# Patient Record
Sex: MALE | Race: WHITE | NOT HISPANIC OR LATINO | URBAN - METROPOLITAN AREA
[De-identification: names, ages, dates, MRNs, and addresses within clinical notes are randomized per-mention and may not be internally consistent; named-entity substitution may affect disease eponyms.]

---

## 2017-10-20 ENCOUNTER — OUTPATIENT (OUTPATIENT)
Dept: OUTPATIENT SERVICES | Facility: HOSPITAL | Age: 82
LOS: 1 days | Discharge: HOME | End: 2017-10-20

## 2017-10-20 DIAGNOSIS — B02.9 ZOSTER WITHOUT COMPLICATIONS: ICD-10-CM

## 2017-10-20 DIAGNOSIS — E11.9 TYPE 2 DIABETES MELLITUS WITHOUT COMPLICATIONS: ICD-10-CM

## 2017-10-20 DIAGNOSIS — I48.91 UNSPECIFIED ATRIAL FIBRILLATION: ICD-10-CM

## 2017-10-20 DIAGNOSIS — I10 ESSENTIAL (PRIMARY) HYPERTENSION: ICD-10-CM

## 2017-10-31 DIAGNOSIS — E55.9 VITAMIN D DEFICIENCY, UNSPECIFIED: ICD-10-CM

## 2017-11-22 ENCOUNTER — OUTPATIENT (OUTPATIENT)
Dept: OUTPATIENT SERVICES | Facility: HOSPITAL | Age: 82
LOS: 1 days | Discharge: HOME | End: 2017-11-22

## 2017-11-22 DIAGNOSIS — E11.9 TYPE 2 DIABETES MELLITUS WITHOUT COMPLICATIONS: ICD-10-CM

## 2017-11-22 DIAGNOSIS — R06.02 SHORTNESS OF BREATH: ICD-10-CM

## 2017-11-22 DIAGNOSIS — I10 ESSENTIAL (PRIMARY) HYPERTENSION: ICD-10-CM

## 2018-07-31 ENCOUNTER — TRANSCRIPTION ENCOUNTER (OUTPATIENT)
Age: 83
End: 2018-07-31

## 2018-10-16 ENCOUNTER — OUTPATIENT (OUTPATIENT)
Dept: OUTPATIENT SERVICES | Facility: HOSPITAL | Age: 83
LOS: 1 days | Discharge: HOME | End: 2018-10-16

## 2018-10-16 DIAGNOSIS — N40.1 BENIGN PROSTATIC HYPERPLASIA WITH LOWER URINARY TRACT SYMPTOMS: ICD-10-CM

## 2018-10-16 DIAGNOSIS — N99.114 POSTPROCEDURAL URETHRAL STRICTURE, MALE, UNSPECIFIED: ICD-10-CM

## 2018-10-16 DIAGNOSIS — R33.9 RETENTION OF URINE, UNSPECIFIED: ICD-10-CM

## 2018-12-03 ENCOUNTER — INPATIENT (INPATIENT)
Facility: HOSPITAL | Age: 83
LOS: 2 days | Discharge: HOME | End: 2018-12-06
Attending: INTERNAL MEDICINE | Admitting: INTERNAL MEDICINE

## 2018-12-03 VITALS — WEIGHT: 279.99 LBS | HEIGHT: 72 IN

## 2018-12-03 DIAGNOSIS — Z98.890 OTHER SPECIFIED POSTPROCEDURAL STATES: Chronic | ICD-10-CM

## 2018-12-03 LAB
ALBUMIN SERPL ELPH-MCNC: 4 G/DL — SIGNIFICANT CHANGE UP (ref 3.5–5.2)
ALP SERPL-CCNC: 104 U/L — SIGNIFICANT CHANGE UP (ref 30–115)
ALT FLD-CCNC: 8 U/L — SIGNIFICANT CHANGE UP (ref 0–41)
ANION GAP SERPL CALC-SCNC: 17 MMOL/L — HIGH (ref 7–14)
APPEARANCE UR: CLEAR — SIGNIFICANT CHANGE UP
AST SERPL-CCNC: 23 U/L — SIGNIFICANT CHANGE UP (ref 0–41)
BASOPHILS # BLD AUTO: 0.01 K/UL — SIGNIFICANT CHANGE UP (ref 0–0.2)
BASOPHILS NFR BLD AUTO: 0.1 % — SIGNIFICANT CHANGE UP (ref 0–1)
BILIRUB SERPL-MCNC: 0.7 MG/DL — SIGNIFICANT CHANGE UP (ref 0.2–1.2)
BILIRUB UR-MCNC: NEGATIVE — SIGNIFICANT CHANGE UP
BUN SERPL-MCNC: 58 MG/DL — HIGH (ref 10–20)
CALCIUM SERPL-MCNC: 7.6 MG/DL — LOW (ref 8.5–10.1)
CHLORIDE SERPL-SCNC: 96 MMOL/L — LOW (ref 98–110)
CO2 SERPL-SCNC: 25 MMOL/L — SIGNIFICANT CHANGE UP (ref 17–32)
COLOR SPEC: YELLOW — SIGNIFICANT CHANGE UP
CREAT SERPL-MCNC: 2.3 MG/DL — HIGH (ref 0.7–1.5)
DIFF PNL FLD: NEGATIVE — SIGNIFICANT CHANGE UP
EOSINOPHIL # BLD AUTO: 0.21 K/UL — SIGNIFICANT CHANGE UP (ref 0–0.7)
EOSINOPHIL NFR BLD AUTO: 2.4 % — SIGNIFICANT CHANGE UP (ref 0–8)
GAS PNL BLDV: SIGNIFICANT CHANGE UP
GLUCOSE BLDC GLUCOMTR-MCNC: 305 MG/DL — HIGH (ref 70–99)
GLUCOSE SERPL-MCNC: 370 MG/DL — HIGH (ref 70–99)
GLUCOSE UR QL: 250 MG/DL
HCT VFR BLD CALC: 37.2 % — LOW (ref 42–52)
HGB BLD-MCNC: 11.6 G/DL — LOW (ref 14–18)
IMM GRANULOCYTES NFR BLD AUTO: 0.6 % — HIGH (ref 0.1–0.3)
KETONES UR-MCNC: NEGATIVE — SIGNIFICANT CHANGE UP
LEUKOCYTE ESTERASE UR-ACNC: ABNORMAL
LYMPHOCYTES # BLD AUTO: 2.1 K/UL — SIGNIFICANT CHANGE UP (ref 1.2–3.4)
LYMPHOCYTES # BLD AUTO: 24.2 % — SIGNIFICANT CHANGE UP (ref 20.5–51.1)
MCHC RBC-ENTMCNC: 27.4 PG — SIGNIFICANT CHANGE UP (ref 27–31)
MCHC RBC-ENTMCNC: 31.2 G/DL — LOW (ref 32–37)
MCV RBC AUTO: 87.7 FL — SIGNIFICANT CHANGE UP (ref 80–94)
MONOCYTES # BLD AUTO: 0.65 K/UL — HIGH (ref 0.1–0.6)
MONOCYTES NFR BLD AUTO: 7.5 % — SIGNIFICANT CHANGE UP (ref 1.7–9.3)
NEUTROPHILS # BLD AUTO: 5.67 K/UL — SIGNIFICANT CHANGE UP (ref 1.4–6.5)
NEUTROPHILS NFR BLD AUTO: 65.2 % — SIGNIFICANT CHANGE UP (ref 42.2–75.2)
NITRITE UR-MCNC: NEGATIVE — SIGNIFICANT CHANGE UP
NT-PROBNP SERPL-SCNC: 2577 PG/ML — HIGH (ref 0–300)
PH UR: 6 — SIGNIFICANT CHANGE UP (ref 5–8)
PLATELET # BLD AUTO: 187 K/UL — SIGNIFICANT CHANGE UP (ref 130–400)
POTASSIUM SERPL-MCNC: 4.9 MMOL/L — SIGNIFICANT CHANGE UP (ref 3.5–5)
POTASSIUM SERPL-SCNC: 4.9 MMOL/L — SIGNIFICANT CHANGE UP (ref 3.5–5)
PROT SERPL-MCNC: 6.2 G/DL — SIGNIFICANT CHANGE UP (ref 6–8)
PROT UR-MCNC: NEGATIVE MG/DL — SIGNIFICANT CHANGE UP
RBC # BLD: 4.24 M/UL — LOW (ref 4.7–6.1)
RBC # FLD: 15.3 % — HIGH (ref 11.5–14.5)
SODIUM SERPL-SCNC: 138 MMOL/L — SIGNIFICANT CHANGE UP (ref 135–146)
SP GR SPEC: 1.02 — SIGNIFICANT CHANGE UP (ref 1.01–1.03)
TROPONIN T SERPL-MCNC: 0.11 NG/ML — CRITICAL HIGH
UROBILINOGEN FLD QL: 0.2 MG/DL — SIGNIFICANT CHANGE UP (ref 0.2–0.2)
WBC # BLD: 8.69 K/UL — SIGNIFICANT CHANGE UP (ref 4.8–10.8)
WBC # FLD AUTO: 8.69 K/UL — SIGNIFICANT CHANGE UP (ref 4.8–10.8)

## 2018-12-03 RX ORDER — SODIUM CHLORIDE 9 MG/ML
1000 INJECTION, SOLUTION INTRAVENOUS
Qty: 0 | Refills: 0 | Status: DISCONTINUED | OUTPATIENT
Start: 2018-12-03 | End: 2018-12-06

## 2018-12-03 RX ORDER — ARIPIPRAZOLE 15 MG/1
1 TABLET ORAL
Qty: 0 | Refills: 0 | COMMUNITY

## 2018-12-03 RX ORDER — INSULIN GLARGINE 100 [IU]/ML
20 INJECTION, SOLUTION SUBCUTANEOUS AT BEDTIME
Qty: 0 | Refills: 0 | Status: DISCONTINUED | OUTPATIENT
Start: 2018-12-03 | End: 2018-12-04

## 2018-12-03 RX ORDER — DEXTROSE 50 % IN WATER 50 %
15 SYRINGE (ML) INTRAVENOUS ONCE
Qty: 0 | Refills: 0 | Status: DISCONTINUED | OUTPATIENT
Start: 2018-12-03 | End: 2018-12-06

## 2018-12-03 RX ORDER — TAMSULOSIN HYDROCHLORIDE 0.4 MG/1
0.4 CAPSULE ORAL AT BEDTIME
Qty: 0 | Refills: 0 | Status: DISCONTINUED | OUTPATIENT
Start: 2018-12-03 | End: 2018-12-06

## 2018-12-03 RX ORDER — AMLODIPINE BESYLATE 2.5 MG/1
5 TABLET ORAL DAILY
Qty: 0 | Refills: 0 | Status: DISCONTINUED | OUTPATIENT
Start: 2018-12-03 | End: 2018-12-06

## 2018-12-03 RX ORDER — INSULIN LISPRO 100/ML
7 VIAL (ML) SUBCUTANEOUS
Qty: 0 | Refills: 0 | Status: DISCONTINUED | OUTPATIENT
Start: 2018-12-03 | End: 2018-12-04

## 2018-12-03 RX ORDER — FUROSEMIDE 40 MG
40 TABLET ORAL DAILY
Qty: 0 | Refills: 0 | Status: DISCONTINUED | OUTPATIENT
Start: 2018-12-03 | End: 2018-12-06

## 2018-12-03 RX ORDER — GLUCAGON INJECTION, SOLUTION 0.5 MG/.1ML
1 INJECTION, SOLUTION SUBCUTANEOUS ONCE
Qty: 0 | Refills: 0 | Status: DISCONTINUED | OUTPATIENT
Start: 2018-12-03 | End: 2018-12-06

## 2018-12-03 RX ORDER — INSULIN LISPRO 100/ML
5 VIAL (ML) SUBCUTANEOUS ONCE
Qty: 0 | Refills: 0 | Status: COMPLETED | OUTPATIENT
Start: 2018-12-03 | End: 2018-12-03

## 2018-12-03 RX ORDER — ASPIRIN/CALCIUM CARB/MAGNESIUM 324 MG
325 TABLET ORAL ONCE
Qty: 0 | Refills: 0 | Status: COMPLETED | OUTPATIENT
Start: 2018-12-03 | End: 2018-12-03

## 2018-12-03 RX ORDER — DEXTROSE 50 % IN WATER 50 %
12.5 SYRINGE (ML) INTRAVENOUS ONCE
Qty: 0 | Refills: 0 | Status: DISCONTINUED | OUTPATIENT
Start: 2018-12-03 | End: 2018-12-06

## 2018-12-03 RX ORDER — INSULIN LISPRO 100/ML
VIAL (ML) SUBCUTANEOUS
Qty: 0 | Refills: 0 | Status: DISCONTINUED | OUTPATIENT
Start: 2018-12-03 | End: 2018-12-06

## 2018-12-03 RX ORDER — DEXTROSE 50 % IN WATER 50 %
25 SYRINGE (ML) INTRAVENOUS ONCE
Qty: 0 | Refills: 0 | Status: DISCONTINUED | OUTPATIENT
Start: 2018-12-03 | End: 2018-12-06

## 2018-12-03 RX ORDER — ATORVASTATIN CALCIUM 80 MG/1
20 TABLET, FILM COATED ORAL AT BEDTIME
Qty: 0 | Refills: 0 | Status: DISCONTINUED | OUTPATIENT
Start: 2018-12-03 | End: 2018-12-06

## 2018-12-03 RX ORDER — HEPARIN SODIUM 5000 [USP'U]/ML
5000 INJECTION INTRAVENOUS; SUBCUTANEOUS EVERY 8 HOURS
Qty: 0 | Refills: 0 | Status: DISCONTINUED | OUTPATIENT
Start: 2018-12-03 | End: 2018-12-06

## 2018-12-03 RX ORDER — LISINOPRIL 2.5 MG/1
40 TABLET ORAL DAILY
Qty: 0 | Refills: 0 | Status: DISCONTINUED | OUTPATIENT
Start: 2018-12-03 | End: 2018-12-06

## 2018-12-03 RX ADMIN — Medication 325 MILLIGRAM(S): at 17:38

## 2018-12-03 NOTE — ED PROVIDER NOTE - PHYSICAL EXAMINATION
GEN: Well appearing, in no apparent distress.    HEAD:  Normocephalic, atraumatic.    EYES:  Clear conjunctivae without injection, drainage or discharge.    ENMT:  Moist MM.    NECK:  Supple, no masses. Normal ROM.    CARDIAC:  RRR, normal S1 and S2, no murmurs, rubs or gallops.    RESP:  Respiratory rate and effort appear normal; lungs are clear to auscultation bilaterally; no rhonchi, rales or wheezes.    ABDOMEN:  Soft, non-tender, non-distended, no masses. Normal BS throughout.    MUSCULOSKELETAL: No leg swelling, no calf tenderness.   NEURO:  AAO x 3. Motor 5/5. Sensory intact. CN II – XII intact.     SKIN:  Normal skin color for age and race, well-perfused; warm and dry.

## 2018-12-03 NOTE — H&P ADULT - ASSESSMENT
83M with PMHx of DMII, HTN, Hydrocephalus s/p  shunt, Afib not on AC presents to [facility] for dizziness x 1 week.    #Dizziness  - Ddx (neuro mediated vs orthostatic vs cardiovascular)  - check monitor for arrhythmic events  - Check orthostatic vitals   - Check TTE, possible hx of CHF. Was prescribed lasix as per pharmacy. Euvolemic on exam   - Neuro eval, PT eval   - consider meclizine for vertigo   - consider cardio evaluation     #Hydrocephalus s/p  shunt   - CTH shows Hypo- to isodense subdural fluid collections over the cerebral   convexities measuring 4-5 mm on each side.   - Evaluated by CT surgery PA in the ED. Images review and this is unlikely SDH    #DMII, uncontrolled   - non compliant with medications at home  - A1c on 10/2018 - 13.1   - Will start insulin regimen     CKD stage III-IV  - stable since october  - likely 2/2 uncontrolled dmii    #HTN  - c/w ace-i and amlodipine    #Atrial fibrillation not on AC  - rate controlled     Ambulate with assistance  DASH diet  DVT PPx: heparin sc   PLEASE CONFIRM HOME MEDS WITH FAMILY IN AM 83M with PMHx of DMII, HTN, Hydrocephalus s/p  shunt, Afib not on AC presents to [facility] for dizziness x 1 week.    #Dizziness  - Ddx (neuro mediated vs orthostatic vs cardiovascular)  - check monitor for arrhythmic events  - Check orthostatic vitals   - Check TTE, possible hx of CHF. Was prescribed lasix as per pharmacy. Euvolemic on exam   - Neuro eval, PT eval   - consider meclizine for vertigo   - consider cardio evaluation     #Hydrocephalus s/p  shunt   - CTH shows Hypo- to isodense subdural fluid collections over the cerebral   convexities measuring 4-5 mm on each side.   - Evaluated by CT surgery PA in the ED. Images review and this is unlikely SDH    #DMII, uncontrolled   - non compliant with medications at home  - A1c on 10/2018 - 13.1   - Will start insulin regimen     CKD stage III-IV  - stable since october  - likely 2/2 uncontrolled dmii    #HTN  - c/w ace-i and amlodipine    #Atrial fibrillation not on AC  - rate controlled     Ambulate with assistance  DASH diet  DVT PPx: heparin sc   PLEASE CONFIRM HOME MEDS WITH FAMILY IN AM. CURRENT MED REC FROM [facility]

## 2018-12-03 NOTE — H&P ADULT - NSHPPHYSICALEXAM_GEN_ALL_CORE
PHYSICAL EXAM:  GEN: No acute distress, obese male   LUNGS: Clear to auscultation bilaterally   HEART: S1/S2 present. RRR.   ABD: Soft, non-tender, non-distended. Bowel sounds present  EXT: NC/NC/NE/2+PP/HUSTON  NEURO: AAOX3 PHYSICAL EXAM:  GEN: No acute distress, obese male   LUNGS/RESP: Clear to auscultation bilaterally   HEART/CVS: S1/S2 present. RRR.   ABD/GI: Soft, non-tender, non-distended. Bowel sounds present  EXT: NC/NC/NE/2+PP/HUSTON  NEURO: AAOX3

## 2018-12-03 NOTE — ED PROVIDER NOTE - PROGRESS NOTE DETAILS
I spoke to memo RESENDEZ at the [facility] and discussed results of CT.  will admit to [facility] for further dizziness work up, inpatient neurosurgery consult, and fuerther management.  pt with mild elevated troponin.  will give aspirin.  CT dicsused with neurosurgery pt likely not with ich given ct read. pt has no dysuria.  no urinary complaints.  no incontinence.

## 2018-12-03 NOTE — ED PROVIDER NOTE - OBJECTIVE STATEMENT
84 yo male with PMHX Afib, NPH s/p  shunt presents for dizziness x 2 weeks. Patient states he was seen at PMD today and was sent in for high sugar. Patient has been having B/l; leg weakness and dizziness. Patient/family denies fevers, chest pain, SOB, abdominal pain, nausea, vomiting, diarrhea, weakness, changes in PO intake, changes in urine output, changes in mental status.

## 2018-12-03 NOTE — H&P ADULT - ATTENDING COMMENTS
Patient seen and examined independently. Resident's H & P reviewed. Agree with the findings and plan of care except,    An 83 years old male presented to the ER with dizziness for the last one week. As per wife while trying to get in van he fell backwards three times over the last few weeks.    EKG: A. Fib @ 68/min. Multiple PVC'S. Non specific ST, T changes.    ASSESSMENT:    1. Dizziness likely due to elevated sugars  2. Uncontrolled DM-2  3. JOHANNY/Obesity  4. DM-2/HTN  5. Hydrocephalus S/P  shunt  6. A. Fib on no A/C  7. TOBIAS on CKD-3    PLAN:    . Increase Lantus to 30 units and Humalog to 10 units before meals  . Monitor FS  . Neurosurgery eval for  shunt.  . Care D/W the Neuro. Recommended neurosurgery eval.  . Not on A/C due to  shunt as per wife  . Kidney function back to baseline  . Plan of care D/W the wife and daughter on bedside.

## 2018-12-03 NOTE — ED PROVIDER NOTE - ATTENDING CONTRIBUTION TO CARE
84 yo m with pmh of afib not on anticoagulation, NPH s/p  shunt sent in for 2 weeks of intermittent dizziness and hyperglyucemia.  no cp, no sob, no abd pain, no back pain, no headache, no neck pain, no fevers, no chills.  no back pain.    No dysuria.  awake, alert.  Lungs clear.  EOMI.  Neck supple full rom.  motor/sensation intact in all 4 ext.  MMM.   FS over 300.  p:  labs, ekg, cxr, ct, ivf, supportive care, reassess.

## 2018-12-03 NOTE — ED PROVIDER NOTE - NS ED ROS FT
Constitutional: No fevers/chills.    Head: No injury, headache.    Eyes:  No visual changes, eye pain or discharge. No injury.    ENMT:  No hearing changes, pain, discharge or infections. No neck pain or stiffness. No loss ROM.    Cardiac:  No chest pain, SOB or edema. No chest pain with exertion.    Respiratory:  No cough or respiratory distress.     GI:  No nausea, vomiting, diarrhea or abdominal pain. No anorexia. No change in PO intake.    :  No frequency, urgency or burning. No change in urine output.    MS:  No leg pain, leg swelling, myalgia, muscle weakness, joint pain or back pain. No loss ROM.    Neuro:  (+) dizziness and generalized weakness.  No LOC.    Skin:  No skin rash.

## 2018-12-03 NOTE — H&P ADULT - HISTORY OF PRESENT ILLNESS
83M with PMHx of DMII, HTN, Hydrocephalus s/p  shunt, Afib not on AC presents to [facility] for dizziness x 1 week. Patient has had intermittent feelings of dizziness described as room spinning sensation. Occurs mostly with ambulations. He admits to blurry vision not associated with the episode but has seen ophthalmologist and diagnosed with macular degeneration of right eye. Denies any headaches, LOC. He otherwise denies any fevers, chills, N/V, diarrhea, chest pain, palpitations. Of note patient has history of uncontrolled DMII, recent A1c 13.1, has not been taking his medications.     In ED vitals; /74, HR 78, 98% on RA, RR 18. Finger stick in 300s.

## 2018-12-03 NOTE — H&P ADULT - NSHPLABSRESULTS_GEN_ALL_CORE
Vitals:    Vital Signs Last 24 Hrs  T(C): 35.8 (03 Dec 2018 22:09), Max: 36.6 (03 Dec 2018 17:47)  T(F): 96.4 (03 Dec 2018 22:09), Max: 97.8 (03 Dec 2018 17:47)  HR: 50 (03 Dec 2018 22:09) (50 - 84)  BP: 125/65 (03 Dec 2018 22:09) (109/59 - 148/74)  BP(mean): --  RR: 18 (03 Dec 2018 22:09) (18 - 19)  SpO2: 98% (03 Dec 2018 19:42) (97% - 98%)    Labs:         138  |  96<L>  |  58<H>  ----------------------------<  370<H>  4.9   |  25  |  2.3<H>    Ca    7.6<L>      03 Dec 2018 15:45    TPro  6.2  /  Alb  4.0  /  TBili  0.7  /  DBili  x   /  AST  23  /  ALT  8   /  AlkPhos  104                            11.6   8.69  )-----------( 187      ( 03 Dec 2018 15:45 )             37.2     CARDIAC MARKERS ( 03 Dec 2018 15:45 )  x     / 0.11 ng/mL / x     / x     / x          LIVER FUNCTIONS - ( 03 Dec 2018 15:45 )  Alb: 4.0 g/dL / Pro: 6.2 g/dL / ALK PHOS: 104 U/L / ALT: 8 U/L / AST: 23 U/L / GGT: x             Urinalysis Basic - ( 03 Dec 2018 19:00 )    Color: Yellow / Appearance: Clear / S.020 / pH: x  Gluc: x / Ketone: Negative  / Bili: Negative / Urobili: 0.2 mg/dL   Blood: x / Protein: Negative mg/dL / Nitrite: Negative   Leuk Esterase: Small / RBC: 3-5 /HPF / WBC 10-25 /HPF   Sq Epi: x / Non Sq Epi: Few /HPF / Bacteria: Few        RADIOLOGY    < from: CT Head No Cont (18 @ 16:39) >    IMPRESSION:    1.  Right transfrontal ventricular shunt catheter present. The ventricles   are not significantly enlarged.    2.  Hypo- to isodense subdural fluid collections over the cerebral   convexities measuring 4-5 mm on each side. Given the presence of a shunt   catheter and the appearance and symmetry of the collections, favor   sequela of over-shunting rather than subacute subdural hematomas.    3.  Parenchymal edema in the right frontal lobe surrounding the shunt   catheter, likely reflecting catheter placement.    4.  Direct comparison with the patient's previous outside imaging studies   is recommended. If these can be made available an addendum will be issued   as appropriate.      < end of copied text >

## 2018-12-03 NOTE — ED PROVIDER NOTE - MEDICAL DECISION MAKING DETAILS
Pt sent in by PMD for intermittent dizziness.  Pt also with hyperglycemia.  Pt not acidotic, not in dka.  Pt with slightly elevated troponin.  No chest pain.  Pt also with history of  shunt.  I spoke to neurosurgery PA at [facility] and discussed CT reading.  Pt admitted to [facility] tele for further management, inpatient neuro/neurosurgery and cardiology evaluation.

## 2018-12-04 LAB
ANION GAP SERPL CALC-SCNC: 16 MMOL/L — HIGH (ref 7–14)
BUN SERPL-MCNC: 47 MG/DL — HIGH (ref 10–20)
CALCIUM SERPL-MCNC: 8.3 MG/DL — LOW (ref 8.5–10.1)
CHLORIDE SERPL-SCNC: 98 MMOL/L — SIGNIFICANT CHANGE UP (ref 98–110)
CK MB CFR SERPL CALC: 4.5 NG/ML — SIGNIFICANT CHANGE UP (ref 0.6–6.3)
CK MB CFR SERPL CALC: 4.7 NG/ML — SIGNIFICANT CHANGE UP (ref 0.6–6.3)
CK SERPL-CCNC: 56 U/L — SIGNIFICANT CHANGE UP (ref 0–225)
CK SERPL-CCNC: 57 U/L — SIGNIFICANT CHANGE UP (ref 0–225)
CO2 SERPL-SCNC: 31 MMOL/L — SIGNIFICANT CHANGE UP (ref 17–32)
CREAT SERPL-MCNC: 1.9 MG/DL — HIGH (ref 0.7–1.5)
CULTURE RESULTS: NO GROWTH — SIGNIFICANT CHANGE UP
ESTIMATED AVERAGE GLUCOSE: 283 MG/DL — HIGH (ref 68–114)
GLUCOSE BLDC GLUCOMTR-MCNC: 203 MG/DL — HIGH (ref 70–99)
GLUCOSE BLDC GLUCOMTR-MCNC: 269 MG/DL — HIGH (ref 70–99)
GLUCOSE BLDC GLUCOMTR-MCNC: 304 MG/DL — HIGH (ref 70–99)
GLUCOSE BLDC GLUCOMTR-MCNC: 320 MG/DL — HIGH (ref 70–99)
GLUCOSE BLDC GLUCOMTR-MCNC: 365 MG/DL — HIGH (ref 70–99)
GLUCOSE BLDC GLUCOMTR-MCNC: 419 MG/DL — HIGH (ref 70–99)
GLUCOSE SERPL-MCNC: 276 MG/DL — HIGH (ref 70–99)
HBA1C BLD-MCNC: 11.5 % — HIGH (ref 4–5.6)
MAGNESIUM SERPL-MCNC: 2.3 MG/DL — SIGNIFICANT CHANGE UP (ref 1.8–2.4)
POTASSIUM SERPL-MCNC: 3.9 MMOL/L — SIGNIFICANT CHANGE UP (ref 3.5–5)
POTASSIUM SERPL-SCNC: 3.9 MMOL/L — SIGNIFICANT CHANGE UP (ref 3.5–5)
SODIUM SERPL-SCNC: 145 MMOL/L — SIGNIFICANT CHANGE UP (ref 135–146)
SPECIMEN SOURCE: SIGNIFICANT CHANGE UP
TROPONIN T SERPL-MCNC: 0.06 NG/ML — CRITICAL HIGH
TROPONIN T SERPL-MCNC: 0.06 NG/ML — CRITICAL HIGH

## 2018-12-04 RX ORDER — INSULIN GLARGINE 100 [IU]/ML
30 INJECTION, SOLUTION SUBCUTANEOUS AT BEDTIME
Qty: 0 | Refills: 0 | Status: DISCONTINUED | OUTPATIENT
Start: 2018-12-04 | End: 2018-12-05

## 2018-12-04 RX ORDER — INSULIN LISPRO 100/ML
10 VIAL (ML) SUBCUTANEOUS
Qty: 0 | Refills: 0 | Status: DISCONTINUED | OUTPATIENT
Start: 2018-12-04 | End: 2018-12-05

## 2018-12-04 RX ADMIN — Medication 10 UNIT(S): at 12:38

## 2018-12-04 RX ADMIN — Medication 6: at 12:38

## 2018-12-04 RX ADMIN — Medication 5 UNIT(S): at 04:07

## 2018-12-04 RX ADMIN — INSULIN GLARGINE 30 UNIT(S): 100 INJECTION, SOLUTION SUBCUTANEOUS at 22:00

## 2018-12-04 RX ADMIN — Medication 10 UNIT(S): at 17:20

## 2018-12-04 RX ADMIN — Medication 40 MILLIGRAM(S): at 05:58

## 2018-12-04 RX ADMIN — Medication 4: at 07:55

## 2018-12-04 RX ADMIN — Medication 75 MILLIGRAM(S): at 06:01

## 2018-12-04 RX ADMIN — ATORVASTATIN CALCIUM 20 MILLIGRAM(S): 80 TABLET, FILM COATED ORAL at 21:55

## 2018-12-04 RX ADMIN — Medication 2: at 17:21

## 2018-12-04 RX ADMIN — HEPARIN SODIUM 5000 UNIT(S): 5000 INJECTION INTRAVENOUS; SUBCUTANEOUS at 14:01

## 2018-12-04 RX ADMIN — TAMSULOSIN HYDROCHLORIDE 0.4 MILLIGRAM(S): 0.4 CAPSULE ORAL at 21:55

## 2018-12-04 RX ADMIN — HEPARIN SODIUM 5000 UNIT(S): 5000 INJECTION INTRAVENOUS; SUBCUTANEOUS at 05:59

## 2018-12-04 RX ADMIN — Medication 75 MILLIGRAM(S): at 14:01

## 2018-12-04 RX ADMIN — AMLODIPINE BESYLATE 5 MILLIGRAM(S): 2.5 TABLET ORAL at 05:58

## 2018-12-04 RX ADMIN — LISINOPRIL 40 MILLIGRAM(S): 2.5 TABLET ORAL at 05:58

## 2018-12-04 RX ADMIN — Medication 75 MILLIGRAM(S): at 21:59

## 2018-12-04 RX ADMIN — HEPARIN SODIUM 5000 UNIT(S): 5000 INJECTION INTRAVENOUS; SUBCUTANEOUS at 21:59

## 2018-12-04 NOTE — MEDICAL STUDENT PROGRESS NOTE(EDUCATION) - SUBJECTIVE AND OBJECTIVE BOX
History of Present Illness:  Reason for Admission: Dizziness	  History of Present Illness: 	  83M with PMHx of DMII, HTN, Hydrocephalus s/p  shunt, Afib not on AC presents to [facility] for dizziness x 1 week. Patient has had intermittent feelings of dizziness described as room spinning sensation. Occurs mostly with ambulations. He admits to blurry vision not associated with the episode but has seen ophthalmologist and diagnosed with macular degeneration of right eye. Denies any headaches, LOC. He otherwise denies any fevers, chills, N/V, diarrhea, chest pain, palpitations. Of note patient has history of uncontrolled DMII, recent A1c 13.1, has not been taking his medications.     In ED vitals; /74, HR 78, 98% on RA, RR 18. Finger stick in 300s.     Interval History:  Patient was seen at bedside and appeared to be in no acute distress. He describes feeling dizzy mostly after ambulating and describes these episodes as like a spinning sensation starting within the last week, occurring intermittently occasionally to the point when he needs to lay down. He hasn't been on any diabetes medication medications for over a month and his sugars were in the 300s. No shaking or eye movements were noted by the family during these episodes. No n/v/d/c.    PHYSICAL EXAM:  	GEN: No acute distress, obese male   	LUNGS: Clear to auscultation bilaterally   	HEART: S1/S2 present. RRR.   	ABD: Soft, non-tender, non-distended. Bowel sounds present  	EXT: NC/NC/NE/2+PP/HUSTON  NEURO:   Gen- Patient is alert and oriented to person, place, and location. Recent and remote memory intact. Fund of knowledge is intact and normal. Language with normal repetition, comprehension, and naming. Nondysarthric  Cranial nerves- EOMI w/o nystagmus, skew, or reported double vision. PERRL. No ptosis/weakness or eyelid closure. No facial droop was noted. Facial sensation was intact. Mild hearing loss noted bilaterally. Tongue midline  Motor examination- Normal bulk, Normal tone in the UE and LE b/l; strength 5/5 in the UE and LE b/l  Sensation-   Reflexes- 2+ b/l biceps, triceps, brachioradialis, patella, and achilles. No Babinski.  Cerebellum- FTN and heel to shin testing was normal  Gait- Pt was able to ambulate independently. 4 step turn        Allergies and Intolerances:        Allergies:  	No Known Allergies:       MEDICATIONS  (STANDING):  amLODIPine   Tablet 5 milliGRAM(s) Oral daily  atorvastatin 20 milliGRAM(s) Oral at bedtime  dextrose 5%. 1000 milliLiter(s) (50 mL/Hr) IV Continuous <Continuous>  dextrose 50% Injectable 12.5 Gram(s) IV Push once  dextrose 50% Injectable 25 Gram(s) IV Push once  dextrose 50% Injectable 25 Gram(s) IV Push once  furosemide    Tablet 40 milliGRAM(s) Oral daily  heparin  Injectable 5000 Unit(s) SubCutaneous every 8 hours  insulin glargine Injectable (LANTUS) 30 Unit(s) SubCutaneous at bedtime  insulin lispro (HumaLOG) corrective regimen sliding scale   SubCutaneous three times a day before meals  insulin lispro Injectable (HumaLOG) 10 Unit(s) SubCutaneous three times a day before meals  lisinopril 40 milliGRAM(s) Oral daily  pregabalin 75 milliGRAM(s) Oral three times a day  tamsulosin 0.4 milliGRAM(s) Oral at bedtime    MEDICATIONS  (PRN):  dextrose 40% Gel 15 Gram(s) Oral once PRN Blood Glucose LESS THAN 70 milliGRAM(s)/deciliter  glucagon  Injectable 1 milliGRAM(s) IntraMuscular once PRN Glucose LESS THAN 70 milligrams/deciliter History of Present Illness:  Reason for Admission: Dizziness	  History of Present Illness: 	  83M with PMHx of DMII, HTN, Hydrocephalus s/p  shunt, Afib not on AC presents to [facility] for dizziness x 1 week. Patient has had intermittent feelings of dizziness described as room spinning sensation. Occurs mostly with ambulations. He admits to blurry vision not associated with the episode but has seen ophthalmologist and diagnosed with macular degeneration of right eye. Denies any headaches, LOC. He otherwise denies any fevers, chills, N/V, diarrhea, chest pain, palpitations. Of note patient has history of uncontrolled DMII, recent A1c 13.1, has not been taking his medications.     In ED vitals; /74, HR 78, 98% on RA, RR 18. Finger stick in 300s.     Interval History:  Patient was seen at bedside and appeared to be in no acute distress. He describes feeling dizzy mostly after ambulating and describes these episodes as like a spinning sensation starting within the last week, occurring intermittently occasionally to the point when he needs to lay down. He hasn't been on any diabetes medication medications for over a month and his sugars were in the 300s. No shaking or eye movements were noted by the family during these episodes. No n/v/d/c.    PHYSICAL EXAM:  	GEN: No acute distress, obese male   	LUNGS: Clear to auscultation bilaterally   	HEART: S1/S2 present. RRR.   	ABD: Soft, non-tender, non-distended. Bowel sounds present  	EXT: NC/NC/NE/2+PP/HUSTON  NEURO:   Gen- Patient is alert and oriented to person, place, and location. Recent and remote memory intact. Fund of knowledge is intact and normal. Language with normal repetition, comprehension, and naming. Nondysarthric  Cranial nerves- EOMI w/o nystagmus, skew, or reported double vision. PERRL. No ptosis/weakness or eyelid closure. No facial droop was noted. Facial sensation was intact. Mild hearing loss noted bilaterally. Tongue midline  Motor examination- Normal bulk, Normal tone in the UE and LE b/l; strength 5/5 in the UE and LE b/l  Sensation-   Reflexes- 2+ b/l biceps, triceps, brachioradialis, patella, and achilles. No Babinski.  Cerebellum- FTN and heel to shin testing was normal  Gait- Pt was able to ambulate independently. 4 step turn        Allergies and Intolerances:        Allergies:  	No Known Allergies:       MEDICATIONS  (STANDING):  amLODIPine   Tablet 5 milliGRAM(s) Oral daily  atorvastatin 20 milliGRAM(s) Oral at bedtime  dextrose 5%. 1000 milliLiter(s) (50 mL/Hr) IV Continuous <Continuous>  dextrose 50% Injectable 12.5 Gram(s) IV Push once  dextrose 50% Injectable 25 Gram(s) IV Push once  dextrose 50% Injectable 25 Gram(s) IV Push once  furosemide    Tablet 40 milliGRAM(s) Oral daily  heparin  Injectable 5000 Unit(s) SubCutaneous every 8 hours  insulin glargine Injectable (LANTUS) 30 Unit(s) SubCutaneous at bedtime  insulin lispro (HumaLOG) corrective regimen sliding scale   SubCutaneous three times a day before meals  insulin lispro Injectable (HumaLOG) 10 Unit(s) SubCutaneous three times a day before meals  lisinopril 40 milliGRAM(s) Oral daily  pregabalin 75 milliGRAM(s) Oral three times a day  tamsulosin 0.4 milliGRAM(s) Oral at bedtime    MEDICATIONS  (PRN):  dextrose 40% Gel 15 Gram(s) Oral once PRN Blood Glucose LESS THAN 70 milliGRAM(s)/deciliter  glucagon  Injectable 1 milliGRAM(s) IntraMuscular once PRN Glucose LESS THAN 70 milligrams/deciliter    Labs:  Basic Metabolic Panel in AM (12.04.18 @ 07:33)    Sodium, Serum: 145 mmol/L    Potassium, Serum: 3.9 mmol/L    Chloride, Serum: 98 mmol/L    Carbon Dioxide, Serum: 31 mmol/L    Anion Gap, Serum: 16 mmol/L    Blood Urea Nitrogen, Serum: 47 mg/dL    Creatinine, Serum: 1.9 mg/dL    Glucose, Serum: 276 mg/dL    Calcium, Total Serum: 8.3 mg/dL    eGFR if Non : 32: Interpretative comment    POCT  Blood Glucose (12.04.18 @ 07:48)    POCT Blood Glucose.: 304: RNNotify RB Clnd mtr mg/dL      Imaging:    < from: CT Head No Cont (12.03.18 @ 16:39) >  EXAM:  CT BRAIN            PROCEDURE DATE:  12/03/2018            INTERPRETATION:  Clinical History / Reason for exam: Dizziness. History   of normal pressure hydrocephalus. Status post shunt.    Technique: Noncontrast head CT. Contiguous CT axial images of the head   from the base to the vertex.    Comparison: None available    Findings:    There is a right transfrontal ventricular shunt catheter entering via   right frontal bo hole with distal tip terminating in the atrium of the   right lateral ventricle. Shunt reservoir is in the right parietal scalp   soft tissues with superficial catheter traversing the soft tissues of the   right scalp and neck. There is parenchymal edema in the right frontal   lobe surrounding the catheter tract.    The ventricles are not significantly enlarged.     There are hypo- to isodense subdural fluid collections over the cerebral   convexities measuring up to 4 to 5 mm on each side.    There are patchy hypodensities in the cerebral hemispheric white matter   likely on the basis of chronic microvascular changes.    There is calcific atherosclerotic disease at the skull base.    The calvarium is otherwise intact. The paranasal sinuses and mastoids are   well-aerated.    IMPRESSION:    1.  Right transfrontal ventricular shunt catheter present. The ventricles   are not significantly enlarged.    2.  Hypo- to isodense subdural fluid collections over the cerebral   convexities measuring 4-5 mm on each side. Given the presence of a shunt   catheter and the appearance and symmetry of the collections, favor   sequela of over-shunting rather than subacute subdural hematomas.    3.  Parenchymal edema in the right frontal lobe surrounding the shunt   catheter, likely reflecting catheter placement.    4.  Direct comparison with the patient's previous outside imaging studies   is recommended. If these can be made available an addendum will be issued   as appropriate.                  AMY YOUNG M.D., ATTENDING RADIOLOGIST  This document has been electronically signed. Dec  3 2018  4:49PM    < end of copied text > History of Present Illness:  Reason for Admission: Dizziness	  History of Present Illness: 	  83M with PMHx of DMII, HTN, Hydrocephalus s/p  shunt, Afib not on AC presents to [facility] for dizziness x 1 week. Patient has had intermittent feelings of dizziness described as room spinning sensation. Occurs mostly with ambulations. He admits to blurry vision not associated with the episode but has seen ophthalmologist and diagnosed with macular degeneration of right eye. Denies any headaches, LOC. He otherwise denies any fevers, chills, N/V, diarrhea, chest pain, palpitations. Of note patient has history of uncontrolled DMII, recent A1c 13.1, has not been taking his medications.     In ED vitals; /74, HR 78, 98% on RA, RR 18. Finger stick in 300s.     Interval History:  Patient was seen at bedside and appeared to be in no acute distress. He describes feeling dizzy mostly after ambulating and describes these episodes as like a spinning sensation starting within the last week, occurring intermittently occasionally to the point when he needs to lay down. He hasn't been on any diabetes medication medications for over a month and his sugars were in the 300s. No shaking or eye movements were noted by the family during these episodes. No n/v/d/c.    PHYSICAL EXAM:  	GEN: No acute distress, obese male   	LUNGS: Clear to auscultation bilaterally   	HEART: S1/S2 present. RRR.   	ABD: Soft, non-tender, non-distended. Bowel sounds present  	EXT: NC/NC/NE/2+PP/HUSTON  NEURO:   Gen- Patient is alert and oriented to person, place, and location. Recent and remote memory intact. Fund of knowledge is intact and normal. Language with normal repetition, comprehension, and naming. Nondysarthric  Cranial nerves- EOMI w/o nystagmus, skew, or reported double vision. PERRL. No ptosis/weakness or eyelid closure. No facial droop was noted. Facial sensation was intact. Mild hearing loss noted bilaterally. Tongue midline  Motor examination- Normal bulk, Normal tone in the UE and LE b/l; strength 5/5 in the UE and LE b/l  Sensation- Sensation to light touch and pinprick were present in the UE; sensation to light touch was mildly decreased in the distal LE.   Reflexes- 2+ b/l biceps, triceps, brachioradialis, patella, and achilles. No Babinski.  Cerebellum- FTN and heel to shin testing was normal  Gait- Pt was able to ambulate independently. 4 step turn        Allergies and Intolerances:        Allergies:  	No Known Allergies:       MEDICATIONS  (STANDING):  amLODIPine   Tablet 5 milliGRAM(s) Oral daily  atorvastatin 20 milliGRAM(s) Oral at bedtime  dextrose 5%. 1000 milliLiter(s) (50 mL/Hr) IV Continuous <Continuous>  dextrose 50% Injectable 12.5 Gram(s) IV Push once  dextrose 50% Injectable 25 Gram(s) IV Push once  dextrose 50% Injectable 25 Gram(s) IV Push once  furosemide    Tablet 40 milliGRAM(s) Oral daily  heparin  Injectable 5000 Unit(s) SubCutaneous every 8 hours  insulin glargine Injectable (LANTUS) 30 Unit(s) SubCutaneous at bedtime  insulin lispro (HumaLOG) corrective regimen sliding scale   SubCutaneous three times a day before meals  insulin lispro Injectable (HumaLOG) 10 Unit(s) SubCutaneous three times a day before meals  lisinopril 40 milliGRAM(s) Oral daily  pregabalin 75 milliGRAM(s) Oral three times a day  tamsulosin 0.4 milliGRAM(s) Oral at bedtime    MEDICATIONS  (PRN):  dextrose 40% Gel 15 Gram(s) Oral once PRN Blood Glucose LESS THAN 70 milliGRAM(s)/deciliter  glucagon  Injectable 1 milliGRAM(s) IntraMuscular once PRN Glucose LESS THAN 70 milligrams/deciliter    Labs:  Basic Metabolic Panel in AM (12.04.18 @ 07:33)    Sodium, Serum: 145 mmol/L    Potassium, Serum: 3.9 mmol/L    Chloride, Serum: 98 mmol/L    Carbon Dioxide, Serum: 31 mmol/L    Anion Gap, Serum: 16 mmol/L    Blood Urea Nitrogen, Serum: 47 mg/dL    Creatinine, Serum: 1.9 mg/dL    Glucose, Serum: 276 mg/dL    Calcium, Total Serum: 8.3 mg/dL    eGFR if Non : 32: Interpretative comment    POCT  Blood Glucose (12.04.18 @ 07:48)    POCT Blood Glucose.: 304: RNNotify RB Clnd mtr mg/dL      Imaging:    < from: CT Head No Cont (12.03.18 @ 16:39) >  EXAM:  CT BRAIN            PROCEDURE DATE:  12/03/2018            INTERPRETATION:  Clinical History / Reason for exam: Dizziness. History   of normal pressure hydrocephalus. Status post shunt.    Technique: Noncontrast head CT. Contiguous CT axial images of the head   from the base to the vertex.    Comparison: None available    Findings:    There is a right transfrontal ventricular shunt catheter entering via   right frontal bo hole with distal tip terminating in the atrium of the   right lateral ventricle. Shunt reservoir is in the right parietal scalp   soft tissues with superficial catheter traversing the soft tissues of the   right scalp and neck. There is parenchymal edema in the right frontal   lobe surrounding the catheter tract.    The ventricles are not significantly enlarged.     There are hypo- to isodense subdural fluid collections over the cerebral   convexities measuring up to 4 to 5 mm on each side.    There are patchy hypodensities in the cerebral hemispheric white matter   likely on the basis of chronic microvascular changes.    There is calcific atherosclerotic disease at the skull base.    The calvarium is otherwise intact. The paranasal sinuses and mastoids are   well-aerated.    IMPRESSION:    1.  Right transfrontal ventricular shunt catheter present. The ventricles   are not significantly enlarged.    2.  Hypo- to isodense subdural fluid collections over the cerebral   convexities measuring 4-5 mm on each side. Given the presence of a shunt   catheter and the appearance and symmetry of the collections, favor   sequela of over-shunting rather than subacute subdural hematomas.    3.  Parenchymal edema in the right frontal lobe surrounding the shunt   catheter, likely reflecting catheter placement.    4.  Direct comparison with the patient's previous outside imaging studies   is recommended. If these can be made available an addendum will be issued   as appropriate.                  AMY YOUNG M.D., ATTENDING RADIOLOGIST  This document has been electronically signed. Dec  3 2018  4:49PM    < end of copied text > History of Present Illness:  Reason for Admission: Dizziness	  History of Present Illness: 	  83M with PMHx of DMII, HTN, Hydrocephalus s/p  shunt, Afib not on AC presents to [facility] for dizziness x 1 week. Patient has had intermittent feelings of dizziness described as room spinning sensation. Occurs mostly with ambulations. He admits to blurry vision not associated with the episode but has seen ophthalmologist and diagnosed with macular degeneration of right eye. Denies any headaches, LOC. He otherwise denies any fevers, chills, N/V, diarrhea, chest pain, palpitations. Of note patient has history of uncontrolled DMII, recent A1c 13.1, has not been taking his medications.     In ED vitals; /74, HR 78, 98% on RA, RR 18. Finger stick in 300s.     Interval History:  Patient was seen at bedside and appeared to be in no acute distress. He describes feeling dizzy mostly after ambulating and describes these episodes as like a spinning sensation starting within the last week, occurring intermittently occasionally to the point when he needs to lay down. He hasn't been on any diabetes medication medications for over a month and his sugars were in the 300s. No shaking or eye movements were noted by the family during these episodes. No n/v/d/c.  Has known prior SDH seen in last f/u with neurosurgery Dr. Parker at [facility] in July 2018 was told that SDH is ok.      PHYSICAL EXAM:  	GEN: No acute distress, obese male   	LUNGS: Clear to auscultation bilaterally   	HEART: S1/S2 present. RRR.   	ABD: Soft, non-tender, non-distended. Bowel sounds present  	EXT: NC/NC/NE/2+PP/HUSTON  NEURO:   Gen- Patient is alert and oriented to person, place, and location. Recent and remote memory intact. Fund of knowledge is intact and normal. Language with normal repetition, comprehension, and naming. Nondysarthric  Cranial nerves- EOMI w/o nystagmus, skew, or reported double vision. PERRL. No ptosis/weakness or eyelid closure. No facial droop was noted. Facial sensation was intact. Mild hearing loss noted bilaterally. Tongue midline  Motor examination- Normal bulk, Normal tone in the UE and LE b/l; strength 5/5 in the UE and LE b/l  Sensation- Sensation to light touch and pinprick were present in the UE; sensation to light touch was mildly decreased in the distal LE.   Reflexes- 2+ b/l biceps, triceps, brachioradialis, patella, and achilles. No Babinski.  Cerebellum- FTN and heel to shin testing was normal  Gait- Pt was able to ambulate independently. 4 step turn    Allergies and Intolerances:        Allergies:  	No Known Allergies:     MEDICATIONS  (STANDING):  amLODIPine   Tablet 5 milliGRAM(s) Oral daily  atorvastatin 20 milliGRAM(s) Oral at bedtime  dextrose 5%. 1000 milliLiter(s) (50 mL/Hr) IV Continuous <Continuous>  dextrose 50% Injectable 12.5 Gram(s) IV Push once  dextrose 50% Injectable 25 Gram(s) IV Push once  dextrose 50% Injectable 25 Gram(s) IV Push once  furosemide    Tablet 40 milliGRAM(s) Oral daily  heparin  Injectable 5000 Unit(s) SubCutaneous every 8 hours  insulin glargine Injectable (LANTUS) 30 Unit(s) SubCutaneous at bedtime  insulin lispro (HumaLOG) corrective regimen sliding scale   SubCutaneous three times a day before meals  insulin lispro Injectable (HumaLOG) 10 Unit(s) SubCutaneous three times a day before meals  lisinopril 40 milliGRAM(s) Oral daily  pregabalin 75 milliGRAM(s) Oral three times a day  tamsulosin 0.4 milliGRAM(s) Oral at bedtime    MEDICATIONS  (PRN):  dextrose 40% Gel 15 Gram(s) Oral once PRN Blood Glucose LESS THAN 70 milliGRAM(s)/deciliter  glucagon  Injectable 1 milliGRAM(s) IntraMuscular once PRN Glucose LESS THAN 70 milligrams/deciliter    Labs:  Basic Metabolic Panel in AM (12.04.18 @ 07:33)    Sodium, Serum: 145 mmol/L    Potassium, Serum: 3.9 mmol/L    Chloride, Serum: 98 mmol/L    Carbon Dioxide, Serum: 31 mmol/L    Anion Gap, Serum: 16 mmol/L    Blood Urea Nitrogen, Serum: 47 mg/dL    Creatinine, Serum: 1.9 mg/dL    Glucose, Serum: 276 mg/dL    Calcium, Total Serum: 8.3 mg/dL    eGFR if Non : 32: Interpretative comment    POCT  Blood Glucose (12.04.18 @ 07:48)    POCT Blood Glucose.: 304: RNNotify RB Clnd mtr mg/dL      Imaging:    < from: CT Head No Cont (12.03.18 @ 16:39) >  EXAM:  CT BRAIN            PROCEDURE DATE:  12/03/2018            INTERPRETATION:  Clinical History / Reason for exam: Dizziness. History   of normal pressure hydrocephalus. Status post shunt.    Technique: Noncontrast head CT. Contiguous CT axial images of the head   from the base to the vertex.    Comparison: None available    Findings:    There is a right transfrontal ventricular shunt catheter entering via   right frontal bo hole with distal tip terminating in the atrium of the   right lateral ventricle. Shunt reservoir is in the right parietal scalp   soft tissues with superficial catheter traversing the soft tissues of the   right scalp and neck. There is parenchymal edema in the right frontal   lobe surrounding the catheter tract.    The ventricles are not significantly enlarged.     There are hypo- to isodense subdural fluid collections over the cerebral   convexities measuring up to 4 to 5 mm on each side.    There are patchy hypodensities in the cerebral hemispheric white matter   likely on the basis of chronic microvascular changes.    There is calcific atherosclerotic disease at the skull base.    The calvarium is otherwise intact. The paranasal sinuses and mastoids are   well-aerated.    IMPRESSION:    1.  Right transfrontal ventricular shunt catheter present. The ventricles   are not significantly enlarged.    2.  Hypo- to isodense subdural fluid collections over the cerebral   convexities measuring 4-5 mm on each side. Given the presence of a shunt   catheter and the appearance and symmetry of the collections, favor   sequela of over-shunting rather than subacute subdural hematomas.    3.  Parenchymal edema in the right frontal lobe surrounding the shunt   catheter, likely reflecting catheter placement.    4.  Direct comparison with the patient's previous outside imaging studies   is recommended. If these can be made available an addendum will be issued   as appropriate.                  AMY YOUNG M.D., ATTENDING RADIOLOGIST  This document has been electronically signed. Dec  3 2018  4:49PM    < end of copied text >

## 2018-12-04 NOTE — CONSULT NOTE ADULT - ASSESSMENT
IMPRESSION: Rehab of gait ab dizziness falls- multifactorial- neuropathy/RO CAD/RO Shunt     PRECAUTIONS: [   x ] Cardiac  [    ] Respiratory  [    ] Seizures [    ] Contact Isolation  [    ] Droplet Isolation  [    ] Other    Weight Bearing Status:     RECOMMENDATION:    Out of Bed to Chair     DVT/Decubiti Prophylaxis    REHAB PLAN:     [  x   ] Bedside P/T 3-5 times a week   [     ] Bedside O/T  2-3 times a week   [     ] No Rehab Therapy Indicated   [     ]  Speech Therapy   Conditioning/ROM                                 ADL  Bed Mobility                                            Conditioning/ROM  Transfers                                                  Bed Mobility  Sitting /Standing Balance                      Transfers                                        Gait Training                                            Sitting/Standing Balance  Stair Training [   ]Applicable                 Home equipment Eval                                                                     Splinting  [   ] Only      GOALS:   ADL   [    ]   Independent         Transfers  [    ] Independent            Ambulation  [     ] Independent     [     ] With device                            [    ]  CG                                               [    ]  CG                                                    [     ] CG                            [    ] Min A                                          [    ] Min A                                                [     ] Min  A          DISCHARGE PLAN:   [     ]  Good candidate for Intensive Rehabilitation/Hospital based                                             Will tolerate 3hrs Intensive Rehab Daily                                       [      ]  Short Term Rehab in Skilled Nursing Facility                                       [  x    ]  Home with Outpatient or VN services USE DEVICE FOR SAFETY                                         [      ]  Possible Candidate for Intensive Hospital based Rehab

## 2018-12-04 NOTE — MEDICAL STUDENT PROGRESS NOTE(EDUCATION) - NS MD HP STUD SUPERVISOR COMMENTS FT
Pt seen and examined.  Agree with above.  82 y/o M w/ PMHx of DMII, HTN, Hydrocephalus s/p  shunt, Afib not on AC p/w 1 week slowly progressive sensation of imbalance with no obvious focal neurologic deficits except mild asterixis likely toxic/metabolic off medications last month.  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      Plan:  - f/u neurosurgery recommendations regarding chronic SDH and VPS management  - check TSH, NH3, B12, RPR,   - PT eval and treat  - if no neurosurgical intervention, recommend f/u with Dr. Parker at [facility] for further VPS management

## 2018-12-04 NOTE — MEDICAL STUDENT PROGRESS NOTE(EDUCATION) - SUBJECTIVE AND OBJECTIVE BOX
Provider Specialty    Internal Medicine    HPI:   83M with PMHx of DMII, HTN, macular degeneration, CKD III, NPH s/p  shunt, Afib not on AC presents to the [facility] for dizziness of 2-3 week duration. Patient has had intermittent feelings of dizziness with ambulation in which he feels that he is spinning. He also reports blurry vision but notes it is no different from the blurry vision he has been experiencing from macular degeneration. The patient also reports recent congestion and increased frequency of urination. He otherwise denies any HA, LOC, fevers, chills, N/V, diarrhea, chest pain, palpitations, neck pain. The patient is non compliant with his DMII medications, recent A1c 13.1.   In ED vitals; /74, HR 78, 98% on RA, RR 18. Finger stick in 300s.     Interval History:  Patient was sitting comfortably in bed NAD. Patients reports no feelings of dizziness O/N.    MEDICATIONS  (STANDING):  amLODIPine   Tablet 5 milliGRAM(s) Oral daily  atorvastatin 20 milliGRAM(s) Oral at bedtime  dextrose 5%. 1000 milliLiter(s) (50 mL/Hr) IV Continuous <Continuous>  dextrose 50% Injectable 12.5 Gram(s) IV Push once  dextrose 50% Injectable 25 Gram(s) IV Push once  dextrose 50% Injectable 25 Gram(s) IV Push once  furosemide    Tablet 40 milliGRAM(s) Oral daily  heparin  Injectable 5000 Unit(s) SubCutaneous every 8 hours  insulin glargine Injectable (LANTUS) 30 Unit(s) SubCutaneous at bedtime  insulin lispro (HumaLOG) corrective regimen sliding scale   SubCutaneous three times a day before meals  insulin lispro Injectable (HumaLOG) 10 Unit(s) SubCutaneous three times a day before meals  lisinopril 40 milliGRAM(s) Oral daily  pregabalin 75 milliGRAM(s) Oral three times a day  tamsulosin 0.4 milliGRAM(s) Oral at bedtime    MEDICATIONS  (PRN):  dextrose 40% Gel 15 Gram(s) Oral once PRN Blood Glucose LESS THAN 70 milliGRAM(s)/deciliter  glucagon  Injectable 1 milliGRAM(s) IntraMuscular once PRN Glucose LESS THAN 70 milligrams/deciliter    Vital Signs Last 24 Hrs  T(C): 35.6 (04 Dec 2018 06:20), Max: 36.6 (03 Dec 2018 17:47)  T(F): 96.0 (04 Dec 2018 06:20), Max: 97.8 (03 Dec 2018 17:47)  HR: 78 (04 Dec 2018 06:20) (50 - 84)  BP: 123/69 (04 Dec 2018 06:20) (109/59 - 148/74)  BP(mean): --  RR: 18 (04 Dec 2018 06:20) (18 - 19)  SpO2: 98% (03 Dec 2018 19:42) (97% - 98%)    PHYSICAL EXAM:  GEN: No acute distress, obese male   LUNGS: Clear to auscultation bilaterally   HEART: S1/S2 present   ABD: Soft, non-tender, non-distended. Bowel sounds present  NEURO: AOx3, cerebellar function normal, no focal deficits     LABS:               11.6   8.69  )-----------( 187      ( 03 Dec 2018 15:45 )               37.2     145  |  98  |  47<H>  ----------------------------<  276<H>  3.9   |  31  |  1.9<H>    Ca    8.3<L>      04 Dec 2018 07:33  POCT 304 <H>    TPro  6.2  /  Alb  4.0  /  TBili  0.7  /  DBili  x   /  AST  23  /  ALT  8   /  AlkPhos  104  12-03    CARDIAC MARKERS ( 03 Dec 2018 15:45 )  x     / 0.11 ng/mL / x     / x     / x        LIVER FUNCTIONS - ( 03 Dec 2018 15:45 )  Alb: 4.0 g/dL / Pro: 6.2 g/dL / ALK PHOS: 104 U/L / ALT: 8 U/L / AST: 23 U/L / GGT: x           Urinalysis Basic - ( 03 Dec 2018 19:00 )  Color: Yellow / Appearance: Clear / S.020 / pH: x  Gluc: x / Ketone: Negative  / Bili: Negative / Urobili: 0.2 mg/dL   Blood: x / Protein: Negative mg/dL / Nitrite: Negative   Leuk Esterase: Small / RBC: 3-5 /HPF / WBC 10-25 /HPF   Sq Epi: x / Non Sq Epi: Few /HPF / Bacteria: Few    RADIOLOGY  < from: CT Head No Cont (12.03.18 @ 16:39) >  IMPRESSION:  1.  Right transfrontal ventricular shunt catheter present. The ventricles are not significantly enlarged.  2.  Hypo- to isodense subdural fluid collections over the cerebral convexities measuring 4-5 mm on each side. Given the presence of a shunt catheter and the appearance and symmetry of the collections, favor sequela of over-shunting rather than subacute subdural hematomas.  3.  Parenchymal edema in the right frontal lobe surrounding the shunt catheter, likely reflecting catheter placement.  4.  Direct comparison with the patient's previous outside imaging studies is recommended. If these can be made available an addendum will be issued as appropriate.

## 2018-12-04 NOTE — MEDICAL STUDENT PROGRESS NOTE(EDUCATION) - NS MD HP STUD ASPLAN PLAN FT
#Dizziness  - Ddx (neuro mediated vs orthostatic)  - orthostatic vitals negative   - Neuro eval, PT eval   - consider meclizine for vertigo     #Hydrocephalus s/p  shunt   - CTH shows Hypo- to isodense subdural fluid collections over the cerebral convexities measuring 4-5 mm on each side.   - Evaluated by CT surgery PA in the ED. Images review and this is unlikely SDH    #DMII, uncontrolled   - non compliant with medications at home  - A1c on 10/2018 - 13.1   - insulin regimen started    CKD stage III  - stable since october  - likely 2/2 uncontrolled DM II    #HTN  - c/w ace-i and amlodipine    #Atrial fibrillation not on AC  - rate controlled     Ambulate with assistance  DASH diet  DVT PPx: heparin sc #Dizziness  - orthostatic vitals negative   - Neuro eval, PT eval  - cardio consult for multifocal pvc and dizziness  - f/u echo  - pending tsh , NH3,  b12     #Hydrocephalus s/p  shunt   - CTH shows Hypo- to isodense subdural fluid collections over the cerebral convexities measuring 4-5 mm on each side.   - neurosurgery consult per Medicine attending    #DMII, uncontrolled   - non compliant with medications at home  - A1c on 10/2018 - 13.1   - insulin regimen started 30 Lantus , 10/10/10 lispro    CKD stage III  - stable since october  - likely 2/2 uncontrolled DM II    #HTN  - c/w ace-i and amlodipine    #Atrial fibrillation not on AC  - rate controlled     Ambulate with assistance  DASH diet  DVT PPx: heparin sc

## 2018-12-04 NOTE — CONSULT NOTE ADULT - SUBJECTIVE AND OBJECTIVE BOX
83M with PMHx of DMII, HTN, Hydrocephalus s/p  shunt, Afib not on AC presents to [facility] for dizziness x 1 week. Patient has had intermittent feelings of dizziness described as room spinning sensation. Occurs mostly with ambulations. He admits to blurry vision not associated with the episode but has seen ophthalmologist and diagnosed with macular degeneration of right eye. Denies any headaches, LOC. He otherwise denies any fevers, chills, N/V, diarrhea, chest pain, palpitations. Of note patient has history of uncontrolled DMII, recent A1c 13.1, has not been taking his medications.         MEDICATIONS  (STANDING):  amLODIPine   Tablet 5 milliGRAM(s) Oral daily  atorvastatin 20 milliGRAM(s) Oral at bedtime  dextrose 5%. 1000 milliLiter(s) (50 mL/Hr) IV Continuous <Continuous>  dextrose 50% Injectable 12.5 Gram(s) IV Push once  dextrose 50% Injectable 25 Gram(s) IV Push once  dextrose 50% Injectable 25 Gram(s) IV Push once  furosemide    Tablet 40 milliGRAM(s) Oral daily  heparin  Injectable 5000 Unit(s) SubCutaneous every 8 hours  insulin glargine Injectable (LANTUS) 30 Unit(s) SubCutaneous at bedtime  insulin lispro (HumaLOG) corrective regimen sliding scale   SubCutaneous three times a day before meals  insulin lispro Injectable (HumaLOG) 10 Unit(s) SubCutaneous three times a day before meals  lisinopril 40 milliGRAM(s) Oral daily  pregabalin 75 milliGRAM(s) Oral three times a day  tamsulosin 0.4 milliGRAM(s) Oral at bedtime    MEDICATIONS  (PRN):  dextrose 40% Gel 15 Gram(s) Oral once PRN Blood Glucose LESS THAN 70 milliGRAM(s)/deciliter  glucagon  Injectable 1 milliGRAM(s) IntraMuscular once PRN Glucose LESS THAN 70 milligrams/deciliter      Allergies    No Known Allergies    Intolerances      83y    Vital Signs Last 24 Hrs  T(C): 35.7 (04 Dec 2018 20:00), Max: 35.8 (03 Dec 2018 22:09)  T(F): 96.2 (04 Dec 2018 20:00), Max: 96.4 (03 Dec 2018 22:09)  HR: 78 (04 Dec 2018 06:20) (50 - 78)  BP: 123/69 (04 Dec 2018 06:20) (123/69 - 125/65)  BP(mean): --  RR: 18 (04 Dec 2018 20:00) (18 - 18)  SpO2: 98% (04 Dec 2018 18:18) (98% - 98%)    PHYSICAL EXAM:    GENERAL: NAD, well-groomed, well-developed  HEAD:  Atraumatic, Normocephalic  EYES: EOMI, PERRLA, conjunctiva and sclera clear  NERVOUS SYSTEM:  Awake  alert oriented to self, place situation   Fund of knowledge, recent and remote memory are intact   Mood; normal affect   CN II-XII intact PERRRL, EOMI, no ptosis, no Nystagmus, normal shoulder shrug   Face symmetrical tongue is midline speech is clear and fluent  Motor: 5/5 UE/LE all muscle groups   Sensory: No deficit to light touch   Gait is not tested      EXTREMITIES:  2+ Peripheral Pulses, No clubbing, cyanosis, or edema      LABS:                        11.6   8.69  )-----------( 187      ( 03 Dec 2018 15:45 )             37.2     12-    145  |  98  |  47<H>  ----------------------------<  276<H>  3.9   |  31  |  1.9<H>    Ca    8.3<L>      04 Dec 2018 07:33  Mg     2.3     12-    TPro  6.2  /  Alb  4.0  /  TBili  0.7  /  DBili  x   /  AST  23  /  ALT  8   /  AlkPhos  104  12-03      Urinalysis Basic - ( 03 Dec 2018 19:00 )    Color: Yellow / Appearance: Clear / S.020 / pH: x  Gluc: x / Ketone: Negative  / Bili: Negative / Urobili: 0.2 mg/dL   Blood: x / Protein: Negative mg/dL / Nitrite: Negative   Leuk Esterase: Small / RBC: 3-5 /HPF / WBC 10-25 /HPF   Sq Epi: x / Non Sq Epi: Few /HPF / Bacteria: Few        RADIOLOGY & ADDITIONAL TESTS:  < from: CT Head No Cont (18 @ 16:39) >  IMPRESSION:    1.  Right transfrontal ventricular shunt catheter present. The ventricles   are not significantly enlarged.    2.  Hypo- to isodense subdural fluid collections over the cerebral   convexities measuring 4-5 mm on each side. Given the presence of a shunt   catheter and the appearance and symmetry of the collections, favor   sequela of over-shunting rather than subacute subdural hematomas.    3.  Parenchymal edema in the right frontal lobe surrounding the shunt   catheter, likely reflecting catheter placement.    4.  Direct comparison with the patient's previous outside imaging studies   is recommended. If these can be made available an addendum will be issued   as appropriate.      < end of copied text >

## 2018-12-04 NOTE — CONSULT NOTE ADULT - SUBJECTIVE AND OBJECTIVE BOX
Patient is a 83y old  Male who presents with a chief complaint of Dizziness (03 Dec 2018 22:32)    HPI:  83M with PMHx of DMII, HTN, Hydrocephalus s/p  shunt, Afib not on AC presents to [facility] for dizziness x 1 week. Patient has had intermittent feelings of dizziness described as room spinning sensation. Occurs mostly with ambulations. He admits to blurry vision not associated with the episode but has seen ophthalmologist and diagnosed with macular degeneration of right eye. Denies any headaches, LOC. He otherwise denies any fevers, chills, N/V, diarrhea, chest pain, palpitations. Of note patient has history of uncontrolled DMII, recent A1c 13.1, has not been taking his medications.     In ED vitals; /74, HR 78, 98% on RA, RR 18. Finger stick in 300s. (03 Dec 2018 22:32)      PAST MEDICAL & SURGICAL HISTORY:  Hypertension  Hydrocephalus  CKD (chronic kidney disease)  DM (diabetes mellitus)  Afib  History of prostate surgery      Hospital Course: Dizziness  - Ddx (neuro mediated vs orthostatic vs cardiovascular)  - check monitor for arrhythmic events  - Check orthostatic vitals   - Check TTE, possible hx of CHF. Was prescribed lasix as per pharmacy. Euvolemic on exam   - Neuro eval, PT eval   - consider meclizine for vertigo   - consider cardio evaluation     #Hydrocephalus s/p  shunt   - CTH shows Hypo- to isodense subdural fluid collections over the cerebral   convexities measuring 4-5 mm on each side.   - Evaluated by CT surgery PA in the ED. Images review and this is unlikely SDH    #DMII, uncontrolled   - non compliant with medications at home  - A1c on 10/2018 - 13.1   - Will start insulin regimen     CKD stage III-IV  - stable since october  - likely 2/2 uncontrolled dmii    #HTN  - c/w ace-i and amlodipine    #Atrial fibrillation not on AC- rate controlled    TODAY'S SUBJECTIVE & REVIEW OF SYMPTOMS:     Constitutional dizziness   Cardio WNL   Resp WNL   GI WNL  Heme WNL  Endo WNL  Skin WNL  MSK WNL  Neuro WNL  Cognitive WNL  Psych WNL  +falls X3 attempting to get into car- no falls at home    MEDICATIONS  (STANDING):  amLODIPine   Tablet 5 milliGRAM(s) Oral daily  atorvastatin 20 milliGRAM(s) Oral at bedtime  dextrose 5%. 1000 milliLiter(s) (50 mL/Hr) IV Continuous <Continuous>  dextrose 50% Injectable 12.5 Gram(s) IV Push once  dextrose 50% Injectable 25 Gram(s) IV Push once  dextrose 50% Injectable 25 Gram(s) IV Push once  furosemide    Tablet 40 milliGRAM(s) Oral daily  heparin  Injectable 5000 Unit(s) SubCutaneous every 8 hours  insulin glargine Injectable (LANTUS) 30 Unit(s) SubCutaneous at bedtime  insulin lispro (HumaLOG) corrective regimen sliding scale   SubCutaneous three times a day before meals  insulin lispro Injectable (HumaLOG) 10 Unit(s) SubCutaneous three times a day before meals  lisinopril 40 milliGRAM(s) Oral daily  pregabalin 75 milliGRAM(s) Oral three times a day  tamsulosin 0.4 milliGRAM(s) Oral at bedtime    MEDICATIONS  (PRN):  dextrose 40% Gel 15 Gram(s) Oral once PRN Blood Glucose LESS THAN 70 milliGRAM(s)/deciliter  glucagon  Injectable 1 milliGRAM(s) IntraMuscular once PRN Glucose LESS THAN 70 milligrams/deciliter      FAMILY HISTORY:  No pertinent family history in first degree relatives      Allergies    No Known Allergies    Intolerances        SOCIAL HISTORY:    [    ] Etoh  [    ] Smoking  [    ] Substance abuse     Home Environment:  [    ] Home Alone  [ x   ] Lives with Family  [    ] Home Health Aid    Dwelling:  [    ] Apartment  [  x  ] Private House  [    ] Adult Home  [    ] Skilled Nursing Facility      [    ] Short Term  [    ] Long Term  [  x  ] Stairs   outside                        [    ] Elevator     FUNCTIONAL STATUS PTA: (Check all that apply)  Ambulation: [   x  ]Independent    [    ] Dependent     [    ] Non-Ambulatory  Assistive Device: [    ] SA Cane  [    ]  Q Cane  [    ] Walker  [    ]  Wheelchair  ADL : [  x  ] Independent  [    ]  Dependent   REFUSES TO USE DEVICE    Vital Signs Last 24 Hrs  T(C): 35.6 (04 Dec 2018 06:20), Max: 36.6 (03 Dec 2018 17:47)  T(F): 96 (04 Dec 2018 06:20), Max: 97.8 (03 Dec 2018 17:47)  HR: 78 (04 Dec 2018 06:20) (50 - 84)  BP: 123/69 (04 Dec 2018 06:20) (109/59 - 148/74)  BP(mean): --  RR: 18 (04 Dec 2018 06:20) (18 - 19)  SpO2: 98% (03 Dec 2018 19:42) (97% - 98%)      PHYSICAL EXAM: Alert & Oriented X3  GENERAL: NAD, well-groomed, well-developed  HEAD:  Atraumatic, Normocephalic  EYES: EOMI, PERRLA, conjunctiva and sclera clear  NECK: Supple, No JVD, Normal thyroid  CHEST/LUNG: Clear bilaterally; No rales, rhonchi, wheezing, or rubs  HEART: S1s2RR  ABDOMEN: Soft, Nontender, Nondistended; Bowel sounds present  EXTREMITIES:  2+ Peripheral Pulses, No clubbing, cyanosis, or edema    NERVOUS SYSTEM:  Cranial Nerves 2-12 intact [    ] Abnormal  [   x ]diminished vision  ROM: WFL all extremities [  x  ]  Abnormal [     ]  Motor Strength: WFL all extremities  [ x   ]  Abnormal [    ]  Sensation: intact to light touch [    ] Abnormal [ x   ]diminished distally LES      FUNCTIONAL STATUS:  Bed Mobility: [   ]  Independent [ x  ]  Supervision [    ]  Needs Assistance [  ]  N/A  Transfers: [    ]  Independent [  x ]  Supervision [    ]  Needs Assistance [    ]  N/A    Ambulation:  [    ]  Independent [  x  ]  Supervision [    ]  Needs Assistance [    ]  N/A   ADL:  [    ]   Independent [    ] Requires Assistance [  x  ] N/A   OCC LOB  FTN INTACT  LABS:                        11.6   8.69  )-----------( 187      ( 03 Dec 2018 15:45 )             37.2     12-04    145  |  98  |  47<H>  ----------------------------<  276<H>  3.9   |  31  |  1.9<H>    Ca    8.3<L>      04 Dec 2018 07:33  Mg     2.3     12-    TPro  6.2  /  Alb  4.0  /  TBili  0.7  /  DBili  x   /  AST  23  /  ALT  8   /  AlkPhos  104  12-03      Urinalysis Basic - ( 03 Dec 2018 19:00 )    Color: Yellow / Appearance: Clear / S.020 / pH: x  Gluc: x / Ketone: Negative  / Bili: Negative / Urobili: 0.2 mg/dL   Blood: x / Protein: Negative mg/dL / Nitrite: Negative   Leuk Esterase: Small / RBC: 3-5 /HPF / WBC 10-25 /HPF   Sq Epi: x / Non Sq Epi: Few /HPF / Bacteria: Few        RADIOLOGY & ADDITIONAL STUDIES:

## 2018-12-05 DIAGNOSIS — G91.9 HYDROCEPHALUS, UNSPECIFIED: ICD-10-CM

## 2018-12-05 LAB
AMMONIA BLD-MCNC: 21 UMOL/L — SIGNIFICANT CHANGE UP (ref 11–55)
ANION GAP SERPL CALC-SCNC: 16 MMOL/L — HIGH (ref 7–14)
BUN SERPL-MCNC: 41 MG/DL — HIGH (ref 10–20)
CALCIUM SERPL-MCNC: 7.9 MG/DL — LOW (ref 8.5–10.1)
CHLORIDE SERPL-SCNC: 98 MMOL/L — SIGNIFICANT CHANGE UP (ref 98–110)
CO2 SERPL-SCNC: 30 MMOL/L — SIGNIFICANT CHANGE UP (ref 17–32)
CREAT SERPL-MCNC: 1.9 MG/DL — HIGH (ref 0.7–1.5)
GLUCOSE BLDC GLUCOMTR-MCNC: 156 MG/DL — HIGH (ref 70–99)
GLUCOSE BLDC GLUCOMTR-MCNC: 189 MG/DL — HIGH (ref 70–99)
GLUCOSE BLDC GLUCOMTR-MCNC: 248 MG/DL — HIGH (ref 70–99)
GLUCOSE BLDC GLUCOMTR-MCNC: 339 MG/DL — HIGH (ref 70–99)
GLUCOSE SERPL-MCNC: 346 MG/DL — HIGH (ref 70–99)
HCT VFR BLD CALC: 38.6 % — LOW (ref 42–52)
HGB BLD-MCNC: 11.8 G/DL — LOW (ref 14–18)
MCHC RBC-ENTMCNC: 26.9 PG — LOW (ref 27–31)
MCHC RBC-ENTMCNC: 30.6 G/DL — LOW (ref 32–37)
MCV RBC AUTO: 87.9 FL — SIGNIFICANT CHANGE UP (ref 80–94)
NRBC # BLD: 0 /100 WBCS — SIGNIFICANT CHANGE UP (ref 0–0)
PLATELET # BLD AUTO: 174 K/UL — SIGNIFICANT CHANGE UP (ref 130–400)
POTASSIUM SERPL-MCNC: 3.7 MMOL/L — SIGNIFICANT CHANGE UP (ref 3.5–5)
POTASSIUM SERPL-SCNC: 3.7 MMOL/L — SIGNIFICANT CHANGE UP (ref 3.5–5)
RBC # BLD: 4.39 M/UL — LOW (ref 4.7–6.1)
RBC # FLD: 15 % — HIGH (ref 11.5–14.5)
SODIUM SERPL-SCNC: 144 MMOL/L — SIGNIFICANT CHANGE UP (ref 135–146)
WBC # BLD: 6.7 K/UL — SIGNIFICANT CHANGE UP (ref 4.8–10.8)
WBC # FLD AUTO: 6.7 K/UL — SIGNIFICANT CHANGE UP (ref 4.8–10.8)

## 2018-12-05 RX ORDER — INSULIN LISPRO 100/ML
12 VIAL (ML) SUBCUTANEOUS
Qty: 0 | Refills: 0 | Status: DISCONTINUED | OUTPATIENT
Start: 2018-12-05 | End: 2018-12-06

## 2018-12-05 RX ORDER — INSULIN GLARGINE 100 [IU]/ML
36 INJECTION, SOLUTION SUBCUTANEOUS AT BEDTIME
Qty: 0 | Refills: 0 | Status: DISCONTINUED | OUTPATIENT
Start: 2018-12-05 | End: 2018-12-06

## 2018-12-05 RX ORDER — SODIUM CHLORIDE 0.65 %
1 AEROSOL, SPRAY (ML) NASAL
Qty: 0 | Refills: 0 | Status: DISCONTINUED | OUTPATIENT
Start: 2018-12-05 | End: 2018-12-06

## 2018-12-05 RX ORDER — POTASSIUM CHLORIDE 20 MEQ
1 PACKET (EA) ORAL
Qty: 0 | Refills: 0 | COMMUNITY

## 2018-12-05 RX ADMIN — Medication 4: at 11:55

## 2018-12-05 RX ADMIN — Medication 1: at 17:45

## 2018-12-05 RX ADMIN — Medication 12 UNIT(S): at 17:45

## 2018-12-05 RX ADMIN — INSULIN GLARGINE 36 UNIT(S): 100 INJECTION, SOLUTION SUBCUTANEOUS at 21:35

## 2018-12-05 RX ADMIN — AMLODIPINE BESYLATE 5 MILLIGRAM(S): 2.5 TABLET ORAL at 06:23

## 2018-12-05 RX ADMIN — HEPARIN SODIUM 5000 UNIT(S): 5000 INJECTION INTRAVENOUS; SUBCUTANEOUS at 21:34

## 2018-12-05 RX ADMIN — HEPARIN SODIUM 5000 UNIT(S): 5000 INJECTION INTRAVENOUS; SUBCUTANEOUS at 13:38

## 2018-12-05 RX ADMIN — Medication 75 MILLIGRAM(S): at 21:34

## 2018-12-05 RX ADMIN — ATORVASTATIN CALCIUM 20 MILLIGRAM(S): 80 TABLET, FILM COATED ORAL at 21:45

## 2018-12-05 RX ADMIN — LISINOPRIL 40 MILLIGRAM(S): 2.5 TABLET ORAL at 06:23

## 2018-12-05 RX ADMIN — TAMSULOSIN HYDROCHLORIDE 0.4 MILLIGRAM(S): 0.4 CAPSULE ORAL at 21:36

## 2018-12-05 RX ADMIN — Medication 40 MILLIGRAM(S): at 06:23

## 2018-12-05 RX ADMIN — Medication 75 MILLIGRAM(S): at 13:39

## 2018-12-05 RX ADMIN — HEPARIN SODIUM 5000 UNIT(S): 5000 INJECTION INTRAVENOUS; SUBCUTANEOUS at 06:23

## 2018-12-05 RX ADMIN — Medication 10 UNIT(S): at 08:08

## 2018-12-05 RX ADMIN — Medication 1 SPRAY(S): at 17:47

## 2018-12-05 RX ADMIN — Medication 75 MILLIGRAM(S): at 06:25

## 2018-12-05 RX ADMIN — Medication 1 SPRAY(S): at 16:22

## 2018-12-05 RX ADMIN — Medication 12 UNIT(S): at 11:55

## 2018-12-05 RX ADMIN — Medication 2: at 08:06

## 2018-12-05 NOTE — CONSULT NOTE ADULT - SUBJECTIVE AND OBJECTIVE BOX
CHIEF COMPLAINT:Patient is a 83y old  Male who presents with a chief complaint of Dizziness (04 Dec 2018 21:46)      HISTORY OF PRESENT ILLNESS:     83M with PMHx of DMII, HTN, Hydrocephalus s/p  shunt, Afib not on AC presents to [facility] for dizziness x 1 week. Patient has had intermittent feelings of dizziness described as room spinning sensation. Occurs mostly with ambulations. He admits to blurry vision not associated with the episode but has seen ophthalmologist and diagnosed with macular degeneration of right eye. Denies any headaches, LOC. He otherwise denies any fevers, chills, N/V, diarrhea, chest pain, palpitations. Of note patient has history of uncontrolled DMII, recent A1c 13.1, has not been taking his medications.     In ED vitals; /74, HR 78, 98% on RA, RR 18. Finger stick in 300s. (03 Dec 2018 22:32)    PAST MEDICAL & SURGICAL HISTORY:  Hypertension  Hydrocephalus  CKD (chronic kidney disease)  DM (diabetes mellitus)  Afib  History of prostate surgery    FAMILY HISTORY:  Family history of diabetes mellitus (Mother, Sibling)    Allergies    No Known Allergies    Intolerances    	  Home Medications:  amLODIPine 5 mg oral tablet: 1 tab(s) orally once a day (03 Dec 2018 23:01)  Crestor 5 mg oral tablet: 1 tab(s) orally once a day (at bedtime) (03 Dec 2018 22:56)  Flomax 0.4 mg oral capsule: 1 cap(s) orally once a day (03 Dec 2018 22:58)  Lasix 40 mg oral tablet: 1 tab(s) orally once a day (03 Dec 2018 22:56)  Lyrica 75 mg oral capsule: 1 cap(s) orally 3 times a day (03 Dec 2018 22:56)  potassium chloride 8 mEq (600 mg) oral capsule, extended release: 1 cap(s) orally once a day (03 Dec 2018 22:57)  ramipril 10 mg oral capsule: 1 cap(s) orally 2 times a day (03 Dec 2018 22:57)  Tradjenta 5 mg oral tablet: 1 tab(s) orally once a day (03 Dec 2018 22:58)    MEDICATIONS  (STANDING):  amLODIPine   Tablet 5 milliGRAM(s) Oral daily  atorvastatin 20 milliGRAM(s) Oral at bedtime  dextrose 5%. 1000 milliLiter(s) (50 mL/Hr) IV Continuous <Continuous>  dextrose 50% Injectable 12.5 Gram(s) IV Push once  dextrose 50% Injectable 25 Gram(s) IV Push once  dextrose 50% Injectable 25 Gram(s) IV Push once  furosemide    Tablet 40 milliGRAM(s) Oral daily  heparin  Injectable 5000 Unit(s) SubCutaneous every 8 hours  insulin glargine Injectable (LANTUS) 36 Unit(s) SubCutaneous at bedtime  insulin lispro (HumaLOG) corrective regimen sliding scale   SubCutaneous three times a day before meals  insulin lispro Injectable (HumaLOG) 12 Unit(s) SubCutaneous three times a day before meals  lisinopril 40 milliGRAM(s) Oral daily  pregabalin 75 milliGRAM(s) Oral three times a day  tamsulosin 0.4 milliGRAM(s) Oral at bedtime    MEDICATIONS  (PRN):  dextrose 40% Gel 15 Gram(s) Oral once PRN Blood Glucose LESS THAN 70 milliGRAM(s)/deciliter  glucagon  Injectable 1 milliGRAM(s) IntraMuscular once PRN Glucose LESS THAN 70 milligrams/deciliter    REVIEW OF SYSTEMS:  14 point ROS negative except as mentioned above in HPI    PHYSICAL EXAM:  T(C): 36 (12-05-18 @ 06:15), Max: 36 (12-05-18 @ 06:15)  HR: 71 (12-05-18 @ 06:15) (71 - 71)  BP: --  RR: 18 (12-05-18 @ 06:15) (18 - 18)  SpO2: 98% (12-04-18 @ 18:18) (98% - 98%)  Wt(kg): --  I&O's Summary      General Appearance: in NAD	  HEENT: NC, No JVD appreciated  Cardiovascular: Normal S1 S2, No murmurs  Respiratory: cta b/l  Gastrointestinal:  Soft, Non-tender, BS +	  Neurologic: No focal deficits, AAOx3  Extremities: ROM wnl, No clubbing/cyanosis, b/l LE edema  Skin: No rashes, No ecchymoses, No cyanosis  Vascular: Peripheral pulses palpable 2+ bilaterally    LABS:	 	                        11.6   8.69  )-----------( 187      ( 03 Dec 2018 15:45 )             37.2     12-04    145  |  98  |  47<H>  ----------------------------<  276<H>  3.9   |  31  |  1.9<H>    Ca    8.3<L>      04 Dec 2018 07:33  Mg     2.3     12-04    TPro  6.2  /  Alb  4.0  /  TBili  0.7  /  DBili  x   /  AST  23  /  ALT  8   /  AlkPhos  104  12-03        Hemoglobin A1C, Whole Blood: 11.5 % (12-04-18 @ 07:33)    Serum Pro-Brain Natriuretic Peptide: 2577 pg/mL (12-03-18 @ 15:45)      CARDIAC MARKERS:  12-04-18 @ 07:33  TROPONIN-T  0.06 ng/mL  CKMB  4.7 ng/mL  CREATINE KINASE  57 U/L  12-04-18 @ 01:29  TROPONIN-T  0.06 ng/mL  CKMB  4.5 ng/mL  CREATINE KINASE  56 U/L  12-03-18 @ 15:45  TROPONIN-T  0.11 ng/mL  CKMB  --    TELEMETRY EVENTS:  PVC's noted	    ECG:  < from: 12 Lead ECG (12.03.18 @ 20:54) >  Diagnosis Line Sinus rhythm  Otherwise normal ECG    Confirmed by FAHAD FUENTES, MARYCHUY ([id number]) on 12/4/2018 2:47:20 PM    < end of copied text >    RADIOLOGY:  < from: Xray Chest 1 View-PORTABLE IMMEDIATE (12.03.18 @ 17:03) >  Impression:      No radiographic evidence of acute pulmonary disease.    Enlarged cardiac silhouette.         JONNIE BERRY M.D., ATTENDING RADIOLOGIST  This document has been electronically signed. Dec  4 2018  8:32AM    < end of copied text >  	    PREVIOUS DIAGNOSTIC TESTING:    [x] Echocardiogram:  < from: Transthoracic Echocardiogram (12.04.18 @ 13:10) >  Summary:   1. Normal global left ventricular systolic function.   2. LV Ejection Fraction by Lemon's Method with a biplane EF of 54 %.   3. Mildly increased LV wall thickness.   4. There is mild concentric left ventricular hypertrophy.   5. Normal left atrial size.   6. Mild tricuspidregurgitation.   7. Fibrocalcific AV with normal opening.   8. Pulmonic valve regurgitation.   9. Pulmonary hypertension is present.  10. LA volume Index is 25.4 ml/m² ml/m2.    < end of copied text >

## 2018-12-05 NOTE — CONSULT NOTE ADULT - SUBJECTIVE AND OBJECTIVE BOX
HPI:   83M with PMHx of DMII, HTN, macular degeneration, CKD III, NPH s/p  shunt, Afib not on AC presents to the [facility] for dizziness of 2-3 week duration HD:2. Patient has had intermittent feelings of dizziness with ambulation in which he feels that he is spinning. He also reports blurry vision but notes it is no different from the blurry vision he has been experiencing from macular degeneration. The patient also reports recent congestion and increased frequency of urination. He otherwise denies any HA, LOC, fevers, chills, N/V, diarrhea, chest pain, palpitations, neck pain. The patient is non compliant with his DMII medications, recent A1c 13.1.   In ED vitals; /74, HR 78, 98% on RA, RR 18. Finger stick in 300s.     Interval History:  Patient was laying comfortably in bed NAD. Patients reports no feelings of dizziness O/N. Patient reports a decrease in congestion.        PAST MEDICAL & SURGICAL HISTORY:  Hypertension  Hydrocephalus  CKD (chronic kidney disease)  DM (diabetes mellitus)  Afib  History of prostate surgery      FAMILY HISTORY:  Family history of diabetes mellitus (Mother, Sibling)      Home Medications:  amLODIPine 5 mg oral tablet: 1 tab(s) orally once a day (03 Dec 2018 23:01)  Crestor 5 mg oral tablet: 1 tab(s) orally once a day (at bedtime) (03 Dec 2018 22:56)  Flomax 0.4 mg oral capsule: 1 cap(s) orally once a day (03 Dec 2018 22:58)  Lasix 40 mg oral tablet: 1 tab(s) orally once a day (03 Dec 2018 22:56)  Lyrica 75 mg oral capsule: 1 cap(s) orally 3 times a day (03 Dec 2018 22:56)  potassium chloride 8 mEq (600 mg) oral capsule, extended release: 1 cap(s) orally once a day (03 Dec 2018 22:57)  ramipril 10 mg oral capsule: 1 cap(s) orally 2 times a day (03 Dec 2018 22:57)  Tradjenta 5 mg oral tablet: 1 tab(s) orally once a day (03 Dec 2018 22:58)      MEDICATIONS  (STANDING):  amLODIPine   Tablet 5 milliGRAM(s) Oral daily  atorvastatin 20 milliGRAM(s) Oral at bedtime  dextrose 5%. 1000 milliLiter(s) (50 mL/Hr) IV Continuous <Continuous>  dextrose 50% Injectable 12.5 Gram(s) IV Push once  dextrose 50% Injectable 25 Gram(s) IV Push once  dextrose 50% Injectable 25 Gram(s) IV Push once  furosemide    Tablet 40 milliGRAM(s) Oral daily  heparin  Injectable 5000 Unit(s) SubCutaneous every 8 hours  insulin glargine Injectable (LANTUS) 36 Unit(s) SubCutaneous at bedtime  insulin lispro (HumaLOG) corrective regimen sliding scale   SubCutaneous three times a day before meals  insulin lispro Injectable (HumaLOG) 12 Unit(s) SubCutaneous three times a day before meals  lisinopril 40 milliGRAM(s) Oral daily  pregabalin 75 milliGRAM(s) Oral three times a day  tamsulosin 0.4 milliGRAM(s) Oral at bedtime    MEDICATIONS  (PRN):  dextrose 40% Gel 15 Gram(s) Oral once PRN Blood Glucose LESS THAN 70 milliGRAM(s)/deciliter  glucagon  Injectable 1 milliGRAM(s) IntraMuscular once PRN Glucose LESS THAN 70 milligrams/deciliter      Allergies    No Known Allergies    Intolerances        REVIEW OF SYSTEMS    ROS negative except as mentioned above  CAPILLARY BLOOD GLUCOSE      POCT Blood Glucose.: 248 mg/dL (05 Dec 2018 07:53)      CAPILLARY GLUCOSE:  Date:                        AM:  Pre-lunch:  Pre-dinner:  Bedtime:    Vital Signs Last 24 Hrs  T(C): 36 (05 Dec 2018 06:15), Max: 36 (05 Dec 2018 06:15)  T(F): 96.8 (05 Dec 2018 06:15), Max: 96.8 (05 Dec 2018 06:15)  HR: 71 (05 Dec 2018 06:15) (71 - 71)  BP: --  BP(mean): --  RR: 18 (05 Dec 2018 06:15) (18 - 18)  SpO2: 98% (04 Dec 2018 18:18) (98% - 98%)    PHYSICAL EXAM:    General: NAD, AAO x3  HEENT: No icterus,. Moist mucous membranes  Neck: No JVD noted. Supple  Cardio: S1, S2 noted, RRR. No murmurs  Resp: Clear to auscultation b/l.  Abdo: Soft, NT, bowel sounds present.  Extremities: No edema noted. Pulses present b/l  Neuro: AAO x3, grossly normal motor strength.      LABS:                        11.8   6.70  )-----------( 174      ( 05 Dec 2018 09:27 )             38.6     12-    145  |  98  |  47<H>  ----------------------------<  276<H>  3.9   |  31  |  1.9<H>    Ca    8.3<L>      04 Dec 2018 07:33  Mg     2.3         TPro  6.2  /  Alb  4.0  /  TBili  0.7  /  DBili  x   /  AST  23  /  ALT  8   /  AlkPhos  104        Urinalysis Basic - ( 03 Dec 2018 19:00 )    Color: Yellow / Appearance: Clear / S.020 / pH: x  Gluc: x / Ketone: Negative  / Bili: Negative / Urobili: 0.2 mg/dL   Blood: x / Protein: Negative mg/dL / Nitrite: Negative   Leuk Esterase: Small / RBC: 3-5 /HPF / WBC 10-25 /HPF   Sq Epi: x / Non Sq Epi: Few /HPF / Bacteria: Few    hba1c Hemoglobin A1C, Whole Blood: 11.5: Method: Immunoassay       Reference Range                4.0-5.6%       High risk (prediabetic)        5.7-6.4%       Diabetic, diagnostic             >=6.5%       ADA diabetic treatment goal       <7.0%  The Hemoglobin A1c testing is NGSP-certified.Reference ranges are based  upon the 2010 recommendations of  the American Diabetes Association.  Interpretation may vary for children  and adolescents. % (..18 @ 07:33)

## 2018-12-05 NOTE — CONSULT NOTE ADULT - ASSESSMENT
# Afib  #  shunt  # DM 2  # HTN  # HLD  # morbid obesity  # CKD 3  # troponin elevation    - c/w telemetry monitoring  - in light of elevated troponins in setting of CKD, possibly non-ischemic   - start patient on low dose BB  - obtain OP records regarding  shunt and OP cardiac workup  - consider nuclear stress test if not done as part of OP cardiac workup  - CHADSVaSC approximately 4, will benefit from therapeutic anticoagulation.   - in light of dizziness assess if patient is fall risk. If is due to mechanical etiology which can be improved by assistance devices. If no fall risk or  contraindication with respect to shunt, may start on NOAC   - refer for OP sleep study to r/o JOHANNY if not already done  - c/w other home medications # Afib  #  shunt  # DM 2  # HTN  # HLD  # morbid obesity  # CKD 3  # troponin elevation    - c/w telemetry monitoring  - in light of elevated troponins in setting of CKD, possibly non-ischemic   - start patient on low dose BB metorpolol 25mg BID  - obtain OP records regarding  shunt and OP cardiac workup  - consider nuclear stress test if not done as part of OP cardiac workup  - CHADSVaSC approximately 4, will benefit from therapeutic anticoagulation will clear with neurology for antocoagulation.   - in light of dizziness assess if patient is fall risk. If is due to mechanical etiology which can be improved by assistance devices. If no fall risk or  contraindication with respect to shunt, may start on NOAC   - refer for OP sleep study to r/o JOHANNY if not already done  - c/w other home medications # Afib  #  shunt  # DM 2  # HTN  # HLD  # morbid obesity  # CKD 3  # troponin elevation    - c/w telemetry monitoring  - in light of elevated troponins in setting of CKD, possibly non-ischemic   - start patient on low dose BB metorpolol 25mg BID  - obtain OP records regarding  shunt and OP cardiac workup  - nuclear stress test in am tomm  - CHADSVaSC approximately 4, will benefit from therapeutic anticoagulation. However due to frequent falls not a good candidate for a/c currently.    - PT/rehab eval for falls  - refer for OP sleep study to r/o JOHANNY if not already done  - c/w other home medications

## 2018-12-05 NOTE — CONSULT NOTE ADULT - ATTENDING COMMENTS
H+P as above  Recommend home on Lantus 30 units qHs (solostar pen), tradjenta 5 mg daily, and repaglinide 1 mg tid ac  F/U in office next week.

## 2018-12-05 NOTE — CHART NOTE - NSCHARTNOTEFT_GEN_A_CORE
Registered Dietitian Limited Note    RD was consulted for diabetes diet education today--provided pt's wife with materials pertaining to diabetes education, as pt was sleeping at time of RD visit. Discussed carbohydrate counting, sources of carbohydrates, importance of carbohydrate consistency at meals, as well as portion control with pt's wife at b/s. Since pt's wife prepares most meals, she had a lot of questions pertaining to sample menus/ways to control carbohydrate intake. Thoroughly discussed and answered all questions; pt's wife verbalized understanding/agreement following diet education.

## 2018-12-05 NOTE — PROGRESS NOTE ADULT - ASSESSMENT
An 83 years old male presented to the ER with dizziness for the last one week. As per wife while trying to get in van he fell backwards three times over the last few weeks.    EKG: A. Fib @ 68/min. Multiple PVC'S. Non specific ST, T changes.    1. Dizziness likely due to Hyperglycemia  2. Uncontrolled DM-2  3. JOHANNY/Obesity  4. DM-2/HTN  5. Hydrocephalus S/P  shunt  6. A. Fib on no A/C  7. TOBIAS on CKD-3          PLAN:    . Care D/W the endocrionology  . Cont Lantus to 30 units QHS  . Cont Tradjenta 5 mg po daily and add Repaglinide 1 mg po three times a day   . Care D/W the Neurosurgery. Shunt is stable. Out pt F/U with his surgeon  . Care D/W the Neuro. No further W/U  . Not on A/C due to  shunt as per wife  . Kidney function back to baseline  . Plan of care D/W the wife and daughter on bedside.    . Awaiting cardiology eval  . D/C planning    * Med rec reviewed with the intern. Plan of care D/W the family on bedside. Time spent 41 minutes. An 83 years old male presented to the ER with dizziness for the last one week. As per wife while trying to get in van he fell backwards three times over the last few weeks.    EKG: A. Fib @ 68/min. Multiple PVC'S. Non specific ST, T changes.    1. Dizziness likely due to Hyperglycemia  2. Uncontrolled DM-2  3. JOHANNY/Obesity  4. DM-2/HTN  5. Hydrocephalus S/P  shunt  6. A. Fib on no A/C  7. TOBIAS on CKD-3          PLAN:    . Care D/W the endocrionology  . Cont Lantus to 30 units QHS  . Cont Tradjenta 5 mg po daily and add Repaglinide 1 mg po three times a day   . Care D/W the Neurosurgery. Shunt is stable. Out pt F/U with his surgeon  . Care D/W the Neuro. No further W/U  . Not on A/C due to  shunt as per wife  . Kidney function back to baseline  . Plan of care D/W the wife and daughter on bedside.    . Awaiting cardiology eval  . D/C planning    * Med rec reviewed with the intern. Plan of care D/W the family on bedside. Time spent 41 minutes.    ADDENDUM: Care D/W the cardiology. Recommended Nuclear stress test. Pt had multiple falls recently. He is not a candidate for A/C as per cardiology.

## 2018-12-05 NOTE — CONSULT NOTE ADULT - ASSESSMENT
83M with PMHx of DMII, HTN, macular degeneration, CKD III, NPH s/p  shunt, Afib not on AC presents to the [facility] site for dizziness of 2-3 week duration .Endocrinology being consulted for management of diabetes    #DM-2   hba1c 11.5  patient not compliant with outpatient -tradjenta 5 mg PO   inpatient patient on insulin regimen 36 lantus and lispro 12/12/12  carb consistent diet    case to be d/w attending

## 2018-12-05 NOTE — MEDICAL STUDENT PROGRESS NOTE(EDUCATION) - SUBJECTIVE AND OBJECTIVE BOX
Provider Specialty    Internal Medicine    HPI:   83M with PMHx of DMII, HTN, macular degeneration, CKD III, NPH s/p  shunt, Afib not on AC presents to the [facility] site for dizziness of 2-3 week duration HD:2. Patient has had intermittent feelings of dizziness with ambulation in which he feels that he is spinning. He also reports blurry vision but notes it is no different from the blurry vision he has been experiencing from macular degeneration. The patient also reports recent congestion and increased frequency of urination. He otherwise denies any HA, LOC, fevers, chills, N/V, diarrhea, chest pain, palpitations, neck pain. The patient is non compliant with his DMII medications, recent A1c 13.1.   In ED vitals; /74, HR 78, 98% on RA, RR 18. Finger stick in 300s.     Interval History:  Patient was laying comfortably in bed NAD. Patients reports no feelings of dizziness O/N. Patient reports a decrease in congestion.    MEDICATIONS  (STANDING):  amLODIPine   Tablet 5 milliGRAM(s) Oral daily  atorvastatin 20 milliGRAM(s) Oral at bedtime  dextrose 5%. 1000 milliLiter(s) (50 mL/Hr) IV Continuous <Continuous>  dextrose 50% Injectable 12.5 Gram(s) IV Push once  dextrose 50% Injectable 25 Gram(s) IV Push once  dextrose 50% Injectable 25 Gram(s) IV Push once  furosemide    Tablet 40 milliGRAM(s) Oral daily  heparin  Injectable 5000 Unit(s) SubCutaneous every 8 hours  insulin glargine Injectable (LANTUS) 30 Unit(s) SubCutaneous at bedtime  insulin lispro (HumaLOG) corrective regimen sliding scale   SubCutaneous three times a day before meals  insulin lispro Injectable (HumaLOG) 10 Unit(s) SubCutaneous three times a day before meals  lisinopril 40 milliGRAM(s) Oral daily  pregabalin 75 milliGRAM(s) Oral three times a day  tamsulosin 0.4 milliGRAM(s) Oral at bedtime    MEDICATIONS  (PRN):  dextrose 40% Gel 15 Gram(s) Oral once PRN Blood Glucose LESS THAN 70 milliGRAM(s)/deciliter  glucagon  Injectable 1 milliGRAM(s) IntraMuscular once PRN Glucose LESS THAN 70 milligrams/deciliter    Vital Signs Last 24 Hrs  T(C): 36.0 (05 Dec 2018 06:15), Max: 36.6 (03 Dec 2018 17:47)  T(F): 96.8 (05 Dec 2018 06:15), Max: 97.8 (03 Dec 2018 17:47)  HR: 71 (05 Dec 2018 06:15) (50 - 84)  BP: 123/69 (04 Dec 2018 06:20) (109/59 - 148/74)  BP(mean): --  RR: 18 (05 Dec 2018 06:15) (18 - 19)  SpO2: 98% (03 Dec 2018 19:42) (97% - 98%)    PHYSICAL EXAM:  GEN: No acute distress, obese male   LUNGS: Clear to auscultation bilaterally   HEART: S1/S2 present   ABD: Soft, non-tender, non-distended. Bowel sounds present  NEURO: AOx3, cerebellar function normal, no focal deficits     LABS:               11.8  6.70  )-----------( 174      ( 05 Dec 2018 09:27 )               38.6     145  |  98  |  47<H>  ----------------------------<  276<H>  3.9   |  31  |  1.9<H>    Ca    8.3<L>      04 Dec 2018 07:33  POCT 304 <H>    TPro  6.2  /  Alb  4.0  /  TBili  0.7  /  DBili  x   /  AST  23  /  ALT  8   /  AlkPhos  104  12-03    CARDIAC MARKERS ( 03 Dec 2018 15:45 )  x     / 0.11 ng/mL / x     / x     / x        LIVER FUNCTIONS - ( 03 Dec 2018 15:45 )  Alb: 4.0 g/dL / Pro: 6.2 g/dL / ALK PHOS: 104 U/L / ALT: 8 U/L / AST: 23 U/L / GGT: x           Urinalysis Basic - ( 03 Dec 2018 19:00 )  Color: Yellow / Appearance: Clear / S.020 / pH: x  Gluc: x / Ketone: Negative  / Bili: Negative / Urobili: 0.2 mg/dL   Blood: x / Protein: Negative mg/dL / Nitrite: Negative   Leuk Esterase: Small / RBC: 3-5 /HPF / WBC 10-25 /HPF   Sq Epi: x / Non Sq Epi: Few /HPF / Bacteria: Few    Hemoglobin A1C, Whole Blood: 11.5 % (18 @ 07:33)  Serum Pro-Brain Natriuretic Peptide: 2577 pg/mL (18 @ 15:45)    CARDIAC MARKERS:  18 @ 07:33  TROPONIN-T 0.06 ng/mL  CKMB 4.7 ng/mL  CREATINE KINASE 57 U/L    18 @ 01:29  TROPONIN-T 0.06 ng/mL  CKMB 4.5 ng/mL  CREATINE KINASE 56 U/L    18 @ 15:45  TROPONIN-T 0.11 ng/mL  CKMB --    TELEMETRY EVENTS:  PVC's noted	    ECG:  < from: 12 Lead ECG (18 @ 20:54) >  Diagnosis Line Sinus rhythm  Otherwise normal ECG    RADIOLOGY:  < from: Xray Chest 1 View-PORTABLE IMMEDIATE (18 @ 17:03) >  Impression:    No radiographic evidence of acute pulmonary disease.  Enlarged cardiac silhouette.     < from: Transthoracic Echocardiogram (18 @ 13:10) >  Summary:   1. Normal global left ventricular systolic function.   2. LV Ejection Fraction by Lemon's Method with a biplane EF of 54 %.   3. Mildly increased LV wall thickness.   4. There is mild concentric left ventricular hypertrophy.   5. Normal left atrial size.   6. Mild tricuspid regurgitation.   7. Fibrocalcific AV with normal opening.   8. Pulmonic valve regurgitation.   9. Pulmonary hypertension is present.  10. LA volume Index is 25.4 ml/m² ml/m2.    < from: CT Head No Cont (18 @ 16:39) >  IMPRESSION:  1.  Right transfrontal ventricular shunt catheter present. The ventricles are not significantly enlarged.  2.  Hypo- to isodense subdural fluid collections over the cerebral convexities measuring 4-5 mm on each side. Given the presence of a shunt catheter and the appearance and symmetry of the collections, favor sequela of over-shunting rather than subacute subdural hematomas.  3.  Parenchymal edema in the right frontal lobe surrounding the shunt catheter, likely reflecting catheter placement.  4.  Direct comparison with the patient's previous outside imaging studies is recommended. If these can be made available an addendum will be issued as appropriate.

## 2018-12-05 NOTE — MEDICAL STUDENT PROGRESS NOTE(EDUCATION) - NS MD HP STUD ASPLAN ASSES FT
83M with PMHx of DMII, HTN, macular degeneration, CKD III, NPH s/p  shunt, Afib not on AC presents to the [facility] site for dizziness of 2-3 week duration.
83M with PMHx of DMII, HTN, macular degeneration, CKD III, NPH s/p  shunt, Afib not on AC presents to the [facility] site for dizziness of 2-3 week duration.
82 y/o M w/ PMHx of DMII, HTN, Hydrocephalus s/p  shunt, Afib not on AC presents to [facility] site for dizziness x 1 week

## 2018-12-05 NOTE — MEDICAL STUDENT PROGRESS NOTE(EDUCATION) - NS MD HP STUD ASPLAN PLAN FT
#Dizziness  - orthostatic vitals negative   - Neuro consult appreciated- hydrocephalus likely due to overshunting  - PT consult appreciated  - cardio consult for multifocal pvc and dizziness appreciated  - cardio recommends starting low dose BB and therapeutic anticoagulation (CHADSVaSC score 4)  - pending tsh , NH3,  b12     #Hydrocephalus s/p  shunt   - CTH shows Hypo- to isodense subdural fluid collections over the cerebral convexities measuring 4-5 mm on each side.   - neurosurgery consult appreciated    #DMII, uncontrolled   - non compliant with medications at home  - A1c on 10/2018 - 13.1   - insulin regimen started 30 Lantus , 10/10/10 lispro  - endocrine consult    CKD stage III  - stable since october  - likely 2/2 uncontrolled DM II    #HTN  - c/w ace-i and amlodipine    #Atrial fibrillation not on AC  - rate controlled     Ambulate with assistance  DASH diet  DVT PPx: heparin sc # Dizziness:   - orthostatic vitals negative   - Neuro consult appreciated- hydrocephalus likely due to over shunting  - Neurosurgery: no need for AC ,  shunt is intact  - PT consult appreciated  - cardio consult : will get nuclear stress test  - pending TSH, b12 , syphilis  - normal NH3 level    # DMII, uncontrolled   - non compliant with medications at home  - A1c on 10/2018 - 13.1   - insulin regimen adjusted 32 Lantus , 12/12/12 lispro  - endocrine consult appreciated , will continue insulin regimen in house.    # CKD stage III  - stable since october  - likely 2/2 uncontrolled DM II  - AM cr 1.9 from 2.3 on admission    # HTN:  - controlled  - c/w ace-i and amlodipine    # Atrial fibrillation not on AC  - rate controlled     Ambulate with assistance  DASH diet  DVT PPx: heparin sc   Dispo: home when medically cleared

## 2018-12-06 ENCOUNTER — TRANSCRIPTION ENCOUNTER (OUTPATIENT)
Age: 83
End: 2018-12-06

## 2018-12-06 VITALS
RESPIRATION RATE: 18 BRPM | DIASTOLIC BLOOD PRESSURE: 56 MMHG | SYSTOLIC BLOOD PRESSURE: 94 MMHG | TEMPERATURE: 97 F | HEART RATE: 60 BPM

## 2018-12-06 LAB
ANION GAP SERPL CALC-SCNC: 15 MMOL/L — HIGH (ref 7–14)
BUN SERPL-MCNC: 40 MG/DL — HIGH (ref 10–20)
CALCIUM SERPL-MCNC: 8.1 MG/DL — LOW (ref 8.5–10.1)
CHLORIDE SERPL-SCNC: 98 MMOL/L — SIGNIFICANT CHANGE UP (ref 98–110)
CO2 SERPL-SCNC: 32 MMOL/L — SIGNIFICANT CHANGE UP (ref 17–32)
CREAT SERPL-MCNC: 1.9 MG/DL — HIGH (ref 0.7–1.5)
GLUCOSE BLDC GLUCOMTR-MCNC: 179 MG/DL — HIGH (ref 70–99)
GLUCOSE SERPL-MCNC: 195 MG/DL — HIGH (ref 70–99)
HCT VFR BLD CALC: 38.4 % — LOW (ref 42–52)
HGB BLD-MCNC: 12 G/DL — LOW (ref 14–18)
MAGNESIUM SERPL-MCNC: 2.2 MG/DL — SIGNIFICANT CHANGE UP (ref 1.8–2.4)
MCHC RBC-ENTMCNC: 27.6 PG — SIGNIFICANT CHANGE UP (ref 27–31)
MCHC RBC-ENTMCNC: 31.3 G/DL — LOW (ref 32–37)
MCV RBC AUTO: 88.5 FL — SIGNIFICANT CHANGE UP (ref 80–94)
NRBC # BLD: 0 /100 WBCS — SIGNIFICANT CHANGE UP (ref 0–0)
PLATELET # BLD AUTO: 188 K/UL — SIGNIFICANT CHANGE UP (ref 130–400)
POTASSIUM SERPL-MCNC: 4 MMOL/L — SIGNIFICANT CHANGE UP (ref 3.5–5)
POTASSIUM SERPL-SCNC: 4 MMOL/L — SIGNIFICANT CHANGE UP (ref 3.5–5)
RBC # BLD: 4.34 M/UL — LOW (ref 4.7–6.1)
RBC # FLD: 15.1 % — HIGH (ref 11.5–14.5)
SODIUM SERPL-SCNC: 145 MMOL/L — SIGNIFICANT CHANGE UP (ref 135–146)
T PALLIDUM AB TITR SER: NEGATIVE — SIGNIFICANT CHANGE UP
TSH SERPL-MCNC: 0.34 UIU/ML — SIGNIFICANT CHANGE UP (ref 0.27–4.2)
VIT B12 SERPL-MCNC: 578 PG/ML — SIGNIFICANT CHANGE UP (ref 232–1245)
WBC # BLD: 8.73 K/UL — SIGNIFICANT CHANGE UP (ref 4.8–10.8)
WBC # FLD AUTO: 8.73 K/UL — SIGNIFICANT CHANGE UP (ref 4.8–10.8)

## 2018-12-06 RX ORDER — RAMIPRIL 5 MG
1 CAPSULE ORAL
Qty: 0 | Refills: 0 | COMMUNITY

## 2018-12-06 RX ORDER — TAMSULOSIN HYDROCHLORIDE 0.4 MG/1
1 CAPSULE ORAL
Qty: 30 | Refills: 0
Start: 2018-12-06 | End: 2019-01-04

## 2018-12-06 RX ORDER — ENOXAPARIN SODIUM 100 MG/ML
20 INJECTION SUBCUTANEOUS
Qty: 0 | Refills: 0 | DISCHARGE
Start: 2018-12-06 | End: 2019-01-04

## 2018-12-06 RX ORDER — AMLODIPINE BESYLATE 2.5 MG/1
1 TABLET ORAL
Qty: 30 | Refills: 0 | OUTPATIENT
Start: 2018-12-06 | End: 2019-01-04

## 2018-12-06 RX ORDER — LINAGLIPTIN 5 MG/1
1 TABLET, FILM COATED ORAL
Qty: 30 | Refills: 0 | OUTPATIENT
Start: 2018-12-06 | End: 2019-01-04

## 2018-12-06 RX ORDER — TAMSULOSIN HYDROCHLORIDE 0.4 MG/1
1 CAPSULE ORAL
Qty: 0 | Refills: 0 | COMMUNITY

## 2018-12-06 RX ORDER — LINAGLIPTIN 5 MG/1
1 TABLET, FILM COATED ORAL
Qty: 0 | Refills: 0 | COMMUNITY

## 2018-12-06 RX ORDER — AMLODIPINE BESYLATE 2.5 MG/1
1 TABLET ORAL
Qty: 0 | Refills: 0 | COMMUNITY

## 2018-12-06 RX ORDER — ROSUVASTATIN CALCIUM 5 MG/1
1 TABLET ORAL
Qty: 30 | Refills: 0
Start: 2018-12-06 | End: 2019-01-04

## 2018-12-06 RX ORDER — RAMIPRIL 5 MG
1 CAPSULE ORAL
Qty: 60 | Refills: 0 | OUTPATIENT
Start: 2018-12-06 | End: 2019-01-04

## 2018-12-06 RX ORDER — ENOXAPARIN SODIUM 100 MG/ML
30 INJECTION SUBCUTANEOUS
Qty: 10 | Refills: 0 | OUTPATIENT
Start: 2018-12-06 | End: 2019-01-04

## 2018-12-06 RX ORDER — FUROSEMIDE 40 MG
1 TABLET ORAL
Qty: 0 | Refills: 0 | DISCHARGE
Start: 2018-12-06 | End: 2019-01-04

## 2018-12-06 RX ORDER — INSULIN GLARGINE 100 [IU]/ML
30 INJECTION, SOLUTION SUBCUTANEOUS ONCE
Qty: 0 | Refills: 0 | Status: COMPLETED | OUTPATIENT
Start: 2018-12-06 | End: 2018-12-06

## 2018-12-06 RX ORDER — ROSUVASTATIN CALCIUM 5 MG/1
1 TABLET ORAL
Qty: 0 | Refills: 0 | COMMUNITY

## 2018-12-06 RX ORDER — FUROSEMIDE 40 MG
1 TABLET ORAL
Qty: 30 | Refills: 0 | OUTPATIENT
Start: 2018-12-06 | End: 2019-01-04

## 2018-12-06 RX ORDER — AMLODIPINE BESYLATE 2.5 MG/1
1 TABLET ORAL
Qty: 0 | Refills: 0 | DISCHARGE
Start: 2018-12-06 | End: 2019-01-04

## 2018-12-06 RX ORDER — REPAGLINIDE 1 MG/1
1 TABLET ORAL
Qty: 90 | Refills: 0 | OUTPATIENT
Start: 2018-12-06 | End: 2019-01-04

## 2018-12-06 RX ORDER — FUROSEMIDE 40 MG
1 TABLET ORAL
Qty: 0 | Refills: 0 | COMMUNITY

## 2018-12-06 RX ADMIN — Medication 1 SPRAY(S): at 05:34

## 2018-12-06 RX ADMIN — HEPARIN SODIUM 5000 UNIT(S): 5000 INJECTION INTRAVENOUS; SUBCUTANEOUS at 05:34

## 2018-12-06 RX ADMIN — Medication 75 MILLIGRAM(S): at 14:47

## 2018-12-06 RX ADMIN — Medication 75 MILLIGRAM(S): at 05:33

## 2018-12-06 RX ADMIN — AMLODIPINE BESYLATE 5 MILLIGRAM(S): 2.5 TABLET ORAL at 05:34

## 2018-12-06 RX ADMIN — INSULIN GLARGINE 30 UNIT(S): 100 INJECTION, SOLUTION SUBCUTANEOUS at 16:45

## 2018-12-06 RX ADMIN — HEPARIN SODIUM 5000 UNIT(S): 5000 INJECTION INTRAVENOUS; SUBCUTANEOUS at 14:48

## 2018-12-06 RX ADMIN — LISINOPRIL 40 MILLIGRAM(S): 2.5 TABLET ORAL at 05:34

## 2018-12-06 RX ADMIN — Medication 40 MILLIGRAM(S): at 05:34

## 2018-12-06 NOTE — DISCHARGE NOTE ADULT - PATIENT PORTAL LINK FT
You can access the [facility] Patient Portal, offered by [facility], by registering with the following website: [email or web address]

## 2018-12-06 NOTE — DISCHARGE NOTE ADULT - HOSPITAL COURSE
83M with PMHx of DMII, HTN, Hydrocephalus s/p  shunt, Afib not on AC presents to [facility] for dizziness x 1 week. Patient has had intermittent feelings of dizziness described as room spinning sensation. Occurs mostly with ambulations. He admits to blurry vision not associated with the episode but has seen ophthalmologist and diagnosed with macular degeneration of right eye. Denies any headaches, LOC. He otherwise denies any fevers, chills, N/V, diarrhea, chest pain, palpitations. Of note patient has history of uncontrolled DMII, recent A1c 13.1, has not been taking his medications. In ED vitals; /74, HR 78, 98% on RA, RR 18. Finger stick in 300s.   pt was admitted to the hospital for further work up.  Neurology / Neurosurgery / cardiology / endocrinology were consulted.  DM has been controlled. education provided . pt has negative nuclear stress test.  pt is medically stable and clinically improved.  pt to be discharged per Medicine attending to follow up with endo as out patient and PMD in a week.

## 2018-12-06 NOTE — DISCHARGE NOTE ADULT - CARE PLAN
Principal Discharge DX:	DM (diabetes mellitus)  Goal:	medically optimize condition  Assessment and plan of treatment:	1- take medications as prescribed  2- f/u with Dr. Adkins in 1 week  Secondary Diagnosis:	Hydrocephalus  Goal:	medically optimize condition  Assessment and plan of treatment:	f/u with Dr. Parker at [facility] for further VPS management.  Secondary Diagnosis:	Hypertension  Goal:	medically optimize condition  Assessment and plan of treatment:	1- take medications as prescribed  2- f/u with PMD in a week Principal Discharge DX:	DM (diabetes mellitus)  Goal:	medically optimize condition  Assessment and plan of treatment:	1- take medications as prescribed  2- f/u with Dr. Adkins in 1 week  Secondary Diagnosis:	Hydrocephalus  Goal:	medically optimize condition  Assessment and plan of treatment:	f/u with Dr. Parker at [facility] for further VPS management.  Secondary Diagnosis:	Hypertension  Goal:	medically optimize condition  Assessment and plan of treatment:	1- take medications as prescribed  2- f/u with PMD in a week  3- follow up with Dr. Dias in 10 days

## 2018-12-06 NOTE — PROGRESS NOTE ADULT - SUBJECTIVE AND OBJECTIVE BOX
VINCENZO EDMONDSON  83y Male    CHIEF COMPLAINT:    Patient is a 83y old  Male who presents with a chief complaint of Dizziness (06 Dec 2018 15:06)      INTERVAL HPI/OVERNIGHT EVENTS:    Patient seen and examined. Feels goo. No dizziness. No other complaint    ROS: All other systems are negative.    Vital Signs:    T(F): 97.2 (12-06-18 @ 14:16), Max: 97.2 (12-06-18 @ 05:30)  HR: 60 (12-06-18 @ 14:16) (60 - 80)  BP: 94/56 (12-06-18 @ 14:16) (94/56 - 109/61)  RR: 18 (12-06-18 @ 14:16) (18 - 18)  SpO2: --  I&O's Summary    Daily     Daily   CAPILLARY BLOOD GLUCOSE      POCT Blood Glucose.: 179 mg/dL (06 Dec 2018 07:50)  POCT Blood Glucose.: 189 mg/dL (05 Dec 2018 21:00)  POCT Blood Glucose.: 156 mg/dL (05 Dec 2018 16:38)      PHYSICAL EXAM:    GENERAL:  NAD  SKIN: No rashes or lesions  HENT: Atrumatic. Normocephalic. PERRL. Moist membranes.  NECK: Supple, No JVD. No lymphadenopathy.  PULMONARY: CTA B/L. No wheezing. No rales  CVS: Normal S1, S2. Rate and Rythm are regular. No murmurs.  ABDOMEN/GI: Soft, Nontender, Nondistended; BS present  EXTREMITIES: Peripheral pulses intact. No edema B/L LE.  NEUROLOGIC:  No motor or sensory deficit.  PSYCH: Alert & oriented x 3    Consultant(s) Notes Reviewed:  [x ] YES  [ ] NO  Care Discussed with Consultants/Other Providers [ x] YES  [ ] NO    EKG reviewed  Telemetry reviewed    LABS:                        12.0   8.73  )-----------( 188      ( 06 Dec 2018 06:52 )             38.4     12-06    145  |  98  |  40<H>  ----------------------------<  195<H>   Creatinine Trend: 1.9<--, 1.9<--, 1.9<--, 2.3<--  4.0   |  32  |  1.9<H>    Ca    8.1<L>      06 Dec 2018 06:52  Mg     2.2     12-06        Serum Pro-Brain Natriuretic Peptide: 2577 pg/mL (12-03-18 @ 15:45)    Trop 0.06, CKMB 4.7, CK 57, 12-04-18 @ 07:33  Trop 0.06, CKMB 4.5, CK 56, 12-04-18 @ 01:29  Trop 0.11, CKMB --, CK --, 12-03-18 @ 15:45      Culture - Urine (collected 03 Dec 2018 17:27)  Source: .Urine Clean Catch (Midstream)  Final Report (04 Dec 2018 20:42):    No growth        RADIOLOGY & ADDITIONAL TESTS:    < from: Transthoracic Echocardiogram (12.04.18 @ 13:10) >    Summary:   1. Normal global left ventricular systolic function.   2. LV Ejection Fraction by Lemon's Method with a biplane EF of 54 %.   3. Mildly increased LV wall thickness.   4. There is mild concentric left ventricular hypertrophy.   5. Normal left atrial size.   6. Mild tricuspidregurgitation.   7. Fibrocalcific AV with normal opening.   8. Pulmonic valve regurgitation.   9. Pulmonary hypertension is present.  10. LA volume Index is 25.4 ml/m² ml/m2.      < end of copied text >  < from: NM Nuclear Stress Pharmacologic Multiple (12.06.18 @ 14:16) >  Impression:   1. NORMAL ADENOSINE / REST MYOCARDIAL PERFUSION TOMOGRAPHY, WITH NO   EVIDENCE FOR ISCHEMIA DURING ADENOSINE INFUSION.   2. BORDERLINE NORMAL RESTING LEFT VENTRICULAR WALL MOTION AND WALL   THICKENING.  3. LEFT VENTRICULAR EJECTION FRACTION OF   49 % WHICH IS JUST BELOW THE   LOWER LIMIT OF NORMAL OF 50%. WALL MOTION ANALYSIS SUGGESTS EJECTION   FRACTION OF 55%. ECHOCARDIOGRAPHIC ESTIMATED EJECTION FRACTION WAS 54% ON   12/4/2018.       < end of copied text >    Imaging or report Personally Reviewed:  [ ] YES  [ ] NO    Medications:  Standing  amLODIPine   Tablet 5 milliGRAM(s) Oral daily  atorvastatin 20 milliGRAM(s) Oral at bedtime  dextrose 5%. 1000 milliLiter(s) IV Continuous <Continuous>  dextrose 50% Injectable 12.5 Gram(s) IV Push once  dextrose 50% Injectable 25 Gram(s) IV Push once  dextrose 50% Injectable 25 Gram(s) IV Push once  furosemide    Tablet 40 milliGRAM(s) Oral daily  heparin  Injectable 5000 Unit(s) SubCutaneous every 8 hours  insulin glargine Injectable (LANTUS) 36 Unit(s) SubCutaneous at bedtime  insulin lispro (HumaLOG) corrective regimen sliding scale   SubCutaneous three times a day before meals  insulin lispro Injectable (HumaLOG) 12 Unit(s) SubCutaneous three times a day before meals  lisinopril 40 milliGRAM(s) Oral daily  pregabalin 75 milliGRAM(s) Oral three times a day  sodium chloride 0.65% Nasal 1 Spray(s) Both Nostrils four times a day  tamsulosin 0.4 milliGRAM(s) Oral at bedtime    PRN Meds  dextrose 40% Gel 15 Gram(s) Oral once PRN  glucagon  Injectable 1 milliGRAM(s) IntraMuscular once PRN      Case discussed with resident    Care discussed with pt/family
DELVISVINCENZO  83y Male    CHIEF COMPLAINT:    Patient is a 83y old  Male who presents with a chief complaint of Dizziness (05 Dec 2018 10:13)      INTERVAL HPI/OVERNIGHT EVENTS:    Patient seen and examined. Feels better today. No dizziness. No cp. No palpitations. No sob    ROS: All other systems are negative.    Vital Signs:    T(F): 96.8 (12-05-18 @ 06:15), Max: 96.8 (12-05-18 @ 06:15)  HR: 71 (12-05-18 @ 06:15) (71 - 71)  BP: --  RR: 18 (12-05-18 @ 06:15) (18 - 18)  SpO2: 98% (12-04-18 @ 18:18) (98% - 98%)  I&O's Summary    Daily     Daily   CAPILLARY BLOOD GLUCOSE      POCT Blood Glucose.: 339 mg/dL (05 Dec 2018 11:39)  POCT Blood Glucose.: 248 mg/dL (05 Dec 2018 07:53)  POCT Blood Glucose.: 269 mg/dL (04 Dec 2018 21:18)  POCT Blood Glucose.: 203 mg/dL (04 Dec 2018 16:42)  POCT Blood Glucose.: 365 mg/dL (04 Dec 2018 14:04)      PHYSICAL EXAM:    GENERAL:  NAD  SKIN: No rashes or lesions  HENT: Atrumatic. Normocephalic. PERRL. Moist membranes.  NECK: Supple, No JVD. No lymphadenopathy.  PULMONARY: CTA B/L. No wheezing. No rales  CVS: Normal S1, S2. Rate and Rythm are regular. No murmurs.  ABDOMEN/GI: Soft, Nontender, Nondistended; BS present  EXTREMITIES: Peripheral pulses intact. No edema B/L LE.  NEUROLOGIC:  No motor or sensory deficit.  PSYCH: Alert & oriented x 3    Consultant(s) Notes Reviewed:  [x ] YES  [ ] NO  Care Discussed with Consultants/Other Providers [ x] YES  [ ] NO    EKG reviewed  Telemetry reviewed    LABS:                        11.8   6.70  )-----------( 174      ( 05 Dec 2018 09:27 )             38.6     12-05    144  |  98  |  41<H>  ----------------------------<  346<H>   Creatinine Trend: 1.9<--, 1.9<--, 2.3<--  3.7   |  30  |  1.9<H>    Ca    7.9<L>      05 Dec 2018 09:27  Mg     2.3     12-04    TPro  6.2  /  Alb  4.0  /  TBili  0.7  /  DBili  x   /  AST  23  /  ALT  8   /  AlkPhos  104  12-03      Serum Pro-Brain Natriuretic Peptide: 2577 pg/mL (12-03-18 @ 15:45)    Trop 0.06, CKMB 4.7, CK 57, 12-04-18 @ 07:33  Trop 0.06, CKMB 4.5, CK 56, 12-04-18 @ 01:29  Trop 0.11, CKMB --, CK --, 12-03-18 @ 15:45      Culture - Urine (collected 03 Dec 2018 17:27)  Source: .Urine Clean Catch (Midstream)  Final Report (04 Dec 2018 20:42):    No growth        RADIOLOGY & ADDITIONAL TESTS:      Imaging or report Personally Reviewed:  [ ] YES  [ ] NO    Medications:  Standing  amLODIPine   Tablet 5 milliGRAM(s) Oral daily  atorvastatin 20 milliGRAM(s) Oral at bedtime  dextrose 5%. 1000 milliLiter(s) IV Continuous <Continuous>  dextrose 50% Injectable 12.5 Gram(s) IV Push once  dextrose 50% Injectable 25 Gram(s) IV Push once  dextrose 50% Injectable 25 Gram(s) IV Push once  furosemide    Tablet 40 milliGRAM(s) Oral daily  heparin  Injectable 5000 Unit(s) SubCutaneous every 8 hours  insulin glargine Injectable (LANTUS) 36 Unit(s) SubCutaneous at bedtime  insulin lispro (HumaLOG) corrective regimen sliding scale   SubCutaneous three times a day before meals  insulin lispro Injectable (HumaLOG) 12 Unit(s) SubCutaneous three times a day before meals  lisinopril 40 milliGRAM(s) Oral daily  pregabalin 75 milliGRAM(s) Oral three times a day  tamsulosin 0.4 milliGRAM(s) Oral at bedtime    PRN Meds  dextrose 40% Gel 15 Gram(s) Oral once PRN  glucagon  Injectable 1 milliGRAM(s) IntraMuscular once PRN      Case discussed with resident    Care discussed with pt/family

## 2018-12-06 NOTE — DISCHARGE NOTE ADULT - CARE PROVIDERS DIRECT ADDRESSES
,[email or web address],DirectAddress_Unknown

## 2018-12-06 NOTE — PROGRESS NOTE ADULT - ASSESSMENT
An 83 years old male presented to the ER with dizziness for the last one week. As per wife while trying to get in van he fell backwards three times over the last few weeks.    EKG: A. Fib @ 68/min. Multiple PVC'S. Non specific ST, T changes.    1. Dizziness likely due to Hyperglycemia  2. Uncontrolled DM-2  3. JOHANNY/Obesity  4. DM-2/HTN  5. Hydrocephalus S/P  shunt  6. A. Fib on no A/C  7. TOBIAS on CKD-3          PLAN:    . Care D/W the cardiology yesterday. Pt has normal Stress test. Will D/C home. F/U with cardiology as an out pt.  . Care D/W the endocrinology yesterday. F/U with endocrinology in two weeks  . Cont Lantus 30 units QHS  . Cont Tradjenta 5 mg po daily and add Repaglinide 1 mg po three times a day   . Care D/W the Neurosurgery. Shunt is stable. Out pt F/U with his surgeon at [facility]  . Care D/W the Neuro. No further W/U  . Not on A/C due to  shunt as per wife. Pt is also at risk of falls  . Kidney function back to baseline  . Plan of care D/W the wife and daughter on bedside.      * Med rec reviewed with the intern. Plan of care D/W the family on bedside. Time spent 41 minutes.

## 2018-12-06 NOTE — DISCHARGE NOTE ADULT - PLAN OF CARE
medically optimize condition 1- take medications as prescribed  2- f/u with Dr. Adkins in 1 week f/u with Dr. Parker at [facility] for further VPS management. 1- take medications as prescribed  2- f/u with PMD in a week 1- take medications as prescribed  2- f/u with PMD in a week  3- follow up with Dr. Dias in 10 days

## 2018-12-06 NOTE — DISCHARGE NOTE ADULT - CARE PROVIDER_API CALL
Irvin Adkins), EndocrinologyMetabDiabetes; Internal Medicine  [address] [place] [address]  Phone: [phone]  Fax: [phone]    Doug Foy), Geriatric Medicine; Internal Medicine  [address] [place] [address]  Phone: [phone]  Fax: [phone] Irvin Adkins), EndocrinologyMetabDiabetes; Internal Medicine  [address] [place] [address]  Phone: [phone]  Fax: [phone]    Doug Foy), Geriatric Medicine; Internal Medicine  [address] [place] [address]  Phone: [phone]  Fax: [phone]    Ok Harris), Cardiovascular Disease; Interventional Cardiology  [address] [place] [address]  Phone: [phone]  Fax: [phone]

## 2018-12-06 NOTE — CHART NOTE - NSCHARTNOTEFT_GEN_A_CORE
<<<RESIDENT DISCHARGE NOTE>>>     VINCENZO EDMONDSON  MRN-[id number]    VITAL SIGNS:  T(F): 97.2 (12-06-18 @ 14:16), Max: 97.2 (12-06-18 @ 05:30)  HR: 60 (12-06-18 @ 14:16)  BP: 94/56 (12-06-18 @ 14:16)  SpO2: --      PHYSICAL EXAMINATION:  GEN: No acute distress, obese male   LUNGS: Clear to auscultation bilaterally   HEART: S1/S2 present   ABD: Soft, non-tender, non-distended. Bowel sounds present  NEURO: AOx3, no focal deficits                           12.0   8.73  )-----------( 188      ( 06 Dec 2018 06:52 )             38.4       12-06    145  |  98  |  40<H>  ----------------------------<  195<H>  4.0   |  32  |  1.9<H>    Ca    8.1<L>      06 Dec 2018 06:52  Mg     2.2     12-06        FINAL DISCHARGE INTERVIEW:  Resident(s) Present: (Name: Dr. Jag Wright), RN Present: (Name:Duane)    DISCHARGE MEDICATION RECONCILIATION  reviewed with Attending (Name: Dr. Murphy)    DISPOSITION:  Home

## 2018-12-06 NOTE — DISCHARGE NOTE ADULT - PROVIDER TOKENS
TOKEN:'[id number]',TOKEN:'[id number]' TOKEN:'[id number]:'[id number]',TOKEN:'[id number]'

## 2018-12-06 NOTE — DISCHARGE NOTE ADULT - REASON FOR ADMISSION
PROCEDURE:  FLEXIBLE SIGMOIDOSCOPY      PHYSICIAN:  Dr. Johnson Piedra      Please re-read these instructions at least one (1) week prior to your procedure.  If you have any questions, please call our [facility] office ([phone].)    CANCELLATION AND RESCHEDULE POLICY:  Due to the busy schedules of our patients, surgery centers, and physicians it is important that appointments for procedures are kept.  In the event that you need to reschedule or cancel your procedure, we require 48-hours notice.  Please call the clinic where you scheduled your procedure appointment:     [phone]    Thank you for your cooperation in this matter.       Please read all instructions carefully, at least one week before your procedure.      FOOD CHANGES BEFORE THE PROCEDURE:  Avoid all alcoholic beverages including beer for at least 24 hours before your procedure.  No corn based foods for 48 hours before your procedure.  At least 5 days prior, avoid all food containing OLEAN ingredients.  Please refer to package labels.       MEDICATION CHANGES BEFORE THE PROCEDURE:  If you are taking a blood thinner such as Coumadin, please check with the doctor for instructions.  If you are diabetic and require Insulin please check with the doctor for instructions before you begin this preparation to change the dosage of your insulin for those days.  You may take any of your regular medications the day of your procedure.  It is fine to take Tylenol if needed.                      DAY BEFORE PROCEDURE:    1. You can eat a normal breakfast and lunch.    Avoid vegetables, fruits, or popcorn.      2. Starting at 5:00 p.m. you can have only clear liquids for the rest of the evening.  Solid foods are not allowed.  No flavors or products that are colored red or blue or purple.      CLEAR LIQUIDS INCLUDE:  Fruit juices, without pulp (Do NOT drink pineapple, tomato, grapefruit, or nectars)  Water  Clear broth or bouillon  Coffee or tea      Gatorade - NO FLAVORS COLORED RED  Popsicles - NO FLAVORS COLORED RED  Carbonated or non-carbonated soft drinks  Ministerio aid (or other fruit flavored drinks) - NO FLAVORS COLORED RED  Plain jello (without added fruits or toppings) - NO FLAVORS COLORED RED  Milk or Shakes (no added fruits or toppings)      DAY OF PROCEDURE:    1. On the day of the exam, the only thing you may have is clear liquids.    2.   You may take your medications the morning of your procedure with sips of water.      3.   After your procedure, you may resume eating and drinking with no limitations.       Dizziness

## 2018-12-06 NOTE — PROGRESS NOTE ADULT - ATTENDING COMMENTS
Patient seen and examined, neurologically stable.    Shunt pumps and refills without resistance.     CTH shows bilateral extraaxial fluid collections, which are chronic.    Pt is followed by neurosurgery at [facility], shunt has not been manipulated in >10 yrs, and is NONPROGRAMMABLE.    Unlikely pt's symptoms due to shunt. Follow up with primary neurosurgeon as scheduled.

## 2018-12-06 NOTE — DISCHARGE NOTE ADULT - MEDICATION SUMMARY - MEDICATIONS TO TAKE
I will START or STAY ON the medications listed below when I get home from the hospital:    ramipril 10 mg oral capsule  -- 1 cap(s) by mouth 2 times a day  -- Indication: For Hypertension    Flomax 0.4 mg oral capsule  -- 1 cap(s) by mouth once a day  -- Indication: For BPH    Lyrica 75 mg oral capsule  -- 1 cap(s) by mouth 3 times a day MDD:3 tabs / day  -- Indication: For Neuropathy    Tradjenta 5 mg oral tablet  -- 1 tab(s) by mouth once a day  -- Indication: For DM (diabetes mellitus)    Lantus Solostar Pen 100 units/mL subcutaneous solution  -- 30 unit(s) subcutaneous once a day (at bedtime)   -- Do not drink alcoholic beverages when taking this medication.  It is very important that you take or use this exactly as directed.  Do not skip doses or discontinue unless directed by your doctor.  Keep in refrigerator.  Do not freeze.    -- Indication: For DM (diabetes mellitus)    repaglinide 1 mg oral tablet  -- 1 tab(s) by mouth 3 times a day   -- Do not drink alcoholic beverages when taking this medication.  It is very important that you take or use this exactly as directed.  Do not skip doses or discontinue unless directed by your doctor.  Obtain medical advice before taking any non-prescription drugs as some may affect the action of this medication.    -- Indication: For DM (diabetes mellitus)    Crestor 5 mg oral tablet  -- 1 tab(s) by mouth once a day (at bedtime)  -- Indication: For DLD    amLODIPine 5 mg oral tablet  -- 1 tab(s) by mouth once a day  -- Indication: For HTN    Lasix 40 mg oral tablet  -- 1 tab(s) by mouth once a day   -- Indication: For HTN

## 2018-12-12 DIAGNOSIS — N18.4 CHRONIC KIDNEY DISEASE, STAGE 4 (SEVERE): ICD-10-CM

## 2018-12-12 DIAGNOSIS — N17.9 ACUTE KIDNEY FAILURE, UNSPECIFIED: ICD-10-CM

## 2018-12-12 DIAGNOSIS — E11.65 TYPE 2 DIABETES MELLITUS WITH HYPERGLYCEMIA: ICD-10-CM

## 2018-12-12 DIAGNOSIS — I12.9 HYPERTENSIVE CHRONIC KIDNEY DISEASE WITH STAGE 1 THROUGH STAGE 4 CHRONIC KIDNEY DISEASE, OR UNSPECIFIED CHRONIC KIDNEY DISEASE: ICD-10-CM

## 2018-12-12 DIAGNOSIS — R42 DIZZINESS AND GIDDINESS: ICD-10-CM

## 2018-12-12 DIAGNOSIS — Z91.14 PATIENT'S OTHER NONCOMPLIANCE WITH MEDICATION REGIMEN: ICD-10-CM

## 2018-12-12 DIAGNOSIS — I48.91 UNSPECIFIED ATRIAL FIBRILLATION: ICD-10-CM

## 2018-12-12 DIAGNOSIS — E66.9 OBESITY, UNSPECIFIED: ICD-10-CM

## 2018-12-12 DIAGNOSIS — G47.33 OBSTRUCTIVE SLEEP APNEA (ADULT) (PEDIATRIC): ICD-10-CM

## 2018-12-12 DIAGNOSIS — Z79.84 LONG TERM (CURRENT) USE OF ORAL HYPOGLYCEMIC DRUGS: ICD-10-CM

## 2018-12-12 DIAGNOSIS — H35.30 UNSPECIFIED MACULAR DEGENERATION: ICD-10-CM

## 2018-12-12 DIAGNOSIS — Z83.3 FAMILY HISTORY OF DIABETES MELLITUS: ICD-10-CM

## 2019-02-15 ENCOUNTER — INPATIENT (INPATIENT)
Facility: HOSPITAL | Age: 84
LOS: 11 days | Discharge: NURSING, REHAB OR LONG-TERM CARE | End: 2019-02-27
Attending: INTERNAL MEDICINE | Admitting: INTERNAL MEDICINE
Payer: COMMERCIAL

## 2019-02-15 VITALS
SYSTOLIC BLOOD PRESSURE: 83 MMHG | OXYGEN SATURATION: 100 % | RESPIRATION RATE: 20 BRPM | TEMPERATURE: 96 F | DIASTOLIC BLOOD PRESSURE: 45 MMHG | HEART RATE: 79 BPM

## 2019-02-15 DIAGNOSIS — Z98.890 OTHER SPECIFIED POSTPROCEDURAL STATES: Chronic | ICD-10-CM

## 2019-02-15 PROBLEM — N18.9 CHRONIC KIDNEY DISEASE, UNSPECIFIED: Chronic | Status: ACTIVE | Noted: 2018-12-03

## 2019-02-15 PROBLEM — G91.9 HYDROCEPHALUS, UNSPECIFIED: Chronic | Status: ACTIVE | Noted: 2018-12-03

## 2019-02-15 PROBLEM — E11.9 TYPE 2 DIABETES MELLITUS WITHOUT COMPLICATIONS: Chronic | Status: ACTIVE | Noted: 2018-12-03

## 2019-02-15 PROBLEM — I10 ESSENTIAL (PRIMARY) HYPERTENSION: Chronic | Status: ACTIVE | Noted: 2018-12-03

## 2019-02-15 PROBLEM — I48.91 UNSPECIFIED ATRIAL FIBRILLATION: Chronic | Status: ACTIVE | Noted: 2018-12-03

## 2019-02-15 LAB
ALBUMIN SERPL ELPH-MCNC: 4 G/DL — SIGNIFICANT CHANGE UP (ref 3.5–5.2)
ALP SERPL-CCNC: 79 U/L — SIGNIFICANT CHANGE UP (ref 30–115)
ALT FLD-CCNC: 8 U/L — SIGNIFICANT CHANGE UP (ref 0–41)
ANION GAP SERPL CALC-SCNC: 18 MMOL/L — HIGH (ref 7–14)
APTT BLD: 31.8 SEC — SIGNIFICANT CHANGE UP (ref 27–39.2)
AST SERPL-CCNC: 13 U/L — SIGNIFICANT CHANGE UP (ref 0–41)
BASE EXCESS BLDA CALC-SCNC: 2.7 MMOL/L — HIGH (ref -2–2)
BASE EXCESS BLDV CALC-SCNC: 4.7 MMOL/L — HIGH (ref -2–2)
BASOPHILS # BLD AUTO: 0.02 K/UL — SIGNIFICANT CHANGE UP (ref 0–0.2)
BASOPHILS NFR BLD AUTO: 0.2 % — SIGNIFICANT CHANGE UP (ref 0–1)
BILIRUB SERPL-MCNC: 0.8 MG/DL — SIGNIFICANT CHANGE UP (ref 0.2–1.2)
BLD GP AB SCN SERPL QL: SIGNIFICANT CHANGE UP
BUN SERPL-MCNC: 94 MG/DL — CRITICAL HIGH (ref 10–20)
CA-I SERPL-SCNC: 0.94 MMOL/L — LOW (ref 1.12–1.3)
CALCIUM SERPL-MCNC: 7.1 MG/DL — LOW (ref 8.5–10.1)
CHLORIDE SERPL-SCNC: 97 MMOL/L — LOW (ref 98–110)
CK MB CFR SERPL CALC: 5.4 NG/ML — SIGNIFICANT CHANGE UP (ref 0.6–6.3)
CK SERPL-CCNC: 144 U/L — SIGNIFICANT CHANGE UP (ref 0–225)
CO2 SERPL-SCNC: 29 MMOL/L — SIGNIFICANT CHANGE UP (ref 17–32)
CREAT SERPL-MCNC: 3.2 MG/DL — HIGH (ref 0.7–1.5)
EOSINOPHIL # BLD AUTO: 0.15 K/UL — SIGNIFICANT CHANGE UP (ref 0–0.7)
EOSINOPHIL NFR BLD AUTO: 1.8 % — SIGNIFICANT CHANGE UP (ref 0–8)
GAS PNL BLDV: 144 MMOL/L — SIGNIFICANT CHANGE UP (ref 136–145)
GAS PNL BLDV: SIGNIFICANT CHANGE UP
GLUCOSE BLDC GLUCOMTR-MCNC: 120 MG/DL — HIGH (ref 70–99)
GLUCOSE BLDC GLUCOMTR-MCNC: 49 MG/DL — LOW (ref 70–99)
GLUCOSE BLDC GLUCOMTR-MCNC: 55 MG/DL — LOW (ref 70–99)
GLUCOSE BLDC GLUCOMTR-MCNC: 62 MG/DL — LOW (ref 70–99)
GLUCOSE SERPL-MCNC: 70 MG/DL — SIGNIFICANT CHANGE UP (ref 70–99)
HCO3 BLDA-SCNC: 29 MMOL/L — HIGH (ref 23–27)
HCO3 BLDV-SCNC: 32 MMOL/L — HIGH (ref 22–29)
HCT VFR BLD CALC: 32.5 % — LOW (ref 42–52)
HCT VFR BLDA CALC: 31.6 % — LOW (ref 34–44)
HGB BLD CALC-MCNC: 10.3 G/DL — LOW (ref 14–18)
HGB BLD-MCNC: 10 G/DL — LOW (ref 14–18)
IMM GRANULOCYTES NFR BLD AUTO: 0.4 % — HIGH (ref 0.1–0.3)
INR BLD: 1.14 RATIO — SIGNIFICANT CHANGE UP (ref 0.65–1.3)
LACTATE BLDV-MCNC: 1.1 MMOL/L — SIGNIFICANT CHANGE UP (ref 0.5–1.6)
LIDOCAIN IGE QN: 16 U/L — SIGNIFICANT CHANGE UP (ref 7–60)
LYMPHOCYTES # BLD AUTO: 0.98 K/UL — LOW (ref 1.2–3.4)
LYMPHOCYTES # BLD AUTO: 11.9 % — LOW (ref 20.5–51.1)
MCHC RBC-ENTMCNC: 25.4 PG — LOW (ref 27–31)
MCHC RBC-ENTMCNC: 30.8 G/DL — LOW (ref 32–37)
MCV RBC AUTO: 82.7 FL — SIGNIFICANT CHANGE UP (ref 80–94)
MONOCYTES # BLD AUTO: 0.8 K/UL — HIGH (ref 0.1–0.6)
MONOCYTES NFR BLD AUTO: 9.7 % — HIGH (ref 1.7–9.3)
NEUTROPHILS # BLD AUTO: 6.23 K/UL — SIGNIFICANT CHANGE UP (ref 1.4–6.5)
NEUTROPHILS NFR BLD AUTO: 76 % — HIGH (ref 42.2–75.2)
NRBC # BLD: 0 /100 WBCS — SIGNIFICANT CHANGE UP (ref 0–0)
PCO2 BLDA: 53 MMHG — HIGH (ref 38–42)
PCO2 BLDV: 58 MMHG — HIGH (ref 41–51)
PH BLDA: 7.35 — LOW (ref 7.38–7.42)
PH BLDV: 7.34 — SIGNIFICANT CHANGE UP (ref 7.26–7.43)
PLATELET # BLD AUTO: 169 K/UL — SIGNIFICANT CHANGE UP (ref 130–400)
PO2 BLDA: 54 MMHG — LOW (ref 78–95)
PO2 BLDV: 41 MMHG — HIGH (ref 20–40)
POTASSIUM BLDV-SCNC: 4.5 MMOL/L — SIGNIFICANT CHANGE UP (ref 3.3–5.6)
POTASSIUM SERPL-MCNC: 4.6 MMOL/L — SIGNIFICANT CHANGE UP (ref 3.5–5)
POTASSIUM SERPL-SCNC: 4.6 MMOL/L — SIGNIFICANT CHANGE UP (ref 3.5–5)
PROT SERPL-MCNC: 6.2 G/DL — SIGNIFICANT CHANGE UP (ref 6–8)
PROTHROM AB SERPL-ACNC: 13.1 SEC — HIGH (ref 9.95–12.87)
RBC # BLD: 3.93 M/UL — LOW (ref 4.7–6.1)
RBC # FLD: 16 % — HIGH (ref 11.5–14.5)
SAO2 % BLDA: 87 % — LOW (ref 94–98)
SAO2 % BLDV: 74 % — SIGNIFICANT CHANGE UP
SODIUM SERPL-SCNC: 144 MMOL/L — SIGNIFICANT CHANGE UP (ref 135–146)
TROPONIN T SERPL-MCNC: 0.19 NG/ML — CRITICAL HIGH
TROPONIN T SERPL-MCNC: 0.19 NG/ML — CRITICAL HIGH
TYPE + AB SCN PNL BLD: SIGNIFICANT CHANGE UP
WBC # BLD: 8.21 K/UL — SIGNIFICANT CHANGE UP (ref 4.8–10.8)
WBC # FLD AUTO: 8.21 K/UL — SIGNIFICANT CHANGE UP (ref 4.8–10.8)

## 2019-02-15 PROCEDURE — 99223 1ST HOSP IP/OBS HIGH 75: CPT

## 2019-02-15 RX ORDER — VANCOMYCIN HCL 1 G
1000 VIAL (EA) INTRAVENOUS EVERY 24 HOURS
Qty: 0 | Refills: 0 | Status: DISCONTINUED | OUTPATIENT
Start: 2019-02-16 | End: 2019-02-16

## 2019-02-15 RX ORDER — CALCIUM GLUCONATE 100 MG/ML
1 VIAL (ML) INTRAVENOUS ONCE
Qty: 0 | Refills: 0 | Status: COMPLETED | OUTPATIENT
Start: 2019-02-15 | End: 2019-02-15

## 2019-02-15 RX ORDER — VANCOMYCIN HCL 1 G
VIAL (EA) INTRAVENOUS
Qty: 0 | Refills: 0 | Status: DISCONTINUED | OUTPATIENT
Start: 2019-02-15 | End: 2019-02-16

## 2019-02-15 RX ORDER — SODIUM CHLORIDE 9 MG/ML
1000 INJECTION, SOLUTION INTRAVENOUS
Qty: 0 | Refills: 0 | Status: DISCONTINUED | OUTPATIENT
Start: 2019-02-15 | End: 2019-02-27

## 2019-02-15 RX ORDER — VANCOMYCIN HCL 1 G
1000 VIAL (EA) INTRAVENOUS ONCE
Qty: 0 | Refills: 0 | Status: COMPLETED | OUTPATIENT
Start: 2019-02-15 | End: 2019-02-15

## 2019-02-15 RX ORDER — DEXTROSE 50 % IN WATER 50 %
25 SYRINGE (ML) INTRAVENOUS ONCE
Qty: 0 | Refills: 0 | Status: DISCONTINUED | OUTPATIENT
Start: 2019-02-15 | End: 2019-02-27

## 2019-02-15 RX ORDER — MEROPENEM 1 G/30ML
INJECTION INTRAVENOUS
Qty: 0 | Refills: 0 | Status: DISCONTINUED | OUTPATIENT
Start: 2019-02-15 | End: 2019-02-16

## 2019-02-15 RX ORDER — FUROSEMIDE 40 MG
40 TABLET ORAL DAILY
Qty: 0 | Refills: 0 | Status: DISCONTINUED | OUTPATIENT
Start: 2019-02-15 | End: 2019-02-16

## 2019-02-15 RX ORDER — ATORVASTATIN CALCIUM 80 MG/1
20 TABLET, FILM COATED ORAL AT BEDTIME
Qty: 0 | Refills: 0 | Status: DISCONTINUED | OUTPATIENT
Start: 2019-02-15 | End: 2019-02-27

## 2019-02-15 RX ORDER — INSULIN LISPRO 100/ML
3 VIAL (ML) SUBCUTANEOUS
Qty: 0 | Refills: 0 | Status: DISCONTINUED | OUTPATIENT
Start: 2019-02-15 | End: 2019-02-27

## 2019-02-15 RX ORDER — MEROPENEM 1 G/30ML
500 INJECTION INTRAVENOUS EVERY 12 HOURS
Qty: 0 | Refills: 0 | Status: DISCONTINUED | OUTPATIENT
Start: 2019-02-16 | End: 2019-02-16

## 2019-02-15 RX ORDER — HEPARIN SODIUM 5000 [USP'U]/ML
5000 INJECTION INTRAVENOUS; SUBCUTANEOUS EVERY 8 HOURS
Qty: 0 | Refills: 0 | Status: DISCONTINUED | OUTPATIENT
Start: 2019-02-15 | End: 2019-02-27

## 2019-02-15 RX ORDER — NYSTATIN CREAM 100000 [USP'U]/G
1 CREAM TOPICAL THREE TIMES A DAY
Qty: 0 | Refills: 0 | Status: DISCONTINUED | OUTPATIENT
Start: 2019-02-15 | End: 2019-02-27

## 2019-02-15 RX ORDER — TAMSULOSIN HYDROCHLORIDE 0.4 MG/1
0.4 CAPSULE ORAL AT BEDTIME
Qty: 0 | Refills: 0 | Status: DISCONTINUED | OUTPATIENT
Start: 2019-02-15 | End: 2019-02-27

## 2019-02-15 RX ORDER — FUROSEMIDE 40 MG
40 TABLET ORAL ONCE
Qty: 0 | Refills: 0 | Status: COMPLETED | OUTPATIENT
Start: 2019-02-15 | End: 2019-02-15

## 2019-02-15 RX ORDER — MEROPENEM 1 G/30ML
1000 INJECTION INTRAVENOUS ONCE
Qty: 0 | Refills: 0 | Status: COMPLETED | OUTPATIENT
Start: 2019-02-15 | End: 2019-02-15

## 2019-02-15 RX ORDER — SODIUM CHLORIDE 9 MG/ML
2000 INJECTION, SOLUTION INTRAVENOUS ONCE
Qty: 0 | Refills: 0 | Status: COMPLETED | OUTPATIENT
Start: 2019-02-15 | End: 2019-02-15

## 2019-02-15 RX ORDER — DEXTROSE 50 % IN WATER 50 %
12.5 SYRINGE (ML) INTRAVENOUS ONCE
Qty: 0 | Refills: 0 | Status: DISCONTINUED | OUTPATIENT
Start: 2019-02-15 | End: 2019-02-27

## 2019-02-15 RX ORDER — DEXTROSE 50 % IN WATER 50 %
15 SYRINGE (ML) INTRAVENOUS ONCE
Qty: 0 | Refills: 0 | Status: DISCONTINUED | OUTPATIENT
Start: 2019-02-15 | End: 2019-02-27

## 2019-02-15 RX ORDER — GLUCAGON INJECTION, SOLUTION 0.5 MG/.1ML
1 INJECTION, SOLUTION SUBCUTANEOUS ONCE
Qty: 0 | Refills: 0 | Status: DISCONTINUED | OUTPATIENT
Start: 2019-02-15 | End: 2019-02-27

## 2019-02-15 RX ORDER — INSULIN GLARGINE 100 [IU]/ML
10 INJECTION, SOLUTION SUBCUTANEOUS AT BEDTIME
Qty: 0 | Refills: 0 | Status: DISCONTINUED | OUTPATIENT
Start: 2019-02-15 | End: 2019-02-27

## 2019-02-15 RX ORDER — FUROSEMIDE 40 MG
40 TABLET ORAL DAILY
Qty: 0 | Refills: 0 | Status: DISCONTINUED | OUTPATIENT
Start: 2019-02-15 | End: 2019-02-15

## 2019-02-15 RX ADMIN — Medication 40 MILLIGRAM(S): at 13:19

## 2019-02-15 RX ADMIN — ATORVASTATIN CALCIUM 20 MILLIGRAM(S): 80 TABLET, FILM COATED ORAL at 22:45

## 2019-02-15 RX ADMIN — TAMSULOSIN HYDROCHLORIDE 0.4 MILLIGRAM(S): 0.4 CAPSULE ORAL at 22:45

## 2019-02-15 RX ADMIN — Medication 40 MILLIGRAM(S): at 16:14

## 2019-02-15 RX ADMIN — MEROPENEM 100 MILLIGRAM(S): 1 INJECTION INTRAVENOUS at 20:15

## 2019-02-15 RX ADMIN — HEPARIN SODIUM 5000 UNIT(S): 5000 INJECTION INTRAVENOUS; SUBCUTANEOUS at 22:45

## 2019-02-15 RX ADMIN — INSULIN GLARGINE 10 UNIT(S): 100 INJECTION, SOLUTION SUBCUTANEOUS at 22:45

## 2019-02-15 RX ADMIN — HEPARIN SODIUM 5000 UNIT(S): 5000 INJECTION INTRAVENOUS; SUBCUTANEOUS at 13:19

## 2019-02-15 RX ADMIN — Medication 250 MILLIGRAM(S): at 20:15

## 2019-02-15 RX ADMIN — SODIUM CHLORIDE 2000 MILLILITER(S): 9 INJECTION, SOLUTION INTRAVENOUS at 02:30

## 2019-02-15 RX ADMIN — Medication 200 GRAM(S): at 16:13

## 2019-02-15 RX ADMIN — NYSTATIN CREAM 1 APPLICATION(S): 100000 CREAM TOPICAL at 13:19

## 2019-02-15 NOTE — H&P ADULT - NSHPPHYSICALEXAM_GEN_ALL_CORE
PHYSICAL EXAM:      Constitutional: obese male with short neck, snoring while sleeping.    Respiratory: lungs B/L clear on auscultation    Cardiovascular: S1S2 normal, no murmur heard    Gastrointestinal: Abd soft, non distended, non tender, BS+    Extremities: B/L pedal edema, erythema B/L leg, venous stasis changes.    Neurological: AAOX3, No FND, slurred speech (at baseline).

## 2019-02-15 NOTE — H&P ADULT - ATTENDING COMMENTS
HPI as above.  Interval Hx- pt has had multiple falls this week and this past year. Denies cp, palpitations, sob or LOC.   Vital Signs (24 Hrs):  T(C): 36.7 (02-15-19 @ 15:40), Max: 37.1 (02-15-19 @ 02:35)  HR: 97 (02-15-19 @ 15:40) (72 - 98)  BP: 116/61 (02-15-19 @ 15:40) (79/51 - 132/98)  RR: 19 (02-15-19 @ 15:40) (16 - 26)  SpO2: 95% (02-15-19 @ 15:40) (95% - 100%)  Wt(kg): --  Daily     Daily     I&O's Summary    Exam: distant air sounds   Labs reviewed  Imaging reviewed  EKG reviewed    Plan      #Progress Note Handoff  Pending (specify):  Consults_________, Tests________, Test Results_______, Other_________  Family discussion:  Disposition: Home___/SNF___/Other________/Unknown at this time________ HPI as above.  Interval Hx- pt has had multiple falls this week and this past year. Denies cp, palpitations, sob or LOC.   Vital Signs (24 Hrs):  T(C): 36.7 (02-15-19 @ 15:40), Max: 37.1 (02-15-19 @ 02:35)  HR: 97 (02-15-19 @ 15:40) (72 - 98)  BP: 116/61 (02-15-19 @ 15:40) (79/51 - 132/98)  RR: 19 (02-15-19 @ 15:40) (16 - 26)  SpO2: 95% (02-15-19 @ 15:40) (95% - 100%)  Wt(kg): --  Daily     Daily     I&O's Summary    Exam: distant air sounds, irregular rhytum, normal s1 s2, +BS, NT/ND  Labs reviewed  Imaging reviewed  < from: 12 Lead ECG (02.15.19 @ 04:37) >    Atrial fibrillation  Low voltage QRS  Nonspecific ST abnormality  Prolonged QT  Abnormal ECG    < end of copied text >    Plan  #Recurrent falls- PT, trauma w/u neg   #TOBIAS on CKD- likely post obstructive- barragan inserted with release 1L- fu bmp   #Sleep apnea- continue night cpap  #Troponemia- likely 2/2 ckd- ckmb flat-fu trops  #Lower ext edema- check echo, [facility]- last echo in dec 2018 normal EF  #DM- controlled- SS        #Progress Note Handoff  Pending (specify):  Consults___PT______, Tests_bmps and trops_______, Test Results_______, Other_________  Family discussion: dw family at bedside   Disposition: Home___/SNF_x__/Other________/Unknown at this time________

## 2019-02-15 NOTE — H&P ADULT - NSHPSOCIALHISTORY_GEN_ALL_CORE
Ex smoker with 30 pack yr history, quit 30 yrs bcak  Non alcoholic  No IV drug abuse    Lives in [place] with family and has health aid

## 2019-02-15 NOTE — H&P ADULT - HISTORY OF PRESENT ILLNESS
82 yo male coming from home with PMHx of DMII on insulin, HTN on meds, Hydrocephalus s/p  shunt, Afib not on AC (multiple falls) presents to our ED with c/o mechanical fall yesterday. According to patient he was trying to walk with a walker and slipped on the floor, no nausea, vomiting, dizziness, chest pain, palpitations, seizure like episode, no LOC. So according to the history patient is having mechanical fall. He had 4 falls since last 10 days. He was recently discharged from the hospital for dizziness and stress test and  shunt was normal. At baseline he has health aid, and he takes his medications and walks very minimal with the help of cane or walker. For the last few months he is not walking at all, increase B/L leg swelling.  In the ED BP was 83/45, HR 78/min, Sao2 100% on NC, afebrile. He was given 2l RL and his repeat BP was 110/80. Trauma work up was negative.

## 2019-02-15 NOTE — ED ADULT NURSE NOTE - OBJECTIVE STATEMENT
patient s/p fall at home on anticoag. hx of multiple falls this week with worsening mental status. patient BIBA alert and oriented x4, [place], on 3LNC O2 sat 100%. patient arrived hypotensive. B/L LE ary/reddness and edema +4. B/L UE ecchymosis and scraped. denies LOC. complains of pain to touch on R hip and r arm

## 2019-02-15 NOTE — CONSULT NOTE ADULT - ASSESSMENT
---------------------------------------------------------------------------------------    ASSESSMENT:  83y old m s/p mechanical fall at nursing home (+HT, -LOC, -AC)    PLAN:    - Pan scan  - Labs  - IVF

## 2019-02-15 NOTE — ED PROVIDER NOTE - ATTENDING CONTRIBUTION TO CARE
84yo m with PMHx encephalopathy s/p  shunt, DM, HTN, Afib not on AC, hard of hearing, p/w 3 days of progressively worsening mental status, slurring speech, generalized weakness, frequent falls. Was BIBEMS today for another fall tonight, unknown head trauma or LOC, not on AC or antiplatelets. Pt fell backwards, c/o right hip pain and low back pain. GCS 15 and AAOx2 but is relatively poor historian. Patient's family at bedside supplementing history.  trauma code called for low bp, ams, fall. surgery at bedside, FAST neg.   agree with exam. will get imaging, labs, reassess.

## 2019-02-15 NOTE — ED PROVIDER NOTE - CARE PLAN
Principal Discharge DX:	TOBIAS (acute kidney injury)  Secondary Diagnosis:	NSTEMI (non-ST elevated myocardial infarction)  Secondary Diagnosis:	Metabolic encephalopathy

## 2019-02-15 NOTE — CHART NOTE - NSCHARTNOTEFT_GEN_A_CORE
Tertiary Survey    Patient seen and examined. No complaints at this time.     PHYSICAL EXAM:  Craniofacial: Atraumatic, No deformity  Eyes: Pupil Size equal B/L and RTL. EOMI  Oropharynx: Atraumatic  Neck: Non-tender, No deformity, Trachea midline.  Chest: Equal breath sounds, non-tender, No deformity or crepitus  Heart: RSR, No murmurs, no rubs  Abdomen: Atraumatic, Non-tender, non-distended. No hepatosplenomegaly  Pelvis: Stable, non tender, no deformity  Back: Spine nontender. atraumatic  Extremities: Atraumatic, no deformities, b/l pedal edema   Neurologic: CN intact, Motor intact throughout. Sensation intact/normal throughout. Slurred speech at baseline, no focal neural deficits  Psych: Mood and affect normal.    All images/reports reviewed. No further traumatic work-up warranted.

## 2019-02-15 NOTE — ED PROVIDER NOTE - PROGRESS NOTE DETAILS
Trauma code called on arrival for fall and hypotension, patient slurring speech. Trauma team at bedside. Initial BP 80s/50s, improved with IV fluids. GCS 15, patient awake and oriented x2. FAST negative. currently at CT scan. Multiple calls/paged for EKG. Obtaining now

## 2019-02-15 NOTE — H&P ADULT - ASSESSMENT
# Recurrent falls: Likely multifactorial, physical deconditioning medication induced hypotension and peripheral neuropathy.  admit to medicine  Trauma work up was negative, FS was 45 s/p glucose  BP was 78/50 s/p fluids and responsive  He needs PT/Rehab, family wants short term rehab.  Monitor electrolytes.  ambulate with assistance    Tropnemia: likely 2/2 hypotension  recent stress test was negative  trend CE and get a repeat ECHO.    # TOBIAS on CKD: Baseline Cr is around 2, today is 3.2  Likely pre renal + lasix.  R/O urinary retention as he is not going to bathroom often according to family  Will do a bladder scan and if needed put a barragan in.  Recheck BMP in am  Avoid nephrotoxic agents like Ramipril  Get Renal US, urine electrolytes with UA, monitor I/O    HTN: SBP goal 120-150  Hold indapamide, amlopdipine, Ramipril, monitor vitals    DM II: Get Hga1C  FS monitor every hr until normal  Hold oral agents  Will start insulin 10 units at night  Lispro 3 units with meals     shunt: Recently evaluated by Dr. Wayne and was normal    Afib not AC : 2/2 falls.    Full code  DVt PPX: heparin  OOB with assistance  Diet: Renal diet

## 2019-02-15 NOTE — ED PROVIDER NOTE - OBJECTIVE STATEMENT
82yo m with PMHx encephalopathy s/p  shunt, DM, HTN, Afib not on AC, p/w 3 days of progressively worsening 82yo m with PMHx encephalopathy s/p  shunt, DM, HTN, Afib not on AC, hard of hearing, p/w 3 days of progressively worsening mental status, slurring speech, generalized weakness, frequent falls. Was BIBEMS today for another fall tonight, unknown head trauma or LOC, not on AC or antiplatelets. Pt fell backwards, c/o right hip pain and low back pain. GCS 15 and AAOx2 but is relatively poor historian. Patient's family at bedside supplementing history. They state pt has also been congested, was being treated for respiratory infection for past 2 weeks initially with azithromycin and then with levaquin without significant improvement. B/L leg redness per family is chronic, pt has leg swelling and venous stasis.   Neurosurgeon is at [facility], Dr. Parker

## 2019-02-15 NOTE — CONSULT NOTE ADULT - SUBJECTIVE AND OBJECTIVE BOX
83y m  83y m    TRAUMA ACTIVATION LEVEL:  CODE TRAUMA    MECHANISM OF INJURY: s/p mechanical fall at nursing home     [] Blunt  	[] MVC	[] Fall	[] Pedestrian Struck	[] Motorcycle   [] Assault   [] Bicycle collision  [] Sports injury     [] Penetrating  	[] Gun Shot Wound 		[] Stab Wound    GCS:14 	E: 4	V: 5	M: 6    83y old male, obese, PMH siginificant for HTN, CKD, DM,  s/p mechanical fall off bed +HT, -LOC, -AC from NH, presents to the ED as CODE TRAUMA complaining of right hip pain, right shoulder pain, some right upper extremity ecchymosis. No other active complaints. Foul smelling. Slightly confused but AA&Ox3, responds to questions.     PAST MEDICAL & SURGICAL HISTORY:  Hypertension  Hydrocephalus  CKD (chronic kidney disease)  DM (diabetes mellitus)  Afib  History of prostate surgery    ROS: 10-system review is otherwise negative except HPI above.      Primary Survey:    A - airway intact  B - bilateral breath sounds and good chest rise  C - palpable pulses in all extremities  D - GCS 15 on arrival, HUSTON  Exposure obtained    Vital Signs Last 24 Hrs  T(C): 37.1 (15 Feb 2019 02:35), Max: 37.1 (15 Feb 2019 02:35)  T(F): 98.8 (15 Feb 2019 02:35), Max: 98.8 (15 Feb 2019 02:35)  HR: 98 (15 Feb 2019 02:47) (79 - 98)  BP: 102/59 (15 Feb 2019 02:47) (79/51 - 102/59)  RR: 19 (15 Feb 2019 02:47) (16 - 26)  SpO2: 100% (15 Feb 2019 02:47) (100% - 100%)    Secondary Survey:   General: NAD  HEENT: Normocephalic, atraumatic, EOMI, PEERLA. no scalp lacerations   Neck: Soft, midline trachea. no cspine tenderness  Chest: No chest wall tenderness. or subq  emphysema   Cardiac: S1, S2, RRR  Respiratory: Bilateral breath sounds, clear and equal bilaterally  Abdomen: Soft, non-distended, non-tender, no rebound,   Groin: Normal appearing, pelvis stable   Ext: palp radial b/l UE, b/l DP palp in Lower Extrem.   Back: no TTP, no palpable runoff/stepoff/deformity  Rectal: No deb blood, IVY with good tone    FAST negative    Labs:  CAPILLARY BLOOD GLUCOSE      POCT Blood Glucose.: 84 mg/dL (15 Feb 2019 02:27)      LFTs:     Blood Gas Venous - Lactate: 1.1 mmoL/L (02-15-19 @ 03:03)      RADIOLOGY & ADDITIONAL STUDIES:    < from: Xray Pelvis AP only (02.15.19 @ 02:57) >  Findings:    Left greater trochanter is excluded from view. The pannus obscures fine   bony detail. No gross fracture is seen.    < end of copied text >

## 2019-02-15 NOTE — ED PROVIDER NOTE - NS ED ROS FT
Constitutional: No fever  Eyes:  No visual changes  ENMT:  No neck pain  Cardiac:  No chest pain  Respiratory:  No cough, SOB  GI:  No nausea, vomiting, diarrhea, abdominal pain.  :  No dysuria  MS:  +hip pain, back pain.  Neuro:  No headache or lightheadedness  Skin:  +chronic skin rash  Endocrine: h/o DM  Except as documented in the HPI,  all other systems are negative.

## 2019-02-15 NOTE — ED PROVIDER NOTE - CLINICAL SUMMARY MEDICAL DECISION MAKING FREE TEXT BOX
trauma/metabolic encephalopathy/abnl troponins/acute on chronic renal insuff. trauma eval/trauma code. labs, ct imaging, fluid resus. ekg. admission for further mgmt.

## 2019-02-15 NOTE — H&P ADULT - NSHPLABSRESULTS_GEN_ALL_CORE
10.0   8.21  )-----------( 169      ( 15 Feb 2019 02:48 )             32.5       02-15    144  |  97<L>  |  94<HH>  ----------------------------<  70  4.6   |  29  |  3.2<H>    Ca    7.1<L>      15 Feb 2019 02:48    TPro  6.2  /  Alb  4.0  /  TBili  0.8  /  DBili  x   /  AST  13  /  ALT  8   /  AlkPhos  79  02-15                  PT/INR - ( 15 Feb 2019 02:48 )   PT: 13.10 sec;   INR: 1.14 ratio         PTT - ( 15 Feb 2019 02:48 )  PTT:31.8 sec    Lactate Trend      CARDIAC MARKERS ( 15 Feb 2019 02:48 )  x     / 0.19 ng/mL / x     / x     / x            CAPILLARY BLOOD GLUCOSE      POCT Blood Glucose.: 62 mg/dL (15 Feb 2019 08:13)

## 2019-02-15 NOTE — CHART NOTE - NSCHARTNOTEFT_GEN_A_CORE
I was called by the RN to assess the patient for hypoxia. He had O2 SAt 85% on 3 L NC, so I did an ABG which showed hypoxia with hypercapnea. On review of history patient has HTN , JOHANNY , CKD, obese, DM2, from NH came in for falls and SOB. He has a distant history of smoking 2-3 PPD for 30+ years and quit 30 years ago. On physical examination he looked in respiratory distress with bilateral crackles to upper fields, so BIPAP was started with a stat dose of lasix 40 mg. Patients was AAO3. His breathing improved over next 30 minutes. Family at bedside, discussed code status. He is full code. I updated the RN with the plan and will follow closely.

## 2019-02-15 NOTE — ED PROVIDER NOTE - PHYSICAL EXAMINATION
Vital Signs: I have reviewed the initial vital signs.  Constitutional: WDWN in nad.  HEAD: No signs of basilar skull fracture.  Integumentary:  B/L leg erythema, slightly warm to touch, no drainage/discharge.   EYES: No periorbial swelling/ecchymoses. Pupils 2mm, reactive. EOM intact. No nystagmus.  ENT: MMM. No rhinorrhea/otorrhea. No septal hematoma. No mastoid ecchymoses.  NECK: Supple, non-tender, no spinous tenderness to neck. No palpable shelves or step-offs.  BACK: No spinous tenderness. No palpable shelves or step-offs.  Cardiovascular: RRR, radial pulses 2/4 b/l. No pain to palpation to chest wall.  Respiratory: BS present b/l, ctabl, no wheezing or crackles, good air exchange, good resp effort and excursion, no accessory muscle use, no stridor. No pain to palpation to ribs b/l. No crepitus.  Gastrointestinal: BS present throughout all 4 quadrants, obese, soft, nd, nt no rebound tenderness or guarding, no cvat.  Musculoskeletal: FROM, no edema, no hip pain to palpation. No short leg. No internal or external rotation of LE.  Neurologic: GCS 15. AAOx2. No facial droop. Slurring speech.

## 2019-02-16 LAB
ANION GAP SERPL CALC-SCNC: 21 MMOL/L — HIGH (ref 7–14)
ANION GAP SERPL CALC-SCNC: 22 MMOL/L — HIGH (ref 7–14)
APPEARANCE UR: ABNORMAL
BASOPHILS # BLD AUTO: 0.01 K/UL — SIGNIFICANT CHANGE UP (ref 0–0.2)
BASOPHILS NFR BLD AUTO: 0.1 % — SIGNIFICANT CHANGE UP (ref 0–1)
BILIRUB UR-MCNC: NEGATIVE — SIGNIFICANT CHANGE UP
BUN SERPL-MCNC: 89 MG/DL — CRITICAL HIGH (ref 10–20)
BUN SERPL-MCNC: 95 MG/DL — CRITICAL HIGH (ref 10–20)
CALCIUM SERPL-MCNC: 6.8 MG/DL — LOW (ref 8.5–10.1)
CALCIUM SERPL-MCNC: 6.9 MG/DL — LOW (ref 8.5–10.1)
CHLORIDE SERPL-SCNC: 100 MMOL/L — SIGNIFICANT CHANGE UP (ref 98–110)
CHLORIDE SERPL-SCNC: 102 MMOL/L — SIGNIFICANT CHANGE UP (ref 98–110)
CK MB CFR SERPL CALC: 3.6 NG/ML — SIGNIFICANT CHANGE UP (ref 0.6–6.3)
CK SERPL-CCNC: 109 U/L — SIGNIFICANT CHANGE UP (ref 0–225)
CO2 SERPL-SCNC: 25 MMOL/L — SIGNIFICANT CHANGE UP (ref 17–32)
CO2 SERPL-SCNC: 25 MMOL/L — SIGNIFICANT CHANGE UP (ref 17–32)
COLOR SPEC: YELLOW — SIGNIFICANT CHANGE UP
CREAT ?TM UR-MCNC: 28 MG/DL — SIGNIFICANT CHANGE UP
CREAT SERPL-MCNC: 2.9 MG/DL — HIGH (ref 0.7–1.5)
CREAT SERPL-MCNC: 3 MG/DL — HIGH (ref 0.7–1.5)
DIFF PNL FLD: ABNORMAL
EOSINOPHIL # BLD AUTO: 0.06 K/UL — SIGNIFICANT CHANGE UP (ref 0–0.7)
EOSINOPHIL NFR BLD AUTO: 0.8 % — SIGNIFICANT CHANGE UP (ref 0–8)
GLUCOSE BLDC GLUCOMTR-MCNC: 121 MG/DL — HIGH (ref 70–99)
GLUCOSE BLDC GLUCOMTR-MCNC: 149 MG/DL — HIGH (ref 70–99)
GLUCOSE BLDC GLUCOMTR-MCNC: 168 MG/DL — HIGH (ref 70–99)
GLUCOSE BLDC GLUCOMTR-MCNC: 99 MG/DL — SIGNIFICANT CHANGE UP (ref 70–99)
GLUCOSE SERPL-MCNC: 151 MG/DL — HIGH (ref 70–99)
GLUCOSE SERPL-MCNC: 93 MG/DL — SIGNIFICANT CHANGE UP (ref 70–99)
GLUCOSE UR QL: NEGATIVE MG/DL — SIGNIFICANT CHANGE UP
HCT VFR BLD CALC: 32.2 % — LOW (ref 42–52)
HCT VFR BLD CALC: 33.2 % — LOW (ref 42–52)
HGB BLD-MCNC: 9.7 G/DL — LOW (ref 14–18)
HGB BLD-MCNC: 9.8 G/DL — LOW (ref 14–18)
IMM GRANULOCYTES NFR BLD AUTO: 0.4 % — HIGH (ref 0.1–0.3)
KETONES UR-MCNC: NEGATIVE — SIGNIFICANT CHANGE UP
LEUKOCYTE ESTERASE UR-ACNC: ABNORMAL
LYMPHOCYTES # BLD AUTO: 0.97 K/UL — LOW (ref 1.2–3.4)
LYMPHOCYTES # BLD AUTO: 12.1 % — LOW (ref 20.5–51.1)
MCHC RBC-ENTMCNC: 24.5 PG — LOW (ref 27–31)
MCHC RBC-ENTMCNC: 25 PG — LOW (ref 27–31)
MCHC RBC-ENTMCNC: 29.5 G/DL — LOW (ref 32–37)
MCHC RBC-ENTMCNC: 30.1 G/DL — LOW (ref 32–37)
MCV RBC AUTO: 83 FL — SIGNIFICANT CHANGE UP (ref 80–94)
MCV RBC AUTO: 83 FL — SIGNIFICANT CHANGE UP (ref 80–94)
MONOCYTES # BLD AUTO: 0.8 K/UL — HIGH (ref 0.1–0.6)
MONOCYTES NFR BLD AUTO: 10 % — HIGH (ref 1.7–9.3)
NEUTROPHILS # BLD AUTO: 6.12 K/UL — SIGNIFICANT CHANGE UP (ref 1.4–6.5)
NEUTROPHILS NFR BLD AUTO: 76.6 % — HIGH (ref 42.2–75.2)
NITRITE UR-MCNC: NEGATIVE — SIGNIFICANT CHANGE UP
NRBC # BLD: 0 /100 WBCS — SIGNIFICANT CHANGE UP (ref 0–0)
NRBC # BLD: 0 /100 WBCS — SIGNIFICANT CHANGE UP (ref 0–0)
PH UR: 6.5 — SIGNIFICANT CHANGE UP (ref 5–8)
PLATELET # BLD AUTO: 141 K/UL — SIGNIFICANT CHANGE UP (ref 130–400)
PLATELET # BLD AUTO: 153 K/UL — SIGNIFICANT CHANGE UP (ref 130–400)
POTASSIUM SERPL-MCNC: 4.4 MMOL/L — SIGNIFICANT CHANGE UP (ref 3.5–5)
POTASSIUM SERPL-MCNC: 4.5 MMOL/L — SIGNIFICANT CHANGE UP (ref 3.5–5)
POTASSIUM SERPL-SCNC: 4.4 MMOL/L — SIGNIFICANT CHANGE UP (ref 3.5–5)
POTASSIUM SERPL-SCNC: 4.5 MMOL/L — SIGNIFICANT CHANGE UP (ref 3.5–5)
PROT UR-MCNC: 100 MG/DL
RBC # BLD: 3.88 M/UL — LOW (ref 4.7–6.1)
RBC # BLD: 4 M/UL — LOW (ref 4.7–6.1)
RBC # FLD: 16 % — HIGH (ref 11.5–14.5)
RBC # FLD: 16.1 % — HIGH (ref 11.5–14.5)
SODIUM SERPL-SCNC: 147 MMOL/L — HIGH (ref 135–146)
SODIUM SERPL-SCNC: 148 MMOL/L — HIGH (ref 135–146)
SODIUM UR-SCNC: 20 MMOL/L — SIGNIFICANT CHANGE UP
SP GR SPEC: 1.01 — SIGNIFICANT CHANGE UP (ref 1.01–1.03)
TROPONIN T SERPL-MCNC: 0.2 NG/ML — CRITICAL HIGH
UROBILINOGEN FLD QL: 0.2 MG/DL — SIGNIFICANT CHANGE UP (ref 0.2–0.2)
WBC # BLD: 7.99 K/UL — SIGNIFICANT CHANGE UP (ref 4.8–10.8)
WBC # BLD: 8.16 K/UL — SIGNIFICANT CHANGE UP (ref 4.8–10.8)
WBC # FLD AUTO: 7.99 K/UL — SIGNIFICANT CHANGE UP (ref 4.8–10.8)
WBC # FLD AUTO: 8.16 K/UL — SIGNIFICANT CHANGE UP (ref 4.8–10.8)

## 2019-02-16 RX ORDER — CEFTRIAXONE 500 MG/1
1 INJECTION, POWDER, FOR SOLUTION INTRAMUSCULAR; INTRAVENOUS EVERY 24 HOURS
Qty: 0 | Refills: 0 | Status: DISCONTINUED | OUTPATIENT
Start: 2019-02-17 | End: 2019-02-18

## 2019-02-16 RX ORDER — CALCIUM CARBONATE 500(1250)
1 TABLET ORAL THREE TIMES A DAY
Qty: 0 | Refills: 0 | Status: DISCONTINUED | OUTPATIENT
Start: 2019-02-16 | End: 2019-02-18

## 2019-02-16 RX ORDER — ACETAMINOPHEN 500 MG
650 TABLET ORAL EVERY 6 HOURS
Qty: 0 | Refills: 0 | Status: DISCONTINUED | OUTPATIENT
Start: 2019-02-16 | End: 2019-02-27

## 2019-02-16 RX ORDER — AZITHROMYCIN 500 MG/1
500 TABLET, FILM COATED ORAL EVERY 24 HOURS
Qty: 0 | Refills: 0 | Status: DISCONTINUED | OUTPATIENT
Start: 2019-02-17 | End: 2019-02-18

## 2019-02-16 RX ADMIN — ATORVASTATIN CALCIUM 20 MILLIGRAM(S): 80 TABLET, FILM COATED ORAL at 21:07

## 2019-02-16 RX ADMIN — NYSTATIN CREAM 1 APPLICATION(S): 100000 CREAM TOPICAL at 21:10

## 2019-02-16 RX ADMIN — HEPARIN SODIUM 5000 UNIT(S): 5000 INJECTION INTRAVENOUS; SUBCUTANEOUS at 21:08

## 2019-02-16 RX ADMIN — NYSTATIN CREAM 1 APPLICATION(S): 100000 CREAM TOPICAL at 13:00

## 2019-02-16 RX ADMIN — NYSTATIN CREAM 1 APPLICATION(S): 100000 CREAM TOPICAL at 06:20

## 2019-02-16 RX ADMIN — HEPARIN SODIUM 5000 UNIT(S): 5000 INJECTION INTRAVENOUS; SUBCUTANEOUS at 06:21

## 2019-02-16 RX ADMIN — Medication 40 MILLIGRAM(S): at 06:21

## 2019-02-16 RX ADMIN — TAMSULOSIN HYDROCHLORIDE 0.4 MILLIGRAM(S): 0.4 CAPSULE ORAL at 21:07

## 2019-02-16 RX ADMIN — Medication 3 UNIT(S): at 12:49

## 2019-02-16 RX ADMIN — HEPARIN SODIUM 5000 UNIT(S): 5000 INJECTION INTRAVENOUS; SUBCUTANEOUS at 13:00

## 2019-02-16 RX ADMIN — Medication 1 TABLET(S): at 21:07

## 2019-02-16 RX ADMIN — INSULIN GLARGINE 10 UNIT(S): 100 INJECTION, SOLUTION SUBCUTANEOUS at 21:53

## 2019-02-16 RX ADMIN — MEROPENEM 100 MILLIGRAM(S): 1 INJECTION INTRAVENOUS at 06:21

## 2019-02-16 NOTE — CONSULT NOTE ADULT - ASSESSMENT
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.9 on 12/2018)  urine retention s/p prostatectomy / no hydro on CT abd and renal sono   s/p barragan - 1L UOP initially, now with mild traumatic gross hematuria  hypernatremia  hypocalcemia  fluid responsive hypotension - better after 2L IVF  b/l renal cysts  somnolence due to uremia, lyrica and hypercapnia  falls  JOHANNY on CPAP with cor pulmonale and right heart failure  EF 54% / mild LVH and pulm HTN on echo 12/2018  peripheral edema with nl albumin   proteinuria  afib  HTN   s/p  shunt  DM 2 uncontrolled  dm neuropathy    plan:    hold diuretics today  hold high dose ramipril  hold norvasc  encourage PO H2O hydration  if Na+ worsens, then can give gentle D5W gtt  allow bp's to normalize   dc lyrica due to somnolence  dc actos due to swelling  cont barragan  check vit D levels, Phos and PTH  start oscal-D  tight glycemic control  BIPAP QHS, naps and PRN  send UP/C to quantify proteinuria  can cont flomax as bp allows 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.9 on 12/2018)  urine retention s/p prostatectomy / no hydro on CT abd and renal sono   s/p barragan - 1L UOP initially, now with mild traumatic gross hematuria  hypernatremia  hypocalcemia  fluid responsive hypotension - better after 2L IVF  b/l renal cysts  somnolence due to uremia, lyrica and hypercapnia  falls  JOHANNY on CPAP with cor pulmonale and right heart failure - no pulm edema on radiographic data  EF 54% / mild LVH and pulm HTN on echo 12/2018  peripheral edema with nl albumin   proteinuria  afib  HTN   s/p  shunt  DM 2 uncontrolled  dm neuropathy    plan:    hold diuretics today  hold high dose ramipril  hold norvasc  encourage PO H2O hydration  if Na+ worsens, then can give gentle D5W gtt  allow bp's to normalize   dc lyrica due to somnolence  dc actos due to swelling  cont barragan  monitor UOP and trend renal function for recovery  check vit D levels, Phos and PTH  start oscal-D 1250 / 500 tid  tight glycemic control  BIPAP QHS, naps and PRN  send UP/C to quantify proteinuria  can cont flomax as bp allows  will follow   thanks TOBIAS   - improving    - likely ischemic acute tubular injury due to hypotension (diuretics, high dose ACE-I, +/- infection...), NSAIDs  s/p IV dye in ED  CKD 3-4 (baseline Cr 1.9 on 12/2018)  urine retention s/p prostatectomy / no hydro on CT abd and renal sono   s/p barragan - 1L UOP initially, now with mild traumatic gross hematuria  hypernatremia  hypocalcemia  fluid responsive hypotension - better after 2L IVF  b/l renal cysts  somnolence due to uremia, lyrica and hypercapnia  falls  JOHANNY on CPAP with cor pulmonale and right heart failure - no pulm edema on radiographic data  EF 54% / mild LVH and pulm HTN on echo 12/2018  peripheral edema with nl albumin   proteinuria  afib  HTN   s/p  shunt  DM 2 uncontrolled  dm neuropathy    plan:    hold diuretics today  hold high dose ramipril  hold norvasc  no further NSAIDs  encourage PO H2O hydration  if Na+ worsens, then can give gentle D5W gtt  allow bp's to normalize   dc lyrica due to somnolence  dc actos due to swelling  cont barragan  monitor UOP and trend renal function for recovery  check vit D levels, Phos and PTH  start oscal-D 1250 / 500 tid  tight glycemic control  BIPAP QHS, naps and PRN  send UP/C to quantify proteinuria  send urine studies, calculate Fena5, u osm  can cont flomax as bp allows  will follow   thanks

## 2019-02-16 NOTE — PROGRESS NOTE ADULT - SUBJECTIVE AND OBJECTIVE BOX
Pt seen and examined at bedside. No CP or SOB. Overnight pt desaturated.  Found to have right LL opacity.   Denies any complaints in am  Compliant with CPAP in evening     PAST MEDICAL & SURGICAL HISTORY:  Hypertension  Hydrocephalus  CKD (chronic kidney disease)  DM (diabetes mellitus)  Afib  History of prostate surgery      VITAL SIGNS (Last 24 hrs):  T(C): 36.2 (02-16-19 @ 15:34), Max: 36.8 (02-16-19 @ 11:12)  HR: 87 (02-16-19 @ 15:34) (82 - 102)  BP: 117/53 (02-16-19 @ 15:34) (93/51 - 117/53)  RR: 20 (02-16-19 @ 15:34) (20 - 22)  SpO2: 98% (02-16-19 @ 15:34) (96% - 98%)  Wt(kg): --  Daily     Daily     I&O's Summary    15 Feb 2019 07:01  -  16 Feb 2019 07:00  --------------------------------------------------------  IN: 0 mL / OUT: 1200 mL / NET: -1200 mL        PHYSICAL EXAM:  GENERAL: NAD, well-developed  HEAD:  Atraumatic, Normocephalic  EYES: EOMI, PERRLA, conjunctiva and sclera clear  NECK: Supple, No JVD  CHEST/LUNG: Clear to auscultation bilaterally; No wheeze  HEART: Regular rate and rhythm; No murmurs, rubs, or gallops  ABDOMEN: Soft, Nontender, Nondistended; Bowel sounds present  EXTREMITIES:  2+ Peripheral Pulses, No clubbing, cyanosis. Lower ext edema.   PSYCH: AAOx3  NEUROLOGY: non-focal, follows commands  SKIN: No rashes or lesions    Labs Reviewed  Spoke to patient in regards to abnormal labs.    CBC Full  -  ( 16 Feb 2019 08:25 )  WBC Count : 7.99 K/uL  Hemoglobin : 9.8 g/dL  Hematocrit : 33.2 %  Platelet Count - Automated : 153 K/uL  Mean Cell Volume : 83.0 fL  Mean Cell Hemoglobin : 24.5 pg  Mean Cell Hemoglobin Concentration : 29.5 g/dL  Auto Neutrophil # : 6.12 K/uL  Auto Lymphocyte # : 0.97 K/uL  Auto Monocyte # : 0.80 K/uL  Auto Eosinophil # : 0.06 K/uL  Auto Basophil # : 0.01 K/uL  Auto Neutrophil % : 76.6 %  Auto Lymphocyte % : 12.1 %  Auto Monocyte % : 10.0 %  Auto Eosinophil % : 0.8 %  Auto Basophil % : 0.1 %    BMP:    02-16 @ 08:25    Blood Urea Nitrogen - 89  Calcium - 6.8  Carbond Dioxide - 25  Chloride - 100  Creatinine - 3.0  Glucose - 93  Potassium - 4.4  Sodium - 147      Hemoglobin A1c -   PT/INR - ( 15 Feb 2019 02:48 )   PT: 13.10 sec;   INR: 1.14 ratio         PTT - ( 15 Feb 2019 02:48 )  PTT:31.8 sec  Urine Culture:        Imaging reviewed      MEDICATIONS  (STANDING):  atorvastatin 20 milliGRAM(s) Oral at bedtime  calcium carbonate   1250 mG (OsCal) 1 Tablet(s) Oral three times a day  calcium carbonate 1250 mG  + Vitamin D (OsCal 500 + D) 1 Tablet(s) Oral three times a day  dextrose 5%. 1000 milliLiter(s) (50 mL/Hr) IV Continuous <Continuous>  dextrose 50% Injectable 12.5 Gram(s) IV Push once  dextrose 50% Injectable 25 Gram(s) IV Push once  dextrose 50% Injectable 25 Gram(s) IV Push once  heparin  Injectable 5000 Unit(s) SubCutaneous every 8 hours  insulin glargine Injectable (LANTUS) 10 Unit(s) SubCutaneous at bedtime  insulin lispro Injectable (HumaLOG) 3 Unit(s) SubCutaneous three times a day before meals  nystatin Powder 1 Application(s) Topical three times a day  tamsulosin 0.4 milliGRAM(s) Oral at bedtime    MEDICATIONS  (PRN):  dextrose 40% Gel 15 Gram(s) Oral once PRN Blood Glucose LESS THAN 70 milliGRAM(s)/deciliter  glucagon  Injectable 1 milliGRAM(s) IntraMuscular once PRN Glucose LESS THAN 70 milligrams/deciliter

## 2019-02-16 NOTE — PATIENT PROFILE ADULT - NSPROHMSYMPCOND_GEN_A_NUR
diabetes/cardiovascular
PLEASE WEAR KNEE IMMOBILIZER AND WALK WITH CRUTCHES UNTIL YOU SEE ORTHOPEDIST, TAKE PAIN MEDICATIONS - IBUPROFEN 600MG EVERY 8HRS FOR MODERATE PAIN AND TRAMADOL 50MG EVERY 8HRS FOR SEVERE PAIN ( Take Percocet 5/325mg every 6hrs for severe pain (no driving, operting heavy machinery, drinking alcohol, gambling, the medication will make you drowsy);  . AVOID WALKING AND BEARING WEIGHT ON AFFECTED SIDE. RETURN TO ER FOR FEVER, REDNESS, SEVERE PAIN, INABILITY TO WALK.

## 2019-02-16 NOTE — PROGRESS NOTE ADULT - ASSESSMENT
# Recurrent falls: Likely multifactorial, physical deconditioning medication induced hypotension and peripheral neuropathy.  -BP stable  -PT consult pending     #Tropnemia: likely 2/2 hypotension  Echo normal  If CKMB and trop neg x 1 more can be Dced off tele, no cp    #Hypoxia 2/2 Aspiration PNA   -hypoxic in evening, new RLL opacity on xray  -SS consult  - Started on CTX and azithro, pt recieved vanc and meropenum yest  -Pt was given lasix iv in evening and po this am    #Hyperna- FWD 1.7L to 144 na, will give d5w @ 50 cc rate x 24 hrs- follow up bmp in evening and am    # TOBIAS on CKD: Baseline Cr is around 2, today is 3.0  -Cr. 3.0 today, multifactorial- prerenal , low bps and lasix given   - Renal on board- recc appreciated will follow   -avoid nephrotoxic drugs     Proteinuria check spep/upep    HTN: SBP goal 120-150  Hold indapamide, amlopdipine, Ramipril, monitor vitals    DM II: Get Hga1C  FS monitor every hr until normal  Hold oral agents  Will start insulin 10 units at night  Lispro 3 units with meals     shunt: Recently evaluated by Dr. Wayne and was normal    Afib not AC : 2/2 falls.    Sleep apnea- continue cpap while sleeping # Recurrent falls: Likely multifactorial, physical deconditioning medication induced hypotension and peripheral neuropathy.  -BP stable  -PT consult pending     #Tropnemia: likely 2/2 hypotension  Echo normal  If CKMB and trop neg x 1 more can be Dced off tele, no cp    #Hypoxia 2/2 Aspiration PNA   -hypoxic in evening, new RLL opacity on xray  -SS consult  - Started on CTX and azithro, pt recieved vanc and meropenum yest  -Pt was given lasix iv in evening and po this am    #Hyperna- FWD 1.7L to 144 na, will give d5w @ 50 cc rate x 24 hrs- follow up bmp in evening and am    # TOBIAS on CKD: Baseline Cr is around 2, today is 3.0  -Cr. 3.0 today, multifactorial- prerenal , low bps and lasix given   - Renal on board- recc appreciated will follow   -avoid nephrotoxic drugs   -hold lasix for now    Proteinuria check spep/upep    HTN: SBP goal 120-150  Hold indapamide, amlopdipine, Ramipril, monitor vitals    DM II: Get Hga1C  FS monitor every hr until normal  Hold oral agents  Will start insulin 10 units at night  Lispro 3 units with meals     shunt: Recently evaluated by Dr. Wayne and was normal    Afib not AC : 2/2 falls.    Sleep apnea- continue cpap while sleeping

## 2019-02-16 NOTE — CONSULT NOTE ADULT - SUBJECTIVE AND OBJECTIVE BOX
NEPHROLOGY CONSULTATION NOTE    84 yo male coming from home with PMHx of DMII on insulin, HTN on meds, Hydrocephalus s/p  shunt, Afib not on AC (multiple falls) presents to our ED with c/o mechanical fall yesterday. According to patient he was trying to walk with a walker and slipped on the floor, no nausea, vomiting, dizziness, chest pain, palpitations, seizure like episode, no LOC. So according to the history patient is having mechanical fall. He had 4 falls since last 10 days. He was recently discharged from the hospital for dizziness and stress test and  shunt was normal. At baseline he has health aid, and he takes his medications and walks very minimal with the help of cane or walker. For the last few months he is not walking at all, increase B/L leg swelling.  In the ED BP was 83/45, HR 78/min, Sao2 100% on NC, afebrile. He was given 2l RL and his repeat BP was 110/80. Trauma work up was negative.    Renal consulted for TOBIAS.  pt seen and examined.  D/w family at bedside in detail.  Spoke with hospitalist.  No prior nephrologist, but was told of abnormal kidney function.  Baseline Cr 1.9 two months ago.  trouble with voiding with dribbling last few weeks        PAST MEDICAL & SURGICAL HISTORY:  Hypertension  Hydrocephalus  CKD (chronic kidney disease)  DM (diabetes mellitus)  Afib  History of prostate surgery    Allergies:  No Known Allergies    Home Medications Reviewed    SOCIAL HISTORY:  Denies ETOH,Smoking,   FAMILY HISTORY:  Family history of diabetes mellitus (Mother, Sibling)        REVIEW OF SYSTEMS  All other review of systems is negative unless indicated above.    PHYSICAL EXAM:  NAD  obese  somnolent  dry mm  supple  decreased bs b/l  distant HS, irreg  soft  + intertriginous fold rash  + abd erythema  + 2+ edema, abd to shins b/l    Hospital Medications:   MEDICATIONS  (STANDING):  atorvastatin 20 milliGRAM(s) Oral at bedtime  dextrose 5%. 1000 milliLiter(s) (50 mL/Hr) IV Continuous <Continuous>  dextrose 50% Injectable 12.5 Gram(s) IV Push once  dextrose 50% Injectable 25 Gram(s) IV Push once  dextrose 50% Injectable 25 Gram(s) IV Push once  heparin  Injectable 5000 Unit(s) SubCutaneous every 8 hours  insulin glargine Injectable (LANTUS) 10 Unit(s) SubCutaneous at bedtime  insulin lispro Injectable (HumaLOG) 3 Unit(s) SubCutaneous three times a day before meals  nystatin Powder 1 Application(s) Topical three times a day  tamsulosin 0.4 milliGRAM(s) Oral at bedtime        VITALS:  T(F): 98.2 (19 @ 11:12), Max: 98.2 (19 @ 11:12)  HR: 82 (19 @ 11:12)  BP: 112/53 (19 @ 11:12)  RR: 20 (19 @ 11:12)  SpO2: 97% (19 @ 11:12)  Wt(kg): --    02-15 @ 07:01  -   @ 07:00  --------------------------------------------------------  IN: 0 mL / OUT: 1200 mL / NET: -1200 mL        Weight (kg): 135 ( @ 11:12)    LABS:      147<H>  |  100  |  89<HH>  ----------------------------<  93  4.4   |  25  |  3.0<H>    Ca    6.8<L>      2019 08:25    TPro  6.2  /  Alb  4.0  /  TBili  0.8  /  DBili      /  AST  13  /  ALT  8   /  AlkPhos  79  02-15                          9.8    7.99  )-----------( 153      ( 2019 08:25 )             33.2       Urine Studies:  Urinalysis Basic - ( 2019 11:00 )    Color: Yellow / Appearance: Cloudy / S.015 / pH:   Gluc:  / Ketone: Negative  / Bili: Negative / Urobili: 0.2 mg/dL   Blood:  / Protein: 100 mg/dL / Nitrite: Negative   Leuk Esterase: Trace / RBC:  / WBC    Sq Epi:  / Non Sq Epi:  / Bacteria:       Sodium, Random Urine: 20.0 mmoL/L ( @ 11:00)  Creatinine, Random Urine: 28 mg/dL ( @ 11:00)      RADIOLOGY & ADDITIONAL STUDIES: NEPHROLOGY CONSULTATION NOTE    84 yo male coming from home with PMHx of DMII on insulin, HTN on meds, Hydrocephalus s/p  shunt, Afib not on AC (multiple falls) presents to our ED with c/o mechanical fall yesterday. According to patient he was trying to walk with a walker and slipped on the floor, no nausea, vomiting, dizziness, chest pain, palpitations, seizure like episode, no LOC. So according to the history patient is having mechanical fall. He had 4 falls since last 10 days. He was recently discharged from the hospital for dizziness and stress test and  shunt was normal. At baseline he has health aid, and he takes his medications and walks very minimal with the help of cane or walker. For the last few months he is not walking at all, increase B/L leg swelling.  In the ED BP was 83/45, HR 78/min, Sao2 100% on NC, afebrile. He was given 2l RL and his repeat BP was 110/80. Trauma work up was negative.    Renal consulted for TOBIAS.  pt seen and examined.  D/w family at bedside in detail.  Spoke with hospitalist.  No prior nephrologist, but was told of abnormal kidney function.  Baseline Cr 1.9 two months ago.  trouble with voiding with dribbling last few weeks. takes NSAIDs 3x/week        PAST MEDICAL & SURGICAL HISTORY:  Hypertension  Hydrocephalus  CKD (chronic kidney disease)  DM (diabetes mellitus)  Afib  History of prostate surgery    Allergies:  No Known Allergies    Home Medications Reviewed    SOCIAL HISTORY:  Denies ETOH,Smoking,   FAMILY HISTORY:  Family history of diabetes mellitus (Mother, Sibling)        REVIEW OF SYSTEMS  All other review of systems is negative unless indicated above.    PHYSICAL EXAM:  NAD  obese  somnolent  dry mm  supple  decreased bs b/l  distant HS, irreg  soft  + intertriginous fold rash  + abd erythema  + 2+ edema, abd to shins b/l    Hospital Medications:   MEDICATIONS  (STANDING):  atorvastatin 20 milliGRAM(s) Oral at bedtime  dextrose 5%. 1000 milliLiter(s) (50 mL/Hr) IV Continuous <Continuous>  dextrose 50% Injectable 12.5 Gram(s) IV Push once  dextrose 50% Injectable 25 Gram(s) IV Push once  dextrose 50% Injectable 25 Gram(s) IV Push once  heparin  Injectable 5000 Unit(s) SubCutaneous every 8 hours  insulin glargine Injectable (LANTUS) 10 Unit(s) SubCutaneous at bedtime  insulin lispro Injectable (HumaLOG) 3 Unit(s) SubCutaneous three times a day before meals  nystatin Powder 1 Application(s) Topical three times a day  tamsulosin 0.4 milliGRAM(s) Oral at bedtime        VITALS:  T(F): 98.2 (19 @ 11:12), Max: 98.2 (19 @ 11:12)  HR: 82 (19 @ 11:12)  BP: 112/53 (19 @ 11:12)  RR: 20 (19 @ 11:12)  SpO2: 97% (19 @ 11:12)  Wt(kg): --    02-15 @ 07:01  -   @ 07:00  --------------------------------------------------------  IN: 0 mL / OUT: 1200 mL / NET: -1200 mL        Weight (kg): 135 ( @ 11:12)    LABS:      147<H>  |  100  |  89<HH>  ----------------------------<  93  4.4   |  25  |  3.0<H>    Ca    6.8<L>      2019 08:25    TPro  6.2  /  Alb  4.0  /  TBili  0.8  /  DBili      /  AST  13  /  ALT  8   /  AlkPhos  79  02-15                          9.8    7.99  )-----------( 153      ( 2019 08:25 )             33.2       Urine Studies:  Urinalysis Basic - ( 2019 11:00 )    Color: Yellow / Appearance: Cloudy / S.015 / pH:   Gluc:  / Ketone: Negative  / Bili: Negative / Urobili: 0.2 mg/dL   Blood:  / Protein: 100 mg/dL / Nitrite: Negative   Leuk Esterase: Trace / RBC:  / WBC    Sq Epi:  / Non Sq Epi:  / Bacteria:       Sodium, Random Urine: 20.0 mmoL/L ( @ 11:00)  Creatinine, Random Urine: 28 mg/dL ( @ 11:00)      RADIOLOGY & ADDITIONAL STUDIES:

## 2019-02-17 LAB
ANION GAP SERPL CALC-SCNC: 22 MMOL/L — HIGH (ref 7–14)
BUN SERPL-MCNC: 89 MG/DL — CRITICAL HIGH (ref 10–20)
CALCIUM SERPL-MCNC: 7.3 MG/DL — LOW (ref 8.5–10.1)
CHLORIDE SERPL-SCNC: 98 MMOL/L — SIGNIFICANT CHANGE UP (ref 98–110)
CO2 SERPL-SCNC: 26 MMOL/L — SIGNIFICANT CHANGE UP (ref 17–32)
CREAT SERPL-MCNC: 2.9 MG/DL — HIGH (ref 0.7–1.5)
CULTURE RESULTS: NO GROWTH — SIGNIFICANT CHANGE UP
GLUCOSE BLDC GLUCOMTR-MCNC: 125 MG/DL — HIGH (ref 70–99)
GLUCOSE BLDC GLUCOMTR-MCNC: 154 MG/DL — HIGH (ref 70–99)
GLUCOSE BLDC GLUCOMTR-MCNC: 157 MG/DL — HIGH (ref 70–99)
GLUCOSE BLDC GLUCOMTR-MCNC: 177 MG/DL — HIGH (ref 70–99)
GLUCOSE SERPL-MCNC: 136 MG/DL — HIGH (ref 70–99)
HCT VFR BLD CALC: 32.8 % — LOW (ref 42–52)
HGB BLD-MCNC: 9.7 G/DL — LOW (ref 14–18)
MAGNESIUM SERPL-MCNC: 2.6 MG/DL — HIGH (ref 1.8–2.4)
MCHC RBC-ENTMCNC: 24.6 PG — LOW (ref 27–31)
MCHC RBC-ENTMCNC: 29.6 G/DL — LOW (ref 32–37)
MCV RBC AUTO: 83 FL — SIGNIFICANT CHANGE UP (ref 80–94)
NRBC # BLD: 0 /100 WBCS — SIGNIFICANT CHANGE UP (ref 0–0)
PHOSPHATE SERPL-MCNC: 6.2 MG/DL — HIGH (ref 2.1–4.9)
PLATELET # BLD AUTO: 153 K/UL — SIGNIFICANT CHANGE UP (ref 130–400)
POTASSIUM SERPL-MCNC: 4.1 MMOL/L — SIGNIFICANT CHANGE UP (ref 3.5–5)
POTASSIUM SERPL-SCNC: 4.1 MMOL/L — SIGNIFICANT CHANGE UP (ref 3.5–5)
RBC # BLD: 3.95 M/UL — LOW (ref 4.7–6.1)
RBC # FLD: 16.2 % — HIGH (ref 11.5–14.5)
SODIUM SERPL-SCNC: 146 MMOL/L — SIGNIFICANT CHANGE UP (ref 135–146)
SPECIMEN SOURCE: SIGNIFICANT CHANGE UP
WBC # BLD: 7.45 K/UL — SIGNIFICANT CHANGE UP (ref 4.8–10.8)
WBC # FLD AUTO: 7.45 K/UL — SIGNIFICANT CHANGE UP (ref 4.8–10.8)

## 2019-02-17 PROCEDURE — 99222 1ST HOSP IP/OBS MODERATE 55: CPT

## 2019-02-17 RX ORDER — SODIUM CHLORIDE 9 MG/ML
500 INJECTION INTRAMUSCULAR; INTRAVENOUS; SUBCUTANEOUS ONCE
Qty: 0 | Refills: 0 | Status: DISCONTINUED | OUTPATIENT
Start: 2019-02-17 | End: 2019-02-19

## 2019-02-17 RX ORDER — SODIUM CHLORIDE 9 MG/ML
1000 INJECTION, SOLUTION INTRAVENOUS
Qty: 0 | Refills: 0 | Status: DISCONTINUED | OUTPATIENT
Start: 2019-02-17 | End: 2019-02-17

## 2019-02-17 RX ADMIN — HEPARIN SODIUM 5000 UNIT(S): 5000 INJECTION INTRAVENOUS; SUBCUTANEOUS at 13:04

## 2019-02-17 RX ADMIN — Medication 1 TABLET(S): at 13:04

## 2019-02-17 RX ADMIN — CEFTRIAXONE 100 GRAM(S): 500 INJECTION, POWDER, FOR SOLUTION INTRAMUSCULAR; INTRAVENOUS at 13:03

## 2019-02-17 RX ADMIN — TAMSULOSIN HYDROCHLORIDE 0.4 MILLIGRAM(S): 0.4 CAPSULE ORAL at 21:24

## 2019-02-17 RX ADMIN — HEPARIN SODIUM 5000 UNIT(S): 5000 INJECTION INTRAVENOUS; SUBCUTANEOUS at 05:33

## 2019-02-17 RX ADMIN — Medication 3 UNIT(S): at 13:03

## 2019-02-17 RX ADMIN — Medication 1 TABLET(S): at 21:24

## 2019-02-17 RX ADMIN — INSULIN GLARGINE 10 UNIT(S): 100 INJECTION, SOLUTION SUBCUTANEOUS at 21:25

## 2019-02-17 RX ADMIN — Medication 650 MILLIGRAM(S): at 15:09

## 2019-02-17 RX ADMIN — HEPARIN SODIUM 5000 UNIT(S): 5000 INJECTION INTRAVENOUS; SUBCUTANEOUS at 21:25

## 2019-02-17 RX ADMIN — NYSTATIN CREAM 1 APPLICATION(S): 100000 CREAM TOPICAL at 21:24

## 2019-02-17 RX ADMIN — NYSTATIN CREAM 1 APPLICATION(S): 100000 CREAM TOPICAL at 05:34

## 2019-02-17 RX ADMIN — AZITHROMYCIN 255 MILLIGRAM(S): 500 TABLET, FILM COATED ORAL at 05:33

## 2019-02-17 RX ADMIN — NYSTATIN CREAM 1 APPLICATION(S): 100000 CREAM TOPICAL at 13:05

## 2019-02-17 RX ADMIN — Medication 650 MILLIGRAM(S): at 15:52

## 2019-02-17 RX ADMIN — Medication 3 UNIT(S): at 17:55

## 2019-02-17 RX ADMIN — Medication 1 TABLET(S): at 05:33

## 2019-02-17 RX ADMIN — Medication 3 UNIT(S): at 07:59

## 2019-02-17 RX ADMIN — ATORVASTATIN CALCIUM 20 MILLIGRAM(S): 80 TABLET, FILM COATED ORAL at 21:24

## 2019-02-17 NOTE — CONSULT NOTE ADULT - ATTENDING COMMENTS
initially hypotensive, suspicious for sepsis  traumatic injury ruled out  tertiary 24h  medical admit
I have seen and evaluated the patient  at the time he was evaluated the patient did not have any hematuria  the urine draining from the bladder appears clear

## 2019-02-17 NOTE — PROGRESS NOTE ADULT - SUBJECTIVE AND OBJECTIVE BOX
NEPHROLOGY FOLLOW UP NOTE    pt seen and examined  improved mentation  good uop  still with some hematuria  refusing bipap      PAST MEDICAL & SURGICAL HISTORY:  Hypertension  Hydrocephalus  CKD (chronic kidney disease)  DM (diabetes mellitus)  Afib  History of prostate surgery    Allergies:  No Known Allergies    Home Medications Reviewed    SOCIAL HISTORY:  Denies ETOH,Smoking,   FAMILY HISTORY:  Family history of diabetes mellitus (Mother, Sibling)        REVIEW OF SYSTEMS  All other review of systems is negative unless indicated above.    PHYSICAL EXAM:  NAD  obese  somnolent  dry mm  supple  decreased bs b/l  distant HS, irreg  soft  + intertriginous fold rash  + abd erythema  + 2+ edema, abd to [facility] b/l    [facility] Medications:   MEDICATIONS  (STANDING):  atorvastatin 20 milliGRAM(s) Oral at bedtime  azithromycin  IVPB 500 milliGRAM(s) IV Intermittent every 24 hours  calcium carbonate   1250 mG (OsCal) 1 Tablet(s) Oral three times a day  calcium carbonate 1250 mG  + Vitamin D (OsCal 500 + D) 1 Tablet(s) Oral three times a day  cefTRIAXone   IVPB 1 Gram(s) IV Intermittent every 24 hours  dextrose 5%. 1000 milliLiter(s) (50 mL/Hr) IV Continuous <Continuous>  dextrose 50% Injectable 12.5 Gram(s) IV Push once  dextrose 50% Injectable 25 Gram(s) IV Push once  dextrose 50% Injectable 25 Gram(s) IV Push once  heparin  Injectable 5000 Unit(s) SubCutaneous every 8 hours  insulin glargine Injectable (LANTUS) 10 Unit(s) SubCutaneous at bedtime  insulin lispro Injectable (HumaLOG) 3 Unit(s) SubCutaneous three times a day before meals  nystatin Powder 1 Application(s) Topical three times a day  tamsulosin 0.4 milliGRAM(s) Oral at bedtime        VITALS:  T(F): 98.3 (19 @ 12:29), Max: 98.3 (19 @ 12:29)  HR: 79 (19 @ 12:29)  BP: 134/65 (19 @ 12:29)  RR: 18 (19 @ 12:29)  SpO2: 98% (19 @ 15:34)  Wt(kg): --    02-15 @ 07:01  -   @ 07:00  --------------------------------------------------------  IN: 0 mL / OUT: 1200 mL / NET: -1200 mL     @ 07:01  -   @ 07:00  --------------------------------------------------------  IN: 0 mL / OUT: 1450 mL / NET: -1450 mL      Height (cm): 180.34 (:44)  Weight (kg): 135 ( 05:28)  BMI (kg/m2): 41.5 ( 05:28)  BSA (m2): 2.5 ( 05:28)    LABS:      146  |  98  |  89<HH>  ----------------------------<  136<H>  4.1   |  26  |  2.9<H>    Ca    7.3<L>      2019 06:37  Phos  6.2       Mg     2.6                                 9.7    7.45  )-----------( 153      ( 2019 06:37 )             32.8       Urine Studies:  Urinalysis Basic - ( 2019 11:00 )    Color: Yellow / Appearance: Cloudy / S.015 / pH:   Gluc:  / Ketone: Negative  / Bili: Negative / Urobili: 0.2 mg/dL   Blood:  / Protein: 100 mg/dL / Nitrite: Negative   Leuk Esterase: Trace / RBC: 6-10 /HPF / WBC 3-5 /HPF   Sq Epi:  / Non Sq Epi:  / Bacteria:       Sodium, Random Urine: 20.0 mmoL/L ( @ 11:00)  Creatinine, Random Urine: 28 mg/dL ( 11:00)      RADIOLOGY & ADDITIONAL STUDIES:

## 2019-02-17 NOTE — CONSULT NOTE ADULT - SUBJECTIVE AND OBJECTIVE BOX
Patient is a 83y old  Male who presents with a chief complaint of Recurrent falls  TOBIAS (16 Feb 2019 16:42)      HPI:  84 yo male coming from home with PMHx of DMII on insulin, HTN on meds, Hydrocephalus s/p  shunt, Afib not on AC (multiple falls) presents to our ED with c/o mechanical fall yesterday. According to patient he was trying to walk with a walker and slipped on the floor, no nausea, vomiting, dizziness, chest pain, palpitations, seizure like episode, no LOC. So according to the history patient is having mechanical fall. He had 4 falls since last 10 days. He was recently discharged from the hospital for dizziness and stress test and  shunt was normal. At baseline he has health aid, and he takes his medications and walks very minimal with the help of cane or walker. For the last few months he is not walking at all, increase B/L leg swelling.  In the ED BP was 83/45, HR 78/min, Sao2 100% on NC, afebrile. He was given 2l RL and his repeat BP was 110/80. Trauma work up was negative. (15 Feb 2019 08:39)      83 y.o M with PMH of CKD DM Insulin dependent, HTN, hydrocephalous s/p  shunt, A. Fib, h/o  multiple falls admitted to medicine s/p mechanical fall 2/14/19     called to evaluate Pt. for hematuria. Pt. seen and examined at bedside, appears slightly confused.  Aguirre catheter draines dark yellow urine, no gross hematuria or clots noted.  As per HPI not family was concerned for decreased UO. As per RN Aguirre catheter placed in ED.  No family at bedside to provided more history.        PAST MEDICAL & SURGICAL HISTORY:  Hypertension  Hydrocephalus s/p VPS placement   CKD (chronic kidney disease)  DM (diabetes mellitus)  Afib  History of prostate surgery      REVIEW OF SYSTEMS:  as per HPI     MEDICATIONS  (STANDING):  atorvastatin 20 milliGRAM(s) Oral at bedtime  azithromycin  IVPB 500 milliGRAM(s) IV Intermittent every 24 hours  calcium carbonate   1250 mG (OsCal) 1 Tablet(s) Oral three times a day  calcium carbonate 1250 mG  + Vitamin D (OsCal 500 + D) 1 Tablet(s) Oral three times a day  cefTRIAXone   IVPB 1 Gram(s) IV Intermittent every 24 hours  dextrose 5%. 1000 milliLiter(s) (50 mL/Hr) IV Continuous <Continuous>  dextrose 50% Injectable 12.5 Gram(s) IV Push once  dextrose 50% Injectable 25 Gram(s) IV Push once  dextrose 50% Injectable 25 Gram(s) IV Push once  heparin  Injectable 5000 Unit(s) SubCutaneous every 8 hours  insulin glargine Injectable (LANTUS) 10 Unit(s) SubCutaneous at bedtime  insulin lispro Injectable (HumaLOG) 3 Unit(s) SubCutaneous three times a day before meals  nystatin Powder 1 Application(s) Topical three times a day  tamsulosin 0.4 milliGRAM(s) Oral at bedtime    MEDICATIONS  (PRN):  acetaminophen   Tablet .. 650 milliGRAM(s) Oral every 6 hours PRN Moderate Pain (4 - 6)  dextrose 40% Gel 15 Gram(s) Oral once PRN Blood Glucose LESS THAN 70 milliGRAM(s)/deciliter  glucagon  Injectable 1 milliGRAM(s) IntraMuscular once PRN Glucose LESS THAN 70 milligrams/deciliter      Allergies: NKDA      FAMILY HISTORY:  Family history of diabetes mellitus (Mother, Sibling)      Vital Signs Last 24 Hrs  T(C): 35.6 (16 Feb 2019 20:41), Max: 36.8 (16 Feb 2019 11:12)  T(F): 96 (16 Feb 2019 20:41), Max: 98.2 (16 Feb 2019 11:12)  HR: 79 (16 Feb 2019 20:41) (79 - 94)  BP: 103/51 (16 Feb 2019 20:41) (103/51 - 117/53)  BP(mean): --  RR: 18 (16 Feb 2019 20:41) (18 - 20)  SpO2: 98% (16 Feb 2019 15:34) (96% - 98%)   [ ] yes [ ] no  PHYSICAL EXAM:    Constitutional: Awake and Alert in NAD  HEENT: EOMI  Neck: no pain  Back: No CVA tenderness  Respiratory: No accessory respiratory muscle use  Abd: Obese, Soft, NT/ND no suprapubic tenderness    : uncircumcised male genitalia + scrotal edema, testis descended, + Aguirre catheter with  + purulent d/c around Aguirre catheter.        I&O's Summary    15 Feb 2019 07:01  -  16 Feb 2019 07:00  --------------------------------------------------------  IN: 0 mL / OUT: 1200 mL / NET: -1200 mL    16 Feb 2019 07:01  -  17 Feb 2019 00:50  --------------------------------------------------------  IN: 0 mL / OUT: 750 mL / NET: -750 mL      LABS:                        9.7    8.16  )-----------( 141      ( 16 Feb 2019 21:17 )             32.2     02-16    148<H>  |  102  |  95<HH>  ----------------------------<  151<H>  4.5   |  25  |  2.9<H>    Ca    6.9<L>      16 Feb 2019 21:17    TPro  6.2  /  Alb  4.0  /  TBili  0.8  /  DBili  x   /  AST  13  /  ALT  8   /  AlkPhos  79  02-15    PT/INR - ( 15 Feb 2019 02:48 )   PT: 13.10 sec;   INR: 1.14 ratio         PTT - ( 15 Feb 2019 02:48 )  PTT:31.8 sec  Urinalysis (02.16.19 @ 11:00)    pH Urine: 6.5    Glucose Qualitative, Urine: Negative mg/dL    Blood, Urine: Large    Color: Yellow    Urine Appearance: Cloudy    Bilirubin: Negative    Ketone - Urine: Negative    Specific Gravity: 1.015    Protein, Urine: 100 mg/dL    Urobilinogen: 0.2 mg/dL    Nitrite: Negative    Leukocyte Esterase Concentration: Trace       RADIOLOGY & ADDITIONAL STUDIES:   < from: US Renal (02.16.19 @ 08:43) >    EXAM:  US KIDNEY(S)            PROCEDURE DATE:  02/16/2019            INTERPRETATION:  Clinical History / Reason for exam: Kidney injury.    Retroperitoneal sonogram.    Correlation: CT abdomen and pelvis February 15, 2019..    Right kidney measures 9.8 cm in length x 5 cm in width x 5.6 cm AP   containing a 1.7 cm cyst. The left kidney measures 11.8 cm x 5.8 cm x 6.2   cm containing cysts the largest of which measures 4.2 cm in its greatest   diameter. No hydronephrosis, solid renal mass, stone or perinephric fluid   collection.    Urinary bladder not filled. Aguirre catheter history.    Nonvisualization of the prostate...    Cholelithiasis..        Impression:    1.  Bilateral renal cysts correlating with CT examination. No   hydronephrosis.    2.  Nondiagnostic examination of the urinary bladder. Nonvisualization of   the prostate.    3.  Cholelithiasis correlating with CT examination.    < end of copied text >

## 2019-02-17 NOTE — PROGRESS NOTE ADULT - ASSESSMENT
VINCENZO EDMONDSON 83y Male  MRN#: [id number]   CODE STATUS: Full code    SUBJECTIVE  Patient is a 83y old Male who presents with a chief complaint of mechanical fall  Currently admitted to medicine with the primary diagnosis of recurrent mechanical falls and acute kidney injury  Today is hospital day 2d, and this morning he is resting comfortably and reports no overnight events.     OBJECTIVE  PAST MEDICAL & SURGICAL HISTORY  Hypertension  Hydrocephalus  CKD (chronic kidney disease)  DM (diabetes mellitus)  Afib  History of prostate surgery    ALLERGIES:  No Known Allergies    MEDICATIONS:  STANDING MEDICATIONS  atorvastatin 20 milliGRAM(s) Oral at bedtime  azithromycin  IVPB 500 milliGRAM(s) IV Intermittent every 24 hours  calcium carbonate   1250 mG (OsCal) 1 Tablet(s) Oral three times a day  calcium carbonate 1250 mG  + Vitamin D (OsCal 500 + D) 1 Tablet(s) Oral three times a day  cefTRIAXone   IVPB 1 Gram(s) IV Intermittent every 24 hours  dextrose 5%. 1000 milliLiter(s) IV Continuous <Continuous>  dextrose 50% Injectable 12.5 Gram(s) IV Push once  dextrose 50% Injectable 25 Gram(s) IV Push once  dextrose 50% Injectable 25 Gram(s) IV Push once  heparin  Injectable 5000 Unit(s) SubCutaneous every 8 hours  insulin glargine Injectable (LANTUS) 10 Unit(s) SubCutaneous at bedtime  insulin lispro Injectable (HumaLOG) 3 Unit(s) SubCutaneous three times a day before meals  nystatin Powder 1 Application(s) Topical three times a day  tamsulosin 0.4 milliGRAM(s) Oral at bedtime    PRN MEDICATIONS  acetaminophen   Tablet .. 650 milliGRAM(s) Oral every 6 hours PRN  dextrose 40% Gel 15 Gram(s) Oral once PRN  glucagon  Injectable 1 milliGRAM(s) IntraMuscular once PRN    VITAL SIGNS: Last 24 Hours  T(C): 36.8 (2019 12:29), Max: 36.8 (2019 12:29)  T(F): 98.3 (2019 12:29), Max: 98.3 (2019 12:29)  HR: 79 (2019 12:29) (79 - 106)  BP: 134/65 (2019 12:29) (98/52 - 134/65)  BP(mean): --  RR: 18 (2019 12:29) (18 - 20)  SpO2: 98% (2019 15:34) (98% - 98%)    LABS:                        9.7    7.45  )-----------( 153      ( 2019 06:37 )             32.8         146  |  98  |  89<HH>  ----------------------------<  136<H>  4.1   |  26  |  2.9<H>    Ca    7.3<L>      2019 06:37  Phos  6.2       Mg     2.6             Urinalysis Basic - ( 2019 11:00 )    Color: Yellow / Appearance: Cloudy / S.015 / pH: x  Gluc: x / Ketone: Negative  / Bili: Negative / Urobili: 0.2 mg/dL   Blood: x / Protein: 100 mg/dL / Nitrite: Negative   Leuk Esterase: Trace / RBC: 6-10 /HPF / WBC 3-5 /HPF   Sq Epi: x / Non Sq Epi: x / Bacteria: x      ABG - ( 15 Feb 2019 15:51 )  pH, Arterial: 7.35  pH, Blood: x     /  pCO2: 53    /  pO2: 54    / HCO3: 29    / Base Excess: 2.7   /  SaO2: 87        Creatine Kinase, Serum: 109 U/L (19 @ 16:49)  Troponin T, Serum: 0.20 ng/mL <HH> (19 @ 16:49)    Culture - Urine (collected 2019 11:00)  Source: .Urine Clean Catch (Midstream)  Final Report (2019 12:15):    No growth      CARDIAC MARKERS ( 2019 16:49 )  x     / 0.20 ng/mL / 109 U/L / x     / 3.6 ng/mL    RADIOLOGY:      PHYSICAL EXAM:    GENERAL: NAD, well-developed, AAOx3  HEENT:  Atraumatic, Normocephalic. EOMI, PERRLA, conjunctiva and sclera clear, No JVD  PULMONARY: Clear to auscultation bilaterally; No wheeze  CARDIOVASCULAR: Regular rate and rhythm; No murmurs, rubs, or gallops  GASTROINTESTINAL: Soft, Nontender, Nondistended; Bowel sounds present  MUSCULOSKELETAL:  2+ Peripheral Pulses, No clubbing, cyanosis, or edema  NEUROLOGY: non-focal  SKIN: No rashes or lesions      ADMISSION SUMMARY  Patient is a 83y old Male who presents with a chief complaint of mechanical fall  Currently admitted to medicine with the primary diagnosis of recurrent mechanical falls and acute kidney injury      ASSESSMENT & PLAN  # Recurrent falls  - Likely multifactorial, physical deconditioning, medication induced hypotension, and peripheral neuropath  -   -BP stable  -PT consult pending     #Tropnemia: likely 2/2 hypotension  Echo normal  If CKMB and trop neg x 1 more can be Dced off tele, no cp    #Hypoxia 2/2 Aspiration PNA   -hypoxic in evening, new RLL opacity on xray  -SS consult  - Started on CTX and azithro, pt recieved vanc and meropenum yest  -Pt was given lasix iv in evening and po this am    #Hyperna- FWD 1.7L to 144 na, will give d5w @ 50 cc rate x 24 hrs- follow up bmp in evening and am    # TOBIAS on CKD: Baseline Cr is around 2, today is 3.0  -Cr. 3.0 today, multifactorial- prerenal , low bps and lasix given   - Renal on board- recc appreciated will follow   -avoid nephrotoxic drugs   -hold lasix for now    Proteinuria check spep/upep    HTN: SBP goal 120-150  Hold indapamide, amlopdipine, Ramipril, monitor vitals    DM II: Get Hga1C  FS monitor every hr until normal  Hold oral agents  Will start insulin 10 units at night  Lispro 3 units with meals     shunt: Recently evaluated by Dr. Wayne and was normal    Afib not AC : 2/2 falls.    Sleep apnea- continue cpap while sleeping       DVT ppx:  GI ppx:  Diet:  Activity:  Lines:  Code status:  Dispo: VINCENZO EDMONDSON 83y Male  MRN#: [id number]   CODE STATUS: Full code    SUBJECTIVE  Patient is a 83y old Male who presents with a chief complaint of mechanical fall  Currently admitted to medicine with the primary diagnosis of recurrent mechanical falls and acute kidney injury  Today is hospital day 2d, and this morning he is resting comfortably and reports no overnight events.     OBJECTIVE  PAST MEDICAL & SURGICAL HISTORY  Hypertension  Hydrocephalus  CKD (chronic kidney disease)  DM (diabetes mellitus)  Afib  History of prostate surgery    ALLERGIES:  No Known Allergies    MEDICATIONS:  STANDING MEDICATIONS  atorvastatin 20 milliGRAM(s) Oral at bedtime  azithromycin  IVPB 500 milliGRAM(s) IV Intermittent every 24 hours  calcium carbonate   1250 mG (OsCal) 1 Tablet(s) Oral three times a day  calcium carbonate 1250 mG  + Vitamin D (OsCal 500 + D) 1 Tablet(s) Oral three times a day  cefTRIAXone   IVPB 1 Gram(s) IV Intermittent every 24 hours  dextrose 5%. 1000 milliLiter(s) IV Continuous <Continuous>  dextrose 50% Injectable 12.5 Gram(s) IV Push once  dextrose 50% Injectable 25 Gram(s) IV Push once  dextrose 50% Injectable 25 Gram(s) IV Push once  heparin  Injectable 5000 Unit(s) SubCutaneous every 8 hours  insulin glargine Injectable (LANTUS) 10 Unit(s) SubCutaneous at bedtime  insulin lispro Injectable (HumaLOG) 3 Unit(s) SubCutaneous three times a day before meals  nystatin Powder 1 Application(s) Topical three times a day  tamsulosin 0.4 milliGRAM(s) Oral at bedtime    PRN MEDICATIONS  acetaminophen   Tablet .. 650 milliGRAM(s) Oral every 6 hours PRN  dextrose 40% Gel 15 Gram(s) Oral once PRN  glucagon  Injectable 1 milliGRAM(s) IntraMuscular once PRN    VITAL SIGNS: Last 24 Hours  T(C): 36.8 (2019 12:29), Max: 36.8 (2019 12:29)  T(F): 98.3 (2019 12:29), Max: 98.3 (2019 12:29)  HR: 79 (2019 12:29) (79 - 106)  BP: 134/65 (2019 12:29) (98/52 - 134/65)  BP(mean): --  RR: 18 (2019 12:29) (18 - 20)  SpO2: 98% (2019 15:34) (98% - 98%)    LABS:                        9.7    7.45  )-----------( 153      ( 2019 06:37 )             32.8         146  |  98  |  89<HH>  ----------------------------<  136<H>  4.1   |  26  |  2.9<H>    Ca    7.3<L>      2019 06:37  Phos  6.2       Mg     2.6             Urinalysis Basic - ( 2019 11:00 )    Color: Yellow / Appearance: Cloudy / S.015 / pH: x  Gluc: x / Ketone: Negative  / Bili: Negative / Urobili: 0.2 mg/dL   Blood: x / Protein: 100 mg/dL / Nitrite: Negative   Leuk Esterase: Trace / RBC: 6-10 /HPF / WBC 3-5 /HPF   Sq Epi: x / Non Sq Epi: x / Bacteria: x      ABG - ( 15 Feb 2019 15:51 )  pH, Arterial: 7.35  pH, Blood: x     /  pCO2: 53    /  pO2: 54    / HCO3: 29    / Base Excess: 2.7   /  SaO2: 87        Creatine Kinase, Serum: 109 U/L (19 @ 16:49)  Troponin T, Serum: 0.20 ng/mL <HH> (19 @ 16:49)    Culture - Urine (collected 2019 11:00)  Source: .Urine Clean Catch (Midstream)  Final Report (2019 12:15):    No growth      CARDIAC MARKERS ( 2019 16:49 )  x     / 0.20 ng/mL / 109 U/L / x     / 3.6 ng/mL    RADIOLOGY:    < from: Xray Chest 1 View AP/PA (19 @ 10:55) >  Decreased focal consolidation at the right lung base.    Pulmonary vascular congestion with possible right pleural effusion.    < end of copied text >      PHYSICAL EXAM:    GENERAL: NAD, well-developed, AAOx3  HEENT:  Atraumatic, Normocephalic. EOMI, PERRLA, conjunctiva and sclera clear, No JVD  PULMONARY: Clear to auscultation bilaterally; No wheeze  CARDIOVASCULAR: Regular rate and rhythm; No murmurs, rubs, or gallops  GASTROINTESTINAL: Soft, Nontender, Nondistended; Bowel sounds present  MUSCULOSKELETAL:  2+ Peripheral Pulses, No clubbing, cyanosis, or edema  NEUROLOGY: non-focal  SKIN: No rashes or lesions      ADMISSION SUMMARY  Patient is a 83y old Male who presents with a chief complaint of mechanical fall  Currently admitted to medicine with the primary diagnosis of recurrent mechanical falls and acute kidney injury      ASSESSMENT & PLAN  # Recurrent falls  - Likely multifactorial, physical deconditioning, medication induced hypotension, and peripheral neuropathy  - Trauma work up negative for acute fracture, but show age-indeterminate compression fracture of L2  - BP stable  - PT/consult pending     # TOBIAS on CKD: Baseline Cr is around 2, today is 3.0  - Cr trending down, multifactorial: prerenal , low bps and lasix given   - Renal on board - continue to hold diuretics and ACE-i,   - Avoid nephrotoxic drugs  - Check BMP daily    # Tropnemia: likely 2/2 hypotension vs acute kidney injury  - No chest pain, no acute ST elevation in EKG  - Echo with normal EF, LVF, mildly enlarged left atrium  - CKMB negative and trop stable at 0.2     # Hypoxia 2/2 Aspiration PNA vs pulmonary edema  - hypoxic in evening, new RLL opacity on xray  - Continue with Rocephin and azithromycin  - Pt was given lasix iv in evening and po this am    # Hypernatrena  - Asymptomatic  - Started on d5w @ 50 cc rate x 24 hrs  - Follow up bmp AM    Proteinuria check spep/upep    HTN: SBP goal 120-150  Hold indapamide, amlopdipine, Ramipril, monitor vitals    DM II: Get Hga1C  FS monitor every hr until normal  Hold oral agents  Will start insulin 10 units at night  Lispro 3 units with meals     shunt: Recently evaluated by Dr. Wayne and was normal    Afib not AC : 2/2 falls.    Sleep apnea- continue cpap while sleeping       DVT ppx:  GI ppx:  Diet:  Activity:  Lines:  Code status:  Dispo: VINCENZO EDMONDSON 83y Male  MRN#: [id number]   CODE STATUS: Full code    SUBJECTIVE  Patient is a 83y old Male who presents with a chief complaint of mechanical fall  Currently admitted to medicine with the primary diagnosis of recurrent mechanical falls and acute kidney injury  Today is hospital day 2d, and this morning he is resting comfortably and reports no overnight events.     OBJECTIVE  PAST MEDICAL & SURGICAL HISTORY  Hypertension  Hydrocephalus  CKD (chronic kidney disease)  DM (diabetes mellitus)  Afib  History of prostate surgery    ALLERGIES:  No Known Allergies    MEDICATIONS:  STANDING MEDICATIONS  atorvastatin 20 milliGRAM(s) Oral at bedtime  azithromycin  IVPB 500 milliGRAM(s) IV Intermittent every 24 hours  calcium carbonate   1250 mG (OsCal) 1 Tablet(s) Oral three times a day  calcium carbonate 1250 mG  + Vitamin D (OsCal 500 + D) 1 Tablet(s) Oral three times a day  cefTRIAXone   IVPB 1 Gram(s) IV Intermittent every 24 hours  dextrose 5%. 1000 milliLiter(s) IV Continuous <Continuous>  dextrose 50% Injectable 12.5 Gram(s) IV Push once  dextrose 50% Injectable 25 Gram(s) IV Push once  dextrose 50% Injectable 25 Gram(s) IV Push once  heparin  Injectable 5000 Unit(s) SubCutaneous every 8 hours  insulin glargine Injectable (LANTUS) 10 Unit(s) SubCutaneous at bedtime  insulin lispro Injectable (HumaLOG) 3 Unit(s) SubCutaneous three times a day before meals  nystatin Powder 1 Application(s) Topical three times a day  tamsulosin 0.4 milliGRAM(s) Oral at bedtime    PRN MEDICATIONS  acetaminophen   Tablet .. 650 milliGRAM(s) Oral every 6 hours PRN  dextrose 40% Gel 15 Gram(s) Oral once PRN  glucagon  Injectable 1 milliGRAM(s) IntraMuscular once PRN    VITAL SIGNS: Last 24 Hours  T(C): 36.8 (2019 12:29), Max: 36.8 (2019 12:29)  T(F): 98.3 (2019 12:29), Max: 98.3 (2019 12:29)  HR: 79 (2019 12:29) (79 - 106)  BP: 134/65 (2019 12:29) (98/52 - 134/65)  BP(mean): --  RR: 18 (2019 12:29) (18 - 20)  SpO2: 98% (2019 15:34) (98% - 98%)    LABS:                        9.7    7.45  )-----------( 153      ( 2019 06:37 )             32.8         146  |  98  |  89<HH>  ----------------------------<  136<H>  4.1   |  26  |  2.9<H>    Ca    7.3<L>      2019 06:37  Phos  6.2       Mg     2.6             Urinalysis Basic - ( 2019 11:00 )    Color: Yellow / Appearance: Cloudy / S.015 / pH: x  Gluc: x / Ketone: Negative  / Bili: Negative / Urobili: 0.2 mg/dL   Blood: x / Protein: 100 mg/dL / Nitrite: Negative   Leuk Esterase: Trace / RBC: 6-10 /HPF / WBC 3-5 /HPF   Sq Epi: x / Non Sq Epi: x / Bacteria: x      ABG - ( 15 Feb 2019 15:51 )  pH, Arterial: 7.35  pH, Blood: x     /  pCO2: 53    /  pO2: 54    / HCO3: 29    / Base Excess: 2.7   /  SaO2: 87        Creatine Kinase, Serum: 109 U/L (19 @ 16:49)  Troponin T, Serum: 0.20 ng/mL <HH> (19 @ 16:49)    Culture - Urine (collected 2019 11:00)  Source: .Urine Clean Catch (Midstream)  Final Report (2019 12:15):    No growth      CARDIAC MARKERS ( 2019 16:49 )  x     / 0.20 ng/mL / 109 U/L / x     / 3.6 ng/mL    RADIOLOGY:    < from: Xray Chest 1 View AP/PA (19 @ 10:55) >  Decreased focal consolidation at the right lung base.    Pulmonary vascular congestion with possible right pleural effusion.    < end of copied text >      PHYSICAL EXAM:    GENERAL: NAD, well-developed, AAOx3  HEENT:  Atraumatic, Normocephalic. EOMI, PERRLA, conjunctiva and sclera clear, No JVD  PULMONARY: Clear to auscultation bilaterally; No wheeze  CARDIOVASCULAR: Regular rate and rhythm; No murmurs, rubs, or gallops  GASTROINTESTINAL: Soft, Nontender, Nondistended; Bowel sounds present  MUSCULOSKELETAL:  2+ Peripheral Pulses, No clubbing, cyanosis, or edema  NEUROLOGY: non-focal  SKIN: No rashes or lesions      ADMISSION SUMMARY  Patient is a 83y old Male who presents with a chief complaint of mechanical fall  Currently admitted to medicine with the primary diagnosis of recurrent mechanical falls and acute kidney injury      ASSESSMENT & PLAN  # Recurrent falls  - Likely multifactorial, physical deconditioning, medication induced hypotension, and peripheral neuropathy  - Trauma work up negative for acute fracture, but show age-indeterminate compression fracture of L2  - BP stable  - PT/consult pending     # TOBIAS on CKD: Baseline Cr is around 2, today is 3.0  - Cr trending down, multifactorial: prerenal, low bps, diabetic nephropathy?  - Renal on board - continue to hold diuretics and ACE-i  - Proteinuria check spep/upep  - Avoid nephrotoxic drugs  - Check BMP daily    # Tropnemia: likely 2/2 hypotension vs acute kidney injury  - No chest pain, no acute ST elevation in EKG  - Echo with normal EF, LVF, mildly enlarged left atrium  - CKMB negative and trop stable at 0.2     # Hypoxia 2/2 Aspiration PNA vs pulmonary edema  - hypoxic in evening, new RLL opacity on xray  - Continue with Rocephin and azithromycin  - Pt was given lasix iv in evening and po this am    # Chronic afib  - CHADsVASC 4 (Age, HTN, DM)  - Currently rate controlled, not on BB  - Not on AC due to recurrent falls    # Hypernatrena  - Asymptomatic  - Started on d5w @ 50 cc rate x 24 hrs  - Follow up bmp AM    # HTN: SBP goal 120-150  - Hold indapamide, amlodipine Ramipril  - Monitor vitals, avoid hypotension    # DM II:   - Hga1C pending  - Start basal/bolus 10 and 3/3/3  - Monitor FS    #  shunt:   - Recently evaluated by Dr. Wayne and was normal  - No signs of hydrocephalus in CT    # Sleep apnea  - continue cpap while sleeping       DVT ppx: SubQ heparin  GI ppx: Not indicated  Diet: Regular with thin  Activity: increase as tolerated  Lines: Peripheral IVs  Code status: Full code  Dispo: acute VINCENZO EDMONDSON 83y Male  MRN#: [id number]   CODE STATUS: Full code    SUBJECTIVE  Patient is a 83y old Male who presents with a chief complaint of mechanical fall  Currently admitted to medicine with the primary diagnosis of recurrent mechanical falls and acute kidney injury  Today is hospital day 2d, and this morning he is resting comfortably and reports no overnight events.     OBJECTIVE  PAST MEDICAL & SURGICAL HISTORY  Hypertension  Hydrocephalus  CKD (chronic kidney disease)  DM (diabetes mellitus)  Afib  History of prostate surgery    ALLERGIES:  No Known Allergies    MEDICATIONS:  STANDING MEDICATIONS  atorvastatin 20 milliGRAM(s) Oral at bedtime  azithromycin  IVPB 500 milliGRAM(s) IV Intermittent every 24 hours  calcium carbonate   1250 mG (OsCal) 1 Tablet(s) Oral three times a day  calcium carbonate 1250 mG  + Vitamin D (OsCal 500 + D) 1 Tablet(s) Oral three times a day  cefTRIAXone   IVPB 1 Gram(s) IV Intermittent every 24 hours  dextrose 5%. 1000 milliLiter(s) IV Continuous <Continuous>  dextrose 50% Injectable 12.5 Gram(s) IV Push once  dextrose 50% Injectable 25 Gram(s) IV Push once  dextrose 50% Injectable 25 Gram(s) IV Push once  heparin  Injectable 5000 Unit(s) SubCutaneous every 8 hours  insulin glargine Injectable (LANTUS) 10 Unit(s) SubCutaneous at bedtime  insulin lispro Injectable (HumaLOG) 3 Unit(s) SubCutaneous three times a day before meals  nystatin Powder 1 Application(s) Topical three times a day  tamsulosin 0.4 milliGRAM(s) Oral at bedtime    PRN MEDICATIONS  acetaminophen   Tablet .. 650 milliGRAM(s) Oral every 6 hours PRN  dextrose 40% Gel 15 Gram(s) Oral once PRN  glucagon  Injectable 1 milliGRAM(s) IntraMuscular once PRN    VITAL SIGNS: Last 24 Hours  T(C): 36.8 (2019 12:29), Max: 36.8 (2019 12:29)  T(F): 98.3 (2019 12:29), Max: 98.3 (2019 12:29)  HR: 79 (2019 12:29) (79 - 106)  BP: 134/65 (2019 12:29) (98/52 - 134/65)  BP(mean): --  RR: 18 (2019 12:29) (18 - 20)  SpO2: 98% (2019 15:34) (98% - 98%)    LABS:                        9.7    7.45  )-----------( 153      ( 2019 06:37 )             32.8         146  |  98  |  89<HH>  ----------------------------<  136<H>  4.1   |  26  |  2.9<H>    Ca    7.3<L>      2019 06:37  Phos  6.2       Mg     2.6             Urinalysis Basic - ( 2019 11:00 )    Color: Yellow / Appearance: Cloudy / S.015 / pH: x  Gluc: x / Ketone: Negative  / Bili: Negative / Urobili: 0.2 mg/dL   Blood: x / Protein: 100 mg/dL / Nitrite: Negative   Leuk Esterase: Trace / RBC: 6-10 /HPF / WBC 3-5 /HPF   Sq Epi: x / Non Sq Epi: x / Bacteria: x      ABG - ( 15 Feb 2019 15:51 )  pH, Arterial: 7.35  pH, Blood: x     /  pCO2: 53    /  pO2: 54    / HCO3: 29    / Base Excess: 2.7   /  SaO2: 87        Creatine Kinase, Serum: 109 U/L (19 @ 16:49)  Troponin T, Serum: 0.20 ng/mL <HH> (19 @ 16:49)    Culture - Urine (collected 2019 11:00)  Source: .Urine Clean Catch (Midstream)  Final Report (2019 12:15):    No growth      CARDIAC MARKERS ( 2019 16:49 )  x     / 0.20 ng/mL / 109 U/L / x     / 3.6 ng/mL    RADIOLOGY:    < from: Xray Chest 1 View AP/PA (19 @ 10:55) >  Decreased focal consolidation at the right lung base.    Pulmonary vascular congestion with possible right pleural effusion.    < end of copied text >      PHYSICAL EXAM:    GENERAL: NAD, well-developed, AAOx3  HEENT:  Atraumatic, Normocephalic. EOMI, PERRLA, conjunctiva and sclera clear, No JVD  PULMONARY: Clear to auscultation bilaterally; No wheeze  CARDIOVASCULAR: Regular rate and rhythm; No murmurs, rubs, or gallops  GASTROINTESTINAL: Soft, Nontender, Nondistended; Bowel sounds present  MUSCULOSKELETAL:  2+ Peripheral Pulses, No clubbing, cyanosis, or edema  NEUROLOGY: non-focal  SKIN: No rashes or lesions      ADMISSION SUMMARY  Patient is a 83y old Male who presents with a chief complaint of mechanical fall  Currently admitted to medicine with the primary diagnosis of recurrent mechanical falls and acute kidney injury      ASSESSMENT & PLAN  # Recurrent falls  - Likely multifactorial, physical deconditioning, medication induced hypotension, and peripheral neuropathy  - Trauma work up negative for acute fracture, but show age-indeterminate compression fracture of L2  - BP stable  - PT/consult pending     # TOBIAS on CKD: Baseline Cr is around 2, today is 3.0  - Cr trending down, multifactorial: prerenal, low bps, diabetic nephropathy?  - Renal on board - continue to hold diuretics and ACE-i  - Proteinuria check spep/upep  - Avoid nephrotoxic drugs  - Check BMP daily    # Tropnemia: likely 2/2 hypotension vs acute kidney injury  - No chest pain, no acute ST elevation in EKG  - Echo with normal EF, LVF, mildly enlarged left atrium  - CKMB negative and trop stable at 0.2     # Hypoxia 2/2 Aspiration PNA vs sleep apnea   - hypoxic in evening, new RLL opacity on xray  - Continue with Rocephin and azithromycin  - Pt was given lasix iv in evening and po yesterday, continue to hold   -Pulm consult placed, follow up pulm   -CXR PA and lateral ordered   - FU SS  -ECHO normal     # Chronic afib  - CHADsVASC 4 (Age, HTN, DM)  - Currently rate controlled, not on BB  - Not on AC due to recurrent falls    # Hypernatrena  - Asymptomatic  - Started on d5w @ 50 cc rate x 24 hrs  - Follow up bmp AM    # HTN: SBP goal 120-150  - Hold indapamide, amlodipine Ramipril  - Monitor vitals, avoid hypotension    # DM II:   - Hga1C pending  - Start basal/bolus 10 and 3/3/3  - Monitor FS    #  shunt:   - Recently evaluated by Dr. Wayne and was normal  - No signs of hydrocephalus in CT    # Sleep apnea  - continue cpap while sleeping       DVT ppx: SubQ heparin  GI ppx: Not indicated  Diet: Regular with thin  Activity: increase as tolerated  Lines: Peripheral IVs  Code status: Full code  Dispo: acute

## 2019-02-17 NOTE — SWALLOW BEDSIDE ASSESSMENT ADULT - SWALLOW EVAL: PATIENT/FAMILY GOALS STATEMENT
Pt and son educated on diet recs, potential for instrumental assessment, O/P services for skilled ST and use of thickeners

## 2019-02-17 NOTE — PROGRESS NOTE ADULT - ASSESSMENT
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  EF 54% /  pulm HTN on echo 12/2018, but fairly nl echo yesterday  peripheral edema with nl albumin, likely multifactorial due to TOBIAS, CKD, obesity, right heart failure  proteinuria  afib  HTN   s/p  shunt  DM 2 uncontrolled  dm neuropathy    plan:    cont to hold diuretics for today  off high dose ramipril  off norvasc  no further NSAIDs  encourage PO H2O hydration  no IV free water needed  off lyrica due to somnolence  off actos due to swelling  cont barragan  monitor UOP and trend renal function for recovery  f/u vit D levels, Phos and PTH  cont oscal-D 1250 / 500 tid  tight glycemic control  BIPAP QHS, naps and PRN / consider pulm eval  send UP/C to quantify proteinuria  send urine studies, calculate Fena%, u osm  can cont flomax as bp allows  cont abx - no gfr adjustment  will follow   d/w son   d/w hospitalist

## 2019-02-17 NOTE — CONSULT NOTE ADULT - ASSESSMENT
83 y.o M with urethritis and microscopic hematuria      Plan:   Will need good catheter care, keep glans c/d/i   Sent urethral d/c cx   Urine cx   Urine cytology  Will need cystoscopy as an outpatient   Will d/w attending on call 83 y.o M with urethritis and microscopic hematuria      Plan:   Will need good catheter care, keep glans c/d/i   Sent urethral d/c cx   Urine cx   Urine cytology  Cont. Abx as per ID  Will need cystoscopy as an outpatient   Will d/w attending on call 83 y.o M with urethritis and microscopic hematuria      Plan:   Will need good catheter care, keep glans c/d/i   Sent urethral d/c cx   Urine cx   Urine cytology  Cont. Abx as per ID  Will need cystoscopy as an outpatient   Will d/w attending Pt follows with Dr. Matthews.

## 2019-02-18 LAB
ANION GAP SERPL CALC-SCNC: 19 MMOL/L — HIGH (ref 7–14)
ANION GAP SERPL CALC-SCNC: 21 MMOL/L — HIGH (ref 7–14)
APPEARANCE UR: ABNORMAL
APTT BLD: 30.5 SEC — SIGNIFICANT CHANGE UP (ref 27–39.2)
BASE EXCESS BLDA CALC-SCNC: 5.7 MMOL/L — HIGH (ref -2–2)
BASOPHILS # BLD AUTO: 0.02 K/UL — SIGNIFICANT CHANGE UP (ref 0–0.2)
BASOPHILS NFR BLD AUTO: 0.1 % — SIGNIFICANT CHANGE UP (ref 0–1)
BILIRUB UR-MCNC: ABNORMAL
BLD GP AB SCN SERPL QL: SIGNIFICANT CHANGE UP
BUN SERPL-MCNC: 93 MG/DL — CRITICAL HIGH (ref 10–20)
BUN SERPL-MCNC: 94 MG/DL — CRITICAL HIGH (ref 10–20)
C3 SERPL-MCNC: 96 MG/DL — SIGNIFICANT CHANGE UP (ref 81–157)
C4 SERPL-MCNC: 40 MG/DL — HIGH (ref 13–39)
CALCIUM SERPL-MCNC: 7.5 MG/DL — LOW (ref 8.4–10.5)
CALCIUM SERPL-MCNC: 8 MG/DL — LOW (ref 8.5–10.1)
CALCIUM SERPL-MCNC: 8 MG/DL — LOW (ref 8.5–10.1)
CHLORIDE SERPL-SCNC: 101 MMOL/L — SIGNIFICANT CHANGE UP (ref 98–110)
CHLORIDE SERPL-SCNC: 103 MMOL/L — SIGNIFICANT CHANGE UP (ref 98–110)
CK MB CFR SERPL CALC: 3.8 NG/ML — SIGNIFICANT CHANGE UP (ref 0.6–6.3)
CK MB CFR SERPL CALC: 4.6 NG/ML — SIGNIFICANT CHANGE UP (ref 0.6–6.3)
CK SERPL-CCNC: 103 U/L — SIGNIFICANT CHANGE UP (ref 0–225)
CK SERPL-CCNC: 89 U/L — SIGNIFICANT CHANGE UP (ref 0–225)
CO2 SERPL-SCNC: 25 MMOL/L — SIGNIFICANT CHANGE UP (ref 17–32)
CO2 SERPL-SCNC: 25 MMOL/L — SIGNIFICANT CHANGE UP (ref 17–32)
COLOR SPEC: ABNORMAL
CREAT ?TM UR-MCNC: 72 MG/DL — SIGNIFICANT CHANGE UP
CREAT ?TM UR-MCNC: 74 MG/DL — SIGNIFICANT CHANGE UP
CREAT SERPL-MCNC: 3.1 MG/DL — HIGH (ref 0.7–1.5)
CREAT SERPL-MCNC: 3.2 MG/DL — HIGH (ref 0.7–1.5)
DIFF PNL FLD: ABNORMAL
EOSINOPHIL # BLD AUTO: 0 K/UL — SIGNIFICANT CHANGE UP (ref 0–0.7)
EOSINOPHIL NFR BLD AUTO: 0 % — SIGNIFICANT CHANGE UP (ref 0–8)
GAS PNL BLDA: SIGNIFICANT CHANGE UP
GLUCOSE BLDC GLUCOMTR-MCNC: 135 MG/DL — HIGH (ref 70–99)
GLUCOSE BLDC GLUCOMTR-MCNC: 177 MG/DL — HIGH (ref 70–99)
GLUCOSE BLDC GLUCOMTR-MCNC: 197 MG/DL — HIGH (ref 70–99)
GLUCOSE SERPL-MCNC: 175 MG/DL — HIGH (ref 70–99)
GLUCOSE SERPL-MCNC: 189 MG/DL — HIGH (ref 70–99)
GLUCOSE UR QL: NEGATIVE MG/DL — SIGNIFICANT CHANGE UP
HCO3 BLDA-SCNC: 32 MMOL/L — HIGH (ref 23–27)
HCT VFR BLD CALC: 34.2 % — LOW (ref 42–52)
HCT VFR BLD CALC: 34.6 % — LOW (ref 42–52)
HGB BLD-MCNC: 10.1 G/DL — LOW (ref 14–18)
HGB BLD-MCNC: 10.2 G/DL — LOW (ref 14–18)
HOROWITZ INDEX BLDA+IHG-RTO: 100 — SIGNIFICANT CHANGE UP
IMM GRANULOCYTES NFR BLD AUTO: 0.8 % — HIGH (ref 0.1–0.3)
INR BLD: 1.11 RATIO — SIGNIFICANT CHANGE UP (ref 0.65–1.3)
KAPPA LC SER QL IFE: 3.33 MG/DL — HIGH (ref 0.33–1.94)
KAPPA/LAMBDA FREE LIGHT CHAIN RATIO, SERUM: 0.27 RATIO — SIGNIFICANT CHANGE UP (ref 0.26–1.65)
KETONES UR-MCNC: 15
LAMBDA LC SER QL IFE: 12.51 MG/DL — HIGH (ref 0.57–2.63)
LEUKOCYTE ESTERASE UR-ACNC: ABNORMAL
LYMPHOCYTES # BLD AUTO: 0.58 K/UL — LOW (ref 1.2–3.4)
LYMPHOCYTES # BLD AUTO: 4.2 % — LOW (ref 20.5–51.1)
MAGNESIUM SERPL-MCNC: 2.7 MG/DL — HIGH (ref 1.8–2.4)
MCHC RBC-ENTMCNC: 24.6 PG — LOW (ref 27–31)
MCHC RBC-ENTMCNC: 24.8 PG — LOW (ref 27–31)
MCHC RBC-ENTMCNC: 29.5 G/DL — LOW (ref 32–37)
MCHC RBC-ENTMCNC: 29.5 G/DL — LOW (ref 32–37)
MCV RBC AUTO: 83.4 FL — SIGNIFICANT CHANGE UP (ref 80–94)
MCV RBC AUTO: 84 FL — SIGNIFICANT CHANGE UP (ref 80–94)
MONOCYTES # BLD AUTO: 1.06 K/UL — HIGH (ref 0.1–0.6)
MONOCYTES NFR BLD AUTO: 7.7 % — SIGNIFICANT CHANGE UP (ref 1.7–9.3)
NEUTROPHILS # BLD AUTO: 11.95 K/UL — HIGH (ref 1.4–6.5)
NEUTROPHILS NFR BLD AUTO: 87.2 % — HIGH (ref 42.2–75.2)
NITRITE UR-MCNC: POSITIVE
NRBC # BLD: 0 /100 WBCS — SIGNIFICANT CHANGE UP (ref 0–0)
NRBC # BLD: 0 /100 WBCS — SIGNIFICANT CHANGE UP (ref 0–0)
OSMOLALITY UR: 362 MOS/KG — SIGNIFICANT CHANGE UP (ref 50–1400)
PCO2 BLDA: 51 MMHG — HIGH (ref 38–42)
PH BLDA: 7.4 — SIGNIFICANT CHANGE UP (ref 7.38–7.42)
PH UR: 5.5 — SIGNIFICANT CHANGE UP (ref 5–8)
PLATELET # BLD AUTO: 149 K/UL — SIGNIFICANT CHANGE UP (ref 130–400)
PLATELET # BLD AUTO: 155 K/UL — SIGNIFICANT CHANGE UP (ref 130–400)
PO2 BLDA: 197 MMHG — HIGH (ref 78–95)
POTASSIUM SERPL-MCNC: 4.3 MMOL/L — SIGNIFICANT CHANGE UP (ref 3.5–5)
POTASSIUM SERPL-MCNC: 4.6 MMOL/L — SIGNIFICANT CHANGE UP (ref 3.5–5)
POTASSIUM SERPL-SCNC: 4.3 MMOL/L — SIGNIFICANT CHANGE UP (ref 3.5–5)
POTASSIUM SERPL-SCNC: 4.6 MMOL/L — SIGNIFICANT CHANGE UP (ref 3.5–5)
PROT ?TM UR-MCNC: 302 MG/DLG/24H — SIGNIFICANT CHANGE UP
PROT UR-MCNC: 100 MG/DL
PROT/CREAT UR-RTO: 4.1 RATIO — HIGH (ref 0–0.2)
PROTHROM AB SERPL-ACNC: 12.8 SEC — SIGNIFICANT CHANGE UP (ref 9.95–12.87)
PTH-INTACT FLD-MCNC: 37 PG/ML — SIGNIFICANT CHANGE UP (ref 15–65)
RBC # BLD: 4.1 M/UL — LOW (ref 4.7–6.1)
RBC # BLD: 4.12 M/UL — LOW (ref 4.7–6.1)
RBC # FLD: 16.1 % — HIGH (ref 11.5–14.5)
RBC # FLD: 16.2 % — HIGH (ref 11.5–14.5)
RBC CASTS # UR COMP ASSIST: >50 /HPF
SAO2 % BLDA: 100 % — HIGH (ref 94–98)
SODIUM SERPL-SCNC: 147 MMOL/L — HIGH (ref 135–146)
SODIUM SERPL-SCNC: 147 MMOL/L — HIGH (ref 135–146)
SODIUM UR-SCNC: 39 MMOL/L — SIGNIFICANT CHANGE UP
SODIUM UR-SCNC: 44 MMOL/L — SIGNIFICANT CHANGE UP
SP GR SPEC: 1.02 — SIGNIFICANT CHANGE UP (ref 1.01–1.03)
TROPONIN T SERPL-MCNC: 0.13 NG/ML — CRITICAL HIGH
TROPONIN T SERPL-MCNC: 0.14 NG/ML — CRITICAL HIGH
TYPE + AB SCN PNL BLD: SIGNIFICANT CHANGE UP
UROBILINOGEN FLD QL: 1 MG/DL (ref 0.2–0.2)
UUN UR-MCNC: 526 MG/DL — SIGNIFICANT CHANGE UP
WBC # BLD: 13.72 K/UL — HIGH (ref 4.8–10.8)
WBC # BLD: 14.79 K/UL — HIGH (ref 4.8–10.8)
WBC # FLD AUTO: 13.72 K/UL — HIGH (ref 4.8–10.8)
WBC # FLD AUTO: 14.79 K/UL — HIGH (ref 4.8–10.8)

## 2019-02-18 RX ORDER — IPRATROPIUM/ALBUTEROL SULFATE 18-103MCG
3 AEROSOL WITH ADAPTER (GRAM) INHALATION EVERY 6 HOURS
Qty: 0 | Refills: 0 | Status: DISCONTINUED | OUTPATIENT
Start: 2019-02-18 | End: 2019-02-27

## 2019-02-18 RX ORDER — FENTANYL CITRATE 50 UG/ML
400 INJECTION INTRAVENOUS ONCE
Qty: 0 | Refills: 0 | Status: DISCONTINUED | OUTPATIENT
Start: 2019-02-18 | End: 2019-02-18

## 2019-02-18 RX ORDER — FUROSEMIDE 40 MG
40 TABLET ORAL ONCE
Qty: 0 | Refills: 0 | Status: COMPLETED | OUTPATIENT
Start: 2019-02-18 | End: 2019-02-18

## 2019-02-18 RX ORDER — PROPOFOL 10 MG/ML
5 INJECTION, EMULSION INTRAVENOUS
Qty: 500 | Refills: 0 | Status: DISCONTINUED | OUTPATIENT
Start: 2019-02-18 | End: 2019-02-19

## 2019-02-18 RX ORDER — AMPICILLIN SODIUM AND SULBACTAM SODIUM 250; 125 MG/ML; MG/ML
INJECTION, POWDER, FOR SUSPENSION INTRAMUSCULAR; INTRAVENOUS
Qty: 0 | Refills: 0 | Status: DISCONTINUED | OUTPATIENT
Start: 2019-02-18 | End: 2019-02-18

## 2019-02-18 RX ORDER — CHLORHEXIDINE GLUCONATE 213 G/1000ML
1 SOLUTION TOPICAL
Qty: 0 | Refills: 0 | Status: DISCONTINUED | OUTPATIENT
Start: 2019-02-18 | End: 2019-02-23

## 2019-02-18 RX ORDER — FUROSEMIDE 40 MG
80 TABLET ORAL EVERY 12 HOURS
Qty: 0 | Refills: 0 | Status: DISCONTINUED | OUTPATIENT
Start: 2019-02-18 | End: 2019-02-19

## 2019-02-18 RX ORDER — MIDAZOLAM HYDROCHLORIDE 1 MG/ML
3 INJECTION, SOLUTION INTRAMUSCULAR; INTRAVENOUS ONCE
Qty: 0 | Refills: 0 | Status: DISCONTINUED | OUTPATIENT
Start: 2019-02-18 | End: 2019-02-18

## 2019-02-18 RX ORDER — MIDAZOLAM HYDROCHLORIDE 1 MG/ML
0.02 INJECTION, SOLUTION INTRAMUSCULAR; INTRAVENOUS
Qty: 100 | Refills: 0 | Status: DISCONTINUED | OUTPATIENT
Start: 2019-02-18 | End: 2019-02-19

## 2019-02-18 RX ORDER — CHLORHEXIDINE GLUCONATE 213 G/1000ML
15 SOLUTION TOPICAL EVERY 12 HOURS
Qty: 0 | Refills: 0 | Status: DISCONTINUED | OUTPATIENT
Start: 2019-02-18 | End: 2019-02-23

## 2019-02-18 RX ORDER — MIDAZOLAM HYDROCHLORIDE 1 MG/ML
2 INJECTION, SOLUTION INTRAMUSCULAR; INTRAVENOUS ONCE
Qty: 0 | Refills: 0 | Status: DISCONTINUED | OUTPATIENT
Start: 2019-02-18 | End: 2019-02-18

## 2019-02-18 RX ORDER — FENTANYL CITRATE 50 UG/ML
0.5 INJECTION INTRAVENOUS
Qty: 2500 | Refills: 0 | Status: DISCONTINUED | OUTPATIENT
Start: 2019-02-18 | End: 2019-02-21

## 2019-02-18 RX ORDER — FUROSEMIDE 40 MG
60 TABLET ORAL
Qty: 0 | Refills: 0 | Status: DISCONTINUED | OUTPATIENT
Start: 2019-02-18 | End: 2019-02-18

## 2019-02-18 RX ORDER — PIPERACILLIN AND TAZOBACTAM 4; .5 G/20ML; G/20ML
2.25 INJECTION, POWDER, LYOPHILIZED, FOR SOLUTION INTRAVENOUS EVERY 8 HOURS
Qty: 0 | Refills: 0 | Status: DISCONTINUED | OUTPATIENT
Start: 2019-02-18 | End: 2019-02-21

## 2019-02-18 RX ADMIN — NYSTATIN CREAM 1 APPLICATION(S): 100000 CREAM TOPICAL at 05:39

## 2019-02-18 RX ADMIN — FENTANYL CITRATE 1.35 MICROGRAM(S)/KG/HR: 50 INJECTION INTRAVENOUS at 15:09

## 2019-02-18 RX ADMIN — HEPARIN SODIUM 5000 UNIT(S): 5000 INJECTION INTRAVENOUS; SUBCUTANEOUS at 13:42

## 2019-02-18 RX ADMIN — INSULIN GLARGINE 10 UNIT(S): 100 INJECTION, SOLUTION SUBCUTANEOUS at 21:58

## 2019-02-18 RX ADMIN — FENTANYL CITRATE 400 MICROGRAM(S): 50 INJECTION INTRAVENOUS at 15:30

## 2019-02-18 RX ADMIN — Medication 40 MILLIGRAM(S): at 10:05

## 2019-02-18 RX ADMIN — HEPARIN SODIUM 5000 UNIT(S): 5000 INJECTION INTRAVENOUS; SUBCUTANEOUS at 05:39

## 2019-02-18 RX ADMIN — ATORVASTATIN CALCIUM 20 MILLIGRAM(S): 80 TABLET, FILM COATED ORAL at 21:57

## 2019-02-18 RX ADMIN — MIDAZOLAM HYDROCHLORIDE 2.7 MG/KG/HR: 1 INJECTION, SOLUTION INTRAMUSCULAR; INTRAVENOUS at 14:32

## 2019-02-18 RX ADMIN — Medication 1 TABLET(S): at 05:39

## 2019-02-18 RX ADMIN — MIDAZOLAM HYDROCHLORIDE 3 MILLIGRAM(S): 1 INJECTION, SOLUTION INTRAMUSCULAR; INTRAVENOUS at 15:06

## 2019-02-18 RX ADMIN — Medication 3 MILLILITER(S): at 13:41

## 2019-02-18 RX ADMIN — PIPERACILLIN AND TAZOBACTAM 25 GRAM(S): 4; .5 INJECTION, POWDER, LYOPHILIZED, FOR SOLUTION INTRAVENOUS at 21:57

## 2019-02-18 RX ADMIN — FENTANYL CITRATE 400 MICROGRAM(S): 50 INJECTION INTRAVENOUS at 19:42

## 2019-02-18 RX ADMIN — Medication 1 TABLET(S): at 21:58

## 2019-02-18 RX ADMIN — HEPARIN SODIUM 5000 UNIT(S): 5000 INJECTION INTRAVENOUS; SUBCUTANEOUS at 21:57

## 2019-02-18 RX ADMIN — Medication 80 MILLIGRAM(S): at 18:24

## 2019-02-18 RX ADMIN — AZITHROMYCIN 255 MILLIGRAM(S): 500 TABLET, FILM COATED ORAL at 05:38

## 2019-02-18 RX ADMIN — PIPERACILLIN AND TAZOBACTAM 25 GRAM(S): 4; .5 INJECTION, POWDER, LYOPHILIZED, FOR SOLUTION INTRAVENOUS at 13:39

## 2019-02-18 RX ADMIN — NYSTATIN CREAM 1 APPLICATION(S): 100000 CREAM TOPICAL at 21:58

## 2019-02-18 RX ADMIN — Medication 40 MILLIGRAM(S): at 13:52

## 2019-02-18 NOTE — CONSULT NOTE ADULT - ASSESSMENT
IMPRESSION:    Acute hypoxic respiratory failure  Chronic hypercapnic respiratory failure  CHF exacerbation  RLL PNA ?Asp  JOHANNY/OHS not compliant with CPAP  TOBAIS on CKD  Hypernatremia  HO of Afib not on AC  HO  shunt      PLAN:    CNS: Avoid CNS depressants    HEENT: Oral care    PULMONARY:  HOB @ 45 degrees. Continue BiPAP 12/6 appears comfortable    CARDIOVASCULAR: Keep I<O continue lasix. Obtain full echo report from cardiology    GI: GI prophylaxis.  NPO while on BiPAP. Speech eval when off BiPAP    RENAL:  Follow up lytes.  Correct as needed. Nephrology follow up, urology evaluation for new hematuria and decreased urine output r/o obstructive cause    INFECTIOUS DISEASE: Follow up cultures. Continue Unasyn for now.    HEMATOLOGICAL:  DVT prophylaxis.    ENDOCRINE:  Follow up FS. Basal bolus insulin when eating    MUSCULOSKELETAL: Bedrest for now    Will benefit form continuous monitoring in the MICU    Overall prognosis guarded.    Advanced Directives IMPRESSION:    Acute hypoxic respiratory failure s/p intubation  CHF exacerbation  RLL PNA ?Asp  JOHANNY/OHS not compliant with CPAP  TOBIAS on CKd  HO of Afib not on AC  HO  shunt      PLAN:    CNS: Avoid CNS depressants, if needed fentanyl for sedation    HEENT: Oral care    PULMONARY:  HOB @ 45 degrees. repeat ABG adjust vent setting accordingly    CARDIOVASCULAR: Keep I<O continue lasix. Obtain full echo report from cardiology, CE    GI: GI prophylaxis. NGT feeding    RENAL:  Follow up lytes.  Correct as needed. Nephrology follow up, ? need for HD, give lasix    INFECTIOUS DISEASE: Follow up cultures. meropenem till cx    HEMATOLOGICAL:  DVT prophylaxis. le doppler    ENDOCRINE:  Follow up FS. Will benefit form continuous monitoring in the MICU    Overall prognosis guarded.    Advanced Directives

## 2019-02-18 NOTE — CONSULT NOTE ADULT - SUBJECTIVE AND OBJECTIVE BOX
Patient is a 83y old  Male who presents with a chief complaint of Recurrent falls  TOBIAS (2019 15:22)      HPI:  82 yo male coming from home with PMHx of DMII on insulin, HTN on meds, Hydrocephalus s/p  shunt, Afib not on AC (multiple falls) presents to our ED with c/o mechanical fall yesterday. According to patient he was trying to walk with a walker and slipped on the floor, no nausea, vomiting, dizziness, chest pain, palpitations, seizure like episode, no LOC. So according to the history patient is having mechanical fall. He had 4 falls since last 10 days. He was recently discharged from the hospital for dizziness and stress test and  shunt was normal. At baseline he has health aid, and he takes his medications and walks very minimal with the help of cane or walker. For the last few months he is not walking at all, increase B/L leg swelling.  In the ED BP was 83/45, HR 78/min, Sao2 100% on NC, afebrile. He was given 2l RL and his repeat BP was 110/80. Trauma work up was negative. (15 Feb 2019 08:39)      PAST MEDICAL & SURGICAL HISTORY:  Hypertension  Hydrocephalus  CKD (chronic kidney disease)  DM (diabetes mellitus)  Afib  History of prostate surgery      SOCIAL HX:   Smoking                         ETOH                            Other    FAMILY HISTORY:  Family history of diabetes mellitus (Mother, Sibling)  .  No cardiovascular or pulmonary family history     Review of System:  See HPI    Allergies    No Known Allergies    Intolerances          PHYSICAL EXAM  Vital Signs Last 24 Hrs  T(C): 36.2 (2019 04:39), Max: 36.8 (2019 12:29)  T(F): 97.1 (2019 04:39), Max: 98.3 (2019 12:29)  HR: 111 (2019 09:34) (79 - 120)  BP: 123/78 (2019 09:34) (123/78 - 150/60)  BP(mean): --  RR: 19 (2019 04:39) (18 - 19)  SpO2: 89% (2019 08:59) (87% - 90%)    General: In NAD  HEENT: ALEXYS             Lymphatic system: No cervical LN     Lungs: Hima BS  Cardiovascular: Regular  Gastrointestinal: Soft.  + BS   Musculoskeletal: No Clubbing.  Full range of motion.. Moves all extremities  Skin: Warm.  Intact  Neurological: No motor or sensory deficit       LABS:                          10.1   14.79 )-----------( 155      ( 2019 06:48 )             34.2                                               02-18    147<H>  |  101  |  93<HH>  ----------------------------<  189<H>  4.3   |  25  |  3.2<H>    Ca    8.0<L>      2019 06:48  Phos  6.2     -  Mg     2.7     02-18                                               Urinalysis Basic - ( 2019 06:28 )    Color: Red / Appearance: Turbid / S.020 / pH: x  Gluc: x / Ketone: 15  / Bili: Large / Urobili: 1.0 mg/dL   Blood: x / Protein: 100 mg/dL / Nitrite: Positive   Leuk Esterase: Large / RBC: >50 /HPF / WBC x   Sq Epi: x / Non Sq Epi: x / Bacteria: x        CARDIAC MARKERS ( 2019 16:49 )  x     / 0.20 ng/mL / 109 U/L / x     / 3.6 ng/mL                                                                                     Culture - Urine (collected 2019 11:00)  Source: .Urine Clean Catch (Midstream)  Final Report (2019 12:15):    No growth                                                ABG - ( 2019 09:04 )  pH, Arterial: 7.35  pH, Blood: x     /  pCO2: 53    /  pO2: 66    / HCO3: 29    / Base Excess: 2.7   /  SaO2: 92          Serum Pro-Brain Natriuretic Peptide: 2577 pg/mL (18 @ 15:45)      < from: Xray Chest 1 View AP/PA (19 @ 10:55) >  Impression:      Decreased focal consolidation at the right lung base.    Pulmonary vascular congestion with possible right pleural effusion.    < end of copied text >        < from: CT Chest w/ IV Cont (02.15.19 @ 03:17) >    IMPRESSION:        No CT evidence of acuteintrathoracic or intra-abdominal pelvic traumatic   injury.    Age-indeterminate mild superior endplate compression fracture of L2.    Abdominal aortic aneurysm measuring 3 cm.    Findings suggestive of right heart dysfunction.    Small right pleuraleffusion.     shunt tip in the abdomen with small amount of free fluid.    < end of copied text >      < from: Transthoracic Echocardiogram (19 @ 13:35) >    Summary:   1. LV Ejection Fraction by Lemon's Method.   2. Normal left ventricular internal cavity size.   3. Mildly enlarged left atrium.   4. Normal right atrial size.   5. There is no evidence of pericardial effusion.   6. Thickening and calcification of the anterior and posterior mitral   valve leaflets.   7. Moderate tricuspid regurgitation.   8. Pulmonic valve regurgitation.    < end of copied text >    MEDICATIONS  (STANDING):  ampicillin/sulbactam  IVPB      atorvastatin 20 milliGRAM(s) Oral at bedtime  calcium carbonate 1250 mG  + Vitamin D (OsCal 500 + D) 1 Tablet(s) Oral three times a day  chlorhexidine 4% Liquid 1 Application(s) Topical <User Schedule>  dextrose 5%. 1000 milliLiter(s) (50 mL/Hr) IV Continuous <Continuous>  dextrose 50% Injectable 12.5 Gram(s) IV Push once  dextrose 50% Injectable 25 Gram(s) IV Push once  dextrose 50% Injectable 25 Gram(s) IV Push once  furosemide   Injectable 40 milliGRAM(s) IV Push once  guaiFENesin  milliGRAM(s) Oral every 12 hours  heparin  Injectable 5000 Unit(s) SubCutaneous every 8 hours  insulin glargine Injectable (LANTUS) 10 Unit(s) SubCutaneous at bedtime  insulin lispro Injectable (HumaLOG) 3 Unit(s) SubCutaneous three times a day before meals  nystatin Powder 1 Application(s) Topical three times a day  sodium chloride 0.9% Bolus 500 milliLiter(s) IV Bolus once  tamsulosin 0.4 milliGRAM(s) Oral at bedtime    MEDICATIONS  (PRN):  acetaminophen   Tablet .. 650 milliGRAM(s) Oral every 6 hours PRN Moderate Pain (4 - 6)  dextrose 40% Gel 15 Gram(s) Oral once PRN Blood Glucose LESS THAN 70 milliGRAM(s)/deciliter  glucagon  Injectable 1 milliGRAM(s) IntraMuscular once PRN Glucose LESS THAN 70 milligrams/deciliter

## 2019-02-18 NOTE — PROGRESS NOTE ADULT - ASSESSMENT
VINCENZO EDMONDSON 83y Male  MRN#: [id number]   CODE STATUS: Full code      SUBJECTIVE  Patient is a 83y old Male who presents with a chief complaint of mechanical fall  Currently admitted to medicine with the primary diagnosis of recurrent mechanical falls and acute kidney injury  Hospital course is complicated by acute hypoxic respiratory failure 2/2 aspiration PNA or pulmonary edema.   Today is hospital day 3d, and this morning he is resting comfortably and reports no overnight events.       OBJECTIVE  PAST MEDICAL & SURGICAL HISTORY  Hypertension  Hydrocephalus  CKD (chronic kidney disease)  DM (diabetes mellitus)  Afib  History of prostate surgery    ALLERGIES:  No Known Allergies    MEDICATIONS:  STANDING MEDICATIONS  atorvastatin 20 milliGRAM(s) Oral at bedtime  calcium carbonate 1250 mG  + Vitamin D (OsCal 500 + D) 1 Tablet(s) Oral three times a day  chlorhexidine 4% Liquid 1 Application(s) Topical <User Schedule>  dextrose 5%. 1000 milliLiter(s) IV Continuous <Continuous>  dextrose 50% Injectable 12.5 Gram(s) IV Push once  dextrose 50% Injectable 25 Gram(s) IV Push once  dextrose 50% Injectable 25 Gram(s) IV Push once  furosemide   Injectable 60 milliGRAM(s) IV Push two times a day  guaiFENesin  milliGRAM(s) Oral every 12 hours  heparin  Injectable 5000 Unit(s) SubCutaneous every 8 hours  insulin glargine Injectable (LANTUS) 10 Unit(s) SubCutaneous at bedtime  insulin lispro Injectable (HumaLOG) 3 Unit(s) SubCutaneous three times a day before meals  nystatin Powder 1 Application(s) Topical three times a day  piperacillin/tazobactam IVPB. 2.25 Gram(s) IV Intermittent every 8 hours  sodium chloride 0.9% Bolus 500 milliLiter(s) IV Bolus once  tamsulosin 0.4 milliGRAM(s) Oral at bedtime    PRN MEDICATIONS  acetaminophen   Tablet .. 650 milliGRAM(s) Oral every 6 hours PRN  dextrose 40% Gel 15 Gram(s) Oral once PRN  glucagon  Injectable 1 milliGRAM(s) IntraMuscular once PRN      VITAL SIGNS: Last 24 Hours  T(C): 36.2 (2019 04:39), Max: 36.2 (2019 04:39)  T(F): 97.1 (2019 04:39), Max: 97.1 (2019 04:39)  HR: 111 (2019 09:34) (96 - 120)  BP: 123/78 (2019 09:34) (123/78 - 150/60)  BP(mean): --  RR: 19 (2019 04:39) (18 - 19)  SpO2: 90% (2019 12:16) (87% - 92%)    LABS:                        10.1   14.79 )-----------( 155      ( 2019 06:48 )             34.2     02-18    147<H>  |  101  |  93<HH>  ----------------------------<  189<H>  4.3   |  25  |  3.2<H>    Ca    8.0<L>      2019 06:48  Phos  6.2     02-17  Mg     2.7     18        Urinalysis Basic - ( 2019 06:28 )    Color: Red / Appearance: Turbid / S.020 / pH: x  Gluc: x / Ketone: 15  / Bili: Large / Urobili: 1.0 mg/dL   Blood: x / Protein: 100 mg/dL / Nitrite: Positive   Leuk Esterase: Large / RBC: >50 /HPF / WBC x   Sq Epi: x / Non Sq Epi: x / Bacteria: x      ABG - ( 2019 09:04 )  pH, Arterial: 7.35  pH, Blood: x     /  pCO2: 53    /  pO2: 66    / HCO3: 29    / Base Excess: 2.7   /  SaO2: 92                Creatine Kinase, Serum: 103 U/L (19 @ 09:40)  Troponin T, Serum: 0.14 ng/mL <HH> (19 @ 09:40)      Culture - Urine (collected 2019 11:00)  Source: .Urine Clean Catch (Midstream)  Final Report (2019 12:15):    No growth      CARDIAC MARKERS ( 2019 09:40 )  x     / 0.14 ng/mL / 103 U/L / x     / 4.6 ng/mL  CARDIAC MARKERS ( 2019 16:49 )  x     / 0.20 ng/mL / 109 U/L / x     / 3.6 ng/mL      RADIOLOGY:    < from: Xray Chest 1 View- PORTABLE-Urgent (19 @ 09:31) >  Unchanged bibasilar opacities/effusions and interstitial edema.    < end of copied text >      PHYSICAL EXAM:    GENERAL: NAD, well-developed, slurred speech but comprehensable, intermittently confused but redirectable  HEENT:  Atraumatic, Normocephalic. Conjunctiva pink and cornea white, No JVD  PULMONARY: Diffused wheezing and bibasilar rhonchi  CARDIOVASCULAR: Regular rate and tachycardic; No murmurs  GASTROINTESTINAL: Soft, Nontender, Nondistended; Bowel sounds present  MUSCULOSKELETAL:  2+ Peripheral Pulses,1+ pitting edema  NEUROLOGY: non-focal  SKIN: No rashes or lesions    ADMISSION SUMMARY  Patient is a 83y old Male who presents with a chief complaint of mechanical fall  Currently admitted to medicine with the primary diagnosis of recurrent mechanical falls and acute kidney injury  Hospital course is complicated by acute hypoxic respiratory failure 2/2 aspiration PNA or pulmonary edema, critical consult was called and patient will be transferred to MICU for monitoring.      ASSESSMENT & PLAN    # Acute hypoxic respiratory failure 2/2 Aspiration PNA vs pulmonary edema  - CXR  shows unchanged bibasilar opacities/effusions and interstitial edema  - Blood Gas Profile - Arterial (19 @ 09:04)    pH 7.35: pCO2 53: pO2 66: HCO3 29 on Venti mask 35%  - ECHO : normal EF  - Switch Rocephin and azithromycin to Zosyn (renally dosed)  - Start IV lasix 60mg BID   - NPO, continue BIPAP  - Follow up ID consult    # TOBIAS likely acute tubular injury - worsening  - Multifactorial: ACE-I use and NSAIDs  - Nephro on board: restart Lasix IV 60mg BID, continue to hold ACEI and NSAIDs  - Avoid nephrotoxic drugs  - Check BMP daily    # Acute cystitis with hematuria  - Continue Zosyn    # Tropnemia: likely 2/2 hypotension vs acute kidney injury  - No chest pain, no acute ST elevation in EKG  - Echo with normal EF, LVF, mildly enlarged left atrium  - CKMB negative and trop trending down from 0.2 -> 0.14    # Recurrent falls  - Likely multifactorial, physical deconditioning, medication induced hypotension, and peripheral neuropathy  - Trauma work up negative for acute fracture, but show age-indeterminate compression fracture of L2  - BP stable  - PT/consult pending    # Chronic afib  - CHADsVASC 4 (Age, HTN, DM)  - Currently rate controlled, not on BB  - Not on AC due to recurrent falls    # Hypernatremia  - Asymptomatic  - BMP daily, if >150 consider gentle free water    # HTN: SBP goal 120-150  - Hold indapamide, amlodipine, Ramipril  - Monitor vitals, avoid hypotension    # DM II:   - Hga1C pending (10.5 in 18)  - Start basal/bolus 10 and 3/3/3  - Monitor FS    #  shunt:   - Recently evaluated by Dr. Wayne and was normal  - No signs of hydrocephalus in CT      DVT ppx: SubQ heparin  GI ppx: Not indicated  Diet: NPO for now  Activity: increase as tolerated  Lines: Peripheral IVs  Code status: Full code  Dispo: MICU transfer. VINCENZO EDMONDSON 83y Male  MRN#: [id number]   CODE STATUS: Full code      SUBJECTIVE  Patient is a 83y old Male who presents with a chief complaint of mechanical fall  Currently admitted to medicine with the primary diagnosis of recurrent mechanical falls and acute kidney injury  Hospital course is complicated by acute hypoxic respiratory failure 2/2 aspiration PNA or pulmonary edema.   Today is hospital day 3d, and this morning he is resting comfortably and reports no overnight events.       OBJECTIVE  PAST MEDICAL & SURGICAL HISTORY  Hypertension  Hydrocephalus  CKD (chronic kidney disease)  DM (diabetes mellitus)  Afib  History of prostate surgery    ALLERGIES:  No Known Allergies    MEDICATIONS:  STANDING MEDICATIONS  atorvastatin 20 milliGRAM(s) Oral at bedtime  calcium carbonate 1250 mG  + Vitamin D (OsCal 500 + D) 1 Tablet(s) Oral three times a day  chlorhexidine 4% Liquid 1 Application(s) Topical <User Schedule>  dextrose 5%. 1000 milliLiter(s) IV Continuous <Continuous>  dextrose 50% Injectable 12.5 Gram(s) IV Push once  dextrose 50% Injectable 25 Gram(s) IV Push once  dextrose 50% Injectable 25 Gram(s) IV Push once  furosemide   Injectable 60 milliGRAM(s) IV Push two times a day  guaiFENesin  milliGRAM(s) Oral every 12 hours  heparin  Injectable 5000 Unit(s) SubCutaneous every 8 hours  insulin glargine Injectable (LANTUS) 10 Unit(s) SubCutaneous at bedtime  insulin lispro Injectable (HumaLOG) 3 Unit(s) SubCutaneous three times a day before meals  nystatin Powder 1 Application(s) Topical three times a day  piperacillin/tazobactam IVPB. 2.25 Gram(s) IV Intermittent every 8 hours  sodium chloride 0.9% Bolus 500 milliLiter(s) IV Bolus once  tamsulosin 0.4 milliGRAM(s) Oral at bedtime    PRN MEDICATIONS  acetaminophen   Tablet .. 650 milliGRAM(s) Oral every 6 hours PRN  dextrose 40% Gel 15 Gram(s) Oral once PRN  glucagon  Injectable 1 milliGRAM(s) IntraMuscular once PRN      VITAL SIGNS: Last 24 Hours  T(C): 36.2 (2019 04:39), Max: 36.2 (2019 04:39)  T(F): 97.1 (2019 04:39), Max: 97.1 (2019 04:39)  HR: 111 (2019 09:34) (96 - 120)  BP: 123/78 (2019 09:34) (123/78 - 150/60)  BP(mean): --  RR: 19 (2019 04:39) (18 - 19)  SpO2: 90% (2019 12:16) (87% - 92%)    LABS:                        10.1   14.79 )-----------( 155      ( 2019 06:48 )             34.2     02-18    147<H>  |  101  |  93<HH>  ----------------------------<  189<H>  4.3   |  25  |  3.2<H>    Ca    8.0<L>      2019 06:48  Phos  6.2     02-17  Mg     2.7     18        Urinalysis Basic - ( 2019 06:28 )    Color: Red / Appearance: Turbid / S.020 / pH: x  Gluc: x / Ketone: 15  / Bili: Large / Urobili: 1.0 mg/dL   Blood: x / Protein: 100 mg/dL / Nitrite: Positive   Leuk Esterase: Large / RBC: >50 /HPF / WBC x   Sq Epi: x / Non Sq Epi: x / Bacteria: x      ABG - ( 2019 09:04 )  pH, Arterial: 7.35  pH, Blood: x     /  pCO2: 53    /  pO2: 66    / HCO3: 29    / Base Excess: 2.7   /  SaO2: 92                Creatine Kinase, Serum: 103 U/L (19 @ 09:40)  Troponin T, Serum: 0.14 ng/mL <HH> (19 @ 09:40)      Culture - Urine (collected 2019 11:00)  Source: .Urine Clean Catch (Midstream)  Final Report (2019 12:15):    No growth      CARDIAC MARKERS ( 2019 09:40 )  x     / 0.14 ng/mL / 103 U/L / x     / 4.6 ng/mL  CARDIAC MARKERS ( 2019 16:49 )  x     / 0.20 ng/mL / 109 U/L / x     / 3.6 ng/mL      RADIOLOGY:    < from: Xray Chest 1 View- PORTABLE-Urgent (19 @ 09:31) >  Unchanged bibasilar opacities/effusions and interstitial edema.    < end of copied text >      PHYSICAL EXAM:    GENERAL: NAD, well-developed, slurred speech but comprehensable, intermittently confused but redirectable  HEENT:  Atraumatic, Normocephalic. Conjunctiva pink and cornea white, No JVD  PULMONARY: Diffused wheezing and bibasilar rhonchi  CARDIOVASCULAR: Regular rate and tachycardic; No murmurs  GASTROINTESTINAL: Soft, Nontender, Nondistended; Bowel sounds present  MUSCULOSKELETAL:  2+ Peripheral Pulses,1+ pitting edema  NEUROLOGY: non-focal  SKIN: No rashes or lesions    ADMISSION SUMMARY  Patient is a 83y old Male who presents with a chief complaint of mechanical fall  Currently admitted to medicine with the primary diagnosis of recurrent mechanical falls and acute kidney injury  Hospital course is complicated by acute hypoxic respiratory failure 2/2 aspiration PNA or pulmonary edema, critical consult was called and patient will be transferred to MICU for monitoring.      ASSESSMENT & PLAN    # Acute hypoxic respiratory failure 2/2 Aspiration PNA vs pulmonary edema  - CXR  shows unchanged bibasilar opacities/effusions and interstitial edema  - Blood Gas Profile - Arterial (19 @ 09:04)    pH 7.35: pCO2 53: pO2 66: HCO3 29 on Venti mask 35%  - ECHO : normal EF  - Switch Rocephin and azithromycin to Zosyn (renally dosed)  - Start IV lasix 60mg BID   - Pt now intubated. Pt will tx to MICU once bed is available.   - Follow up ID consult    # TOBIAS likely acute tubular injury - worsening  - Multifactorial: ACE-I use and NSAIDs  - Nephro on board: restart Lasix IV 60mg BID, continue to hold ACEI and NSAIDs  - Avoid nephrotoxic drugs  - Check BMP daily    # Acute cystitis with hematuria  - Continue Zosyn    # Tropnemia: likely 2/2 hypotension vs acute kidney injury  - No chest pain, no acute ST elevation in EKG  - Echo with normal EF, LVF, mildly enlarged left atrium  - CKMB negative and trop trending down from 0.2 -> 0.14    # Recurrent falls  - Likely multifactorial, physical deconditioning, medication induced hypotension, and peripheral neuropathy  - Trauma work up negative for acute fracture, but show age-indeterminate compression fracture of L2  - BP stable  - PT/consult pending    # Chronic afib  - CHADsVASC 4 (Age, HTN, DM)  - Currently rate controlled, not on BB  - Not on AC due to recurrent falls    # Hypernatremia  - Asymptomatic  - BMP daily, if >150 consider gentle free water    # HTN: SBP goal 120-150  - Hold indapamide, amlodipine, Ramipril  - Monitor vitals, avoid hypotension    # DM II:   - Hga1C pending (10.5 in 18)  - Start basal/bolus 10 and 3/3/3  - Monitor FS    #  shunt:   - Recently evaluated by Dr. Wayne and was normal  - No signs of hydrocephalus in CT      DVT ppx: SubQ heparin  GI ppx: Not indicated  Diet: NPO for now  Activity: increase as tolerated  Lines: Peripheral IVs  Code status: Full code  Dispo: MICU transfer.

## 2019-02-18 NOTE — PROGRESS NOTE ADULT - ATTENDING COMMENTS
Pt seen and examined at bedside today. Pt became hypoxic in am started on BIPAP. CXR appeared worsening PNA vs Pulm edema. labs showed worsening TOBIAS and hyperna. Pt was given lasix with minimal out put and started on zosyn. while on BIPAP pt became lethargic and tiring out. Descion was made to intubate the patient. Plan was dw with family and family was in agreement with intubation. Discussed case with Dr. Merchant who recommended intubation and he wants to continue lasix for now and watch the pt for 24 hrs prior to considering emergent HD. Pt intubated and will be transferred to ICU. Pulm team is aware and agreed with management.

## 2019-02-18 NOTE — PROGRESS NOTE ADULT - SUBJECTIVE AND OBJECTIVE BOX
NEPHROLOGY FOLLOW UP NOTE    pt seen and examined  doing poorly  worsening resp status  worsening azotemia  bp's better though  poor uop, but some response to lasix      PAST MEDICAL & SURGICAL HISTORY:  Hypertension  Hydrocephalus  CKD (chronic kidney disease)  DM (diabetes mellitus)  Afib  History of prostate surgery    Allergies:  No Known Allergies    Home Medications Reviewed    SOCIAL HISTORY:  Denies ETOH,Smoking,   FAMILY HISTORY:  Family history of diabetes mellitus (Mother, Sibling)        REVIEW OF SYSTEMS  All other review of systems is negative unless indicated above.    PHYSICAL EXAM:  NAD  obese  somnolent  dry mm  supple  decreased bs b/l  distant HS, irreg  soft  + intertriginous fold rash  + abd erythema  + 2+ edema, abd to shins b/l    Hospital Medications:   MEDICATIONS  (STANDING):  atorvastatin 20 milliGRAM(s) Oral at bedtime  calcium carbonate 1250 mG  + Vitamin D (OsCal 500 + D) 1 Tablet(s) Oral three times a day  chlorhexidine 4% Liquid 1 Application(s) Topical <User Schedule>  dextrose 5%. 1000 milliLiter(s) (50 mL/Hr) IV Continuous <Continuous>  dextrose 50% Injectable 12.5 Gram(s) IV Push once  dextrose 50% Injectable 25 Gram(s) IV Push once  dextrose 50% Injectable 25 Gram(s) IV Push once  furosemide   Injectable 60 milliGRAM(s) IV Push two times a day  guaiFENesin  milliGRAM(s) Oral every 12 hours  heparin  Injectable 5000 Unit(s) SubCutaneous every 8 hours  insulin glargine Injectable (LANTUS) 10 Unit(s) SubCutaneous at bedtime  insulin lispro Injectable (HumaLOG) 3 Unit(s) SubCutaneous three times a day before meals  nystatin Powder 1 Application(s) Topical three times a day  piperacillin/tazobactam IVPB. 2.25 Gram(s) IV Intermittent every 8 hours  sodium chloride 0.9% Bolus 500 milliLiter(s) IV Bolus once  tamsulosin 0.4 milliGRAM(s) Oral at bedtime        VITALS:  T(F): 97.1 (19 @ 04:39), Max: 97.1 (19 @ 04:39)  HR: 111 (19 @ 09:34)  BP: 123/78 (19 @ 09:34)  RR: 19 (19 @ 04:39)  SpO2: 90% (19 @ 12:16)  Wt(kg): --     @ 07:01  -   @ 07:00  --------------------------------------------------------  IN: 0 mL / OUT: 1450 mL / NET: -1450 mL     @ 07:01  -   @ 07:00  --------------------------------------------------------  IN: 0 mL / OUT: 175 mL / NET: -175 mL     @ 07:01  -   @ 13:09  --------------------------------------------------------  IN: 0 mL / OUT: 3 mL / NET: -3 mL          LABS:      147<H>  |  101  |  93<HH>  ----------------------------<  189<H>  4.3   |  25  |  3.2<H>    Ca    8.0<L>      2019 06:48  Phos  6.2       Mg     2.7                                 10.1   14.79 )-----------( 155      ( 2019 06:48 )             34.2       Urine Studies:  Urinalysis Basic - ( 2019 06:28 )    Color: Red / Appearance: Turbid / S.020 / pH:   Gluc:  / Ketone: 15  / Bili: Large / Urobili: 1.0 mg/dL   Blood:  / Protein: 100 mg/dL / Nitrite: Positive   Leuk Esterase: Large / RBC: >50 /HPF / WBC    Sq Epi:  / Non Sq Epi:  / Bacteria:       Sodium, Random Urine: 44.0 mmoL/L ( @ 06:28)  Osmolality, Random Urine: 362 mos/kg ( @ 06:28)  Sodium, Random Urine: 20.0 mmoL/L ( @ 11:00)  Creatinine, Random Urine: 28 mg/dL ( @ 11:00)      RADIOLOGY & ADDITIONAL STUDIES:

## 2019-02-18 NOTE — CONSULT NOTE ADULT - SUBJECTIVE AND OBJECTIVE BOX
Patient is a 83y old  Male who presents with a chief complaint of Recurrent falls  TOBIAS (2019 09:54)      HPI:  84 yo male coming from home with PMHx of DMII on insulin, HTN on meds, Hydrocephalus s/p  shunt, Afib not on AC (multiple falls) presents to our ED with c/o mechanical fall yesterday. According to patient he was trying to walk with a walker and slipped on the floor, no nausea, vomiting, dizziness, chest pain, palpitations, seizure like episode, no LOC. So according to the history patient is having mechanical fall. He had 4 falls since last 10 days. He was recently discharged from the hospital for dizziness and stress test and  shunt was normal. At baseline he has health aid, and he takes his medications and walks very minimal with the help of cane or walker. For the last few months he is not walking at all, increase B/L leg swelling.  In the ED BP was 83/45, HR 78/min, Sao2 100% on NC, afebrile. He was given 2l RL and his repeat BP was 110/80. Trauma work up was negative. (15 Feb 2019 08:39)      PAST MEDICAL & SURGICAL HISTORY:  Hypertension  Hydrocephalus  CKD (chronic kidney disease)  DM (diabetes mellitus)  Afib  History of prostate surgery      SOCIAL HX:   Smoking   ex smoker                      ETOH   denies                         Other    FAMILY HISTORY:  Family history of diabetes mellitus (Mother, Sibling)  :  No known cardiovacular family hisotry     ROS:  See HPI     Allergies    No Known Allergies    Intolerances          PHYSICAL EXAM    ICU Vital Signs Last 24 Hrs  T(C): 36.2 (2019 04:39), Max: 36.8 (2019 12:29)  T(F): 97.1 (2019 04:39), Max: 98.3 (2019 12:29)  HR: 111 (2019 09:34) (79 - 120)  BP: 123/78 (2019 09:34) (123/78 - 150/60)  RR: 19 (2019 04:39) (18 - 19)  SpO2: 92% (2019 10:07) (87% - 92%)      General: In NAD   HEENT:  ALEXYS, + BiPAP  Lymphatic system: No cervical LN   Lungs: Bilateral BS anteriorly, basilar crackles  Cardiovascular: Irregular  Gastrointestinal: Soft, Positive BS  Musculoskeletal: No clubbing.  Moves all extremities.  B/L LE edema 2+ pitting  Skin: Warm.  Intact  Neurological: No motor or sensory deficit      19 @ 07:01  -  19 @ 07:00  --------------------------------------------------------  IN:  Total IN: 0 mL    OUT:    Indwelling Catheter - Urethral: 25 mL    Voided: 150 mL  Total OUT: 175 mL    Total NET: -175 mL      19 @ 07:01  -  19 @ 10:50  --------------------------------------------------------  IN:  Total IN: 0 mL    OUT:    Stool: 2 mL  Total OUT: 2 mL    Total NET: -2 mL          LABS:                          10.1   14.79 )-----------( 155      ( 2019 06:48 )             34.2                                               02-18    147<H>  |  101  |  93<HH>  ----------------------------<  189<H>  4.3   |  25  |  3.2<H>    Ca    8.0<L>      2019 06:48  Phos  6.2     -17  Mg     2.7     -18      Urinalysis Basic - ( 2019 06:28 )    Color: Red / Appearance: Turbid / S.020 / pH: x  Gluc: x / Ketone: 15  / Bili: Large / Urobili: 1.0 mg/dL   Blood: x / Protein: 100 mg/dL / Nitrite: Positive   Leuk Esterase: Large / RBC: >50 /HPF / WBC x   Sq Epi: x / Non Sq Epi: x / Bacteria: x        CARDIAC MARKERS ( 2019 16:49 )  x     / 0.20 ng/mL / 109 U/L / x     / 3.6 ng/mL                                                                                     Culture - Urine (collected 2019 11:00)  Source: .Urine Clean Catch (Midstream)  Final Report (2019 12:15):    No growth                                                  ABG - ( 2019 09:04 )  pH, Arterial: 7.35  pH, Blood: x     /  pCO2: 53    /  pO2: 66    / HCO3: 29    / Base Excess: 2.7   /  SaO2: 92                  X-Rays     Bilateral interstitial infiltrates, with underlying RLL opacity                                                                                ECHO incomplete results    MEDICATIONS  (STANDING):  atorvastatin 20 milliGRAM(s) Oral at bedtime  calcium carbonate 1250 mG  + Vitamin D (OsCal 500 + D) 1 Tablet(s) Oral three times a day  chlorhexidine 4% Liquid 1 Application(s) Topical <User Schedule>  dextrose 5%. 1000 milliLiter(s) (50 mL/Hr) IV Continuous <Continuous>  dextrose 50% Injectable 12.5 Gram(s) IV Push once  dextrose 50% Injectable 25 Gram(s) IV Push once  dextrose 50% Injectable 25 Gram(s) IV Push once  guaiFENesin  milliGRAM(s) Oral every 12 hours  heparin  Injectable 5000 Unit(s) SubCutaneous every 8 hours  insulin glargine Injectable (LANTUS) 10 Unit(s) SubCutaneous at bedtime  insulin lispro Injectable (HumaLOG) 3 Unit(s) SubCutaneous three times a day before meals  nystatin Powder 1 Application(s) Topical three times a day  piperacillin/tazobactam IVPB. 2.25 Gram(s) IV Intermittent every 8 hours  sodium chloride 0.9% Bolus 500 milliLiter(s) IV Bolus once  tamsulosin 0.4 milliGRAM(s) Oral at bedtime    MEDICATIONS  (PRN):  acetaminophen   Tablet .. 650 milliGRAM(s) Oral every 6 hours PRN Moderate Pain (4 - 6)  dextrose 40% Gel 15 Gram(s) Oral once PRN Blood Glucose LESS THAN 70 milliGRAM(s)/deciliter  glucagon  Injectable 1 milliGRAM(s) IntraMuscular once PRN Glucose LESS THAN 70 milligrams/deciliter Patient is a 83y old  Male who presents with a chief complaint of Recurrent falls  TOBIAS (2019 09:54)      HPI:  84 yo male coming from home with PMHx of DMII on insulin, HTN on meds, Hydrocephalus s/p  shunt, Afib not on AC (multiple falls) presents to our ED with c/o mechanical fall.  he was trying to walk with a walker and slipped on the floor, no nausea, vomiting, dizziness, chest pain, palpitations, seizure like episode, no LOC. So according to the history patient is having mechanical fall. He had 4 falls since last 10 days. He was recently discharged from the hospital for dizziness and stress test and  shunt was normal. At baseline he has health aid, and he takes his medications and walks very minimal with the help of cane or walker. For the last few months he is not walking at all, increase B/L leg swelling.  In the ED BP was 83/45, HR 78/min, Sao2 100% on NC, afebrile. He was given 2l RL and his repeat BP was 110/80. Trauma work up was negative. (15 Feb 2019 08:39), admitted to tele, was given IVF, today worsening SOB, desaturation s.p lasix than intubation, renal was called for worsening renal function      PAST MEDICAL & SURGICAL HISTORY:  Hypertension  Hydrocephalus  CKD (chronic kidney disease)  DM (diabetes mellitus)  Afib  History of prostate surgery      SOCIAL HX:   Smoking   ex smoker                      ETOH   denies                         Other    FAMILY HISTORY:  Family history of diabetes mellitus (Mother, Sibling)  :  No known cardiovacular family hisotry     ROS:  See HPI     Allergies    No Known Allergies    Intolerances          PHYSICAL EXAM    ICU Vital Signs Last 24 Hrs  T(C): 36.2 (2019 04:39), Max: 36.8 (2019 12:29)  T(F): 97.1 (2019 04:39), Max: 98.3 (2019 12:29)  HR: 111 (2019 09:34) (79 - 120)  BP: 123/78 (2019 09:34) (123/78 - 150/60)  RR: 19 (2019 04:39) (18 - 19)  SpO2: 92% (2019 10:07) (87% - 92%)      General: In NAD   HEENT:  ALEXYS, + BiPAP  Lymphatic system: No cervical LN   Lungs: Bilateral BS anteriorly, basilar crackles, dec bs both bases  Cardiovascular: Irregular  Gastrointestinal: Soft, Positive BS  Musculoskeletal: No clubbing.  Moves all extremities.  B/L LE edema 2+ pitting  Skin: Warm.  Intact  Neurological: No motor or sensory deficit      19 @ 07:01  -  19 @ 07:00  --------------------------------------------------------  IN:  Total IN: 0 mL    OUT:    Indwelling Catheter - Urethral: 25 mL    Voided: 150 mL  Total OUT: 175 mL    Total NET: -175 mL      19 @ 07:01  -  19 @ 10:50  --------------------------------------------------------  IN:  Total IN: 0 mL    OUT:    Stool: 2 mL  Total OUT: 2 mL    Total NET: -2 mL          LABS:                          10.1   14.79 )-----------( 155      ( 2019 06:48 )             34.2                                               02-18    147<H>  |  101  |  93<HH>  ----------------------------<  189<H>  4.3   |  25  |  3.2<H>    Ca    8.0<L>      2019 06:48  Phos  6.2     02-17  Mg     2.7     -18      Urinalysis Basic - ( 2019 06:28 )    Color: Red / Appearance: Turbid / S.020 / pH: x  Gluc: x / Ketone: 15  / Bili: Large / Urobili: 1.0 mg/dL   Blood: x / Protein: 100 mg/dL / Nitrite: Positive   Leuk Esterase: Large / RBC: >50 /HPF / WBC x   Sq Epi: x / Non Sq Epi: x / Bacteria: x        CARDIAC MARKERS ( 2019 16:49 )  x     / 0.20 ng/mL / 109 U/L / x     / 3.6 ng/mL                                                                                     Culture - Urine (collected 2019 11:00)  Source: .Urine Clean Catch (Midstream)  Final Report (2019 12:15):    No growth                                                  ABG - ( 2019 09:04 )  pH, Arterial: 7.35  pH, Blood: x     /  pCO2: 53    /  pO2: 66    / HCO3: 29    / Base Excess: 2.7   /  SaO2: 92                  X-Rays     Bilateral interstitial infiltrates, with underlying RLL opacity                                                                                ECHO incomplete results    MEDICATIONS  (STANDING):  atorvastatin 20 milliGRAM(s) Oral at bedtime  calcium carbonate 1250 mG  + Vitamin D (OsCal 500 + D) 1 Tablet(s) Oral three times a day  chlorhexidine 4% Liquid 1 Application(s) Topical <User Schedule>  dextrose 5%. 1000 milliLiter(s) (50 mL/Hr) IV Continuous <Continuous>  dextrose 50% Injectable 12.5 Gram(s) IV Push once  dextrose 50% Injectable 25 Gram(s) IV Push once  dextrose 50% Injectable 25 Gram(s) IV Push once  guaiFENesin  milliGRAM(s) Oral every 12 hours  heparin  Injectable 5000 Unit(s) SubCutaneous every 8 hours  insulin glargine Injectable (LANTUS) 10 Unit(s) SubCutaneous at bedtime  insulin lispro Injectable (HumaLOG) 3 Unit(s) SubCutaneous three times a day before meals  nystatin Powder 1 Application(s) Topical three times a day  piperacillin/tazobactam IVPB. 2.25 Gram(s) IV Intermittent every 8 hours  sodium chloride 0.9% Bolus 500 milliLiter(s) IV Bolus once  tamsulosin 0.4 milliGRAM(s) Oral at bedtime    MEDICATIONS  (PRN):  acetaminophen   Tablet .. 650 milliGRAM(s) Oral every 6 hours PRN Moderate Pain (4 - 6)  dextrose 40% Gel 15 Gram(s) Oral once PRN Blood Glucose LESS THAN 70 milliGRAM(s)/deciliter  glucagon  Injectable 1 milliGRAM(s) IntraMuscular once PRN Glucose LESS THAN 70 milligrams/deciliter

## 2019-02-18 NOTE — SWALLOW BEDSIDE ASSESSMENT ADULT - ASR SWALLOW ASPIRATION MONITOR
cough/gurgly voice/fever/pneumonia/throat clearing/upper respiratory infection/change of breathing pattern/position upright (90Y)/oral hygiene
gurgly voice/pneumonia/upper respiratory infection/oral hygiene/position upright (90Y)/fever/change of breathing pattern/cough/throat clearing

## 2019-02-18 NOTE — CHART NOTE - NSCHARTNOTEFT_GEN_A_CORE
82 yo male coming from home with PMHx of DMII on insulin, HTN on meds, Hydrocephalus s/p  shunt, Afib not on AC (multiple falls) presents to our ED with c/o mechanical fall yesterday. According to patient he was trying to walk with a walker and slipped on the floor, no nausea, vomiting, dizziness, chest pain, palpitations, seizure like episode, no LOC. So according to the history patient is having mechanical fall. He had 4 falls since last 10 days. He was recently discharged from the hospital for dizziness and stress test and  shunt was normal. At baseline he has health aid, and he takes his medications and walks very minimal with the help of cane or walker. For the last few months he is not walking at all, increase B/L leg swelling.    In the ED BP was 83/45, HR 78/min, Sao2 100% on NC, afebrile. He was given 2l RL and his repeat BP was 110/80. Initial CXR only shows cardiomegaly without lobar consolidation. However Patient later desatted to 85% on 3L NC so he was placed on BIPAP. Repeated CXR shows new consolidation at right lung base, suspecting PNA, so patient was started on Rocephin and Azithromycin.     Patient was also found to have TOBIAS with a creatinine of 3.2 (baseline 2) with decrease urine output. Nephrology was consulted and it is believed that patient has acute tubular injury due to NSAIDs and ACE-I use. Nephro recommends holding ACE-I, NSAIDs, and Actos. Patient had barragan placed for I & O monitoring. Trauma work up negative for acute fracture, except age-indeterminate compression fracture of L2.     2/18: Patient started 84 yo male coming from home with PMHx of DMII on insulin, HTN on meds, Hydrocephalus s/p  shunt, Afib not on AC (multiple falls) presents to our ED with c/o mechanical fall yesterday. According to patient he was trying to walk with a walker and slipped on the floor, no nausea, vomiting, dizziness, chest pain, palpitations, seizure like episode, no LOC. So according to the history patient is having mechanical fall. He had 4 falls since last 10 days. He was recently discharged from the hospital for dizziness and stress test and  shunt was normal. At baseline he has health aid, and he takes his medications and walks very minimal with the help of cane or walker. For the last few months he is not walking at all, increase B/L leg swelling.    In the ED BP was 83/45, HR 78/min, Sao2 100% on NC, afebrile. He was given 2L RL and his repeat BP was 110/80. Initial CXR only shows cardiomegaly without lobar consolidation. However Patient later desatted to 85% on 3L NC so he was placed on BIPAP. Repeated CXR shows new consolidation at right lung base, suspecting PNA, so patient was started on Rocephin and Azithromycin.     Patient was also found to have TOBIAS with a creatinine of 3.2 (baseline 2) with decrease urine output. Nephrology was consulted and it is believed that patient has acute tubular injury due to NSAIDs and ACE-I use. Nephro recommends holding ACE-I, NSAIDs, and Actos. Patient had barragan placed for I & O monitoring. Trauma work up negative for acute fracture, except age-indeterminate compression fracture of L2.     2/18: Patient started to have shortness of breath and wheezing. Overnight patient did not tolerate BIPAP as he kept trying to take it off. In the morning patient desatted to 89% on 3L and oxygenation did not improve with venturi mask. ABG was repeated and it shows acute hypoxic respiratory failure with chronic hypercapnia. Patient was placed on BIPAP with improved O2 sat. Repeated CXR shows worsening pulmonary vascular congestion compared to CXR on 2/17. Patient's urine output increased with IV lasix 40mg x1. Critical care consulted and patient is being upgrade to the MICU for monitoring.      ASSESSMENT/PLAN:    # Acute hypoxic respiratory failure 2/2 Aspiration PNA vs pulmonary edema  - CXR 2/18 shows unchanged bibasilar opacities/effusions and interstitial edema  - Blood Gas Profile - Arterial (02.18.19 @ 09:04)    pH 7.35: pCO2 53: pO2 66: HCO3 29 on Venti mask 35%  - ECHO 2/16: normal EF  - Switch Rocephin and azithromycin to Zosyn (renally dosed)  - Start IV lasix 60mg BID   - NPO, continue BIPAP  - Follow up ID consult    # TOBIAS likely acute tubular injury - worsening  - Multifactorial: ACE-I use and NSAIDs  - Nephro on board: restart Lasix IV 60mg BID, continue to hold ACEI and NSAIDs  - Avoid nephrotoxic drugs  - Check BMP daily    # Acute cystitis with hematuria  - Continue Zosyn    # Tropnemia: likely 2/2 hypotension vs acute kidney injury  - No chest pain, no acute ST elevation in EKG  - Echo with normal EF, LVF, mildly enlarged left atrium  - CKMB negative and trop trending down from 0.2 -> 0.14    # Recurrent falls  - Likely multifactorial, physical deconditioning, medication induced hypotension, and peripheral neuropathy  - Trauma work up negative for acute fracture, but show age-indeterminate compression fracture of L2  - BP stable  - PT/consult pending    # Chronic afib  - CHADsVASC 4 (Age, HTN, DM)  - Currently rate controlled, not on BB  - Not on AC due to recurrent falls    # Hypernatremia  - Asymptomatic  - BMP daily, if >150 consider gentle free water    # HTN: SBP goal 120-150  - Hold indapamide, amlodipine, Ramipril  - Monitor vitals, avoid hypotension    # DM II:   - Hga1C pending (10.5 in 12/5/18)  - Start basal/bolus 10 and 3/3/3  - Monitor FS    #  shunt:   - Recently evaluated by Dr. Wayne and was normal  - No signs of hydrocephalus in CT      DVT ppx: SubQ heparin  GI ppx: Not indicated  Diet: NPO for now  Activity: increase as tolerated  Lines: Peripheral IVs  Code status: Full code  Dispo: MICU transfer 82 yo male coming from home with PMHx of DMII on insulin, HTN on meds, Hydrocephalus s/p  shunt, Afib not on AC (multiple falls) presents to our ED with c/o mechanical fall yesterday. According to patient he was trying to walk with a walker and slipped on the floor, no nausea, vomiting, dizziness, chest pain, palpitations, seizure like episode, no LOC. So according to the history patient is having mechanical fall. He had 4 falls since last 10 days. He was recently discharged from the hospital for dizziness and stress test and  shunt was normal. At baseline he has health aid, and he takes his medications and walks very minimal with the help of cane or walker. For the last few months he is not walking at all, increase B/L leg swelling.    In the ED BP was 83/45, HR 78/min, Sao2 100% on NC, afebrile. He was given 2L RL and his repeat BP was 110/80. Initial CXR only shows cardiomegaly without lobar consolidation. However Patient later desatted to 85% on 3L NC so he was placed on BIPAP. Repeated CXR shows new consolidation at right lung base, suspecting PNA, so patient was started on Rocephin and Azithromycin.     Patient was also found to have TOBIAS with a creatinine of 3.2 (baseline 2) with decrease urine output. Nephrology was consulted and it is believed that patient has acute tubular injury due to NSAIDs and ACE-I use. Nephro recommends holding ACE-I, NSAIDs, and Actos. Patient had barragan placed for I & O monitoring. Trauma work up negative for acute fracture, except age-indeterminate compression fracture of L2.     2/18: Patient started to have shortness of breath and wheezing. Overnight patient did not tolerate BIPAP as he kept trying to take it off. In the morning patient desatted to 89% on 3L and oxygenation did not improve with venturi mask. ABG was repeated and it shows acute hypoxic respiratory failure with chronic hypercapnia. Patient was placed on BIPAP with improved O2 sat. Repeated CXR shows worsening pulmonary vascular congestion compared to CXR on 2/17. Patient's urine output increased with IV lasix 40mg x1. Critical care consulted and patient is being upgrade to the MICU for monitoring.      ASSESSMENT/PLAN:    # Acute hypoxic respiratory failure 2/2 Aspiration PNA vs pulmonary edema  - CXR 2/18 shows unchanged bibasilar opacities/effusions and interstitial edema  - Blood Gas Profile - Arterial (02.18.19 @ 09:04)    pH 7.35: pCO2 53: pO2 66: HCO3 29 on Venti mask 35%  - ECHO 2/16: normal EF  - Switch Rocephin and azithromycin to Zosyn (renally dosed)  - Start IV lasix 80mg BID   - NPO, continue BIPAP  - Follow up ID consult    # TOBIAS likely acute tubular injury - worsening  - Multifactorial: ACE-I use and NSAIDs  - Nephro on board: restart Lasix IV 80mg BID, continue to hold ACEI and NSAIDs  - Avoid nephrotoxic drugs  - Check BMP daily    # Acute cystitis with hematuria  - Continue Zosyn    # Tropnemia: likely 2/2 hypotension vs acute kidney injury  - No chest pain, no acute ST elevation in EKG  - Echo with normal EF, LVF, mildly enlarged left atrium  - CKMB negative and trop trending down from 0.2 -> 0.14    # Recurrent falls  - Likely multifactorial, physical deconditioning, medication induced hypotension, and peripheral neuropathy  - Trauma work up negative for acute fracture, but show age-indeterminate compression fracture of L2  - BP stable  - PT/consult pending    # Chronic afib  - CHADsVASC 4 (Age, HTN, DM)  - Currently rate controlled, not on BB  - Not on AC due to recurrent falls    # Hypernatremia  - Asymptomatic  - BMP daily, if >150 consider gentle free water    # HTN: SBP goal 120-150  - Hold indapamide, amlodipine, Ramipril  - Monitor vitals, avoid hypotension    # DM II:   - Hga1C pending (10.5 in 12/5/18)  - Start basal/bolus 10 and 3/3/3  - Monitor FS    #  shunt:   - Recently evaluated by Dr. Wayne and was normal  - No signs of hydrocephalus in CT      DVT ppx: SubQ heparin  GI ppx: Not indicated  Diet: NPO for now  Activity: increase as tolerated  Lines: Peripheral IVs  Code status: Full code  Dispo: MICU transfer 82 yo male coming from home with PMHx of DMII on insulin, HTN on meds, Hydrocephalus s/p  shunt, Afib not on AC (multiple falls) presents to our ED with c/o mechanical fall yesterday. According to patient he was trying to walk with a walker and slipped on the floor, no nausea, vomiting, dizziness, chest pain, palpitations, seizure like episode, no LOC. So according to the history patient is having mechanical fall. He had 4 falls since last 10 days. He was recently discharged from the hospital for dizziness and stress test and  shunt was normal. At baseline he has health aid, and he takes his medications and walks very minimal with the help of cane or walker. For the last few months he is not walking at all, increase B/L leg swelling.    In the ED BP was 83/45, HR 78/min, Sao2 100% on NC, afebrile. He was given 2L RL and his repeat BP was 110/80. Initial CXR only shows cardiomegaly without lobar consolidation. However Patient later desatted to 85% on 3L NC so he was placed on BIPAP. Repeated CXR shows new consolidation at right lung base, suspecting PNA, so patient was started on Rocephin and Azithromycin.     Patient was also found to have TOBIAS with a creatinine of 3.2 (baseline 2) with decrease urine output. Nephrology was consulted and it is believed that patient has acute tubular injury due to NSAIDs and ACE-I use. Nephro recommends holding ACE-I, NSAIDs, and Actos. Patient had barragan placed for I & O monitoring. Trauma work up negative for acute fracture, except age-indeterminate compression fracture of L2.     2/18: Patient started to have shortness of breath and wheezing. Overnight patient did not tolerate BIPAP as he kept trying to take it off. In the morning patient desatted to 89% on 3L and oxygenation did not improve with venturi mask. ABG was repeated and it shows acute hypoxic respiratory failure with chronic hypercapnia. Patient was placed on BIPAP, repeat CXR showed worsening pulmonary vascular congestion, there was minimal urine output with 2 pushes of 40 mg IV lasix. After 4 hours on Bipap he was becoming more lethargic, he was initially tachpneic, but looked like his respiratory muscles were fatiguing, and the decision was made to intubate him, he was intubated on 2/18/2019 on 2:30 PM.      ASSESSMENT/PLAN:    # Acute hypoxic respiratory failure 2/2 Aspiration PNA vs pulmonary edema  -intubated, sedated with Versed and Fentanyl  - CXR 2/18 shows unchanged bibasilar opacities/effusions and interstitial edema  - Blood Gas Profile - Arterial (02.18.19 @ 09:04)    pH 7.35: pCO2 53: pO2 66: HCO3 29 on Venti mask 35%  - ECHO 2/16: normal EF  - Switch Rocephin and azithromycin to Zosyn (renally dosed)  - Start IV lasix 80mg BID   - NPO, continue BIPAP  - Follow up ID consult    # TOBIAS likely acute tubular injury - worsening  - Multifactorial: ACE-I use and NSAIDs  - Nephro on board: restart Lasix IV 80mg BID, continue to hold ACEI and NSAIDs  - Avoid nephrotoxic drugs  - Check BMP daily    # Acute cystitis with hematuria  - Continue Zosyn    # Tropnemia: likely 2/2 hypotension vs acute kidney injury  - No chest pain, no acute ST elevation in EKG  - Echo with normal EF, LVF, mildly enlarged left atrium  - CKMB negative and trop trending down from 0.2 -> 0.14    # Recurrent falls  - Likely multifactorial, physical deconditioning, medication induced hypotension, and peripheral neuropathy  - Trauma work up negative for acute fracture, but show age-indeterminate compression fracture of L2  - BP stable  - PT/consult pending    # Chronic afib  - CHADsVASC 4 (Age, HTN, DM)  - Currently rate controlled, not on BB  - Not on AC due to recurrent falls    # Hypernatremia  - Asymptomatic  - BMP daily, if >150 consider gentle free water    # HTN: SBP goal 120-150  - Hold indapamide, amlodipine, Ramipril  - Monitor vitals, avoid hypotension    # DM II:   - Hga1C pending (10.5 in 12/5/18)  - Start basal/bolus 10 and 3/3/3  - Monitor FS    #  shunt:   - Recently evaluated by Dr. Wayne and was normal  - No signs of hydrocephalus in CT      DVT ppx: SubQ heparin  GI ppx: Not indicated  Diet: NPO for now  Activity: increase as tolerated  Lines: Peripheral IVs  Code status: Full code  Dispo: MICU transfer 82 yo male coming from home with PMHx of DMII on insulin, HTN on meds, Hydrocephalus s/p  shunt, Afib not on AC (multiple falls) presents to our ED with c/o mechanical fall yesterday. According to patient he was trying to walk with a walker and slipped on the floor, no nausea, vomiting, dizziness, chest pain, palpitations, seizure like episode, no LOC. So according to the history patient is having mechanical fall. He had 4 falls since last 10 days. He was recently discharged from the hospital for dizziness and stress test and  shunt was normal. At baseline he has health aid, and he takes his medications and walks very minimal with the help of cane or walker. For the last few months he is not walking at all, increase B/L leg swelling.    In the ED BP was 83/45, HR 78/min, Sao2 100% on NC, afebrile. He was given 2L RL and his repeat BP was 110/80. Initial CXR only shows cardiomegaly without lobar consolidation. However Patient later desatted to 85% on 3L NC so he was placed on BIPAP. Repeated CXR shows new consolidation at right lung base, suspecting PNA, so patient was started on Rocephin and Azithromycin.     Patient was also found to have TOBIAS with a creatinine of 3.2 (baseline 2) with decrease urine output. Nephrology was consulted and it is believed that patient has acute tubular injury due to NSAIDs and ACE-I use. Nephro recommends holding ACE-I, NSAIDs, and Actos. Patient had barragan placed for I & O monitoring. Trauma work up negative for acute fracture, except age-indeterminate compression fracture of L2.     2/18: Patient started to have shortness of breath and wheezing. Overnight patient did not tolerate BIPAP as he kept trying to take it off. In the morning patient desatted to 89% on 3L and oxygenation did not improve with venturi mask. ABG was repeated and it shows acute hypoxic respiratory failure with chronic hypercapnia. Patient was placed on BIPAP, repeat CXR showed worsening pulmonary vascular congestion, there was minimal urine output with 2 pushes of 40 mg IV lasix. After 4 hours on Bipap he was becoming more lethargic, he was initially tachpneic, but looked like his respiratory muscles were fatiguing, and the decision was made to intubate him, he was intubated on 2/18/2019 on 2:30 PM.      ASSESSMENT/PLAN:    # Acute hypoxic respiratory failure 2/2 Aspiration PNA vs pulmonary edema  -intubated, sedated with Versed and Fentanyl  - CXR 2/18 shows unchanged bibasilar opacities/effusions and interstitial edema  - Blood Gas Profile - Arterial (02.18.19 @ 09:04)    pH 7.35: pCO2 53: pO2 66: HCO3 29 on Venti mask 35%  - ECHO 2/16: normal EF  - Switch Rocephin and azithromycin to Zosyn (renally dosed)  - Start IV lasix 80mg BID   - NPO, continue BIPAP  - Follow up ID consult    # TOBIAS likely acute tubular injury - worsening  - Multifactorial: ACE-I use and NSAIDs  - Nephro on board: restart Lasix IV 80mg BID, continue to hold ACEI and NSAIDs  - Avoid nephrotoxic drugs  - Check BMP daily    # Acute cystitis with hematuria  - Continue Zosyn    # Tropnemia: likely 2/2 hypotension vs acute kidney injury  - No chest pain, no acute ST elevation in EKG  - Echo with normal EF, LVF, mildly enlarged left atrium  - CKMB negative and trop trending down from 0.2 -> 0.14    # Recurrent falls  - Likely multifactorial, physical deconditioning, medication induced hypotension, and peripheral neuropathy  - Trauma work up negative for acute fracture, but show age-indeterminate compression fracture of L2  - BP stable  - PT/consult pending    # Chronic afib  - CHADsVASC 4 (Age, HTN, DM)  - Currently rate controlled, not on BB  - Not on AC due to recurrent falls    # Hypernatremia  - Asymptomatic  - BMP daily, if >150 consider gentle free water    # HTN: SBP goal 120-150  - Hold indapamide, amlodipine, Ramipril  - Monitor vitals, avoid hypotension    # DM II:   - Hga1C pending (10.5 in 12/5/18)  - Start basal/bolus 10 and 3/3/3  - Monitor FS    #  shunt:   - Recently evaluated by Dr. Wayne and was normal  - No signs of hydrocephalus in CT      DVT ppx: SubQ heparin  GI ppx: Not indicated  Diet: NPO for now  Activity: increase as tolerated  Lines: Peripheral IVs  Code status: Full code, family aware of poor prognosis, they want to proceed with intubation, but will decided on other aggressive measures such as HD as the situation arises  Dispo: MICU transfer

## 2019-02-18 NOTE — PROGRESS NOTE ADULT - ATTENDING COMMENTS
resolving hematuria  TOBIAS  no acute urologic intervention  indwelling catheter indicated for renal insufficiency and close monitoring of ins and outs

## 2019-02-18 NOTE — PROGRESS NOTE ADULT - SUBJECTIVE AND OBJECTIVE BOX
Progress Note    Subjective  84 y/o Male being upgraded to the unit. Pt tachypneic.  Pt denies any pain, Aguirre in place draining dark blood tinged urine.  Pt follows with Dr. Matthews, Hx of incomplete bladder emptying.        Vital signs  T(C): , Max: 36.8 (02-17-19 @ 12:29)  HR: 111 (02-18-19 @ 09:34)  BP: 123/78 (02-18-19 @ 09:34)  SpO2: 92% (02-18-19 @ 10:07)    Gen in mild distress  Abd obese soft non tender   + Aguirre draining dark slightly tinged urine     Labs                        10.1   14.79 )-----------( 155      ( 18 Feb 2019 06:48 )             34.2     18 Feb 2019 06:48    147    |  101    |  93     ----------------------------<  189    4.3     |  25     |  3.2      Ca    8.0        18 Feb 2019 06:48  Phos  6.2       17 Feb 2019 06:37  Mg     2.7       18 Feb 2019 06:48

## 2019-02-18 NOTE — PROGRESS NOTE ADULT - ASSESSMENT
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)  urine retention s/p prostatectomy / no hydro on CT abd and renal sono   s/p barragan with mild traumatic gross hematuria  hypernatremia - mild  hypocalcemia  hyperphosphatemia  hypotension from bp meds +/- infection - better  b/l renal cysts  somnolence due to uremia, lyrica and hypercapnia  falls  ARF - JOHANNY on CPAP / PNA / CHF  EF 55%  peripheral edema with nl albumin, likely multifactorial due to TOBIAS, CKD, obesity, right heart failure  proteinuria  afib  HTN   s/p  shunt  DM 2 uncontrolled  dm neuropathy    plan:    worsening ARF / TOBIAS --> transfer to ICU  d/w multiple family members, hospitalist and resident  start lasix 80mg IV q12h - aim for 2-3L urine ouput next 24 hours  discussed HD option with family if renal function continues to deteriorate over next few days      off high dose ramipril and norvasc  off norvasc  no further NSAIDs  off lyrica due to somnolence  off actos due to swelling  cont oscal-D 1250 / 500 tid  send UP/C to quantify proteinuria      critical care 35min

## 2019-02-18 NOTE — PROGRESS NOTE ADULT - ASSESSMENT
84 y/o Male being upgraded to the unit. Pt tachypneic.  Pt denies any pain, Aguirre in place draining dark blood tinged urine.  Pt follows with Dr. Matthews, Hx of incomplete bladder emptying.    A) ATN/TOBIAS  baseline creat 2.3 in Oct.  LUTS with incomplete bladder emptying.  hx of urethral stricture    P) Continue Aguirre catheter  irrigate prn  no acute urologic intervention at this time

## 2019-02-19 LAB
ALBUMIN SERPL ELPH-MCNC: 3.4 G/DL — LOW (ref 3.5–5.2)
ALP SERPL-CCNC: 66 U/L — SIGNIFICANT CHANGE UP (ref 30–115)
ALT FLD-CCNC: 7 U/L — SIGNIFICANT CHANGE UP (ref 0–41)
ANION GAP SERPL CALC-SCNC: 19 MMOL/L — HIGH (ref 7–14)
AST SERPL-CCNC: 10 U/L — SIGNIFICANT CHANGE UP (ref 0–41)
BASE EXCESS BLDA CALC-SCNC: 6.8 MMOL/L — HIGH (ref -2–2)
BASE EXCESS BLDA CALC-SCNC: 7.1 MMOL/L — HIGH (ref -2–2)
BASOPHILS # BLD AUTO: 0 K/UL — SIGNIFICANT CHANGE UP (ref 0–0.2)
BASOPHILS NFR BLD AUTO: 0 % — SIGNIFICANT CHANGE UP (ref 0–1)
BILIRUB SERPL-MCNC: 0.6 MG/DL — SIGNIFICANT CHANGE UP (ref 0.2–1.2)
BUN SERPL-MCNC: 93 MG/DL — CRITICAL HIGH (ref 10–20)
CALCIUM SERPL-MCNC: 8 MG/DL — LOW (ref 8.5–10.1)
CHLORIDE SERPL-SCNC: 106 MMOL/L — SIGNIFICANT CHANGE UP (ref 98–110)
CO2 SERPL-SCNC: 29 MMOL/L — SIGNIFICANT CHANGE UP (ref 17–32)
CREAT SERPL-MCNC: 3 MG/DL — HIGH (ref 0.7–1.5)
CULTURE RESULTS: NO GROWTH — SIGNIFICANT CHANGE UP
EOSINOPHIL # BLD AUTO: 0.09 K/UL — SIGNIFICANT CHANGE UP (ref 0–0.7)
EOSINOPHIL NFR BLD AUTO: 1 % — SIGNIFICANT CHANGE UP (ref 0–8)
GLUCOSE BLDC GLUCOMTR-MCNC: 102 MG/DL — HIGH (ref 70–99)
GLUCOSE BLDC GLUCOMTR-MCNC: 109 MG/DL — HIGH (ref 70–99)
GLUCOSE BLDC GLUCOMTR-MCNC: 133 MG/DL — HIGH (ref 70–99)
GLUCOSE BLDC GLUCOMTR-MCNC: 142 MG/DL — HIGH (ref 70–99)
GLUCOSE BLDC GLUCOMTR-MCNC: 162 MG/DL — HIGH (ref 70–99)
GLUCOSE BLDC GLUCOMTR-MCNC: 99 MG/DL — SIGNIFICANT CHANGE UP (ref 70–99)
GLUCOSE SERPL-MCNC: 101 MG/DL — HIGH (ref 70–99)
GRAM STN FLD: SIGNIFICANT CHANGE UP
HCO3 BLDA-SCNC: 32 MMOL/L — HIGH (ref 23–27)
HCO3 BLDA-SCNC: 33 MMOL/L — HIGH (ref 23–27)
HCT VFR BLD CALC: 33.1 % — LOW (ref 42–52)
HGB BLD-MCNC: 9.9 G/DL — LOW (ref 14–18)
HOROWITZ INDEX BLDA+IHG-RTO: 60 — SIGNIFICANT CHANGE UP
HOROWITZ INDEX BLDA+IHG-RTO: 60 — SIGNIFICANT CHANGE UP
IMM GRANULOCYTES NFR BLD AUTO: 0.7 % — HIGH (ref 0.1–0.3)
LYMPHOCYTES # BLD AUTO: 0.81 K/UL — LOW (ref 1.2–3.4)
LYMPHOCYTES # BLD AUTO: 9.3 % — LOW (ref 20.5–51.1)
MAGNESIUM SERPL-MCNC: 2.6 MG/DL — HIGH (ref 1.8–2.4)
MCHC RBC-ENTMCNC: 24.6 PG — LOW (ref 27–31)
MCHC RBC-ENTMCNC: 29.9 G/DL — LOW (ref 32–37)
MCV RBC AUTO: 82.3 FL — SIGNIFICANT CHANGE UP (ref 80–94)
MONOCYTES # BLD AUTO: 0.75 K/UL — HIGH (ref 0.1–0.6)
MONOCYTES NFR BLD AUTO: 8.6 % — SIGNIFICANT CHANGE UP (ref 1.7–9.3)
NEUTROPHILS # BLD AUTO: 7.01 K/UL — HIGH (ref 1.4–6.5)
NEUTROPHILS NFR BLD AUTO: 80.4 % — HIGH (ref 42.2–75.2)
NRBC # BLD: 0 /100 WBCS — SIGNIFICANT CHANGE UP (ref 0–0)
OSMOLALITY UR: 369 MOS/KG — SIGNIFICANT CHANGE UP (ref 50–1400)
PCO2 BLDA: 47 MMHG — HIGH (ref 38–42)
PCO2 BLDA: 52 MMHG — HIGH (ref 38–42)
PH BLDA: 7.41 — SIGNIFICANT CHANGE UP (ref 7.38–7.42)
PH BLDA: 7.45 — HIGH (ref 7.38–7.42)
PLATELET # BLD AUTO: 145 K/UL — SIGNIFICANT CHANGE UP (ref 130–400)
PO2 BLDA: 118 MMHG — HIGH (ref 78–95)
PO2 BLDA: 77 MMHG — LOW (ref 78–95)
POTASSIUM SERPL-MCNC: 3.7 MMOL/L — SIGNIFICANT CHANGE UP (ref 3.5–5)
POTASSIUM SERPL-SCNC: 3.7 MMOL/L — SIGNIFICANT CHANGE UP (ref 3.5–5)
PROT SERPL-MCNC: 5.5 G/DL — LOW (ref 6–8)
PROT SERPL-MCNC: 6.2 G/DL — SIGNIFICANT CHANGE UP (ref 6–8.3)
RBC # BLD: 4.02 M/UL — LOW (ref 4.7–6.1)
RBC # FLD: 16.1 % — HIGH (ref 11.5–14.5)
SAO2 % BLDA: 96 % — SIGNIFICANT CHANGE UP (ref 94–98)
SAO2 % BLDA: 99 % — HIGH (ref 94–98)
SODIUM SERPL-SCNC: 154 MMOL/L — HIGH (ref 135–146)
SPECIMEN SOURCE: SIGNIFICANT CHANGE UP
SPECIMEN SOURCE: SIGNIFICANT CHANGE UP
WBC # BLD: 8.72 K/UL — SIGNIFICANT CHANGE UP (ref 4.8–10.8)
WBC # FLD AUTO: 8.72 K/UL — SIGNIFICANT CHANGE UP (ref 4.8–10.8)

## 2019-02-19 RX ORDER — METOPROLOL TARTRATE 50 MG
50 TABLET ORAL
Qty: 0 | Refills: 0 | Status: DISCONTINUED | OUTPATIENT
Start: 2019-02-19 | End: 2019-02-19

## 2019-02-19 RX ORDER — POTASSIUM CHLORIDE 20 MEQ
20 PACKET (EA) ORAL ONCE
Qty: 0 | Refills: 0 | Status: COMPLETED | OUTPATIENT
Start: 2019-02-19 | End: 2019-02-19

## 2019-02-19 RX ORDER — SENNA PLUS 8.6 MG/1
2 TABLET ORAL AT BEDTIME
Qty: 0 | Refills: 0 | Status: DISCONTINUED | OUTPATIENT
Start: 2019-02-19 | End: 2019-02-22

## 2019-02-19 RX ORDER — DEXMEDETOMIDINE HYDROCHLORIDE IN 0.9% SODIUM CHLORIDE 4 UG/ML
0.2 INJECTION INTRAVENOUS
Qty: 200 | Refills: 0 | Status: DISCONTINUED | OUTPATIENT
Start: 2019-02-19 | End: 2019-02-21

## 2019-02-19 RX ORDER — METOPROLOL TARTRATE 50 MG
25 TABLET ORAL
Qty: 0 | Refills: 0 | Status: DISCONTINUED | OUTPATIENT
Start: 2019-02-19 | End: 2019-02-21

## 2019-02-19 RX ORDER — PANTOPRAZOLE SODIUM 20 MG/1
40 TABLET, DELAYED RELEASE ORAL DAILY
Qty: 0 | Refills: 0 | Status: DISCONTINUED | OUTPATIENT
Start: 2019-02-19 | End: 2019-02-25

## 2019-02-19 RX ORDER — DOCUSATE SODIUM 100 MG
100 CAPSULE ORAL THREE TIMES A DAY
Qty: 0 | Refills: 0 | Status: DISCONTINUED | OUTPATIENT
Start: 2019-02-19 | End: 2019-02-19

## 2019-02-19 RX ORDER — DOCUSATE SODIUM 100 MG
100 CAPSULE ORAL THREE TIMES A DAY
Qty: 0 | Refills: 0 | Status: DISCONTINUED | OUTPATIENT
Start: 2019-02-19 | End: 2019-02-22

## 2019-02-19 RX ADMIN — Medication 100 MILLIGRAM(S): at 21:45

## 2019-02-19 RX ADMIN — NYSTATIN CREAM 1 APPLICATION(S): 100000 CREAM TOPICAL at 05:57

## 2019-02-19 RX ADMIN — Medication 600 MILLIGRAM(S): at 18:47

## 2019-02-19 RX ADMIN — ATORVASTATIN CALCIUM 20 MILLIGRAM(S): 80 TABLET, FILM COATED ORAL at 21:44

## 2019-02-19 RX ADMIN — NYSTATIN CREAM 1 APPLICATION(S): 100000 CREAM TOPICAL at 21:44

## 2019-02-19 RX ADMIN — CHLORHEXIDINE GLUCONATE 15 MILLILITER(S): 213 SOLUTION TOPICAL at 05:59

## 2019-02-19 RX ADMIN — Medication 100 MILLIGRAM(S): at 14:20

## 2019-02-19 RX ADMIN — HEPARIN SODIUM 5000 UNIT(S): 5000 INJECTION INTRAVENOUS; SUBCUTANEOUS at 05:58

## 2019-02-19 RX ADMIN — Medication 80 MILLIGRAM(S): at 05:57

## 2019-02-19 RX ADMIN — PIPERACILLIN AND TAZOBACTAM 25 GRAM(S): 4; .5 INJECTION, POWDER, LYOPHILIZED, FOR SOLUTION INTRAVENOUS at 06:00

## 2019-02-19 RX ADMIN — CHLORHEXIDINE GLUCONATE 1 APPLICATION(S): 213 SOLUTION TOPICAL at 05:58

## 2019-02-19 RX ADMIN — Medication 1 TABLET(S): at 05:57

## 2019-02-19 RX ADMIN — DEXMEDETOMIDINE HYDROCHLORIDE IN 0.9% SODIUM CHLORIDE 6.7 MICROGRAM(S)/KG/HR: 4 INJECTION INTRAVENOUS at 14:00

## 2019-02-19 RX ADMIN — INSULIN GLARGINE 10 UNIT(S): 100 INJECTION, SOLUTION SUBCUTANEOUS at 21:44

## 2019-02-19 RX ADMIN — Medication 20 MILLIEQUIVALENT(S): at 14:20

## 2019-02-19 RX ADMIN — PIPERACILLIN AND TAZOBACTAM 25 GRAM(S): 4; .5 INJECTION, POWDER, LYOPHILIZED, FOR SOLUTION INTRAVENOUS at 14:21

## 2019-02-19 RX ADMIN — CHLORHEXIDINE GLUCONATE 15 MILLILITER(S): 213 SOLUTION TOPICAL at 18:46

## 2019-02-19 RX ADMIN — NYSTATIN CREAM 1 APPLICATION(S): 100000 CREAM TOPICAL at 14:21

## 2019-02-19 RX ADMIN — Medication 25 MILLIGRAM(S): at 16:09

## 2019-02-19 RX ADMIN — MIDAZOLAM HYDROCHLORIDE 2.7 MG/KG/HR: 1 INJECTION, SOLUTION INTRAMUSCULAR; INTRAVENOUS at 00:04

## 2019-02-19 RX ADMIN — Medication 1 TABLET(S): at 14:20

## 2019-02-19 RX ADMIN — HEPARIN SODIUM 5000 UNIT(S): 5000 INJECTION INTRAVENOUS; SUBCUTANEOUS at 21:44

## 2019-02-19 RX ADMIN — HEPARIN SODIUM 5000 UNIT(S): 5000 INJECTION INTRAVENOUS; SUBCUTANEOUS at 14:19

## 2019-02-19 RX ADMIN — SENNA PLUS 2 TABLET(S): 8.6 TABLET ORAL at 21:45

## 2019-02-19 RX ADMIN — DEXMEDETOMIDINE HYDROCHLORIDE IN 0.9% SODIUM CHLORIDE 6.7 MICROGRAM(S)/KG/HR: 4 INJECTION INTRAVENOUS at 21:43

## 2019-02-19 RX ADMIN — Medication 1 TABLET(S): at 21:44

## 2019-02-19 RX ADMIN — Medication 3 UNIT(S): at 16:33

## 2019-02-19 RX ADMIN — PIPERACILLIN AND TAZOBACTAM 25 GRAM(S): 4; .5 INJECTION, POWDER, LYOPHILIZED, FOR SOLUTION INTRAVENOUS at 21:43

## 2019-02-19 RX ADMIN — PANTOPRAZOLE SODIUM 40 MILLIGRAM(S): 20 TABLET, DELAYED RELEASE ORAL at 12:24

## 2019-02-19 NOTE — PROGRESS NOTE ADULT - ASSESSMENT
Patient is a 83y old Male who presents with a chief complaint of mechanical fall  Currently admitted to medicine with the primary diagnosis of recurrent mechanical falls and acute kidney injury  Hospital course is complicated by acute hypoxic respiratory failure 2/2 aspiration PNA or pulmonary edema.    IMPRESSION:  Aspiration with possibly Bibasilar GNR PNA complicated by respiratory failure requiring ventilation  No pressors    RECOMMENDATIONS:  Procalcitonin level   Diagnostic bronchoscopy or deep ET cultures  Nares for ORSA  BCx  Zosyn 2.250 mg iv q8h   Consider a CT chest if stable enough

## 2019-02-19 NOTE — PROGRESS NOTE ADULT - SUBJECTIVE AND OBJECTIVE BOX
Patient is a 83y old Male who presents with a chief complaint of Recurrent falls  TOBIAS (2019 06:28)    Currently admitted to medicine with the primary diagnosis of TOBIAS (acute kidney injury)    Today is hospital day 4d.    BRIEF HOSPITAL COURSE:     EVENTS LAST 24HRS:     PAST MEDICAL & SURGICAL HISTORY  Hypertension  Hydrocephalus  CKD (chronic kidney disease)  DM (diabetes mellitus)  Afib  History of prostate surgery      SOCIAL HISTORY:      REVIEW OF SYSTEMS:  See HPI    ALLERGIES:    MEDICATIONS:  STANDING MEDICATIONS  atorvastatin 20 milliGRAM(s) Oral at bedtime  calcium carbonate 1250 mG  + Vitamin D (OsCal 500 + D) 1 Tablet(s) Oral three times a day  chlorhexidine 0.12% Liquid 15 milliLiter(s) Oral Mucosa every 12 hours  chlorhexidine 4% Liquid 1 Application(s) Topical <User Schedule>  dextrose 5%. 1000 milliLiter(s) IV Continuous <Continuous>  dextrose 50% Injectable 12.5 Gram(s) IV Push once  dextrose 50% Injectable 25 Gram(s) IV Push once  dextrose 50% Injectable 25 Gram(s) IV Push once  fentaNYL   Infusion. 0.5 MICROgram(s)/kG/Hr IV Continuous <Continuous>  furosemide   Injectable 80 milliGRAM(s) IV Push every 12 hours  guaiFENesin  milliGRAM(s) Oral every 12 hours  heparin  Injectable 5000 Unit(s) SubCutaneous every 8 hours  insulin glargine Injectable (LANTUS) 10 Unit(s) SubCutaneous at bedtime  insulin lispro Injectable (HumaLOG) 3 Unit(s) SubCutaneous three times a day before meals  midazolam Infusion 0.02 mG/kG/Hr IV Continuous <Continuous>  nystatin Powder 1 Application(s) Topical three times a day  piperacillin/tazobactam IVPB. 2.25 Gram(s) IV Intermittent every 8 hours  propofol Infusion 5 MICROgram(s)/kG/Min IV Continuous <Continuous>  sodium chloride 0.9% Bolus 500 milliLiter(s) IV Bolus once  tamsulosin 0.4 milliGRAM(s) Oral at bedtime    PRN MEDICATIONS  acetaminophen   Tablet .. 650 milliGRAM(s) Oral every 6 hours PRN  ALBUTerol/ipratropium for Nebulization 3 milliLiter(s) Nebulizer every 6 hours PRN  dextrose 40% Gel 15 Gram(s) Oral once PRN  glucagon  Injectable 1 milliGRAM(s) IntraMuscular once PRN    VITALS:     Mode: AC/ CMV (Assist Control/ Continuous Mandatory Ventilation)  RR (machine): 20  TV (machine): 450  FiO2: 60  PEEP: 10  ITime: 1  MAP: 15  PIP: 24      ICU Vital Signs Last 24 Hrs:    T(C): 37.1 (2019 04:00), Max: 37.6 (2019 17:00)  T(F): 98.7 (2019 04:00), Max: 99.7 (2019 17:00)  HR: 76 (2019 06:00) (60 - 111)  BP: 107/63 (2019 06:00) (95/52 - 150/60)  BP(mean): 86 (2019 06:00) (74 - 90)  ABP: --  ABP(mean): --  RR: 19 (2019 06:00) (19 - 45)  SpO2: 100% (2019 06:00) (87% - 100%)      ABG - ( 2019 19:12 )  pH, Arterial: 7.40  pH, Blood: x     /  pCO2: 51    /  pO2: 197   / HCO3: 32    / Base Excess: 5.7   /  SaO2: 100                 I&O's Detail:      2019 07:01  -  2019 07:00  --------------------------------------------------------  IN:  Total IN: 0 mL    OUT:    Indwelling Catheter - Urethral: 25 mL    Voided: 150 mL  Total OUT: 175 mL    Total NET: -175 mL      2019 07:01  -  2019 06:57  --------------------------------------------------------  IN:    fentaNYL Infusion.: 153.6 mL    IV PiggyBack: 200 mL    midazolam Infusion: 87 mL  Total IN: 440.6 mL    OUT:    Indwelling Catheter - Urethral: 2555 mL    Stool: 4 mL  Total OUT: 2559 mL    Total NET: -2118.4 mL          LABS:                        9.9    8.72  )-----------( 145      ( 2019 04:35 )             33.1         154<H>  |  106  |  93<HH>  ----------------------------<  101<H>  3.7   |  29  |  3.0<H>    Ca    8.0<L>      2019 04:35  Mg     2.6         TPro  5.5<L>  /  Alb  3.4<L>  /  TBili  0.6  /  DBili  x   /  AST  10  /  ALT  7   /  AlkPhos  66        CARDIAC MARKERS ( 2019 15:00 )  x     / 0.13 ng/mL / 89 U/L / x     / 3.8 ng/mL  CARDIAC MARKERS ( 2019 09:40 )  x     / 0.14 ng/mL / 103 U/L / x     / 4.6 ng/mL      CAPILLARY BLOOD GLUCOSE      POCT Blood Glucose.: 135 mg/dL (2019 21:36)    PT/INR - ( 2019 15:00 )   PT: 12.80 sec;   INR: 1.11 ratio         PTT - ( 2019 15:00 )  PTT:30.5 sec  Urinalysis Basic - ( 2019 06:28 )    Color: Red / Appearance: Turbid / S.020 / pH: x  Gluc: x / Ketone: 15  / Bili: Large / Urobili: 1.0 mg/dL   Blood: x / Protein: 100 mg/dL / Nitrite: Positive   Leuk Esterase: Large / RBC: >50 /HPF / WBC x   Sq Epi: x / Non Sq Epi: x / Bacteria: x      CULTURES:  Culture Results:   No growth ( @ 11:00)      PHYSICAL EXAM:    General: No acute distress.  Alert, oriented, interactive, nonfocal.    HEENT: Pupils equal, reactive to light symmetrically.    PULM: Clear to auscultation bilaterally, no significant sputum production.    CVS: Regular rate and rhythm, no murmurs, rubs, or gallops.    ABD: Soft, nondistended, nontender, no masses.    EXT: No edema, nontender.    SKIN: Warm and well perfused, no rashes noted.    RADIOLOGY:     < from: Transthoracic Echocardiogram (19 @ 13:35) >    Summary:   1. Normal left ventricular internal cavity size.   2. Mildly enlarged left atrium.   3. Normal right atrial size.   4. There is no evidence of pericardial effusion.   5. Thickening and calcification of the anterior and posterior mitral   valve leaflets.   6. Moderate tricuspid regurgitation.   7. Pulmonic valve regurgitation.      < end of copied text > Patient is a 83y old Male who presents with a chief complaint of Recurrent falls  TOBIAS (2019 06:28)    Currently admitted to medicine with the primary diagnosis of TOBIAS (acute kidney injury)    Today is hospital day 4d.    BRIEF HOSPITAL COURSE:   84 yo M PMHx of DMII, HTN,  Hydrocephalus s/p  shunt, Afib not on AC (multiple falls) presents to our ED with c/o mechanical fall. According to patient he was trying to walk with a walker and slipped on the floor, no nausea, vomiting, dizziness, chest pain, palpitations, seizure like episode, no LOC. He had 4 falls in last 10 days as per family. He was recently discharged from the hospital Dec for dizziness with stress test and  shunt assessment that was normal. At baseline he has home aid, takes his medications and walks very minimal with the help of cane or walker. However for last few months he has had dec ambulation and worsening LE edema. As per family, also worsening lethargy. Trauma work-up neg (age unknown L2 fx)    In the ED BP 83/45, HR 78/min, Sao2 100% on NC, afebrile. He was given 2L RL and his repeat BP was 110/80. Initial CXR only shows cardiomegaly without lobar consolidation. Patient later desatted to 85% on 3L NC so he was placed on BIPAP. Repeated CXR shows new consolidation at right lung base, suspecting PNA, so patient was started on Rocephin and Azithromycin. Labs showed TOBIAS with Cr 3.2 (baseline 2) and dec UOP. Pt also noted to be on NSAIDs and ACE, Actos so likely TOBIAS 2/2 dehydration and meds. On  pt was unable to tolerate NC and moved to BIPAP with ABG showing acute hypoxic resp failure and hypercapnia (likely chronic?). CXR at this time showed worsening pulm vascular congestion. Given x2 IV 40 lasix which did not improve UOP. Was on BIPAP for 4hr, became progressively lethargic to pt was intubated.    Admitted to CCU, barragan noted to be out-of-place (likely pt pulled out) so advanced with a sharp increase in out-put. CXR remained congested b/l bases and tolerated intubation well. Kidneys showed improvement after aggressive diuresis but remained on 60% O2.     EVENTS LAST 24HRS:   Remains intubated and sedated on fentanyl and precedex.   No events overnight, neg UOP 2L. CXR mildly improved.     PAST MEDICAL & SURGICAL HISTORY  Hypertension  Hydrocephalus  CKD (chronic kidney disease)  DM (diabetes mellitus)  Afib  History of prostate surgery      SOCIAL HISTORY:      REVIEW OF SYSTEMS:  See HPI    ALLERGIES:    MEDICATIONS:  STANDING MEDICATIONS  atorvastatin 20 milliGRAM(s) Oral at bedtime  calcium carbonate 1250 mG  + Vitamin D (OsCal 500 + D) 1 Tablet(s) Oral three times a day  chlorhexidine 0.12% Liquid 15 milliLiter(s) Oral Mucosa every 12 hours  chlorhexidine 4% Liquid 1 Application(s) Topical <User Schedule>  dextrose 5%. 1000 milliLiter(s) IV Continuous <Continuous>  dextrose 50% Injectable 12.5 Gram(s) IV Push once  dextrose 50% Injectable 25 Gram(s) IV Push once  dextrose 50% Injectable 25 Gram(s) IV Push once  fentaNYL   Infusion. 0.5 MICROgram(s)/kG/Hr IV Continuous <Continuous>  furosemide   Injectable 80 milliGRAM(s) IV Push every 12 hours  guaiFENesin  milliGRAM(s) Oral every 12 hours  heparin  Injectable 5000 Unit(s) SubCutaneous every 8 hours  insulin glargine Injectable (LANTUS) 10 Unit(s) SubCutaneous at bedtime  insulin lispro Injectable (HumaLOG) 3 Unit(s) SubCutaneous three times a day before meals  midazolam Infusion 0.02 mG/kG/Hr IV Continuous <Continuous>  nystatin Powder 1 Application(s) Topical three times a day  piperacillin/tazobactam IVPB. 2.25 Gram(s) IV Intermittent every 8 hours  propofol Infusion 5 MICROgram(s)/kG/Min IV Continuous <Continuous>  sodium chloride 0.9% Bolus 500 milliLiter(s) IV Bolus once  tamsulosin 0.4 milliGRAM(s) Oral at bedtime    PRN MEDICATIONS  acetaminophen   Tablet .. 650 milliGRAM(s) Oral every 6 hours PRN  ALBUTerol/ipratropium for Nebulization 3 milliLiter(s) Nebulizer every 6 hours PRN  dextrose 40% Gel 15 Gram(s) Oral once PRN  glucagon  Injectable 1 milliGRAM(s) IntraMuscular once PRN    VITALS:     Mode: AC/ CMV (Assist Control/ Continuous Mandatory Ventilation)  RR (machine): 20  TV (machine): 450  FiO2: 60  PEEP: 10  ITime: 1  MAP: 15  PIP: 24      ICU Vital Signs Last 24 Hrs:    T(C): 37.1 (2019 04:00), Max: 37.6 (2019 17:00)  T(F): 98.7 (2019 04:00), Max: 99.7 (2019 17:00)  HR: 76 (2019 06:00) (60 - 111)  BP: 107/63 (2019 06:00) (95/52 - 150/60)  BP(mean): 86 (2019 06:00) (74 - 90)  ABP: --  ABP(mean): --  RR: 19 (2019 06:00) (19 - 45)  SpO2: 100% (2019 06:00) (87% - 100%)    ABG - ( 2019 19:12 )  pH, Arterial: 7.40  pH, Blood: x     /  pCO2: 51    /  pO2: 197   / HCO3: 32    / Base Excess: 5.7   /  SaO2: 100       I&O's Detail:      2019 07:01  -  2019 07:00  --------------------------------------------------------  IN:  Total IN: 0 mL    OUT:    Indwelling Catheter - Urethral: 25 mL    Voided: 150 mL  Total OUT: 175 mL    Total NET: -175 mL      2019 07:01  -  2019 06:57  --------------------------------------------------------  IN:    fentaNYL Infusion.: 153.6 mL    IV PiggyBack: 200 mL    midazolam Infusion: 87 mL  Total IN: 440.6 mL    OUT:    Indwelling Catheter - Urethral: 2555 mL    Stool: 4 mL  Total OUT: 2559 mL    Total NET: -2118.4 mL    LABS:                        9.9    8.72  )-----------( 145      ( 2019 04:35 )             33.1     02-19    154<H>  |  106  |  93<HH>  ----------------------------<  101<H>  3.7   |  29  |  3.0<H>    Ca    8.0<L>      2019 04:35  Mg     2.6         TPro  5.5<L>  /  Alb  3.4<L>  /  TBili  0.6  /  DBili  x   /  AST  10  /  ALT  7   /  AlkPhos  66        CARDIAC MARKERS ( 2019 15:00 )  x     / 0.13 ng/mL / 89 U/L / x     / 3.8 ng/mL  CARDIAC MARKERS ( 2019 09:40 )  x     / 0.14 ng/mL / 103 U/L / x     / 4.6 ng/mL      CAPILLARY BLOOD GLUCOSE      POCT Blood Glucose.: 135 mg/dL (2019 21:36)    PT/INR - ( 2019 15:00 )   PT: 12.80 sec;   INR: 1.11 ratio         PTT - ( 2019 15:00 )  PTT:30.5 sec  Urinalysis Basic - ( 2019 06:28 )    Color: Red / Appearance: Turbid / S.020 / pH: x  Gluc: x / Ketone: 15  / Bili: Large / Urobili: 1.0 mg/dL   Blood: x / Protein: 100 mg/dL / Nitrite: Positive   Leuk Esterase: Large / RBC: >50 /HPF / WBC x   Sq Epi: x / Non Sq Epi: x / Bacteria: x      CULTURES:  Culture Results:   No growth ( @ 11:00)      PHYSICAL EXAM:    General: No acute distress.  intubated and sedated  HEENT: Pupils equal, reactive to light symmetrically. does not follow commands/track  PULM: Clear to auscultation bilaterally with decreased breath sounds, b/l bases, thick secretions noted; no crackles (But body habitus limiting)  CVS: Irregular rhythm, controlled rate, frequent PVCs noted on monitor, very soft systolic murmur at apex  ABD: Obese, Soft, nondistended, nontender, no masses.  EXT: 1+ pitting on top of diffuse non-putting edema to mid shin with small ulcerated sores noted (nothing open or draining)  SKIN: Warm and well perfused, bronze discoloration of b/l legs noted    RADIOLOGY:     < from: Transthoracic Echocardiogram (19 @ 13:35) >    Summary:   1. Normal left ventricular internal cavity size.   2. Mildly enlarged left atrium.   3. Normal right atrial size.   4. There is no evidence of pericardial effusion.   5. Thickening and calcification of the anterior and posterior mitral   valve leaflets.   6. Moderate tricuspid regurgitation.   7. Pulmonic valve regurgitation.      < end of copied text >

## 2019-02-19 NOTE — CONSULT NOTE ADULT - ASSESSMENT
82 y/o M PMHx DMII, HTN, Hydrocephalus s/p  shunt, Afib not on AC (multiple falls) presents to our ED with c/o mechanical fall, admitted for PNA. Hospital stay complicated by TOBIAS and worsening respiratory status requiring Intubation.    IMPRESSION:  # Frequent PVCs with NSVT, (NM stress in Dec negative)  # Afib- HR 90s  # Respiratory failure- intubated  # Hypernatremia  # TOBIAS  # Elevated troponin  # PNA/UTI      PLAN:  -monitor and correct electrolytes (sodium)  -troponin downtrending, likely related to TOBIAS/PNA (CKMB/CK negative)  -no AC given falls  -c/w IV Abx    The above is an interim resident provided plan to be d/w attending.  Final recs to follow. 84 y/o M PMHx DMII, HTN, Hydrocephalus s/p  shunt, Afib not on AC (multiple falls) presents to our ED with c/o mechanical fall, admitted for PNA. Hospital stay complicated by TOBIAS and worsening respiratory status requiring Intubation.    IMPRESSION:  # Frequent PVCs with NSVT, (NM stress in Dec negative)  # Afib- HR 90s  # Respiratory failure- intubated  # Hypernatremia  # TOBIAS  # Elevated troponin  # PNA/UTI      PLAN:  -monitor and correct electrolytes  -start metoprolol 25mg BID (can increase to q8 if needed)  -troponin downtrending, likely related to TOBIAS/PNA (CKMB/CK negative)  -no AC given falls  -c/w IV Abx    The above is an interim resident provided plan to be d/w attending.  Final recs to follow.

## 2019-02-19 NOTE — PROGRESS NOTE ADULT - ASSESSMENT
IMPRESSION:    Acute hypoxic respiratory failure s/p intubation  CHF exacerbation  RLL PNA ?Asp  JOHANNY/OHS not compliant with CPAP  TOBIAS on CKd  HO of Afib not on AC  HO  shunt      PLAN:    CNS: sedation vacation add precedex in need sedation     HEENT: Oral care    PULMONARY:  HOB @ 45 degrees. decrease rate to 18  send deep tracheal aspiration   CARDIOVASCULAR: Keep I<O continue lasix once daily keep negative max 1 liter     GI: GI prophylaxis. NGT feeding  colace senna   RENAL:  Follow up lytes.  Correct as needed. Nephrology follow up,     INFECTIOUS DISEASE: Follow up cultures.  zosyn  deep tracheal aspiration   pan cx   HEMATOLOGICAL:  DVT prophylaxis    ENDOCRINE:  Follow up FS. Will benefit form continuous monitoring in the MICU    Overall prognosis guarded.    Advanced Directives

## 2019-02-19 NOTE — PROGRESS NOTE ADULT - ASSESSMENT
# Acute hypoxic respiratory failure 2/2 Aspiration PNA vs pulmonary edema  - intubated, sedated with Versed and Fentanyl  - CXR shows unchanged bibasilar opacities/effusions and interstitial edema  - ABG 7.4/51/197/32 post unit transfer  - ECHO 2/16: normal EF  - initially on Rocephin and azithromycin; now on Zosyn  - c/w IV lasix 80mg BID   - Follow up ID consult - sent procal, bronch?    # TOBIAS likely acute tubular injury - worsening  - baseline Cr ~2, now 3.1  - likely ACE-I use and NSAIDs  - Nephro on board: c/w Lasix IV 80mg BID, continue to hold ACEI and NSAIDs  - negative 2L overnight  - Avoid nephrotoxic drugs  - Check BMP daily    # Acute cystitis with hematuria  - c/w Zosyn    # Tropnemia likely 2/2 hypotension vs acute kidney injury, stable   - No chest pain, no acute ST elevation in EKG  - CKMB negative and trop trending down from 0.2 -> 0.14 > 0.13    # Recurrent falls  - Likely multifactorial, physical deconditioning, medication induced hypotension, and peripheral neuropathy  - Trauma work up negative for acute fracture, but show age-indeterminate compression fracture of L2  - PT/consult pending    # Chronic afib  - CHADsVASC 4 (Age, HTN, DM)  - Currently rate controlled, not on BB  - Not on AC due to recurrent falls    # Hypernatremia  - Asymptomatic  - BMP daily, if >150 consider gentle free water    # HTN: SBP goal 120-150  - Hold indapamide, amlodipine, Ramipril  - Monitor vitals, avoid hypotension    # DM II:   - Hga1C pending (10.5 in 12/5/18)  - Start basal/bolus 10 and 3/3/3  - Monitor FS    #  shunt:   - Recently evaluated by Dr. Wayne 12/6/18, deemed to be chronically in place  - not manipulated in 10yrs, follows in [facility], non-programmable   - No signs of hydrocephalus in CT    #MISC:  - DVT ppx: SubQ heparin  - GI ppx: PPI  - Diet: NPO for now  - Activity: increase as tolerated  - Code status: Full code, family aware of poor prognosis, they want to proceed with intubation, but will decided on other aggressive measures such as HD as the situation arises # Acute hypoxic respiratory failure 2/2 Aspiration PNA vs pulmonary edema  - intubated, sedated with precedex and Fentanyl  - CXR shows persistent bibasilar opacities/effusions and interstitial edema  - ABG 7.4/51/197/32 post unit transfer  - ECHO 2/16: normal EF  - initially on Rocephin and azithromycin; now on Zosyn  - goal UOP neg 1L, to give 2nd dose of lasix depending on out-put in the evening  - Follow up ID consult - sent procal, bronch?    # TOBIAS likely acute tubular injury  - baseline Cr ~2, now 3.1 (stable)  - likely ACE-I use and NSAIDs  - Nephro on board: dose lasix to neg 1L, add free water, continue to hold ACEI and NSAIDs  - negative 2L overnight  - Avoid nephrotoxic drugs    # Hypernatremia  - Asymptomatic  - add 250 q6h free water to OG tube    # Acute cystitis with hematuria  - c/w Zosyn    # Tropnemia likely 2/2 hypotension vs acute kidney injury, stable   - No chest pain, no acute ST elevation in EKG  - CKMB negative and trop trending down from 0.2 -> 0.14 > 0.13    # Recurrent falls  - Likely multifactorial, physical deconditioning, medication induced hypotension, and peripheral neuropathy  - Trauma work up negative for acute fracture, but show age-indeterminate compression fracture of L2  - PT/consult pending    # Chronic afib  - CHADsVASC 4 (Age, HTN, DM)  - Currently rate controlled, not on BB  - Not on AC due to recurrent falls    # HTN: SBP goal 120-150, BP low at time  - Hold indapamide, amlodipine, Ramipril  - Monitor vitals, avoid hypotension    # DM II:   - Hga1C pending (10.5 in 12/5/18)  - Start basal/bolus 10 and 3/3/3  - Monitor FS    #  shunt:   - Recently evaluated by Dr. Wayne 12/6/18, deemed to be chronically in place  - not manipulated in 10yrs, follows in [facility], non-programmable   - No signs of hydrocephalus in CT    #MISC:  - DVT ppx: SubQ heparin  - GI ppx: PPI  - Diet: tube feeds  - Activity: increase as tolerated  - Code status: Full code, family aware of poor prognosis, they want to proceed with intubation, but will decided on other aggressive measures such as HD as the situation arises

## 2019-02-19 NOTE — CONSULT NOTE ADULT - SUBJECTIVE AND OBJECTIVE BOX
Date of Admission: 02-15-19    CHIEF COMPLAINT:     HISTORY OF PRESENT ILLNESS:   82 yo M PMHx of DMII, HTN,  Hydrocephalus s/p  shunt, Afib not on AC (multiple falls) presents to our ED with c/o mechanical fall. According to patient he was trying to walk with a walker and slipped on the floor, no nausea, vomiting, dizziness, chest pain, palpitations, seizure like episode, no LOC. He had 4 falls in last 10 days as per family. He was recently discharged from the hospital Dec for dizziness with stress test and  shunt assessment that was normal. At baseline he has home aid, takes his medications and walks very minimal with the help of cane or walker. However for last few months he has had dec ambulation and worsening LE edema. As per family, also worsening lethargy. Trauma work-up neg (age unknown L2 fx)    In the ED BP 83/45, HR 78/min, Sao2 100% on NC, afebrile. He was given 2L RL and his repeat BP was 110/80. Initial CXR only shows cardiomegaly without lobar consolidation. Patient later desatted to 85% on 3L NC so he was placed on BIPAP. Repeated CXR shows new consolidation at right lung base, suspecting PNA, so patient was started on Rocephin and Azithromycin. Labs showed TOBIAS with Cr 3.2 (baseline 2) and dec UOP. Pt also noted to be on NSAIDs and ACE, Actos so likely TOBIAS 2/2 dehydration and meds. On 2/18 pt was unable to tolerate NC and moved to BIPAP with ABG showing acute hypoxic resp failure and hypercapnia (likely chronic?). CXR at this time showed worsening pulm vascular congestion. Given x2 IV 40 lasix which did not improve UOP. Was on BIPAP for 4hr, became progressively lethargic to pt was intubated.    Admitted to CCU, barragan noted to be out-of-place (likely pt pulled out) so advanced with a sharp increase in out-put. CXR remained congested b/l bases and tolerated intubation well. Kidneys showed improvement after aggressive diuresis but remained on 60% O2.     Interval Hx  In CCU pt noticed to have mostly controlled heart rate with frequent PVCs. Labs significant for increasing Hypernatremia, stable TOBIAS, normalization of WBCs, downtrending Troponin with normal CKBM/CK,      PAST MEDICAL & SURGICAL HISTORY  Hypertension  Hydrocephalus  CKD (chronic kidney disease)  DM (diabetes mellitus)  Afib  History of prostate surgery      FAMILY HISTORY:  FAMILY HISTORY:  Family history of diabetes mellitus (Mother, Sibling)      SOCIAL HISTORY:  []smoker  []Alcohol  []Drug    ROS:  Negative except as mentioned in HPI    ALLERGIES:  No Known Allergies      MEDICATIONS:  MEDICATIONS  (STANDING):  atorvastatin 20 milliGRAM(s) Oral at bedtime  calcium carbonate 1250 mG  + Vitamin D (OsCal 500 + D) 1 Tablet(s) Oral three times a day  chlorhexidine 0.12% Liquid 15 milliLiter(s) Oral Mucosa every 12 hours  chlorhexidine 4% Liquid 1 Application(s) Topical <User Schedule>  dexmedetomidine Infusion 0.2 MICROgram(s)/kG/Hr (6.7 mL/Hr) IV Continuous <Continuous>  dextrose 5%. 1000 milliLiter(s) (50 mL/Hr) IV Continuous <Continuous>  dextrose 50% Injectable 12.5 Gram(s) IV Push once  dextrose 50% Injectable 25 Gram(s) IV Push once  dextrose 50% Injectable 25 Gram(s) IV Push once  docusate sodium Liquid 100 milliGRAM(s) Oral three times a day  fentaNYL   Infusion. 0.5 MICROgram(s)/kG/Hr (1.35 mL/Hr) IV Continuous <Continuous>  guaiFENesin  milliGRAM(s) Oral every 12 hours  heparin  Injectable 5000 Unit(s) SubCutaneous every 8 hours  insulin glargine Injectable (LANTUS) 10 Unit(s) SubCutaneous at bedtime  insulin lispro Injectable (HumaLOG) 3 Unit(s) SubCutaneous three times a day before meals  nystatin Powder 1 Application(s) Topical three times a day  pantoprazole  Injectable 40 milliGRAM(s) IV Push daily  piperacillin/tazobactam IVPB. 2.25 Gram(s) IV Intermittent every 8 hours  senna 2 Tablet(s) Oral at bedtime  tamsulosin 0.4 milliGRAM(s) Oral at bedtime    MEDICATIONS  (PRN):  acetaminophen   Tablet .. 650 milliGRAM(s) Oral every 6 hours PRN Moderate Pain (4 - 6)  ALBUTerol/ipratropium for Nebulization 3 milliLiter(s) Nebulizer every 6 hours PRN Shortness of Breath and/or Wheezing  dextrose 40% Gel 15 Gram(s) Oral once PRN Blood Glucose LESS THAN 70 milliGRAM(s)/deciliter  glucagon  Injectable 1 milliGRAM(s) IntraMuscular once PRN Glucose LESS THAN 70 milligrams/deciliter      HOME MEDICATIONS:  Home Medications:  amLODIPine 5 mg oral tablet: 1 tab(s) orally 2 times a day (15 Feb 2019 08:59)  indapamide 2.5 mg oral tablet: 1 tab(s) orally once a day (in the morning) (15 Feb 2019 08:59)  Lantus Solostar Pen 100 units/mL subcutaneous solution: 20 unit(s) subcutaneous once a day (at bedtime) (15 Feb 2019 08:59)  Lasix 40 mg oral tablet: 1 tab(s) orally 2 times a day (15 Feb 2019 08:59)  pioglitazone 30 mg oral tablet: 1 tab(s) orally once a day (15 Feb 2019 08:59)  semaglutide 2 mg/1.5 mL subcutaneous solution: 20  subcutaneous once a week (15 Feb 2019 08:59)      VITALS:   T(F): 98.8 (02-19 @ 14:00), Max: 99.7 (02-18 @ 17:00)  HR: 104 (02-19 @ 14:00) (60 - 120)  BP: 136/60 (02-19 @ 14:00) (95/52 - 150/60)  BP(mean): 80 (02-19 @ 14:00) (74 - 105)  RR: 18 (02-19 @ 14:00) (17 - 45)  SpO2: 99% (02-19 @ 14:00) (87% - 100%)    I&O's Summary    18 Feb 2019 07:01  -  19 Feb 2019 07:00  --------------------------------------------------------  IN: 440.6 mL / OUT: 2784 mL / NET: -2343.4 mL    19 Feb 2019 07:01  -  19 Feb 2019 14:30  --------------------------------------------------------  IN: 412.9 mL / OUT: 935 mL / NET: -522.1 mL        PHYSICAL EXAM:  GEN: Intubated, sedated   HEENT: NCAT, PERRL  LUNGS: Mechanical breath sounds clear to auscultation bilaterally (anterior auscultation)    CARDIOVASCULAR: Irregularly irregular, S1/S2 present, no murmurs, rubs or gallops, no JVD, + PP bilaterally  ABD: Obese, Soft, non-tender, non-distended, bowel sounds present  EXT: 1+ pitting edema bilaterally to level of the knees  SKIN: multiple skin tears, and healed/healing abrasions on UE   NEURO: sedated, withdraws to pain    LABS:                        9.9    8.72  )-----------( 145      ( 19 Feb 2019 04:35 )             33.1     02-19    154<H>  |  106  |  93<HH>  ----------------------------<  101<H>  3.7   |  29  |  3.0<H>    Ca    8.0<L>      19 Feb 2019 04:35  Mg     2.6     02-19    TPro  5.5<L>  /  Alb  3.4<L>  /  TBili  0.6  /  DBili  x   /  AST  10  /  ALT  7   /  AlkPhos  66  02-19    PT/INR - ( 18 Feb 2019 15:00 )   PT: 12.80 sec;   INR: 1.11 ratio         PTT - ( 18 Feb 2019 15:00 )  PTT:30.5 sec  Creatine Kinase, Serum: 89 U/L (02-18-19 @ 15:00)  Troponin T, Serum: 0.13 ng/mL <HH> (02-18-19 @ 15:00)    Troponin 0.13, CKMB 3.8, CK 89/ 02-18-19 @ 15:00  Troponin 0.14, CKMB 4.6, / 02-18-19 @ 09:40  Troponin 0.20, CKMB 3.6, / 02-16-19 @ 16:49  Troponin 0.19, CKMB 5.4, / 02-15-19 @ 12:08  Troponin 0.19, CKMB --, CK --/ 02-15-19 @ 02:48        Hemoglobin A1C Hemoglobin A1C with Mean Plasma Glucose (12.04.18 @ 07:33)    Hemoglobin A1C, Whole Blood: 11.5    Thyroid      RADIOLOGY:  -CXR:  < from: Xray Chest 1 View- PORTABLE-Routine (02.19.19 @ 05:34) >  INTERPRETATION:  Clinical History / Reason for exam: Intubated patient.  Comparison : Chest radiograph February 18, 2019.  Technique/Positioning: Satisfactory.    Findings:  Support devices: None.  Cardiac/mediastinum/hilum: Enlarged cardiac silhouette.  Lung parenchyma/Pleura: Worsening pulmonary vascular congestion,   bilateral pleural effusions and bibasilar airspace opacities. No   pneumothorax.  Skeleton/soft tissues: Unchanged.    Impression:    Enlarged cardiac silhouette and worsening pulmonary vascular congestion,   bilateral pleural effusions and bibasilar opacities.  < end of copied text >    -TTE:  < from: Transthoracic Echocardiogram (02.16.19 @ 13:35) >  PHYSICIAN INTERPRETATION:  Left Ventricle: The left ventricular internal cavity size is normal. Left   ventricular wall thickness is normal. LV Ejection Fraction by Lemon's   Method 55-60%.  Right Ventricle: The right ventricular size is normal. RV systolic   function isnormal.  Left Atrium: Mildly enlarged left atrium.  Right Atrium: Normal right atrial size.  Pericardium: There is no evidence of pericardial effusion.  Mitral Valve: The mitral valve is normal in structure. Thickening and   calcification of the anterior and posterior mitral valve leaflets.  Tricuspid Valve: Moderate tricuspid regurgitation is visualized. The   tricuspid valve is normal.  Aortic Valve: No evidence of aortic valve regurgitation is seen. The   aortic valve is trileaflet. Peak Av velocity is 2.16 m/s, mean   transaortic gradient equals 10.2 mmHg, the calculated aortic valve area   equals 2.10 cm² by the continuity equation consistent with mild aortic   stenosis.  Pulmonic Valve: Mild pulmonic valve regurgitation. The pulmonic valve is   thickened with good excursion.  Aorta: Aortic root measured at Sinus of Valsalva is normal.  < end of copied text >    -CCTA:  -STRESS TEST:  < from: NM Nuclear Stress Pharmacologic Multiple (12.06.18 @ 14:16) >  Impression:   1. NORMAL ADENOSINE / REST MYOCARDIAL PERFUSION TOMOGRAPHY, WITH NO   EVIDENCE FOR ISCHEMIA DURING ADENOSINE INFUSION.   2. BORDERLINE NORMAL RESTING LEFT VENTRICULAR WALL MOTION AND WALL   THICKENING.  3. LEFT VENTRICULAR EJECTION FRACTION OF   49 % WHICH IS JUST BELOW THE   LOWER LIMIT OF NORMAL OF 50%. WALL MOTION ANALYSIS SUGGESTS EJECTION   FRACTION OF 55%. ECHOCARDIOGRAPHIC ESTIMATED EJECTION FRACTION WAS 54% ON   12/4/2018.   < end of copied text >    -CATHETERIZATION:    ECG:  < from: 12 Lead ECG (02.19.19 @ 07:51) >  Ventricular Rate 84 BPM  Atrial Rate 45 BPM  QRS Duration 102 ms  Q-T Interval 352 ms  QTC Calculation(Bezet) 415 ms  R Axis 35 degrees  T Axis -76 degrees  Diagnosis Line Undetermined rhythm  Low voltage QRS  Nonspecific ST and T wave abnormality  Abnormal ECG  < end of copied text >    TELEMETRY EVENTS:  frequent PVCs, single run of 7 beats VT Date of Admission: 02-15-19    CHIEF COMPLAINT:     HISTORY OF PRESENT ILLNESS:   82 yo M PMHx of DMII, HTN,  Hydrocephalus s/p  shunt, Afib not on AC (multiple falls) presents to our ED with c/o mechanical fall. According to patient he was trying to walk with a walker and slipped on the floor, no nausea, vomiting, dizziness, chest pain, palpitations, seizure like episode, no LOC. He had 4 falls in last 10 days as per family. He was recently discharged from the hospital Dec for dizziness with stress test and  shunt assessment that was normal. At baseline he has home aid, takes his medications and walks very minimal with the help of cane or walker. However for last few months he has had dec ambulation and worsening LE edema. As per family, also worsening lethargy. Trauma work-up neg (age unknown L2 fx)    In the ED BP 83/45, HR 78/min, Sao2 100% on NC, afebrile. He was given 2L RL and his repeat BP was 110/80. Initial CXR only shows cardiomegaly without lobar consolidation. Patient later desatted to 85% on 3L NC so he was placed on BIPAP. Repeated CXR shows new consolidation at right lung base, suspecting PNA, so patient was started on Rocephin and Azithromycin. Labs showed TOBIAS with Cr 3.2 (baseline 2) and dec UOP. Pt also noted to be on NSAIDs and ACE, Actos so likely TOBIAS 2/2 dehydration and meds. On 2/18 pt was unable to tolerate NC and moved to BIPAP with ABG showing acute hypoxic resp failure and hypercapnia (likely chronic?). CXR at this time showed worsening pulm vascular congestion. Given x2 IV 40 lasix which did not improve UOP. Was on BIPAP for 4hr, became progressively lethargic to pt was intubated.    Admitted to CCU, barragan noted to be out-of-place (likely pt pulled out) so advanced with a sharp increase in out-put. CXR remained congested b/l bases and tolerated intubation well. Kidneys showed improvement after aggressive diuresis but remained on 60% O2.     Interval Hx  In CCU pt noticed to have mostly controlled heart rate with frequent PVCs. Labs significant for increasing Hypernatremia, stable TOBIAS, normalization of WBCs, downtrending Troponin with normal CKBM/CK,      PAST MEDICAL & SURGICAL HISTORY  Hypertension  Hydrocephalus  CKD (chronic kidney disease)  DM (diabetes mellitus)  Afib  History of prostate surgery      FAMILY HISTORY:  FAMILY HISTORY:  Family history of diabetes mellitus (Mother, Sibling)      SOCIAL HISTORY:  []smoker  []Alcohol  []Drug    ROS:  Negative except as mentioned in HPI    ALLERGIES:  No Known Allergies      MEDICATIONS:  MEDICATIONS  (STANDING):  atorvastatin 20 milliGRAM(s) Oral at bedtime  calcium carbonate 1250 mG  + Vitamin D (OsCal 500 + D) 1 Tablet(s) Oral three times a day  chlorhexidine 0.12% Liquid 15 milliLiter(s) Oral Mucosa every 12 hours  chlorhexidine 4% Liquid 1 Application(s) Topical <User Schedule>  dexmedetomidine Infusion 0.2 MICROgram(s)/kG/Hr (6.7 mL/Hr) IV Continuous <Continuous>  dextrose 5%. 1000 milliLiter(s) (50 mL/Hr) IV Continuous <Continuous>  dextrose 50% Injectable 12.5 Gram(s) IV Push once  dextrose 50% Injectable 25 Gram(s) IV Push once  dextrose 50% Injectable 25 Gram(s) IV Push once  docusate sodium Liquid 100 milliGRAM(s) Oral three times a day  fentaNYL   Infusion. 0.5 MICROgram(s)/kG/Hr (1.35 mL/Hr) IV Continuous <Continuous>  guaiFENesin  milliGRAM(s) Oral every 12 hours  heparin  Injectable 5000 Unit(s) SubCutaneous every 8 hours  insulin glargine Injectable (LANTUS) 10 Unit(s) SubCutaneous at bedtime  insulin lispro Injectable (HumaLOG) 3 Unit(s) SubCutaneous three times a day before meals  nystatin Powder 1 Application(s) Topical three times a day  pantoprazole  Injectable 40 milliGRAM(s) IV Push daily  piperacillin/tazobactam IVPB. 2.25 Gram(s) IV Intermittent every 8 hours  senna 2 Tablet(s) Oral at bedtime  tamsulosin 0.4 milliGRAM(s) Oral at bedtime    MEDICATIONS  (PRN):  acetaminophen   Tablet .. 650 milliGRAM(s) Oral every 6 hours PRN Moderate Pain (4 - 6)  ALBUTerol/ipratropium for Nebulization 3 milliLiter(s) Nebulizer every 6 hours PRN Shortness of Breath and/or Wheezing  dextrose 40% Gel 15 Gram(s) Oral once PRN Blood Glucose LESS THAN 70 milliGRAM(s)/deciliter  glucagon  Injectable 1 milliGRAM(s) IntraMuscular once PRN Glucose LESS THAN 70 milligrams/deciliter      HOME MEDICATIONS:  Home Medications:  amLODIPine 5 mg oral tablet: 1 tab(s) orally 2 times a day (15 Feb 2019 08:59)  indapamide 2.5 mg oral tablet: 1 tab(s) orally once a day (in the morning) (15 Feb 2019 08:59)  Lantus Solostar Pen 100 units/mL subcutaneous solution: 20 unit(s) subcutaneous once a day (at bedtime) (15 Feb 2019 08:59)  Lasix 40 mg oral tablet: 1 tab(s) orally 2 times a day (15 Feb 2019 08:59)  pioglitazone 30 mg oral tablet: 1 tab(s) orally once a day (15 Feb 2019 08:59)  semaglutide 2 mg/1.5 mL subcutaneous solution: 20  subcutaneous once a week (15 Feb 2019 08:59)      VITALS:   T(F): 98.8 (02-19 @ 14:00), Max: 99.7 (02-18 @ 17:00)  HR: 104 (02-19 @ 14:00) (60 - 120)  BP: 136/60 (02-19 @ 14:00) (95/52 - 150/60)  BP(mean): 80 (02-19 @ 14:00) (74 - 105)  RR: 18 (02-19 @ 14:00) (17 - 45)  SpO2: 99% (02-19 @ 14:00) (87% - 100%)    I&O's Summary    18 Feb 2019 07:01  -  19 Feb 2019 07:00  --------------------------------------------------------  IN: 440.6 mL / OUT: 2784 mL / NET: -2343.4 mL    19 Feb 2019 07:01  -  19 Feb 2019 14:30  --------------------------------------------------------  IN: 412.9 mL / OUT: 935 mL / NET: -522.1 mL        PHYSICAL EXAM:  GEN: Intubated, sedated   HEENT: NCAT, PERRL  LUNGS: Mechanical breath sounds bilaterally (anterior auscultation)    CARDIOVASCULAR: Irregularly irregular, S1/S2 present, no murmurs, rubs or gallops, no JVD, + PP bilaterally  ABD: Obese, Soft, non-tender, non-distended, bowel sounds present  EXT: 1+ pitting edema bilaterally to level of the knees  SKIN: multiple skin tears, and healed/healing abrasions on UE   NEURO: sedated, withdraws to pain    LABS:                        9.9    8.72  )-----------( 145      ( 19 Feb 2019 04:35 )             33.1     02-19    154<H>  |  106  |  93<HH>  ----------------------------<  101<H>  3.7   |  29  |  3.0<H>    Ca    8.0<L>      19 Feb 2019 04:35  Mg     2.6     02-19    TPro  5.5<L>  /  Alb  3.4<L>  /  TBili  0.6  /  DBili  x   /  AST  10  /  ALT  7   /  AlkPhos  66  02-19    PT/INR - ( 18 Feb 2019 15:00 )   PT: 12.80 sec;   INR: 1.11 ratio         PTT - ( 18 Feb 2019 15:00 )  PTT:30.5 sec  Creatine Kinase, Serum: 89 U/L (02-18-19 @ 15:00)  Troponin T, Serum: 0.13 ng/mL <HH> (02-18-19 @ 15:00)    Troponin 0.13, CKMB 3.8, CK 89/ 02-18-19 @ 15:00  Troponin 0.14, CKMB 4.6, / 02-18-19 @ 09:40  Troponin 0.20, CKMB 3.6, / 02-16-19 @ 16:49  Troponin 0.19, CKMB 5.4, / 02-15-19 @ 12:08  Troponin 0.19, CKMB --, CK --/ 02-15-19 @ 02:48        Hemoglobin A1C Hemoglobin A1C with Mean Plasma Glucose (12.04.18 @ 07:33)    Hemoglobin A1C, Whole Blood: 11.5    Thyroid      RADIOLOGY:  -CXR:  < from: Xray Chest 1 View- PORTABLE-Routine (02.19.19 @ 05:34) >  INTERPRETATION:  Clinical History / Reason for exam: Intubated patient.  Comparison : Chest radiograph February 18, 2019.  Technique/Positioning: Satisfactory.    Findings:  Support devices: None.  Cardiac/mediastinum/hilum: Enlarged cardiac silhouette.  Lung parenchyma/Pleura: Worsening pulmonary vascular congestion,   bilateral pleural effusions and bibasilar airspace opacities. No   pneumothorax.  Skeleton/soft tissues: Unchanged.    Impression:    Enlarged cardiac silhouette and worsening pulmonary vascular congestion,   bilateral pleural effusions and bibasilar opacities.  < end of copied text >    -TTE:  < from: Transthoracic Echocardiogram (02.16.19 @ 13:35) >  PHYSICIAN INTERPRETATION:  Left Ventricle: The left ventricular internal cavity size is normal. Left   ventricular wall thickness is normal. LV Ejection Fraction by Lemon's   Method 55-60%.  Right Ventricle: The right ventricular size is normal. RV systolic   function isnormal.  Left Atrium: Mildly enlarged left atrium.  Right Atrium: Normal right atrial size.  Pericardium: There is no evidence of pericardial effusion.  Mitral Valve: The mitral valve is normal in structure. Thickening and   calcification of the anterior and posterior mitral valve leaflets.  Tricuspid Valve: Moderate tricuspid regurgitation is visualized. The   tricuspid valve is normal.  Aortic Valve: No evidence of aortic valve regurgitation is seen. The   aortic valve is trileaflet. Peak Av velocity is 2.16 m/s, mean   transaortic gradient equals 10.2 mmHg, the calculated aortic valve area   equals 2.10 cm² by the continuity equation consistent with mild aortic   stenosis.  Pulmonic Valve: Mild pulmonic valve regurgitation. The pulmonic valve is   thickened with good excursion.  Aorta: Aortic root measured at Sinus of Valsalva is normal.  < end of copied text >    -CCTA:  -STRESS TEST:  < from: NM Nuclear Stress Pharmacologic Multiple (12.06.18 @ 14:16) >  Impression:   1. NORMAL ADENOSINE / REST MYOCARDIAL PERFUSION TOMOGRAPHY, WITH NO   EVIDENCE FOR ISCHEMIA DURING ADENOSINE INFUSION.   2. BORDERLINE NORMAL RESTING LEFT VENTRICULAR WALL MOTION AND WALL   THICKENING.  3. LEFT VENTRICULAR EJECTION FRACTION OF   49 % WHICH IS JUST BELOW THE   LOWER LIMIT OF NORMAL OF 50%. WALL MOTION ANALYSIS SUGGESTS EJECTION   FRACTION OF 55%. ECHOCARDIOGRAPHIC ESTIMATED EJECTION FRACTION WAS 54% ON   12/4/2018.   < end of copied text >    -CATHETERIZATION:    ECG:  < from: 12 Lead ECG (02.19.19 @ 07:51) >  Ventricular Rate 84 BPM  Atrial Rate 45 BPM  QRS Duration 102 ms  Q-T Interval 352 ms  QTC Calculation(Bezet) 415 ms  R Axis 35 degrees  T Axis -76 degrees  Diagnosis Line Undetermined rhythm  Low voltage QRS  Nonspecific ST and T wave abnormality  Abnormal ECG  < end of copied text >    TELEMETRY EVENTS:  frequent PVCs, single run of 7 beats VT

## 2019-02-19 NOTE — PROGRESS NOTE ADULT - ASSESSMENT
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/2018)  urine retention s/p prostatectomy   hypotension - better    falls  ARF - JOHANNY on CPAP / PNA / CHF  EF 55%  afib  HTN   s/p  shunt  DM 2       plan:    ICU care  cont current iv lasix  add D5W @ 50 cc/hr   keep negative balance 1-2L today  off bp meds and ACE-I  vent support  cmp, mg, phos Q12h  d/w team and family    critical care 35min 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

## 2019-02-19 NOTE — PROGRESS NOTE ADULT - SUBJECTIVE AND OBJECTIVE BOX
NEPHROLOGY FOLLOW UP NOTE    pt seen and examined in icu  critcally ill  vented  not hypotensive  barragan readjusted -> now with improved UOP     PAST MEDICAL & SURGICAL HISTORY:  Hypertension  Hydrocephalus  CKD (chronic kidney disease)  DM (diabetes mellitus)  Afib  History of prostate surgery    Allergies:  No Known Allergies    Home Medications Reviewed    SOCIAL HISTORY:  Denies ETOH,Smoking,   FAMILY HISTORY:  Family history of diabetes mellitus (Mother, Sibling)        REVIEW OF SYSTEMS  All other review of systems is negative unless indicated above.    PHYSICAL EXAM:  NAD  ETT 60%  dry mm  supple  decreased bs b/l  distant HS, irreg  soft  + edema  barragan -> pale     Hospital Medications:   MEDICATIONS  (STANDING):  atorvastatin 20 milliGRAM(s) Oral at bedtime  calcium carbonate 1250 mG  + Vitamin D (OsCal 500 + D) 1 Tablet(s) Oral three times a day  chlorhexidine 0.12% Liquid 15 milliLiter(s) Oral Mucosa every 12 hours  chlorhexidine 4% Liquid 1 Application(s) Topical <User Schedule>  dextrose 5%. 1000 milliLiter(s) (50 mL/Hr) IV Continuous <Continuous>  dextrose 50% Injectable 12.5 Gram(s) IV Push once  dextrose 50% Injectable 25 Gram(s) IV Push once  dextrose 50% Injectable 25 Gram(s) IV Push once  fentaNYL   Infusion. 0.5 MICROgram(s)/kG/Hr (1.35 mL/Hr) IV Continuous <Continuous>  furosemide   Injectable 80 milliGRAM(s) IV Push every 12 hours  guaiFENesin  milliGRAM(s) Oral every 12 hours  heparin  Injectable 5000 Unit(s) SubCutaneous every 8 hours  insulin glargine Injectable (LANTUS) 10 Unit(s) SubCutaneous at bedtime  insulin lispro Injectable (HumaLOG) 3 Unit(s) SubCutaneous three times a day before meals  midazolam Infusion 0.02 mG/kG/Hr (2.7 mL/Hr) IV Continuous <Continuous>  nystatin Powder 1 Application(s) Topical three times a day  pantoprazole  Injectable 40 milliGRAM(s) IV Push daily  piperacillin/tazobactam IVPB. 2.25 Gram(s) IV Intermittent every 8 hours  propofol Infusion 5 MICROgram(s)/kG/Min (4.05 mL/Hr) IV Continuous <Continuous>  sodium chloride 0.9% Bolus 500 milliLiter(s) IV Bolus once  tamsulosin 0.4 milliGRAM(s) Oral at bedtime        VITALS:  T(F): 98.7 (19 @ 04:00), Max: 99.7 (19 @ 17:00)  HR: 77 (19 @ 07:36)  BP: 107/63 (19 @ 06:00)  RR: 19 (19 @ 06:00)  SpO2: 100% (19 @ 07:36)  Wt(kg): --     07:01  -   07:00  --------------------------------------------------------  IN: 0 mL / OUT: 175 mL / NET: -175 mL     07:01  -   07:00  --------------------------------------------------------  IN: 440.6 mL / OUT: 2559 mL / NET: -2118.4 mL      Height (cm): 180.34 ( 17:00)  Weight (kg): 134 ( 17:00)  BMI (kg/m2): 41.2 (:00)  BSA (m2): 2.49 (:00)    LABS:      154<H>  |  106  |  93<HH>  ----------------------------<  101<H>  3.7   |  29  |  3.0<H>    Ca    8.0<L>      2019 04:35  Mg     2.6         TPro  5.5<L>  /  Alb  3.4<L>  /  TBili  0.6  /  DBili      /  AST  10  /  ALT  7   /  AlkPhos  66                            9.9    8.72  )-----------( 145      ( 2019 04:35 )             33.1       Urine Studies:  Urinalysis Basic - ( 2019 06:28 )    Color: Red / Appearance: Turbid / S.020 / pH:   Gluc:  / Ketone: 15  / Bili: Large / Urobili: 1.0 mg/dL   Blood:  / Protein: 100 mg/dL / Nitrite: Positive   Leuk Esterase: Large / RBC: >50 /HPF / WBC    Sq Epi:  / Non Sq Epi:  / Bacteria:       Osmolality, Random Urine: 369 mos/kg ( @ 23:00)  Sodium, Random Urine: 39.0 mmoL/L ( @ 23:00)  Creatinine, Random Urine: 72 mg/dL ( @ 23:00)  Creatinine, Random Urine: 74 mg/dL ( @ 06:28)  Protein/Creatinine Ratio Calculation: 4.1 Ratio ( @ 06:28)  Sodium, Random Urine: 44.0 mmoL/L ( @ 06:28)  Osmolality, Random Urine: 362 mos/kg ( @ 06:28)  Sodium, Random Urine: 20.0 mmoL/L ( @ 11:00)  Creatinine, Random Urine: 28 mg/dL ( @ 11:00)      RADIOLOGY & ADDITIONAL STUDIES:

## 2019-02-19 NOTE — PROGRESS NOTE ADULT - SUBJECTIVE AND OBJECTIVE BOX
Patient is a 83y old  Male who presents with a chief complaint of Recurrent falls  TOBIAS (2019 08:36)      Over Night Events:  Patient seen and examined still vented on fentanyl no pressors       ROS:  See HPI    PHYSICAL EXAM    ICU Vital Signs Last 24 Hrs  T(C): 37.1 (2019 04:00), Max: 37.6 (2019 17:00)  T(F): 98.7 (2019 04:00), Max: 99.7 (2019 17:00)  HR: 77 (2019 07:36) (60 - 111)  BP: 107/63 (2019 06:00) (95/52 - 137/77)  BP(mean): 86 (2019 06:00) (74 - 90)  ABP: --  ABP(mean): --  RR: 19 (2019 06:00) (19 - 45)  SpO2: 100% (2019 07:36) (90% - 100%)      General: on sedation   HEENT:   Et tube              Lymph Nodes: NO cervical LN   Lungs: Bilateral BS  Cardiovascular: Regular   Abdomen: Soft, Positive BS  Extremities: No clubbing   Skin: warm   Neurological: open eyes   Musculoskeletal: move all ext     I&O's Detail    2019 07:01  -  2019 07:00  --------------------------------------------------------  IN:    fentaNYL Infusion.: 153.6 mL    IV PiggyBack: 200 mL    midazolam Infusion: 87 mL  Total IN: 440.6 mL    OUT:    Indwelling Catheter - Urethral: 2555 mL    Stool: 4 mL  Total OUT: 2559 mL    Total NET: -2118.4 mL          LABS:                          9.9    8.72  )-----------( 145      ( 2019 04:35 )             33.1         2019 04:35    154    |  106    |  93     ----------------------------<  101    3.7     |  29     |  3.0      Ca    8.0        2019 04:35  Mg     2.6       2019 04:35    TPro  5.5    /  Alb  3.4    /  TBili  0.6    /  DBili  x      /  AST  10     /  ALT  7      /  AlkPhos  66     2019 04:35  Amylase x     lipase x                                                 PT/INR - ( 2019 15:00 )   PT: 12.80 sec;   INR: 1.11 ratio         PTT - ( 2019 15:00 )  PTT:30.5 sec                                       Urinalysis Basic - ( 2019 06:28 )    Color: Red / Appearance: Turbid / S.020 / pH: x  Gluc: x / Ketone: 15  / Bili: Large / Urobili: 1.0 mg/dL   Blood: x / Protein: 100 mg/dL / Nitrite: Positive   Leuk Esterase: Large / RBC: >50 /HPF / WBC x   Sq Epi: x / Non Sq Epi: x / Bacteria: x          CARDIAC MARKERS ( 2019 15:00 )  x     / 0.13 ng/mL / 89 U/L / x     / 3.8 ng/mL  CARDIAC MARKERS ( 2019 09:40 )  x     / 0.14 ng/mL / 103 U/L / x     / 4.6 ng/mL                                                        Culture - Urine (collected 2019 06:28)  Source: .Urine Catheterized  Final Report (2019 07:31):    No growth    Culture - Urine (collected 2019 11:00)  Source: .Urine Clean Catch (Midstream)  Final Report (2019 12:15):    No growth                                                   Mode: AC/ CMV (Assist Control/ Continuous Mandatory Ventilation)  RR (machine): 20  TV (machine): 450  FiO2: 60  PEEP: 10  ITime: 1  MAP: 15  PIP: 26                                      ABG - ( 2019 07:24 )  pH, Arterial: 7.45  pH, Blood: x     /  pCO2: 47    /  pO2: 77    / HCO3: 32    / Base Excess: 7.1   /  SaO2: 96                  MEDICATIONS  (STANDING):  atorvastatin 20 milliGRAM(s) Oral at bedtime  calcium carbonate 1250 mG  + Vitamin D (OsCal 500 + D) 1 Tablet(s) Oral three times a day  chlorhexidine 0.12% Liquid 15 milliLiter(s) Oral Mucosa every 12 hours  chlorhexidine 4% Liquid 1 Application(s) Topical <User Schedule>  dextrose 5%. 1000 milliLiter(s) (50 mL/Hr) IV Continuous <Continuous>  dextrose 50% Injectable 12.5 Gram(s) IV Push once  dextrose 50% Injectable 25 Gram(s) IV Push once  dextrose 50% Injectable 25 Gram(s) IV Push once  fentaNYL   Infusion. 0.5 MICROgram(s)/kG/Hr (1.35 mL/Hr) IV Continuous <Continuous>  furosemide   Injectable 80 milliGRAM(s) IV Push every 12 hours  guaiFENesin  milliGRAM(s) Oral every 12 hours  heparin  Injectable 5000 Unit(s) SubCutaneous every 8 hours  insulin glargine Injectable (LANTUS) 10 Unit(s) SubCutaneous at bedtime  insulin lispro Injectable (HumaLOG) 3 Unit(s) SubCutaneous three times a day before meals  midazolam Infusion 0.02 mG/kG/Hr (2.7 mL/Hr) IV Continuous <Continuous>  nystatin Powder 1 Application(s) Topical three times a day  pantoprazole  Injectable 40 milliGRAM(s) IV Push daily  piperacillin/tazobactam IVPB. 2.25 Gram(s) IV Intermittent every 8 hours  propofol Infusion 5 MICROgram(s)/kG/Min (4.05 mL/Hr) IV Continuous <Continuous>  sodium chloride 0.9% Bolus 500 milliLiter(s) IV Bolus once  tamsulosin 0.4 milliGRAM(s) Oral at bedtime    MEDICATIONS  (PRN):  acetaminophen   Tablet .. 650 milliGRAM(s) Oral every 6 hours PRN Moderate Pain (4 - 6)  ALBUTerol/ipratropium for Nebulization 3 milliLiter(s) Nebulizer every 6 hours PRN Shortness of Breath and/or Wheezing  dextrose 40% Gel 15 Gram(s) Oral once PRN Blood Glucose LESS THAN 70 milliGRAM(s)/deciliter  glucagon  Injectable 1 milliGRAM(s) IntraMuscular once PRN Glucose LESS THAN 70 milligrams/deciliter          Xrays:  TLC:  OG:  ET tube:                                                                                    b/l effusion ?? opacity    ECHO:

## 2019-02-19 NOTE — PROGRESS NOTE ADULT - SUBJECTIVE AND OBJECTIVE BOX
DELVISVINCENZO  83y, Male  Allergy: No Known Allergies      HPI:  82 yo male coming from home with PMHx of DMII on insulin, HTN on meds, Hydrocephalus s/p  shunt, Afib not on AC (multiple falls) presents to our ED with c/o mechanical fall yesterday. According to patient he was trying to walk with a walker and slipped on the floor, no nausea, vomiting, dizziness, chest pain, palpitations, seizure like episode, no LOC. So according to the history patient is having mechanical fall. He had 4 falls since last 10 days. He was recently discharged from the hospital for dizziness and stress test and  shunt was normal. At baseline he has health aid, and he takes his medications and walks very minimal with the help of cane or walker. For the last few months he is not walking at all, increase B/L leg swelling.  In the ED BP was 83/45, HR 78/min, Sao2 100% on NC, afebrile. He was given 2l RL and his repeat BP was 110/80. Trauma work up was negative. (15 Feb 2019 08:39)  Patient started to have shortness of breath and wheezing. Overnight patient did not tolerate BIPAP as he kept trying to take it off. In the morning patient desaturatted to 89% on 3L and oxygenation did not improve with venturi mask. ABG was repeated and it shows acute hypoxic respiratory failure with chronic hypercapnia. Patient was placed on BIPAP, repeat CXR showed worsening pulmonary vascular congestion, there was minimal urine output with 2 pushes of 40 mg IV lasix. After 4 hours on Bipap he was becoming more lethargic, he was initially tachypneic, but looked like his respiratory muscles were fatiguing, and the decision was made to intubate him, he was intubated on 2019 on 2:30 PM.    FAMILY HISTORY:  Family history of diabetes mellitus (Mother, Sibling)    PAST MEDICAL & SURGICAL HISTORY:  Hypertension  Hydrocephalus  CKD (chronic kidney disease)  DM (diabetes mellitus)  Afib  History of prostate surgery        VITALS:  T(F): 98.7, Max: 99.7 (19 @ 17:00)  HR: 76  BP: 107/63  RR: 19Vital Signs Last 24 Hrs  T(C): 37.1 (2019 04:00), Max: 37.6 (2019 17:00)  T(F): 98.7 (2019 04:00), Max: 99.7 (2019 17:00)  HR: 76 (2019 06:00) (60 - 111)  BP: 107/63 (2019 06:00) (95/52 - 150/60)  BP(mean): 86 (2019 06:00) (74 - 90)  RR: 19 (2019 06:00) (19 - 45)  SpO2: 100% (2019 06:00) (87% - 100%)    TESTS & MEASUREMENTS:                        9.9    8.72  )-----------( 145      ( 2019 04:35 )             33.1         154<H>  |  106  |  93<HH>  ----------------------------<  101<H>  3.7   |  29  |  3.0<H>    Ca    8.0<L>      2019 04:35  Phos  6.2       Mg     2.6         TPro  5.5<L>  /  Alb  3.4<L>  /  TBili  0.6  /  DBili  x   /  AST  10  /  ALT  7   /  AlkPhos  66      LIVER FUNCTIONS - ( 2019 04:35 )  Alb: 3.4 g/dL / Pro: 5.5 g/dL / ALK PHOS: 66 U/L / ALT: 7 U/L / AST: 10 U/L / GGT: x             Culture - Urine (collected 19 @ 11:00)  Source: .Urine Clean Catch (Midstream)  Final Report (19 @ 12:15):    No growth      Urinalysis Basic - ( 2019 06:28 )    Color: Red / Appearance: Turbid / S.020 / pH: x  Gluc: x / Ketone: 15  / Bili: Large / Urobili: 1.0 mg/dL   Blood: x / Protein: 100 mg/dL / Nitrite: Positive   Leuk Esterase: Large / RBC: >50 /HPF / WBC x   Sq Epi: x / Non Sq Epi: x / Bacteria: x          RADIOLOGY & ADDITIONAL TESTS:    ANTIBIOTICS:  piperacillin/tazobactam IVPB. 2.25 Gram(s) IV Intermittent every 8 hours

## 2019-02-20 LAB
ALBUMIN SERPL ELPH-MCNC: 3.2 G/DL — LOW (ref 3.5–5.2)
ALP SERPL-CCNC: 71 U/L — SIGNIFICANT CHANGE UP (ref 30–115)
ALT FLD-CCNC: 7 U/L — SIGNIFICANT CHANGE UP (ref 0–41)
ANION GAP SERPL CALC-SCNC: 17 MMOL/L — HIGH (ref 7–14)
ANION GAP SERPL CALC-SCNC: 19 MMOL/L — HIGH (ref 7–14)
AST SERPL-CCNC: 16 U/L — SIGNIFICANT CHANGE UP (ref 0–41)
BASE EXCESS BLDA CALC-SCNC: 10.4 MMOL/L — HIGH (ref -2–2)
BASE EXCESS BLDA CALC-SCNC: 11.1 MMOL/L — HIGH (ref -2–2)
BASOPHILS # BLD AUTO: 0.02 K/UL — SIGNIFICANT CHANGE UP (ref 0–0.2)
BASOPHILS NFR BLD AUTO: 0.2 % — SIGNIFICANT CHANGE UP (ref 0–1)
BILIRUB SERPL-MCNC: 0.7 MG/DL — SIGNIFICANT CHANGE UP (ref 0.2–1.2)
BUN SERPL-MCNC: 89 MG/DL — CRITICAL HIGH (ref 10–20)
BUN SERPL-MCNC: 90 MG/DL — CRITICAL HIGH (ref 10–20)
CALCIUM SERPL-MCNC: 7.6 MG/DL — LOW (ref 8.5–10.1)
CALCIUM SERPL-MCNC: 7.7 MG/DL — LOW (ref 8.5–10.1)
CHLORIDE SERPL-SCNC: 103 MMOL/L — SIGNIFICANT CHANGE UP (ref 98–110)
CHLORIDE SERPL-SCNC: 105 MMOL/L — SIGNIFICANT CHANGE UP (ref 98–110)
CO2 SERPL-SCNC: 28 MMOL/L — SIGNIFICANT CHANGE UP (ref 17–32)
CO2 SERPL-SCNC: 30 MMOL/L — SIGNIFICANT CHANGE UP (ref 17–32)
CREAT SERPL-MCNC: 2.5 MG/DL — HIGH (ref 0.7–1.5)
CREAT SERPL-MCNC: 2.6 MG/DL — HIGH (ref 0.7–1.5)
EOSINOPHIL # BLD AUTO: 0.16 K/UL — SIGNIFICANT CHANGE UP (ref 0–0.7)
EOSINOPHIL NFR BLD AUTO: 1.8 % — SIGNIFICANT CHANGE UP (ref 0–8)
GLUCOSE BLDC GLUCOMTR-MCNC: 163 MG/DL — HIGH (ref 70–99)
GLUCOSE BLDC GLUCOMTR-MCNC: 181 MG/DL — HIGH (ref 70–99)
GLUCOSE BLDC GLUCOMTR-MCNC: 214 MG/DL — HIGH (ref 70–99)
GLUCOSE BLDC GLUCOMTR-MCNC: 215 MG/DL — HIGH (ref 70–99)
GLUCOSE SERPL-MCNC: 215 MG/DL — HIGH (ref 70–99)
GLUCOSE SERPL-MCNC: 98 MG/DL — SIGNIFICANT CHANGE UP (ref 70–99)
HCO3 BLDA-SCNC: 35 MMOL/L — HIGH (ref 23–27)
HCO3 BLDA-SCNC: 36 MMOL/L — HIGH (ref 23–27)
HCT VFR BLD CALC: 35.1 % — LOW (ref 42–52)
HGB BLD-MCNC: 10.5 G/DL — LOW (ref 14–18)
HOROWITZ INDEX BLDA+IHG-RTO: 40 — SIGNIFICANT CHANGE UP
HOROWITZ INDEX BLDA+IHG-RTO: 40 — SIGNIFICANT CHANGE UP
IMM GRANULOCYTES NFR BLD AUTO: 0.7 % — HIGH (ref 0.1–0.3)
LYMPHOCYTES # BLD AUTO: 0.99 K/UL — LOW (ref 1.2–3.4)
LYMPHOCYTES # BLD AUTO: 11.4 % — LOW (ref 20.5–51.1)
MAGNESIUM SERPL-MCNC: 2.4 MG/DL — SIGNIFICANT CHANGE UP (ref 1.8–2.4)
MCHC RBC-ENTMCNC: 24.9 PG — LOW (ref 27–31)
MCHC RBC-ENTMCNC: 29.9 G/DL — LOW (ref 32–37)
MCV RBC AUTO: 83.4 FL — SIGNIFICANT CHANGE UP (ref 80–94)
MONOCYTES # BLD AUTO: 0.57 K/UL — SIGNIFICANT CHANGE UP (ref 0.1–0.6)
MONOCYTES NFR BLD AUTO: 6.6 % — SIGNIFICANT CHANGE UP (ref 1.7–9.3)
NEUTROPHILS # BLD AUTO: 6.87 K/UL — HIGH (ref 1.4–6.5)
NEUTROPHILS NFR BLD AUTO: 79.3 % — HIGH (ref 42.2–75.2)
NRBC # BLD: 0 /100 WBCS — SIGNIFICANT CHANGE UP (ref 0–0)
PCO2 BLDA: 47 MMHG — HIGH (ref 38–42)
PCO2 BLDA: 48 MMHG — HIGH (ref 38–42)
PH BLDA: 7.48 — HIGH (ref 7.38–7.42)
PH BLDA: 7.49 — HIGH (ref 7.38–7.42)
PHOSPHATE SERPL-MCNC: 4.7 MG/DL — SIGNIFICANT CHANGE UP (ref 2.1–4.9)
PLATELET # BLD AUTO: 143 K/UL — SIGNIFICANT CHANGE UP (ref 130–400)
PO2 BLDA: 69 MMHG — LOW (ref 78–95)
PO2 BLDA: 79 MMHG — SIGNIFICANT CHANGE UP (ref 78–95)
POTASSIUM SERPL-MCNC: 4.1 MMOL/L — SIGNIFICANT CHANGE UP (ref 3.5–5)
POTASSIUM SERPL-MCNC: 4.3 MMOL/L — SIGNIFICANT CHANGE UP (ref 3.5–5)
POTASSIUM SERPL-SCNC: 4.1 MMOL/L — SIGNIFICANT CHANGE UP (ref 3.5–5)
POTASSIUM SERPL-SCNC: 4.3 MMOL/L — SIGNIFICANT CHANGE UP (ref 3.5–5)
PROCALCITONIN SERPL-MCNC: 0.6 NG/ML — HIGH (ref 0.02–0.1)
PROT ?TM UR-MCNC: 79 MG/DL — HIGH (ref 0–12)
PROT SERPL-MCNC: 5.7 G/DL — LOW (ref 6–8)
RBC # BLD: 4.21 M/UL — LOW (ref 4.7–6.1)
RBC # FLD: 16 % — HIGH (ref 11.5–14.5)
SAO2 % BLDA: 95 % — SIGNIFICANT CHANGE UP (ref 94–98)
SAO2 % BLDA: 96 % — SIGNIFICANT CHANGE UP (ref 94–98)
SODIUM SERPL-SCNC: 150 MMOL/L — HIGH (ref 135–146)
SODIUM SERPL-SCNC: 152 MMOL/L — HIGH (ref 135–146)
WBC # BLD: 8.67 K/UL — SIGNIFICANT CHANGE UP (ref 4.8–10.8)
WBC # FLD AUTO: 8.67 K/UL — SIGNIFICANT CHANGE UP (ref 4.8–10.8)

## 2019-02-20 RX ORDER — FUROSEMIDE 40 MG
80 TABLET ORAL ONCE
Qty: 0 | Refills: 0 | Status: COMPLETED | OUTPATIENT
Start: 2019-02-20 | End: 2019-02-20

## 2019-02-20 RX ORDER — FUROSEMIDE 40 MG
60 TABLET ORAL DAILY
Qty: 0 | Refills: 0 | Status: DISCONTINUED | OUTPATIENT
Start: 2019-02-20 | End: 2019-02-21

## 2019-02-20 RX ADMIN — Medication 600 MILLIGRAM(S): at 18:07

## 2019-02-20 RX ADMIN — INSULIN GLARGINE 10 UNIT(S): 100 INJECTION, SOLUTION SUBCUTANEOUS at 22:37

## 2019-02-20 RX ADMIN — CHLORHEXIDINE GLUCONATE 15 MILLILITER(S): 213 SOLUTION TOPICAL at 05:24

## 2019-02-20 RX ADMIN — SENNA PLUS 2 TABLET(S): 8.6 TABLET ORAL at 22:38

## 2019-02-20 RX ADMIN — Medication 3 UNIT(S): at 11:57

## 2019-02-20 RX ADMIN — HEPARIN SODIUM 5000 UNIT(S): 5000 INJECTION INTRAVENOUS; SUBCUTANEOUS at 05:23

## 2019-02-20 RX ADMIN — Medication 1 TABLET(S): at 22:38

## 2019-02-20 RX ADMIN — HEPARIN SODIUM 5000 UNIT(S): 5000 INJECTION INTRAVENOUS; SUBCUTANEOUS at 22:39

## 2019-02-20 RX ADMIN — Medication 3 UNIT(S): at 17:10

## 2019-02-20 RX ADMIN — Medication 1 TABLET(S): at 13:42

## 2019-02-20 RX ADMIN — PIPERACILLIN AND TAZOBACTAM 25 GRAM(S): 4; .5 INJECTION, POWDER, LYOPHILIZED, FOR SOLUTION INTRAVENOUS at 13:41

## 2019-02-20 RX ADMIN — Medication 100 MILLIGRAM(S): at 05:23

## 2019-02-20 RX ADMIN — DEXMEDETOMIDINE HYDROCHLORIDE IN 0.9% SODIUM CHLORIDE 6.7 MICROGRAM(S)/KG/HR: 4 INJECTION INTRAVENOUS at 05:25

## 2019-02-20 RX ADMIN — PIPERACILLIN AND TAZOBACTAM 25 GRAM(S): 4; .5 INJECTION, POWDER, LYOPHILIZED, FOR SOLUTION INTRAVENOUS at 22:37

## 2019-02-20 RX ADMIN — CHLORHEXIDINE GLUCONATE 1 APPLICATION(S): 213 SOLUTION TOPICAL at 05:23

## 2019-02-20 RX ADMIN — Medication 80 MILLIGRAM(S): at 00:46

## 2019-02-20 RX ADMIN — Medication 1 TABLET(S): at 05:23

## 2019-02-20 RX ADMIN — TAMSULOSIN HYDROCHLORIDE 0.4 MILLIGRAM(S): 0.4 CAPSULE ORAL at 22:38

## 2019-02-20 RX ADMIN — NYSTATIN CREAM 1 APPLICATION(S): 100000 CREAM TOPICAL at 13:42

## 2019-02-20 RX ADMIN — Medication 25 MILLIGRAM(S): at 05:23

## 2019-02-20 RX ADMIN — Medication 100 MILLIGRAM(S): at 22:39

## 2019-02-20 RX ADMIN — HEPARIN SODIUM 5000 UNIT(S): 5000 INJECTION INTRAVENOUS; SUBCUTANEOUS at 13:41

## 2019-02-20 RX ADMIN — CHLORHEXIDINE GLUCONATE 15 MILLILITER(S): 213 SOLUTION TOPICAL at 18:07

## 2019-02-20 RX ADMIN — NYSTATIN CREAM 1 APPLICATION(S): 100000 CREAM TOPICAL at 22:39

## 2019-02-20 RX ADMIN — PANTOPRAZOLE SODIUM 40 MILLIGRAM(S): 20 TABLET, DELAYED RELEASE ORAL at 12:30

## 2019-02-20 RX ADMIN — Medication 60 MILLIGRAM(S): at 11:57

## 2019-02-20 RX ADMIN — Medication 100 MILLIGRAM(S): at 13:41

## 2019-02-20 RX ADMIN — ATORVASTATIN CALCIUM 20 MILLIGRAM(S): 80 TABLET, FILM COATED ORAL at 22:38

## 2019-02-20 RX ADMIN — Medication 3 UNIT(S): at 08:32

## 2019-02-20 RX ADMIN — PIPERACILLIN AND TAZOBACTAM 25 GRAM(S): 4; .5 INJECTION, POWDER, LYOPHILIZED, FOR SOLUTION INTRAVENOUS at 05:24

## 2019-02-20 RX ADMIN — NYSTATIN CREAM 1 APPLICATION(S): 100000 CREAM TOPICAL at 05:23

## 2019-02-20 NOTE — PROGRESS NOTE ADULT - ASSESSMENT
· Assessment		  Patient is a 83y old Male who presents with a chief complaint of mechanical fall  Currently admitted to medicine with the primary diagnosis of recurrent mechanical falls and acute kidney injury  Hospital course is complicated by acute hypoxic respiratory failure 2/2 aspiration PNA or pulmonary edema.    IMPRESSION:  Aspiration with possibly Bibasilar GNR PNA complicated by respiratory failure requiring ventilation  CXR much improved which goes against a bacterial process  No pressors  UCx NTD  ET with few PMNs which goes against an infection    RECOMMENDATIONS:  Procalcitonin level . If normal/ low hold ABx  BCx  Zosyn 2.250 mg iv q8h   Consider a CT chest if stable enough

## 2019-02-20 NOTE — DIETITIAN INITIAL EVALUATION ADULT. - NS FNS REASON FOR WEIGHT CHANG
fluid retention/weight difference possibly d/t fluid retention as pt. with edema vs. weight gain, however pt. following diet and mindful of portion sizes per family. Will continue to monitor wt trends during LOS

## 2019-02-20 NOTE — PROGRESS NOTE ADULT - ASSESSMENT
# Acute hypoxic respiratory failure 2/2 Aspiration PNA vs pulmonary edema  - intubated, sedated with precedex and Fentanyl  - CXR shows mild improvement  - ABG 7.4/51/197/32 post unit transfer  - ECHO 2/16: normal EF  - initially on Rocephin and azithromycin; now on Zosyn  - lasix dosing as per urine out-put  - Follow up ID consult - sent procal, bronch?    #PVC on cardiac monitoring // Hx of Afib // Troponinemia on admit (likely 2/2 type II demand)  - CHADsVASC 4 (Age, HTN, DM); no AC 2/2 falls  - Cardio recs ([facility]): add BB 25 BID  - No chest pain on admit, no acute ST elevation in EKG  - CKMB negative; trop downtrend 0.2 -> 0.14 > 0.13    # TOBIAS likely acute tubular injury  - baseline Cr ~2, now 2.5 (improving)   - likely ACE-I use and NSAIDs  - Nephro on board: dose lasix to neg 1L, add free water, continue to hold ACEI and NSAIDs  - negative 0.6L overnight  - Avoid nephrotoxic drugs    # Hypernatremia  - improving, today 150  - c/w 250 q6h free water to OG tube    # Acute cystitis with hematuria  - c/w Zosyn    # Recurrent falls  - Likely multifactorial, physical deconditioning, medication induced hypotension, and peripheral neuropathy  - Trauma work up negative for acute fracture, but show age-indeterminate compression fracture of L2    # HTN: SBP goal 120-150, BP low at time  - Hold indapamide, amlodipine, Ramipril  - Monitor vitals, avoid hypotension    # DM II:   - Hga1C pending (11.5 in 12/5/18)  - Start basal/bolus 10 and 3/3/3  - Monitor FS    #  shunt:   - Recently evaluated by Dr. Wayne 12/6/18, deemed to be chronically in place  - not manipulated in 10yrs, follows in [facility], non-programmable   - No signs of hydrocephalus in CT done on this admit    #MISC:  - DVT ppx: SubQ heparin  - GI ppx: PPI  - Diet: tube feeds  - Activity: increase as tolerated  - Code status: Full code, family aware of poor prognosis, they want to proceed with intubation, but will decided on other aggressive measures such as HD as the situation arises # Acute hypoxic respiratory failure 2/2 Aspiration PNA vs pulmonary edema  - intubated, sedated with precedex only  - CXR shows mild improvement from yesterday, but still high PEEP required   - ECHO 2/16: normal EF  - c/w Zosyn for now pending procal lv  - ID recs appreciated  - IV lasix 60 daily, good UOP noted    #PVC on cardiac monitoring // Hx of Afib // Troponinemia on admit (likely 2/2 type II demand)  - CHADsVASC 4 (Age, HTN, DM); no AC 2/2 falls  - [facility] c/w BB 25 BID, f/u today  - No chest pain on admit, no acute ST elevation in EKG  - CKMB negative; trop downtrend 0.2 -> 0.14 > 0.13    # TOBIAS likely acute tubular injury, improving  - baseline Cr ~2, now 2.5   - Nephro on board: lasix 60 IV qdaily, watch BP, keep free water  - negative 0.6L overnight  - Avoid ACEI/NSAIDs for now    # Hypernatremia  - improving, today 150  - c/w 250 q6h free water to OG tube    # Acute UTI with hematuria (possibly also from traumatic barragan)  - c/w Zosyn    # Recurrent falls  - Likely multifactorial, physical deconditioning, medication induced hypotension, and peripheral neuropathy  - Trauma work up negative for acute fracture, but show age-indeterminate compression fracture of L2    # HTN, BP noted to be lowered today  - Hold indapamide, amlodipine, Ramipril  - Monitor vitals, avoid hypotension    # DM II:   - Hga1C pending (11.5 in 12/5/18)  - Start basal/bolus 10 and 3/3/3  - Monitor FS    #  shunt:   - Recently evaluated by Dr. Wayne 12/6/18, deemed to be chronically in place  - not manipulated in 10yrs, follows in [facility], non-programmable   - No signs of hydrocephalus in CT done on this admit    #MISC:  - DVT ppx: SubQ heparin  - GI ppx: PPI  - Diet: tube feeds  - Activity: increase as tolerated  - Code status: Full code, family aware of poor prognosis, they want to proceed with intubation, but will decided on other aggressive measures such as HD as the situation arises

## 2019-02-20 NOTE — MEDICAL STUDENT PROGRESS NOTE(EDUCATION) - SUBJECTIVE AND OBJECTIVE BOX
SUBJECTIVE:    Patient is a 83y old Male who presents with a chief complaint of Recurrent falls  TOBIAS (20 Feb 2019 08:24)    Currently admitted to medicine with the primary diagnosis of TOBIAS (acute kidney injury)     Today is hospital day 5d. This morning he is currently intubated and lying in bed. Patient is arousable when spoken to but does not follow commands.     PAST MEDICAL & SURGICAL HISTORY  Hypertension  Hydrocephalus  CKD (chronic kidney disease)  DM (diabetes mellitus)  Afib  History of prostate surgery    MEDICATIONS:  STANDING MEDICATIONS  atorvastatin 20 milliGRAM(s) Oral at bedtime  calcium carbonate 1250 mG  + Vitamin D (OsCal 500 + D) 1 Tablet(s) Oral three times a day  chlorhexidine 0.12% Liquid 15 milliLiter(s) Oral Mucosa every 12 hours  chlorhexidine 4% Liquid 1 Application(s) Topical <User Schedule>  dexmedetomidine Infusion 0.2 MICROgram(s)/kG/Hr IV Continuous <Continuous>  dextrose 5%. 1000 milliLiter(s) IV Continuous <Continuous>  dextrose 50% Injectable 12.5 Gram(s) IV Push once  dextrose 50% Injectable 25 Gram(s) IV Push once  dextrose 50% Injectable 25 Gram(s) IV Push once  docusate sodium Liquid 100 milliGRAM(s) Oral three times a day  fentaNYL   Infusion. 0.5 MICROgram(s)/kG/Hr IV Continuous <Continuous>  furosemide   Injectable 60 milliGRAM(s) IV Push daily  guaiFENesin  milliGRAM(s) Oral every 12 hours  heparin  Injectable 5000 Unit(s) SubCutaneous every 8 hours  insulin glargine Injectable (LANTUS) 10 Unit(s) SubCutaneous at bedtime  insulin lispro Injectable (HumaLOG) 3 Unit(s) SubCutaneous three times a day before meals  metoprolol tartrate 25 milliGRAM(s) Oral two times a day  nystatin Powder 1 Application(s) Topical three times a day  pantoprazole  Injectable 40 milliGRAM(s) IV Push daily  piperacillin/tazobactam IVPB. 2.25 Gram(s) IV Intermittent every 8 hours  senna 2 Tablet(s) Oral at bedtime  tamsulosin 0.4 milliGRAM(s) Oral at bedtime    PRN MEDICATIONS  acetaminophen   Tablet .. 650 milliGRAM(s) Oral every 6 hours PRN  ALBUTerol/ipratropium for Nebulization 3 milliLiter(s) Nebulizer every 6 hours PRN  dextrose 40% Gel 15 Gram(s) Oral once PRN  glucagon  Injectable 1 milliGRAM(s) IntraMuscular once PRN    VITALS:   T(F): 97.5  HR: 68  BP: 85/60  RR: 18  SpO2: 100%    LABS:                        10.5   8.67  )-----------( 143      ( 20 Feb 2019 04:41 )             35.1     02-20    150<H>  |  103  |  90<HH>  ----------------------------<  215<H>  4.3   |  28  |  2.5<H>    Ca    7.7<L>      20 Feb 2019 04:41  Phos  4.7     02-20  Mg     2.4     02-20    TPro  5.7<L>  /  Alb  3.2<L>  /  TBili  0.7  /  DBili  x   /  AST  16  /  ALT  7   /  AlkPhos  71  02-20    PT/INR - ( 18 Feb 2019 15:00 )   PT: 12.80 sec;   INR: 1.11 ratio         PTT - ( 18 Feb 2019 15:00 )  PTT:30.5 sec    ABG - ( 20 Feb 2019 07:58 )  pH, Arterial: 7.49  pH, Blood: x     /  pCO2: 47    /  pO2: 69    / HCO3: 36    / Base Excess: 11.1  /  SaO2: 95            Culture - Sputum (collected 18 Feb 2019 19:35)  Source: .Sputum Sputum trap  Gram Stain (19 Feb 2019 15:53):    Few polymorphonuclear leukocytes    Rare Squamous epithelial cells per low power field    Few Gram Positive Cocci in Clusters per oil power field  Preliminary Report (20 Feb 2019 09:48):    Normal Respiratory Marilee present    Culture - Urine (collected 18 Feb 2019 06:28)  Source: .Urine Catheterized  Final Report (19 Feb 2019 07:31):    No growth      CARDIAC MARKERS ( 18 Feb 2019 15:00 )  x     / 0.13 ng/mL / 89 U/L / x     / 3.8 ng/mL      RADIOLOGY:    TTE 02/16:   1. Normal left ventricular internal cavity size.   2. Mildly enlarged left atrium.   3. Normal right atrial size.   4. There is no evidence of pericardial effusion.   5. Thickening and calcification of the anterior and posterior mitral   valve leaflets.   6. Moderate tricuspid regurgitation.   7. Pulmonic valve regurgitation.    CXR 02/19:  Stable pulmonary vascular congestion and bilateral pleural effusions.         PHYSICAL EXAM:  GEN: Patient in bed, intubated  LUNGS: Mechanical ventilation sounds diffusely, no wheezes or crackles  HEART: heart sounds distant, S1S2  ABD: Abdomen distended, ventral hernia present, TTP RUQ withdraws to palpation  EXT: 1+ pitting edema b/l LEs unchanged  NEURO: Patient sedated but arousable, does not follow commands    Intravenous access: PIV  NG tube: OGT  Aguirre Catheter:   Indwelling Urethral Catheter:     Connect To:  Straight Drainage/[place]    Indication:  Urinary Retention / Obstruction (02-15-19 @ 15:57) (not performed) [active]  Indwelling Urethral Catheter:     Connect To:  Straight Drainage/[place]    Indication:  Urinary Retention / Obstruction (02-18-19 @ 18:10) (not performed) [active] SUBJECTIVE:    Patient is a 83y old Male who presents with a chief complaint of Recurrent falls  TOBIAS (20 Feb 2019 08:24)    Currently admitted to medicine with the primary diagnosis of TOBIAS (acute kidney injury)     Today is hospital day 5d. This morning he is currently intubated and lying in bed. Patient is arousable when spoken to but does not follow commands.     PAST MEDICAL & SURGICAL HISTORY  Hypertension  Hydrocephalus  CKD (chronic kidney disease)  DM (diabetes mellitus)  Afib  History of prostate surgery    MEDICATIONS:  STANDING MEDICATIONS  atorvastatin 20 milliGRAM(s) Oral at bedtime  calcium carbonate 1250 mG  + Vitamin D (OsCal 500 + D) 1 Tablet(s) Oral three times a day  chlorhexidine 0.12% Liquid 15 milliLiter(s) Oral Mucosa every 12 hours  chlorhexidine 4% Liquid 1 Application(s) Topical <User Schedule>  dexmedetomidine Infusion 0.2 MICROgram(s)/kG/Hr IV Continuous <Continuous>  dextrose 5%. 1000 milliLiter(s) IV Continuous <Continuous>  dextrose 50% Injectable 12.5 Gram(s) IV Push once  dextrose 50% Injectable 25 Gram(s) IV Push once  dextrose 50% Injectable 25 Gram(s) IV Push once  docusate sodium Liquid 100 milliGRAM(s) Oral three times a day  fentaNYL   Infusion. 0.5 MICROgram(s)/kG/Hr IV Continuous <Continuous>  furosemide   Injectable 60 milliGRAM(s) IV Push daily  guaiFENesin  milliGRAM(s) Oral every 12 hours  heparin  Injectable 5000 Unit(s) SubCutaneous every 8 hours  insulin glargine Injectable (LANTUS) 10 Unit(s) SubCutaneous at bedtime  insulin lispro Injectable (HumaLOG) 3 Unit(s) SubCutaneous three times a day before meals  metoprolol tartrate 25 milliGRAM(s) Oral two times a day  nystatin Powder 1 Application(s) Topical three times a day  pantoprazole  Injectable 40 milliGRAM(s) IV Push daily  piperacillin/tazobactam IVPB. 2.25 Gram(s) IV Intermittent every 8 hours  senna 2 Tablet(s) Oral at bedtime  tamsulosin 0.4 milliGRAM(s) Oral at bedtime    PRN MEDICATIONS  acetaminophen   Tablet .. 650 milliGRAM(s) Oral every 6 hours PRN  ALBUTerol/ipratropium for Nebulization 3 milliLiter(s) Nebulizer every 6 hours PRN  dextrose 40% Gel 15 Gram(s) Oral once PRN  glucagon  Injectable 1 milliGRAM(s) IntraMuscular once PRN    VITALS:   T(F): 97.5  HR: 68  BP: 85/60  RR: 18  SpO2: 100%    LABS:                        10.5   8.67  )-----------( 143      ( 20 Feb 2019 04:41 )             35.1     02-20    150<H>  |  103  |  90<HH>  ----------------------------<  215<H>  4.3   |  28  |  2.5<H>    Ca    7.7<L>      20 Feb 2019 04:41  Phos  4.7     02-20  Mg     2.4     02-20    TPro  5.7<L>  /  Alb  3.2<L>  /  TBili  0.7  /  DBili  x   /  AST  16  /  ALT  7   /  AlkPhos  71  02-20    PT/INR - ( 18 Feb 2019 15:00 )   PT: 12.80 sec;   INR: 1.11 ratio         PTT - ( 18 Feb 2019 15:00 )  PTT:30.5 sec    ABG - ( 20 Feb 2019 07:58 )  pH, Arterial: 7.49  pH, Blood: x     /  pCO2: 47    /  pO2: 69    / HCO3: 36    / Base Excess: 11.1  /  SaO2: 95            Culture - Sputum (collected 18 Feb 2019 19:35)  Source: .Sputum Sputum trap  Gram Stain (19 Feb 2019 15:53):    Few polymorphonuclear leukocytes    Rare Squamous epithelial cells per low power field    Few Gram Positive Cocci in Clusters per oil power field  Preliminary Report (20 Feb 2019 09:48):    Normal Respiratory Marilee present    Culture - Urine (collected 18 Feb 2019 06:28)  Source: .Urine Catheterized  Final Report (19 Feb 2019 07:31):    No growth      CARDIAC MARKERS ( 18 Feb 2019 15:00 )  x     / 0.13 ng/mL / 89 U/L / x     / 3.8 ng/mL      RADIOLOGY:    TTE 02/16:   1. Normal left ventricular internal cavity size.   2. Mildly enlarged left atrium.   3. Normal right atrial size.   4. There is no evidence of pericardial effusion.   5. Thickening and calcification of the anterior and posterior mitral   valve leaflets.   6. Moderate tricuspid regurgitation.   7. Pulmonic valve regurgitation.    CXR 02/19:  Stable pulmonary vascular congestion and bilateral pleural effusions.         PHYSICAL EXAM:  GEN: Patient in bed, intubated  LUNGS: Mechanical ventilation sounds diffusely, no wheezes or crackles  HEART: heart sounds distant, S1S2  ABD: Abdomen distended, ventral hernia present, responds to deep palpation of abdomen, mildly tense  EXT: 1+ pitting edema b/l LEs unchanged  NEURO: Patient sedated but arousable, does not follow commands    Intravenous access: PIV  NG tube: OGT  Aguirre Catheter:   Indwelling Urethral Catheter:     Connect To:  Straight Drainage/[place]    Indication:  Urinary Retention / Obstruction (02-15-19 @ 15:57) (not performed) [active]  Indwelling Urethral Catheter:     Connect To:  Straight Drainage/[place]    Indication:  Urinary Retention / Obstruction (02-18-19 @ 18:10) (not performed) [active]

## 2019-02-20 NOTE — DIETITIAN INITIAL EVALUATION ADULT. - OTHER INFO
Initial assessment for intubation. P/w: TOBIAS, recurrent falls.  Acute hypoxic respiratory failure 2/2 Aspiration PNA vs pulmonary edema: intubated/sedated. PVC on cardiac monitoring // Hx of Afib // Troponinemia on admit: cardio following. TOBIAS on CKD: improving, nephro following. Recurrent falls: trauma work up negative. DM2: on insulin.

## 2019-02-20 NOTE — PHYSICAL THERAPY INITIAL EVALUATION ADULT - SPECIFY REASON(S)
Pt is intubated on sedation. PT to f/u when pt is medically appropriate for b/s PT.
Pt currently going to echo test. PT will follow up after as appropriately.
Pt still at echo test. Family aware that PT will follow up as appropriately.

## 2019-02-20 NOTE — MEDICAL STUDENT PROGRESS NOTE(EDUCATION) - NS MD HP STUD ASPLAN PLAN FT
# Acute hypoxic respiratory failure 2/2 Pulmonary edema vs Aspiration PNA  - intubated, sedated with precedex, fentanyl, patient still requiring high PEEP  - CXR 02/20: mild improvement since 1 day ago  - TTE 02/16: EF normal  - ABG 02/20: 7.49/47/69/36/95%  - c/w Zosyn as per ID, pending Procalcitonin level, f/u CT chest  - Lasix 60mg IV QD  - negative 0.6L overnight, good UOP    # PVCs on monitor / Hx of Afib / Troponinemia on admission likely 2/2 Type II demand  - CHADsVASC 4 (age, HTN, DM); not on AC 2/2 multiple mechanical falls  - c/w metoprolol 25mg po BID per cardio (T[place]rino)  - no CP on admission, no acute ST changes on EKG  - CKMB negative; troponins downtrend 0.2 -> 0.14 > 0.13    # TOBIAS likely 2/2 acute tubular injury  - baseline Cr ~2, last 2.5 (improving)  - likely 2/2 NSAIDs and ACEI use  - good UOP, negative 0.6L overnight  - Lasix 60mg IV QD per nephro, monitor BP, c/w free water    # Hypernatremia  - Na improving, last 150  - c/w 250 q6h free water by OGT    # Acute UTI with hematuria, possibly from traumatic [place]  - c/w Zosyn    # h/o recurrent falls  - likely multifactorial 2/2 physical deconditioning, medication-induced hypotension, peripheral neuropathy  - trauma w/u negative for acute fracture, but age-indeterminate compression fracture L2 noted    # HTN, BP lower today  - hold indapamide, amlodipine, ramipril  - monitor VS, avoid hypotension  - patient on metoprolol 25mg po BID    # DM II:  - HgA1c pending, 11.5 on 12/5/18  - c/w basal bolus 10 and 3/3/3  - monitor FS    #  shunt:  - recently evaluated by Dr. Wayne 12/6/18, deemed chronically in place  - not manipulated in 10 years, follows in [facility], non-programmable  - no signs of hydrocephalus in CT done 02/15 this admission    DVT PPx - subQ heparin  GI PPx - PPI  Diet - tube feeding  Activity - increase as tolerated  Code status: Full code, family aware of poor prognosis, they want to proceed with intubation, but will decided on other aggressive measures such as HD as the situation arises # Acute hypoxic respiratory failure 2/2 Pulmonary edema vs Aspiration vs PNA  - intubated, sedated with precedex, fentanyl, patient still requiring high PEEP  - CXR 02/20: mild improvement since 1 day ago  - TTE 02/16: EF normal  - ABG 02/20: 7.49/47/69/36/95%  - c/w Zosyn as per ID, pending Procalcitonin level, f/u CT chest  - Lasix 60mg IV QD  - negative 0.6L overnight, good UOP    # PVCs on monitor / Hx of Afib / Troponinemia on admission likely 2/2 Type II demand  - CHADsVASC 4 (age, HTN, DM); not on AC 2/2 multiple mechanical falls  - c/w metoprolol 25mg po BID per cardio (T[place]urrino)  - no CP on admission, no acute ST changes on EKG  - CKMB negative; troponins downtrend 0.2 -> 0.14 > 0.13    # TOBIAS likely 2/2 acute tubular injury  - baseline Cr ~2, last 2.5 (improving)  - likely 2/2 NSAIDs and ACEI use  - good UOP, negative 0.6L overnight  - Lasix 60mg IV QD per nephro, monitor BP, c/w free water    # Hypernatremia  - Na improving, last 150  - c/w 250 q6h free water by OGT    # Acute UTI with hematuria, possibly from traumatic [place]  - c/w Zosyn    # h/o recurrent falls  - likely multifactorial 2/2 physical deconditioning, medication-induced hypotension, peripheral neuropathy  - trauma w/u negative for acute fracture, but age-indeterminate compression fracture L2 noted    # HTN, BP lower today  - hold indapamide, amlodipine, ramipril  - monitor VS, avoid hypotension  - patient on metoprolol 25mg po BID    # DM II:  - HgA1c pending, 11.5 on 12/5/18  - c/w basal bolus 10 and 3/3/3  - monitor FS    #  shunt:  - recently evaluated by Dr. Wayne 12/6/18, deemed chronically in place  - not manipulated in 10 years, follows in [facility], non-programmable  - no signs of hydrocephalus in CT done 02/15 this admission    DVT PPx - subQ heparin  GI PPx - PPI  Diet - tube feeding  Activity - increase as tolerated  Code status: Full code, family aware of poor prognosis, they want to proceed with intubation, but will decided on other aggressive measures such as HD as the situation arises

## 2019-02-20 NOTE — DIETITIAN INITIAL EVALUATION ADULT. - NS AS NUTRI INTERV ENTERAL NUTRITION
Continue enteral nutrition Osmolite 1.5 at 35ml/h + Prosource TF 5x/day. This regimen provides- 1460kcal, 107g protein, 638ml free H2O- 98% est calorie needs, 86% est protein needs

## 2019-02-20 NOTE — DIETITIAN INITIAL EVALUATION ADULT. - PHYSICAL APPEARANCE
obese/BMI 41.2, intubated, skin: tar (BS 14) obese/BMI 41.2, intubated, skin: tar (BS 14), 2+ edema to L, R leg, 3+ to scrotum

## 2019-02-20 NOTE — PROGRESS NOTE ADULT - ASSESSMENT
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

## 2019-02-20 NOTE — PROGRESS NOTE ADULT - SUBJECTIVE AND OBJECTIVE BOX
DELVIS VINCENZO  83y, Male      OVERNIGHT EVENTS:    no fevers, no pressors, sedated, responsive  no BM  FiO2 40%  No central lines    VITALS:  T(F): 97.6, Max: 98.9 (19 @ 20:00)  HR: 68  BP: 93/57  RR: 18Vital Signs Last 24 Hrs  T(C): 36.4 (2019 04:00), Max: 37.2 (2019 20:00)  T(F): 97.6 (2019 04:00), Max: 98.9 (2019 20:00)  HR: 68 (2019 06:00) (66 - 104)  BP: 93/57 (2019 06:00) (84/56 - 136/69)  BP(mean): 70 (2019 06:00) (62 - 105)  RR: 18 (2019 06:00) (17 - 20)  SpO2: 100% (2019 06:00) (99% - 100%)    TESTS & MEASUREMENTS:                        10.5   8.67  )-----------( 143      ( 2019 04:41 )             35.1     02    150<H>  |  103  |  90<HH>  ----------------------------<  215<H>  4.3   |  28  |  2.5<H>    Ca    7.7<L>      2019 04:41  Phos  4.7       Mg     2.4         TPro  5.7<L>  /  Alb  3.2<L>  /  TBili  0.7  /  DBili  x   /  AST  16  /  ALT  7   /  AlkPhos  71  0220    LIVER FUNCTIONS - ( 2019 04:41 )  Alb: 3.2 g/dL / Pro: 5.7 g/dL / ALK PHOS: 71 U/L / ALT: 7 U/L / AST: 16 U/L / GGT: x             Culture - Sputum (collected 19 @ 19:35)  Source: .Sputum Sputum trap  Gram Stain (19 @ 15:53):    Few polymorphonuclear leukocytes    Rare Squamous epithelial cells per low power field    Few Gram Positive Cocci in Clusters per oil power field    Culture - Urine (collected 19 @ 06:28)  Source: .Urine Catheterized  Final Report (19 @ 07:31):    No growth    Culture - Urine (collected 19 @ 11:00)  Source: .Urine Clean Catch (Midstream)  Final Report (19 @ 12:15):    No growth      Urinalysis Basic - ( 2019 06:28 )    Color: Red / Appearance: Turbid / S.020 / pH: x  Gluc: x / Ketone: 15  / Bili: Large / Urobili: 1.0 mg/dL   Blood: x / Protein: 100 mg/dL / Nitrite: Positive   Leuk Esterase: Large / RBC: >50 /HPF / WBC x   Sq Epi: x / Non Sq Epi: x / Bacteria: x          RADIOLOGY & ADDITIONAL TESTS:    ANTIBIOTICS:  piperacillin/tazobactam IVPB. 2.25 Gram(s) IV Intermittent every 8 hours

## 2019-02-20 NOTE — MEDICAL STUDENT PROGRESS NOTE(EDUCATION) - NS MD HP STUD ASPLAN ASSES FT
82 yo M with PMHx of DM III on insulin, HTN, hydrocephalus s/p  shunt, Afib not on AC, multiple mechanical falls presented to ED with mechanical fall and was found to have TOBIAS. Patient developed acute hypoxic respiratory failure and was intubated.

## 2019-02-20 NOTE — DIETITIAN INITIAL EVALUATION ADULT. - CRITERIA
energy  intake, body composition, NFPF, electrolyte/renal profile, glucose profile, micronutrient pr

## 2019-02-20 NOTE — PROGRESS NOTE ADULT - ASSESSMENT
IMPRESSION:    Acute hypoxic respiratory failure s/p intubation  CHF exacerbation  RLL PNA ?Asp Doubt   JOHANNY/OHS not compliant with CPAP  TOBIAS on CKd  HO of Afib not on AC  HO  shunt      PLAN:    CNS: sedation vacation add precedex if needed     HEENT: Oral care    PULMONARY:  HOB @ 45 degrees. decrease rate to 14   send deep tracheal aspiration   ct scan   also will do bed side US if significant effusion will do throacentesis   CARDIOVASCULAR: Keep I<O continue lasix once daily keep negative max 1 liter     GI: GI prophylaxis. NGT feeding  colace senna   RENAL:  Follow up lytes.  Correct as needed. Nephrology follow up,     INFECTIOUS DISEASE: Follow up cultures.  zosyn  deep tracheal aspiration   pan cx   HEMATOLOGICAL:  DVT prophylaxis    ENDOCRINE:  Follow up FS. Will benefit form continuous monitoring in the MICU    Overall prognosis guarded.    Advanced Directives IMPRESSION:    Acute hypoxic respiratory failure s/p intubation  CHF exacerbation  RLL PNA ?Asp Doubt   JOHANNY/OHS not compliant with CPAP  TOBIAS on CKd  HO of Afib not on AC  HO  shunt      PLAN:    CNS: sedation vacation add precedex if needed     HEENT: Oral care    PULMONARY:  HOB @ 45 degrees. decrease rate to 16   send deep tracheal aspiration   ct scan   also will do bed side US if significant effusion will do throacentesis     CARDIOVASCULAR: Keep I<O continue lasix once daily keep negative max 1 liter     GI: GI prophylaxis. NGT feeding  colace senna   RENAL:  Follow up lytes.  Correct as needed. Nephrology follow up,     INFECTIOUS DISEASE: Follow up cultures.  zosyn    pan cx   HEMATOLOGICAL:  DVT prophylaxis    ENDOCRINE:  Follow up FS. Will benefit form continuous monitoring in the MICU    Overall prognosis guarded.    Advanced Directives

## 2019-02-20 NOTE — DIETITIAN INITIAL EVALUATION ADULT. - FACTORS AFF FOOD INTAKE
intubation, no GI distress or chew/swallow difficulty PTP per family, usually regular BM's, last BM 2/19

## 2019-02-20 NOTE — PROGRESS NOTE ADULT - SUBJECTIVE AND OBJECTIVE BOX
Patient is a 83y old Male who presents with a chief complaint of Recurrent falls  TOBIAS (20 Feb 2019 06:25)    Currently admitted to medicine with the primary diagnosis of TOBIAS (acute kidney injury)    Today is hospital day 5d.    BRIEF HOSPITAL COURSE:   84 yo M PMHx of DMII, HTN,  Hydrocephalus s/p  shunt, Afib not on AC (multiple falls) presents to our ED with c/o mechanical fall. According to patient he was trying to walk with a walker and slipped on the floor, no nausea, vomiting, dizziness, chest pain, palpitations, seizure like episode, no LOC. He had 4 falls in last 10 days as per family. He was recently discharged from the hospital Dec for dizziness with stress test and  shunt assessment that was normal. At baseline he has home aid, takes his medications and walks very minimal with the help of cane or walker. However for last few months he has had dec ambulation and worsening LE edema. As per family, also worsening lethargy. Trauma work-up neg (age unknown L2 fx)    In the ED BP 83/45, HR 78/min, Sao2 100% on NC, afebrile. He was given 2L RL and his repeat BP was 110/80. Initial CXR only shows cardiomegaly without lobar consolidation. Patient later desatted to 85% on 3L NC so he was placed on BIPAP. Repeated CXR shows new consolidation at right lung base, suspecting PNA, so patient was started on Rocephin and Azithromycin. Labs showed TOBIAS with Cr 3.2 (baseline 2) and dec UOP. Pt also noted to be on NSAIDs and ACE, Actos so likely TOBIAS 2/2 dehydration and meds. On 2/18 pt was unable to tolerate NC and moved to BIPAP with ABG showing acute hypoxic resp failure and hypercapnia (likely chronic?). CXR at this time showed worsening pulm vascular congestion. Given x2 IV 40 lasix which did not improve UOP. Was on BIPAP for 4hr, became progressively lethargic to pt was intubated.    Admitted to CCU, barragan noted to be out-of-place (likely pt pulled out) so advanced with a sharp increase in out-put. CXR remained congested b/l bases and tolerated intubation well. Kidneys showed improvement after aggressive diuresis but remained on 60% O2.     EVENTS LAST 24HRS:   Patient intubated and sedated (Fentanyl/persedex). BP noted to be on the low side.   Overnight pt continued to have PVC's but less 'irritable' than yesterday on cardiac monitor. Started BB yesterday as per Dr. Harris.  Negative 0.6L overnight.    PAST MEDICAL & SURGICAL HISTORY  Hypertension  Hydrocephalus  CKD (chronic kidney disease)  DM (diabetes mellitus)  Afib  History of prostate surgery      SOCIAL HISTORY:      REVIEW OF SYSTEMS:  See HPI    ALLERGIES:    MEDICATIONS:  STANDING MEDICATIONS  atorvastatin 20 milliGRAM(s) Oral at bedtime  calcium carbonate 1250 mG  + Vitamin D (OsCal 500 + D) 1 Tablet(s) Oral three times a day  chlorhexidine 0.12% Liquid 15 milliLiter(s) Oral Mucosa every 12 hours  chlorhexidine 4% Liquid 1 Application(s) Topical <User Schedule>  dexmedetomidine Infusion 0.2 MICROgram(s)/kG/Hr IV Continuous <Continuous>  dextrose 5%. 1000 milliLiter(s) IV Continuous <Continuous>  dextrose 50% Injectable 12.5 Gram(s) IV Push once  dextrose 50% Injectable 25 Gram(s) IV Push once  dextrose 50% Injectable 25 Gram(s) IV Push once  docusate sodium Liquid 100 milliGRAM(s) Oral three times a day  fentaNYL   Infusion. 0.5 MICROgram(s)/kG/Hr IV Continuous <Continuous>  guaiFENesin  milliGRAM(s) Oral every 12 hours  heparin  Injectable 5000 Unit(s) SubCutaneous every 8 hours  insulin glargine Injectable (LANTUS) 10 Unit(s) SubCutaneous at bedtime  insulin lispro Injectable (HumaLOG) 3 Unit(s) SubCutaneous three times a day before meals  metoprolol tartrate 25 milliGRAM(s) Oral two times a day  nystatin Powder 1 Application(s) Topical three times a day  pantoprazole  Injectable 40 milliGRAM(s) IV Push daily  piperacillin/tazobactam IVPB. 2.25 Gram(s) IV Intermittent every 8 hours  senna 2 Tablet(s) Oral at bedtime  tamsulosin 0.4 milliGRAM(s) Oral at bedtime    PRN MEDICATIONS  acetaminophen   Tablet .. 650 milliGRAM(s) Oral every 6 hours PRN  ALBUTerol/ipratropium for Nebulization 3 milliLiter(s) Nebulizer every 6 hours PRN  dextrose 40% Gel 15 Gram(s) Oral once PRN  glucagon  Injectable 1 milliGRAM(s) IntraMuscular once PRN    VITALS:     Mode: AC/ CMV (Assist Control/ Continuous Mandatory Ventilation)  RR (machine): 18  TV (machine): 450  FiO2: 40  PEEP: 10  ITime: 1  MAP: 11  PIP: 23      ICU Vital Signs Last 24 Hrs:    T(C): 36.4 (20 Feb 2019 04:00), Max: 37.2 (19 Feb 2019 20:00)  T(F): 97.6 (20 Feb 2019 04:00), Max: 98.9 (19 Feb 2019 20:00)  HR: 68 (20 Feb 2019 06:00) (66 - 104)  BP: 93/57 (20 Feb 2019 06:00) (84/56 - 136/69)  BP(mean): 70 (20 Feb 2019 06:00) (62 - 105)  ABP: --  ABP(mean): --  RR: 18 (20 Feb 2019 06:00) (17 - 20)  SpO2: 100% (20 Feb 2019 06:00) (99% - 100%)      ABG - ( 19 Feb 2019 14:54 )  pH, Arterial: 7.41  pH, Blood: x     /  pCO2: 52    /  pO2: 118   / HCO3: 33    / Base Excess: 6.8   /  SaO2: 99                  I&O's Detail:      18 Feb 2019 07:01  -  19 Feb 2019 07:00  --------------------------------------------------------  IN:    fentaNYL Infusion.: 153.6 mL    IV PiggyBack: 200 mL    midazolam Infusion: 87 mL  Total IN: 440.6 mL    OUT:    Indwelling Catheter - Urethral: 2780 mL    Stool: 4 mL  Total OUT: 2784 mL    Total NET: -2343.4 mL      19 Feb 2019 07:01  -  20 Feb 2019 06:59  --------------------------------------------------------  IN:    dexmedetomidine Infusion: 185.1 mL    Enteral Tube Flush: 280 mL    fentaNYL Infusion.: 80.7 mL    Free Water: 1000 mL    IV PiggyBack: 300 mL    Osmolite 1.2: 370 mL  Total IN: 2215.8 mL    OUT:    Indwelling Catheter - Urethral: 2850 mL  Total OUT: 2850 mL    Total NET: -634.2 mL          LABS:                        10.5   8.67  )-----------( 143      ( 20 Feb 2019 04:41 )             35.1     02-20    150<H>  |  103  |  90<HH>  ----------------------------<  215<H>  4.3   |  28  |  2.5<H>    Ca    7.7<L>      20 Feb 2019 04:41  Phos  4.7     02-20  Mg     2.4     02-20    TPro  5.7<L>  /  Alb  3.2<L>  /  TBili  0.7  /  DBili  x   /  AST  16  /  ALT  7   /  AlkPhos  71  02-20      CARDIAC MARKERS ( 18 Feb 2019 15:00 )  x     / 0.13 ng/mL / 89 U/L / x     / 3.8 ng/mL  CARDIAC MARKERS ( 18 Feb 2019 09:40 )  x     / 0.14 ng/mL / 103 U/L / x     / 4.6 ng/mL      CAPILLARY BLOOD GLUCOSE      POCT Blood Glucose.: 109 mg/dL (19 Feb 2019 21:32)    PT/INR - ( 18 Feb 2019 15:00 )   PT: 12.80 sec;   INR: 1.11 ratio         PTT - ( 18 Feb 2019 15:00 )  PTT:30.5 sec    CULTURES:  Culture Results:   No growth (02-18 @ 06:28)  Culture Results:   No growth (02-16 @ 11:00)      PHYSICAL EXAM:  General: No acute distress.  intubated and sedated  HEENT: Pupils equal, reactive to light symmetrically. some squeezing of hands  PULM: Clear to auscultation bilaterally with decreased breath sounds, b/l bases, (body habitus limiting); some bloody secretions noted in ET tube  CVS: Irregular rhythm, controlled rate, PVCs noted on monitor, very soft systolic murmur at apex  ABD: Obese, mildly tense (possibly 2/2  shunt draining?) mildly distended,nontender  EXT: 1+ pitting on top of diffuse non-putting edema to mid shin with small ulcerated sores noted; some ulcerated sores noted over arms b/l as well   SKIN: Warm and well perfused, bronze discoloration of b/l legs noted    RADIOLOGY: Patient is a 83y old Male who presents with a chief complaint of Recurrent falls  TOBIAS (20 Feb 2019 06:25)    Currently admitted to medicine with the primary diagnosis of TOBIAS (acute kidney injury)    Today is hospital day 5d.    BRIEF HOSPITAL COURSE:   82 yo M PMHx of DMII, HTN,  Hydrocephalus s/p  shunt, Afib not on AC (multiple falls) presents to our ED with c/o mechanical fall. According to patient he was trying to walk with a walker and slipped on the floor, no nausea, vomiting, dizziness, chest pain, palpitations, seizure like episode, no LOC. He had 4 falls in last 10 days as per family. He was recently discharged from the hospital Dec for dizziness with stress test and  shunt assessment that was normal. At baseline he has home aid, takes his medications and walks very minimal with the help of cane or walker. However for last few months he has had dec ambulation and worsening LE edema. As per family, also worsening lethargy. Trauma work-up neg (age unknown L2 fx)    In the ED BP 83/45, HR 78/min, Sao2 100% on NC, afebrile. He was given 2L RL and his repeat BP was 110/80. Initial CXR only shows cardiomegaly without lobar consolidation. Patient later desatted to 85% on 3L NC so he was placed on BIPAP. Repeated CXR shows new consolidation at right lung base, suspecting PNA, so patient was started on Rocephin and Azithromycin. Labs showed TOBIAS with Cr 3.2 (baseline 2) and dec UOP. Pt also noted to be on NSAIDs and ACE, Actos so likely TOBIAS 2/2 dehydration and meds. On 2/18 pt was unable to tolerate NC and moved to BIPAP with ABG showing acute hypoxic resp failure and hypercapnia (likely chronic?). CXR at this time showed worsening pulm vascular congestion. Given x2 IV 40 lasix which did not improve UOP. Was on BIPAP for 4hr, became progressively lethargic to pt was intubated.    Admitted to CCU, barragan noted to be out-of-place (likely pt pulled out) so advanced with a sharp increase in out-put. CXR remained congested b/l bases and tolerated intubation well. Kidneys showed improvement after aggressive diuresis but remained on 60% O2.     EVENTS LAST 24HRS:   Patient remains intubated and sedated (Fentanyl/persedex). BP noted to be on the low side.   Improved O2 to 40% but still requiring high PEEP 10.  Overnight pt continued to have PVC's but less 'irritable' than yesterday on cardiac monitor. Started BB yesterday as per Dr. Harris.  Negative 0.6L overnight.    PAST MEDICAL & SURGICAL HISTORY  Hypertension  Hydrocephalus  CKD (chronic kidney disease)  DM (diabetes mellitus)  Afib  History of prostate surgery      SOCIAL HISTORY:      REVIEW OF SYSTEMS:  See HPI    ALLERGIES:    MEDICATIONS:  STANDING MEDICATIONS  atorvastatin 20 milliGRAM(s) Oral at bedtime  calcium carbonate 1250 mG  + Vitamin D (OsCal 500 + D) 1 Tablet(s) Oral three times a day  chlorhexidine 0.12% Liquid 15 milliLiter(s) Oral Mucosa every 12 hours  chlorhexidine 4% Liquid 1 Application(s) Topical <User Schedule>  dexmedetomidine Infusion 0.2 MICROgram(s)/kG/Hr IV Continuous <Continuous>  dextrose 5%. 1000 milliLiter(s) IV Continuous <Continuous>  dextrose 50% Injectable 12.5 Gram(s) IV Push once  dextrose 50% Injectable 25 Gram(s) IV Push once  dextrose 50% Injectable 25 Gram(s) IV Push once  docusate sodium Liquid 100 milliGRAM(s) Oral three times a day  fentaNYL   Infusion. 0.5 MICROgram(s)/kG/Hr IV Continuous <Continuous>  guaiFENesin  milliGRAM(s) Oral every 12 hours  heparin  Injectable 5000 Unit(s) SubCutaneous every 8 hours  insulin glargine Injectable (LANTUS) 10 Unit(s) SubCutaneous at bedtime  insulin lispro Injectable (HumaLOG) 3 Unit(s) SubCutaneous three times a day before meals  metoprolol tartrate 25 milliGRAM(s) Oral two times a day  nystatin Powder 1 Application(s) Topical three times a day  pantoprazole  Injectable 40 milliGRAM(s) IV Push daily  piperacillin/tazobactam IVPB. 2.25 Gram(s) IV Intermittent every 8 hours  senna 2 Tablet(s) Oral at bedtime  tamsulosin 0.4 milliGRAM(s) Oral at bedtime    PRN MEDICATIONS  acetaminophen   Tablet .. 650 milliGRAM(s) Oral every 6 hours PRN  ALBUTerol/ipratropium for Nebulization 3 milliLiter(s) Nebulizer every 6 hours PRN  dextrose 40% Gel 15 Gram(s) Oral once PRN  glucagon  Injectable 1 milliGRAM(s) IntraMuscular once PRN    VITALS:     Mode: AC/ CMV (Assist Control/ Continuous Mandatory Ventilation)  RR (machine): 18  TV (machine): 450  FiO2: 40  PEEP: 10  ITime: 1  MAP: 11  PIP: 23      ICU Vital Signs Last 24 Hrs:    T(C): 36.4 (20 Feb 2019 04:00), Max: 37.2 (19 Feb 2019 20:00)  T(F): 97.6 (20 Feb 2019 04:00), Max: 98.9 (19 Feb 2019 20:00)  HR: 68 (20 Feb 2019 06:00) (66 - 104)  BP: 93/57 (20 Feb 2019 06:00) (84/56 - 136/69)  BP(mean): 70 (20 Feb 2019 06:00) (62 - 105)  ABP: --  ABP(mean): --  RR: 18 (20 Feb 2019 06:00) (17 - 20)  SpO2: 100% (20 Feb 2019 06:00) (99% - 100%)      ABG - ( 19 Feb 2019 14:54 )  pH, Arterial: 7.41  pH, Blood: x     /  pCO2: 52    /  pO2: 118   / HCO3: 33    / Base Excess: 6.8   /  SaO2: 99                  I&O's Detail:      18 Feb 2019 07:01  -  19 Feb 2019 07:00  --------------------------------------------------------  IN:    fentaNYL Infusion.: 153.6 mL    IV PiggyBack: 200 mL    midazolam Infusion: 87 mL  Total IN: 440.6 mL    OUT:    Indwelling Catheter - Urethral: 2780 mL    Stool: 4 mL  Total OUT: 2784 mL    Total NET: -2343.4 mL      19 Feb 2019 07:01  -  20 Feb 2019 06:59  --------------------------------------------------------  IN:    dexmedetomidine Infusion: 185.1 mL    Enteral Tube Flush: 280 mL    fentaNYL Infusion.: 80.7 mL    Free Water: 1000 mL    IV PiggyBack: 300 mL    Osmolite 1.2: 370 mL  Total IN: 2215.8 mL    OUT:    Indwelling Catheter - Urethral: 2850 mL  Total OUT: 2850 mL    Total NET: -634.2 mL          LABS:                        10.5   8.67  )-----------( 143      ( 20 Feb 2019 04:41 )             35.1     02-20    150<H>  |  103  |  90<HH>  ----------------------------<  215<H>  4.3   |  28  |  2.5<H>    Ca    7.7<L>      20 Feb 2019 04:41  Phos  4.7     02-20  Mg     2.4     02-20    TPro  5.7<L>  /  Alb  3.2<L>  /  TBili  0.7  /  DBili  x   /  AST  16  /  ALT  7   /  AlkPhos  71  02-20      CARDIAC MARKERS ( 18 Feb 2019 15:00 )  x     / 0.13 ng/mL / 89 U/L / x     / 3.8 ng/mL  CARDIAC MARKERS ( 18 Feb 2019 09:40 )  x     / 0.14 ng/mL / 103 U/L / x     / 4.6 ng/mL      CAPILLARY BLOOD GLUCOSE      POCT Blood Glucose.: 109 mg/dL (19 Feb 2019 21:32)    PT/INR - ( 18 Feb 2019 15:00 )   PT: 12.80 sec;   INR: 1.11 ratio         PTT - ( 18 Feb 2019 15:00 )  PTT:30.5 sec    CULTURES:  Culture Results:   No growth (02-18 @ 06:28)  Culture Results:   No growth (02-16 @ 11:00)      PHYSICAL EXAM:  General: No acute distress.  intubated and sedated; opens eyes but not following commands  HEENT: Pupils equal, reactive to light symmetrically  PULM: Clear to auscultation bilaterally with decreased breath sounds, b/l bases, (body habitus limiting); some bloody secretions noted in tubing  CVS: Irregular rhythm, controlled rate, PVCs noted on monitor improved  ABD: Obese, mildly tense (possibly 2/2  shunt draining?), distended, nontender  EXT: 1+ pitting on top of diffuse non-putting edema to mid shin with small ulcerated sores noted; some ulcerated sores noted over arms b/l as well   SKIN: Warm and well perfused, bronze discoloration of b/l legs noted    RADIOLOGY:     < from: Transthoracic Echocardiogram (02.16.19 @ 13:35) >  Summary:   1. Normal left ventricular internal cavity size.   2. Mildly enlarged left atrium.   3. Normal right atrial size.   4. There is no evidence of pericardial effusion.   5. Thickening and calcification of the anterior and posterior mitral   valve leaflets.   6. Moderate tricuspid regurgitation.   7. Pulmonic valve regurgitation.      < end of copied text >

## 2019-02-20 NOTE — PROGRESS NOTE ADULT - SUBJECTIVE AND OBJECTIVE BOX
NEPHROLOGY FOLLOW UP NOTE    pt seen and examined in icu  critcally ill  vented  cr better  excellent uop  bp's on low side    PAST MEDICAL & SURGICAL HISTORY:  Hypertension  Hydrocephalus  CKD (chronic kidney disease)  DM (diabetes mellitus)  Afib  History of prostate surgery    Allergies:  No Known Allergies    Home Medications Reviewed    SOCIAL HISTORY:  Denies ETOH,Smoking,   FAMILY HISTORY:  Family history of diabetes mellitus (Mother, Sibling)        REVIEW OF SYSTEMS  All other review of systems is negative unless indicated above.    PHYSICAL EXAM:  NAD  ETT 60%  dry mm  supple  decreased bs b/l  distant HS, irreg  soft  + edema  barragan -> pale     Hospital Medications:   MEDICATIONS  (STANDING):  atorvastatin 20 milliGRAM(s) Oral at bedtime  calcium carbonate 1250 mG  + Vitamin D (OsCal 500 + D) 1 Tablet(s) Oral three times a day  chlorhexidine 0.12% Liquid 15 milliLiter(s) Oral Mucosa every 12 hours  chlorhexidine 4% Liquid 1 Application(s) Topical <User Schedule>  dexmedetomidine Infusion 0.2 MICROgram(s)/kG/Hr (6.7 mL/Hr) IV Continuous <Continuous>  dextrose 5%. 1000 milliLiter(s) (50 mL/Hr) IV Continuous <Continuous>  dextrose 50% Injectable 12.5 Gram(s) IV Push once  dextrose 50% Injectable 25 Gram(s) IV Push once  dextrose 50% Injectable 25 Gram(s) IV Push once  docusate sodium Liquid 100 milliGRAM(s) Oral three times a day  fentaNYL   Infusion. 0.5 MICROgram(s)/kG/Hr (1.35 mL/Hr) IV Continuous <Continuous>  furosemide   Injectable 60 milliGRAM(s) IV Push daily  guaiFENesin  milliGRAM(s) Oral every 12 hours  heparin  Injectable 5000 Unit(s) SubCutaneous every 8 hours  insulin glargine Injectable (LANTUS) 10 Unit(s) SubCutaneous at bedtime  insulin lispro Injectable (HumaLOG) 3 Unit(s) SubCutaneous three times a day before meals  metoprolol tartrate 25 milliGRAM(s) Oral two times a day  nystatin Powder 1 Application(s) Topical three times a day  pantoprazole  Injectable 40 milliGRAM(s) IV Push daily  piperacillin/tazobactam IVPB. 2.25 Gram(s) IV Intermittent every 8 hours  senna 2 Tablet(s) Oral at bedtime  tamsulosin 0.4 milliGRAM(s) Oral at bedtime        VITALS:  T(F): 99.3 (19 @ 14:00), Max: 99.3 (19 @ 14:00)  HR: 70 (19 @ 15:00)  BP: 90/59 (19 @ 15:00)  RR: 17 (19 @ 15:00)  SpO2: 100% (19 @ 15:00)  Wt(kg): --     @ 07:01  -   07:00  --------------------------------------------------------  IN: 440.6 mL / OUT: 2784 mL / NET: -2343.4 mL     @ 07:01  -   @ 07:00  --------------------------------------------------------  IN: 2250.8 mL / OUT: 3020 mL / NET: -769.2 mL     @ 07:01  -   @ 16:01  --------------------------------------------------------  IN: 832.6 mL / OUT: 1010 mL / NET: -177.4 mL          LABS:      150<H>  |  103  |  90<HH>  ----------------------------<  215<H>  4.3   |  28  |  2.5<H>    Ca    7.7<L>      2019 04:41  Phos  4.7       Mg     2.4         TPro  5.7<L>  /  Alb  3.2<L>  /  TBili  0.7  /  DBili      /  AST  16  /  ALT  7   /  AlkPhos  71                            10.5   8.67  )-----------( 143      ( 2019 04:41 )             35.1       Urine Studies:  Urinalysis Basic - ( 2019 06:28 )    Color: Red / Appearance: Turbid / S.020 / pH:   Gluc:  / Ketone: 15  / Bili: Large / Urobili: 1.0 mg/dL   Blood:  / Protein: 100 mg/dL / Nitrite: Positive   Leuk Esterase: Large / RBC: >50 /HPF / WBC    Sq Epi:  / Non Sq Epi:  / Bacteria:       Osmolality, Random Urine: 369 mos/kg ( @ 23:00)  Sodium, Random Urine: 39.0 mmoL/L ( @ 23:00)  Creatinine, Random Urine: 72 mg/dL ( @ 23:00)  Creatinine, Random Urine: 74 mg/dL ( @ 06:28)  Protein/Creatinine Ratio Calculation: 4.1 Ratio ( @ :28)  Sodium, Random Urine: 44.0 mmoL/L ( @ 06:28)  Osmolality, Random Urine: 362 mos/kg ( @ 06:28)  Sodium, Random Urine: 20.0 mmoL/L ( @ 11:00)  Creatinine, Random Urine: 28 mg/dL ( @ 11:00)      RADIOLOGY & ADDITIONAL STUDIES:

## 2019-02-20 NOTE — DIETITIAN INITIAL EVALUATION ADULT. - DIET TYPE
Osmolite 1.5 at 35ml/h via OGT + Prosource 5x/day- 1460kcal, 107g protein, 638ml free H2O- 98% est calorie needs, 86% est protein needs. Per RN good tolerance to TF at this time, no residuals, insulin adjusted as needed./NPO with tube feedings

## 2019-02-20 NOTE — PROGRESS NOTE ADULT - SUBJECTIVE AND OBJECTIVE BOX
Patient is a 83y old  Male who presents with a chief complaint of Recurrent falls  TOBIAS (20 Feb 2019 06:59)      Over Night Events:  Patient seen and examined still vented  on sedation   cxr improved b/l       ROS:  See HPI    PHYSICAL EXAM    ICU Vital Signs Last 24 Hrs  T(C): 36.4 (20 Feb 2019 04:00), Max: 37.2 (19 Feb 2019 20:00)  T(F): 97.6 (20 Feb 2019 04:00), Max: 98.9 (19 Feb 2019 20:00)  HR: 68 (20 Feb 2019 07:47) (66 - 104)  BP: 93/57 (20 Feb 2019 06:00) (84/56 - 136/69)  BP(mean): 70 (20 Feb 2019 06:00) (62 - 105)  ABP: --  ABP(mean): --  RR: 18 (20 Feb 2019 06:00) (17 - 20)  SpO2: 100% (20 Feb 2019 07:47) (99% - 100%)      General: on sedation   HEENT:  et tube               Lymph Nodes: NO cervical LN   Lungs: Bilateral crackles   Vascular: gular   Abdomen: Soft, Positive BS  Extremities: No clubbing   Skin: warm   Neurological: no focal deficit   Musculoskeletal: move all ext     I&O's Detail    19 Feb 2019 07:01  -  20 Feb 2019 07:00  --------------------------------------------------------  IN:    dexmedetomidine Infusion: 185.1 mL    Enteral Tube Flush: 280 mL    fentaNYL Infusion.: 80.7 mL    Free Water: 1000 mL    IV PiggyBack: 300 mL    Osmolite 1.2: 370 mL  Total IN: 2215.8 mL    OUT:    Indwelling Catheter - Urethral: 2850 mL  Total OUT: 2850 mL    Total NET: -634.2 mL          LABS:                          10.5   8.67  )-----------( 143      ( 20 Feb 2019 04:41 )             35.1         20 Feb 2019 04:41    150    |  103    |  90     ----------------------------<  215    4.3     |  28     |  2.5      Ca    7.7        20 Feb 2019 04:41  Phos  4.7       20 Feb 2019 04:41  Mg     2.4       20 Feb 2019 04:41    TPro  5.7    /  Alb  3.2    /  TBili  0.7    /  DBili  x      /  AST  16     /  ALT  7      /  AlkPhos  71     20 Feb 2019 04:41  Amylase x     lipase x                                                 PT/INR - ( 18 Feb 2019 15:00 )   PT: 12.80 sec;   INR: 1.11 ratio         PTT - ( 18 Feb 2019 15:00 )  PTT:30.5 sec                                             CARDIAC MARKERS ( 18 Feb 2019 15:00 )  x     / 0.13 ng/mL / 89 U/L / x     / 3.8 ng/mL  CARDIAC MARKERS ( 18 Feb 2019 09:40 )  x     / 0.14 ng/mL / 103 U/L / x     / 4.6 ng/mL                                                        Culture - Sputum (collected 18 Feb 2019 19:35)  Source: .Sputum Sputum trap  Gram Stain (19 Feb 2019 15:53):    Few polymorphonuclear leukocytes    Rare Squamous epithelial cells per low power field    Few Gram Positive Cocci in Clusters per oil power field    Culture - Urine (collected 18 Feb 2019 06:28)  Source: .Urine Catheterized  Final Report (19 Feb 2019 07:31):    No growth                                                   Mode: AC/ CMV (Assist Control/ Continuous Mandatory Ventilation)  RR (machine): 18  TV (machine): 450  FiO2: 40  PEEP: 10  ITime: 1  MAP: 15  PIP: 28                                      ABG - ( 20 Feb 2019 07:58 )  pH, Arterial: 7.49  pH, Blood: x     /  pCO2: 47    /  pO2: 69    / HCO3: 36    / Base Excess: 11.1  /  SaO2: 95                  MEDICATIONS  (STANDING):  atorvastatin 20 milliGRAM(s) Oral at bedtime  calcium carbonate 1250 mG  + Vitamin D (OsCal 500 + D) 1 Tablet(s) Oral three times a day  chlorhexidine 0.12% Liquid 15 milliLiter(s) Oral Mucosa every 12 hours  chlorhexidine 4% Liquid 1 Application(s) Topical <User Schedule>  dexmedetomidine Infusion 0.2 MICROgram(s)/kG/Hr (6.7 mL/Hr) IV Continuous <Continuous>  dextrose 5%. 1000 milliLiter(s) (50 mL/Hr) IV Continuous <Continuous>  dextrose 50% Injectable 12.5 Gram(s) IV Push once  dextrose 50% Injectable 25 Gram(s) IV Push once  dextrose 50% Injectable 25 Gram(s) IV Push once  docusate sodium Liquid 100 milliGRAM(s) Oral three times a day  fentaNYL   Infusion. 0.5 MICROgram(s)/kG/Hr (1.35 mL/Hr) IV Continuous <Continuous>  guaiFENesin  milliGRAM(s) Oral every 12 hours  heparin  Injectable 5000 Unit(s) SubCutaneous every 8 hours  insulin glargine Injectable (LANTUS) 10 Unit(s) SubCutaneous at bedtime  insulin lispro Injectable (HumaLOG) 3 Unit(s) SubCutaneous three times a day before meals  metoprolol tartrate 25 milliGRAM(s) Oral two times a day  nystatin Powder 1 Application(s) Topical three times a day  pantoprazole  Injectable 40 milliGRAM(s) IV Push daily  piperacillin/tazobactam IVPB. 2.25 Gram(s) IV Intermittent every 8 hours  senna 2 Tablet(s) Oral at bedtime  tamsulosin 0.4 milliGRAM(s) Oral at bedtime    MEDICATIONS  (PRN):  acetaminophen   Tablet .. 650 milliGRAM(s) Oral every 6 hours PRN Moderate Pain (4 - 6)  ALBUTerol/ipratropium for Nebulization 3 milliLiter(s) Nebulizer every 6 hours PRN Shortness of Breath and/or Wheezing  dextrose 40% Gel 15 Gram(s) Oral once PRN Blood Glucose LESS THAN 70 milliGRAM(s)/deciliter  glucagon  Injectable 1 milliGRAM(s) IntraMuscular once PRN Glucose LESS THAN 70 milligrams/deciliter          Xrays:  TLC:  OG:  ET tube:                                                                                    decrease b/l opacity and effusion    ECHO:

## 2019-02-21 LAB
% ALBUMIN: 55.3 % — SIGNIFICANT CHANGE UP
% ALPHA 1: 7.9 % — SIGNIFICANT CHANGE UP
% ALPHA 2: 15 % — SIGNIFICANT CHANGE UP
% BETA: 10.6 % — SIGNIFICANT CHANGE UP
% GAMMA: 11.2 % — SIGNIFICANT CHANGE UP
ALBUMIN SERPL ELPH-MCNC: 3.1 G/DL — LOW (ref 3.5–5.2)
ALBUMIN SERPL ELPH-MCNC: 3.4 G/DL — LOW (ref 3.6–5.5)
ALBUMIN/GLOB SERPL ELPH: 1.2 RATIO — SIGNIFICANT CHANGE UP
ALP SERPL-CCNC: 72 U/L — SIGNIFICANT CHANGE UP (ref 30–115)
ALPHA1 GLOB SERPL ELPH-MCNC: 0.5 G/DL — HIGH (ref 0.1–0.4)
ALPHA2 GLOB SERPL ELPH-MCNC: 0.9 G/DL — SIGNIFICANT CHANGE UP (ref 0.5–1)
ALT FLD-CCNC: 6 U/L — SIGNIFICANT CHANGE UP (ref 0–41)
ANION GAP SERPL CALC-SCNC: 18 MMOL/L — HIGH (ref 7–14)
AST SERPL-CCNC: 11 U/L — SIGNIFICANT CHANGE UP (ref 0–41)
B-GLOBULIN SERPL ELPH-MCNC: 0.7 G/DL — SIGNIFICANT CHANGE UP (ref 0.5–1)
BASE EXCESS BLDA CALC-SCNC: 10.6 MMOL/L — HIGH (ref -2–2)
BASE EXCESS BLDA CALC-SCNC: 10.9 MMOL/L — HIGH (ref -2–2)
BASE EXCESS BLDA CALC-SCNC: 11.3 MMOL/L — HIGH (ref -2–2)
BILIRUB SERPL-MCNC: 0.6 MG/DL — SIGNIFICANT CHANGE UP (ref 0.2–1.2)
BUN SERPL-MCNC: 88 MG/DL — CRITICAL HIGH (ref 10–20)
CALCIUM SERPL-MCNC: 7.8 MG/DL — LOW (ref 8.5–10.1)
CHLORIDE SERPL-SCNC: 105 MMOL/L — SIGNIFICANT CHANGE UP (ref 98–110)
CO2 SERPL-SCNC: 31 MMOL/L — SIGNIFICANT CHANGE UP (ref 17–32)
CREAT SERPL-MCNC: 2.5 MG/DL — HIGH (ref 0.7–1.5)
GAMMA GLOBULIN: 0.7 G/DL — SIGNIFICANT CHANGE UP (ref 0.6–1.6)
GAS PNL BLDA: SIGNIFICANT CHANGE UP
GLUCOSE BLDC GLUCOMTR-MCNC: 209 MG/DL — HIGH (ref 70–99)
GLUCOSE BLDC GLUCOMTR-MCNC: 217 MG/DL — HIGH (ref 70–99)
GLUCOSE BLDC GLUCOMTR-MCNC: 223 MG/DL — HIGH (ref 70–99)
GLUCOSE BLDC GLUCOMTR-MCNC: 234 MG/DL — HIGH (ref 70–99)
GLUCOSE SERPL-MCNC: 236 MG/DL — HIGH (ref 70–99)
HCO3 BLDA-SCNC: 35 MMOL/L — HIGH (ref 23–27)
HCO3 BLDA-SCNC: 35 MMOL/L — HIGH (ref 23–27)
HCO3 BLDA-SCNC: 36 MMOL/L — HIGH (ref 23–27)
HOROWITZ INDEX BLDA+IHG-RTO: 40 — SIGNIFICANT CHANGE UP
INTERPRETATION SERPL IFE-IMP: SIGNIFICANT CHANGE UP
PCO2 BLDA: 43 MMHG — HIGH (ref 38–42)
PCO2 BLDA: 46 MMHG — HIGH (ref 38–42)
PCO2 BLDA: 49 MMHG — HIGH (ref 38–42)
PH BLDA: 7.47 — HIGH (ref 7.38–7.42)
PH BLDA: 7.5 — HIGH (ref 7.38–7.42)
PH BLDA: 7.52 — HIGH (ref 7.38–7.42)
PO2 BLDA: 67 MMHG — LOW (ref 78–95)
PO2 BLDA: 78 MMHG — SIGNIFICANT CHANGE UP (ref 78–95)
PO2 BLDA: 82 MMHG — SIGNIFICANT CHANGE UP (ref 78–95)
POTASSIUM SERPL-MCNC: 3.6 MMOL/L — SIGNIFICANT CHANGE UP (ref 3.5–5)
POTASSIUM SERPL-SCNC: 3.6 MMOL/L — SIGNIFICANT CHANGE UP (ref 3.5–5)
PROT PATTERN SERPL ELPH-IMP: SIGNIFICANT CHANGE UP
PROT SERPL-MCNC: 5.6 G/DL — LOW (ref 6–8)
PROT SERPL-MCNC: 6.2 G/DL — SIGNIFICANT CHANGE UP (ref 6–8.3)
PTH-INTACT FLD-MCNC: 24 PG/ML — SIGNIFICANT CHANGE UP (ref 15–65)
SAO2 % BLDA: 95 % — SIGNIFICANT CHANGE UP (ref 94–98)
SAO2 % BLDA: 96 % — SIGNIFICANT CHANGE UP (ref 94–98)
SAO2 % BLDA: 96 % — SIGNIFICANT CHANGE UP (ref 94–98)
SODIUM SERPL-SCNC: 154 MMOL/L — HIGH (ref 135–146)

## 2019-02-21 RX ORDER — FUROSEMIDE 40 MG
40 TABLET ORAL ONCE
Qty: 0 | Refills: 0 | Status: DISCONTINUED | OUTPATIENT
Start: 2019-02-21 | End: 2019-02-21

## 2019-02-21 RX ORDER — POTASSIUM CHLORIDE 20 MEQ
20 PACKET (EA) ORAL ONCE
Qty: 0 | Refills: 0 | Status: COMPLETED | OUTPATIENT
Start: 2019-02-21 | End: 2019-02-21

## 2019-02-21 RX ORDER — PIPERACILLIN AND TAZOBACTAM 4; .5 G/20ML; G/20ML
2.25 INJECTION, POWDER, LYOPHILIZED, FOR SOLUTION INTRAVENOUS EVERY 8 HOURS
Qty: 0 | Refills: 0 | Status: DISCONTINUED | OUTPATIENT
Start: 2019-02-21 | End: 2019-02-22

## 2019-02-21 RX ORDER — FUROSEMIDE 40 MG
60 TABLET ORAL ONCE
Qty: 0 | Refills: 0 | Status: DISCONTINUED | OUTPATIENT
Start: 2019-02-21 | End: 2019-02-21

## 2019-02-21 RX ORDER — FUROSEMIDE 40 MG
40 TABLET ORAL
Qty: 0 | Refills: 0 | Status: DISCONTINUED | OUTPATIENT
Start: 2019-02-21 | End: 2019-02-22

## 2019-02-21 RX ORDER — SODIUM CHLORIDE 9 MG/ML
1000 INJECTION, SOLUTION INTRAVENOUS
Qty: 0 | Refills: 0 | Status: DISCONTINUED | OUTPATIENT
Start: 2019-02-21 | End: 2019-02-23

## 2019-02-21 RX ORDER — METOPROLOL TARTRATE 50 MG
25 TABLET ORAL THREE TIMES A DAY
Qty: 0 | Refills: 0 | Status: DISCONTINUED | OUTPATIENT
Start: 2019-02-21 | End: 2019-02-27

## 2019-02-21 RX ORDER — METOPROLOL TARTRATE 50 MG
5 TABLET ORAL ONCE
Qty: 0 | Refills: 0 | Status: COMPLETED | OUTPATIENT
Start: 2019-02-21 | End: 2019-02-21

## 2019-02-21 RX ORDER — FENTANYL CITRATE 50 UG/ML
100 INJECTION INTRAVENOUS EVERY 4 HOURS
Qty: 0 | Refills: 0 | Status: DISCONTINUED | OUTPATIENT
Start: 2019-02-21 | End: 2019-02-21

## 2019-02-21 RX ADMIN — Medication 3 UNIT(S): at 16:16

## 2019-02-21 RX ADMIN — FENTANYL CITRATE 100 MICROGRAM(S): 50 INJECTION INTRAVENOUS at 16:46

## 2019-02-21 RX ADMIN — Medication 600 MILLIGRAM(S): at 17:05

## 2019-02-21 RX ADMIN — INSULIN GLARGINE 10 UNIT(S): 100 INJECTION, SOLUTION SUBCUTANEOUS at 21:53

## 2019-02-21 RX ADMIN — NYSTATIN CREAM 1 APPLICATION(S): 100000 CREAM TOPICAL at 21:57

## 2019-02-21 RX ADMIN — PANTOPRAZOLE SODIUM 40 MILLIGRAM(S): 20 TABLET, DELAYED RELEASE ORAL at 11:16

## 2019-02-21 RX ADMIN — Medication 1 TABLET(S): at 05:26

## 2019-02-21 RX ADMIN — Medication 100 MILLIGRAM(S): at 05:26

## 2019-02-21 RX ADMIN — Medication 40 MILLIGRAM(S): at 10:08

## 2019-02-21 RX ADMIN — HEPARIN SODIUM 5000 UNIT(S): 5000 INJECTION INTRAVENOUS; SUBCUTANEOUS at 21:53

## 2019-02-21 RX ADMIN — Medication 25 MILLIGRAM(S): at 05:26

## 2019-02-21 RX ADMIN — Medication 60 MILLIGRAM(S): at 05:29

## 2019-02-21 RX ADMIN — HEPARIN SODIUM 5000 UNIT(S): 5000 INJECTION INTRAVENOUS; SUBCUTANEOUS at 14:14

## 2019-02-21 RX ADMIN — Medication 3 UNIT(S): at 07:56

## 2019-02-21 RX ADMIN — Medication 25 MILLIGRAM(S): at 17:04

## 2019-02-21 RX ADMIN — PIPERACILLIN AND TAZOBACTAM 25 GRAM(S): 4; .5 INJECTION, POWDER, LYOPHILIZED, FOR SOLUTION INTRAVENOUS at 21:53

## 2019-02-21 RX ADMIN — Medication 600 MILLIGRAM(S): at 05:26

## 2019-02-21 RX ADMIN — Medication 100 MILLIGRAM(S): at 14:14

## 2019-02-21 RX ADMIN — CHLORHEXIDINE GLUCONATE 1 APPLICATION(S): 213 SOLUTION TOPICAL at 05:25

## 2019-02-21 RX ADMIN — Medication 3 UNIT(S): at 11:54

## 2019-02-21 RX ADMIN — CHLORHEXIDINE GLUCONATE 15 MILLILITER(S): 213 SOLUTION TOPICAL at 05:26

## 2019-02-21 RX ADMIN — FENTANYL CITRATE 100 MICROGRAM(S): 50 INJECTION INTRAVENOUS at 14:11

## 2019-02-21 RX ADMIN — Medication 40 MILLIGRAM(S): at 17:05

## 2019-02-21 RX ADMIN — Medication 50 MILLIEQUIVALENT(S): at 23:44

## 2019-02-21 RX ADMIN — PIPERACILLIN AND TAZOBACTAM 25 GRAM(S): 4; .5 INJECTION, POWDER, LYOPHILIZED, FOR SOLUTION INTRAVENOUS at 05:40

## 2019-02-21 RX ADMIN — HEPARIN SODIUM 5000 UNIT(S): 5000 INJECTION INTRAVENOUS; SUBCUTANEOUS at 05:27

## 2019-02-21 RX ADMIN — PIPERACILLIN AND TAZOBACTAM 25 GRAM(S): 4; .5 INJECTION, POWDER, LYOPHILIZED, FOR SOLUTION INTRAVENOUS at 14:13

## 2019-02-21 RX ADMIN — FENTANYL CITRATE 100 MICROGRAM(S): 50 INJECTION INTRAVENOUS at 12:09

## 2019-02-21 RX ADMIN — CHLORHEXIDINE GLUCONATE 15 MILLILITER(S): 213 SOLUTION TOPICAL at 17:05

## 2019-02-21 RX ADMIN — Medication 1 TABLET(S): at 14:14

## 2019-02-21 RX ADMIN — NYSTATIN CREAM 1 APPLICATION(S): 100000 CREAM TOPICAL at 05:24

## 2019-02-21 RX ADMIN — NYSTATIN CREAM 1 APPLICATION(S): 100000 CREAM TOPICAL at 14:13

## 2019-02-21 NOTE — PROGRESS NOTE ADULT - ASSESSMENT
IMPRESSION:  Aspiration with possibly Bibasilar GNR PNA complicated by respiratory failure requiring ventilation  CXR much improved which goes against a bacterial process  No pressors  UCx NTD  ET with few PMNs which goes against an infection  CT with bilateral pleural effusions with atelectasis right base ( official reading pending )    RECOMMENDATIONS:  Procalcitonin level .   Change Zosyn 3.375 mg iv q6h  If CT reveals no consolidation will hold ABx

## 2019-02-21 NOTE — PROGRESS NOTE ADULT - ASSESSMENT
TOBIAS   - better  hypernatremia   CKD 3-4 (baseline Cr 1.9 on 12/2018)  urine retention s/p prostatectomy   hypotension   falls  ARF - JOHANNY on CPAP / PNA / CHF  EF 55%  afib  HTN   s/p  shunt  DM 2       plan:    ICU care  lasix changed to 40mg iv q12h, would hold dose if bp's low  avoid overdiuresis  increase  free H2O 250cc via OGT Q4H  add d5w @ 50cc/hr  off bp meds and ACE-I  vent support - SBT  renal dose zosyn  cmp, mg, phos Q12h      critical care 35min

## 2019-02-21 NOTE — PROGRESS NOTE ADULT - SUBJECTIVE AND OBJECTIVE BOX
Patient is a 83y old  Male who presents with a chief complaint of Recurrent falls  TOBIAS (21 Feb 2019 07:27)      Over Night Events:  Patient seen and examined no pressors   s/p ct scan most likley b/l effusion and edema       ROS:  See HPI    PHYSICAL EXAM    ICU Vital Signs Last 24 Hrs  T(C): 37.1 (21 Feb 2019 07:46), Max: 37.4 (20 Feb 2019 14:00)  T(F): 98.7 (21 Feb 2019 07:46), Max: 99.3 (20 Feb 2019 14:00)  HR: 66 (21 Feb 2019 07:46) (62 - 86)  BP: 86/55 (21 Feb 2019 07:46) (85/60 - 107/71)  BP(mean): 67 (21 Feb 2019 07:46) (64 - 85)  ABP: --  ABP(mean): --  RR: 19 (21 Feb 2019 07:46) (11 - 22)  SpO2: 100% (21 Feb 2019 07:46) (96% - 100%)      General:  awake while off sedation   HEENT:      Et tube   Lymph Nodes: NO cervical LN   Lungs: Bilateral crackles   Cardiovascular: Regular   Abdomen: Soft, Positive BS  Extremities: No clubbing   Skin: warm   Neurological: no focal deficit   Musculoskeletal: move all ext     I&O's Detail    20 Feb 2019 07:01  -  21 Feb 2019 07:00  --------------------------------------------------------  IN:    dexmedetomidine Infusion: 404.6 mL    Enteral Tube Flush: 320 mL    Free Water: 750 mL    IV PiggyBack: 250 mL    Osmolite 1.2: 875 mL  Total IN: 2599.6 mL    OUT:    Indwelling Catheter - Urethral: 3065 mL  Total OUT: 3065 mL    Total NET: -465.4 mL      21 Feb 2019 07:01  -  21 Feb 2019 08:28  --------------------------------------------------------  IN:    dexmedetomidine Infusion: 10.1 mL    IV PiggyBack: 25 mL    Osmolite 1.2: 35 mL  Total IN: 70.1 mL    OUT:    Indwelling Catheter - Urethral: 75 mL  Total OUT: 75 mL    Total NET: -4.9 mL          LABS:                          10.5   8.67  )-----------( 143      ( 20 Feb 2019 04:41 )             35.1         21 Feb 2019 05:44    154    |  105    |  88     ----------------------------<  236    3.6     |  31     |  2.5      Ca    7.8        21 Feb 2019 05:44  Phos  4.7       20 Feb 2019 04:41  Mg     2.4       20 Feb 2019 04:41    TPro  5.6    /  Alb  3.1    /  TBili  0.6    /  DBili  x      /  AST  11     /  ALT  6      /  AlkPhos  72     21 Feb 2019 05:44  Amylase x     lipase x                                                                                                                                                    Culture - Sputum (collected 18 Feb 2019 19:35)  Source: .Sputum Sputum trap  Gram Stain (19 Feb 2019 15:53):    Few polymorphonuclear leukocytes    Rare Squamous epithelial cells per low power field    Few Gram Positive Cocci in Clusters per oil power field  Preliminary Report (20 Feb 2019 09:48):    Normal Respiratory Marilee present                                                   Mode: AC/ CMV (Assist Control/ Continuous Mandatory Ventilation)  RR (machine): 16  TV (machine): 450  FiO2: 40  PEEP: 10  ITime: 1  MAP: 15  PIP: 21                                      ABG - ( 21 Feb 2019 07:11 )  pH, Arterial: 7.47  pH, Blood: x     /  pCO2: 49    /  pO2: 78    / HCO3: 36    / Base Excess: 10.9  /  SaO2: 96                  MEDICATIONS  (STANDING):  atorvastatin 20 milliGRAM(s) Oral at bedtime  calcium carbonate 1250 mG  + Vitamin D (OsCal 500 + D) 1 Tablet(s) Oral three times a day  chlorhexidine 0.12% Liquid 15 milliLiter(s) Oral Mucosa every 12 hours  chlorhexidine 4% Liquid 1 Application(s) Topical <User Schedule>  dexmedetomidine Infusion 0.2 MICROgram(s)/kG/Hr (6.7 mL/Hr) IV Continuous <Continuous>  dextrose 5%. 1000 milliLiter(s) (50 mL/Hr) IV Continuous <Continuous>  dextrose 50% Injectable 12.5 Gram(s) IV Push once  dextrose 50% Injectable 25 Gram(s) IV Push once  dextrose 50% Injectable 25 Gram(s) IV Push once  docusate sodium Liquid 100 milliGRAM(s) Oral three times a day  fentaNYL   Infusion. 0.5 MICROgram(s)/kG/Hr (1.35 mL/Hr) IV Continuous <Continuous>  furosemide   Injectable 60 milliGRAM(s) IV Push daily  guaiFENesin  milliGRAM(s) Oral every 12 hours  heparin  Injectable 5000 Unit(s) SubCutaneous every 8 hours  insulin glargine Injectable (LANTUS) 10 Unit(s) SubCutaneous at bedtime  insulin lispro Injectable (HumaLOG) 3 Unit(s) SubCutaneous three times a day before meals  metoprolol tartrate 25 milliGRAM(s) Oral two times a day  nystatin Powder 1 Application(s) Topical three times a day  pantoprazole  Injectable 40 milliGRAM(s) IV Push daily  piperacillin/tazobactam IVPB. 2.25 Gram(s) IV Intermittent every 8 hours  senna 2 Tablet(s) Oral at bedtime  tamsulosin 0.4 milliGRAM(s) Oral at bedtime    MEDICATIONS  (PRN):  acetaminophen   Tablet .. 650 milliGRAM(s) Oral every 6 hours PRN Moderate Pain (4 - 6)  ALBUTerol/ipratropium for Nebulization 3 milliLiter(s) Nebulizer every 6 hours PRN Shortness of Breath and/or Wheezing  dextrose 40% Gel 15 Gram(s) Oral once PRN Blood Glucose LESS THAN 70 milliGRAM(s)/deciliter  glucagon  Injectable 1 milliGRAM(s) IntraMuscular once PRN Glucose LESS THAN 70 milligrams/deciliter          Xrays:  TLC:  OG:  ET tube:                                                                                    decrease b/l opacity    ECHO:

## 2019-02-21 NOTE — PROGRESS NOTE ADULT - SUBJECTIVE AND OBJECTIVE BOX
DELVIS VINCENZO  83y, Male      OVERNIGHT EVENTS:    no fevers, no pressors, sedated, no BM  FiO2 40%  no central lines    VITALS:  T(F): 98.9, Max: 99.3 (02-20-19 @ 14:00)  HR: 74  BP: 102/64  RR: 20Vital Signs Last 24 Hrs  T(C): 37.2 (21 Feb 2019 04:00), Max: 37.4 (20 Feb 2019 14:00)  T(F): 98.9 (21 Feb 2019 04:00), Max: 99.3 (20 Feb 2019 14:00)  HR: 74 (21 Feb 2019 06:00) (62 - 86)  BP: 102/64 (21 Feb 2019 06:00) (85/60 - 112/77)  BP(mean): 81 (21 Feb 2019 06:00) (64 - 88)  RR: 20 (21 Feb 2019 06:00) (11 - 22)  SpO2: 100% (21 Feb 2019 06:00) (96% - 100%)    TESTS & MEASUREMENTS:                        10.5   8.67  )-----------( 143      ( 20 Feb 2019 04:41 )             35.1     02-21    154<H>  |  105  |  88<HH>  ----------------------------<  236<H>  3.6   |  31  |  2.5<H>    Ca    7.8<L>      21 Feb 2019 05:44  Phos  4.7     02-20  Mg     2.4     02-20    TPro  5.6<L>  /  Alb  3.1<L>  /  TBili  0.6  /  DBili  x   /  AST  11  /  ALT  6   /  AlkPhos  72  02-21    LIVER FUNCTIONS - ( 21 Feb 2019 05:44 )  Alb: 3.1 g/dL / Pro: 5.6 g/dL / ALK PHOS: 72 U/L / ALT: 6 U/L / AST: 11 U/L / GGT: x             Culture - Sputum (collected 02-18-19 @ 19:35)  Source: .Sputum Sputum trap  Gram Stain (02-19-19 @ 15:53):    Few polymorphonuclear leukocytes    Rare Squamous epithelial cells per low power field    Few Gram Positive Cocci in Clusters per oil power field  Preliminary Report (02-20-19 @ 09:48):    Normal Respiratory Marilee present    Culture - Urine (collected 02-18-19 @ 06:28)  Source: .Urine Catheterized  Final Report (02-19-19 @ 07:31):    No growth    Culture - Urine (collected 02-16-19 @ 11:00)  Source: .Urine Clean Catch (Midstream)  Final Report (02-17-19 @ 12:15):    No growth            RADIOLOGY & ADDITIONAL TESTS:    ANTIBIOTICS:  piperacillin/tazobactam IVPB. 2.25 Gram(s) IV Intermittent every 8 hours

## 2019-02-21 NOTE — PROGRESS NOTE ADULT - SUBJECTIVE AND OBJECTIVE BOX
NEPHROLOGY FOLLOW UP NOTE    pt seen and examined in icu  critcally ill  vented  cr better  excellent uop  bp's on low side still    PAST MEDICAL & SURGICAL HISTORY:  Hypertension  Hydrocephalus  CKD (chronic kidney disease)  DM (diabetes mellitus)  Afib  History of prostate surgery    Allergies:  No Known Allergies    Home Medications Reviewed    SOCIAL HISTORY:  Denies ETOH,Smoking,   FAMILY HISTORY:  Family history of diabetes mellitus (Mother, Sibling)        REVIEW OF SYSTEMS  All other review of systems is negative unless indicated above.    PHYSICAL EXAM:  NAD  ETT 60%  dry mm  supple  decreased bs b/l  distant HS, irreg  soft  + edema  barragan -> pale             Hospital Medications:   MEDICATIONS  (STANDING):  atorvastatin 20 milliGRAM(s) Oral at bedtime  calcium carbonate 1250 mG  + Vitamin D (OsCal 500 + D) 1 Tablet(s) Oral three times a day  chlorhexidine 0.12% Liquid 15 milliLiter(s) Oral Mucosa every 12 hours  chlorhexidine 4% Liquid 1 Application(s) Topical <User Schedule>  dexmedetomidine Infusion 0.2 MICROgram(s)/kG/Hr (6.7 mL/Hr) IV Continuous <Continuous>  dextrose 5%. 1000 milliLiter(s) (50 mL/Hr) IV Continuous <Continuous>  dextrose 5%. 1000 milliLiter(s) (50 mL/Hr) IV Continuous <Continuous>  dextrose 50% Injectable 12.5 Gram(s) IV Push once  dextrose 50% Injectable 25 Gram(s) IV Push once  dextrose 50% Injectable 25 Gram(s) IV Push once  docusate sodium Liquid 100 milliGRAM(s) Oral three times a day  furosemide   Injectable 40 milliGRAM(s) IV Push two times a day  guaiFENesin  milliGRAM(s) Oral every 12 hours  heparin  Injectable 5000 Unit(s) SubCutaneous every 8 hours  insulin glargine Injectable (LANTUS) 10 Unit(s) SubCutaneous at bedtime  insulin lispro Injectable (HumaLOG) 3 Unit(s) SubCutaneous three times a day before meals  metoprolol tartrate 25 milliGRAM(s) Oral two times a day  nystatin Powder 1 Application(s) Topical three times a day  pantoprazole  Injectable 40 milliGRAM(s) IV Push daily  piperacillin/tazobactam IVPB. 2.25 Gram(s) IV Intermittent every 8 hours  senna 2 Tablet(s) Oral at bedtime  tamsulosin 0.4 milliGRAM(s) Oral at bedtime        VITALS:  T(F): 99.8 (19 @ 11:47), Max: 99.8 (19 @ 11:47)  HR: 66 (19 @ 14:17)  BP: 93/55 (19 @ 14:17)  RR: 19 (19 @ 14:17)  SpO2: 100% (19 @ 14:17)  Wt(kg): --     @ 07:01  -   @ 07:00  --------------------------------------------------------  IN: 2250.8 mL / OUT: 3020 mL / NET: -769.2 mL     @ 07:01  -   @ 07:00  --------------------------------------------------------  IN: 2599.6 mL / OUT: 3065 mL / NET: -465.4 mL     @ 07:01  -   @ 14:34  --------------------------------------------------------  IN: 1226.3 mL / OUT: 1100 mL / NET: 126.3 mL          LABS:      154<H>  |  105  |  88<HH>  ----------------------------<  236<H>  3.6   |  31  |  2.5<H>    Ca    7.8<L>      2019 05:44  Phos  4.7     02-20  Mg     2.4     02-20    TPro  5.6<L>  /  Alb  3.1<L>  /  TBili  0.6  /  DBili      /  AST  11  /  ALT  6   /  AlkPhos  72                            10.5   8.67  )-----------( 143      ( 2019 04:41 )             35.1       Urine Studies:  Urinalysis Basic - ( 2019 06:28 )    Color: Red / Appearance: Turbid / S.020 / pH:   Gluc:  / Ketone: 15  / Bili: Large / Urobili: 1.0 mg/dL   Blood:  / Protein: 100 mg/dL / Nitrite: Positive   Leuk Esterase: Large / RBC: >50 /HPF / WBC    Sq Epi:  / Non Sq Epi:  / Bacteria:       Osmolality, Random Urine: 369 mos/kg ( @ 23:00)  Sodium, Random Urine: 39.0 mmoL/L ( @ 23:00)  Creatinine, Random Urine: 72 mg/dL ( @ 23:00)  Creatinine, Random Urine: 74 mg/dL ( @ 06:28)  Protein/Creatinine Ratio Calculation: 4.1 Ratio ( @ :28)  Sodium, Random Urine: 44.0 mmoL/L ( @ 06:28)  Osmolality, Random Urine: 362 mos/kg ( @ 06:28)  Sodium, Random Urine: 20.0 mmoL/L ( @ 11:00)  Creatinine, Random Urine: 28 mg/dL ( @ 11:00)      RADIOLOGY & ADDITIONAL STUDIES:

## 2019-02-21 NOTE — PROGRESS NOTE ADULT - ASSESSMENT
IMPRESSION:    Acute hypoxic respiratory failure s/p intubation  CHF exacerbation  RLL PNA ?Asp Doubt   JOHANNY/OHS not compliant with CPAP  TOBIAS on CKd  HO of Afib not on AC  HO  shunt      PLAN:    CNS: sedation vacation add precedex if needed     HEENT: Oral care    PULMONARY:  HOB @ 45 degrees. decrease PEEP to 8            CARDIOVASCULAR: Keep I<O continue lasix  keep negative by 750-1 liter     GI: GI prophylaxis. NGT feeding  colace senna   RENAL:  Follow up lytes.  Correct as needed. Nephrology follow up,     INFECTIOUS DISEASE: Follow up cultures.  zosyn    follow ID   HEMATOLOGICAL:  DVT prophylaxis    ENDOCRINE:  Follow up FS. Will benefit form continuous monitoring in the MICU    Overall prognosis guarded.    Advanced Directives IMPRESSION:    Acute hypoxic respiratory failure s/p intubation  CHF exacerbation  RLL PNA ?Asp Doubt   JOHANNY/OHS not compliant with CPAP  TOBIAS on CKd  HO of Afib not on AC  HO  shunt      PLAN:    CNS: sedation vacation add precedex if needed     HEENT: Oral care    PULMONARY:  HOB @ 45 degrees. decrease PEEP to 8            CARDIOVASCULAR: Keep I<O continue lasix  keep negative by 750-1 liter     GI: GI prophylaxis. NGT feeding 250cc Q 4 hrs   start d5w@50cc   colace senna   RENAL:  Follow up lytes.  Correct as needed. Nephrology follow up,   follow BMP   INFECTIOUS DISEASE: Follow up cultures.  zosyn    follow ID   HEMATOLOGICAL:  DVT prophylaxis    ENDOCRINE:  Follow up FS. Will benefit form continuous monitoring in the MICU    Overall prognosis guarded.    Advanced Directives

## 2019-02-21 NOTE — PROVIDER CONTACT NOTE (OTHER) - SITUATION
pt self extubated with b/l wrist restraints on,  pt ambu bagged and placed on 100 % Non rebreather , MD raman at bedside , pt VSS , following commands, pt placed on bipap  and tolerating. will monitor

## 2019-02-21 NOTE — PROGRESS NOTE ADULT - SUBJECTIVE AND OBJECTIVE BOX
Patient is a 83y old Male who presents with a chief complaint of Recurrent falls  TOBIAS (20 Feb 2019 16:00)    Currently admitted to medicine with the primary diagnosis of TOBIAS (acute kidney injury)    Today is hospital day 6d.    BRIEF HOSPITAL COURSE:   84 yo M PMHx of DMII, HTN,  Hydrocephalus s/p  shunt, Afib not on AC (multiple falls) presents to our ED with c/o mechanical fall. According to patient he was trying to walk with a walker and slipped on the floor, no nausea, vomiting, dizziness, chest pain, palpitations, seizure like episode, no LOC. He had 4 falls in last 10 days as per family. He was recently discharged from the hospital Dec for dizziness with stress test and  shunt assessment that was normal. At baseline he has home aid, takes his medications and walks very minimal with the help of cane or walker. However for last few months he has had dec ambulation and worsening LE edema. As per family, also worsening lethargy. Trauma work-up neg (age unknown L2 fx)    In the ED BP 83/45, HR 78/min, Sao2 100% on NC, afebrile. He was given 2L RL and his repeat BP was 110/80. Initial CXR only shows cardiomegaly without lobar consolidation. Patient later desatted to 85% on 3L NC so he was placed on BIPAP. Repeated CXR shows new consolidation at right lung base, suspecting PNA, so patient was started on Rocephin and Azithromycin. Labs showed TOBIAS with Cr 3.2 (baseline 2) and dec UOP. Pt also noted to be on NSAIDs and ACE, Actos so likely TOBIAS 2/2 dehydration and meds. On 2/18 pt was unable to tolerate NC and moved to BIPAP with ABG showing acute hypoxic resp failure and hypercapnia (likely chronic?). CXR at this time showed worsening pulm vascular congestion. After diuresis/BIPAP, pt became lethargic so intubated for protection.    Admitted to CCU, barragan noted to be out-of-place (likely pt pulled out) so advanced with a sharp increase in out-put. CXR and kidney showed improvement after agressive diuresis.     EVENTS LAST 24HRS:   Intubated, sedated, following commands well. UOP very good overnight, no need for extra lasix.   As per RN, no events.    PAST MEDICAL & SURGICAL HISTORY  Hypertension  Hydrocephalus  CKD (chronic kidney disease)  DM (diabetes mellitus)  Afib  History of prostate surgery      SOCIAL HISTORY:      REVIEW OF SYSTEMS:  See HPI    ALLERGIES:    MEDICATIONS:  STANDING MEDICATIONS  atorvastatin 20 milliGRAM(s) Oral at bedtime  calcium carbonate 1250 mG  + Vitamin D (OsCal 500 + D) 1 Tablet(s) Oral three times a day  chlorhexidine 0.12% Liquid 15 milliLiter(s) Oral Mucosa every 12 hours  chlorhexidine 4% Liquid 1 Application(s) Topical <User Schedule>  dexmedetomidine Infusion 0.2 MICROgram(s)/kG/Hr IV Continuous <Continuous>  dextrose 5%. 1000 milliLiter(s) IV Continuous <Continuous>  dextrose 50% Injectable 12.5 Gram(s) IV Push once  dextrose 50% Injectable 25 Gram(s) IV Push once  dextrose 50% Injectable 25 Gram(s) IV Push once  docusate sodium Liquid 100 milliGRAM(s) Oral three times a day  fentaNYL   Infusion. 0.5 MICROgram(s)/kG/Hr IV Continuous <Continuous>  furosemide   Injectable 60 milliGRAM(s) IV Push daily  guaiFENesin  milliGRAM(s) Oral every 12 hours  heparin  Injectable 5000 Unit(s) SubCutaneous every 8 hours  insulin glargine Injectable (LANTUS) 10 Unit(s) SubCutaneous at bedtime  insulin lispro Injectable (HumaLOG) 3 Unit(s) SubCutaneous three times a day before meals  metoprolol tartrate 25 milliGRAM(s) Oral two times a day  nystatin Powder 1 Application(s) Topical three times a day  pantoprazole  Injectable 40 milliGRAM(s) IV Push daily  piperacillin/tazobactam IVPB. 2.25 Gram(s) IV Intermittent every 8 hours  senna 2 Tablet(s) Oral at bedtime  tamsulosin 0.4 milliGRAM(s) Oral at bedtime    PRN MEDICATIONS  acetaminophen   Tablet .. 650 milliGRAM(s) Oral every 6 hours PRN  ALBUTerol/ipratropium for Nebulization 3 milliLiter(s) Nebulizer every 6 hours PRN  dextrose 40% Gel 15 Gram(s) Oral once PRN  glucagon  Injectable 1 milliGRAM(s) IntraMuscular once PRN    VITALS:     Mode: AC/ CMV (Assist Control/ Continuous Mandatory Ventilation)  RR (machine): 16  TV (machine): 450  FiO2: 40  PEEP: 10  ITime: 1  MAP: 15  PIP: 21      ICU Vital Signs Last 24 Hrs:    T(C): 37.2 (21 Feb 2019 04:00), Max: 37.4 (20 Feb 2019 14:00)  T(F): 98.9 (21 Feb 2019 04:00), Max: 99.3 (20 Feb 2019 14:00)  HR: 74 (21 Feb 2019 06:00) (62 - 86)  BP: 102/64 (21 Feb 2019 06:00) (85/60 - 112/77)  BP(mean): 81 (21 Feb 2019 06:00) (64 - 88)  ABP: --  ABP(mean): --  RR: 20 (21 Feb 2019 06:00) (11 - 22)  SpO2: 100% (21 Feb 2019 06:00) (96% - 100%)      ABG - ( 20 Feb 2019 14:35 )  pH, Arterial: 7.48  pH, Blood: x     /  pCO2: 48    /  pO2: 79    / HCO3: 35    / Base Excess: 10.4  /  SaO2: 96                  I&O's Detail:      19 Feb 2019 07:01  -  20 Feb 2019 07:00  --------------------------------------------------------  IN:    dexmedetomidine Infusion: 185.1 mL    Enteral Tube Flush: 280 mL    fentaNYL Infusion.: 80.7 mL    Free Water: 1000 mL    IV PiggyBack: 300 mL    Osmolite 1.2: 405 mL  Total IN: 2250.8 mL    OUT:    Indwelling Catheter - Urethral: 3020 mL  Total OUT: 3020 mL    Total NET: -769.2 mL      20 Feb 2019 07:01  -  21 Feb 2019 06:47  --------------------------------------------------------  IN:    dexmedetomidine Infusion: 404.6 mL    Enteral Tube Flush: 320 mL    Free Water: 500 mL    IV PiggyBack: 250 mL    Osmolite 1.2: 735 mL  Total IN: 2209.6 mL    OUT:    Indwelling Catheter - Urethral: 2990 mL  Total OUT: 2990 mL    Total NET: -780.4 mL          LABS:                        10.5   8.67  )-----------( 143      ( 20 Feb 2019 04:41 )             35.1     02-20    150<H>  |  103  |  90<HH>  ----------------------------<  215<H>  4.3   |  28  |  2.5<H>    Ca    7.7<L>      20 Feb 2019 04:41  Phos  4.7     02-20  Mg     2.4     02-20    TPro  5.7<L>  /  Alb  3.2<L>  /  TBili  0.7  /  DBili  x   /  AST  16  /  ALT  7   /  AlkPhos  71  02-20          CAPILLARY BLOOD GLUCOSE      POCT Blood Glucose.: 215 mg/dL (20 Feb 2019 22:35)        CULTURES:  Culture Results:   Normal Respiratory Marilee present (02-18 @ 19:35)  Culture Results:   No growth (02-18 @ 06:28)  Culture Results:   No growth (02-16 @ 11:00)      PHYSICAL EXAM:  General: No acute distress.  intubated and sedated; opens eyes but not following commands  HEENT: Pupils equal, reactive to light symmetrically  PULM: Clear to auscultation bilaterally with decreased breath sounds, b/l bases, (body habitus limiting); some bloody secretions noted in tubing  CVS: Irregular rhythm, controlled rate, PVCs noted on monitor improved  ABD: Obese, mildly tense (possibly 2/2  shunt draining?), distended, nontender  EXT: 1+ pitting on top of diffuse non-putting edema to mid shin with small ulcerated sores noted; some ulcerated sores noted over arms b/l as well   SKIN: Warm and well perfused, bronze discoloration of b/l legs noted    RADIOLOGY:     < from: Transthoracic Echocardiogram (02.16.19 @ 13:35) >  Summary:   1. Normal left ventricular internal cavity size.   2. Mildly enlarged left atrium.   3. Normal right atrial size.   4. There is no evidence of pericardial effusion.   5. Thickening and calcification of the anterior and posterior mitral   valve leaflets.   6. Moderate tricuspid regurgitation.   7. Pulmonic valve regurgitation.      < end of copied text >

## 2019-02-21 NOTE — PROGRESS NOTE ADULT - ASSESSMENT
# Acute hypoxic respiratory failure 2/2 Aspiration PNA vs pulmonary edema  - intubated, sedated with precedex only  - ECHO 2/16: normal EF  - c/w Zosyn; procal 0.6.  - ID recs appreciated  - IV lasix 60 daily, consider switching to PO given high UOP  - CT chest done (no final read yet) R >> L effusion , possible R consolidation underlying    #PVC on cardiac monitoring // Hx of Afib // Troponinemia on admit (likely 2/2 type II demand)  - CHADsVASC 4 (Age, HTN, DM); no AC 2/2 falls  - Cardio recs ([facility]): c/w BB 25 BID (no official note in yet?)  - troponin stable, no EKG changes    # TOBIAS likely acute tubular injury, improving  - baseline Cr ~2, now 2.5   - Nephro on board: lasix 60 IV qdaily, watch BP, keep free water  - negative 0.75L overnight  - Avoid ACEI/NSAIDs for now    # Hypernatremia; improving  - c/w 250 q6h free water to OG tube    # Acute UTI with hematuria (possibly also from traumatic barragan)  - c/w Zosyn    # Recurrent falls  - Likely multifactorial, physical deconditioning, medication induced hypotension, and peripheral neuropathy  - Trauma work up negative for acute fracture, but show age-indeterminate compression fracture of L2    # HTN, BP noted to be lowered today  - Hold indapamide, amlodipine, Ramipril  - Monitor vitals, avoid hypotension    # DM II:   - Hga1C pending (11.5 in 12/5/18)  - Start basal/bolus 10 and 3/3/3  - Monitor FS    #  shunt:   - Recently evaluated by Dr. Wayne 12/6/18, deemed to be chronically in place  - not manipulated in 10yrs, follows in [facility], non-programmable   - No signs of hydrocephalus in CT done on this admit    #MISC:  - DVT ppx: SubQ heparin  - GI ppx: PPI  - Diet: tube feeds  - Activity: increase as tolerated  - Code status: Full code, family aware of poor prognosis, they want to proceed with intubation, but will decided on other aggressive measures such as HD as the situation arises # Acute hypoxic respiratory failure 2/2 Aspiration PNA vs pulmonary edema  - intubated, sedated with precedex only, more responsive today  - ECHO 2/16: normal EF  - c/w Zosyn; procal 0.6.  - ID recs appreciated (inc zosyn) for now pending final CT read  - change to IV lasix 40 BID (keep more neg)  - CT chest done (no final read yet) R >> L effusion , possible R consolidation underlying    #PVC on cardiac monitoring // Hx of Afib // Troponinemia on admit (likely 2/2 type II demand)  - CHADsVASC 4 (Age, HTN, DM); no AC 2/2 falls  - Cardio recs ([facility]): c/w BB 25 BID (no official note in yet?)  - troponin stable, no EKG changes    # TOBIAS likely acute tubular injury, improving  - baseline Cr ~2, now 2.5   - Nephro on board: watch BP, keep free water  - negative 0.3L (goal at least >500cc daily)  - Avoid ACEI/NSAIDs for now    # Hypernatremia  - 154 again today  - switch to 250 q4h free water   - add D5W 50cc/hr  - repeat BMP in PM    # Acute UTI with hematuria (possibly also from traumatic barragan)  - c/w Zosyn    # Recurrent falls  - Likely multifactorial, physical deconditioning, medication induced hypotension, and peripheral neuropathy  - Trauma work up negative for acute fracture, but show age-indeterminate compression fracture of L2    # HTN, BP noted to be lowered today  - Hold indapamide, amlodipine, Ramipril  - Monitor vitals, avoid hypotension    # DM II:   - Hga1C pending (11.5 in 12/5/18)  - Start basal/bolus 10 and 3/3/3  - Monitor FS    #  shunt:   - Recently evaluated by Dr. Wayne 12/6/18, deemed to be chronically in place  - not manipulated in 10yrs, follows in [facility], non-programmable   - No signs of hydrocephalus in CT done on this admit    #MISC:  - DVT ppx: SubQ heparin  - GI ppx: PPI  - Diet: tube feeds  - Activity: increase as tolerated  - Code status: Full code, family aware of poor prognosis, they want to proceed with intubation, but will decided on other aggressive measures such as HD as the situation arises # Acute hypoxic respiratory failure 2/2 Aspiration PNA vs pulmonary edema  - intubated, sedated with precedex only, more responsive today  - ECHO 2/16: normal EF  - procal 0.6.  - ID recs appreciated - hold zosyn, no consolidation on CT scan  - change to IV lasix 40 BID (keep more neg)  - CT chest done (no final read yet) R >> L effusion , possible R consolidation underlying    #PVC on cardiac monitoring // Hx of Afib // Troponinemia on admit (likely 2/2 type II demand)  - CHADsVASC 4 (Age, HTN, DM); no AC 2/2 falls  - Cardio recs ([facility]): c/w BB 25 BID (no official note in yet?)  - troponin stable, no EKG changes    # TOBIAS likely acute tubular injury, improving  - baseline Cr ~2, now 2.5   - Nephro on board: watch BP, keep free water  - negative 0.3L (goal at least >500cc daily)  - Avoid ACEI/NSAIDs for now    # Hypernatremia  - 154 again today  - switch to 250 q4h free water   - add D5W 50cc/hr  - repeat BMP in PM    # Acute UTI with hematuria (possibly also from traumatic barragan)  - c/w Zosyn    # Recurrent falls  - Likely multifactorial, physical deconditioning, medication induced hypotension, and peripheral neuropathy  - Trauma work up negative for acute fracture, but show age-indeterminate compression fracture of L2    # HTN, BP noted to be lowered today  - Hold indapamide, amlodipine, Ramipril  - Monitor vitals, avoid hypotension    # DM II:   - Hga1C pending (11.5 in 12/5/18)  - Start basal/bolus 10 and 3/3/3  - Monitor FS    #  shunt:   - Recently evaluated by Dr. Wayne 12/6/18, deemed to be chronically in place  - not manipulated in 10yrs, follows in [facility], non-programmable   - No signs of hydrocephalus in CT done on this admit    #MISC:  - DVT ppx: SubQ heparin  - GI ppx: PPI  - Diet: tube feeds  - Activity: increase as tolerated  - Code status: Full code, family aware of poor prognosis, they want to proceed with intubation, but will decided on other aggressive measures such as HD as the situation arises

## 2019-02-22 LAB
ALBUMIN SERPL ELPH-MCNC: 3.2 G/DL — LOW (ref 3.5–5.2)
ALP SERPL-CCNC: 72 U/L — SIGNIFICANT CHANGE UP (ref 30–115)
ALT FLD-CCNC: 7 U/L — SIGNIFICANT CHANGE UP (ref 0–41)
ANION GAP SERPL CALC-SCNC: 16 MMOL/L — HIGH (ref 7–14)
ANION GAP SERPL CALC-SCNC: 17 MMOL/L — HIGH (ref 7–14)
ANION GAP SERPL CALC-SCNC: 18 MMOL/L — HIGH (ref 7–14)
AST SERPL-CCNC: 10 U/L — SIGNIFICANT CHANGE UP (ref 0–41)
BASE EXCESS BLDA CALC-SCNC: 11.5 MMOL/L — HIGH (ref -2–2)
BASOPHILS # BLD AUTO: 0.02 K/UL — SIGNIFICANT CHANGE UP (ref 0–0.2)
BASOPHILS NFR BLD AUTO: 0.2 % — SIGNIFICANT CHANGE UP (ref 0–1)
BILIRUB SERPL-MCNC: 0.7 MG/DL — SIGNIFICANT CHANGE UP (ref 0.2–1.2)
BUN SERPL-MCNC: 68 MG/DL — CRITICAL HIGH (ref 10–20)
BUN SERPL-MCNC: 77 MG/DL — CRITICAL HIGH (ref 10–20)
BUN SERPL-MCNC: 80 MG/DL — CRITICAL HIGH (ref 10–20)
CALCIUM SERPL-MCNC: 7.7 MG/DL — LOW (ref 8.4–10.5)
CALCIUM SERPL-MCNC: 7.9 MG/DL — LOW (ref 8.5–10.1)
CALCIUM SERPL-MCNC: 8 MG/DL — LOW (ref 8.5–10.1)
CALCIUM SERPL-MCNC: 8.1 MG/DL — LOW (ref 8.5–10.1)
CHLORIDE SERPL-SCNC: 102 MMOL/L — SIGNIFICANT CHANGE UP (ref 98–110)
CHLORIDE SERPL-SCNC: 103 MMOL/L — SIGNIFICANT CHANGE UP (ref 98–110)
CHLORIDE SERPL-SCNC: 106 MMOL/L — SIGNIFICANT CHANGE UP (ref 98–110)
CO2 SERPL-SCNC: 30 MMOL/L — SIGNIFICANT CHANGE UP (ref 17–32)
CO2 SERPL-SCNC: 30 MMOL/L — SIGNIFICANT CHANGE UP (ref 17–32)
CO2 SERPL-SCNC: 32 MMOL/L — SIGNIFICANT CHANGE UP (ref 17–32)
CREAT SERPL-MCNC: 2.1 MG/DL — HIGH (ref 0.7–1.5)
CREAT SERPL-MCNC: 2.3 MG/DL — HIGH (ref 0.7–1.5)
CREAT SERPL-MCNC: 2.3 MG/DL — HIGH (ref 0.7–1.5)
CULTURE RESULTS: SIGNIFICANT CHANGE UP
EOSINOPHIL # BLD AUTO: 0.24 K/UL — SIGNIFICANT CHANGE UP (ref 0–0.7)
EOSINOPHIL NFR BLD AUTO: 1.9 % — SIGNIFICANT CHANGE UP (ref 0–8)
GLUCOSE BLDC GLUCOMTR-MCNC: 158 MG/DL — HIGH (ref 70–99)
GLUCOSE BLDC GLUCOMTR-MCNC: 160 MG/DL — HIGH (ref 70–99)
GLUCOSE BLDC GLUCOMTR-MCNC: 161 MG/DL — HIGH (ref 70–99)
GLUCOSE BLDC GLUCOMTR-MCNC: 201 MG/DL — HIGH (ref 70–99)
GLUCOSE SERPL-MCNC: 170 MG/DL — HIGH (ref 70–99)
GLUCOSE SERPL-MCNC: 186 MG/DL — HIGH (ref 70–99)
GLUCOSE SERPL-MCNC: 213 MG/DL — HIGH (ref 70–99)
HCO3 BLDA-SCNC: 36 MMOL/L — HIGH (ref 21–29)
HCT VFR BLD CALC: 35 % — LOW (ref 42–52)
HGB BLD-MCNC: 10.5 G/DL — LOW (ref 14–18)
HOROWITZ INDEX BLDA+IHG-RTO: 40 — SIGNIFICANT CHANGE UP
IMM GRANULOCYTES NFR BLD AUTO: 0.6 % — HIGH (ref 0.1–0.3)
LYMPHOCYTES # BLD AUTO: 1.31 K/UL — SIGNIFICANT CHANGE UP (ref 1.2–3.4)
LYMPHOCYTES # BLD AUTO: 10.4 % — LOW (ref 20.5–51.1)
MAGNESIUM SERPL-MCNC: 2.2 MG/DL — SIGNIFICANT CHANGE UP (ref 1.8–2.4)
MAGNESIUM SERPL-MCNC: 2.2 MG/DL — SIGNIFICANT CHANGE UP (ref 1.8–2.4)
MCHC RBC-ENTMCNC: 24.2 PG — LOW (ref 27–31)
MCHC RBC-ENTMCNC: 30 G/DL — LOW (ref 32–37)
MCV RBC AUTO: 80.8 FL — SIGNIFICANT CHANGE UP (ref 80–94)
MONOCYTES # BLD AUTO: 0.84 K/UL — HIGH (ref 0.1–0.6)
MONOCYTES NFR BLD AUTO: 6.7 % — SIGNIFICANT CHANGE UP (ref 1.7–9.3)
NEUTROPHILS # BLD AUTO: 10.12 K/UL — HIGH (ref 1.4–6.5)
NEUTROPHILS NFR BLD AUTO: 80.2 % — HIGH (ref 42.2–75.2)
NRBC # BLD: 0 /100 WBCS — SIGNIFICANT CHANGE UP (ref 0–0)
PCO2 BLDA: 48 MMHG — HIGH (ref 38–42)
PH BLDA: 7.48 — HIGH (ref 7.38–7.42)
PHOSPHATE SERPL-MCNC: 4.7 MG/DL — SIGNIFICANT CHANGE UP (ref 2.1–4.9)
PLATELET # BLD AUTO: 178 K/UL — SIGNIFICANT CHANGE UP (ref 130–400)
PO2 BLDA: 65 MMHG — LOW (ref 78–95)
POTASSIUM SERPL-MCNC: 3.3 MMOL/L — LOW (ref 3.5–5)
POTASSIUM SERPL-MCNC: 3.4 MMOL/L — LOW (ref 3.5–5)
POTASSIUM SERPL-MCNC: 3.5 MMOL/L — SIGNIFICANT CHANGE UP (ref 3.5–5)
POTASSIUM SERPL-SCNC: 3.3 MMOL/L — LOW (ref 3.5–5)
POTASSIUM SERPL-SCNC: 3.4 MMOL/L — LOW (ref 3.5–5)
POTASSIUM SERPL-SCNC: 3.5 MMOL/L — SIGNIFICANT CHANGE UP (ref 3.5–5)
PROT SERPL-MCNC: 5.7 G/DL — LOW (ref 6–8)
RBC # BLD: 4.33 M/UL — LOW (ref 4.7–6.1)
RBC # FLD: 15.9 % — HIGH (ref 11.5–14.5)
SAO2 % BLDA: 94 % — SIGNIFICANT CHANGE UP (ref 92–96)
SODIUM SERPL-SCNC: 150 MMOL/L — HIGH (ref 135–146)
SODIUM SERPL-SCNC: 152 MMOL/L — HIGH (ref 135–146)
SODIUM SERPL-SCNC: 152 MMOL/L — HIGH (ref 135–146)
SPECIMEN SOURCE: SIGNIFICANT CHANGE UP
WBC # BLD: 12.61 K/UL — HIGH (ref 4.8–10.8)
WBC # FLD AUTO: 12.61 K/UL — HIGH (ref 4.8–10.8)

## 2019-02-22 RX ORDER — FUROSEMIDE 40 MG
40 TABLET ORAL ONCE
Qty: 0 | Refills: 0 | Status: COMPLETED | OUTPATIENT
Start: 2019-02-22 | End: 2019-02-22

## 2019-02-22 RX ORDER — POTASSIUM CHLORIDE 20 MEQ
20 PACKET (EA) ORAL ONCE
Qty: 0 | Refills: 0 | Status: COMPLETED | OUTPATIENT
Start: 2019-02-22 | End: 2019-02-22

## 2019-02-22 RX ORDER — FUROSEMIDE 40 MG
40 TABLET ORAL
Qty: 0 | Refills: 0 | Status: DISCONTINUED | OUTPATIENT
Start: 2019-02-23 | End: 2019-02-25

## 2019-02-22 RX ORDER — METOPROLOL TARTRATE 50 MG
5 TABLET ORAL ONCE
Qty: 0 | Refills: 0 | Status: COMPLETED | OUTPATIENT
Start: 2019-02-22 | End: 2019-02-22

## 2019-02-22 RX ADMIN — PANTOPRAZOLE SODIUM 40 MILLIGRAM(S): 20 TABLET, DELAYED RELEASE ORAL at 11:00

## 2019-02-22 RX ADMIN — Medication 600 MILLIGRAM(S): at 17:09

## 2019-02-22 RX ADMIN — Medication 40 MILLIGRAM(S): at 14:50

## 2019-02-22 RX ADMIN — HEPARIN SODIUM 5000 UNIT(S): 5000 INJECTION INTRAVENOUS; SUBCUTANEOUS at 14:13

## 2019-02-22 RX ADMIN — HEPARIN SODIUM 5000 UNIT(S): 5000 INJECTION INTRAVENOUS; SUBCUTANEOUS at 22:31

## 2019-02-22 RX ADMIN — Medication 50 MILLIEQUIVALENT(S): at 23:41

## 2019-02-22 RX ADMIN — NYSTATIN CREAM 1 APPLICATION(S): 100000 CREAM TOPICAL at 22:32

## 2019-02-22 RX ADMIN — TAMSULOSIN HYDROCHLORIDE 0.4 MILLIGRAM(S): 0.4 CAPSULE ORAL at 22:32

## 2019-02-22 RX ADMIN — INSULIN GLARGINE 10 UNIT(S): 100 INJECTION, SOLUTION SUBCUTANEOUS at 22:38

## 2019-02-22 RX ADMIN — NYSTATIN CREAM 1 APPLICATION(S): 100000 CREAM TOPICAL at 14:14

## 2019-02-22 RX ADMIN — CHLORHEXIDINE GLUCONATE 15 MILLILITER(S): 213 SOLUTION TOPICAL at 05:42

## 2019-02-22 RX ADMIN — Medication 1 TABLET(S): at 22:31

## 2019-02-22 RX ADMIN — Medication 650 MILLIGRAM(S): at 18:40

## 2019-02-22 RX ADMIN — Medication 5 MILLIGRAM(S): at 00:14

## 2019-02-22 RX ADMIN — Medication 3 UNIT(S): at 17:07

## 2019-02-22 RX ADMIN — CHLORHEXIDINE GLUCONATE 1 APPLICATION(S): 213 SOLUTION TOPICAL at 05:46

## 2019-02-22 RX ADMIN — Medication 50 MILLIEQUIVALENT(S): at 07:00

## 2019-02-22 RX ADMIN — ATORVASTATIN CALCIUM 20 MILLIGRAM(S): 80 TABLET, FILM COATED ORAL at 22:31

## 2019-02-22 RX ADMIN — Medication 25 MILLIGRAM(S): at 22:31

## 2019-02-22 RX ADMIN — Medication 40 MILLIGRAM(S): at 05:36

## 2019-02-22 RX ADMIN — HEPARIN SODIUM 5000 UNIT(S): 5000 INJECTION INTRAVENOUS; SUBCUTANEOUS at 05:43

## 2019-02-22 RX ADMIN — PIPERACILLIN AND TAZOBACTAM 25 GRAM(S): 4; .5 INJECTION, POWDER, LYOPHILIZED, FOR SOLUTION INTRAVENOUS at 05:36

## 2019-02-22 RX ADMIN — NYSTATIN CREAM 1 APPLICATION(S): 100000 CREAM TOPICAL at 05:42

## 2019-02-22 RX ADMIN — Medication 25 MILLIGRAM(S): at 11:52

## 2019-02-22 RX ADMIN — Medication 5 MILLIGRAM(S): at 05:33

## 2019-02-22 RX ADMIN — Medication 1 TABLET(S): at 14:07

## 2019-02-22 NOTE — CONSULT NOTE ADULT - SUBJECTIVE AND OBJECTIVE BOX
VINCENZO EDMONDSON  83y  Male  Patient seen and examined. Chart reviewed and events noted.  HPI:  82 yo male coming from home with PMHx of DMII on insulin, HTN on meds, Hydrocephalus s/p  shunt, Afib not on AC (multiple falls) presents to our ED with c/o mechanical fall yesterday. According to patient he was trying to walk with a walker and slipped on the floor, no nausea, vomiting, dizziness, chest pain, palpitations, seizure like episode, no LOC. So according to the history patient is having mechanical fall. He had 4 falls since last 10 days. He was recently discharged from the hospital for dizziness and stress test and  shunt was normal. At baseline he has health aid, and he takes his medications and walks very minimal with the help of cane or walker. For the last few months he is not walking at all, increase B/L leg swelling.  In the ED BP was 83/45, HR 78/min, Sao2 100% on NC, afebrile. He was given 2l RL and his repeat BP was 110/80. Trauma work up was negative. (15 Feb 2019 08:39)    ADDITIONAL INFO:  Patient last seen about 2 years ago. Has Severe JOHANNY with significant nocturnal hypoxia. He has an auto CPAP at home. He compliance reports are 100% usage. His average CPAP pressure is 10.   He did smoke in the past but not diagnosed with COPD. His last PFT showed restrictive defect due to his morbid obesity.  On admission he was found to be in TOBIAS. His chest x-rays and CT Chest shows CHF with bilateral pleural effusions and atelectasis most likely from volume overload.   Patient self extubated himself and bridged with BiPAP + O2.    PAST MEDICAL & SURGICAL HISTORY:  JOHANNY  Hypertension  Hydrocephalus with  shunt   CKD (chronic kidney disease)  DM (diabetes mellitus)  Afib  History of prostate surgery    No Known Allergies    FAMILY HISTORY:  Family history of diabetes mellitus (Mother, Sibling)    SOCIAL HISTORY  Smoking History: Ex smoker  Alcohol: denied  Drugs: denied  Occupation: retired   MEDICATIONS  (STANDING):  atorvastatin 20 milliGRAM(s) Oral at bedtime  calcium carbonate 1250 mG  + Vitamin D (OsCal 500 + D) 1 Tablet(s) Oral three times a day  chlorhexidine 0.12% Liquid 15 milliLiter(s) Oral Mucosa every 12 hours  chlorhexidine 4% Liquid 1 Application(s) Topical <User Schedule>  dextrose 5%. 1000 milliLiter(s) (50 mL/Hr) IV Continuous <Continuous>  dextrose 5%. 1000 milliLiter(s) (75 mL/Hr) IV Continuous <Continuous>  dextrose 50% Injectable 12.5 Gram(s) IV Push once  dextrose 50% Injectable 25 Gram(s) IV Push once  dextrose 50% Injectable 25 Gram(s) IV Push once  guaiFENesin  milliGRAM(s) Oral every 12 hours  heparin  Injectable 5000 Unit(s) SubCutaneous every 8 hours  insulin glargine Injectable (LANTUS) 10 Unit(s) SubCutaneous at bedtime  insulin lispro Injectable (HumaLOG) 3 Unit(s) SubCutaneous three times a day before meals  metoprolol tartrate 25 milliGRAM(s) Oral three times a day  nystatin Powder 1 Application(s) Topical three times a day  pantoprazole  Injectable 40 milliGRAM(s) IV Push daily  tamsulosin 0.4 milliGRAM(s) Oral at bedtime    MEDICATIONS  (PRN):  acetaminophen   Tablet .. 650 milliGRAM(s) Oral every 6 hours PRN Moderate Pain (4 - 6)  ALBUTerol/ipratropium for Nebulization 3 milliLiter(s) Nebulizer every 6 hours PRN Shortness of Breath and/or Wheezing  dextrose 40% Gel 15 Gram(s) Oral once PRN Blood Glucose LESS THAN 70 milliGRAM(s)/deciliter  glucagon  Injectable 1 milliGRAM(s) IntraMuscular once PRN Glucose LESS THAN 70 milligrams/deciliter    REVIEW OF SYSTEMS:  CONSTITUTIONAL: No fevers or chills.  HEENT: No visual disturbance or sore throat  NECK: No pain or stiffness  RESPIRATORY: improved SOB/DAN  CARDIOVASCULAR: No chest pain or palpitations  GASTROINTESTINAL:  No nausea, vomiting, or diarrhea. No abdominal pain.  GENITOURINARY: No dysuria, frequency or hematuria  NEUROLOGICAL: No numbness or headache  EXTREMITIES: + edema  No calf pain or tenderness    Vital Signs Last 24 Hrs  T(F): 99.1 (22 Feb 2019 20:00), Max: 99.6 (22 Feb 2019 00:00)  HR: 86 (22 Feb 2019 20:00) (76 - 100)  BP: 123/70 (22 Feb 2019 18:00) (107/62 - 144/68)  BP(mean): 96 (22 Feb 2019 18:00) (74 - 119)  RR: 20 (22 Feb 2019 20:00) (18 - 29)  SpO2: 100% (22 Feb 2019 20:00) (97% - 100%)    PHYSICAL EXAM:  Constitutional: A A O x 3 NAD Freely speaking. O2 in use. BiPAP bedside.  HEAD: PERRLA, No JVD, Atraumatic, Normocephalic. Dry mouth. Hoarse  voice.  Neck: No neck pain  Respiratory: Decreased BS bilaterally   Cardiovascular: Irregular rate and rhythm  Gastrointestinal: Soft obese NT +BS  Extremities: Edema. No calf pain or tenderness. Movement in all four extremities  Skin: No lesions    LABS:                        10.5   12.61 )-----------( 178      ( 22 Feb 2019 05:22 )             35.0     02-22    152<H>  |  103  |  68<HH>  ----------------------------<  170<H>  3.3<L>   |  32  |  2.1<H>    Ca    7.9<L>      22 Feb 2019 20:21  Phos  4.7     02-21  Mg     2.2     02-22  TPro  5.7<L>  /  Alb  3.2<L>  /  TBili  0.7  /  DBili  x   /  AST  10  /  ALT  7   /  AlkPhos  72  02-22    ABG - ( 22 Feb 2019 06:36 )  pH: 7.48  /  pCO2: 48    /  pO2: 65    / HCO3: 36    / Base Excess: 11.5  /  SaO2: 94        RADIOLOGY:  Chest X-Ray:  CHF and bilateral pleural effusions     Chest CT:  Endotracheal tube with some debris in the central tracheobronchial tree. Worsening bilateral effusions, on the right measuring approximately 786 cc and on the left measuring about 228 cc with overlying atelectasis.   Stable right middle lobe 3 mm solid nodule.    Previous CT Chest  Small right pleural effusion with adjacent compressive atelectasis.  Left basilar atelectasis.  No pneumothorax. No acute focal consolidation.    ASSESSMENT:  SOB and hypoxia  CHF  Bilateral pleural effusions (worsening) with compressive atelectasis    JOHANNY on Auto CPAP  RML nodule 3 mm  AFIB  TOBIAS with fluid overload   Morbid obesity (BMI - 41) with restrictive ventilatory defect   H/O tobacco use (not diagnosed with COPD)    PLAN:  O2 at 2 - 3 liters. Keep Sat at 92%  CPAP / BiPAP HS  Diuresis   GI / DVT prophylaxis   Rate control for AFib  Cardio and renal workup in progress  Out patient follow up  Any questions, please contact me  THANK YOU

## 2019-02-22 NOTE — PROGRESS NOTE ADULT - ASSESSMENT
IMPRESSION:    Acute hypoxic respiratory failure s/p intubation  CHF exacerbation  RLL PNA ?Asp Doubt   JOHANNY/OHS not compliant with CPAP  TOBIAS on CKd  HO of Afib not on AC  HO  shunt  hypernatremia     PLAN:    CNS: no sedation     HEENT: Oral care    PULMONARY:  taper oxygen keep pox > 92% avap 4 on and 4 off   pulmonary consult dr ramos has JOHANNY and on cpap was not compliant            CARDIOVASCULAR: Keep I<O continue lasix  keep negative by 750-1 liter   follow cardiology     GI: GI prophylaxis. speech and swallow if needed    d5w@75cc   colace senna   RENAL:  Follow up lytes.  Correct as needed. Nephrology follow up,   follow BMP   INFECTIOUS DISEASE: Follow up cultures. d/c ABX   follow ID   HEMATOLOGICAL:  DVT prophylaxis    ENDOCRINE:  Follow up FS. Will benefit form continuous monitoring in the MICU    Overall prognosis guarded.    Advanced Directives

## 2019-02-22 NOTE — PROGRESS NOTE ADULT - SUBJECTIVE AND OBJECTIVE BOX
NEPHROLOGY FOLLOW UP NOTE    pt seen and examined in icu  critcally ill  self - extubated  cr improving  excellent uop  bp's fair  d/w son    PAST MEDICAL & SURGICAL HISTORY:  Hypertension  Hydrocephalus  CKD (chronic kidney disease)  DM (diabetes mellitus)  Afib  History of prostate surgery    Allergies:  No Known Allergies    Home Medications Reviewed    SOCIAL HISTORY:  Denies ETOH,Smoking,   FAMILY HISTORY:  Family history of diabetes mellitus (Mother, Sibling)        REVIEW OF SYSTEMS  All other review of systems is negative unless indicated above.    PHYSICAL EXAM:  NAD  awake   o2 via nc  dry mm  supple  decreased bs b/l  distant HS, irreg  soft  + edema  [facility] Medications:   MEDICATIONS  (STANDING):  atorvastatin 20 milliGRAM(s) Oral at bedtime  calcium carbonate 1250 mG  + Vitamin D (OsCal 500 + D) 1 Tablet(s) Oral three times a day  chlorhexidine 0.12% Liquid 15 milliLiter(s) Oral Mucosa every 12 hours  chlorhexidine 4% Liquid 1 Application(s) Topical <User Schedule>  dextrose 5%. 1000 milliLiter(s) (50 mL/Hr) IV Continuous <Continuous>  dextrose 5%. 1000 milliLiter(s) (75 mL/Hr) IV Continuous <Continuous>  dextrose 50% Injectable 12.5 Gram(s) IV Push once  dextrose 50% Injectable 25 Gram(s) IV Push once  dextrose 50% Injectable 25 Gram(s) IV Push once  guaiFENesin  milliGRAM(s) Oral every 12 hours  heparin  Injectable 5000 Unit(s) SubCutaneous every 8 hours  insulin glargine Injectable (LANTUS) 10 Unit(s) SubCutaneous at bedtime  insulin lispro Injectable (HumaLOG) 3 Unit(s) SubCutaneous three times a day before meals  metoprolol tartrate 25 milliGRAM(s) Oral three times a day  nystatin Powder 1 Application(s) Topical three times a day  pantoprazole  Injectable 40 milliGRAM(s) IV Push daily  tamsulosin 0.4 milliGRAM(s) Oral at bedtime        VITALS:  T(F): 99 (19 @ 11:24), Max: 99.9 (19 @ 20:00)  HR: 90 (19 @ 14:02)  BP: 112/66 (19 @ 14:02)  RR: 22 (19 @ 14:02)  SpO2: 100% (19 @ 14:02)  Wt(kg): --     @ 07:01  -   @ 07:00  --------------------------------------------------------  IN: 2599.6 mL / OUT: 3065 mL / NET: -465.4 mL     @ 07:01  -   @ 07:00  --------------------------------------------------------  IN: 2635.3 mL / OUT: 2925 mL / NET: -289.7 mL     @ 07:01  -   @ 15:18  --------------------------------------------------------  IN: 550 mL / OUT: 740 mL / NET: -190 mL          LABS:      152<H>  |  106  |  77<HH>  ----------------------------<  186<H>  3.4<L>   |  30  |  2.3<H>    Ca    8.0<L>      2019 05:22  Phos  4.7       Mg     2.2         TPro  5.7<L>  /  Alb  3.2<L>  /  TBili  0.7  /  DBili      /  AST  10  /  ALT  7   /  AlkPhos  72                            10.5   12.61 )-----------( 178      ( 2019 05:22 )             35.0       Urine Studies:  Urinalysis Basic - ( 2019 06:28 )    Color: Red / Appearance: Turbid / S.020 / pH:   Gluc:  / Ketone: 15  / Bili: Large / Urobili: 1.0 mg/dL   Blood:  / Protein: 100 mg/dL / Nitrite: Positive   Leuk Esterase: Large / RBC: >50 /HPF / WBC    Sq Epi:  / Non Sq Epi:  / Bacteria:       Osmolality, Random Urine: 369 mos/kg ( @ 23:00)  Sodium, Random Urine: 39.0 mmoL/L ( @ 23:00)  Creatinine, Random Urine: 72 mg/dL ( @ 23:00)  Creatinine, Random Urine: 74 mg/dL ( @ 06:28)  Protein/Creatinine Ratio Calculation: 4.1 Ratio ( @ :28)  Sodium, Random Urine: 44.0 mmoL/L ( @ 06:28)  Osmolality, Random Urine: 362 mos/kg ( @ 06:28)  Sodium, Random Urine: 20.0 mmoL/L ( @ 11:00)  Creatinine, Random Urine: 28 mg/dL ( @ 11:00)      RADIOLOGY & ADDITIONAL STUDIES:

## 2019-02-22 NOTE — CHART NOTE - NSCHARTNOTEFT_GEN_A_CORE
Registered Dietitian Follow-Up     Patient Profile Reviewed                           Yes [v]   No []     Nutrition History Previously Obtained        Yes [v]  No []       Pertinent Subjective Information:      Pertinent Medical Interventions: Acute hypoxic respiratory failure -  self extubated last night (kept on BIPAP at night), currently NPO awaiting SLP eval. CHF exacerbation. RLL PNA. 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      Diet order:  NPO (EN was discontinued last night)      Anthropometrics:  - Ht. 180.34  - Wt. 130.7kg (2/22) - lowest this admit, compared to 134kg (2/18) - likely fluid shifts. RD will continue to monitor  - %wt change  - BMI 41.2  - IBW 78kg     Pertinent Lab Data: 2/22 WBC- 12.61, H/H- 10.5/35.0, BUN- 77 (trending down from 95), Cr- 2.3, GLu- 186, GFR- 25, ALb- 3.2     Pertinent Meds: D5@75ml/hr (~306kcal from dextrose), insulin, lopressor, pantoprazole, senna, lipitor, flomax     Physical Findings:  - Appearance: obese, alert, oriented  - GI function: 3 BMs today (consistency from formed to loose)   - Tubes: none  - Oral/Mouth cavity:   - Skin: skin tear     Nutrition Requirements (Weight Used: 130.7kg - lowest this admit and IBW -78kg)      Estimated Energy Needs    Continue []  Adjust [v]  Adjusted Energy Recommendations:  ~2030 kcal/day (MSJ x1.0) compared to 1715kcal- 1950kcal/d (22-25kcal/kg IBW) for BMI > 40        Estimated Protein Needs    Continue []  Adjust [v]  Adjusted Protein Recommendations:   ~60-80 gm/day (0.8-1.0gm/kg IBW) -  TOBIAS on CKD (? stage)        Estimated Fluid Needs        Continue [v]  Adjust []  Adjusted Fluid Recommendations:   as per CCU team       Nutrient Intake: Not meeting needs        [v] Previous Nutrition Diagnosis: none              Nutrition Diagnostic #1  Problem: Inadequate protein- energy intake  Etiology: s/p self extubation and pending SLP eval  Statement: EN was discontinued last night, pt remains NPO.         Nutrition Intervention: Meal and snacks vs. Enteral nutrition     Goal/Expected Outcome: Initiation of PO diet vs enteral nutrition in 1-4 days     Indicator/Monitoring: Will monitor energy intake, diet order, labs, body composition, NFPF.     Recommended: If PO diet - low Na, CHO consistent diet (consistency per SLP) if EN .....    At risk f/u Registered Dietitian Follow-Up     Patient Profile Reviewed                           Yes [v]   No []     Nutrition History Previously Obtained        Yes [v]  No []       Pertinent Subjective Information: Pt alert, hard of hearing. Family at bedside. NPO awaiting SLP. 2 BMs today.      Pertinent Medical Interventions: Acute hypoxic respiratory failure -  self extubated last night (kept on BIPAP at night), currently NPO awaiting SLP eval. CHF exacerbation. RLL PNA. 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      Diet order:  NPO (EN was discontinued last night)      Anthropometrics:  - Ht. 180.34  - Wt. 130.7kg (2/22) - lowest this admit, compared to 134kg (2/18) - likely fluid shifts. RD will continue to monitor  - %wt change  - BMI 41.2  - IBW 78kg     Pertinent Lab Data: 2/22 WBC- 12.61, H/H- 10.5/35.0, BUN- 77 (trending down from 95), Cr- 2.3, GLu- 186, GFR- 25, ALb- 3.2     Pertinent Meds: D5@75ml/hr (~306kcal from dextrose), insulin, lopressor, pantoprazole, senna, lipitor, flomax     Physical Findings:  - Appearance: obese, alert, oriented, on supplemental O2 via NC  - GI function: 2 BMs today per pr  - Tubes: none  - Oral/Mouth cavity:   - Skin: skin tear     Nutrition Requirements (Weight Used: 130.7kg - lowest this admit and IBW -78kg)      Estimated Energy Needs    Continue []  Adjust [v]  Adjusted Energy Recommendations:  ~1900kcal/day based on 2030 kcal/day (MSJ x1.0) compared to 1715kcal- 1950kcal/d (22-25kcal/kg IBW) for BMI > 40        Estimated Protein Needs    Continue []  Adjust [v]  Adjusted Protein Recommendations:   ~60-80 gm/day (0.8-1.0gm/kg IBW) -  TOBIAS on CKD (? stage)        Estimated Fluid Needs        Continue [v]  Adjust []  Adjusted Fluid Recommendations:   as per CCU team       Nutrient Intake: Not meeting needs        [v] Previous Nutrition Diagnosis: none              Nutrition Diagnostic #1  Problem: Inadequate protein- energy intake  Etiology: s/p self extubation and pending SLP eval  Statement: EN was discontinued last night, pt remains NPO.         Nutrition Intervention: Meal and snacks vs. Enteral nutrition     Goal/Expected Outcome: Initiation of PO diet vs enteral nutrition in 1-4 days     Indicator/Monitoring: Will monitor energy intake, diet order, labs, body composition, NFPF.     Recommended: If PO diet - low Na, CHO consistent diet (consistency per SLP) if EN - Osmolite 1.5 - 240ml q 4hrs (hold 2 am feed) to provide 1775kcal (93% estimated energy needs), 75gm protein (~100% estimated protein needs), 900ml h20. Additional fluids per LIP.    At risk f/u.

## 2019-02-22 NOTE — PROGRESS NOTE ADULT - SUBJECTIVE AND OBJECTIVE BOX
DELVISVINCENZO  83y, Male      OVERNIGHT EVENTS:    no fevers, self extubation, no pressors  alert, has no complaints  no cough. On bipap.  no abdominal pain, diarrhea    VITALS:  T(F): 99.5, Max: 99.9 (02-21-19 @ 20:00)  HR: 78  BP: 134/77  RR: 20Vital Signs Last 24 Hrs  T(C): 37.5 (22 Feb 2019 04:00), Max: 37.7 (21 Feb 2019 11:47)  T(F): 99.5 (22 Feb 2019 04:00), Max: 99.9 (21 Feb 2019 20:00)  HR: 78 (22 Feb 2019 06:00) (58 - 100)  BP: 134/77 (22 Feb 2019 06:00) (86/55 - 134/77)  BP(mean): 100 (22 Feb 2019 06:00) (65 - 119)  RR: 20 (22 Feb 2019 06:00) (18 - 29)  SpO2: 100% (22 Feb 2019 06:00) (97% - 100%)    TESTS & MEASUREMENTS:                        10.5   12.61 )-----------( 178      ( 22 Feb 2019 05:22 )             35.0     02-22    152<H>  |  106  |  77<HH>  ----------------------------<  186<H>  3.4<L>   |  30  |  2.3<H>    Ca    8.0<L>      22 Feb 2019 05:22  Phos  4.7     02-21  Mg     2.2     02-22    TPro  5.7<L>  /  Alb  3.2<L>  /  TBili  0.7  /  DBili  x   /  AST  10  /  ALT  7   /  AlkPhos  72  02-22    LIVER FUNCTIONS - ( 22 Feb 2019 05:22 )  Alb: 3.2 g/dL / Pro: 5.7 g/dL / ALK PHOS: 72 U/L / ALT: 7 U/L / AST: 10 U/L / GGT: x             Culture - Sputum (collected 02-18-19 @ 19:35)  Source: .Sputum Sputum trap  Gram Stain (02-19-19 @ 15:53):    Few polymorphonuclear leukocytes    Rare Squamous epithelial cells per low power field    Few Gram Positive Cocci in Clusters per oil power field  Preliminary Report (02-20-19 @ 09:48):    Normal Respiratory Marilee present    Culture - Urine (collected 02-18-19 @ 06:28)  Source: .Urine Catheterized  Final Report (02-19-19 @ 07:31):    No growth    Culture - Urine (collected 02-16-19 @ 11:00)  Source: .Urine Clean Catch (Midstream)  Final Report (02-17-19 @ 12:15):    No growth            RADIOLOGY & ADDITIONAL TESTS:    ANTIBIOTICS:  piperacillin/tazobactam IVPB. 2.25 Gram(s) IV Intermittent every 8 hours

## 2019-02-22 NOTE — PROGRESS NOTE ADULT - ASSESSMENT
IMPRESSION:  Aspiration with possibly Bibasilar GNR PNA complicated by respiratory failure requiring ventilation  CT with bilateral pleural effusions with atalectasis  S/p self extubation 2/21  No pressors  UCx NTD  ET with few PMNs , normal son  WBC12.6    RECOMMENDATIONS:  Hold ABx for now

## 2019-02-22 NOTE — PROGRESS NOTE ADULT - SUBJECTIVE AND OBJECTIVE BOX
Patient is a 83y old Male who presents with a chief complaint of Recurrent falls  TOBIAS (22 Feb 2019 06:42)    Currently admitted to medicine with the primary diagnosis of TOBIAS (acute kidney injury)    Today is hospital day 7d.    BRIEF HOSPITAL COURSE:   84 yo M PMHx of DMII, HTN,  Hydrocephalus s/p  shunt, Afib not on AC (multiple falls) presents to our ED with c/o mechanical fall. According to patient he was trying to walk with a walker and slipped on the floor, no nausea, vomiting, dizziness, chest pain, palpitations, seizure like episode, no LOC. He had 4 falls in last 10 days as per family. He was recently discharged from the hospital Dec for dizziness with stress test and  shunt assessment that was normal. At baseline he has home aid, takes his medications and walks very minimal with the help of cane or walker. However for last few months he has had dec ambulation and worsening LE edema. As per family, also worsening lethargy. Trauma work-up neg (age unknown L2 fx)    In the ED BP 83/45, HR 78/min, Sao2 100% on NC, afebrile. He was given 2L RL and his repeat BP was 110/80. Initial CXR only shows cardiomegaly without lobar consolidation. Patient later desatted to 85% on 3L NC so he was placed on BIPAP. Repeated CXR shows new consolidation at right lung base, suspecting PNA, so patient was started on Rocephin and Azithromycin. Labs showed TOBIAS with Cr 3.2 (baseline 2) and dec UOP. Pt also noted to be on NSAIDs and ACE, Actos so likely TOBIAS 2/2 dehydration and meds. On 2/18 pt was unable to tolerate NC and moved to BIPAP with ABG showing acute hypoxic resp failure and hypercapnia (likely chronic?). CXR at this time showed worsening pulm vascular congestion. After diuresis/BIPAP, pt became lethargic so intubated for protection. Admitted to CCU, barragan noted to be out-of-place (likely pt pulled out) so advanced with a sharp increase in out-put. CXR and kidney showed improvement after aggressive diuresis. Cardiac monitor significant for high # of PVC/short runs of NSVT (seen when first arrived as well) On 2/21 pt SELF-EXTUBATED, able to transition to BIPAP without difficulty.     EVENTS LAST 24HRS:   At 8:45pm pt SELF-EXTUBATED. Transitioned ot BIPAP without difficulty. Able to remain off intubation into AM.   Cardiac monitor significant for high #s of PVC and small runs of NSVT. Overnight ABG showed K 3.1, pt got 40meq IV K.  Alert and responsive, denying SOB.     PAST MEDICAL & SURGICAL HISTORY  Hypertension  Hydrocephalus  CKD (chronic kidney disease)  DM (diabetes mellitus)  Afib  History of prostate surgery      SOCIAL HISTORY:      REVIEW OF SYSTEMS:  See HPI    ALLERGIES:    MEDICATIONS:  STANDING MEDICATIONS  atorvastatin 20 milliGRAM(s) Oral at bedtime  calcium carbonate 1250 mG  + Vitamin D (OsCal 500 + D) 1 Tablet(s) Oral three times a day  chlorhexidine 0.12% Liquid 15 milliLiter(s) Oral Mucosa every 12 hours  chlorhexidine 4% Liquid 1 Application(s) Topical <User Schedule>  docusate sodium Liquid 100 milliGRAM(s) Oral three times a day  furosemide   Injectable 40 milliGRAM(s) IV Push two times a day  guaiFENesin  milliGRAM(s) Oral every 12 hours  heparin  Injectable 5000 Unit(s) SubCutaneous every 8 hours  insulin glargine Injectable (LANTUS) 10 Unit(s) SubCutaneous at bedtime  insulin lispro Injectable (HumaLOG) 3 Unit(s) SubCutaneous three times a day before meals  metoprolol tartrate 25 milliGRAM(s) Oral three times a day  nystatin Powder 1 Application(s) Topical three times a day  pantoprazole  Injectable 40 milliGRAM(s) IV Push daily  piperacillin/tazobactam IVPB. 2.25 Gram(s) IV Intermittent every 8 hours  potassium chloride  20 mEq/100 mL IVPB 20 milliEquivalent(s) IV Intermittent once  senna 2 Tablet(s) Oral at bedtime  tamsulosin 0.4 milliGRAM(s) Oral at bedtime    PRN MEDICATIONS  acetaminophen   Tablet .. 650 milliGRAM(s) Oral every 6 hours PRN  ALBUTerol/ipratropium for Nebulization 3 milliLiter(s) Nebulizer every 6 hours PRN  dextrose 40% Gel 15 Gram(s) Oral once PRN  glucagon  Injectable 1 milliGRAM(s) IntraMuscular once PRN    VITALS:     Mode: AC/ CMV (Assist Control/ Continuous Mandatory Ventilation)  RR (machine): 16  TV (machine): 450  FiO2: 40  PEEP: 5  ITime: 1  MAP: 11  PIP: 28      ICU Vital Signs Last 24 Hrs:    T(C): 37.5 (22 Feb 2019 04:00), Max: 37.7 (21 Feb 2019 11:47)  T(F): 99.5 (22 Feb 2019 04:00), Max: 99.9 (21 Feb 2019 20:00)  HR: 78 (22 Feb 2019 06:00) (58 - 100)  BP: 134/77 (22 Feb 2019 06:00) (86/55 - 134/77)  BP(mean): 100 (22 Feb 2019 06:00) (65 - 119)  ABP: --  ABP(mean): --  RR: 20 (22 Feb 2019 06:00) (18 - 29)  SpO2: 100% (22 Feb 2019 06:00) (97% - 100%)      ABG - ( 22 Feb 2019 06:36 )  pH, Arterial: 7.48  pH, Blood: x     /  pCO2: 48    /  pO2: 65    / HCO3: 36    / Base Excess: 11.5  /  SaO2: 94                  I&O's Detail:      20 Feb 2019 07:01  -  21 Feb 2019 07:00  --------------------------------------------------------  IN:    dexmedetomidine Infusion: 404.6 mL    Enteral Tube Flush: 320 mL    Free Water: 750 mL    IV PiggyBack: 250 mL    Osmolite 1.2: 875 mL  Total IN: 2599.6 mL    OUT:    Indwelling Catheter - Urethral: 3065 mL  Total OUT: 3065 mL    Total NET: -465.4 mL      21 Feb 2019 07:01  -  22 Feb 2019 06:47  --------------------------------------------------------  IN:    dexmedetomidine Infusion: 215.3 mL    dextrose 5%.: 1050 mL    Free Water: 750 mL    IV PiggyBack: 200 mL    Osmolite 1.2: 420 mL  Total IN: 2635.3 mL    OUT:    Indwelling Catheter - Urethral: 2925 mL  Total OUT: 2925 mL    Total NET: -289.7 mL          LABS:                        10.5   12.61 )-----------( 178      ( 22 Feb 2019 05:22 )             35.0     02-22    152<H>  |  106  |  77<HH>  ----------------------------<  186<H>  3.4<L>   |  30  |  2.3<H>    Ca    8.0<L>      22 Feb 2019 05:22  Phos  4.7     02-21  Mg     2.2     02-22    TPro  5.7<L>  /  Alb  3.2<L>  /  TBili  0.7  /  DBili  x   /  AST  10  /  ALT  7   /  AlkPhos  72  02-22          CAPILLARY BLOOD GLUCOSE  221 (21 Feb 2019 16:00)      POCT Blood Glucose.: 234 mg/dL (21 Feb 2019 21:44)        CULTURES:  Culture Results:   Normal Respiratory Marilee present (02-18 @ 19:35)  Culture Results:   No growth (02-18 @ 06:28)  Culture Results:   No growth (02-16 @ 11:00)      PHYSICAL EXAM:    General: No acute distress.  on bipap, lethargic but easily arounsable, following commands  HEENT: Pupils equal, reactive to light symmetrically, dry oral mucosa  PULM: Clear to auscultation bilaterally with decreased breath sounds all fields, (body habitus limiting)  CVS: Irregular rhythm, controlled rate, PVCs and NSVT noted on monitor again (improved after lopressor)   ABD: Obese, mildly tense (possibly 2/2  shunt draining?), distended, nontender  EXT: 1+ pitting on top of diffuse non-putting edema to mid shin with small ulcerated sores noted; some ulcerated sores noted over arms b/l as well   SKIN: Warm and well perfused, bronze discoloration of b/l legs noted    RADIOLOGY:

## 2019-02-22 NOTE — PROGRESS NOTE ADULT - ASSESSMENT
# Acute hypoxic respiratory failure 2/2 Aspiration PNA vs pulmonary edema  - SELF EXTUBATE 2/21, on BIPAP all night  - ECHO 2/16: normal EF  - ID recs appreciated - hold zosyn, no consolidation on CT scan  - c/w IV lasix 40 BID  - CT chest done (no final read yet) R >> L effusion , possible R consolidation underlying    #PVC on cardiac monitoring // Hx of Afib // Troponinemia on admit (likely 2/2 type II demand)  - CHADsVASC 4 (Age, HTN, DM); no AC 2/2 falls  - Cardio recs (Tamburrino): c/w BB 25 BID   - cardio f/u?  - switched to IV lopressor as pt on BIPAP (noted to have high # events again post-extubation)    # TOBIAS likely acute tubular injury, improving  - baseline Cr ~2, now 2.5   - Nephro on board: watch BP, keep free water  - negative 0.3L (goal at least >500cc daily)  - Avoid ACEI/NSAIDs for now    # Hypernatremia  - 152 today  - c/w D5W 50cc/hr    # Acute UTI with hematuria (possibly also from traumatic barragan)  - c/w Zosyn    # Recurrent falls  - Likely multifactorial, physical deconditioning, medication induced hypotension, and peripheral neuropathy  - Trauma work up negative for acute fracture, but show age-indeterminate compression fracture of L2    # HTN, BP noted to be lowered today  - Hold indapamide, amlodipine, Ramipril  - Monitor vitals, avoid hypotension    # DM II:   - Hga1C pending (11.5 in 12/5/18)  - Start basal/bolus 10 and 3/3/3  - Monitor FS    #  shunt:   - Recently evaluated by Dr. Wayne 12/6/18, deemed to be chronically in place  - not manipulated in 10yrs, follows in [facility], non-programmable   - No signs of hydrocephalus in CT done on this admit    #MISC:  - DVT ppx: SubQ heparin  - GI ppx: PPI  - Diet: NPO, S&S eval  - Activity: increase as tolerated  - Code status: Full code, family aware of poor prognosis, they want to proceed with intubation, but will decided on other aggressive measures such as HD as the situation arises # Acute hypoxic respiratory failure 2/2 Aspiration PNA vs pulmonary edema  - SELF EXTUBATE 2/21, on BIPAP all night  - ECHO 2/16: normal EF  - ID recs appreciated - hold zosyn, no consolidation on CT scan  - keep pt NEGATIVE (to dose 40 IV lasix based on urine out-put, please reassess at 8-9PM)  - CT chest done (no final read yet) R >> L effusion , possible R consolidation underlying    #PVC on cardiac monitoring // Hx of Afib // Troponinemia on admit (likely 2/2 type II demand)  - CHADsVASC 4 (Age, HTN, DM); no AC 2/2 falls  - Cardio recs (Tamburrino): c/w BB 25 BID   - cardio f/u?  - switched to IV lopressor as pt on BIPAP (noted to have high # events again post-extubation)    # TOBIAS likely acute tubular injury, improving  - baseline Cr ~2, now 2.5   - Nephro on board: watch BP, keep free water  - negative 0.3L (goal at least >500cc daily)  - Avoid ACEI/NSAIDs for now    # Hypernatremia  - 152 today  - c/w D5W 50cc/hr    # Acute UTI with hematuria (possibly also from traumatic barragan)  - c/w Zosyn    # Recurrent falls  - Likely multifactorial, physical deconditioning, medication induced hypotension, and peripheral neuropathy  - Trauma work up negative for acute fracture, but show age-indeterminate compression fracture of L2    # HTN, BP noted to be lowered today  - Hold indapamide, amlodipine, Ramipril  - Monitor vitals, avoid hypotension    # DM II:   - Hga1C pending (11.5 in 12/5/18)  - Start basal/bolus 10 and 3/3/3  - Monitor FS    #  shunt:   - Recently evaluated by Dr. Wayne 12/6/18, deemed to be chronically in place  - not manipulated in 10yrs, follows in [facility], non-programmable   - No signs of hydrocephalus in CT done on this admit    #MISC:  - DVT ppx: SubQ heparin  - GI ppx: PPI  - Diet: NPO, S&S eval  - Activity: increase as tolerated  - Code status: Full code, family aware of poor prognosis, they want to proceed with intubation, but will decided on other aggressive measures such as HD as the situation arises

## 2019-02-22 NOTE — SWALLOW FEES ASSESSMENT ADULT - PRELIMINARY ENDOSCOPIC EXAMINATIONS
Vocal fold adduction/abduction/Interarytenoid/Arytenoid edema/Baseline pooling of secretions/Baseline penetration of secretions

## 2019-02-22 NOTE — SWALLOW FEES ASSESSMENT ADULT - ROSENBEK'S PENETRATION ASPIRATION SCALE
(3) material remains above the vocal cords, visible residue remains (penetration) (8) material passes glottis, visible subglottic residue remains, absent patient response (aspiration)

## 2019-02-22 NOTE — PROGRESS NOTE ADULT - ASSESSMENT
TOBIAS   - improving   - acute tubular injury due to hypotension  hypernatremia   CKD 3-4 (baseline Cr 1.9 on 12/2018)  urine retention s/p prostatectomy   hypotension   falls  ARF - JOHANNY on CPAP / PNA / CHF  EF 55%  afib  HTN   s/p  shunt  DM 2       plan:    ICU care  cont lasix 40mg iv q12h today   keep mild negative balance - aim for 0.5L neg balance / day  D5W @ 75 cc/hr today  off bp meds and ACE-I  renal dose zosyn  f/u labs  d/w son and pulmonary      critical care 35min

## 2019-02-22 NOTE — SWALLOW FEES ASSESSMENT ADULT - RECOMMENDED CONSISTENCY
NPO with alternate means of nutrition/hydration. if po meds must be given crush and mix with applesauce. after each teaspoon cue pt to swallow 4 times

## 2019-02-22 NOTE — PROGRESS NOTE ADULT - SUBJECTIVE AND OBJECTIVE BOX
Patient is a 83y old  Male who presents with a chief complaint of Recurrent falls  TOBIAS (22 Feb 2019 06:47)      Over Night Events:  Patient seen and examined s/p self extubation on AVAp now  feel good not on distress   talking full sentence     ROS:  See HPI    PHYSICAL EXAM    ICU Vital Signs Last 24 Hrs  T(C): 37.2 (22 Feb 2019 07:23), Max: 37.7 (21 Feb 2019 11:47)  T(F): 98.9 (22 Feb 2019 07:23), Max: 99.9 (21 Feb 2019 20:00)  HR: 76 (22 Feb 2019 07:23) (58 - 100)  BP: 144/68 (22 Feb 2019 07:23) (93/55 - 144/68)  BP(mean): 95 (22 Feb 2019 07:23) (65 - 119)  ABP: --  ABP(mean): --  RR: 22 (22 Feb 2019 07:23) (18 - 29)  SpO2: 100% (22 Feb 2019 07:23) (97% - 100%)      General: aOx3  HEENT:              alisa  Lymph Nodes: NO cervical LN   Lungs: Bilateral BS  Cardiovascular: Regular   Abdomen: Soft, Positive BS  Extremities: No clubbing   Skin: warm   Neurological: no focal deficit   Musculoskeletal: move all ext     I&O's Detail    21 Feb 2019 07:01  -  22 Feb 2019 07:00  --------------------------------------------------------  IN:    dexmedetomidine Infusion: 215.3 mL    dextrose 5%.: 1050 mL    Free Water: 750 mL    IV PiggyBack: 200 mL    Osmolite 1.2: 420 mL  Total IN: 2635.3 mL    OUT:    Indwelling Catheter - Urethral: 2925 mL  Total OUT: 2925 mL    Total NET: -289.7 mL      22 Feb 2019 07:01  -  22 Feb 2019 09:31  --------------------------------------------------------  IN:    dextrose 5%.: 100 mL  Total IN: 100 mL    OUT:    Indwelling Catheter - Urethral: 425 mL  Total OUT: 425 mL    Total NET: -325 mL          LABS:                          10.5   12.61 )-----------( 178      ( 22 Feb 2019 05:22 )             35.0         22 Feb 2019 05:22    152    |  106    |  77     ----------------------------<  186    3.4     |  30     |  2.3      Ca    8.0        22 Feb 2019 05:22  Phos  4.7       21 Feb 2019 23:20  Mg     2.2       22 Feb 2019 05:22    TPro  5.7    /  Alb  3.2    /  TBili  0.7    /  DBili  x      /  AST  10     /  ALT  7      /  AlkPhos  72     22 Feb 2019 05:22  Amylase x     lipase x                                                                                                                                                                                                 Mode: AC/ CMV (Assist Control/ Continuous Mandatory Ventilation)  RR (machine): 16  TV (machine): 450  FiO2: 40  PEEP: 5  ITime: 1  MAP: 11  PIP: 28                                      ABG - ( 22 Feb 2019 06:36 )  pH, Arterial: 7.48  pH, Blood: x     /  pCO2: 48    /  pO2: 65    / HCO3: 36    / Base Excess: 11.5  /  SaO2: 94                  MEDICATIONS  (STANDING):  atorvastatin 20 milliGRAM(s) Oral at bedtime  calcium carbonate 1250 mG  + Vitamin D (OsCal 500 + D) 1 Tablet(s) Oral three times a day  chlorhexidine 0.12% Liquid 15 milliLiter(s) Oral Mucosa every 12 hours  chlorhexidine 4% Liquid 1 Application(s) Topical <User Schedule>  dextrose 5%. 1000 milliLiter(s) (50 mL/Hr) IV Continuous <Continuous>  dextrose 5%. 1000 milliLiter(s) (50 mL/Hr) IV Continuous <Continuous>  dextrose 50% Injectable 12.5 Gram(s) IV Push once  dextrose 50% Injectable 25 Gram(s) IV Push once  dextrose 50% Injectable 25 Gram(s) IV Push once  docusate sodium Liquid 100 milliGRAM(s) Oral three times a day  furosemide   Injectable 40 milliGRAM(s) IV Push two times a day  guaiFENesin  milliGRAM(s) Oral every 12 hours  heparin  Injectable 5000 Unit(s) SubCutaneous every 8 hours  insulin glargine Injectable (LANTUS) 10 Unit(s) SubCutaneous at bedtime  insulin lispro Injectable (HumaLOG) 3 Unit(s) SubCutaneous three times a day before meals  metoprolol tartrate 25 milliGRAM(s) Oral three times a day  nystatin Powder 1 Application(s) Topical three times a day  pantoprazole  Injectable 40 milliGRAM(s) IV Push daily  piperacillin/tazobactam IVPB. 2.25 Gram(s) IV Intermittent every 8 hours  senna 2 Tablet(s) Oral at bedtime  tamsulosin 0.4 milliGRAM(s) Oral at bedtime    MEDICATIONS  (PRN):  acetaminophen   Tablet .. 650 milliGRAM(s) Oral every 6 hours PRN Moderate Pain (4 - 6)  ALBUTerol/ipratropium for Nebulization 3 milliLiter(s) Nebulizer every 6 hours PRN Shortness of Breath and/or Wheezing  dextrose 40% Gel 15 Gram(s) Oral once PRN Blood Glucose LESS THAN 70 milliGRAM(s)/deciliter  glucagon  Injectable 1 milliGRAM(s) IntraMuscular once PRN Glucose LESS THAN 70 milligrams/deciliter          Xrays:  TLC:  OG:  ET tube:                                                                                    decrease b/l opacity    ECHO:

## 2019-02-23 LAB
ALBUMIN SERPL ELPH-MCNC: 3.3 G/DL — LOW (ref 3.5–5.2)
ALP SERPL-CCNC: 74 U/L — SIGNIFICANT CHANGE UP (ref 30–115)
ALT FLD-CCNC: 9 U/L — SIGNIFICANT CHANGE UP (ref 0–41)
ANION GAP SERPL CALC-SCNC: 16 MMOL/L — HIGH (ref 7–14)
ANION GAP SERPL CALC-SCNC: 17 MMOL/L — HIGH (ref 7–14)
AST SERPL-CCNC: 15 U/L — SIGNIFICANT CHANGE UP (ref 0–41)
BILIRUB SERPL-MCNC: 0.6 MG/DL — SIGNIFICANT CHANGE UP (ref 0.2–1.2)
BUN SERPL-MCNC: 54 MG/DL — HIGH (ref 10–20)
BUN SERPL-MCNC: 62 MG/DL — CRITICAL HIGH (ref 10–20)
CALCIUM SERPL-MCNC: 8 MG/DL — LOW (ref 8.5–10.1)
CALCIUM SERPL-MCNC: 8.2 MG/DL — LOW (ref 8.5–10.1)
CHLORIDE SERPL-SCNC: 104 MMOL/L — SIGNIFICANT CHANGE UP (ref 98–110)
CHLORIDE SERPL-SCNC: 106 MMOL/L — SIGNIFICANT CHANGE UP (ref 98–110)
CO2 SERPL-SCNC: 31 MMOL/L — SIGNIFICANT CHANGE UP (ref 17–32)
CO2 SERPL-SCNC: 31 MMOL/L — SIGNIFICANT CHANGE UP (ref 17–32)
CREAT SERPL-MCNC: 1.8 MG/DL — HIGH (ref 0.7–1.5)
CREAT SERPL-MCNC: 2 MG/DL — HIGH (ref 0.7–1.5)
GLUCOSE BLDC GLUCOMTR-MCNC: 148 MG/DL — HIGH (ref 70–99)
GLUCOSE BLDC GLUCOMTR-MCNC: 170 MG/DL — HIGH (ref 70–99)
GLUCOSE BLDC GLUCOMTR-MCNC: 189 MG/DL — HIGH (ref 70–99)
GLUCOSE BLDC GLUCOMTR-MCNC: 192 MG/DL — HIGH (ref 70–99)
GLUCOSE SERPL-MCNC: 163 MG/DL — HIGH (ref 70–99)
GLUCOSE SERPL-MCNC: 202 MG/DL — HIGH (ref 70–99)
HCT VFR BLD CALC: 34.4 % — LOW (ref 42–52)
HGB BLD-MCNC: 10.1 G/DL — LOW (ref 14–18)
MAGNESIUM SERPL-MCNC: 2.1 MG/DL — SIGNIFICANT CHANGE UP (ref 1.8–2.4)
MCHC RBC-ENTMCNC: 24.4 PG — LOW (ref 27–31)
MCHC RBC-ENTMCNC: 29.4 G/DL — LOW (ref 32–37)
MCV RBC AUTO: 83.1 FL — SIGNIFICANT CHANGE UP (ref 80–94)
NRBC # BLD: 0 /100 WBCS — SIGNIFICANT CHANGE UP (ref 0–0)
PLATELET # BLD AUTO: 186 K/UL — SIGNIFICANT CHANGE UP (ref 130–400)
POTASSIUM SERPL-MCNC: 3.4 MMOL/L — LOW (ref 3.5–5)
POTASSIUM SERPL-MCNC: 3.5 MMOL/L — SIGNIFICANT CHANGE UP (ref 3.5–5)
POTASSIUM SERPL-SCNC: 3.4 MMOL/L — LOW (ref 3.5–5)
POTASSIUM SERPL-SCNC: 3.5 MMOL/L — SIGNIFICANT CHANGE UP (ref 3.5–5)
PROT SERPL-MCNC: 5.9 G/DL — LOW (ref 6–8)
RBC # BLD: 4.14 M/UL — LOW (ref 4.7–6.1)
RBC # FLD: 15.9 % — HIGH (ref 11.5–14.5)
SODIUM SERPL-SCNC: 151 MMOL/L — HIGH (ref 135–146)
SODIUM SERPL-SCNC: 154 MMOL/L — HIGH (ref 135–146)
WBC # BLD: 9.65 K/UL — SIGNIFICANT CHANGE UP (ref 4.8–10.8)
WBC # FLD AUTO: 9.65 K/UL — SIGNIFICANT CHANGE UP (ref 4.8–10.8)

## 2019-02-23 RX ORDER — POTASSIUM CHLORIDE 20 MEQ
20 PACKET (EA) ORAL ONCE
Qty: 0 | Refills: 0 | Status: COMPLETED | OUTPATIENT
Start: 2019-02-23 | End: 2019-02-23

## 2019-02-23 RX ORDER — SODIUM CHLORIDE 9 MG/ML
1000 INJECTION, SOLUTION INTRAVENOUS
Qty: 0 | Refills: 0 | Status: DISCONTINUED | OUTPATIENT
Start: 2019-02-23 | End: 2019-02-24

## 2019-02-23 RX ORDER — CHLORHEXIDINE GLUCONATE 213 G/1000ML
1 SOLUTION TOPICAL
Qty: 0 | Refills: 0 | Status: DISCONTINUED | OUTPATIENT
Start: 2019-02-23 | End: 2019-02-27

## 2019-02-23 RX ORDER — FUROSEMIDE 40 MG
40 TABLET ORAL ONCE
Qty: 0 | Refills: 0 | Status: COMPLETED | OUTPATIENT
Start: 2019-02-23 | End: 2019-02-23

## 2019-02-23 RX ADMIN — PANTOPRAZOLE SODIUM 40 MILLIGRAM(S): 20 TABLET, DELAYED RELEASE ORAL at 11:51

## 2019-02-23 RX ADMIN — Medication 40 MILLIGRAM(S): at 05:51

## 2019-02-23 RX ADMIN — Medication 25 MILLIGRAM(S): at 15:01

## 2019-02-23 RX ADMIN — Medication 25 MILLIGRAM(S): at 21:18

## 2019-02-23 RX ADMIN — CHLORHEXIDINE GLUCONATE 15 MILLILITER(S): 213 SOLUTION TOPICAL at 05:50

## 2019-02-23 RX ADMIN — ATORVASTATIN CALCIUM 20 MILLIGRAM(S): 80 TABLET, FILM COATED ORAL at 21:18

## 2019-02-23 RX ADMIN — CHLORHEXIDINE GLUCONATE 1 APPLICATION(S): 213 SOLUTION TOPICAL at 05:52

## 2019-02-23 RX ADMIN — Medication 50 MILLIEQUIVALENT(S): at 23:06

## 2019-02-23 RX ADMIN — Medication 3 UNIT(S): at 16:43

## 2019-02-23 RX ADMIN — Medication 1 TABLET(S): at 05:52

## 2019-02-23 RX ADMIN — Medication 25 MILLIGRAM(S): at 05:50

## 2019-02-23 RX ADMIN — Medication 600 MILLIGRAM(S): at 05:51

## 2019-02-23 RX ADMIN — INSULIN GLARGINE 10 UNIT(S): 100 INJECTION, SOLUTION SUBCUTANEOUS at 21:19

## 2019-02-23 RX ADMIN — Medication 1 TABLET(S): at 15:01

## 2019-02-23 RX ADMIN — Medication 40 MILLIGRAM(S): at 22:10

## 2019-02-23 RX ADMIN — NYSTATIN CREAM 1 APPLICATION(S): 100000 CREAM TOPICAL at 15:01

## 2019-02-23 RX ADMIN — TAMSULOSIN HYDROCHLORIDE 0.4 MILLIGRAM(S): 0.4 CAPSULE ORAL at 21:18

## 2019-02-23 RX ADMIN — Medication 50 MILLIEQUIVALENT(S): at 11:50

## 2019-02-23 RX ADMIN — Medication 600 MILLIGRAM(S): at 18:16

## 2019-02-23 RX ADMIN — HEPARIN SODIUM 5000 UNIT(S): 5000 INJECTION INTRAVENOUS; SUBCUTANEOUS at 15:01

## 2019-02-23 RX ADMIN — HEPARIN SODIUM 5000 UNIT(S): 5000 INJECTION INTRAVENOUS; SUBCUTANEOUS at 05:51

## 2019-02-23 RX ADMIN — Medication 1 TABLET(S): at 21:18

## 2019-02-23 RX ADMIN — NYSTATIN CREAM 1 APPLICATION(S): 100000 CREAM TOPICAL at 05:51

## 2019-02-23 RX ADMIN — HEPARIN SODIUM 5000 UNIT(S): 5000 INJECTION INTRAVENOUS; SUBCUTANEOUS at 21:18

## 2019-02-23 RX ADMIN — SODIUM CHLORIDE 200 MILLILITER(S): 9 INJECTION, SOLUTION INTRAVENOUS at 22:20

## 2019-02-23 RX ADMIN — NYSTATIN CREAM 1 APPLICATION(S): 100000 CREAM TOPICAL at 21:19

## 2019-02-23 NOTE — PROGRESS NOTE ADULT - SUBJECTIVE AND OBJECTIVE BOX
[facility] NEPHROLOGY FOLLOW UP NOTE  --------------------------------------------------------------------------------  24 hour events/subjective: Patient examined in ICU with wife. Appears comfortable.    PAST HISTORY  --------------------------------------------------------------------------------  No significant changes to PMH, PSH, FHx, SHx, unless otherwise noted    ALLERGIES & MEDICATIONS  --------------------------------------------------------------------------------  Allergies    No Known Allergies    Standing Inpatient Medications  atorvastatin 20 milliGRAM(s) Oral at bedtime  calcium carbonate 1250 mG  + Vitamin D (OsCal 500 + D) 1 Tablet(s) Oral three times a day  chlorhexidine 0.12% Liquid 15 milliLiter(s) Oral Mucosa every 12 hours  chlorhexidine 4% Liquid 1 Application(s) Topical <User Schedule>  dextrose 5%. 1000 milliLiter(s) IV Continuous <Continuous>  dextrose 5%. 1000 milliLiter(s) IV Continuous <Continuous>  dextrose 50% Injectable 12.5 Gram(s) IV Push once  dextrose 50% Injectable 25 Gram(s) IV Push once  dextrose 50% Injectable 25 Gram(s) IV Push once  furosemide   Injectable 40 milliGRAM(s) IV Push two times a day  guaiFENesin  milliGRAM(s) Oral every 12 hours  heparin  Injectable 5000 Unit(s) SubCutaneous every 8 hours  insulin glargine Injectable (LANTUS) 10 Unit(s) SubCutaneous at bedtime  insulin lispro Injectable (HumaLOG) 3 Unit(s) SubCutaneous three times a day before meals  metoprolol tartrate 25 milliGRAM(s) Oral three times a day  nystatin Powder 1 Application(s) Topical three times a day  pantoprazole  Injectable 40 milliGRAM(s) IV Push daily  tamsulosin 0.4 milliGRAM(s) Oral at bedtime    PRN Inpatient Medications  acetaminophen   Tablet .. 650 milliGRAM(s) Oral every 6 hours PRN  ALBUTerol/ipratropium for Nebulization 3 milliLiter(s) Nebulizer every 6 hours PRN  dextrose 40% Gel 15 Gram(s) Oral once PRN  glucagon  Injectable 1 milliGRAM(s) IntraMuscular once PRN      VITALS/PHYSICAL EXAM  --------------------------------------------------------------------------------  T(C): 36.7 (02-23-19 @ 12:00), Max: 37.3 (02-22-19 @ 20:00)  HR: 84 (02-23-19 @ 12:00) (74 - 100)  BP: 135/70 (02-23-19 @ 12:00) (111/69 - 149/72)  RR: 19 (02-23-19 @ 12:00) (14 - 23)  SpO2: 96% (02-23-19 @ 12:00) (85% - 100%)    02-22-19 @ 07:01  -  02-23-19 @ 07:00  --------------------------------------------------------  IN: 1750 mL / OUT: 2590 mL / NET: -840 mL    02-23-19 @ 07:01  -  02-23-19 @ 13:27  --------------------------------------------------------  IN: 637.5 mL / OUT: 825 mL / NET: -187.5 mL    Physical Exam:  	Gen: NAD  	Pulm: CTA B/L  	CV: RRR, S1S2  	Abd: +BS, soft, nontender/nondistended  	: No suprapubic tenderness  	LE: Warm,  edema    LABS/STUDIES  --------------------------------------------------------------------------------              10.1   9.65  >-----------<  186      [02-23-19 @ 05:22]              34.4     154  |  106  |  62  ----------------------------<  163      [02-23-19 @ 05:22]  3.4   |  31  |  2.0        Ca     8.0     [02-23-19 @ 05:22]      Mg     2.1     [02-23-19 @ 05:22]      Phos  4.7     [02-21-19 @ 23:20]    TPro  5.9  /  Alb  3.3  /  TBili  0.6  /  DBili  x   /  AST  15  /  ALT  9   /  AlkPhos  74  [02-23-19 @ 05:22]    Creatinine Trend:  SCr 2.0 [02-23 @ 05:22]  SCr 2.1 [02-22 @ 20:21]  SCr 2.3 [02-22 @ 05:22]  SCr 2.3 [02-21 @ 23:20]  SCr 2.5 [02-21 @ 05:44]    Urinalysis - [02-18-19 @ 06:28]      Color Red / Appearance Turbid / SG 1.020 / pH 5.5      Gluc Negative / Ketone 15  / Bili Large / Urobili 1.0       Blood Large / Protein 100 / Leuk Est Large / Nitrite Positive      RBC >50 / WBC  / Hyaline  / Gran  / Sq Epi  / Non Sq Epi  / Bacteria     Urine Creatinine 72      [02-18-19 @ 23:00]  Urine Protein 79      [02-18-19 @ 23:00]  Urine Sodium 39.0      [02-18-19 @ 23:00]  Urine Urea Nitrogen 526      [02-18-19 @ 06:28]  Urine Osmolality 369      [02-18-19 @ 23:00]    PTH -- (Ca 7.7)      [02-20-19 @ 04:41]   24  PTH -- (Ca 7.5)      [02-17-19 @ 06:37]   37  HbA1c 11.5      [12-04-18 @ 07:33]  TSH 0.34      [12-05-18 @ 09:27]      C3 Complement 96      [02-17-19 @ 06:37]  C4 Complement 40      [02-17-19 @ 06:37]  Free Light Chains: kappa 3.33, lambda 12.51, ratio = 0.27      [02-17 @ 06:37]  Immunofixation Serum:   No Monoclonal Band Identified    Reference Range: None Detected      [02-17-19 @ 06:37]  SPEP Interpretation: Normal Electrophoresis Pattern      [02-17-19 @ 06:37]

## 2019-02-23 NOTE — PROGRESS NOTE ADULT - ASSESSMENT
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

## 2019-02-23 NOTE — PROGRESS NOTE ADULT - SUBJECTIVE AND OBJECTIVE BOX
Patient is a 83y old Male who presents with a chief complaint of Recurrent falls  TOBIAS (22 Feb 2019 06:42)    Currently admitted to medicine with the primary diagnosis of TOBIAS (acute kidney injury)    Today is hospital day 8d.    BRIEF HOSPITAL COURSE:   82 yo M PMHx of DMII, HTN,  Hydrocephalus s/p  shunt, Afib not on AC (multiple falls) presents to our ED with c/o mechanical fall. According to patient he was trying to walk with a walker and slipped on the floor, no nausea, vomiting, dizziness, chest pain, palpitations, seizure like episode, no LOC. He had 4 falls in last 10 days as per family. He was recently discharged from the hospital Dec for dizziness with stress test and  shunt assessment that was normal. At baseline he has home aid, takes his medications and walks very minimal with the help of cane or walker. However for last few months he has had dec ambulation and worsening LE edema. As per family, also worsening lethargy. Trauma work-up neg (age unknown L2 fx)    In the ED BP 83/45, HR 78/min, Sao2 100% on NC, afebrile. He was given 2L RL and his repeat BP was 110/80. Initial CXR only shows cardiomegaly without lobar consolidation. Patient later desatted to 85% on 3L NC so he was placed on BIPAP. Repeated CXR shows new consolidation at right lung base, suspecting PNA, so patient was started on Rocephin and Azithromycin. Labs showed TOBIAS with Cr 3.2 (baseline 2) and dec UOP. Pt also noted to be on NSAIDs and ACE, Actos so likely TOBIAS 2/2 dehydration and meds. On 2/18 pt was unable to tolerate NC and moved to BIPAP with ABG showing acute hypoxic resp failure and hypercapnia (likely chronic?). CXR at this time showed worsening pulm vascular congestion. After diuresis/BIPAP, pt became lethargic so intubated for protection. Admitted to CCU, barragan noted to be out-of-place (likely pt pulled out) so advanced with a sharp increase in out-put. CXR and kidney showed improvement after aggressive diuresis. Cardiac monitor significant for high # of PVC/short runs of NSVT (seen when first arrived as well) On 2/21 pt SELF-EXTUBATED, able to transition to BIPAP without difficulty.     on 2/22: At 8:45pm pt SELF-EXTUBATED. Transitioned ot BIPAP without difficulty. Able to remain off intubation into AM.   Cardiac monitor significant for high #s of PVC and small runs of NSVT. Overnight ABG showed K 3.1, pt got 40meq IV K.-Alert and responsive, denying SOB.     EVENTS LAST 24HRS:  patient is doing  well - he denies any shortness of breath  he is interactive and in am was saturating 96% on room air   otherwise no active complaints - yesterday had on and off the bipap 4hrs on and off  he didn't pass speech and swallow trial     PAST MEDICAL & SURGICAL HISTORY  Hypertension  Hydrocephalus  CKD (chronic kidney disease)  DM (diabetes mellitus)  Afib  History of prostate surgery      SOCIAL HISTORY:      REVIEW OF SYSTEMS:  See HPI    ALLERGIES:    MEDICATIONS:  MEDICATIONS  (STANDING):  atorvastatin 20 milliGRAM(s) Oral at bedtime  calcium carbonate 1250 mG  + Vitamin D (OsCal 500 + D) 1 Tablet(s) Oral three times a day  chlorhexidine 0.12% Liquid 15 milliLiter(s) Oral Mucosa every 12 hours  chlorhexidine 4% Liquid 1 Application(s) Topical <User Schedule>  dextrose 5%. 1000 milliLiter(s) (50 mL/Hr) IV Continuous <Continuous>  dextrose 5%. 1000 milliLiter(s) (200 mL/Hr) IV Continuous <Continuous>  dextrose 50% Injectable 12.5 Gram(s) IV Push once  dextrose 50% Injectable 25 Gram(s) IV Push once  dextrose 50% Injectable 25 Gram(s) IV Push once  furosemide   Injectable 40 milliGRAM(s) IV Push two times a day  guaiFENesin  milliGRAM(s) Oral every 12 hours  heparin  Injectable 5000 Unit(s) SubCutaneous every 8 hours  insulin glargine Injectable (LANTUS) 10 Unit(s) SubCutaneous at bedtime  insulin lispro Injectable (HumaLOG) 3 Unit(s) SubCutaneous three times a day before meals  metoprolol tartrate 25 milliGRAM(s) Oral three times a day  nystatin Powder 1 Application(s) Topical three times a day  pantoprazole  Injectable 40 milliGRAM(s) IV Push daily  tamsulosin 0.4 milliGRAM(s) Oral at bedtime    MEDICATIONS  (PRN):  acetaminophen   Tablet .. 650 milliGRAM(s) Oral every 6 hours PRN Moderate Pain (4 - 6)  ALBUTerol/ipratropium for Nebulization 3 milliLiter(s) Nebulizer every 6 hours PRN Shortness of Breath and/or Wheezing  dextrose 40% Gel 15 Gram(s) Oral once PRN Blood Glucose LESS THAN 70 milliGRAM(s)/deciliter  glucagon  Injectable 1 milliGRAM(s) IntraMuscular once PRN Glucose LESS THAN 70 milligrams/deciliter    VITALS:   ICU Vital Signs Last 24 Hrs  T(C): 36.7 (23 Feb 2019 12:00), Max: 37.3 (22 Feb 2019 20:00)  T(F): 98 (23 Feb 2019 12:00), Max: 99.1 (22 Feb 2019 20:00)  HR: 80 (23 Feb 2019 14:00) (74 - 100)  BP: 140/81 (23 Feb 2019 14:00) (111/69 - 149/72)  BP(mean): 108 (23 Feb 2019 14:00) (73 - 110)  ABP: --  ABP(mean): --  RR: 17 (23 Feb 2019 14:00) (14 - 23)  SpO2: 96% (23 Feb 2019 14:00) (85% - 100%)      ABG - ( 22 Feb 2019 06:36 )  pH, Arterial: 7.48  pH, Blood: x     /  pCO2: 48    /  pO2: 65    / HCO3: 36    / Base Excess: 11.5  /  SaO2: 94          22 Feb 2019 07:01  -  23 Feb 2019 07:00  --------------------------------------------------------  IN:    dextrose 5%.: 1750 mL  Total IN: 1750 mL    OUT:    Indwelling Catheter - Urethral: 2590 mL  Total OUT: 2590 mL    Total NET: -840 mL      23 Feb 2019 07:01  -  23 Feb 2019 15:28  --------------------------------------------------------  IN:    dextrose 5%.: 300 mL    dextrose 5%.: 737.5 mL  Total IN: 1037.5 mL    OUT:    Indwelling Catheter - Urethral: 1125 mL  Total OUT: 1125 mL    Total NET: -87.5 mL            LABS:                        10.1   9.65  )-----------( 186      ( 23 Feb 2019 05:22 )             34.4   02-23    154<H>  |  106  |  62<HH>  ----------------------------<  163<H>  3.4<L>   |  31  |  2.0<H>    Ca    8.0<L>      23 Feb 2019 05:22  Phos  4.7     02-21  Mg     2.1     02-23    TPro  5.9<L>  /  Alb  3.3<L>  /  TBili  0.6  /  DBili  x   /  AST  15  /  ALT  9   /  AlkPhos  74  02-23      CAPILLARY BLOOD GLUCOSE  221 (21 Feb 2019 16:00)      POCT Blood Glucose.: 234 mg/dL (21 Feb 2019 21:44)        CULTURES:  Culture Results:   Normal Respiratory Marilee present (02-18 @ 19:35)  Culture Results:   No growth (02-18 @ 06:28)  Culture Results:   No growth (02-16 @ 11:00)      PHYSICAL EXAM:    General: No acute distress.  on room air - awake alert and interactive  HEENT: Pupils equal, reactive to light symmetrically   PULM: Clear to auscultation bilaterally on anterior auscultation - no wheezing  CVS: Irregular rate and rhythm, no tachycardia  ABD: Obese, soft - distended  nontender  EXT: 1+ pitting on top of diffuse non-putting edema to mid shin with small ulcerated sores noted; some ulcerated sores noted over arms b/l as well   SKIN: Warm and well perfused, bronze discoloration of b/l legs noted    RADIOLOGY:   < from: Transthoracic Echocardiogram (02.16.19 @ 13:35) >    Summary:   1. Normal left ventricular internal cavity size. 55% EF   2. Mildly enlarged left atrium.   3. Normal right atrial size.   4. There is no evidence of pericardial effusion.   5. Thickening and calcification of the anterior and posterior mitral   valve leaflets.   6. Moderate tricuspid regurgitation.   7. Pulmonic valve regurgitation.    < end of copied text >

## 2019-02-23 NOTE — PROGRESS NOTE ADULT - ASSESSMENT
E prescribed med to their pharmacy. Call pt. And inform. # Acute hypoxic respiratory failure 2/2 Aspiration PNA vs pulmonary edema  -saturating well on room air    SELF EXTUBATE 2/21  - ECHO 2/16: normal EF  - ID recs appreciated - hold abx, no consolidation on CT scan  -hold lasix in pm as per pulmonary team - pt is NPO failed speech and swallow   - CT chest done (no final read yet) R >> L effusion , possible R consolidation underlying    #PVC on cardiac monitoring // Hx of Afib // Troponinemia on admit (likely 2/2 type II demand)  - CHADsVASC 4 (Age, HTN, DM); no AC 2/2 falls  - Cardio recs (Tamburrino): c/w BB 25 BID   - if need to give PO lopressor crush it and mix with applesauce; after each teaspoon cue pt to swallow 4 times - now HR controlled     # TOBIAS likely acute tubular injury, improving  - baseline Cr ~2, now 2.0 at baseline   - Nephro on board: watch BP, keep free water  - Avoid NSAIDs for now- consider resuming ACEi on dc since Cr at baseline     # Hypernatremia  2.3L deficit and 154 Na today  - increased D5W 200cc/hr    # Acute UTI with hematuria (possibly also from traumatic barragan)  - follow up with urology on barragan discontinuation     # Recurrent falls  - Likely multifactorial, physical deconditioning, medication induced hypotension, and peripheral neuropathy  - Trauma work up negative for acute fracture, but show age-indeterminate compression fracture of L2    # HTN,controlled today   -keep same     # DM II:   - Hga1C pending (11.5 in 12/5/18)  - Start basal/bolus 10 and 3/3/3  - Monitor FS    #  shunt:   - Recently evaluated by Dr. Wayne 12/6/18, deemed to be chronically in place  - not manipulated in 10yrs, follows in [facility], non-programmable   - No signs of hydrocephalus in CT done on this admit    #MISC:  - DVT ppx: SubQ heparin  - GI ppx: PPI  - Diet: NPO, failed speech and swallow - reassess   - Activity: increase as tolerated  - Code status: Full code, family aware of poor prognosis, they want to proceed with intubation, but will decided on other aggressive measures such as HD as the situation arises # Acute hypoxic respiratory failure 2/2 Aspiration PNA vs pulmonary edema  -saturating well on room air    SELF EXTUBATE 2/21  - ECHO 2/16: normal EF  - ID recs appreciated - hold abx, no consolidation on CT scan  -hold lasix in pm as per pulmonary team - pt is NPO failed speech and swallow   - CT chest done (no final read yet) R >> L effusion , possible R consolidation underlying    #PVC on cardiac monitoring // Hx of Afib // Troponinemia on admit (likely 2/2 type II demand)  - CHADsVASC 4 (Age, HTN, DM); no AC 2/2 falls  - Cardio recs (Tamburrino): c/w BB 25 BID   - if need to give PO lopressor crush it and mix with applesauce; after each teaspoon cue pt to swallow 4 times - now HR controlled     # TOBIAS likely acute tubular injury, improving  - baseline Cr ~2, now 2.0 at baseline   - Nephro on board: watch BP, keep free water  - Avoid NSAIDs for now- consider resuming ACEi on dc since Cr at baseline     # Hypernatremia  2.3L deficit and 154 Na today  - increased D5W 200cc/hr check every 6 hrs and correct accordingly     # Acute UTI with hematuria (possibly also from traumatic barragan)  - follow up with urology on barragan discontinuation     # Recurrent falls  - Likely multifactorial, physical deconditioning, medication induced hypotension, and peripheral neuropathy  - Trauma work up negative for acute fracture, but show age-indeterminate compression fracture of L2    # HTN,controlled today   -keep same     # DM II:   - Hga1C pending (11.5 in 12/5/18)  - Start basal/bolus 10 and 3/3/3  - Monitor FS    #  shunt:   - Recently evaluated by Dr. Wayne 12/6/18, deemed to be chronically in place  - not manipulated in 10yrs, follows in [facility], non-programmable   - No signs of hydrocephalus in CT done on this admit    #MISC:  - DVT ppx: SubQ heparin  - GI ppx: PPI  - Diet: NPO, failed speech and swallow - reassess   - Activity: increase as tolerated  - Code status: Full code, family aware of poor prognosis, they want to proceed with intubation, but will decided on other aggressive measures such as HD as the situation arises

## 2019-02-23 NOTE — PROGRESS NOTE ADULT - ASSESSMENT
IMPRESSION:    Acute hypoxemic respiratory failure resolved  Aspiration pneumonia / pulmonary edema improved   Afib    PLAN:    CNS: no depressants     HEENT: Oral care    PULMONARY:  HOB @ 45 degrees.  biPAP during sleep     CARDIOVASCULAR:  Rate Control.  I=O.       GI: GI prophylaxis.  NPO.  FU speech and swallow     RENAL:  Follow up lytes.  Correct as needed.  D5W 200 cc per hour.  Repeat lytes in 4 hours and adjust accordingly     INFECTIOUS DISEASE: Follow up cultures    HEMATOLOGICAL:  DVT prophylaxis.    ENDOCRINE:  Follow up FS.      MUSCULOSKELETAL:    PT Rehab eval.      TREY Aguirre per Urology

## 2019-02-23 NOTE — PROGRESS NOTE ADULT - SUBJECTIVE AND OBJECTIVE BOX
Patient is a 83y old  Male who presents with a chief complaint of Recurrent falls  TOBIAS (22 Feb 2019 22:52)        Over Night Events: Extubated yesterday.  on NIV on and off.  Now on RA sating 94% .  Off pressors.         ROS:  See HPI    PHYSICAL EXAM    ICU Vital Signs Last 24 Hrs  T(C): 36.9 (23 Feb 2019 08:00), Max: 37.3 (22 Feb 2019 20:00)  T(F): 98.4 (23 Feb 2019 08:00), Max: 99.1 (22 Feb 2019 20:00)  HR: 88 (23 Feb 2019 08:00) (74 - 96)  BP: 141/72 (23 Feb 2019 08:00) (111/69 - 149/72)  BP(mean): 91 (23 Feb 2019 08:00) (73 - 110)  ABP: --  ABP(mean): --  RR: 21 (23 Feb 2019 08:00) (14 - 25)  SpO2: 94% (23 Feb 2019 08:00) (94% - 100%)      General: In NAD   HEENT: ALEXYS             Lymphatic system: No cervical LN   Lungs: Bilateral BS  Cardiovascular: Irregular   Gastrointestinal: Soft, Positive BS  Extremities: No clubbing.  Moves extremities.  Full Range of motion   Skin: Warm, intact  Neurological: No motor or sensory deficit       02-22-19 @ 07:01  -  02-23-19 @ 07:00  --------------------------------------------------------  IN:    dextrose 5%.: 1750 mL  Total IN: 1750 mL    OUT:    Indwelling Catheter - Urethral: 2590 mL  Total OUT: 2590 mL    Total NET: -840 mL      02-23-19 @ 07:01  -  02-23-19 @ 10:28  --------------------------------------------------------  IN:    dextrose 5%.: 150 mL  Total IN: 150 mL    OUT:    Indwelling Catheter - Urethral: 225 mL  Total OUT: 225 mL    Total NET: -75 mL          LABS:                            10.1   9.65  )-----------( 186      ( 23 Feb 2019 05:22 )             34.4                                               02-23    154<H>  |  106  |  62<HH>  ----------------------------<  163<H>  3.4<L>   |  31  |  2.0<H>    23 Feb 2019 05:22    154<H>  |  106    |  62<HH>  ----------------------------<  163<H>  3.4<L>   |  31     |  2.0<H>  22 Feb 2019 20:21    152<H>  |  103    |  68<HH>  ----------------------------<  170<H>  3.3<L>   |  32     |  2.1<H>    Ca    8.0<L>      23 Feb 2019 05:22  Ca    7.9<L>      22 Feb 2019 20:21  Phos  4.7       21 Feb 2019 23:20  Mg     2.1       23 Feb 2019 05:22  Mg     2.2       22 Feb 2019 05:22    TPro  5.9<L>  /  Alb  3.3<L>  /  TBili  0.6    /  DBili  x      /  AST  15     /  ALT  9      /  AlkPhos  74     23 Feb 2019 05:22  TPro  5.7<L>  /  Alb  3.2<L>  /  TBili  0.7    /  DBili  x      /  AST  10     /  ALT  7      /  AlkPhos  72     22 Feb 2019 05:22      Ca    8.0<L>      23 Feb 2019 05:22  Phos  4.7     02-21  Mg     2.1     02-23    TPro  5.9<L>  /  Alb  3.3<L>  /  TBili  0.6  /  DBili  x   /  AST  15  /  ALT  9   /  AlkPhos  74  02-23                                                                                           LIVER FUNCTIONS - ( 23 Feb 2019 05:22 )  Alb: 3.3 g/dL / Pro: 5.9 g/dL / ALK PHOS: 74 U/L / ALT: 9 U/L / AST: 15 U/L / GGT: x                                                                                                                                   ABG - ( 22 Feb 2019 06:36 )  pH, Arterial: 7.48  pH, Blood: x     /  pCO2: 48    /  pO2: 65    / HCO3: 36    / Base Excess: 11.5  /  SaO2: 94                  MEDICATIONS  (STANDING):  atorvastatin 20 milliGRAM(s) Oral at bedtime  calcium carbonate 1250 mG  + Vitamin D (OsCal 500 + D) 1 Tablet(s) Oral three times a day  chlorhexidine 0.12% Liquid 15 milliLiter(s) Oral Mucosa every 12 hours  chlorhexidine 4% Liquid 1 Application(s) Topical <User Schedule>  dextrose 5%. 1000 milliLiter(s) (50 mL/Hr) IV Continuous <Continuous>  dextrose 5%. 1000 milliLiter(s) (75 mL/Hr) IV Continuous <Continuous>  dextrose 50% Injectable 12.5 Gram(s) IV Push once  dextrose 50% Injectable 25 Gram(s) IV Push once  dextrose 50% Injectable 25 Gram(s) IV Push once  furosemide   Injectable 40 milliGRAM(s) IV Push two times a day  guaiFENesin  milliGRAM(s) Oral every 12 hours  heparin  Injectable 5000 Unit(s) SubCutaneous every 8 hours  insulin glargine Injectable (LANTUS) 10 Unit(s) SubCutaneous at bedtime  insulin lispro Injectable (HumaLOG) 3 Unit(s) SubCutaneous three times a day before meals  metoprolol tartrate 25 milliGRAM(s) Oral three times a day  nystatin Powder 1 Application(s) Topical three times a day  pantoprazole  Injectable 40 milliGRAM(s) IV Push daily  tamsulosin 0.4 milliGRAM(s) Oral at bedtime    MEDICATIONS  (PRN):  acetaminophen   Tablet .. 650 milliGRAM(s) Oral every 6 hours PRN Moderate Pain (4 - 6)  ALBUTerol/ipratropium for Nebulization 3 milliLiter(s) Nebulizer every 6 hours PRN Shortness of Breath and/or Wheezing  dextrose 40% Gel 15 Gram(s) Oral once PRN Blood Glucose LESS THAN 70 milliGRAM(s)/deciliter  glucagon  Injectable 1 milliGRAM(s) IntraMuscular once PRN Glucose LESS THAN 70 milligrams/deciliter      Xrays:                   Bilateral small effusions                                                                  ECHO

## 2019-02-24 LAB
ALBUMIN SERPL ELPH-MCNC: 3.4 G/DL — LOW (ref 3.5–5.2)
ALP SERPL-CCNC: 72 U/L — SIGNIFICANT CHANGE UP (ref 30–115)
ALT FLD-CCNC: 10 U/L — SIGNIFICANT CHANGE UP (ref 0–41)
ANION GAP SERPL CALC-SCNC: 15 MMOL/L — HIGH (ref 7–14)
ANION GAP SERPL CALC-SCNC: 15 MMOL/L — HIGH (ref 7–14)
ANION GAP SERPL CALC-SCNC: 16 MMOL/L — HIGH (ref 7–14)
AST SERPL-CCNC: 13 U/L — SIGNIFICANT CHANGE UP (ref 0–41)
BILIRUB SERPL-MCNC: 0.7 MG/DL — SIGNIFICANT CHANGE UP (ref 0.2–1.2)
BUN SERPL-MCNC: 43 MG/DL — HIGH (ref 10–20)
BUN SERPL-MCNC: 48 MG/DL — HIGH (ref 10–20)
BUN SERPL-MCNC: 50 MG/DL — HIGH (ref 10–20)
CALCIUM SERPL-MCNC: 8 MG/DL — LOW (ref 8.5–10.1)
CALCIUM SERPL-MCNC: 8.1 MG/DL — LOW (ref 8.5–10.1)
CALCIUM SERPL-MCNC: 8.3 MG/DL — LOW (ref 8.5–10.1)
CHLORIDE SERPL-SCNC: 100 MMOL/L — SIGNIFICANT CHANGE UP (ref 98–110)
CHLORIDE SERPL-SCNC: 102 MMOL/L — SIGNIFICANT CHANGE UP (ref 98–110)
CHLORIDE SERPL-SCNC: 102 MMOL/L — SIGNIFICANT CHANGE UP (ref 98–110)
CO2 SERPL-SCNC: 29 MMOL/L — SIGNIFICANT CHANGE UP (ref 17–32)
CO2 SERPL-SCNC: 30 MMOL/L — SIGNIFICANT CHANGE UP (ref 17–32)
CO2 SERPL-SCNC: 31 MMOL/L — SIGNIFICANT CHANGE UP (ref 17–32)
CREAT SERPL-MCNC: 1.7 MG/DL — HIGH (ref 0.7–1.5)
CREAT SERPL-MCNC: 1.8 MG/DL — HIGH (ref 0.7–1.5)
CREAT SERPL-MCNC: 1.8 MG/DL — HIGH (ref 0.7–1.5)
GLUCOSE BLDC GLUCOMTR-MCNC: 125 MG/DL — HIGH (ref 70–99)
GLUCOSE BLDC GLUCOMTR-MCNC: 128 MG/DL — HIGH (ref 70–99)
GLUCOSE BLDC GLUCOMTR-MCNC: 143 MG/DL — HIGH (ref 70–99)
GLUCOSE BLDC GLUCOMTR-MCNC: 168 MG/DL — HIGH (ref 70–99)
GLUCOSE SERPL-MCNC: 142 MG/DL — HIGH (ref 70–99)
GLUCOSE SERPL-MCNC: 214 MG/DL — HIGH (ref 70–99)
GLUCOSE SERPL-MCNC: 221 MG/DL — HIGH (ref 70–99)
HCT VFR BLD CALC: 33.6 % — LOW (ref 42–52)
HGB BLD-MCNC: 10 G/DL — LOW (ref 14–18)
MAGNESIUM SERPL-MCNC: 2.1 MG/DL — SIGNIFICANT CHANGE UP (ref 1.8–2.4)
MCHC RBC-ENTMCNC: 24.2 PG — LOW (ref 27–31)
MCHC RBC-ENTMCNC: 29.8 G/DL — LOW (ref 32–37)
MCV RBC AUTO: 81.4 FL — SIGNIFICANT CHANGE UP (ref 80–94)
NRBC # BLD: 0 /100 WBCS — SIGNIFICANT CHANGE UP (ref 0–0)
PLATELET # BLD AUTO: 194 K/UL — SIGNIFICANT CHANGE UP (ref 130–400)
POTASSIUM SERPL-MCNC: 3.4 MMOL/L — LOW (ref 3.5–5)
POTASSIUM SERPL-MCNC: 3.5 MMOL/L — SIGNIFICANT CHANGE UP (ref 3.5–5)
POTASSIUM SERPL-MCNC: 3.5 MMOL/L — SIGNIFICANT CHANGE UP (ref 3.5–5)
POTASSIUM SERPL-SCNC: 3.4 MMOL/L — LOW (ref 3.5–5)
POTASSIUM SERPL-SCNC: 3.5 MMOL/L — SIGNIFICANT CHANGE UP (ref 3.5–5)
POTASSIUM SERPL-SCNC: 3.5 MMOL/L — SIGNIFICANT CHANGE UP (ref 3.5–5)
PROT SERPL-MCNC: 5.8 G/DL — LOW (ref 6–8)
RBC # BLD: 4.13 M/UL — LOW (ref 4.7–6.1)
RBC # FLD: 15.9 % — HIGH (ref 11.5–14.5)
SODIUM SERPL-SCNC: 144 MMOL/L — SIGNIFICANT CHANGE UP (ref 135–146)
SODIUM SERPL-SCNC: 147 MMOL/L — HIGH (ref 135–146)
SODIUM SERPL-SCNC: 149 MMOL/L — HIGH (ref 135–146)
WBC # BLD: 8.81 K/UL — SIGNIFICANT CHANGE UP (ref 4.8–10.8)
WBC # FLD AUTO: 8.81 K/UL — SIGNIFICANT CHANGE UP (ref 4.8–10.8)

## 2019-02-24 RX ORDER — POTASSIUM CHLORIDE 20 MEQ
20 PACKET (EA) ORAL
Qty: 0 | Refills: 0 | Status: COMPLETED | OUTPATIENT
Start: 2019-02-24 | End: 2019-02-24

## 2019-02-24 RX ORDER — SODIUM CHLORIDE 9 MG/ML
1000 INJECTION, SOLUTION INTRAVENOUS
Qty: 0 | Refills: 0 | Status: DISCONTINUED | OUTPATIENT
Start: 2019-02-24 | End: 2019-02-25

## 2019-02-24 RX ADMIN — Medication 50 MILLIEQUIVALENT(S): at 10:43

## 2019-02-24 RX ADMIN — Medication 40 MILLIGRAM(S): at 06:44

## 2019-02-24 RX ADMIN — PANTOPRAZOLE SODIUM 40 MILLIGRAM(S): 20 TABLET, DELAYED RELEASE ORAL at 12:09

## 2019-02-24 RX ADMIN — NYSTATIN CREAM 1 APPLICATION(S): 100000 CREAM TOPICAL at 06:45

## 2019-02-24 RX ADMIN — Medication 25 MILLIGRAM(S): at 13:13

## 2019-02-24 RX ADMIN — ATORVASTATIN CALCIUM 20 MILLIGRAM(S): 80 TABLET, FILM COATED ORAL at 21:47

## 2019-02-24 RX ADMIN — NYSTATIN CREAM 1 APPLICATION(S): 100000 CREAM TOPICAL at 13:14

## 2019-02-24 RX ADMIN — INSULIN GLARGINE 10 UNIT(S): 100 INJECTION, SOLUTION SUBCUTANEOUS at 21:48

## 2019-02-24 RX ADMIN — CHLORHEXIDINE GLUCONATE 1 APPLICATION(S): 213 SOLUTION TOPICAL at 08:21

## 2019-02-24 RX ADMIN — Medication 40 MILLIGRAM(S): at 17:12

## 2019-02-24 RX ADMIN — Medication 600 MILLIGRAM(S): at 17:12

## 2019-02-24 RX ADMIN — Medication 600 MILLIGRAM(S): at 06:44

## 2019-02-24 RX ADMIN — HEPARIN SODIUM 5000 UNIT(S): 5000 INJECTION INTRAVENOUS; SUBCUTANEOUS at 13:13

## 2019-02-24 RX ADMIN — NYSTATIN CREAM 1 APPLICATION(S): 100000 CREAM TOPICAL at 21:48

## 2019-02-24 RX ADMIN — Medication 1 TABLET(S): at 06:44

## 2019-02-24 RX ADMIN — Medication 50 MILLIEQUIVALENT(S): at 08:20

## 2019-02-24 RX ADMIN — TAMSULOSIN HYDROCHLORIDE 0.4 MILLIGRAM(S): 0.4 CAPSULE ORAL at 21:47

## 2019-02-24 RX ADMIN — Medication 25 MILLIGRAM(S): at 21:47

## 2019-02-24 RX ADMIN — Medication 1 TABLET(S): at 21:47

## 2019-02-24 RX ADMIN — HEPARIN SODIUM 5000 UNIT(S): 5000 INJECTION INTRAVENOUS; SUBCUTANEOUS at 21:47

## 2019-02-24 RX ADMIN — Medication 1 TABLET(S): at 13:13

## 2019-02-24 RX ADMIN — Medication 25 MILLIGRAM(S): at 06:44

## 2019-02-24 RX ADMIN — HEPARIN SODIUM 5000 UNIT(S): 5000 INJECTION INTRAVENOUS; SUBCUTANEOUS at 06:45

## 2019-02-24 NOTE — PROGRESS NOTE ADULT - SUBJECTIVE AND OBJECTIVE BOX
Patient is a 83y old Male who presents with a chief complaint of Recurrent falls  TOBIAS (23 Feb 2019 15:23)    Currently admitted to medicine with the primary diagnosis of TOBIAS (acute kidney injury)    Today is hospital day 9d.    BRIEF HOSPITAL COURSE:   84 yo M PMHx of DMII, HTN,  Hydrocephalus s/p  shunt, Afib not on AC (multiple falls) presents to our ED with c/o mechanical fall. According to patient he was trying to walk with a walker and slipped on the floor, no nausea, vomiting, dizziness, chest pain, palpitations, seizure like episode, no LOC. He had 4 falls in last 10 days as per family. He was recently discharged from the hospital Dec for dizziness with stress test and  shunt assessment that was normal. At baseline he has home aid, takes his medications and walks very minimal with the help of cane or walker. However for last few months he has had dec ambulation and worsening LE edema. As per family, also worsening lethargy. Trauma work-up neg (age unknown L2 fx)    In the ED BP 83/45, HR 78/min, Sao2 100% on NC, afebrile. He was given 2L RL and his repeat BP was 110/80. Initial CXR only shows cardiomegaly without lobar consolidation. Patient later desatted to 85% on 3L NC so he was placed on BIPAP. Repeated CXR shows new consolidation at right lung base, suspecting PNA, so patient was started on Rocephin and Azithromycin. Labs showed TOBIAS with Cr 3.2 (baseline 2) and dec UOP. Pt also noted to be on NSAIDs and ACE, Actos so likely TOBIAS 2/2 dehydration and meds. On 2/18 pt was unable to tolerate NC and moved to BIPAP with ABG showing acute hypoxic resp failure and hypercapnia (likely chronic?). CXR at this time showed worsening pulm vascular congestion. After diuresis/BIPAP, pt became lethargic so intubated for protection. Admitted to CCU, barragan noted to be out-of-place (likely pt pulled out) so advanced with a sharp increase in out-put. CXR and kidney showed improvement after aggressive diuresis. Cardiac monitor significant for high # of PVC/short runs of NSVT (seen when first arrived as well) On 2/21 pt SELF-EXTUBATED, able to transition to BIPAP without difficulty.     EVENTS LAST 24HRS:   Still having PVC and small runs of NSVT on monitor. Otherwise, no NGT (pt not really compliant) - NPO as pt failed initial FEES assessment.   Otherwise sat well on BIPAP overnight. Tolerating mask.  Pt UOP declined in PM, got another dose of IV lasix 40 in PM with good out-put after.    PAST MEDICAL & SURGICAL HISTORY  Hypertension  Hydrocephalus  CKD (chronic kidney disease)  DM (diabetes mellitus)  Afib  History of prostate surgery      SOCIAL HISTORY:      REVIEW OF SYSTEMS:  See HPI    ALLERGIES:    MEDICATIONS:  STANDING MEDICATIONS  atorvastatin 20 milliGRAM(s) Oral at bedtime  calcium carbonate 1250 mG  + Vitamin D (OsCal 500 + D) 1 Tablet(s) Oral three times a day  chlorhexidine 4% Liquid 1 Application(s) Topical <User Schedule>  dextrose 5%. 1000 milliLiter(s) IV Continuous <Continuous>  dextrose 5%. 1000 milliLiter(s) IV Continuous <Continuous>  dextrose 50% Injectable 12.5 Gram(s) IV Push once  dextrose 50% Injectable 25 Gram(s) IV Push once  dextrose 50% Injectable 25 Gram(s) IV Push once  furosemide   Injectable 40 milliGRAM(s) IV Push two times a day  guaiFENesin  milliGRAM(s) Oral every 12 hours  heparin  Injectable 5000 Unit(s) SubCutaneous every 8 hours  insulin glargine Injectable (LANTUS) 10 Unit(s) SubCutaneous at bedtime  insulin lispro Injectable (HumaLOG) 3 Unit(s) SubCutaneous three times a day before meals  metoprolol tartrate 25 milliGRAM(s) Oral three times a day  nystatin Powder 1 Application(s) Topical three times a day  pantoprazole  Injectable 40 milliGRAM(s) IV Push daily  tamsulosin 0.4 milliGRAM(s) Oral at bedtime    PRN MEDICATIONS  acetaminophen   Tablet .. 650 milliGRAM(s) Oral every 6 hours PRN  ALBUTerol/ipratropium for Nebulization 3 milliLiter(s) Nebulizer every 6 hours PRN  dextrose 40% Gel 15 Gram(s) Oral once PRN  glucagon  Injectable 1 milliGRAM(s) IntraMuscular once PRN    VITALS:         ICU Vital Signs Last 24 Hrs:    T(C): 36.7 (23 Feb 2019 20:00), Max: 37.2 (23 Feb 2019 04:00)  T(F): 98.1 (23 Feb 2019 20:00), Max: 98.9 (23 Feb 2019 04:00)  HR: 80 (23 Feb 2019 22:50) (74 - 106)  BP: 121/67 (23 Feb 2019 22:50) (111/69 - 144/69)  BP(mean): 103 (23 Feb 2019 22:50) (73 - 108)  ABP: --  ABP(mean): --  RR: 19 (23 Feb 2019 22:50) (14 - 34)  SpO2: 94% (23 Feb 2019 22:50) (85% - 100%)      ABG - ( 22 Feb 2019 06:36 )  pH, Arterial: 7.48  pH, Blood: x     /  pCO2: 48    /  pO2: 65    / HCO3: 36    / Base Excess: 11.5  /  SaO2: 94                  I&O's Detail:      22 Feb 2019 07:01  -  23 Feb 2019 07:00  --------------------------------------------------------  IN:    dextrose 5%.: 1750 mL  Total IN: 1750 mL    OUT:    Indwelling Catheter - Urethral: 2590 mL  Total OUT: 2590 mL    Total NET: -840 mL      23 Feb 2019 07:01  -  24 Feb 2019 00:24  --------------------------------------------------------  IN:    dextrose 5%.: 300 mL    dextrose 5%.: 2337.5 mL  Total IN: 2637.5 mL    OUT:    Indwelling Catheter - Urethral: 1720 mL  Total OUT: 1720 mL    Total NET: 917.5 mL          LABS:                        10.1   9.65  )-----------( 186      ( 23 Feb 2019 05:22 )             34.4     02-23    151<H>  |  104  |  54<H>  ----------------------------<  202<H>  3.5   |  31  |  1.8<H>    Ca    8.2<L>      23 Feb 2019 17:36  Mg     2.1     02-23    TPro  5.9<L>  /  Alb  3.3<L>  /  TBili  0.6  /  DBili  x   /  AST  15  /  ALT  9   /  AlkPhos  74  02-23          CAPILLARY BLOOD GLUCOSE  201 (22 Feb 2019 17:00)      POCT Blood Glucose.: 192 mg/dL (23 Feb 2019 21:24)        CULTURES:  Culture Results:   Normal Respiratory Marilee present (02-18 @ 19:35)  Culture Results:   No growth (02-18 @ 06:28)      PHYSICAL EXAM:    General: No acute distress.  on BIPAP overnight;  awake alert and interactive  HEENT: Pupils equal, reactive to light symmetrically, dry oral mucosa, NPO except for meds  PULM: Clear to auscultation bilaterally on anterior auscultation - no wheezing (limited exam but habitus)  CVS: Irregular rate and rhythm, (monitor shows PVC/small runs NSVT), no murmurs appreciated  ABD: Obese, mildly tense, distended, nontender, tympanic  EXT: 1+ pitting on top of diffuse non-putting edema to mid shin with small ulcerated sores noted; some ulcerated sores noted over arms b/l as well   SKIN: bronze discoloration of b/l legs noted    RADIOLOGY: Patient is a 83y old Male who presents with a chief complaint of Recurrent falls  TOBIAS (23 Feb 2019 15:23)    Currently admitted to medicine with the primary diagnosis of TOBIAS (acute kidney injury)    Today is hospital day 9d.    BRIEF HOSPITAL COURSE:   82 yo M PMHx of DMII, HTN,  Hydrocephalus s/p  shunt, Afib not on AC (multiple falls) presents to our ED with c/o mechanical fall. According to patient he was trying to walk with a walker and slipped on the floor, no nausea, vomiting, dizziness, chest pain, palpitations, seizure like episode, no LOC. He had 4 falls in last 10 days as per family. He was recently discharged from the hospital Dec for dizziness with stress test and  shunt assessment that was normal. At baseline he has home aid, takes his medications and walks very minimal with the help of cane or walker. However for last few months he has had dec ambulation and worsening LE edema. As per family, also worsening lethargy. Trauma work-up neg (age unknown L2 fx)    In the ED BP 83/45, HR 78/min, Sao2 100% on NC, afebrile. He was given 2L RL and his repeat BP was 110/80. Initial CXR only shows cardiomegaly without lobar consolidation. Patient later desatted to 85% on 3L NC so he was placed on BIPAP. Repeated CXR shows new consolidation at right lung base, suspecting PNA, so patient was started on Rocephin and Azithromycin. Labs showed TOBIAS with Cr 3.2 (baseline 2) and dec UOP. Pt also noted to be on NSAIDs and ACE, Actos so likely TOBIAS 2/2 dehydration and meds. On 2/18 pt was unable to tolerate NC and moved to BIPAP with ABG showing acute hypoxic resp failure and hypercapnia (likely chronic?). CXR at this time showed worsening pulm vascular congestion. After diuresis/BIPAP, pt became lethargic so intubated for protection. Admitted to CCU, barragan noted to be out-of-place (likely pt pulled out) so advanced with a sharp increase in out-put. CXR and kidney showed improvement after aggressive diuresis. Cardiac monitor significant for high # of PVC/short runs of NSVT (seen when first arrived as well) On 2/21 pt SELF-EXTUBATED, able to transition to BIPAP without difficulty.     EVENTS LAST 24HRS:   Still having PVC and small runs of NSVT on monitor. Otherwise, no NGT (pt not really compliant) - NPO as pt failed initial FEES assessment.   Otherwise sat well on BIPAP overnight. Tolerating mask.  Pt UOP declined in PM, got another dose of IV lasix 40 in PM with good out-put after.     PAST MEDICAL & SURGICAL HISTORY  Hypertension  Hydrocephalus  CKD (chronic kidney disease)  DM (diabetes mellitus)  Afib  History of prostate surgery      SOCIAL HISTORY:      REVIEW OF SYSTEMS:  See HPI    ALLERGIES:    MEDICATIONS:  STANDING MEDICATIONS  atorvastatin 20 milliGRAM(s) Oral at bedtime  calcium carbonate 1250 mG  + Vitamin D (OsCal 500 + D) 1 Tablet(s) Oral three times a day  chlorhexidine 4% Liquid 1 Application(s) Topical <User Schedule>  dextrose 5%. 1000 milliLiter(s) IV Continuous <Continuous>  dextrose 5%. 1000 milliLiter(s) IV Continuous <Continuous>  dextrose 50% Injectable 12.5 Gram(s) IV Push once  dextrose 50% Injectable 25 Gram(s) IV Push once  dextrose 50% Injectable 25 Gram(s) IV Push once  furosemide   Injectable 40 milliGRAM(s) IV Push two times a day  guaiFENesin  milliGRAM(s) Oral every 12 hours  heparin  Injectable 5000 Unit(s) SubCutaneous every 8 hours  insulin glargine Injectable (LANTUS) 10 Unit(s) SubCutaneous at bedtime  insulin lispro Injectable (HumaLOG) 3 Unit(s) SubCutaneous three times a day before meals  metoprolol tartrate 25 milliGRAM(s) Oral three times a day  nystatin Powder 1 Application(s) Topical three times a day  pantoprazole  Injectable 40 milliGRAM(s) IV Push daily  tamsulosin 0.4 milliGRAM(s) Oral at bedtime    PRN MEDICATIONS  acetaminophen   Tablet .. 650 milliGRAM(s) Oral every 6 hours PRN  ALBUTerol/ipratropium for Nebulization 3 milliLiter(s) Nebulizer every 6 hours PRN  dextrose 40% Gel 15 Gram(s) Oral once PRN  glucagon  Injectable 1 milliGRAM(s) IntraMuscular once PRN    VITALS:         ICU Vital Signs Last 24 Hrs:    T(C): 36.7 (23 Feb 2019 20:00), Max: 37.2 (23 Feb 2019 04:00)  T(F): 98.1 (23 Feb 2019 20:00), Max: 98.9 (23 Feb 2019 04:00)  HR: 80 (23 Feb 2019 22:50) (74 - 106)  BP: 121/67 (23 Feb 2019 22:50) (111/69 - 144/69)  BP(mean): 103 (23 Feb 2019 22:50) (73 - 108)  ABP: --  ABP(mean): --  RR: 19 (23 Feb 2019 22:50) (14 - 34)  SpO2: 94% (23 Feb 2019 22:50) (85% - 100%)      ABG - ( 22 Feb 2019 06:36 )  pH, Arterial: 7.48  pH, Blood: x     /  pCO2: 48    /  pO2: 65    / HCO3: 36    / Base Excess: 11.5  /  SaO2: 94                  I&O's Detail:      22 Feb 2019 07:01  -  23 Feb 2019 07:00  --------------------------------------------------------  IN:    dextrose 5%.: 1750 mL  Total IN: 1750 mL    OUT:    Indwelling Catheter - Urethral: 2590 mL  Total OUT: 2590 mL    Total NET: -840 mL      23 Feb 2019 07:01  -  24 Feb 2019 00:24  --------------------------------------------------------  IN:    dextrose 5%.: 300 mL    dextrose 5%.: 2337.5 mL  Total IN: 2637.5 mL    OUT:    Indwelling Catheter - Urethral: 1720 mL  Total OUT: 1720 mL    Total NET: 917.5 mL          LABS:                        10.1   9.65  )-----------( 186      ( 23 Feb 2019 05:22 )             34.4     02-23    151<H>  |  104  |  54<H>  ----------------------------<  202<H>  3.5   |  31  |  1.8<H>    Ca    8.2<L>      23 Feb 2019 17:36  Mg     2.1     02-23    TPro  5.9<L>  /  Alb  3.3<L>  /  TBili  0.6  /  DBili  x   /  AST  15  /  ALT  9   /  AlkPhos  74  02-23          CAPILLARY BLOOD GLUCOSE  201 (22 Feb 2019 17:00)      POCT Blood Glucose.: 192 mg/dL (23 Feb 2019 21:24)        CULTURES:  Culture Results:   Normal Respiratory Marilee present (02-18 @ 19:35)  Culture Results:   No growth (02-18 @ 06:28)      PHYSICAL EXAM:    General: No acute distress.  on BIPAP overnight;  awake alert and interactive  HEENT: Pupils equal, reactive to light symmetrically, dry oral mucosa, NPO except for meds  PULM: Clear to auscultation bilaterally on anterior auscultation - no wheezing (limited exam but habitus)  CVS: Irregular rate and rhythm, (monitor shows PVC/small runs NSVT), no murmurs appreciated  ABD: Obese, mildly tense, distended, nontender, tympanic  	: barragan in place  EXT: 1+ pitting on top of diffuse non-putting edema to mid shin with small ulcerated sores noted; some ulcerated sores noted over arms b/l as well   SKIN: bronze discoloration of b/l legs noted    RADIOLOGY:

## 2019-02-24 NOTE — PROGRESS NOTE ADULT - ASSESSMENT
# Acute hypoxic respiratory failure 2/2 Aspiration PNA vs pulmonary edema - improving  - Self extubated 2/21  - keep on RA as tolerated during day, BIPAP in night (JOHANNY hx, not using mask)  - ECHO 2/16: normal EF 55%  - ID recs appreciated, no abbx  - pt remains NPO (had dysphagia from before intubation, possible cause of aspiration)  - c/w lasix 40 IV BID (keep even); noted to be POS in the PM, gave IV lasix  - barragan, traumatic - uro to remove?  - CT chest: no consolidation, b/l effusion + atelectasis     #PVC on cardiac monitoring // Hx of Afib // Troponinemia on admit (likely 2/2 type II demand)  - CHADsVASC 4 (Age, HTN, DM); no AC 2/2 falls  - Cardio recs (Tamburrino): c/w BB 25 BID   - if need to give PO lopressor crush it and mix with applesauce; after each teaspoon cue pt to swallow 4 times - now HR controlled     # Hypernatremia  - noted to have 2.3L deficit and 154 Na (likely due to NPO, w/o free water)  - goal correct 6-8U in 12-24hr  - increased D5W to 200cc/hr check q6-12hr and correct accordingly     # TOBIAS likely acute tubular injury, improving  - baseline Cr ~2, now 1.8   - Nephro recs appreciated  - Avoid NSAIDs for now- consider resuming ACEi on dc since Cr at baseline     # Acute UTI with hematuria (possibly also from traumatic barragan)  - follow up with urology on barragan discontinuation     # Recurrent falls  - Likely multifactorial, physical deconditioning, medication induced hypotension, and peripheral neuropathy  - Trauma work up negative for acute fracture, but show age-indeterminate compression fracture of L2    # HTN,controlled  - keep same     # DM II:   - Hga1C pending (11.5 in 12/5/18)  - Start basal/bolus 10 and 3/3/3  - Monitor FS    #  shunt:   - Recently evaluated by Dr. Wayne 12/6/18, deemed to be chronically in place  - not manipulated in 10yrs, follows in [facility], non-programmable   - No signs of hydrocephalus in CT done on this admit    #MISC:  - DVT ppx: SubQ heparin  - GI ppx: PPI  - Diet: NPO, failed speech and swallow - reassess   - Activity: increase as tolerated  - Code status: Full code family to assess as tests come up # Acute hypoxic respiratory failure 2/2 Aspiration PNA vs pulmonary edema - improving  - Self extubated 2/21  - keep on RA as tolerated during day, BIPAP in night (JOHANNY hx, not using mask)  - ECHO 2/16: normal EF 55%  - ID recs appreciated, no abbx  - pt remains NPO (had dysphagia from before intubation, possible cause of aspiration)  - c/w lasix 40 IV BID (keep even)  - braragan, traumatic - Urology recalled (as per them, failed TOV in past, may need to go home w/ it if not walking well)    #PVC on cardiac monitoring // Hx of Afib // Troponinemia on admit (likely 2/2 type II demand)  - CHADsVASC 4 (Age, HTN, DM); no AC 2/2 falls  - Cardio recs (Tamburrino): c/w BB 25 BID   - if need to give PO lopressor crush it and mix with applesauce; after each teaspoon cue pt to swallow 4 times - now HR controlled     # Hypernatremia, corrected  - noted to have 2.3L deficit and 154 Na (likely due to NPO, w/o free water)  - corrected to 144, @ goal  - changed to LR @ 50 , f/u 4PM BMP    # TOBIAS likely acute tubular injury, improving  - baseline Cr ~2, now 1.8   - Nephro recs appreciated  - Avoid NSAIDs for now- consider resuming ACEi on dc since Cr at baseline     # Acute UTI with hematuria (possibly also from traumatic barragan)  - follow up with urology on barragan discontinuation     # Recurrent falls  - Likely multifactorial, physical deconditioning, medication induced hypotension, and peripheral neuropathy  - Trauma work up negative for acute fracture, but show age-indeterminate compression fracture of L2  - PT/rehab    # HTN,controlled  - keep same     # DM II:   - Hga1C pending (11.5 in 12/5/18)  - Start basal/bolus 10 and 3/3/3  - Monitor FS    #  shunt:   - Recently evaluated by Dr. Wayne 12/6/18, deemed to be chronically in place  - not manipulated in 10yrs, follows in [facility], non-programmable   - No signs of hydrocephalus in CT done on this admit    #MISC:  - DVT ppx: SubQ heparin  - GI ppx: PPI  - Diet: NPO, failed speech and swallow - reassess   - Activity: increase as tolerated  - Code status: Full code family to assess as tests come up  - DISPO: downgrade to floor # Acute hypoxic respiratory failure 2/2 Aspiration PNA vs pulmonary edema - improving  - Self extubated 2/21  - keep on RA as tolerated during day, BIPAP in night (JOHANNY hx, not using mask)  - ECHO 2/16: normal EF 55%  - ID recs appreciated, no abbx  - pt remains NPO (had dysphagia from before intubation, possible cause of aspiration)  - change to metolazone 2.5 daily, IV lasix 40 qd  - barragan, traumatic - Urology recalled (as per them, failed TOV in past, may need to go home w/ it if not walking well)    #PVC on cardiac monitoring // Hx of Afib // Troponinemia on admit (likely 2/2 type II demand)  - CHADsVASC 4 (Age, HTN, DM); no AC 2/2 falls  - Cardio recs (Tamburrino): c/w BB 25 BID   - if need to give PO lopressor crush it and mix with applesauce; after each teaspoon cue pt to swallow 4 times - now HR controlled     # Hypernatremia, corrected  - noted to have 2.3L deficit and 154 Na (likely due to NPO, w/o free water)  - corrected to 144, @ goal  - changed to LR @ 50 , f/u 4PM BMP    # TOBIAS likely acute tubular injury, improving  - baseline Cr ~2, now 1.8   - Nephro recs appreciated: change to metolazone 2.5 daily, IV lasix 40 qd  - Avoid NSAIDs for now- consider resuming ACEi on dc since Cr at baseline     # Acute UTI with hematuria (possibly also from traumatic barragan)  - follow up with urology on barragan discontinuation     # Recurrent falls  - Likely multifactorial, physical deconditioning, medication induced hypotension, and peripheral neuropathy  - Trauma work up negative for acute fracture, but show age-indeterminate compression fracture of L2  - PT/rehab    # HTN,controlled  - keep same     # DM II:   - Hga1C pending (11.5 in 12/5/18)  - Start basal/bolus 10 and 3/3/3  - Monitor FS    #  shunt:   - Recently evaluated by Dr. Wayne 12/6/18, deemed to be chronically in place  - not manipulated in 10yrs, follows in [facility], non-programmable   - No signs of hydrocephalus in CT done on this admit    #MISC:  - DVT ppx: SubQ heparin  - GI ppx: PPI  - Diet: NPO, failed speech and swallow - reassess   - Activity: increase as tolerated  - Code status: Full code family to assess as tests come up  - DISPO: downgrade to floor

## 2019-02-24 NOTE — PROGRESS NOTE ADULT - SUBJECTIVE AND OBJECTIVE BOX
[facility] FOLLOW UP NOTE  --------------------------------------------------------------------------------  24 hour events/subjective: Patient examined. Appears comfortable.    PAST HISTORY  --------------------------------------------------------------------------------  No significant changes to PMH, PSH, FHx, SHx, unless otherwise noted    ALLERGIES & MEDICATIONS  --------------------------------------------------------------------------------  Allergies    No Known Allergies    Standing Inpatient Medications  atorvastatin 20 milliGRAM(s) Oral at bedtime  calcium carbonate 1250 mG  + Vitamin D (OsCal 500 + D) 1 Tablet(s) Oral three times a day  chlorhexidine 4% Liquid 1 Application(s) Topical <User Schedule>  dextrose 5%. 1000 milliLiter(s) IV Continuous <Continuous>  dextrose 50% Injectable 12.5 Gram(s) IV Push once  dextrose 50% Injectable 25 Gram(s) IV Push once  dextrose 50% Injectable 25 Gram(s) IV Push once  furosemide   Injectable 40 milliGRAM(s) IV Push two times a day  guaiFENesin  milliGRAM(s) Oral every 12 hours  heparin  Injectable 5000 Unit(s) SubCutaneous every 8 hours  insulin glargine Injectable (LANTUS) 10 Unit(s) SubCutaneous at bedtime  insulin lispro Injectable (HumaLOG) 3 Unit(s) SubCutaneous three times a day before meals  lactated ringers. 1000 milliLiter(s) IV Continuous <Continuous>  metolazone 2.5 milliGRAM(s) Oral daily  metoprolol tartrate 25 milliGRAM(s) Oral three times a day  nystatin Powder 1 Application(s) Topical three times a day  pantoprazole  Injectable 40 milliGRAM(s) IV Push daily  tamsulosin 0.4 milliGRAM(s) Oral at bedtime    PRN Inpatient Medications  acetaminophen   Tablet .. 650 milliGRAM(s) Oral every 6 hours PRN  ALBUTerol/ipratropium for Nebulization 3 milliLiter(s) Nebulizer every 6 hours PRN  dextrose 40% Gel 15 Gram(s) Oral once PRN  glucagon  Injectable 1 milliGRAM(s) IntraMuscular once PRN    VITALS/PHYSICAL EXAM  --------------------------------------------------------------------------------  T(C): 36 (02-24-19 @ 08:00), Max: 36.9 (02-24-19 @ 00:00)  HR: 70 (02-24-19 @ 10:00) (70 - 106)  BP: 118/58 (02-24-19 @ 10:00) (114/64 - 144/69)  RR: 19 (02-24-19 @ 10:00) (17 - 34)  SpO2: 95% (02-24-19 @ 10:00) (92% - 100%)    02-23-19 @ 07:01  -  02-24-19 @ 07:00  --------------------------------------------------------  IN: 3987.5 mL / OUT: 2750 mL / NET: 1237.5 mL    02-24-19 @ 07:01  -  02-24-19 @ 12:05  --------------------------------------------------------  IN: 700 mL / OUT: 455 mL / NET: 245 mL    Physical Exam:  	Gen: NAD  	Pulm: CTA B/L  	CV: RRR, S1S2  	Abd: +BS, soft, nontender/nondistended  	: No suprapubic tenderness  	LE: Warm, edema    LABS/STUDIES  --------------------------------------------------------------------------------              10.0   8.81  >-----------<  194      [02-24-19 @ 04:22]              33.6     144  |  100  |  48  ----------------------------<  214      [02-24-19 @ 04:22]  3.4   |  29  |  1.8        Ca     8.1     [02-24-19 @ 04:22]      Mg     2.1     [02-24-19 @ 04:22]    TPro  5.8  /  Alb  3.4  /  TBili  0.7  /  DBili  x   /  AST  13  /  ALT  10  /  AlkPhos  72  [02-24-19 @ 04:22]    Creatinine Trend:  SCr 1.8 [02-24 @ 04:22]  SCr 1.8 [02-24 @ 00:53]  SCr 1.8 [02-23 @ 17:36]  SCr 2.0 [02-23 @ 05:22]  SCr 2.1 [02-22 @ 20:21]    Urinalysis - [02-18-19 @ 06:28]      Color Red / Appearance Turbid / SG 1.020 / pH 5.5      Gluc Negative / Ketone 15  / Bili Large / Urobili 1.0       Blood Large / Protein 100 / Leuk Est Large / Nitrite Positive      RBC >50 / WBC  / Hyaline  / Gran  / Sq Epi  / Non Sq Epi  / Bacteria     Urine Creatinine 72      [02-18-19 @ 23:00]  Urine Protein 79      [02-18-19 @ 23:00]  Urine Sodium 39.0      [02-18-19 @ 23:00]  Urine Urea Nitrogen 526      [02-18-19 @ 06:28]  Urine Osmolality 369      [02-18-19 @ 23:00]    PTH -- (Ca 7.7)      [02-20-19 @ 04:41]   24  PTH -- (Ca 7.5)      [02-17-19 @ 06:37]   37  HbA1c 11.5      [12-04-18 @ 07:33]  TSH 0.34      [12-05-18 @ 09:27]      C3 Complement 96      [02-17-19 @ 06:37]  C4 Complement 40      [02-17-19 @ 06:37]  Free Light Chains: kappa 3.33, lambda 12.51, ratio = 0.27      [02-17 @ 06:37]  Immunofixation Serum:   No Monoclonal Band Identified    Reference Range: None Detected      [02-17-19 @ 06:37]  SPEP Interpretation: Normal Electrophoresis Pattern      [02-17-19 @ 06:37]

## 2019-02-24 NOTE — CHART NOTE - NSCHARTNOTEFT_GEN_A_CORE
CCU Transfer Note    Transfer from: CCU    Transfer to: ( x ) Medicine    (  ) Telemetry    (  ) RCU    Accepting Physician:    Signout given to:     HPI / CCU COURSE:  84 yo M PMHx of DMII, HTN,  Hydrocephalus s/p  shunt, Afib not on AC (multiple falls) presents to our ED with c/o mechanical fall. According to patient he was trying to walk with a walker and slipped on the floor, no nausea, vomiting, dizziness, chest pain, palpitations, seizure like episode, no LOC. He had 4 falls in last 10 days as per family. He was recently discharged from the hospital Dec for dizziness with stress test and  shunt assessment that was normal. At baseline he has home aid, takes his medications and walks very minimal with the help of cane or walker. However for last few months he has had dec ambulation and worsening LE edema. As per family, also worsening lethargy. Trauma work-up neg (age unknown L2 fx)    In the ED BP 83/45, HR 78/min, Sao2 100% on NC, afebrile. He was given 2L RL and his repeat BP was 110/80. Initial CXR only shows cardiomegaly without lobar consolidation. Patient later desatted to 85% on 3L NC so he was placed on BIPAP. Repeated CXR shows new consolidation at right lung base, suspecting PNA, so patient was started on Rocephin and Azithromycin. Labs showed TOBIAS with Cr 3.2 (baseline 2) and dec UOP. Pt also noted to be on NSAIDs and ACE, Actos so likely TOBIAS 2/2 dehydration and meds. On 2/18 pt was unable to tolerate NC and moved to BIPAP with ABG showing acute hypoxic resp failure and hypercapnia (likely chronic?). CXR at this time showed worsening pulm vascular congestion. After diuresis/BIPAP, pt became lethargic so intubated for protection. Admitted to CCU, barragan noted to be out-of-place (likely pt pulled out) so advanced with a sharp increase in out-put. CXR and kidney showed improvement after aggressive diuresis. Cardiac monitor significant for high # of PVC/short runs of NSVT (seen when first arrived as well) On 2/21 pt SELF-EXTUBATED, able to transition to BIPAP without difficulty.     Now diuresing well, keeping barragan as per Dr. Tovar. Urology to follow-up. Otherwise stable from pulm standpt. Downgrade to floor.    Vital Signs Last 24 Hrs  T(F): 96.8 (24 Feb 2019 08:00), Max: 98.4 (24 Feb 2019 00:00)  HR: 70 (24 Feb 2019 10:00) (70 - 106)  BP: 118/58 (24 Feb 2019 10:00) (114/64 - 144/69)  RR: 19 (24 Feb 2019 10:00) (17 - 34)  SpO2: 95% (24 Feb 2019 10:00) (92% - 100%)    I&O's Summary    23 Feb 2019 07:01  -  24 Feb 2019 07:00  --------------------------------------------------------  IN: 3987.5 mL / OUT: 2750 mL / NET: 1237.5 mL    24 Feb 2019 07:01  -  24 Feb 2019 11:17  --------------------------------------------------------  IN: 700 mL / OUT: 455 mL / NET: 245 mL    Physical Exam: [see progress note]      LABS:                           10.0   8.81  )-----------( 194      ( 24 Feb 2019 04:22 )             33.6       02-24    144  |  100  |  48<H>  ----------------------------<  214<H>  3.4<L>   |  29  |  1.8<H>    Ca    8.1<L>      24 Feb 2019 04:22  Mg     2.1     02-24    TPro  5.8<L>  /  Alb  3.4<L>  /  TBili  0.7  /  DBili  x   /  AST  13  /  ALT  10  /  AlkPhos  72  02-24        ASSESSMENT & PLAN:     # Acute hypoxic respiratory failure 2/2 Aspiration PNA vs pulmonary edema - improving  - Self extubated 2/21; use BIPAP during night  - ID recs appreciated, no abbx  - pt remains NPO (had dysphagia from before intubation, possible cause of aspiration)  - c/w lasix 40 IV BID (keep even)  - barragan, traumatic - Urology recalled (as per them, failed TOV in past, may need to go home w/ it if not walking well)    #PVC on cardiac monitoring // Hx of Afib // Troponinemia on admit (likely 2/2 type II demand)  - CHADsVASC 4 (Age, HTN, DM); no AC 2/2 falls  - Cardio recs (Tamburrino): c/w BB 25 BID   - if need to give PO lopressor crush it and mix with applesauce; after each teaspoon cue pt to swallow 4 times - now HR controlled     # Hypernatremia, corrected  - noted to have 2.3L deficit and 154 Na (likely due to NPO, w/o free water)  - corrected to 144, @ goal  - changed to LR @ 50 , f/u 4PM BMP    # TOBIAS likely acute tubular injury, improving  - baseline Cr ~2, now 1.8   - Avoid NSAIDs for now- consider resuming ACEi on dc since Cr at baseline     # Recurrent falls  - Likely multifactorial, physical deconditioning, medication induced hypotension, and peripheral neuropathy  - Trauma work up negative for acute fracture, but show age-indeterminate compression fracture of L2  - PT/rehab    #  shunt:   - Recently evaluated by Dr. Wayne 12/6/18, deemed to be chronically in place  - not manipulated in 10yrs, follows in [facility], non-programmable   - No signs of hydrocephalus in CT done on this admit    *** please see progress note for full problem list***        FOR FOLLOW UP:  [ ] f/u 4pm BMP for sodium  [ ] f/u urology for barragan removal  [ ] monitor out-put daily and adjust lasix to keep neg  [ ] repeat FEES assessment tmw 2/25, NPO for now  [ ] work-up for falls and dysphagia??  [ ] PT/rehab    Montse Ferro MD PGY1

## 2019-02-24 NOTE — PROGRESS NOTE ADULT - ASSESSMENT
84 y/o male presented with hematuria, now resolved  - Keep barragan for as long as medically necessarry, then can have TOV.  - no acute urologic intervention at this time  - f/u with Dr. Matthews as o/p  - recall  prn

## 2019-02-24 NOTE — PROGRESS NOTE ADULT - ASSESSMENT
IMPRESSION:    Acute hypoxemic respiratory failure resolved  Aspiration pneumonia / pulmonary edema improved   Afib    PLAN:    CNS: no depressants     HEENT: Oral care.  Follow up wiht speech and swallow     PULMONARY:  HOB @ 45 degrees.  biPAP during sleep     CARDIOVASCULAR:  Rate Control.  I=O.  LR 50 cc per hour     GI: GI prophylaxis.  NPO.  FU speech and swallow     RENAL:  Follow up lytes.  Correct as needed.       INFECTIOUS DISEASE: Follow up cultures    HEMATOLOGICAL:  DVT prophylaxis.    ENDOCRINE:  Follow up FS.      MUSCULOSKELETAL:    PT Rehab eval.      Transfer to floor     DC Aguirre per Urology

## 2019-02-24 NOTE — PROGRESS NOTE ADULT - ASSESSMENT
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

## 2019-02-24 NOTE — PROGRESS NOTE ADULT - SUBJECTIVE AND OBJECTIVE BOX
HPI:  84 y/o male with followed by  for hematuria. Barragan was placed in CCU for strict I&Os. Urine now clear yellow. Denies any pain, fevers, chills, N/V.    PAST MEDICAL & SURGICAL HISTORY:  Hypertension  Hydrocephalus  CKD (chronic kidney disease)  DM (diabetes mellitus)  Afib  History of prostate surgery      MEDICATIONS  (STANDING):  atorvastatin 20 milliGRAM(s) Oral at bedtime  calcium carbonate 1250 mG  + Vitamin D (OsCal 500 + D) 1 Tablet(s) Oral three times a day  chlorhexidine 4% Liquid 1 Application(s) Topical <User Schedule>  dextrose 5%. 1000 milliLiter(s) (50 mL/Hr) IV Continuous <Continuous>  dextrose 50% Injectable 12.5 Gram(s) IV Push once  dextrose 50% Injectable 25 Gram(s) IV Push once  dextrose 50% Injectable 25 Gram(s) IV Push once  furosemide   Injectable 40 milliGRAM(s) IV Push two times a day  guaiFENesin  milliGRAM(s) Oral every 12 hours  heparin  Injectable 5000 Unit(s) SubCutaneous every 8 hours  insulin glargine Injectable (LANTUS) 10 Unit(s) SubCutaneous at bedtime  insulin lispro Injectable (HumaLOG) 3 Unit(s) SubCutaneous three times a day before meals  lactated ringers. 1000 milliLiter(s) (50 mL/Hr) IV Continuous <Continuous>  metolazone 2.5 milliGRAM(s) Oral daily  metoprolol tartrate 25 milliGRAM(s) Oral three times a day  nystatin Powder 1 Application(s) Topical three times a day  pantoprazole  Injectable 40 milliGRAM(s) IV Push daily  tamsulosin 0.4 milliGRAM(s) Oral at bedtime    MEDICATIONS  (PRN):  acetaminophen   Tablet .. 650 milliGRAM(s) Oral every 6 hours PRN Moderate Pain (4 - 6)  ALBUTerol/ipratropium for Nebulization 3 milliLiter(s) Nebulizer every 6 hours PRN Shortness of Breath and/or Wheezing  dextrose 40% Gel 15 Gram(s) Oral once PRN Blood Glucose LESS THAN 70 milliGRAM(s)/deciliter  glucagon  Injectable 1 milliGRAM(s) IntraMuscular once PRN Glucose LESS THAN 70 milligrams/deciliter      Allergies    No Known Allergies    Intolerances        SOCIAL HISTORY: No illicit drug use    FAMILY HISTORY:  Family history of diabetes mellitus (Mother, Sibling)      Vital Signs Last 24 Hrs  T(C): 36 (24 Feb 2019 08:00), Max: 36.9 (24 Feb 2019 00:00)  T(F): 96.8 (24 Feb 2019 08:00), Max: 98.4 (24 Feb 2019 00:00)  HR: 70 (24 Feb 2019 10:00) (70 - 106)  BP: 118/58 (24 Feb 2019 10:00) (114/64 - 144/69)  BP(mean): 81 (24 Feb 2019 10:00) (81 - 110)  RR: 19 (24 Feb 2019 10:00) (17 - 34)  SpO2: 95% (24 Feb 2019 10:00) (92% - 100%)    PHYSICAL EXAM:    Constitutional: NAD, awake/alert  HEENT: EOMI  Neck: no pain  Back: No CVA tenderness B/L  Respiratory: No accessory respiratory muscle use  Abd: Soft, NT/ND, bladder non palpable, no suprapubic tenderness  : + barragan cath draining clear yellow urine, + penile edema  Neurological: A/O x 3  Psychiatric: Normal mood, normal affect      I&O's Summary    23 Feb 2019 07:01  -  24 Feb 2019 07:00  --------------------------------------------------------  IN: 3987.5 mL / OUT: 2750 mL / NET: 1237.5 mL    24 Feb 2019 07:01  -  24 Feb 2019 12:27  --------------------------------------------------------  IN: 700 mL / OUT: 455 mL / NET: 245 mL        LABS:                        10.0   8.81  )-----------( 194      ( 24 Feb 2019 04:22 )             33.6     02-24    144  |  100  |  48<H>  ----------------------------<  214<H>  3.4<L>   |  29  |  1.8<H>    Ca    8.1<L>      24 Feb 2019 04:22  Mg     2.1     02-24    TPro  5.8<L>  /  Alb  3.4<L>  /  TBili  0.7  /  DBili  x   /  AST  13  /  ALT  10  /  AlkPhos  72  02-24

## 2019-02-24 NOTE — PROGRESS NOTE ADULT - SUBJECTIVE AND OBJECTIVE BOX
Patient is a 83y old  Male who presents with a chief complaint of Recurrent falls  TOBIAS (24 Feb 2019 00:23)        Over Night Events:  Tolerated BiPAP last night.  Failed repeat Speech and swallow         ROS:  See HPI    PHYSICAL EXAM    ICU Vital Signs Last 24 Hrs  T(C): 36 (24 Feb 2019 08:00), Max: 36.9 (24 Feb 2019 00:00)  T(F): 96.8 (24 Feb 2019 08:00), Max: 98.4 (24 Feb 2019 00:00)  HR: 70 (24 Feb 2019 10:00) (70 - 106)  BP: 118/58 (24 Feb 2019 10:00) (114/64 - 144/69)  BP(mean): 81 (24 Feb 2019 10:00) (81 - 110)  ABP: --  ABP(mean): --  RR: 19 (24 Feb 2019 10:00) (17 - 34)  SpO2: 95% (24 Feb 2019 10:00) (92% - 100%)      General: In NAD   HEENT: ALEXYS             Lymphatic system: No cervical LN   Lungs: Bilateral BS  Cardiovascular: Irregular   Gastrointestinal: Soft, Positive BS  Extremities: No clubbing.  Moves extremities.  Full Range of motion   Skin: Warm, intact  Neurological: No motor or sensory deficit       02-23-19 @ 07:01  -  02-24-19 @ 07:00  --------------------------------------------------------  IN:    dextrose 5%.: 300 mL    dextrose 5%.: 3687.5 mL  Total IN: 3987.5 mL    OUT:    Indwelling Catheter - Urethral: 2750 mL  Total OUT: 2750 mL    Total NET: 1237.5 mL      02-24-19 @ 07:01  -  02-24-19 @ 10:39  --------------------------------------------------------  IN:    dextrose 5%.: 350 mL    IV PiggyBack: 100 mL  Total IN: 450 mL    OUT:    Indwelling Catheter - Urethral: 380 mL  Total OUT: 380 mL    Total NET: 70 mL          LABS:                            10.0   8.81  )-----------( 194      ( 24 Feb 2019 04:22 )             33.6                                               02-24    144  |  100  |  48<H>  ----------------------------<  214<H>  3.4<L>   |  29  |  1.8<H>    24 Feb 2019 04:22    144    |  100    |  48<H>  ----------------------------<  214<H>  3.4<L>   |  29     |  1.8<H>  24 Feb 2019 00:53    149<H>  |  102    |  50<H>  ----------------------------<  221<H>  3.5     |  31     |  1.8<H>    Ca    8.1<L>      24 Feb 2019 04:22  Ca    8.0<L>      24 Feb 2019 00:53  Mg     2.1       24 Feb 2019 04:22  Mg     2.1       23 Feb 2019 05:22    TPro  5.8<L>  /  Alb  3.4<L>  /  TBili  0.7    /  DBili  x      /  AST  13     /  ALT  10     /  AlkPhos  72     24 Feb 2019 04:22  TPro  5.9<L>  /  Alb  3.3<L>  /  TBili  0.6    /  DBili  x      /  AST  15     /  ALT  9      /  AlkPhos  74     23 Feb 2019 05:22      Ca    8.1<L>      24 Feb 2019 04:22  Mg     2.1     02-24    TPro  5.8<L>  /  Alb  3.4<L>  /  TBili  0.7  /  DBili  x   /  AST  13  /  ALT  10  /  AlkPhos  72  02-24                                                                                           LIVER FUNCTIONS - ( 24 Feb 2019 04:22 )  Alb: 3.4 g/dL / Pro: 5.8 g/dL / ALK PHOS: 72 U/L / ALT: 10 U/L / AST: 13 U/L / GGT: x                                                                                                                                       MEDICATIONS  (STANDING):  atorvastatin 20 milliGRAM(s) Oral at bedtime  calcium carbonate 1250 mG  + Vitamin D (OsCal 500 + D) 1 Tablet(s) Oral three times a day  chlorhexidine 4% Liquid 1 Application(s) Topical <User Schedule>  dextrose 5%. 1000 milliLiter(s) (50 mL/Hr) IV Continuous <Continuous>  dextrose 5%. 1000 milliLiter(s) (100 mL/Hr) IV Continuous <Continuous>  dextrose 50% Injectable 12.5 Gram(s) IV Push once  dextrose 50% Injectable 25 Gram(s) IV Push once  dextrose 50% Injectable 25 Gram(s) IV Push once  furosemide   Injectable 40 milliGRAM(s) IV Push two times a day  guaiFENesin  milliGRAM(s) Oral every 12 hours  heparin  Injectable 5000 Unit(s) SubCutaneous every 8 hours  insulin glargine Injectable (LANTUS) 10 Unit(s) SubCutaneous at bedtime  insulin lispro Injectable (HumaLOG) 3 Unit(s) SubCutaneous three times a day before meals  metoprolol tartrate 25 milliGRAM(s) Oral three times a day  nystatin Powder 1 Application(s) Topical three times a day  pantoprazole  Injectable 40 milliGRAM(s) IV Push daily  potassium chloride  20 mEq/100 mL IVPB 20 milliEquivalent(s) IV Intermittent every 2 hours  tamsulosin 0.4 milliGRAM(s) Oral at bedtime    MEDICATIONS  (PRN):  acetaminophen   Tablet .. 650 milliGRAM(s) Oral every 6 hours PRN Moderate Pain (4 - 6)  ALBUTerol/ipratropium for Nebulization 3 milliLiter(s) Nebulizer every 6 hours PRN Shortness of Breath and/or Wheezing  dextrose 40% Gel 15 Gram(s) Oral once PRN Blood Glucose LESS THAN 70 milliGRAM(s)/deciliter  glucagon  Injectable 1 milliGRAM(s) IntraMuscular once PRN Glucose LESS THAN 70 milligrams/deciliter      Xrays:        No infiltrate                                                                              ECHO

## 2019-02-24 NOTE — PROGRESS NOTE ADULT - NSHPATTENDINGPLANDISCUSS_GEN_ALL_CORE
family
ICU team
ICU team
ID
family AND RENAL
resident
family and PA
resident

## 2019-02-25 LAB
% GAMMA, URINE: 12.6 % — SIGNIFICANT CHANGE UP
ALBUMIN 24H MFR UR ELPH: 47.9 % — SIGNIFICANT CHANGE UP
ALBUMIN SERPL ELPH-MCNC: 3.5 G/DL — SIGNIFICANT CHANGE UP (ref 3.5–5.2)
ALP SERPL-CCNC: 77 U/L — SIGNIFICANT CHANGE UP (ref 30–115)
ALPHA1 GLOB 24H MFR UR ELPH: 12.1 % — SIGNIFICANT CHANGE UP
ALPHA2 GLOB 24H MFR UR ELPH: 16.3 % — SIGNIFICANT CHANGE UP
ALT FLD-CCNC: 10 U/L — SIGNIFICANT CHANGE UP (ref 0–41)
ANION GAP SERPL CALC-SCNC: 15 MMOL/L — HIGH (ref 7–14)
ANION GAP SERPL CALC-SCNC: 18 MMOL/L — HIGH (ref 7–14)
APTT BLD: 31.4 SEC — SIGNIFICANT CHANGE UP (ref 27–39.2)
AST SERPL-CCNC: 15 U/L — SIGNIFICANT CHANGE UP (ref 0–41)
B-GLOBULIN 24H MFR UR ELPH: 11.1 % — SIGNIFICANT CHANGE UP
BILIRUB SERPL-MCNC: 0.6 MG/DL — SIGNIFICANT CHANGE UP (ref 0.2–1.2)
BUN SERPL-MCNC: 41 MG/DL — HIGH (ref 10–20)
BUN SERPL-MCNC: 41 MG/DL — HIGH (ref 10–20)
CALCIUM SERPL-MCNC: 7.9 MG/DL — LOW (ref 8.5–10.1)
CALCIUM SERPL-MCNC: 8.2 MG/DL — LOW (ref 8.5–10.1)
CHLORIDE SERPL-SCNC: 102 MMOL/L — SIGNIFICANT CHANGE UP (ref 98–110)
CHLORIDE SERPL-SCNC: 99 MMOL/L — SIGNIFICANT CHANGE UP (ref 98–110)
CO2 SERPL-SCNC: 28 MMOL/L — SIGNIFICANT CHANGE UP (ref 17–32)
CO2 SERPL-SCNC: 29 MMOL/L — SIGNIFICANT CHANGE UP (ref 17–32)
CREAT SERPL-MCNC: 1.8 MG/DL — HIGH (ref 0.7–1.5)
CREAT SERPL-MCNC: 1.9 MG/DL — HIGH (ref 0.7–1.5)
GLUCOSE BLDC GLUCOMTR-MCNC: 121 MG/DL — HIGH (ref 70–99)
GLUCOSE BLDC GLUCOMTR-MCNC: 125 MG/DL — HIGH (ref 70–99)
GLUCOSE BLDC GLUCOMTR-MCNC: 125 MG/DL — HIGH (ref 70–99)
GLUCOSE BLDC GLUCOMTR-MCNC: 138 MG/DL — HIGH (ref 70–99)
GLUCOSE SERPL-MCNC: 116 MG/DL — HIGH (ref 70–99)
GLUCOSE SERPL-MCNC: 132 MG/DL — HIGH (ref 70–99)
HCT VFR BLD CALC: 34.7 % — LOW (ref 42–52)
HGB BLD-MCNC: 10.5 G/DL — LOW (ref 14–18)
INR BLD: 1.17 RATIO — SIGNIFICANT CHANGE UP (ref 0.65–1.3)
INTERPRETATION 24H UR IFE-IMP: SIGNIFICANT CHANGE UP
INTERPRETATION 24H UR IFE-IMP: SIGNIFICANT CHANGE UP
M PROTEIN 24H UR ELPH-MRATE: SIGNIFICANT CHANGE UP
MAGNESIUM SERPL-MCNC: 2 MG/DL — SIGNIFICANT CHANGE UP (ref 1.8–2.4)
MCHC RBC-ENTMCNC: 24.3 PG — LOW (ref 27–31)
MCHC RBC-ENTMCNC: 30.3 G/DL — LOW (ref 32–37)
MCV RBC AUTO: 80.3 FL — SIGNIFICANT CHANGE UP (ref 80–94)
NRBC # BLD: 0 /100 WBCS — SIGNIFICANT CHANGE UP (ref 0–0)
PLATELET # BLD AUTO: 220 K/UL — SIGNIFICANT CHANGE UP (ref 130–400)
POTASSIUM SERPL-MCNC: 3.4 MMOL/L — LOW (ref 3.5–5)
POTASSIUM SERPL-MCNC: 3.7 MMOL/L — SIGNIFICANT CHANGE UP (ref 3.5–5)
POTASSIUM SERPL-SCNC: 3.4 MMOL/L — LOW (ref 3.5–5)
POTASSIUM SERPL-SCNC: 3.7 MMOL/L — SIGNIFICANT CHANGE UP (ref 3.5–5)
PROT ?TM UR-MCNC: 79 MG/DL — HIGH (ref 0–12)
PROT PATTERN 24H UR ELPH-IMP: SIGNIFICANT CHANGE UP
PROT SERPL-MCNC: 5.9 G/DL — LOW (ref 6–8)
PROTHROM AB SERPL-ACNC: 13.4 SEC — HIGH (ref 9.95–12.87)
RBC # BLD: 4.32 M/UL — LOW (ref 4.7–6.1)
RBC # FLD: 15.7 % — HIGH (ref 11.5–14.5)
SODIUM SERPL-SCNC: 143 MMOL/L — SIGNIFICANT CHANGE UP (ref 135–146)
SODIUM SERPL-SCNC: 148 MMOL/L — HIGH (ref 135–146)
TOTAL VOLUME - 24 HOUR: SIGNIFICANT CHANGE UP ML
URINE CREATININE CALCULATION: SIGNIFICANT CHANGE UP G/24 H (ref 1–2)
WBC # BLD: 8.85 K/UL — SIGNIFICANT CHANGE UP (ref 4.8–10.8)
WBC # FLD AUTO: 8.85 K/UL — SIGNIFICANT CHANGE UP (ref 4.8–10.8)

## 2019-02-25 RX ORDER — FUROSEMIDE 40 MG
40 TABLET ORAL DAILY
Qty: 0 | Refills: 0 | Status: DISCONTINUED | OUTPATIENT
Start: 2019-02-25 | End: 2019-02-27

## 2019-02-25 RX ORDER — POTASSIUM CHLORIDE 20 MEQ
40 PACKET (EA) ORAL ONCE
Qty: 0 | Refills: 0 | Status: DISCONTINUED | OUTPATIENT
Start: 2019-02-25 | End: 2019-02-25

## 2019-02-25 RX ORDER — PANTOPRAZOLE SODIUM 20 MG/1
40 TABLET, DELAYED RELEASE ORAL
Qty: 0 | Refills: 0 | Status: DISCONTINUED | OUTPATIENT
Start: 2019-02-25 | End: 2019-02-27

## 2019-02-25 RX ORDER — POTASSIUM CHLORIDE 20 MEQ
20 PACKET (EA) ORAL ONCE
Qty: 0 | Refills: 0 | Status: COMPLETED | OUTPATIENT
Start: 2019-02-25 | End: 2019-02-25

## 2019-02-25 RX ADMIN — PANTOPRAZOLE SODIUM 40 MILLIGRAM(S): 20 TABLET, DELAYED RELEASE ORAL at 21:56

## 2019-02-25 RX ADMIN — HEPARIN SODIUM 5000 UNIT(S): 5000 INJECTION INTRAVENOUS; SUBCUTANEOUS at 21:56

## 2019-02-25 RX ADMIN — ATORVASTATIN CALCIUM 20 MILLIGRAM(S): 80 TABLET, FILM COATED ORAL at 21:56

## 2019-02-25 RX ADMIN — Medication 1 TABLET(S): at 05:32

## 2019-02-25 RX ADMIN — Medication 40 MILLIGRAM(S): at 05:33

## 2019-02-25 RX ADMIN — Medication 25 MILLIGRAM(S): at 05:32

## 2019-02-25 RX ADMIN — CHLORHEXIDINE GLUCONATE 1 APPLICATION(S): 213 SOLUTION TOPICAL at 06:04

## 2019-02-25 RX ADMIN — Medication 600 MILLIGRAM(S): at 05:32

## 2019-02-25 RX ADMIN — Medication 50 MILLIEQUIVALENT(S): at 03:07

## 2019-02-25 RX ADMIN — TAMSULOSIN HYDROCHLORIDE 0.4 MILLIGRAM(S): 0.4 CAPSULE ORAL at 21:56

## 2019-02-25 RX ADMIN — Medication 25 MILLIGRAM(S): at 21:57

## 2019-02-25 RX ADMIN — INSULIN GLARGINE 10 UNIT(S): 100 INJECTION, SOLUTION SUBCUTANEOUS at 22:02

## 2019-02-25 RX ADMIN — Medication 1 TABLET(S): at 21:55

## 2019-02-25 RX ADMIN — NYSTATIN CREAM 1 APPLICATION(S): 100000 CREAM TOPICAL at 05:32

## 2019-02-25 RX ADMIN — Medication 40 MILLIGRAM(S): at 21:56

## 2019-02-25 RX ADMIN — NYSTATIN CREAM 1 APPLICATION(S): 100000 CREAM TOPICAL at 21:56

## 2019-02-25 RX ADMIN — HEPARIN SODIUM 5000 UNIT(S): 5000 INJECTION INTRAVENOUS; SUBCUTANEOUS at 05:32

## 2019-02-25 NOTE — PROGRESS NOTE ADULT - ASSESSMENT
IMPRESSION:  Aspiration with possibly Bibasilar GNR PNA complicated by respiratory failure requiring ventilation  CT with bilateral pleural effusions with atalectasis  S/p self extubation 2/21  No pressors  UCx NTD  ET with few PMNs , normal son  WBC8.8  Clinically stale off ABx    RECOMMENDATIONS:  Maintain off ABx  Recall prn please

## 2019-02-25 NOTE — SWALLOW FEES ASSESSMENT ADULT - SLP GENERAL OBSERVATIONS
pt awake alert without c/o pain. + hoarse vocal qualit + dysarthria
pt awake alert without c/o pain. + hoarse vocal quality. audible secretions

## 2019-02-25 NOTE — PHYSICAL THERAPY INITIAL EVALUATION ADULT - GENERAL OBSERVATIONS, REHAB EVAL
10:46-11:25 Pt encountered semifowler in bed in NAD.  + IV locked for OOB by Kenji JACOBS. + yung, son present. No c/o offered. Pt motivated for PT..

## 2019-02-25 NOTE — PROGRESS NOTE ADULT - ASSESSMENT
84 yo M PMHx of DMII, HTN,  Hydrocephalus s/p  shunt, Afib not on AC (multiple falls) presents to our ED with c/o mechanical fall. In the ED BP 83/45, HR 78/min, Sao2 100% on NC, afebrile. He was given 2L RL and his repeat BP was 110/80. Pt was started on BiPAP bu did not improve and was intubated. On 2/21 pt SELF-EXTUBATED, able to transition to BIPAP without difficulty.     1.	Acute hypoxemic respiratory failure resolved  2.	Aspiration with possibly Bibasilar GNR PNA complicated by respiratory failure requiring ventilation  3.	TOBIAS on CKD 3-4 baseline cr 1.9  4.	Ac. HFpEF  5.	JOHANNY on CPAP / Obesity  6.	Urinary retention / Hematuria  7.	A. Fib on no A/C due to multiple falls  8.	DM-2  9.	HTN         PLAN:    ·	Care D/W Nephrology  ·	TOBIAS resolved  ·	D/C RL.   ·	Cont Lasix 40 mg po daily and Metolazone 2.5 mg po daily  ·	D/C Aguirre's Voiding trial  ·	OOB to chair  ·	Rehab eval 84 yo M PMHx of DMII, HTN,  Hydrocephalus s/p  shunt, Afib not on AC (multiple falls) presents to our ED with c/o mechanical fall. In the ED BP 83/45, HR 78/min, Sao2 100% on NC, afebrile. He was given 2L RL and his repeat BP was 110/80. Pt was started on BiPAP bu did not improve and was intubated. On 2/21 pt SELF-EXTUBATED, able to transition to BIPAP without difficulty.     1.	Acute hypoxemic respiratory failure resolved  2.	Aspiration with possibly Bibasilar GNR PNA complicated by respiratory failure requiring ventilation  3.	TOBIAS on CKD 3-4 baseline cr 1.9  4.	Ac. HFpEF  5.	JOHANNY on CPAP / Obesity  6.	Urinary retention / Hematuria  7.	A. Fib on no A/C due to multiple falls  8.	DM-2  9.	HTN         PLAN:    ·	Care D/W Nephrology  ·	TOBIAS resolved  ·	D/C RL.   ·	ECHO showed NLVF  ·	Off Abx. Completed Abx course. ID F/U noted  ·	Cont Lasix 40 mg po daily and Metolazone 2.5 mg po daily  ·	D/C Aguirre's Voiding trial  ·	OOB to chair  ·	Rehab eval

## 2019-02-25 NOTE — PROGRESS NOTE ADULT - GASTROINTESTINAL DETAILS
no guarding/no rigidity/soft
nontender
nontender/no guarding/soft/no rebound tenderness
soft/nontender
nontender/soft

## 2019-02-25 NOTE — SWALLOW FEES ASSESSMENT ADULT - DIAGNOSTIC IMPRESSIONS
severe pharyngeal dysphagia for nectar thick liquids. mild-mod pharyngeal dysphagia for puree, soft, and honey thick liquids
severe pharyngeal dysphagia

## 2019-02-25 NOTE — PHYSICAL THERAPY INITIAL EVALUATION ADULT - ACTIVE RANGE OF MOTION EXAMINATION, REHAB EVAL
bilateral upper extremity Active ROM was WFL (within functional limits)/BLE grossly WFL - tight hamstrings

## 2019-02-25 NOTE — CONSULT NOTE ADULT - SUBJECTIVE AND OBJECTIVE BOX
Patient is a 83y old  Male who presents with a chief complaint of Recurrent falls  TOBIAS (25 Feb 2019 08:24)    HPI:  84 yo male coming from home with PMHx of DMII on insulin, HTN on meds, Hydrocephalus s/p  shunt, Afib not on AC (multiple falls) presents to our ED with c/o mechanical fall yesterday. According to patient he was trying to walk with a walker and slipped on the floor, no nausea, vomiting, dizziness, chest pain, palpitations, seizure like episode, no LOC. So according to the history patient is having mechanical fall. He had 4 falls since last 10 days. He was recently discharged from the hospital for dizziness and stress test and  shunt was normal. At baseline he has health aid, and he takes his medications and walks very minimal with the help of cane or walker. For the last few months he is not walking at all, increase B/L leg swelling.  In the ED BP was 83/45, HR 78/min, Sao2 100% on NC, afebrile. He was given 2l RL and his repeat BP was 110/80. Trauma work up was negative. (15 Feb 2019 08:39)      PAST MEDICAL & SURGICAL HISTORY:  Hypertension  Hydrocephalus  CKD (chronic kidney disease)  DM (diabetes mellitus)  Afib  History of prostate surgery      Hospital Course: Acute Hypoxic Respiratory Failure 2/2 aspiration PNA vs Pulmonary Edema  - Downgraded from CCU after 6 days of intubation   - ID evaluated; maintain off antibiotics   - Continue NPO pending S&S reassessment   - BiPAP as needed for night  - Continue with Metolazone 2.5mg daily and Lasix 40mg IV daily    #History of A-fib with noted PVCs on Monitor and suspected Type 2 MI on admission  - Continue with Metoprolol 25mg TID as per cards recs   - CHADsVASC score 4; Hold off on anti coagulation due to falls   - Multifocal PVCs noted     #TOBIAS 2/2 tubular injury from Hypotension (resolving)  - Continue with negative fluid balance (0.5L negative)   - Continue on Metolazone 2.5mg daily and Lasix 40mg IV daily   - Can increase metolazone   - Noted baseline creatinine is approximately 2.0; currently 1.9  - TOV today     #Hematuria (resolved)  - TOV today   - Outpatient follow up with Dr. Matthews     #Recurrent Falls likely 2/2 deconditioning vs hypotension from medication   - Negative trauma workup    TODAY'S SUBJECTIVE & REVIEW OF SYMPTOMS:     Constitutional WNL   Cardio WNL   Resp WNL   GI WNL  Heme WNL  Endo WNL  Skin WNL  MSK WNL  Neuro WNL  Cognitive WNL  Psych WNL      MEDICATIONS  (STANDING):  atorvastatin 20 milliGRAM(s) Oral at bedtime  calcium carbonate 1250 mG  + Vitamin D (OsCal 500 + D) 1 Tablet(s) Oral three times a day  chlorhexidine 4% Liquid 1 Application(s) Topical <User Schedule>  dextrose 5%. 1000 milliLiter(s) (50 mL/Hr) IV Continuous <Continuous>  dextrose 50% Injectable 12.5 Gram(s) IV Push once  dextrose 50% Injectable 25 Gram(s) IV Push once  dextrose 50% Injectable 25 Gram(s) IV Push once  furosemide    Tablet 40 milliGRAM(s) Oral daily  guaiFENesin  milliGRAM(s) Oral every 12 hours  heparin  Injectable 5000 Unit(s) SubCutaneous every 8 hours  insulin glargine Injectable (LANTUS) 10 Unit(s) SubCutaneous at bedtime  insulin lispro Injectable (HumaLOG) 3 Unit(s) SubCutaneous three times a day before meals  metolazone 2.5 milliGRAM(s) Oral daily  metoprolol tartrate 25 milliGRAM(s) Oral three times a day  nystatin Powder 1 Application(s) Topical three times a day  pantoprazole    Tablet 40 milliGRAM(s) Oral before breakfast  tamsulosin 0.4 milliGRAM(s) Oral at bedtime    MEDICATIONS  (PRN):  acetaminophen   Tablet .. 650 milliGRAM(s) Oral every 6 hours PRN Moderate Pain (4 - 6)  ALBUTerol/ipratropium for Nebulization 3 milliLiter(s) Nebulizer every 6 hours PRN Shortness of Breath and/or Wheezing  dextrose 40% Gel 15 Gram(s) Oral once PRN Blood Glucose LESS THAN 70 milliGRAM(s)/deciliter  glucagon  Injectable 1 milliGRAM(s) IntraMuscular once PRN Glucose LESS THAN 70 milligrams/deciliter      FAMILY HISTORY:  Family history of diabetes mellitus (Mother, Sibling)      Allergies    No Known Allergies    Intolerances        SOCIAL HISTORY:    [    ] Etoh  [    ] Smoking  [    ] Substance abuse     Home Environment:  [    ] Home Alone  [  x  ] Lives with Family  [  x  ] Home Health Aid    Dwelling:  [    ] Apartment  [  x  ] Private House  [    ] Adult Home  [    ] Skilled Nursing Facility      [    ] Short Term  [    ] Long Term  [  x  ] Stairs   2 Outside                        [    ] Elevator     FUNCTIONAL STATUS PTA: (Check all that apply)  Ambulation: [     ]Independent    [   x ] Dependent     [    ] Non-Ambulatory  Assistive Device: [    ] SA Cane  [    ]  Q Cane  [ x   ] Walker  [    ]  Wheelchair  ADL : [    ] Independent  [  x  ]  Dependent       Vital Signs Last 24 Hrs  T(C): 35.8 (25 Feb 2019 06:02), Max: 37 (24 Feb 2019 19:37)  T(F): 96.5 (25 Feb 2019 06:02), Max: 98.6 (24 Feb 2019 19:37)  HR: 68 (25 Feb 2019 06:02) (68 - 95)  BP: 121/65 (25 Feb 2019 06:02) (105/68 - 152/70)  BP(mean): 100 (25 Feb 2019 04:00) (76 - 100)  RR: 20 (25 Feb 2019 06:02) (18 - 25)  SpO2: 94% (25 Feb 2019 04:00) (94% - 99%)      PHYSICAL EXAM: Alert & Oriented X3 obese  GENERAL: NAD, well-groomed, well-developed  HEAD:  Atraumatic, Normocephalic  EYES: EOMI, PERRLA, conjunctiva and sclera clear  NECK: Supple  CHEST/LUNG: Clear bilaterally  HEART: s1S2 irregirreg  ABDOMEN: Soft, Nontender, Nondistended; Bowel sounds present+barragan  EXTREMITIES:  no calf tenderness,2+ edema BLES    NERVOUS SYSTEM:  Cranial Nerves 2-12 intact [ x   ] Abnormal  [    ]  ROM: WFL all extremities [    x]  Abnormal [     ]  Motor Strength: WFL all extremities  [    ]  Abnormal [ x   ]3/5  Sensation: intact to light touch [   x ] Abnormal [    ]      FUNCTIONAL STATUS:  Bed Mobility: [   ]  Independent [    ]  Supervision [  x  ]  Needs Assistance [  ]  N/A  Transfers: [    ]  Independent [    ]  Supervision [  x  ]  Needs Assistance [    ]  N/A    Ambulation:  [    ]  Independent [    ]  Supervision [    ]  Needs Assistance [   x ]  N/A   ADL:  [    ]   Independent [  x  ] Requires Assistance [    ] N/A   MAX A X2 OOB    LABS:                        10.5   8.85  )-----------( 220      ( 25 Feb 2019 04:54 )             34.7     02-25    148<H>  |  102  |  41<H>  ----------------------------<  116<H>  3.7   |  28  |  1.9<H>    Ca    8.2<L>      25 Feb 2019 04:54  Mg     2.0     02-25    TPro  5.9<L>  /  Alb  3.5  /  TBili  0.6  /  DBili  x   /  AST  15  /  ALT  10  /  AlkPhos  77  02-25    PT/INR - ( 25 Feb 2019 04:54 )   PT: 13.40 sec;   INR: 1.17 ratio         PTT - ( 25 Feb 2019 04:54 )  PTT:31.4 sec      RADIOLOGY & ADDITIONAL STUDIES:

## 2019-02-25 NOTE — CONSULT NOTE ADULT - ASSESSMENT
IMPRESSION: Rehab of Debilitation     PRECAUTIONS: [x    ] Cardiac  [x    ] Respiratory  [    ] Seizures [    ] Contact Isolation  [    ] Droplet Isolation  [    ] Other    Weight Bearing Status:     RECOMMENDATION:    Out of Bed to Chair     DVT/Decubiti Prophylaxis    REHAB PLAN:     [   x  ] Bedside P/T 3-5 times a week   [     ] Bedside O/T  2-3 times a week   [     ] No Rehab Therapy Indicated   [     ]  Speech Therapy   Conditioning/ROM                                 ADL  Bed Mobility                                            Conditioning/ROM  Transfers                                                  Bed Mobility  Sitting /Standing Balance                      Transfers                                        Gait Training                                            Sitting/Standing Balance  Stair Training [   ]Applicable                 Home equipment Eval                                                                     Splinting  [   ] Only      GOALS:   ADL   [   x ]   Independent         Transfers  [ x   ] Independent            Ambulation  [  x   ] Independent     [   x  ] With device                            [    ]  CG                                               [    ]  CG                                                    [     ] CG                            [    ] Min A                                          [    ] Min A                                                [     ] Min  A          DISCHARGE PLAN:   [     ]  Good candidate for Intensive Rehabilitation/Hospital based                                             Will tolerate 3hrs Intensive Rehab Daily                                       [  x  ]  Short Term Rehab in Skilled Nursing Facility                                       [      ]  Home with Outpatient or  services                                         [      ]  Possible Candidate for Intensive Hospital based Rehab

## 2019-02-25 NOTE — SWALLOW FEES ASSESSMENT ADULT - PHARYNGEAL PHASE COMMENTS
moderate pharyngeal dysphagia severe pharyngeal dysphagia mild-mod pharyngeal dysphagia moderate  pharyngeal dysphagia

## 2019-02-25 NOTE — SWALLOW FEES ASSESSMENT ADULT - SPECIFY REASON(S)
severe pharyngeal dysphagia as per FEES 2/22 re-evaluation to assess candidacy for po diet
suspected pharyngeal dysphagia

## 2019-02-25 NOTE — CONSULT NOTE ADULT - CONSULT REASON
Acute respiratory failure
CODE TRAUMA
JOHANNY
boom
frequent PVCs
kvngauria
weakness/ falls
aspiration braxton

## 2019-02-25 NOTE — CONSULT NOTE ADULT - REASON FOR ADMISSION
Recurrent falls  TOBIAS
Recurrent falls  TOBIAS
Recurrent falls  OTBIAS
Recurrent falls  TOBIAS

## 2019-02-25 NOTE — PHYSICAL THERAPY INITIAL EVALUATION ADULT - PERTINENT HX OF CURRENT PROBLEM, REHAB EVAL
pt is an 84 y/o male admitted s/p mechanical fall complicated by desaturation with PVCs and short runs of NSVT that warranted him to be intubated and admitted to the CCU.

## 2019-02-25 NOTE — PROGRESS NOTE ADULT - SUBJECTIVE AND OBJECTIVE BOX
DELVIS VINCENZO  83y, Male      OVERNIGHT EVENTS:    no fevers, feels well, has no complaints  no barragan    VITALS:  T(F): 96.5, Max: 98.6 (02-24-19 @ 19:37)  HR: 68  BP: 121/65  RR: 20Vital Signs Last 24 Hrs  T(C): 35.8 (25 Feb 2019 06:02), Max: 37 (24 Feb 2019 19:37)  T(F): 96.5 (25 Feb 2019 06:02), Max: 98.6 (24 Feb 2019 19:37)  HR: 68 (25 Feb 2019 06:02) (68 - 95)  BP: 121/65 (25 Feb 2019 06:02) (105/68 - 152/70)  BP(mean): 100 (25 Feb 2019 04:00) (76 - 110)  RR: 20 (25 Feb 2019 06:02) (18 - 25)  SpO2: 94% (25 Feb 2019 04:00) (94% - 99%)    TESTS & MEASUREMENTS:                        10.5   8.85  )-----------( 220      ( 25 Feb 2019 04:54 )             34.7     02-25    148<H>  |  102  |  41<H>  ----------------------------<  116<H>  3.7   |  28  |  1.9<H>    Ca    8.2<L>      25 Feb 2019 04:54  Mg     2.0     02-25    TPro  5.9<L>  /  Alb  3.5  /  TBili  0.6  /  DBili  x   /  AST  15  /  ALT  10  /  AlkPhos  77  02-25    LIVER FUNCTIONS - ( 25 Feb 2019 04:54 )  Alb: 3.5 g/dL / Pro: 5.9 g/dL / ALK PHOS: 77 U/L / ALT: 10 U/L / AST: 15 U/L / GGT: x             Culture - Sputum (collected 02-18-19 @ 19:35)  Source: .Sputum Sputum trap  Gram Stain (02-19-19 @ 15:53):    Few polymorphonuclear leukocytes    Rare Squamous epithelial cells per low power field    Few Gram Positive Cocci in Clusters per oil power field  Final Report (02-22-19 @ 11:54):    Normal Respiratory Marilee present            RADIOLOGY & ADDITIONAL TESTS:    ANTIBIOTICS:

## 2019-02-25 NOTE — SWALLOW FEES ASSESSMENT ADULT - NS SWALLOW FEES REC ASPIR MON
cough/position upright (90Y)/upper respiratory infection/change of breathing pattern/oral hygiene/gurgly voice/fever/pneumonia/throat clearing

## 2019-02-25 NOTE — PROGRESS NOTE ADULT - ASSESSMENT
TOBIAS   -much better  hypernatremia   CKD 3-4 (baseline Cr 1.9 on 12/2018)  urine retention s/p prostatectomy   hypotension - better  falls  ARF - JOHANNY on CPAP / PNA / CHF  EF 55%  afib  HTN   s/p  shunt  DM 2       plan:    change to lasix 40mg po qd  cont metolazone 2.5mg po qd  dc LR IVF  dc barragan - TOV  keep off bp meds and ACE-I  oob to chair  pt / rehab  f/u labs  full code

## 2019-02-25 NOTE — SWALLOW FEES ASSESSMENT ADULT - PRELIMINARY ENDOSCOPIC EXAMINATIONS
Vocal fold adduction/abduction/Interarytenoid/Arytenoid edema/Baseline penetration of secretions/Baseline pooling of secretions

## 2019-02-25 NOTE — PROGRESS NOTE ADULT - SUBJECTIVE AND OBJECTIVE BOX
NEPHROLOGY FOLLOW UP NOTE    much improved  transferred to tele  d/w sons and dr hopper  cr better  no sob  toelrating po    PAST MEDICAL & SURGICAL HISTORY:  Hypertension  Hydrocephalus  CKD (chronic kidney disease)  DM (diabetes mellitus)  Afib  History of prostate surgery    Allergies:  No Known Allergies    Home Medications Reviewed    SOCIAL HISTORY:  Denies ETOH,Smoking,   FAMILY HISTORY:  Family history of diabetes mellitus (Mother, Sibling)        REVIEW OF SYSTEMS  All other review of systems is negative unless indicated above.    advance care planning and advance care directives reviewed and d/w team    PHYSICAL EXAM:  NAD  awake   o2 via nc  dry mm  supple  decreased bs b/l  distant HS, irreg  soft  + edema  [facility] Medications:   MEDICATIONS  (STANDING):  atorvastatin 20 milliGRAM(s) Oral at bedtime  calcium carbonate 1250 mG  + Vitamin D (OsCal 500 + D) 1 Tablet(s) Oral three times a day  chlorhexidine 4% Liquid 1 Application(s) Topical <User Schedule>  dextrose 5%. 1000 milliLiter(s) (50 mL/Hr) IV Continuous <Continuous>  dextrose 50% Injectable 12.5 Gram(s) IV Push once  dextrose 50% Injectable 25 Gram(s) IV Push once  dextrose 50% Injectable 25 Gram(s) IV Push once  furosemide    Tablet 40 milliGRAM(s) Oral daily  guaiFENesin  milliGRAM(s) Oral every 12 hours  heparin  Injectable 5000 Unit(s) SubCutaneous every 8 hours  insulin glargine Injectable (LANTUS) 10 Unit(s) SubCutaneous at bedtime  insulin lispro Injectable (HumaLOG) 3 Unit(s) SubCutaneous three times a day before meals  metolazone 2.5 milliGRAM(s) Oral daily  metoprolol tartrate 25 milliGRAM(s) Oral three times a day  nystatin Powder 1 Application(s) Topical three times a day  pantoprazole    Tablet 40 milliGRAM(s) Oral before breakfast  tamsulosin 0.4 milliGRAM(s) Oral at bedtime        VITALS:  T(F): 97 (02-25-19 @ 14:10), Max: 98.6 (02-24-19 @ 19:37)  HR: 82 (02-25-19 @ 14:10)  BP: 138/93 (02-25-19 @ 14:10)  RR: 19 (02-25-19 @ 14:10)  SpO2: 94% (02-25-19 @ 04:00)  Wt(kg): --    02-23 @ 07:01  -  02-24 @ 07:00  --------------------------------------------------------  IN: 3987.5 mL / OUT: 2750 mL / NET: 1237.5 mL    02-24 @ 07:01  -  02-25 @ 07:00  --------------------------------------------------------  IN: 1500 mL / OUT: 4072 mL / NET: -2572 mL    02-25 @ 07:01  -  02-25 @ 15:32  --------------------------------------------------------  IN: 250 mL / OUT: 0 mL / NET: 250 mL      Height (cm): 182.88 (02-25 @ 06:02)  Weight (kg): 119.1 (02-25 @ 06:02)  BMI (kg/m2): 35.6 (02-25 @ 06:02)  BSA (m2): 2.39 (02-25 @ 06:02)    LABS:  02-25    148<H>  |  102  |  41<H>  ----------------------------<  116<H>  3.7   |  28  |  1.9<H>    Ca    8.2<L>      25 Feb 2019 04:54  Mg     2.0     02-25    TPro  5.9<L>  /  Alb  3.5  /  TBili  0.6  /  DBili      /  AST  15  /  ALT  10  /  AlkPhos  77  02-25                          10.5   8.85  )-----------( 220      ( 25 Feb 2019 04:54 )             34.7       Urine Studies:    Osmolality, Random Urine: 369 mos/kg (02-18 @ 23:00)  Sodium, Random Urine: 39.0 mmoL/L (02-18 @ 23:00)  Creatinine, Random Urine: 72 mg/dL (02-18 @ 23:00)      RADIOLOGY & ADDITIONAL STUDIES:

## 2019-02-25 NOTE — CONSULT NOTE ADULT - CONSULT REQUESTED DATE/TIME
15-Feb-2019 03:14
16-Feb-2019 12:02
17-Feb-2019 00:50
18-Feb-2019 10:49
19-Feb-2019 14:29
22-Feb-2019 22:52
25-Feb-2019 13:33
18-Feb-2019 09:54

## 2019-02-25 NOTE — PROGRESS NOTE ADULT - SUBJECTIVE AND OBJECTIVE BOX
Hospital Day:  10d    Patient is a 83y old  Male who presents with a chief complaint of Recurrent falls    Subjective:  No overnight events. Seen and examined at bedside. Reported no acute complaints. Stated that he is feeling better today. Reported that he is normally a mouth breather. He reported no fevers, chills, nausea, vomiting, chest pain, shortness of breath, weakness, fatigue, constipation, diarrhea     Telemetry reviewed:     Past Medical Hx:   Hypertension  Hydrocephalus  CKD (chronic kidney disease)  DM (diabetes mellitus)  Afib    Past Sx:  History of prostate surgery  No significant past surgical history    Allergies:  No Known Allergies    Current Meds:   Standng Meds:  atorvastatin 20 milliGRAM(s) Oral at bedtime  calcium carbonate 1250 mG  + Vitamin D (OsCal 500 + D) 1 Tablet(s) Oral three times a day  chlorhexidine 4% Liquid 1 Application(s) Topical <User Schedule>  dextrose 5%. 1000 milliLiter(s) (50 mL/Hr) IV Continuous <Continuous>  dextrose 50% Injectable 12.5 Gram(s) IV Push once  dextrose 50% Injectable 25 Gram(s) IV Push once  dextrose 50% Injectable 25 Gram(s) IV Push once  furosemide   Injectable 40 milliGRAM(s) IV Push two times a day  guaiFENesin  milliGRAM(s) Oral every 12 hours  heparin  Injectable 5000 Unit(s) SubCutaneous every 8 hours  insulin glargine Injectable (LANTUS) 10 Unit(s) SubCutaneous at bedtime  insulin lispro Injectable (HumaLOG) 3 Unit(s) SubCutaneous three times a day before meals  lactated ringers. 1000 milliLiter(s) (50 mL/Hr) IV Continuous <Continuous>  metolazone 2.5 milliGRAM(s) Oral daily  metoprolol tartrate 25 milliGRAM(s) Oral three times a day  nystatin Powder 1 Application(s) Topical three times a day  pantoprazole  Injectable 40 milliGRAM(s) IV Push daily  tamsulosin 0.4 milliGRAM(s) Oral at bedtime    PRN Meds:  acetaminophen   Tablet .. 650 milliGRAM(s) Oral every 6 hours PRN Moderate Pain (4 - 6)  ALBUTerol/ipratropium for Nebulization 3 milliLiter(s) Nebulizer every 6 hours PRN Shortness of Breath and/or Wheezing  dextrose 40% Gel 15 Gram(s) Oral once PRN Blood Glucose LESS THAN 70 milliGRAM(s)/deciliter  glucagon  Injectable 1 milliGRAM(s) IntraMuscular once PRN Glucose LESS THAN 70 milligrams/deciliter    HOME MEDICATIONS:  amLODIPine 5 mg oral tablet: 1 tab(s) orally 2 times a day  indapamide 2.5 mg oral tablet: 1 tab(s) orally once a day (in the morning)  Lantus Solostar Pen 100 units/mL subcutaneous solution: 20 unit(s) subcutaneous once a day (at bedtime)  Lasix 40 mg oral tablet: 1 tab(s) orally 2 times a day  pioglitazone 30 mg oral tablet: 1 tab(s) orally once a day  semaglutide 2 mg/1.5 mL subcutaneous solution: 20  subcutaneous once a week      Vital Signs:   T(F): 96.5 (02-25-19 @ 06:02), Max: 98.6 (02-24-19 @ 19:37)  HR: 68 (02-25-19 @ 06:02) (68 - 95)  BP: 121/65 (02-25-19 @ 06:02) (105/68 - 152/70)  RR: 20 (02-25-19 @ 06:02) (18 - 25)  SpO2: 94% (02-25-19 @ 04:00) (94% - 99%)      02-24-19 @ 07:01  -  02-25-19 @ 07:00  --------------------------------------------------------  IN: 1500 mL / OUT: 4072 mL / NET: -2572 mL    Physical Exam:   GENERAL: Obese appearing male with thick neck, anasarca   HEENT: NCAT, pupil a bit more sluggish on left than on right but both are responsive to light. EOMI, Neck thick  CHEST/LUNG: CTA,  HEART: Regular rate and rhythm; s1 s2 appreciated, No murmurs, rubs, or gallops  ABDOMEN: Soft, Nontender, mild distension; Bowel sounds present; obese   EXTREMITIES: 1-2+ pitting edema in bilateral LE from shin to toes. Noted 1+ edema in bilateral upper extremities. Noted small ulcerated sores on extremities as well, Muscle strength 5/5 in bilateral upper and lower extremities.   NERVOUS SYSTEM:  Alert & Oriented X3    Indwelling Barragan in Place   Labs:                         10.5   8.85  )-----------( 220      ( 25 Feb 2019 04:54 )             34.7       25 Feb 2019 04:54    148    |  102    |  41     ----------------------------<  116    3.7     |  28     |  1.9      Ca    8.2        25 Feb 2019 04:54  Mg     2.0       25 Feb 2019 04:54    TPro  5.9    /  Alb  3.5    /  TBili  0.6    /  DBili  x      /  AST  15     /  ALT  10     /  AlkPhos  77     25 Feb 2019 04:54       pTT    31.4             ----< 1.17 INR  (02-25-19 @ 04:54)    13.40        PT    Radiology:   None Today     Assessment and Plan:   This is an 83 year old male with PMHx of Type 2 Diabetes, HTN, Hydrocephalus s/p  shunt, atrial fibrillation not on AC due to falls who presented to the ED s/p mechanical fall. He was found in the ED to have a desaturation with PVCs and short runs of NSVT that warranted him to be intubated and admitted to the CCU. He was intubated for approximately 6 days during this hospitalization stay.     #Acute Hypoxic Respiratory Failure 2/2 aspiration PNA vs Pulmonary Edema  - Downgraded from CCU after 6 days of intubation   - ID evaluated; maintain off antibiotics   - Continue NPO pending S&S reassessment   - BiPAP as needed for night  - Continue with Metolazone 2.5mg daily and Lasix 40mg IV daily    #History of A-fib with noted PVCs on Monitor and suspected Type 2 MI on admission  - Continue with Metoprolol 25mg TID as per cards recs   - CHADsVASC score 4; Hold off on anti coagulation due to falls     #TOBIAS 2/2 tubular injury from Hypotension (resolving)  - Continue with negative fluid balance (0.5L negative)   - Continue on Metolazone 2.5mg daily and Lasix 40mg IV daily   - Can increase metolazone   - Noted baseline creatinine is approximately 2.0; currently 1.9  - Seen by urology; can have TOV    #Hematuria (resolved)  - Can remove barragan when medically stable for TOV  - Outpatient follow up with Dr. Matthews     #Recurrent Falls likely 2/2 deconditioning vs hypotension from medication   - Negative trauma workup  - PT/Rehab    #Hypertension   - Continue with current medications     #Type 2 DM   - Continue with Lantus 10 units daily and Lispro 3 units with meals (when on PO diet)   - Continue to monitor sugars and adjust as needed   POCT Blood Glucose.: 125 mg/dL (25 Feb 2019 08:09)  POCT Blood Glucose.: 125 mg/dL (24 Feb 2019 21:44)  POCT Blood Glucose.: 128 mg/dL (24 Feb 2019 17:09)  POCT Blood Glucose.: 143 mg/dL (24 Feb 2019 12:06)    #Hypernatremia (resolved) Hospital Day:  10d    Patient is a 83y old  Male who presents with a chief complaint of Recurrent falls    Subjective:  No overnight events. Seen and examined at bedside. Reported no acute complaints. Stated that he is feeling better today. Reported that he is normally a mouth breather. He reported no fevers, chills, nausea, vomiting, chest pain, shortness of breath, weakness, fatigue, constipation, diarrhea     Telemetry reviewed: atrial fibrillation rate controlled with numerous PVCs     Past Medical Hx:   Hypertension  Hydrocephalus  CKD (chronic kidney disease)  DM (diabetes mellitus)  Afib    Past Sx:  History of prostate surgery  No significant past surgical history    Allergies:  No Known Allergies    Current Meds:   Standng Meds:  atorvastatin 20 milliGRAM(s) Oral at bedtime  calcium carbonate 1250 mG  + Vitamin D (OsCal 500 + D) 1 Tablet(s) Oral three times a day  chlorhexidine 4% Liquid 1 Application(s) Topical <User Schedule>  dextrose 5%. 1000 milliLiter(s) (50 mL/Hr) IV Continuous <Continuous>  dextrose 50% Injectable 12.5 Gram(s) IV Push once  dextrose 50% Injectable 25 Gram(s) IV Push once  dextrose 50% Injectable 25 Gram(s) IV Push once  furosemide   Injectable 40 milliGRAM(s) IV Push two times a day  guaiFENesin  milliGRAM(s) Oral every 12 hours  heparin  Injectable 5000 Unit(s) SubCutaneous every 8 hours  insulin glargine Injectable (LANTUS) 10 Unit(s) SubCutaneous at bedtime  insulin lispro Injectable (HumaLOG) 3 Unit(s) SubCutaneous three times a day before meals  lactated ringers. 1000 milliLiter(s) (50 mL/Hr) IV Continuous <Continuous>  metolazone 2.5 milliGRAM(s) Oral daily  metoprolol tartrate 25 milliGRAM(s) Oral three times a day  nystatin Powder 1 Application(s) Topical three times a day  pantoprazole  Injectable 40 milliGRAM(s) IV Push daily  tamsulosin 0.4 milliGRAM(s) Oral at bedtime    PRN Meds:  acetaminophen   Tablet .. 650 milliGRAM(s) Oral every 6 hours PRN Moderate Pain (4 - 6)  ALBUTerol/ipratropium for Nebulization 3 milliLiter(s) Nebulizer every 6 hours PRN Shortness of Breath and/or Wheezing  dextrose 40% Gel 15 Gram(s) Oral once PRN Blood Glucose LESS THAN 70 milliGRAM(s)/deciliter  glucagon  Injectable 1 milliGRAM(s) IntraMuscular once PRN Glucose LESS THAN 70 milligrams/deciliter    HOME MEDICATIONS:  amLODIPine 5 mg oral tablet: 1 tab(s) orally 2 times a day  indapamide 2.5 mg oral tablet: 1 tab(s) orally once a day (in the morning)  Lantus Solostar Pen 100 units/mL subcutaneous solution: 20 unit(s) subcutaneous once a day (at bedtime)  Lasix 40 mg oral tablet: 1 tab(s) orally 2 times a day  pioglitazone 30 mg oral tablet: 1 tab(s) orally once a day  semaglutide 2 mg/1.5 mL subcutaneous solution: 20  subcutaneous once a week      Vital Signs:   T(F): 96.5 (02-25-19 @ 06:02), Max: 98.6 (02-24-19 @ 19:37)  HR: 68 (02-25-19 @ 06:02) (68 - 95)  BP: 121/65 (02-25-19 @ 06:02) (105/68 - 152/70)  RR: 20 (02-25-19 @ 06:02) (18 - 25)  SpO2: 94% (02-25-19 @ 04:00) (94% - 99%)      02-24-19 @ 07:01  -  02-25-19 @ 07:00  --------------------------------------------------------  IN: 1500 mL / OUT: 4072 mL / NET: -2572 mL    Physical Exam:   GENERAL: Obese appearing male with thick neck, anasarca   HEENT: NCAT, pupil a bit more sluggish on left than on right but both are responsive to light. EOMI, Neck thick  CHEST/LUNG: CTA,  HEART: irregular rhythm with a regular rate; s1 s2 appreciated, No murmurs, rubs, or gallops  ABDOMEN: Soft, Nontender, mild distension; Bowel sounds present; obese   EXTREMITIES: 1-2+ pitting edema in bilateral LE from shin to toes. Noted 1+ edema in bilateral upper extremities. Noted small ulcerated sores on extremities as well, Muscle strength 5/5 in bilateral upper and lower extremities.   NERVOUS SYSTEM:  Alert & Oriented X3    Indwelling Barragan in Place   Labs:                         10.5   8.85  )-----------( 220      ( 25 Feb 2019 04:54 )             34.7       25 Feb 2019 04:54    148    |  102    |  41     ----------------------------<  116    3.7     |  28     |  1.9      Ca    8.2        25 Feb 2019 04:54  Mg     2.0       25 Feb 2019 04:54    TPro  5.9    /  Alb  3.5    /  TBili  0.6    /  DBili  x      /  AST  15     /  ALT  10     /  AlkPhos  77     25 Feb 2019 04:54       pTT    31.4             ----< 1.17 INR  (02-25-19 @ 04:54)    13.40        PT    Radiology:   None Today     Assessment and Plan:   This is an 83 year old male with PMHx of Type 2 Diabetes, HTN, Hydrocephalus s/p  shunt, atrial fibrillation not on AC due to falls who presented to the ED s/p mechanical fall. He was found in the ED to have a desaturation with PVCs and short runs of NSVT that warranted him to be intubated and admitted to the CCU. He was intubated for approximately 6 days during this hospitalization stay.     #Acute Hypoxic Respiratory Failure 2/2 aspiration PNA vs Pulmonary Edema  - Downgraded from CCU after 6 days of intubation   - ID evaluated; maintain off antibiotics   - Continue NPO pending S&S reassessment   - BiPAP as needed for night  - Continue with Metolazone 2.5mg daily and Lasix 40mg IV daily    #History of A-fib with noted PVCs on Monitor and suspected Type 2 MI on admission  - Continue with Metoprolol 25mg TID as per cards recs   - CHADsVASC score 4; Hold off on anti coagulation due to falls     #TOBIAS 2/2 tubular injury from Hypotension (resolving)  - Continue with negative fluid balance (0.5L negative)   - Continue on Metolazone 2.5mg daily and Lasix 40mg IV daily   - Can increase metolazone   - Noted baseline creatinine is approximately 2.0; currently 1.9  - Seen by urology; can have TOV    #Hematuria (resolved)  - Can remove barragan when medically stable for TOV  - Outpatient follow up with Dr. Matthews     #Recurrent Falls likely 2/2 deconditioning vs hypotension from medication   - Negative trauma workup  - PT/Rehab    #Hypertension   - Continue with current medications     #Type 2 DM   - Continue with Lantus 10 units daily and Lispro 3 units with meals (when on PO diet)   - Continue to monitor sugars and adjust as needed   POCT Blood Glucose.: 125 mg/dL (25 Feb 2019 08:09)  POCT Blood Glucose.: 125 mg/dL (24 Feb 2019 21:44)  POCT Blood Glucose.: 128 mg/dL (24 Feb 2019 17:09)  POCT Blood Glucose.: 143 mg/dL (24 Feb 2019 12:06)    #Hydrocephalus s/p  Shunt  - Chronically in place. Follows Dr. Wayne   - CT inpatient showing no signs of hydrocephalus     #Hypernatremia (resolved)    Activity: As tolerated; Physiatry to see  Diet: NPO until cleared by speech and swallow  DVT ppx: Heparin SubQ  GI ppx: Protonix  Code Status: Full Code for now  DISPO: continue to monitor Hospital Day:  10d    Patient is a 83y old  Male who presents with a chief complaint of Recurrent falls    Subjective:  No overnight events. Seen and examined at bedside. Reported no acute complaints. Stated that he is feeling better today. Reported that he is normally a mouth breather. He reported no fevers, chills, nausea, vomiting, chest pain, shortness of breath, weakness, fatigue, constipation, diarrhea     Telemetry reviewed: atrial fibrillation rate controlled with numerous multifocal PVCs     Past Medical Hx:   Hypertension  Hydrocephalus  CKD (chronic kidney disease)  DM (diabetes mellitus)  Afib    Past Sx:  History of prostate surgery  No significant past surgical history    Allergies:  No Known Allergies    Current Meds:   Standng Meds:  atorvastatin 20 milliGRAM(s) Oral at bedtime  calcium carbonate 1250 mG  + Vitamin D (OsCal 500 + D) 1 Tablet(s) Oral three times a day  chlorhexidine 4% Liquid 1 Application(s) Topical <User Schedule>  dextrose 5%. 1000 milliLiter(s) (50 mL/Hr) IV Continuous <Continuous>  dextrose 50% Injectable 12.5 Gram(s) IV Push once  dextrose 50% Injectable 25 Gram(s) IV Push once  dextrose 50% Injectable 25 Gram(s) IV Push once  furosemide   Injectable 40 milliGRAM(s) IV Push two times a day  guaiFENesin  milliGRAM(s) Oral every 12 hours  heparin  Injectable 5000 Unit(s) SubCutaneous every 8 hours  insulin glargine Injectable (LANTUS) 10 Unit(s) SubCutaneous at bedtime  insulin lispro Injectable (HumaLOG) 3 Unit(s) SubCutaneous three times a day before meals  lactated ringers. 1000 milliLiter(s) (50 mL/Hr) IV Continuous <Continuous>  metolazone 2.5 milliGRAM(s) Oral daily  metoprolol tartrate 25 milliGRAM(s) Oral three times a day  nystatin Powder 1 Application(s) Topical three times a day  pantoprazole  Injectable 40 milliGRAM(s) IV Push daily  tamsulosin 0.4 milliGRAM(s) Oral at bedtime    PRN Meds:  acetaminophen   Tablet .. 650 milliGRAM(s) Oral every 6 hours PRN Moderate Pain (4 - 6)  ALBUTerol/ipratropium for Nebulization 3 milliLiter(s) Nebulizer every 6 hours PRN Shortness of Breath and/or Wheezing  dextrose 40% Gel 15 Gram(s) Oral once PRN Blood Glucose LESS THAN 70 milliGRAM(s)/deciliter  glucagon  Injectable 1 milliGRAM(s) IntraMuscular once PRN Glucose LESS THAN 70 milligrams/deciliter    HOME MEDICATIONS:  amLODIPine 5 mg oral tablet: 1 tab(s) orally 2 times a day  indapamide 2.5 mg oral tablet: 1 tab(s) orally once a day (in the morning)  Lantus Solostar Pen 100 units/mL subcutaneous solution: 20 unit(s) subcutaneous once a day (at bedtime)  Lasix 40 mg oral tablet: 1 tab(s) orally 2 times a day  pioglitazone 30 mg oral tablet: 1 tab(s) orally once a day  semaglutide 2 mg/1.5 mL subcutaneous solution: 20  subcutaneous once a week      Vital Signs:   T(F): 96.5 (02-25-19 @ 06:02), Max: 98.6 (02-24-19 @ 19:37)  HR: 68 (02-25-19 @ 06:02) (68 - 95)  BP: 121/65 (02-25-19 @ 06:02) (105/68 - 152/70)  RR: 20 (02-25-19 @ 06:02) (18 - 25)  SpO2: 94% (02-25-19 @ 04:00) (94% - 99%)      02-24-19 @ 07:01  -  02-25-19 @ 07:00  --------------------------------------------------------  IN: 1500 mL / OUT: 4072 mL / NET: -2572 mL    Physical Exam:   GENERAL: Obese appearing male with thick neck, anasarca   HEENT: NCAT, pupil a bit more sluggish on left than on right but both are responsive to light. EOMI, Neck thick  CHEST/LUNG: CTA,  HEART: irregular rhythm with a regular rate; s1 s2 appreciated, No murmurs, rubs, or gallops  ABDOMEN: Soft, Nontender, mild distension; Bowel sounds present; obese   EXTREMITIES: 1-2+ pitting edema in bilateral LE from shin to toes. Noted 1+ edema in bilateral upper extremities. Noted small ulcerated sores on extremities as well, Muscle strength 5/5 in bilateral upper and lower extremities.   NERVOUS SYSTEM:  Alert & Oriented X3    Indwelling Aguirre in Place   Labs:                         10.5   8.85  )-----------( 220      ( 25 Feb 2019 04:54 )             34.7       25 Feb 2019 04:54    148    |  102    |  41     ----------------------------<  116    3.7     |  28     |  1.9      Ca    8.2        25 Feb 2019 04:54  Mg     2.0       25 Feb 2019 04:54    TPro  5.9    /  Alb  3.5    /  TBili  0.6    /  DBili  x      /  AST  15     /  ALT  10     /  AlkPhos  77     25 Feb 2019 04:54       pTT    31.4             ----< 1.17 INR  (02-25-19 @ 04:54)    13.40        PT    Radiology:   None Today     Assessment and Plan:   This is an 83 year old male with PMHx of Type 2 Diabetes, HTN, Hydrocephalus s/p  shunt, atrial fibrillation not on AC due to falls who presented to the ED s/p mechanical fall. He was found in the ED to have a desaturation with PVCs and short runs of NSVT that warranted him to be intubated and admitted to the CCU. He was intubated for approximately 6 days during this hospitalization stay.     #Acute Hypoxic Respiratory Failure 2/2 aspiration PNA vs Pulmonary Edema  - Downgraded from CCU after 6 days of intubation   - ID evaluated; maintain off antibiotics   - Continue NPO pending S&S reassessment   - BiPAP as needed for night  - Continue with Metolazone 2.5mg daily and Lasix 40mg IV daily    #History of A-fib with noted PVCs on Monitor and suspected Type 2 MI on admission  - Continue with Metoprolol 25mg TID as per cards recs   - CHADsVASC score 4; Hold off on anti coagulation due to falls   - Multifocal PVCs noted     #TOBIAS 2/2 tubular injury from Hypotension (resolving)  - Continue with negative fluid balance (0.5L negative)   - Continue on Metolazone 2.5mg daily and Lasix 40mg IV daily   - Can increase metolazone   - Noted baseline creatinine is approximately 2.0; currently 1.9  - TOV today     #Hematuria (resolved)  - TOV today   - Outpatient follow up with Dr. Matthews     #Recurrent Falls likely 2/2 deconditioning vs hypotension from medication   - Negative trauma workup  - PT/Rehab    #Hypertension   - Continue with current medications     #Type 2 DM   - Continue with Lantus 10 units daily and Lispro 3 units with meals (when on PO diet)   - Continue to monitor sugars and adjust as needed   POCT Blood Glucose.: 125 mg/dL (25 Feb 2019 08:09)  POCT Blood Glucose.: 125 mg/dL (24 Feb 2019 21:44)  POCT Blood Glucose.: 128 mg/dL (24 Feb 2019 17:09)  POCT Blood Glucose.: 143 mg/dL (24 Feb 2019 12:06)    #Hydrocephalus s/p  Shunt  - Chronically in place. Follows Dr. Wayne   - CT inpatient showing no signs of hydrocephalus     #Hypernatremia (resolved)    Activity: As tolerated; Physiatry to see  Diet: NPO until cleared by speech and swallow  DVT ppx: Heparin SubQ  GI ppx: Protonix  Code Status: Full Code for now  DISPO: continue to monitor Hospital Day:  10d    Patient is a 83y old  Male who presents with a chief complaint of Recurrent falls    Subjective:  No overnight events. Seen and examined at bedside. Reported no acute complaints. Stated that he is feeling better today. Reported that he is normally a mouth breather. He reported no fevers, chills, nausea, vomiting, chest pain, shortness of breath, weakness, fatigue, constipation, diarrhea     Telemetry reviewed: atrial fibrillation rate controlled with numerous multifocal PVCs     Past Medical Hx:   Hypertension  Hydrocephalus  CKD (chronic kidney disease)  DM (diabetes mellitus)  Afib    Past Sx:  History of prostate surgery  No significant past surgical history    Allergies:  No Known Allergies    Current Meds:   Standng Meds:  atorvastatin 20 milliGRAM(s) Oral at bedtime  calcium carbonate 1250 mG  + Vitamin D (OsCal 500 + D) 1 Tablet(s) Oral three times a day  chlorhexidine 4% Liquid 1 Application(s) Topical <User Schedule>  dextrose 5%. 1000 milliLiter(s) (50 mL/Hr) IV Continuous <Continuous>  dextrose 50% Injectable 12.5 Gram(s) IV Push once  dextrose 50% Injectable 25 Gram(s) IV Push once  dextrose 50% Injectable 25 Gram(s) IV Push once  furosemide   Injectable 40 milliGRAM(s) IV Push two times a day  guaiFENesin  milliGRAM(s) Oral every 12 hours  heparin  Injectable 5000 Unit(s) SubCutaneous every 8 hours  insulin glargine Injectable (LANTUS) 10 Unit(s) SubCutaneous at bedtime  insulin lispro Injectable (HumaLOG) 3 Unit(s) SubCutaneous three times a day before meals  lactated ringers. 1000 milliLiter(s) (50 mL/Hr) IV Continuous <Continuous>  metolazone 2.5 milliGRAM(s) Oral daily  metoprolol tartrate 25 milliGRAM(s) Oral three times a day  nystatin Powder 1 Application(s) Topical three times a day  pantoprazole  Injectable 40 milliGRAM(s) IV Push daily  tamsulosin 0.4 milliGRAM(s) Oral at bedtime    PRN Meds:  acetaminophen   Tablet .. 650 milliGRAM(s) Oral every 6 hours PRN Moderate Pain (4 - 6)  ALBUTerol/ipratropium for Nebulization 3 milliLiter(s) Nebulizer every 6 hours PRN Shortness of Breath and/or Wheezing  dextrose 40% Gel 15 Gram(s) Oral once PRN Blood Glucose LESS THAN 70 milliGRAM(s)/deciliter  glucagon  Injectable 1 milliGRAM(s) IntraMuscular once PRN Glucose LESS THAN 70 milligrams/deciliter    HOME MEDICATIONS:  amLODIPine 5 mg oral tablet: 1 tab(s) orally 2 times a day  indapamide 2.5 mg oral tablet: 1 tab(s) orally once a day (in the morning)  Lantus Solostar Pen 100 units/mL subcutaneous solution: 20 unit(s) subcutaneous once a day (at bedtime)  Lasix 40 mg oral tablet: 1 tab(s) orally 2 times a day  pioglitazone 30 mg oral tablet: 1 tab(s) orally once a day  semaglutide 2 mg/1.5 mL subcutaneous solution: 20  subcutaneous once a week      Vital Signs:   T(F): 96.5 (02-25-19 @ 06:02), Max: 98.6 (02-24-19 @ 19:37)  HR: 68 (02-25-19 @ 06:02) (68 - 95)  BP: 121/65 (02-25-19 @ 06:02) (105/68 - 152/70)  RR: 20 (02-25-19 @ 06:02) (18 - 25)  SpO2: 94% (02-25-19 @ 04:00) (94% - 99%)      02-24-19 @ 07:01  -  02-25-19 @ 07:00  --------------------------------------------------------  IN: 1500 mL / OUT: 4072 mL / NET: -2572 mL    Physical Exam:   GENERAL: Obese appearing male with thick neck, anasarca   HEENT: NCAT, pupil a bit more sluggish on left than on right but both are responsive to light. EOMI, Neck thick  CHEST/LUNG: CTA,  HEART: irregular rhythm with a regular rate; s1 s2 appreciated, No murmurs, rubs, or gallops  ABDOMEN: Soft, Nontender, mild distension; Bowel sounds present; obese   EXTREMITIES: 1-2+ pitting edema in bilateral LE from shin to toes. Noted 1+ edema in bilateral upper extremities. Noted small ulcerated sores on extremities as well, Muscle strength 5/5 in bilateral upper and lower extremities.   NERVOUS SYSTEM:  Alert & Oriented X3    Indwelling Aguirre in Place   Labs:                         10.5   8.85  )-----------( 220      ( 25 Feb 2019 04:54 )             34.7       25 Feb 2019 04:54    148    |  102    |  41     ----------------------------<  116    3.7     |  28     |  1.9      Ca    8.2        25 Feb 2019 04:54  Mg     2.0       25 Feb 2019 04:54    TPro  5.9    /  Alb  3.5    /  TBili  0.6    /  DBili  x      /  AST  15     /  ALT  10     /  AlkPhos  77     25 Feb 2019 04:54       pTT    31.4             ----< 1.17 INR  (02-25-19 @ 04:54)    13.40        PT    Radiology:   None Today     Assessment and Plan:   This is an 83 year old male with PMHx of Type 2 Diabetes, HTN, Hydrocephalus s/p  shunt, atrial fibrillation not on AC due to falls who presented to the ED s/p mechanical fall. He was found in the ED to have a desaturation with PVCs and short runs of NSVT that warranted him to be intubated and admitted to the CCU. He was intubated for approximately 6 days during this hospitalization stay.     #Acute Hypoxic Respiratory Failure 2/2 aspiration PNA vs Pulmonary Edema  - Downgraded from CCU after 6 days of intubation   - ID evaluated; maintain off antibiotics   - Continue NPO pending S&S reassessment   - BiPAP as needed for night  - Continue with Metolazone 2.5mg daily and Lasix 40mg IV daily    #History of A-fib with noted PVCs on Monitor and suspected Type 2 MI on admission  - Continue with Metoprolol 25mg TID as per cards recs   - CHADsVASC score 4; Hold off on anti coagulation due to falls   - Multifocal PVCs noted     #TOBIAS 2/2 tubular injury from Hypotension (resolving)  - Continue with negative fluid balance (0.5L negative)   - Continue on Metolazone 2.5mg daily and Lasix 40mg IV daily   - Can increase metolazone   - Noted baseline creatinine is approximately 2.0; currently 1.9  - TOV today     #Hematuria (resolved)  - TOV today   - Outpatient follow up with Dr. Matthews     #Recurrent Falls likely 2/2 deconditioning vs hypotension from medication   - Negative trauma workup  - PT/Rehab    #Hypertension   - Continue with current medications     #Type 2 DM   - Continue with Lantus 10 units daily and Lispro 3 units with meals (when on PO diet)   - Continue to monitor sugars and adjust as needed   POCT Blood Glucose.: 125 mg/dL (25 Feb 2019 08:09)  POCT Blood Glucose.: 125 mg/dL (24 Feb 2019 21:44)  POCT Blood Glucose.: 128 mg/dL (24 Feb 2019 17:09)  POCT Blood Glucose.: 143 mg/dL (24 Feb 2019 12:06)    #Hydrocephalus s/p  Shunt  - Chronically in place. Follows Dr. Wayne   - CT inpatient showing no signs of hydrocephalus     #JOHANNY with suspected obesity hypoventilation syndrome  - Non compliant with CPAP at home.   - CPAP needs to be worn     #Hypernatremia (resolved)    Activity: As tolerated; Physiatry to see  Diet: NPO until cleared by speech and swallow  DVT ppx: Heparin SubQ  GI ppx: Protonix  Code Status: Full Code for now  DISPO: continue to monitor Hospital Day:  10d    Patient is a 83y old  Male who presents with a chief complaint of Recurrent falls    Subjective:  No overnight events. Seen and examined at bedside. Reported no acute complaints. Stated that he is feeling better today. Reported that he is normally a mouth breather. He reported no fevers, chills, nausea, vomiting, chest pain, shortness of breath, weakness, fatigue, constipation, diarrhea     Telemetry reviewed: atrial fibrillation rate controlled with numerous multifocal PVCs     Past Medical Hx:   Hypertension  Hydrocephalus  CKD (chronic kidney disease)  DM (diabetes mellitus)  Afib    Past Sx:  History of prostate surgery  No significant past surgical history    Allergies:  No Known Allergies    Current Meds:   Standng Meds:  atorvastatin 20 milliGRAM(s) Oral at bedtime  calcium carbonate 1250 mG  + Vitamin D (OsCal 500 + D) 1 Tablet(s) Oral three times a day  chlorhexidine 4% Liquid 1 Application(s) Topical <User Schedule>  dextrose 5%. 1000 milliLiter(s) (50 mL/Hr) IV Continuous <Continuous>  dextrose 50% Injectable 12.5 Gram(s) IV Push once  dextrose 50% Injectable 25 Gram(s) IV Push once  dextrose 50% Injectable 25 Gram(s) IV Push once  furosemide   Injectable 40 milliGRAM(s) IV Push two times a day  guaiFENesin  milliGRAM(s) Oral every 12 hours  heparin  Injectable 5000 Unit(s) SubCutaneous every 8 hours  insulin glargine Injectable (LANTUS) 10 Unit(s) SubCutaneous at bedtime  insulin lispro Injectable (HumaLOG) 3 Unit(s) SubCutaneous three times a day before meals  lactated ringers. 1000 milliLiter(s) (50 mL/Hr) IV Continuous <Continuous>  metolazone 2.5 milliGRAM(s) Oral daily  metoprolol tartrate 25 milliGRAM(s) Oral three times a day  nystatin Powder 1 Application(s) Topical three times a day  pantoprazole  Injectable 40 milliGRAM(s) IV Push daily  tamsulosin 0.4 milliGRAM(s) Oral at bedtime    PRN Meds:  acetaminophen   Tablet .. 650 milliGRAM(s) Oral every 6 hours PRN Moderate Pain (4 - 6)  ALBUTerol/ipratropium for Nebulization 3 milliLiter(s) Nebulizer every 6 hours PRN Shortness of Breath and/or Wheezing  dextrose 40% Gel 15 Gram(s) Oral once PRN Blood Glucose LESS THAN 70 milliGRAM(s)/deciliter  glucagon  Injectable 1 milliGRAM(s) IntraMuscular once PRN Glucose LESS THAN 70 milligrams/deciliter    HOME MEDICATIONS:  amLODIPine 5 mg oral tablet: 1 tab(s) orally 2 times a day  indapamide 2.5 mg oral tablet: 1 tab(s) orally once a day (in the morning)  Lantus Solostar Pen 100 units/mL subcutaneous solution: 20 unit(s) subcutaneous once a day (at bedtime)  Lasix 40 mg oral tablet: 1 tab(s) orally 2 times a day  pioglitazone 30 mg oral tablet: 1 tab(s) orally once a day  semaglutide 2 mg/1.5 mL subcutaneous solution: 20  subcutaneous once a week      Vital Signs:   T(F): 96.5 (02-25-19 @ 06:02), Max: 98.6 (02-24-19 @ 19:37)  HR: 68 (02-25-19 @ 06:02) (68 - 95)  BP: 121/65 (02-25-19 @ 06:02) (105/68 - 152/70)  RR: 20 (02-25-19 @ 06:02) (18 - 25)  SpO2: 94% (02-25-19 @ 04:00) (94% - 99%)      02-24-19 @ 07:01  -  02-25-19 @ 07:00  --------------------------------------------------------  IN: 1500 mL / OUT: 4072 mL / NET: -2572 mL    Physical Exam:   GENERAL: Obese appearing male with thick neck, anasarca   HEENT: NCAT, pupil a bit more sluggish on left than on right but both are responsive to light. EOMI, Neck thick  CHEST/LUNG: CTA,  HEART: irregular rhythm with a regular rate; s1 s2 appreciated, No murmurs, rubs, or gallops  ABDOMEN: Soft, Nontender, mild distension; Bowel sounds present; obese   EXTREMITIES: 1-2+ pitting edema in bilateral LE from shin to toes. Noted 1+ edema in bilateral upper extremities. Noted small ulcerated sores on extremities as well, Muscle strength 5/5 in bilateral upper and lower extremities.   NERVOUS SYSTEM:  Alert & Oriented X3    Indwelling Aguirre in Place   Labs:                         10.5   8.85  )-----------( 220      ( 25 Feb 2019 04:54 )             34.7       25 Feb 2019 04:54    148    |  102    |  41     ----------------------------<  116    3.7     |  28     |  1.9      Ca    8.2        25 Feb 2019 04:54  Mg     2.0       25 Feb 2019 04:54    TPro  5.9    /  Alb  3.5    /  TBili  0.6    /  DBili  x      /  AST  15     /  ALT  10     /  AlkPhos  77     25 Feb 2019 04:54       pTT    31.4             ----< 1.17 INR  (02-25-19 @ 04:54)    13.40        PT    Radiology:   None Today     Assessment and Plan:   This is an 83 year old male with PMHx of Type 2 Diabetes, HTN, Hydrocephalus s/p  shunt, atrial fibrillation not on AC due to falls who presented to the ED s/p mechanical fall. He was found in the ED to have a desaturation with PVCs and short runs of NSVT that warranted him to be intubated and admitted to the CCU. He was intubated for approximately 6 days during this hospitalization stay.     #Acute Hypoxic Respiratory Failure 2/2 aspiration PNA vs Pulmonary Edema  - Downgraded from CCU after 6 days of intubation   - ID evaluated; maintain off antibiotics   - Continue NPO pending S&S reassessment   - BiPAP as needed for night  - Continue with Metolazone 2.5mg daily and Lasix 40mg IV daily    #History of A-fib with noted PVCs on Monitor and suspected Type 2 MI on admission  - Continue with Metoprolol 25mg TID as per cards recs   - CHADsVASC score 4; Hold off on anti coagulation due to falls   - Multifocal PVCs noted     #TOBIAS 2/2 tubular injury from Hypotension (resolving)  - Continue with negative fluid balance (0.5L negative)   - Continue on Metolazone 2.5mg daily and Lasix 40mg IV daily   - Can increase metolazone   - Noted baseline creatinine is approximately 2.0; currently 1.9  - TOV today     #Hematuria (resolved)  - TOV today   - Outpatient follow up with Dr. Matthews     #Recurrent Falls likely 2/2 deconditioning vs hypotension from medication   - Negative trauma workup  - PT/Rehab    #Hypertension   - Continue with current medications     #Type 2 DM   - Continue with Lantus 10 units daily and Lispro 3 units with meals (when on PO diet)   - Continue to monitor sugars and adjust as needed   POCT Blood Glucose.: 125 mg/dL (25 Feb 2019 08:09)  POCT Blood Glucose.: 125 mg/dL (24 Feb 2019 21:44)  POCT Blood Glucose.: 128 mg/dL (24 Feb 2019 17:09)  POCT Blood Glucose.: 143 mg/dL (24 Feb 2019 12:06)    #Hydrocephalus s/p  Shunt  - Chronically in place. Follows Dr. Wayne   - CT inpatient showing no signs of hydrocephalus     #JOHANNY with suspected obesity hypoventilation syndrome  - Non compliant with CPAP at home.   - CPAP needs to be worn     #Hypernatremia (resolved)    Activity: As tolerated; Physiatry to see  Diet: NPO until cleared by speech and swallow  DVT ppx: Heparin SubQ  GI ppx: Protonix  Code Status: Full Code for now  DISPO: continue to monitor     Case discussed with Dr. Murphy

## 2019-02-25 NOTE — PROGRESS NOTE ADULT - SUBJECTIVE AND OBJECTIVE BOX
DELVISVINCENZO  83y Male    CHIEF COMPLAINT:    Patient is a 83y old  Male who presents with a chief complaint of Recurrent falls  TOBIAS (25 Feb 2019 15:30)      INTERVAL HPI/OVERNIGHT EVENTS:    Patient seen and examined.    ROS: All other systems are negative.    Vital Signs:    T(F): 97 (02-25-19 @ 14:10), Max: 98.6 (02-24-19 @ 19:37)  HR: 82 (02-25-19 @ 14:10) (68 - 95)  BP: 138/93 (02-25-19 @ 14:10) (105/68 - 152/70)  RR: 19 (02-25-19 @ 14:10) (18 - 25)  SpO2: 94% (02-25-19 @ 04:00) (94% - 99%)  I&O's Summary    24 Feb 2019 07:01  -  25 Feb 2019 07:00  --------------------------------------------------------  IN: 1500 mL / OUT: 4072 mL / NET: -2572 mL    25 Feb 2019 07:01  -  25 Feb 2019 15:47  --------------------------------------------------------  IN: 250 mL / OUT: 0 mL / NET: 250 mL      Daily Height in cm: 182.88 (25 Feb 2019 06:02)    Daily   CAPILLARY BLOOD GLUCOSE      POCT Blood Glucose.: 121 mg/dL (25 Feb 2019 11:02)  POCT Blood Glucose.: 125 mg/dL (25 Feb 2019 08:09)  POCT Blood Glucose.: 125 mg/dL (24 Feb 2019 21:44)  POCT Blood Glucose.: 128 mg/dL (24 Feb 2019 17:09)      PHYSICAL EXAM:    GENERAL:  NAD  SKIN: No rashes or lesions  HENT: Atrumatic. Normocephalic. PERRL. Moist membranes.  NECK: Supple, No JVD. No lymphadenopathy.  PULMONARY: CTA B/L. No wheezing. No rales  CVS: Normal S1, S2. Rate and Rythm are regular. No murmurs.  ABDOMEN/GI: Soft, Nontender, Nondistended; BS present  EXTREMITIES: Peripheral pulses intact. No edema B/L LE.  NEUROLOGIC:  No motor or sensory deficit.  PSYCH: Alert & oriented x 3    Consultant(s) Notes Reviewed:  [x ] YES  [ ] NO  Care Discussed with Consultants/Other Providers [ x] YES  [ ] NO    EKG reviewed  Telemetry reviewed    LABS:                        10.5   8.85  )-----------( 220      ( 25 Feb 2019 04:54 )             34.7     02-25    148<H>  |  102  |  41<H>  ----------------------------<  116<H>  3.7   |  28  |  1.9<H>    Ca    8.2<L>      25 Feb 2019 04:54  Mg     2.0     02-25    TPro  5.9<L>  /  Alb  3.5  /  TBili  0.6  /  DBili  x   /  AST  15  /  ALT  10  /  AlkPhos  77  02-25    PT/INR - ( 25 Feb 2019 04:54 )   PT: 13.40 sec;   INR: 1.17 ratio         PTT - ( 25 Feb 2019 04:54 )  PTT:31.4 sec          RADIOLOGY & ADDITIONAL TESTS:    < from: Transthoracic Echocardiogram (02.16.19 @ 13:35) >    Summary:   1. Normal left ventricular internal cavity size.   2. Mildly enlarged left atrium.   3. Normal right atrial size.   4. There is no evidence of pericardial effusion.   5. Thickening and calcification of the anterior and posterior mitral   valve leaflets.   6. Moderate tricuspid regurgitation.   7. Pulmonic valve regurgitation.    < end of copied text >    Imaging or report Personally Reviewed:  [ ] YES  [ ] NO    Medications:  Standing  atorvastatin 20 milliGRAM(s) Oral at bedtime  calcium carbonate 1250 mG  + Vitamin D (OsCal 500 + D) 1 Tablet(s) Oral three times a day  chlorhexidine 4% Liquid 1 Application(s) Topical <User Schedule>  dextrose 5%. 1000 milliLiter(s) IV Continuous <Continuous>  dextrose 50% Injectable 12.5 Gram(s) IV Push once  dextrose 50% Injectable 25 Gram(s) IV Push once  dextrose 50% Injectable 25 Gram(s) IV Push once  furosemide    Tablet 40 milliGRAM(s) Oral daily  guaiFENesin  milliGRAM(s) Oral every 12 hours  heparin  Injectable 5000 Unit(s) SubCutaneous every 8 hours  insulin glargine Injectable (LANTUS) 10 Unit(s) SubCutaneous at bedtime  insulin lispro Injectable (HumaLOG) 3 Unit(s) SubCutaneous three times a day before meals  metolazone 2.5 milliGRAM(s) Oral daily  metoprolol tartrate 25 milliGRAM(s) Oral three times a day  nystatin Powder 1 Application(s) Topical three times a day  pantoprazole    Tablet 40 milliGRAM(s) Oral before breakfast  tamsulosin 0.4 milliGRAM(s) Oral at bedtime    PRN Meds  acetaminophen   Tablet .. 650 milliGRAM(s) Oral every 6 hours PRN  ALBUTerol/ipratropium for Nebulization 3 milliLiter(s) Nebulizer every 6 hours PRN  dextrose 40% Gel 15 Gram(s) Oral once PRN  glucagon  Injectable 1 milliGRAM(s) IntraMuscular once PRN      Case discussed with resident    Care discussed with pt/family VINCENZO EDMONDSON  83y Male    CHIEF COMPLAINT:    Patient is a 83y old  Male who presents with a chief complaint of Recurrent falls  TOBIAS (25 Feb 2019 15:30)      INTERVAL HPI/OVERNIGHT EVENTS:    Patient seen and examined. Denies sob. Had hematuria after removal of the catheter. Clearing up now. Incontinent. No cough. No fever    ROS: All other systems are negative.    Vital Signs:    T(F): 97 (02-25-19 @ 14:10), Max: 98.6 (02-24-19 @ 19:37)  HR: 82 (02-25-19 @ 14:10) (68 - 95)  BP: 138/93 (02-25-19 @ 14:10) (105/68 - 152/70)  RR: 19 (02-25-19 @ 14:10) (18 - 25)  SpO2: 94% (02-25-19 @ 04:00) (94% - 99%)  I&O's Summary    24 Feb 2019 07:01  -  25 Feb 2019 07:00  --------------------------------------------------------  IN: 1500 mL / OUT: 4072 mL / NET: -2572 mL    25 Feb 2019 07:01  -  25 Feb 2019 15:47  --------------------------------------------------------  IN: 250 mL / OUT: 0 mL / NET: 250 mL      Daily Height in cm: 182.88 (25 Feb 2019 06:02)    Daily   CAPILLARY BLOOD GLUCOSE      POCT Blood Glucose.: 121 mg/dL (25 Feb 2019 11:02)  POCT Blood Glucose.: 125 mg/dL (25 Feb 2019 08:09)  POCT Blood Glucose.: 125 mg/dL (24 Feb 2019 21:44)  POCT Blood Glucose.: 128 mg/dL (24 Feb 2019 17:09)      PHYSICAL EXAM:    GENERAL:  NAD  SKIN: No rashes or lesions  HENT: Atraumatic Normocephalic. PERRL. Moist membranes.  NECK: Supple, No JVD. No lymphadenopathy.  PULMONARY: CTA B/L. No wheezing. No rales  CVS: Normal S1, S2. Rate and Rhythm are IRIR. No murmurs.  ABDOMEN/GI: Soft, Nontender, Nondistended; BS present  EXTREMITIES: Peripheral pulses intact. Trace edema B/L LE.  NEUROLOGIC:  No motor or sensory deficit.  PSYCH: Alert & oriented x 3    Consultant(s) Notes Reviewed:  [x ] YES  [ ] NO  Care Discussed with Consultants/Other Providers [ x] YES  [ ] NO    EKG reviewed  Telemetry reviewed    LABS:                        10.5   8.85  )-----------( 220      ( 25 Feb 2019 04:54 )             34.7     02-25    148<H>  |  102  |  41<H>  ----------------------------<  116<H>   Creatinine Trend: 1.9<--, 1.8<--, 1.7<--, 1.8<--, 1.8<--, 1.8<--  3.7   |  28  |  1.9<H>    Ca    8.2<L>      25 Feb 2019 04:54  Mg     2.0     02-25    TPro  5.9<L>  /  Alb  3.5  /  TBili  0.6  /  DBili  x   /  AST  15  /  ALT  10  /  AlkPhos  77  02-25    PT/INR - ( 25 Feb 2019 04:54 )   PT: 13.40 sec;   INR: 1.17 ratio         PTT - ( 25 Feb 2019 04:54 )  PTT:31.4 sec          RADIOLOGY & ADDITIONAL TESTS:    < from: Transthoracic Echocardiogram (02.16.19 @ 13:35) >    Summary:   1. Normal left ventricular internal cavity size.   2. Mildly enlarged left atrium.   3. Normal right atrial size.   4. There is no evidence of pericardial effusion.   5. Thickening and calcification of the anterior and posterior mitral   valve leaflets.   6. Moderate tricuspid regurgitation.   7. Pulmonic valve regurgitation.    < end of copied text >    Imaging or report Personally Reviewed:  [ ] YES  [ ] NO    Medications:  Standing  atorvastatin 20 milliGRAM(s) Oral at bedtime  calcium carbonate 1250 mG  + Vitamin D (OsCal 500 + D) 1 Tablet(s) Oral three times a day  chlorhexidine 4% Liquid 1 Application(s) Topical <User Schedule>  dextrose 5%. 1000 milliLiter(s) IV Continuous <Continuous>  dextrose 50% Injectable 12.5 Gram(s) IV Push once  dextrose 50% Injectable 25 Gram(s) IV Push once  dextrose 50% Injectable 25 Gram(s) IV Push once  furosemide    Tablet 40 milliGRAM(s) Oral daily  guaiFENesin  milliGRAM(s) Oral every 12 hours  heparin  Injectable 5000 Unit(s) SubCutaneous every 8 hours  insulin glargine Injectable (LANTUS) 10 Unit(s) SubCutaneous at bedtime  insulin lispro Injectable (HumaLOG) 3 Unit(s) SubCutaneous three times a day before meals  metolazone 2.5 milliGRAM(s) Oral daily  metoprolol tartrate 25 milliGRAM(s) Oral three times a day  nystatin Powder 1 Application(s) Topical three times a day  pantoprazole    Tablet 40 milliGRAM(s) Oral before breakfast  tamsulosin 0.4 milliGRAM(s) Oral at bedtime    PRN Meds  acetaminophen   Tablet .. 650 milliGRAM(s) Oral every 6 hours PRN  ALBUTerol/ipratropium for Nebulization 3 milliLiter(s) Nebulizer every 6 hours PRN  dextrose 40% Gel 15 Gram(s) Oral once PRN  glucagon  Injectable 1 milliGRAM(s) IntraMuscular once PRN      Case discussed with resident    Care discussed with pt/family

## 2019-02-25 NOTE — PROGRESS NOTE ADULT - RS GEN PE MLT RESP DETAILS PC
diminished breath sounds, L/diminished breath sounds, R
diminished breath sounds, R/diminished breath sounds, L
no rhonchi/no wheezes

## 2019-02-26 ENCOUNTER — TRANSCRIPTION ENCOUNTER (OUTPATIENT)
Age: 84
End: 2019-02-26

## 2019-02-26 LAB
ALBUMIN SERPL ELPH-MCNC: 3.7 G/DL — SIGNIFICANT CHANGE UP (ref 3.5–5.2)
ALP SERPL-CCNC: 89 U/L — SIGNIFICANT CHANGE UP (ref 30–115)
ALT FLD-CCNC: 12 U/L — SIGNIFICANT CHANGE UP (ref 0–41)
ANION GAP SERPL CALC-SCNC: 19 MMOL/L — HIGH (ref 7–14)
AST SERPL-CCNC: 16 U/L — SIGNIFICANT CHANGE UP (ref 0–41)
BASOPHILS # BLD AUTO: 0.02 K/UL — SIGNIFICANT CHANGE UP (ref 0–0.2)
BASOPHILS NFR BLD AUTO: 0.2 % — SIGNIFICANT CHANGE UP (ref 0–1)
BILIRUB SERPL-MCNC: 0.6 MG/DL — SIGNIFICANT CHANGE UP (ref 0.2–1.2)
BUN SERPL-MCNC: 36 MG/DL — HIGH (ref 10–20)
CALCIUM SERPL-MCNC: 8.5 MG/DL — SIGNIFICANT CHANGE UP (ref 8.5–10.1)
CHLORIDE SERPL-SCNC: 102 MMOL/L — SIGNIFICANT CHANGE UP (ref 98–110)
CO2 SERPL-SCNC: 28 MMOL/L — SIGNIFICANT CHANGE UP (ref 17–32)
CREAT SERPL-MCNC: 1.8 MG/DL — HIGH (ref 0.7–1.5)
EOSINOPHIL # BLD AUTO: 0.28 K/UL — SIGNIFICANT CHANGE UP (ref 0–0.7)
EOSINOPHIL NFR BLD AUTO: 3.2 % — SIGNIFICANT CHANGE UP (ref 0–8)
GLUCOSE BLDC GLUCOMTR-MCNC: 118 MG/DL — HIGH (ref 70–99)
GLUCOSE BLDC GLUCOMTR-MCNC: 125 MG/DL — HIGH (ref 70–99)
GLUCOSE BLDC GLUCOMTR-MCNC: 147 MG/DL — HIGH (ref 70–99)
GLUCOSE BLDC GLUCOMTR-MCNC: 161 MG/DL — HIGH (ref 70–99)
GLUCOSE SERPL-MCNC: 135 MG/DL — HIGH (ref 70–99)
HCT VFR BLD CALC: 35 % — LOW (ref 42–52)
HGB BLD-MCNC: 10.6 G/DL — LOW (ref 14–18)
IMM GRANULOCYTES NFR BLD AUTO: 0.6 % — HIGH (ref 0.1–0.3)
LYMPHOCYTES # BLD AUTO: 1.14 K/UL — LOW (ref 1.2–3.4)
LYMPHOCYTES # BLD AUTO: 13.2 % — LOW (ref 20.5–51.1)
MAGNESIUM SERPL-MCNC: 2.1 MG/DL — SIGNIFICANT CHANGE UP (ref 1.8–2.4)
MCHC RBC-ENTMCNC: 24.4 PG — LOW (ref 27–31)
MCHC RBC-ENTMCNC: 30.3 G/DL — LOW (ref 32–37)
MCV RBC AUTO: 80.6 FL — SIGNIFICANT CHANGE UP (ref 80–94)
MONOCYTES # BLD AUTO: 0.6 K/UL — SIGNIFICANT CHANGE UP (ref 0.1–0.6)
MONOCYTES NFR BLD AUTO: 7 % — SIGNIFICANT CHANGE UP (ref 1.7–9.3)
NEUTROPHILS # BLD AUTO: 6.54 K/UL — HIGH (ref 1.4–6.5)
NEUTROPHILS NFR BLD AUTO: 75.8 % — HIGH (ref 42.2–75.2)
NRBC # BLD: 0 /100 WBCS — SIGNIFICANT CHANGE UP (ref 0–0)
PLATELET # BLD AUTO: 248 K/UL — SIGNIFICANT CHANGE UP (ref 130–400)
POTASSIUM SERPL-MCNC: 3.5 MMOL/L — SIGNIFICANT CHANGE UP (ref 3.5–5)
POTASSIUM SERPL-SCNC: 3.5 MMOL/L — SIGNIFICANT CHANGE UP (ref 3.5–5)
PROT SERPL-MCNC: 6.1 G/DL — SIGNIFICANT CHANGE UP (ref 6–8)
RBC # BLD: 4.34 M/UL — LOW (ref 4.7–6.1)
RBC # FLD: 15.9 % — HIGH (ref 11.5–14.5)
SODIUM SERPL-SCNC: 149 MMOL/L — HIGH (ref 135–146)
WBC # BLD: 8.63 K/UL — SIGNIFICANT CHANGE UP (ref 4.8–10.8)
WBC # FLD AUTO: 8.63 K/UL — SIGNIFICANT CHANGE UP (ref 4.8–10.8)

## 2019-02-26 RX ADMIN — Medication 600 MILLIGRAM(S): at 05:48

## 2019-02-26 RX ADMIN — HEPARIN SODIUM 5000 UNIT(S): 5000 INJECTION INTRAVENOUS; SUBCUTANEOUS at 22:24

## 2019-02-26 RX ADMIN — PANTOPRAZOLE SODIUM 40 MILLIGRAM(S): 20 TABLET, DELAYED RELEASE ORAL at 05:59

## 2019-02-26 RX ADMIN — NYSTATIN CREAM 1 APPLICATION(S): 100000 CREAM TOPICAL at 22:22

## 2019-02-26 RX ADMIN — Medication 1 TABLET(S): at 22:24

## 2019-02-26 RX ADMIN — Medication 3 UNIT(S): at 08:13

## 2019-02-26 RX ADMIN — HEPARIN SODIUM 5000 UNIT(S): 5000 INJECTION INTRAVENOUS; SUBCUTANEOUS at 14:59

## 2019-02-26 RX ADMIN — Medication 25 MILLIGRAM(S): at 22:24

## 2019-02-26 RX ADMIN — INSULIN GLARGINE 10 UNIT(S): 100 INJECTION, SOLUTION SUBCUTANEOUS at 22:24

## 2019-02-26 RX ADMIN — Medication 600 MILLIGRAM(S): at 18:24

## 2019-02-26 RX ADMIN — ATORVASTATIN CALCIUM 20 MILLIGRAM(S): 80 TABLET, FILM COATED ORAL at 22:24

## 2019-02-26 RX ADMIN — Medication 1 TABLET(S): at 15:00

## 2019-02-26 RX ADMIN — Medication 40 MILLIGRAM(S): at 05:51

## 2019-02-26 RX ADMIN — Medication 3 UNIT(S): at 12:11

## 2019-02-26 RX ADMIN — CHLORHEXIDINE GLUCONATE 1 APPLICATION(S): 213 SOLUTION TOPICAL at 05:54

## 2019-02-26 RX ADMIN — TAMSULOSIN HYDROCHLORIDE 0.4 MILLIGRAM(S): 0.4 CAPSULE ORAL at 22:24

## 2019-02-26 RX ADMIN — Medication 1 TABLET(S): at 05:47

## 2019-02-26 RX ADMIN — NYSTATIN CREAM 1 APPLICATION(S): 100000 CREAM TOPICAL at 15:00

## 2019-02-26 RX ADMIN — NYSTATIN CREAM 1 APPLICATION(S): 100000 CREAM TOPICAL at 05:56

## 2019-02-26 RX ADMIN — Medication 650 MILLIGRAM(S): at 15:57

## 2019-02-26 RX ADMIN — HEPARIN SODIUM 5000 UNIT(S): 5000 INJECTION INTRAVENOUS; SUBCUTANEOUS at 05:47

## 2019-02-26 RX ADMIN — Medication 25 MILLIGRAM(S): at 14:59

## 2019-02-26 RX ADMIN — Medication 25 MILLIGRAM(S): at 05:48

## 2019-02-26 RX ADMIN — Medication 3 UNIT(S): at 18:23

## 2019-02-26 NOTE — CHART NOTE - NSCHARTNOTEFT_GEN_A_CORE
Registered Dietitian Follow-Up     Patient Profile Reviewed                           Yes []   No []     Nutrition History Previously Obtained        Yes []  No []       Pertinent Subjective Information:      Pertinent Medical Interventions: Acute Hypoxic Respiratory Failure 2/2 aspiration PNA vs Pulmonary Edema- s/p extubation. - BiPAP as needed for night. History of A-fib with noted PVCs on Monitor and suspected Type 2 MI on admission- tele d/swati. TOBIAS 2/2 tubular injury from Hypotension (resolving). Urinary Incontinence- bladder scan pending. Recurrent Falls likely 2/2 deconditioning vs hypotension from medication- Negative trauma workup. DM2: on insulin. SLP: Dysphagia 3 with Honey Fluids.        Diet order: dysphagia 3 soft honey consistency fluids, DASH/TLC, consistent carb       Anthropometrics:  - Ht. 182.8cm   - Wt. 121.2kg on 2/26 vs. 130.7kg (2/22) vs. 134kg (2/18) - likely fluid shifts vs. weight trending down- will continue to monitor wt trends   - %wt change  - BMI 35.6  - IBW 78kg      Pertinent Lab Data: (2/26) RBC 4.44, Hg 10.6, Hct 35.0  (2/25) Na 148, glu 116, BUN 41      Pertinent Meds: Heparin, Glargine, Protonix, Lasix, Metoprolol, Oscal, Atorvastatin      Physical Findings:  - Appearance: alert&oriented, 1+ edema to L, R arm, 3+ to L, R leg   - GI function: no symptoms noted, last BM 2/23  - Tubes: no tubes noted   - Oral/Mouth cavity: no symptoms noted   - Skin: tear (BS 12)      Nutrition Requirements (from RD note on 2/22)   Weight Used: 130.7kg, IBW -78kg)     Estimated Energy Needs    Continue [x]  Adjust []   ~1900kcal/day based on 2030 kcal/day (MSJ x1.0) compared to 1715kcal- 1950kcal/d (22-25kcal/kg IBW) for BMI > 40  Adjusted Energy Recommendations:   kcal/day        Estimated Protein Needs    Continue [x]  Adjust []  ~60-80 gm/day (0.8-1.0gm/kg IBW) -  TOBIAS on CKD, renal function noted improving, will continue to monitor.    Adjusted Protein Recommendations:   gm/day        Estimated Fluid Needs        Continue []  Adjust [x] 1ml/kcal or per LIP   Adjusted Fluid Recommendations:   mL/day     Nutrient Intake:         [] Previous Nutrition Diagnosis: Inadequate protein- energy intake            [] Ongoing          [] Resolved    [] No active nutrition diagnosis identified at this time        Nutrition Intervention: meals and snacks    Rec: Continue dysphagia 3 mech soft honey consistency fluid, DASH/TLC, consistent carb diet order       Goal/Expected Outcome: In      Indicator/Monitoring: energy intake, body composition, NFPF, electrolyte/renal profile, glucose profile Registered Dietitian Follow-Up     Patient Profile Reviewed                           Yes [x]   No []     Nutrition History Previously Obtained        Yes [x]  No []       Pertinent Subjective Information: Pt. resting in bed, family at bedside. Diet advanced yesterday.  Per family pt. with suboptimal PO intake at this time, had 1/2 sandwich for lunch only. Per family pt. is afraid of diarrhea and inability to walk to the bathroom, so he's afraid to eat too much. Explained to pt. he's off bowel regimen and should not be afraid to eat his meals. Discussed supplement options with family, provided samples and explained thickening process.       Pertinent Medical Interventions: Acute Hypoxic Respiratory Failure 2/2 aspiration PNA vs Pulmonary Edema- s/p extubation. - BiPAP as needed for night. History of A-fib with noted PVCs on Monitor and suspected Type 2 MI on admission- tele d/swati. TOBIAS 2/2 tubular injury from Hypotension (resolving). Urinary Incontinence- bladder scan pending. Recurrent Falls likely 2/2 deconditioning vs hypotension from medication- Negative trauma workup. DM2: on insulin. SLP: Dysphagia 3 with Honey Fluids.        Diet order: dysphagia 3 soft honey consistency fluids, DASH/TLC, consistent carb       Anthropometrics:  - Ht. 182.8cm   - Wt. 121.2kg on 2/26 vs. 130.7kg (2/22) vs. 134kg (2/18) - likely fluid shifts vs. weight trending down- will continue to monitor wt trends   - %wt change  - BMI 35.6  - IBW 78kg      Pertinent Lab Data: (2/26) RBC 4.44, Hg 10.6, Hct 35.0  (2/25) Na 148, glu 116, BUN 41      Pertinent Meds: Heparin, Glargine, Protonix, Lasix, Metoprolol, Oscal, Atorvastatin      Physical Findings:  - Appearance: alert&oriented, 1+ edema to L, R arm, 3+ to L, R leg   - GI function: no symptoms noted, last BM 2/23  - Tubes: no tubes noted   - Oral/Mouth cavity: no symptoms noted   - Skin: tear (BS 12)      Nutrition Requirements (from RD note on 2/22)   Weight Used: 130.7kg, IBW -78kg)     Estimated Energy Needs    Continue [x]  Adjust []   ~1900kcal/day based on 2030 kcal/day (MSJ x1.0) compared to 1715kcal- 1950kcal/d (22-25kcal/kg IBW) for BMI > 40  Adjusted Energy Recommendations:   kcal/day        Estimated Protein Needs    Continue [x]  Adjust []  ~60-80 gm/day (0.8-1.0gm/kg IBW) -  OTBIAS on CKD, renal function noted improving, will continue to monitor.    Adjusted Protein Recommendations:   gm/day        Estimated Fluid Needs        Continue []  Adjust [x] 1ml/kcal or per LIP   Adjusted Fluid Recommendations:   mL/day     Nutrient Intake: ~25% PO at meals         [] Previous Nutrition Diagnosis: Inadequate protein- energy intake            [x] Ongoing          [] Resolved     Nutrition Intervention: meals and snacks, medical food supplement     Rec: Continue dysphagia 3 mech soft honey consistency fluid, DASH/TLC, consistent carb diet order, add Glucerna BID, Prosource gelatein SF daily.         Goal/Expected Outcome: In 3 days pt. to consume at least 50% PO and supplement      Indicator/Monitoring: energy intake, body composition, NFPF, electrolyte/renal profile, glucose profile

## 2019-02-26 NOTE — SWALLOW BEDSIDE ASSESSMENT ADULT - SWALLOW EVAL: CURRENT DIET
npo
regular and thin - renal restrictions
Dysphagia Diet III Mechanical soft consistency with cut up meats with Honey-thickened Liquids

## 2019-02-26 NOTE — DISCHARGE NOTE ADULT - CARE PLAN
Principal Discharge DX:	TOBIAS (acute kidney injury)  Goal:	To treat  Assessment and plan of treatment:	You were found to have an acute kidney injury on top of your chronic kidney disease due to tubular injury as a result of your blood pressure being low. Your TOBIAS resolved over the course of the hospitalization and creatinine returned to your baseline level of 2.0. Please continue to take the Metolazone 2.5mg daily and the lasix 40mg PO daily.  Secondary Diagnosis:	DM (diabetes mellitus)  Goal:	To monitor and treat  Assessment and plan of treatment:	Please continue to take your medication as prescribed  Secondary Diagnosis:	Afib  Goal:	To monitor and treat  Assessment and plan of treatment:	You have a known history of A-fib with PVCs noted on the telemetry monitor. Cardiology followed and advised to hold off on anti-coagulation due to recurrent falls. You will continue to take metoprolol for controlling your heart rate  Secondary Diagnosis:	Hydrocephalus  Goal:	To monitor  Assessment and plan of treatment:	Your  shunt was checked in December with a CT showing no signs of hydrocephalus. Please follow up with neurology as an outpatient for further monitoring  Secondary Diagnosis:	Respiratory failure with hypoxia  Goal:	To monitor and treat  Assessment and plan of treatment:	You were found to have a hypoxic respiratory failure likely secondary to aspiration pneumonitis. You need to follow up with neurology as you were found to have an underlying dysphagia when assessed by speech and swallow. A barium swallow will be needed in 2 weeks.  Secondary Diagnosis:	Hematuria  Goal:	To monitor and treat  Assessment and plan of treatment:	Please follow up with Dr. Matthews for further urological management  Secondary Diagnosis:	Dysphagia  Goal:	To assess and treat  Assessment and plan of treatment:	Please follow up with Speech and Swallow in two weeks after discharge for a barium swallow to assess for diet progression Principal Discharge DX:	TOBIAS (acute kidney injury)  Goal:	To treat  Assessment and plan of treatment:	You were found to have an acute kidney injury on top of your chronic kidney disease due to tubular injury as a result of your blood pressure being low. Your TOBIAS resolved over the course of the hospitalization and creatinine returned to your baseline level of 2.0. Please continue to take the Metolazone 2.5mg daily and the lasix 40mg PO daily.  Secondary Diagnosis:	DM (diabetes mellitus)  Goal:	To monitor and treat  Assessment and plan of treatment:	Please continue to take your medication as prescribed  Secondary Diagnosis:	Afib  Goal:	To monitor and treat  Assessment and plan of treatment:	You have a known history of A-fib with PVCs noted on the telemetry monitor. Cardiology followed and advised to hold off on anti-coagulation due to recurrent falls. You will continue to take metoprolol for controlling your heart rate  Secondary Diagnosis:	Hydrocephalus  Goal:	To monitor  Assessment and plan of treatment:	Your  shunt was checked in December with a CT showing no signs of hydrocephalus. Please follow up with neurology as an outpatient for further monitoring  Secondary Diagnosis:	Respiratory failure with hypoxia  Goal:	To monitor and treat  Assessment and plan of treatment:	You were found to have a hypoxic respiratory failure likely secondary to aspiration pneumonitis. You need to follow up with neurology as you were found to have an underlying dysphagia when assessed by speech and swallow. A barium swallow will be needed in 2 weeks.  Secondary Diagnosis:	Hematuria  Goal:	To monitor and treat  Assessment and plan of treatment:	Please follow up with Dr. Matthews for further urological management. You were discharged with a barragan due to urinary retention. Voiding trials to be done at rehab facility. Barragan last changed on 2/27/2019  Secondary Diagnosis:	Dysphagia  Goal:	To assess and treat  Assessment and plan of treatment:	Please follow up with Speech and Swallow in two weeks after discharge for a barium swallow to assess for diet progression

## 2019-02-26 NOTE — PROGRESS NOTE ADULT - ASSESSMENT
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

## 2019-02-26 NOTE — PROGRESS NOTE ADULT - SUBJECTIVE AND OBJECTIVE BOX
DELVISVINCENZO  83y Male    CHIEF COMPLAINT:    Patient is a 83y old  Male who presents with a chief complaint of Recurrent falls  TOBIAS (2019 10:37)      INTERVAL HPI/OVERNIGHT EVENTS:    Patient seen and examined. Sitting in a chair. Feels better. Denies sob. No cough. Incontinent. No fever. No cough.    ROS: All other systems are negative.    Vital Signs:    T(F): 98.2 (19 @ 05:10), Max: 98.2 (19 @ 05:10)  HR: 77 (19 @ 05:10) (73 - 82)  BP: 142/101 (19 @ 05:10) (138/93 - 144/75)  RR: 20 (19 @ 05:10) (18 - 20)  SpO2: --  I&O's Summary    2019 07:01  -  2019 07:00  --------------------------------------------------------  IN: 250 mL / OUT: 1150 mL / NET: -900 mL      Daily     Daily Weight in k.2 (2019 05:10)  CAPILLARY BLOOD GLUCOSE      POCT Blood Glucose.: 161 mg/dL (2019 11:11)  POCT Blood Glucose.: 125 mg/dL (2019 07:35)  POCT Blood Glucose.: 125 mg/dL (2019 21:24)  POCT Blood Glucose.: 138 mg/dL (2019 17:06)      PHYSICAL EXAM:    GENERAL:  NAD  SKIN: No rashes or lesions  HENT: Atraumatic Normocephalic. PERRL. Moist membranes.  NECK: Supple, No JVD. No lymphadenopathy.  PULMONARY: CTA B/L. No wheezing. No rales  CVS: Normal S1, S2. Rate and Rhythm are regular. No murmurs.  ABDOMEN/GI: Soft, Nontender, Nondistended; BS present  EXTREMITIES: Peripheral pulses intact. No edema B/L LE.  NEUROLOGIC:  No motor or sensory deficit.  PSYCH: Alert & oriented x 3    Consultant(s) Notes Reviewed:  [x ] YES  [ ] NO  Care Discussed with Consultants/Other Providers [ x] YES  [ ] NO    EKG reviewed  Telemetry reviewed    LABS:                        10.6   8.63  )-----------( 248      ( 2019 07:45 )             35.0         149<H>  |  102  |  36<H>  ----------------------------<  135<H>  Creatinine Trend: 1.8<--, 1.9<--, 1.8<--, 1.7<--, 1.8<--, 1.8<--  3.5   |  28  |  1.8<H>    Ca    8.5      2019 07:45  Mg     2.1         TPro  6.1  /  Alb  3.7  /  TBili  0.6  /  DBili  x   /  AST  16  /  ALT  12  /  AlkPhos  89      PT/INR - ( 2019 04:54 )   PT: 13.40 sec;   INR: 1.17 ratio         PTT - ( 2019 04:54 )  PTT:31.4 sec          RADIOLOGY & ADDITIONAL TESTS:      Imaging or report Personally Reviewed:  [ ] YES  [ ] NO    Medications:  Standing  atorvastatin 20 milliGRAM(s) Oral at bedtime  calcium carbonate 1250 mG  + Vitamin D (OsCal 500 + D) 1 Tablet(s) Oral three times a day  chlorhexidine 4% Liquid 1 Application(s) Topical <User Schedule>  dextrose 5%. 1000 milliLiter(s) IV Continuous <Continuous>  dextrose 50% Injectable 12.5 Gram(s) IV Push once  dextrose 50% Injectable 25 Gram(s) IV Push once  dextrose 50% Injectable 25 Gram(s) IV Push once  furosemide    Tablet 40 milliGRAM(s) Oral daily  guaiFENesin  milliGRAM(s) Oral every 12 hours  heparin  Injectable 5000 Unit(s) SubCutaneous every 8 hours  insulin glargine Injectable (LANTUS) 10 Unit(s) SubCutaneous at bedtime  insulin lispro Injectable (HumaLOG) 3 Unit(s) SubCutaneous three times a day before meals  metolazone 2.5 milliGRAM(s) Oral daily  metoprolol tartrate 25 milliGRAM(s) Oral three times a day  nystatin Powder 1 Application(s) Topical three times a day  pantoprazole    Tablet 40 milliGRAM(s) Oral before breakfast  tamsulosin 0.4 milliGRAM(s) Oral at bedtime    PRN Meds  acetaminophen   Tablet .. 650 milliGRAM(s) Oral every 6 hours PRN  ALBUTerol/ipratropium for Nebulization 3 milliLiter(s) Nebulizer every 6 hours PRN  dextrose 40% Gel 15 Gram(s) Oral once PRN  glucagon  Injectable 1 milliGRAM(s) IntraMuscular once PRN      Case discussed with resident    Care discussed with pt/family

## 2019-02-26 NOTE — DISCHARGE NOTE ADULT - CARE PROVIDER_API CALL
Doug Foy)  Geriatric Medicine; Internal Medicine  [address] [place] [address]  Phone: [phone]  Fax: [phone]  Follow Up Time:     Vinay Merchant)  Internal Medicine; Nephrology  [address] [place] [address]  Phone: [phone]  Fax: [phone]  Follow Up Time:     Roman Matthews)  Urology  [address] [place] [address]  Phone: [phone]  Fax: [phone]  Follow Up Time:

## 2019-02-26 NOTE — DISCHARGE NOTE ADULT - MEDICATION SUMMARY - MEDICATIONS TO TAKE
I will START or STAY ON the medications listed below when I get home from the hospital:    Insulin Pen Needles, 4mm  -- 1 each subcutaneously once a day (at bedtime)  . ** Use with insulin pen **   -- Indication: For Diabetes    Flomax 0.4 mg oral capsule  -- 1 cap(s) by mouth once a day  -- Indication: For BPH    Lyrica 75 mg oral capsule  -- 1 cap(s) by mouth 3 times a day MDD:3 tabs / day  -- Indication: For Nerve Pain     pioglitazone 30 mg oral tablet  -- 1 tab(s) by mouth once a day  -- Indication: For Diabetes    Lantus Solostar Pen 100 units/mL subcutaneous solution  -- 20 unit(s) subcutaneous once a day (at bedtime)  -- Do not drink alcoholic beverages when taking this medication.  It is very important that you take or use this exactly as directed.  Do not skip doses or discontinue unless directed by your doctor.  Keep in refrigerator.  Do not freeze.    -- Indication: For Diabetes    semaglutide 2 mg/1.5 mL subcutaneous solution  -- 20  subcutaneous once a week  -- Indication: For Diabetes    Crestor 5 mg oral tablet  -- 1 tab(s) by mouth once a day (at bedtime)  -- Indication: For High Blood Pressure    metoprolol tartrate 25 mg oral tablet  -- 1 tab(s) by mouth 3 times a day  -- Indication: For Atrial Fibrillation    ipratropium-albuterol 0.5 mg-2.5 mg/3 mLinhalation solution  -- 3 milliliter(s) inhaled every 6 hours, As needed, Shortness of Breath and/or Wheezing  -- Indication: For COPD    amLODIPine 5 mg oral tablet  -- 1 tab(s) by mouth 2 times a day  -- Indication: For High Blood Pressure    Lasix 40 mg oral tablet  -- 1 tab(s) by mouth 2 times a day  -- Indication: For Diuretic    metOLazone 2.5 mg oral tablet  -- 1 tab(s) by mouth once a day  -- Indication: For Diuretic    calcium-vitamin D 500 mg-200 intl units oral tablet  -- 1 tab(s) by mouth 3 times a day  -- Indication: For Vitamins

## 2019-02-26 NOTE — PROGRESS NOTE ADULT - ASSESSMENT
84 yo M PMHx of DMII, HTN,  Hydrocephalus s/p  shunt, Afib not on AC (multiple falls) presents to our ED with c/o mechanical fall. In the ED BP 83/45, HR 78/min, Sao2 100% on NC, afebrile. He was given 2L RL and his repeat BP was 110/80. Pt was started on BiPAP bu did not improve and was intubated. On 2/21 pt SELF-EXTUBATED, able to transition to BIPAP without difficulty.     1.	Acute hypoxemic respiratory failure resolved  2.	Aspiration with possibly Bibasilar GNR PNA complicated by respiratory failure requiring ventilation  3.	TOBIAS on CKD 3-4 baseline cr 1.9  4.	Ac. HFpEF  5.	JOHANNY on CPAP / Obesity  6.	Urinary retention / Hematuria  7.	A. Fib on no A/C due to multiple falls  8.	DM-2  9.	HTN  10.	Dysphagia         PLAN:    ·	Kidney function continues ot improve  ·	TOBIAS resolved  ·	ECHO showed NLVF  ·	Off Abx. Completed Abx course. ID F/U noted  ·	CXR showed bibasilar opacities.   ·	Cont Lasix 40 mg po daily and Metolazone 2.5 mg po daily  ·	Will D/W the Nephrology if we can increase diuretics dose  ·	Check PVR with US  ·	OOB to chair  ·	D/C planning    #Progress Note Handoff  Pending (specify):  Consults_________, Tests________, Test Results_______, Other_________  Family discussion:  Disposition: Home___/SNF_x__/Other________/Unknown at this time________

## 2019-02-26 NOTE — DISCHARGE NOTE ADULT - HOSPITAL COURSE
84 yo M PMHx of DMII, HTN,  Hydrocephalus s/p  shunt, Afib not on AC (multiple falls) presents to our ED with c/o mechanical fall. According to patient he was trying to walk with a walker and slipped on the floor, no nausea, vomiting, dizziness, chest pain, palpitations, seizure like episode, no LOC. He had 4 falls in last 10 days as per family. He was recently discharged from the hospital Dec for dizziness with stress test and  shunt assessment that was normal. At baseline he has home aid, takes his medications and walks very minimal with the help of cane or walker. However for last few months he has had dec ambulation and worsening LE edema. As per family, also worsening lethargy. Trauma work-up neg (age unknown L2 fx)    In the ED BP 83/45, HR 78/min, Sao2 100% on NC, afebrile. He was given 2L RL and his repeat BP was 110/80. Initial CXR only shows cardiomegaly without lobar consolidation. Patient later desatted to 85% on 3L NC so he was placed on BIPAP. Repeated CXR shows new consolidation at right lung base, suspecting PNA, so patient was started on Rocephin and Azithromycin. Labs showed TOBIAS with Cr 3.2 (baseline 2) and dec UOP. Pt also noted to be on NSAIDs and ACE, Actos so likely TOBIAS 2/2 dehydration and meds. On 2/18 pt was unable to tolerate NC and moved to BIPAP with ABG showing acute hypoxic resp failure and hypercapnia (likely chronic?). CXR at this time showed worsening pulm vascular congestion. After diuresis/BIPAP, pt became lethargic so intubated for protection. Admitted to CCU, barragan noted to be out-of-place (likely pt pulled out) so advanced with a sharp increase in out-put. CXR and kidney showed improvement after aggressive diuresis. Cardiac monitor significant for high # of PVC/short runs of NSVT (seen when first arrived as well) On 2/21 pt SELF-EXTUBATED, able to transition to BIPAP without difficulty.     Now diuresing well, keeping barragan as per Dr. Tovar. Urology to follow-up. Otherwise stable from [facility] standpt. He was downgraded to the floor and was medically stable. His barragan was removed but he continued to have some incontinence. The patient was discharged to SNF with follow up instructions.

## 2019-02-26 NOTE — SWALLOW BEDSIDE ASSESSMENT ADULT - NS ASR SWALLOW FINDINGS DISCUS
Nursing/RN aware
Dr Tompkins regarding diet rec and worsening speech concerns, ARLENE Schultz/Physician/Nursing
Nursing/MD Durbin/Physician

## 2019-02-26 NOTE — DISCHARGE NOTE ADULT - PLAN OF CARE
To treat You were found to have an acute kidney injury on top of your chronic kidney disease due to tubular injury as a result of your blood pressure being low. Your TOBIAS resolved over the course of the hospitalization and creatinine returned to your baseline level of 2.0. Please continue to take the Metolazone 2.5mg daily and the lasix 40mg PO daily. To monitor and treat Please continue to take your medication as prescribed You have a known history of A-fib with PVCs noted on the telemetry monitor. Cardiology followed and advised to hold off on anti-coagulation due to recurrent falls. You will continue to take metoprolol for controlling your heart rate To monitor Your  shunt was checked in December with a CT showing no signs of hydrocephalus. Please follow up with neurology as an outpatient for further monitoring You were found to have a hypoxic respiratory failure likely secondary to aspiration pneumonitis. You need to follow up with neurology as you were found to have an underlying dysphagia when assessed by speech and swallow. A barium swallow will be needed in 2 weeks. Please follow up with Dr. Matthews for further urological management To assess and treat Please follow up with Speech and Swallow in two weeks after discharge for a barium swallow to assess for diet progression Please follow up with Dr. Matthews for further urological management. You were discharged with a barragan due to urinary retention. Voiding trials to be done at rehab facility. Barragan last changed on 2/27/2019

## 2019-02-26 NOTE — SWALLOW BEDSIDE ASSESSMENT ADULT - SLP PERTINENT HISTORY OF CURRENT PROBLEM
83 y.o m s/p multiple mechanical falls 4 in past ten days. PMHx: hydrocephalus s/p  shunt, Afib,DM II. Pt's son expressed concerns regarding his worsening
83 y.o m s/p multiple mechanical falls 4 in past ten days. PMHx: hydrocephalus s/p  shunt, Afib,DM II. Pt's son expressed concerns regarding his worsening
83 y.o m s/p multiple mechanical falls 4 in past ten days. PMHx: hydrocephalus s/p  shunt, Afib,DM II. course complicated by aspiration PNA and intubation. pt s/p self extubation

## 2019-02-26 NOTE — DISCHARGE NOTE ADULT - PROVIDER TOKENS
PROVIDER:[TOKEN:[[id number]]],PROVIDER:[TOKEN:[[id number]]],PROVIDER:[TOKEN:[[id number]]]

## 2019-02-26 NOTE — PROGRESS NOTE ADULT - SUBJECTIVE AND OBJECTIVE BOX
NEPHROLOGY FOLLOW UP NOTE    [facility]  cr stable  + void, but also incontinence  bp's fair    PAST MEDICAL & SURGICAL HISTORY:  Hypertension  Hydrocephalus  CKD (chronic kidney disease)  DM (diabetes mellitus)  Afib  History of prostate surgery    Allergies:  No Known Allergies    Home Medications Reviewed    SOCIAL HISTORY:  Denies ETOH,Smoking,   FAMILY HISTORY:  Family history of diabetes mellitus (Mother, Sibling)        REVIEW OF SYSTEMS  All other review of systems is negative unless indicated above.      PHYSICAL EXAM:  NAD  awake   o2 via nc  dry mm  supple  decreased bs b/l  distant HS, irreg  soft  + edema  [facility] Medications:   MEDICATIONS  (STANDING):  atorvastatin 20 milliGRAM(s) Oral at bedtime  calcium carbonate 1250 mG  + Vitamin D (OsCal 500 + D) 1 Tablet(s) Oral three times a day  chlorhexidine 4% Liquid 1 Application(s) Topical <User Schedule>  dextrose 5%. 1000 milliLiter(s) (50 mL/Hr) IV Continuous <Continuous>  dextrose 50% Injectable 12.5 Gram(s) IV Push once  dextrose 50% Injectable 25 Gram(s) IV Push once  dextrose 50% Injectable 25 Gram(s) IV Push once  furosemide    Tablet 40 milliGRAM(s) Oral daily  guaiFENesin  milliGRAM(s) Oral every 12 hours  heparin  Injectable 5000 Unit(s) SubCutaneous every 8 hours  insulin glargine Injectable (LANTUS) 10 Unit(s) SubCutaneous at bedtime  insulin lispro Injectable (HumaLOG) 3 Unit(s) SubCutaneous three times a day before meals  metolazone 2.5 milliGRAM(s) Oral daily  metoprolol tartrate 25 milliGRAM(s) Oral three times a day  nystatin Powder 1 Application(s) Topical three times a day  pantoprazole    Tablet 40 milliGRAM(s) Oral before breakfast  tamsulosin 0.4 milliGRAM(s) Oral at bedtime        VITALS:  T(F): 97.1 (02-26-19 @ 14:14), Max: 98.2 (02-26-19 @ 05:10)  HR: 88 (02-26-19 @ 14:14)  BP: 147/60 (02-26-19 @ 14:14)  RR: 18 (02-26-19 @ 14:14)  SpO2: --  Wt(kg): --    02-24 @ 07:01  -  02-25 @ 07:00  --------------------------------------------------------  IN: 1500 mL / OUT: 4072 mL / NET: -2572 mL    02-25 @ 07:01 - 02-26 @ 07:00  --------------------------------------------------------  IN: 250 mL / OUT: 1150 mL / NET: -900 mL    02-26 @ 07:01 - 02-26 @ 17:19  --------------------------------------------------------  IN: 0 mL / OUT: 640 mL / NET: -640 mL          LABS:  02-26    149<H>  |  102  |  36<H>  ----------------------------<  135<H>  3.5   |  28  |  1.8<H>    Ca    8.5      26 Feb 2019 07:45  Mg     2.1     02-26    TPro  6.1  /  Alb  3.7  /  TBili  0.6  /  DBili      /  AST  16  /  ALT  12  /  AlkPhos  89  02-26                          10.6   8.63  )-----------( 248      ( 26 Feb 2019 07:45 )             35.0       Urine Studies:        RADIOLOGY & ADDITIONAL STUDIES:

## 2019-02-26 NOTE — SWALLOW BEDSIDE ASSESSMENT ADULT - SWALLOW EVAL: RECOMMENDED FEEDING/EATING TECHNIQUES
alternate food with liquid/maintain upright posture during/after eating for 30 mins/oral hygiene/allow for swallow between intakes/position upright (90 degrees)/small sips/bites
alternate food with liquid

## 2019-02-26 NOTE — DISCHARGE NOTE ADULT - CARE PROVIDERS DIRECT ADDRESSES
[email or web address],DirectAddress_Unknown,DirectAddress_Unknown

## 2019-02-26 NOTE — SWALLOW BEDSIDE ASSESSMENT ADULT - SWALLOW EVAL: RECOMMENDED DIET
NPO with alternate means of nutrition/hydration
dysphagia 3 cut up and nectar thick liquids
Dysphagia Diet III Mechanical soft consistency with cut up meats with Honey-thickened Liquids

## 2019-02-26 NOTE — PROGRESS NOTE ADULT - SUBJECTIVE AND OBJECTIVE BOX
Hospital Day:  11d    Subjective:    Patient is a 83y old  Male who presents with a chief complaint of Recurrent falls  TOBIAS (25 Feb 2019 15:47)    Yesterday the patient had his barragan removed with some bloody drainage noted and then passage of a large blood clot. The patient then had clear urine but has remained incontinent. The patient was seen and examined this morning. Overnight he did not tolerate his BiPAP and only wore it for one hour. The patient has stated that he feels fine and wants to go to rehab. The patient reported no other complaints     Telemetry reviewed: atrial fibrillation 80s with PACs and PVCs    Past Medical Hx:   Hypertension  Hydrocephalus  CKD (chronic kidney disease)  DM (diabetes mellitus)  Afib    Past Sx:  History of prostate surgery  No significant past surgical history    Allergies:  No Known Allergies    Current Meds:   Standng Meds:  atorvastatin 20 milliGRAM(s) Oral at bedtime  calcium carbonate 1250 mG  + Vitamin D (OsCal 500 + D) 1 Tablet(s) Oral three times a day  chlorhexidine 4% Liquid 1 Application(s) Topical <User Schedule>  dextrose 5%. 1000 milliLiter(s) (50 mL/Hr) IV Continuous <Continuous>  dextrose 50% Injectable 12.5 Gram(s) IV Push once  dextrose 50% Injectable 25 Gram(s) IV Push once  dextrose 50% Injectable 25 Gram(s) IV Push once  furosemide    Tablet 40 milliGRAM(s) Oral daily  guaiFENesin  milliGRAM(s) Oral every 12 hours  heparin  Injectable 5000 Unit(s) SubCutaneous every 8 hours  insulin glargine Injectable (LANTUS) 10 Unit(s) SubCutaneous at bedtime  insulin lispro Injectable (HumaLOG) 3 Unit(s) SubCutaneous three times a day before meals  metolazone 2.5 milliGRAM(s) Oral daily  metoprolol tartrate 25 milliGRAM(s) Oral three times a day  nystatin Powder 1 Application(s) Topical three times a day  pantoprazole    Tablet 40 milliGRAM(s) Oral before breakfast  tamsulosin 0.4 milliGRAM(s) Oral at bedtime    PRN Meds:  acetaminophen   Tablet .. 650 milliGRAM(s) Oral every 6 hours PRN Moderate Pain (4 - 6)  ALBUTerol/ipratropium for Nebulization 3 milliLiter(s) Nebulizer every 6 hours PRN Shortness of Breath and/or Wheezing  dextrose 40% Gel 15 Gram(s) Oral once PRN Blood Glucose LESS THAN 70 milliGRAM(s)/deciliter  glucagon  Injectable 1 milliGRAM(s) IntraMuscular once PRN Glucose LESS THAN 70 milligrams/deciliter    HOME MEDICATIONS:  amLODIPine 5 mg oral tablet: 1 tab(s) orally 2 times a day  indapamide 2.5 mg oral tablet: 1 tab(s) orally once a day (in the morning)  Lantus Solostar Pen 100 units/mL subcutaneous solution: 20 unit(s) subcutaneous once a day (at bedtime)  Lasix 40 mg oral tablet: 1 tab(s) orally 2 times a day  pioglitazone 30 mg oral tablet: 1 tab(s) orally once a day  semaglutide 2 mg/1.5 mL subcutaneous solution: 20  subcutaneous once a week      Vital Signs:   T(F): 98.2 (02-26-19 @ 05:10), Max: 98.2 (02-26-19 @ 05:10)  HR: 77 (02-26-19 @ 05:10) (73 - 82)  BP: 142/101 (02-26-19 @ 05:10) (138/93 - 144/75)  RR: 20 (02-26-19 @ 05:10) (18 - 20)  SpO2: --      02-25-19 @ 07:01  -  02-26-19 @ 07:00  --------------------------------------------------------  IN: 250 mL / OUT: 1150 mL / NET: -900 mL        Physical Exam:   GENERAL: Obese appearing male with thick neck, anasarca   HEENT: NCAT, pupil a bit more sluggish on left than on right but both are responsive to light. EOMI, Neck thick  CHEST/LUNG: CTA, No wheezing, rhonchi, rales   HEART: irregular rhythm with a regular rate; s1 s2 appreciated, No murmurs, rubs, or gallops  ABDOMEN: Soft, Nontender, mild distension; Bowel sounds present; obese   EXTREMITIES: 1+ pitting edema in bilateral LE from shin to toes. Noted 1+ edema in bilateral upper extremities as well. Muscle strength 5/5 in bilateral upper and lower extremities.   NERVOUS SYSTEM:  Alert & Oriented X3, non focal     Labs:                         10.6   8.63  )-----------( 248      ( 26 Feb 2019 07:45 )             35.0     Neutophil% 75.8, Lymphocyte% 13.2, Monocyte% 7.0, Bands% 0.6 02-26-19 @ 07:45    26 Feb 2019 07:45    149    |  102    |  36     ----------------------------<  135    3.5     |  28     |  1.8      Ca    8.5        26 Feb 2019 07:45  Mg     2.1       26 Feb 2019 07:45    TPro  6.1    /  Alb  3.7    /  TBili  0.6    /  DBili  x      /  AST  16     /  ALT  12     /  AlkPhos  89     26 Feb 2019 07:45    Radiology:   CXR 2/26/2019: Bibasilar opacities    Assessment and Plan:   This is an 83 year old male with PMHx of Type 2 Diabetes, HTN, Hydrocephalus s/p  shunt, atrial fibrillation not on AC due to falls who presented to the ED s/p mechanical fall. He was found in the ED to have a desaturation with PVCs and short runs of NSVT that warranted him to be intubated and admitted to the CCU. He was intubated for approximately 4 days during this hospitalization stay.     #Acute Hypoxic Respiratory Failure 2/2 aspiration PNA vs Pulmonary Edema  - Downgraded from CCU after 4 days of intubation   - ID evaluated; maintain off antibiotics   - Continue NPO pending S&S reassessment   - BiPAP as needed for night  - Continue with Metolazone 2.5mg daily and Lasix 40mg IV daily    #History of A-fib with noted PVCs on Monitor and suspected Type 2 MI on admission  - Continue with Metoprolol 25mg TID as per cards recs   - CHADsVASC score 4; Hold off on anti coagulation due to falls   - Telemetry discontinued today     #TOBIAS 2/2 tubular injury from Hypotension (resolving)  - Continue with negative fluid balance (0.5L negative)   - Continue on Metolazone 2.5mg daily and Lasix 40mg PO daily; Dr. Merchant to follow up if more diuresis needed  - Noted baseline creatinine is approximately 2.0; currently 1.8    #Urinary Incontinence  - Noted urinary incontinence since barragan removal  - Bladder scan to assess for overflow    #Hematuria (resolved)  - Outpatient follow up with Dr. Matthews     #Recurrent Falls likely 2/2 deconditioning vs hypotension from medication   - Negative trauma workup  - PT/Rehab    #Hypertension   - Continue with current medications     #Type 2 DM   - Continue with Lantus 10 units daily and Lispro 3 units with meals  POCT Blood Glucose.: 125 mg/dL (26 Feb 2019 07:35)  POCT Blood Glucose.: 125 mg/dL (25 Feb 2019 21:24)  POCT Blood Glucose.: 138 mg/dL (25 Feb 2019 17:06)  POCT Blood Glucose.: 121 mg/dL (25 Feb 2019 11:02)    #Hydrocephalus s/p  Shunt  - Chronically in place. Follows Dr. Wayne   - CT inpatient showing no signs of hydrocephalus     #JOHANNY with suspected obesity hypoventilation syndrome  - Non compliant with CPAP at home.   - CPAP needs to be worn     #Hypernatremia (resolved)    Activity: As tolerated;   Diet: Dysphagia 3 with Honey Fluids  DVT ppx: Heparin SubQ  GI ppx: Protonix  Code Status: Full Code for now  DISPO: continue to monitor; pending follow up with PT for discharge    Case discussed with Dr. Murphy

## 2019-02-26 NOTE — DISCHARGE NOTE ADULT - PATIENT PORTAL LINK FT
You can access the [facility] Patient Portal, offered by [facility], by registering with the following website: [email or web address]

## 2019-02-26 NOTE — DISCHARGE NOTE ADULT - ADDITIONAL INSTRUCTIONS
Follow up instructions:  Speech and Swallow: Follow up in 2 weeks for a repeat speech and swallow Follow up instructions:  Speech and Swallow: Follow up in 2 weeks for a repeat speech and swallow  Urology: Follow up with Dr. Matthews  Nephrology: Dr. Merchant  Neurology: Dr. Wayne

## 2019-02-27 VITALS — OXYGEN SATURATION: 97 %

## 2019-02-27 LAB
ALBUMIN SERPL ELPH-MCNC: 3.5 G/DL — SIGNIFICANT CHANGE UP (ref 3.5–5.2)
ALP SERPL-CCNC: 83 U/L — SIGNIFICANT CHANGE UP (ref 30–115)
ALT FLD-CCNC: 13 U/L — SIGNIFICANT CHANGE UP (ref 0–41)
ANION GAP SERPL CALC-SCNC: 19 MMOL/L — HIGH (ref 7–14)
AST SERPL-CCNC: 18 U/L — SIGNIFICANT CHANGE UP (ref 0–41)
BASOPHILS # BLD AUTO: 0.04 K/UL — SIGNIFICANT CHANGE UP (ref 0–0.2)
BASOPHILS NFR BLD AUTO: 0.5 % — SIGNIFICANT CHANGE UP (ref 0–1)
BILIRUB SERPL-MCNC: 0.6 MG/DL — SIGNIFICANT CHANGE UP (ref 0.2–1.2)
BUN SERPL-MCNC: 33 MG/DL — HIGH (ref 10–20)
CALCIUM SERPL-MCNC: 8.5 MG/DL — SIGNIFICANT CHANGE UP (ref 8.5–10.1)
CHLORIDE SERPL-SCNC: 103 MMOL/L — SIGNIFICANT CHANGE UP (ref 98–110)
CO2 SERPL-SCNC: 26 MMOL/L — SIGNIFICANT CHANGE UP (ref 17–32)
CREAT SERPL-MCNC: 1.6 MG/DL — HIGH (ref 0.7–1.5)
EOSINOPHIL # BLD AUTO: 0.25 K/UL — SIGNIFICANT CHANGE UP (ref 0–0.7)
EOSINOPHIL NFR BLD AUTO: 3 % — SIGNIFICANT CHANGE UP (ref 0–8)
GLUCOSE BLDC GLUCOMTR-MCNC: 119 MG/DL — HIGH (ref 70–99)
GLUCOSE BLDC GLUCOMTR-MCNC: 95 MG/DL — SIGNIFICANT CHANGE UP (ref 70–99)
GLUCOSE SERPL-MCNC: 122 MG/DL — HIGH (ref 70–99)
HCT VFR BLD CALC: 34.2 % — LOW (ref 42–52)
HGB BLD-MCNC: 10.3 G/DL — LOW (ref 14–18)
IMM GRANULOCYTES NFR BLD AUTO: 0.5 % — HIGH (ref 0.1–0.3)
LYMPHOCYTES # BLD AUTO: 1.23 K/UL — SIGNIFICANT CHANGE UP (ref 1.2–3.4)
LYMPHOCYTES # BLD AUTO: 14.8 % — LOW (ref 20.5–51.1)
MAGNESIUM SERPL-MCNC: 2.2 MG/DL — SIGNIFICANT CHANGE UP (ref 1.8–2.4)
MCHC RBC-ENTMCNC: 24.6 PG — LOW (ref 27–31)
MCHC RBC-ENTMCNC: 30.1 G/DL — LOW (ref 32–37)
MCV RBC AUTO: 81.6 FL — SIGNIFICANT CHANGE UP (ref 80–94)
MONOCYTES # BLD AUTO: 0.56 K/UL — SIGNIFICANT CHANGE UP (ref 0.1–0.6)
MONOCYTES NFR BLD AUTO: 6.7 % — SIGNIFICANT CHANGE UP (ref 1.7–9.3)
NEUTROPHILS # BLD AUTO: 6.2 K/UL — SIGNIFICANT CHANGE UP (ref 1.4–6.5)
NEUTROPHILS NFR BLD AUTO: 74.5 % — SIGNIFICANT CHANGE UP (ref 42.2–75.2)
NRBC # BLD: 0 /100 WBCS — SIGNIFICANT CHANGE UP (ref 0–0)
PLATELET # BLD AUTO: 246 K/UL — SIGNIFICANT CHANGE UP (ref 130–400)
POTASSIUM SERPL-MCNC: 3.5 MMOL/L — SIGNIFICANT CHANGE UP (ref 3.5–5)
POTASSIUM SERPL-SCNC: 3.5 MMOL/L — SIGNIFICANT CHANGE UP (ref 3.5–5)
PROT SERPL-MCNC: 5.8 G/DL — LOW (ref 6–8)
RBC # BLD: 4.19 M/UL — LOW (ref 4.7–6.1)
RBC # FLD: 15.8 % — HIGH (ref 11.5–14.5)
SODIUM SERPL-SCNC: 148 MMOL/L — HIGH (ref 135–146)
WBC # BLD: 8.32 K/UL — SIGNIFICANT CHANGE UP (ref 4.8–10.8)
WBC # FLD AUTO: 8.32 K/UL — SIGNIFICANT CHANGE UP (ref 4.8–10.8)

## 2019-02-27 PROCEDURE — 99222 1ST HOSP IP/OBS MODERATE 55: CPT

## 2019-02-27 RX ORDER — IPRATROPIUM/ALBUTEROL SULFATE 18-103MCG
3 AEROSOL WITH ADAPTER (GRAM) INHALATION
Qty: 0 | Refills: 0 | DISCHARGE
Start: 2019-02-27

## 2019-02-27 RX ORDER — INDAPAMIDE 1.25 MG
1 TABLET ORAL
Qty: 0 | Refills: 0 | COMMUNITY

## 2019-02-27 RX ORDER — METOPROLOL TARTRATE 50 MG
1 TABLET ORAL
Qty: 0 | Refills: 0 | DISCHARGE
Start: 2019-02-27

## 2019-02-27 RX ADMIN — Medication 40 MILLIGRAM(S): at 06:54

## 2019-02-27 RX ADMIN — CHLORHEXIDINE GLUCONATE 1 APPLICATION(S): 213 SOLUTION TOPICAL at 06:43

## 2019-02-27 RX ADMIN — Medication 25 MILLIGRAM(S): at 14:53

## 2019-02-27 RX ADMIN — HEPARIN SODIUM 5000 UNIT(S): 5000 INJECTION INTRAVENOUS; SUBCUTANEOUS at 14:52

## 2019-02-27 RX ADMIN — Medication 25 MILLIGRAM(S): at 06:47

## 2019-02-27 RX ADMIN — Medication 1 TABLET(S): at 14:52

## 2019-02-27 RX ADMIN — Medication 600 MILLIGRAM(S): at 06:47

## 2019-02-27 RX ADMIN — NYSTATIN CREAM 1 APPLICATION(S): 100000 CREAM TOPICAL at 06:44

## 2019-02-27 RX ADMIN — Medication 1 TABLET(S): at 06:46

## 2019-02-27 RX ADMIN — NYSTATIN CREAM 1 APPLICATION(S): 100000 CREAM TOPICAL at 14:51

## 2019-02-27 RX ADMIN — PANTOPRAZOLE SODIUM 40 MILLIGRAM(S): 20 TABLET, DELAYED RELEASE ORAL at 06:47

## 2019-02-27 RX ADMIN — Medication 3 UNIT(S): at 08:33

## 2019-02-27 RX ADMIN — HEPARIN SODIUM 5000 UNIT(S): 5000 INJECTION INTRAVENOUS; SUBCUTANEOUS at 06:46

## 2019-02-27 NOTE — PROGRESS NOTE ADULT - ASSESSMENT
84 y/o Male with known urologic hx. Called to see pt 2/2 difficulty Aguirre catheter placement.  Pt in retention with over 900 cc on bladder scan after he voided. Pt has no urge to void further.  difficult to palpate his bladder 2/2 to his obesity.      A) urinary retention  hx of urethral stricture disease.  incomplete bladder emptying    P) Pt to be d/c'd to SNF  keep Aguirre in place   OP f/u with Dr. Matthews for further evaluation of his voiding dysfunction.  [phone]  will d/w Dr. Huff covering for Dr. Matthews

## 2019-02-27 NOTE — PROGRESS NOTE ADULT - SUBJECTIVE AND OBJECTIVE BOX
Hospital Day:  12d    Subjective:    Patient is a 83y old  Male who presents with a chief complaint of Recurrent falls  TOBIAS (26 Feb 2019 19:17)    No overnight events. Seen and examined at bedside. Patient reports that he continues to have urinary incontinence. He   Past Medical Hx:   Hypertension  Hydrocephalus  CKD (chronic kidney disease)  DM (diabetes mellitus)  Afib    Past Sx:  History of prostate surgery  No significant past surgical history    Allergies:  No Known Allergies    Current Meds:   Standng Meds:  atorvastatin 20 milliGRAM(s) Oral at bedtime  calcium carbonate 1250 mG  + Vitamin D (OsCal 500 + D) 1 Tablet(s) Oral three times a day  chlorhexidine 4% Liquid 1 Application(s) Topical <User Schedule>  dextrose 5%. 1000 milliLiter(s) (50 mL/Hr) IV Continuous <Continuous>  dextrose 50% Injectable 12.5 Gram(s) IV Push once  dextrose 50% Injectable 25 Gram(s) IV Push once  dextrose 50% Injectable 25 Gram(s) IV Push once  furosemide    Tablet 40 milliGRAM(s) Oral daily  guaiFENesin  milliGRAM(s) Oral every 12 hours  heparin  Injectable 5000 Unit(s) SubCutaneous every 8 hours  insulin glargine Injectable (LANTUS) 10 Unit(s) SubCutaneous at bedtime  insulin lispro Injectable (HumaLOG) 3 Unit(s) SubCutaneous three times a day before meals  metolazone 2.5 milliGRAM(s) Oral daily  metoprolol tartrate 25 milliGRAM(s) Oral three times a day  nystatin Powder 1 Application(s) Topical three times a day  pantoprazole    Tablet 40 milliGRAM(s) Oral before breakfast  tamsulosin 0.4 milliGRAM(s) Oral at bedtime    PRN Meds:  acetaminophen   Tablet .. 650 milliGRAM(s) Oral every 6 hours PRN Moderate Pain (4 - 6)  ALBUTerol/ipratropium for Nebulization 3 milliLiter(s) Nebulizer every 6 hours PRN Shortness of Breath and/or Wheezing  dextrose 40% Gel 15 Gram(s) Oral once PRN Blood Glucose LESS THAN 70 milliGRAM(s)/deciliter  glucagon  Injectable 1 milliGRAM(s) IntraMuscular once PRN Glucose LESS THAN 70 milligrams/deciliter    HOME MEDICATIONS:  amLODIPine 5 mg oral tablet: 1 tab(s) orally 2 times a day  indapamide 2.5 mg oral tablet: 1 tab(s) orally once a day (in the morning)  Lantus Solostar Pen 100 units/mL subcutaneous solution: 20 unit(s) subcutaneous once a day (at bedtime)  Lasix 40 mg oral tablet: 1 tab(s) orally 2 times a day  pioglitazone 30 mg oral tablet: 1 tab(s) orally once a day  semaglutide 2 mg/1.5 mL subcutaneous solution: 20  subcutaneous once a week      Vital Signs:   T(F): 97.3 (02-27-19 @ 05:21), Max: 97.3 (02-27-19 @ 05:21)  HR: 66 (02-27-19 @ 05:21) (66 - 88)  BP: 133/65 (02-27-19 @ 05:21) (126/59 - 147/60)  RR: 18 (02-27-19 @ 05:21) (18 - 19)  SpO2: --      02-26-19 @ 07:01  -  02-27-19 @ 07:00  --------------------------------------------------------  IN: 0 mL / OUT: 640 mL / NET: -640 mL    02-27-19 @ 07:01  -  02-27-19 @ 09:22  --------------------------------------------------------  IN: 0 mL / OUT: 220 mL / NET: -220 mL        Physical Exam:   GENERAL: Obese appearing male with thick neck, anasarca   HEENT: NCAT, pupil a bit more sluggish on left than on right but both are responsive to light. EOMI, Neck thick, mucous membranes moist   CHEST/LUNG: CTA, No wheezing, rhonchi, rales   HEART: irregular rhythm with a regular rate; s1 s2 appreciated, No murmurs, rubs, or gallops  ABDOMEN: Soft, Nontender, mild distension; Bowel sounds present; obese   EXTREMITIES: 1+ pitting edema in bilateral LE from shin to toes. Noted 1+ edema in bilateral upper extremities as well. Muscle strength 5/5 in bilateral upper and lower extremities.   NERVOUS SYSTEM:  Alert & Oriented X3, non focal     Labs:                         10.3   8.32  )-----------( 246      ( 27 Feb 2019 06:48 )             34.2     Neutophil% 74.5, Lymphocyte% 14.8, Monocyte% 6.7, Bands% 0.5 02-27-19 @ 06:48    27 Feb 2019 06:48    148    |  103    |  33     ----------------------------<  122    3.5     |  26     |  1.6      Ca    8.5        27 Feb 2019 06:48  Mg     2.2       27 Feb 2019 06:48    TPro  5.8    /  Alb  3.5    /  TBili  0.6    /  DBili  x      /  AST  18     /  ALT  13     /  AlkPhos  83     27 Feb 2019 06:48    Radiology:       Assessment and Plan:     DVT ppx:    GI ppx:     Code Status:     DISPO: Hospital Day:  12d    Subjective:    Patient is a 83y old  Male who presents with a chief complaint of Recurrent falls  TOBIAS (26 Feb 2019 19:17)    No overnight events. Seen and examined at bedside. Patient reports that he continues to have urinary incontinence. Awaiting Post Void Residual to determine if it is overflow incontinence. Patient reported no other acute complaints. He is aware of being discharged to SNF for subacute rehab today. The patient stated that he is feeling well for discharge.      Past Medical Hx:   Hypertension  Hydrocephalus  CKD (chronic kidney disease)  DM (diabetes mellitus)  Afib    Past Sx:  History of prostate surgery  No significant past surgical history    Allergies:  No Known Allergies    Current Meds:   Standng Meds:  atorvastatin 20 milliGRAM(s) Oral at bedtime  calcium carbonate 1250 mG  + Vitamin D (OsCal 500 + D) 1 Tablet(s) Oral three times a day  chlorhexidine 4% Liquid 1 Application(s) Topical <User Schedule>  dextrose 5%. 1000 milliLiter(s) (50 mL/Hr) IV Continuous <Continuous>  dextrose 50% Injectable 12.5 Gram(s) IV Push once  dextrose 50% Injectable 25 Gram(s) IV Push once  dextrose 50% Injectable 25 Gram(s) IV Push once  furosemide    Tablet 40 milliGRAM(s) Oral daily  guaiFENesin  milliGRAM(s) Oral every 12 hours  heparin  Injectable 5000 Unit(s) SubCutaneous every 8 hours  insulin glargine Injectable (LANTUS) 10 Unit(s) SubCutaneous at bedtime  insulin lispro Injectable (HumaLOG) 3 Unit(s) SubCutaneous three times a day before meals  metolazone 2.5 milliGRAM(s) Oral daily  metoprolol tartrate 25 milliGRAM(s) Oral three times a day  nystatin Powder 1 Application(s) Topical three times a day  pantoprazole    Tablet 40 milliGRAM(s) Oral before breakfast  tamsulosin 0.4 milliGRAM(s) Oral at bedtime    PRN Meds:  acetaminophen   Tablet .. 650 milliGRAM(s) Oral every 6 hours PRN Moderate Pain (4 - 6)  ALBUTerol/ipratropium for Nebulization 3 milliLiter(s) Nebulizer every 6 hours PRN Shortness of Breath and/or Wheezing  dextrose 40% Gel 15 Gram(s) Oral once PRN Blood Glucose LESS THAN 70 milliGRAM(s)/deciliter  glucagon  Injectable 1 milliGRAM(s) IntraMuscular once PRN Glucose LESS THAN 70 milligrams/deciliter    HOME MEDICATIONS:  amLODIPine 5 mg oral tablet: 1 tab(s) orally 2 times a day  indapamide 2.5 mg oral tablet: 1 tab(s) orally once a day (in the morning)  Lantus Solostar Pen 100 units/mL subcutaneous solution: 20 unit(s) subcutaneous once a day (at bedtime)  Lasix 40 mg oral tablet: 1 tab(s) orally 2 times a day  pioglitazone 30 mg oral tablet: 1 tab(s) orally once a day  semaglutide 2 mg/1.5 mL subcutaneous solution: 20  subcutaneous once a week      Vital Signs:   T(F): 97.3 (02-27-19 @ 05:21), Max: 97.3 (02-27-19 @ 05:21)  HR: 66 (02-27-19 @ 05:21) (66 - 88)  BP: 133/65 (02-27-19 @ 05:21) (126/59 - 147/60)  RR: 18 (02-27-19 @ 05:21) (18 - 19)  SpO2: --      02-26-19 @ 07:01  -  02-27-19 @ 07:00  --------------------------------------------------------  IN: 0 mL / OUT: 640 mL / NET: -640 mL    02-27-19 @ 07:01  -  02-27-19 @ 09:22  --------------------------------------------------------  IN: 0 mL / OUT: 220 mL / NET: -220 mL        Physical Exam:   GENERAL: Obese appearing male with thick neck, anasarca   HEENT: NCAT, pupil a bit more sluggish on left than on right but both are responsive to light. EOMI, Neck thick, mucous membranes moist   CHEST/LUNG: CTA, No wheezing, rhonchi, rales   HEART: irregular rhythm with a regular rate; s1 s2 appreciated, No murmurs, rubs, or gallops  ABDOMEN: Soft, Nontender, mild distension; Bowel sounds present; obese   EXTREMITIES: 1+ pitting edema in bilateral LE from shin to toes. Noted 1+ edema in bilateral upper extremities as well. Muscle strength 5/5 in bilateral upper and lower extremities.   NERVOUS SYSTEM:  Alert & Oriented X3, non focal     Labs:                         10.3   8.32  )-----------( 246      ( 27 Feb 2019 06:48 )             34.2     Neutophil% 74.5, Lymphocyte% 14.8, Monocyte% 6.7, Bands% 0.5 02-27-19 @ 06:48    27 Feb 2019 06:48    148    |  103    |  33     ----------------------------<  122    3.5     |  26     |  1.6      Ca    8.5        27 Feb 2019 06:48  Mg     2.2       27 Feb 2019 06:48    TPro  5.8    /  Alb  3.5    /  TBili  0.6    /  DBili  x      /  AST  18     /  ALT  13     /  AlkPhos  83     27 Feb 2019 06:48    Radiology:   None Today     Assessment and Plan:   This is an 83 year old male with PMHx of Type 2 Diabetes, HTN, Hydrocephalus s/p  shunt, atrial fibrillation not on AC due to falls who presented to the ED s/p mechanical fall. He was found in the ED to have a desaturation with PVCs and short runs of NSVT that warranted him to be intubated and admitted to the CCU. He was intubated for approximately 4 days during this hospitalization stay.     #Acute Hypoxic Respiratory Failure 2/2 aspiration PNA vs Pulmonary Edema  - Downgraded from CCU after 4 days of intubation   - ID evaluated; maintain off antibiotics    - BiPAP as needed for night  - Continue with Metolazone 2.5mg daily and Lasix 40mg po daily    #History of A-fib with noted PVCs on Monitor and suspected Type 2 MI on admission  - Continue with Metoprolol 25mg TID as per cards recs   - CHADsVASC score 4; Hold off on anti coagulation due to falls   - Telemetry discontinued today     #TOBIAS 2/2 tubular injury from Hypotension (resolving)  - Continue with negative fluid balance (0.5L negative)   - Continue on Metolazone 2.5mg daily and Lasix 40mg PO daily; Dr. Merchant to follow up if more diuresis needed  - Noted baseline creatinine is approximately 2.0; currently 1.6    #Urinary Incontinence  - Noted urinary incontinence since barragan removal  - Bladder scan to assess for overflow    #Hematuria (resolved)  - Outpatient follow up with Dr. Matthews     #Recurrent Falls likely 2/2 deconditioning vs hypotension from medication   - Negative trauma workup  - PT/Rehab    #Hypertension   - Continue with current medications     #Type 2 DM   - Continue with Lantus 10 units daily and Lispro 3 units with meals  POCT Blood Glucose.: 119 mg/dL (27 Feb 2019 08:18)  POCT Blood Glucose.: 118 mg/dL (26 Feb 2019 21:18)  POCT Blood Glucose.: 147 mg/dL (26 Feb 2019 17:00)  POCT Blood Glucose.: 161 mg/dL (26 Feb 2019 11:11)    #Hydrocephalus s/p  Shunt  - Chronically in place. Follows Dr. Wayne   - CT inpatient showing no signs of hydrocephalus     #JOHANNY with suspected obesity hypoventilation syndrome  - Non compliant with CPAP at home.   - CPAP needs to be worn     #Hypernatremia (resolved)    Activity: As tolerated;   Diet: Dysphagia 3 with Honey Fluids; will need barium swallow in 2 weeks  DVT ppx: Heparin SubQ  GI ppx: Protonix  Code Status: Full Code for now  DISPO: discharge to SNF today       FINAL DISCHARGE INTERVIEW:  Resident(s) Present: Ethan Durbin RN Present: Stacey  DISCHARGE MEDICATION RECONCILIATION  reviewed with Attending Dr. Amezcua    DISPOSITION:   [  ] Home,    [  ] Home with Visiting Nursing Services,   [ x ]  SNF/ NH,    [   ] Acute Rehab (4A),   [   ] Other (Specify:_________) Hospital Day:  12d    Subjective:    Patient is a 83y old  Male who presents with a chief complaint of Recurrent falls  TOBIAS (26 Feb 2019 19:17)    No overnight events. Seen and examined at bedside. Patient reports that he continues to have urinary incontinence. Awaiting Post Void Residual to determine if it is overflow incontinence. Patient reported no other acute complaints. He is aware of being discharged to SNF for subacute rehab today. The patient stated that he is feeling well for discharge.      Past Medical Hx:   Hypertension  Hydrocephalus  CKD (chronic kidney disease)  DM (diabetes mellitus)  Afib    Past Sx:  History of prostate surgery  No significant past surgical history    Allergies:  No Known Allergies    Current Meds:   Standng Meds:  atorvastatin 20 milliGRAM(s) Oral at bedtime  calcium carbonate 1250 mG  + Vitamin D (OsCal 500 + D) 1 Tablet(s) Oral three times a day  chlorhexidine 4% Liquid 1 Application(s) Topical <User Schedule>  dextrose 5%. 1000 milliLiter(s) (50 mL/Hr) IV Continuous <Continuous>  dextrose 50% Injectable 12.5 Gram(s) IV Push once  dextrose 50% Injectable 25 Gram(s) IV Push once  dextrose 50% Injectable 25 Gram(s) IV Push once  furosemide    Tablet 40 milliGRAM(s) Oral daily  guaiFENesin  milliGRAM(s) Oral every 12 hours  heparin  Injectable 5000 Unit(s) SubCutaneous every 8 hours  insulin glargine Injectable (LANTUS) 10 Unit(s) SubCutaneous at bedtime  insulin lispro Injectable (HumaLOG) 3 Unit(s) SubCutaneous three times a day before meals  metolazone 2.5 milliGRAM(s) Oral daily  metoprolol tartrate 25 milliGRAM(s) Oral three times a day  nystatin Powder 1 Application(s) Topical three times a day  pantoprazole    Tablet 40 milliGRAM(s) Oral before breakfast  tamsulosin 0.4 milliGRAM(s) Oral at bedtime    PRN Meds:  acetaminophen   Tablet .. 650 milliGRAM(s) Oral every 6 hours PRN Moderate Pain (4 - 6)  ALBUTerol/ipratropium for Nebulization 3 milliLiter(s) Nebulizer every 6 hours PRN Shortness of Breath and/or Wheezing  dextrose 40% Gel 15 Gram(s) Oral once PRN Blood Glucose LESS THAN 70 milliGRAM(s)/deciliter  glucagon  Injectable 1 milliGRAM(s) IntraMuscular once PRN Glucose LESS THAN 70 milligrams/deciliter    HOME MEDICATIONS:  amLODIPine 5 mg oral tablet: 1 tab(s) orally 2 times a day  indapamide 2.5 mg oral tablet: 1 tab(s) orally once a day (in the morning)  Lantus Solostar Pen 100 units/mL subcutaneous solution: 20 unit(s) subcutaneous once a day (at bedtime)  Lasix 40 mg oral tablet: 1 tab(s) orally 2 times a day  pioglitazone 30 mg oral tablet: 1 tab(s) orally once a day  semaglutide 2 mg/1.5 mL subcutaneous solution: 20  subcutaneous once a week      Vital Signs:   T(F): 97.3 (02-27-19 @ 05:21), Max: 97.3 (02-27-19 @ 05:21)  HR: 66 (02-27-19 @ 05:21) (66 - 88)  BP: 133/65 (02-27-19 @ 05:21) (126/59 - 147/60)  RR: 18 (02-27-19 @ 05:21) (18 - 19)  SpO2: --      02-26-19 @ 07:01  -  02-27-19 @ 07:00  --------------------------------------------------------  IN: 0 mL / OUT: 640 mL / NET: -640 mL    02-27-19 @ 07:01  -  02-27-19 @ 09:22  --------------------------------------------------------  IN: 0 mL / OUT: 220 mL / NET: -220 mL        Physical Exam:   GENERAL: Obese appearing male with thick neck, anasarca   HEENT: NCAT, pupil a bit more sluggish on left than on right but both are responsive to light. EOMI, Neck thick, mucous membranes moist   CHEST/LUNG: CTA, No wheezing, rhonchi, rales   HEART: irregular rhythm with a regular rate; s1 s2 appreciated, No murmurs, rubs, or gallops  ABDOMEN: Soft, Nontender, mild distension; Bowel sounds present; obese   EXTREMITIES: 1+ pitting edema in bilateral LE from shin to toes. Noted 1+ edema in bilateral upper extremities as well. Muscle strength 5/5 in bilateral upper and lower extremities.   NERVOUS SYSTEM:  Alert & Oriented X3, non focal     Labs:                         10.3   8.32  )-----------( 246      ( 27 Feb 2019 06:48 )             34.2     Neutophil% 74.5, Lymphocyte% 14.8, Monocyte% 6.7, Bands% 0.5 02-27-19 @ 06:48    27 Feb 2019 06:48    148    |  103    |  33     ----------------------------<  122    3.5     |  26     |  1.6      Ca    8.5        27 Feb 2019 06:48  Mg     2.2       27 Feb 2019 06:48    TPro  5.8    /  Alb  3.5    /  TBili  0.6    /  DBili  x      /  AST  18     /  ALT  13     /  AlkPhos  83     27 Feb 2019 06:48    Radiology:   None Today     Assessment and Plan:   This is an 83 year old male with PMHx of Type 2 Diabetes, HTN, Hydrocephalus s/p  shunt, atrial fibrillation not on AC due to falls who presented to the ED s/p mechanical fall. He was found in the ED to have a desaturation with PVCs and short runs of NSVT that warranted him to be intubated and admitted to the CCU. He was intubated for approximately 4 days during this hospitalization stay.     #Acute Hypoxic Respiratory Failure 2/2 aspiration PNA vs Pulmonary Edema  - Downgraded from CCU after 4 days of intubation   - ID evaluated; maintain off antibiotics    - BiPAP as needed for night  - Continue with Metolazone 2.5mg daily and Lasix 40mg po daily    #History of A-fib with noted PVCs on Monitor and suspected Type 2 MI on admission  - Continue with Metoprolol 25mg TID as per cards recs   - CHADsVASC score 4; Hold off on anti coagulation due to falls   - Telemetry discontinued today     #TOBIAS 2/2 tubular injury from Hypotension (resolving)  - Continue with negative fluid balance (0.5L negative)   - Continue on Metolazone 2.5mg daily and Lasix 40mg PO daily; Dr. Merchant to follow up if more diuresis needed  - Noted baseline creatinine is approximately 2.0; currently 1.6    #Urinary Incontinence; Overflow  - Bladder scan noted over 999cc   - Urology to place a new barragan for discharge.  - Will need outpatient follow up with Dr. Matthews    #Hematuria (resolved)  - Outpatient follow up with Dr. Matthews     #Recurrent Falls likely 2/2 deconditioning vs hypotension from medication   - Negative trauma workup  - PT/Rehab    #Hypertension   - Continue with current medications     #Type 2 DM   - Continue with Lantus 10 units daily and Lispro 3 units with meals  POCT Blood Glucose.: 119 mg/dL (27 Feb 2019 08:18)  POCT Blood Glucose.: 118 mg/dL (26 Feb 2019 21:18)  POCT Blood Glucose.: 147 mg/dL (26 Feb 2019 17:00)  POCT Blood Glucose.: 161 mg/dL (26 Feb 2019 11:11)    #Hydrocephalus s/p  Shunt  - Chronically in place. Follows Dr. Wayne   - CT inpatient showing no signs of hydrocephalus     #JOHANNY with suspected obesity hypoventilation syndrome  - Non compliant with CPAP at home.   - CPAP needs to be worn     #Hypernatremia (resolved)    Activity: As tolerated;   Diet: Dysphagia 3 with Honey Fluids; will need barium swallow in 2 weeks  DVT ppx: Heparin SubQ  GI ppx: Protonix  Code Status: Full Code for now  DISPO: discharge to SNF today       FINAL DISCHARGE INTERVIEW:  Resident(s) Present: Ethan Durbin RN Present: Stacey    DISCHARGE MEDICATION RECONCILIATION  reviewed with Attending Dr. Amezcua    DISPOSITION:   [  ] Home,    [  ] Home with Visiting Nursing Services,   [ x ]  SNF/ NH,    [   ] Acute Rehab (4A),   [   ] Other (Specify:_________)

## 2019-02-27 NOTE — PROGRESS NOTE ADULT - REASON FOR ADMISSION
Recurrent falls  TOBIAS

## 2019-02-27 NOTE — PROGRESS NOTE ADULT - ASSESSMENT
TOBIAS   -much better  hypernatremia    - mild  CKD 3-4 (baseline Cr 1.9 on 12/2018)  urine retention s/p prostatectomy / urethral stricture  falls  ARF - JOHANNY on CPAP / PNA / CHF  EF 55%  afib  HTN   s/p  shunt  DM 2   debility      plan:    cont current diuretics  keep off bp meds and ACE-I  barragan replaced  pt / rehab  dc planning  oupt renal f/u   check bmp in one week, may need K+ repletion

## 2019-02-27 NOTE — PROGRESS NOTE ADULT - ASSESSMENT
84yo M with Past Medical History DMII (insulin dependent), HTN, Hydrocephalus status post  shunt, Afib (off AC) admitted status post fall.  His hospital course has been complicated by acute hypoxic respiratory failure status post self-extubation and episodes of NSVT.     Acute hypoxic respiratory failure: likely multifactorial secondary to aspiration pneumonia with fluid overload.  The patient successfully self-extubated on 2/21/19.  The patient has been doing well off antibiotics.  For suspected volume overload, continue diuretics as prescribed by nephrology (Lasix 40mg q12 and Metolazone 2.5mg q24).  Atrial Fibrillation: continue Lopressor 25mg q8h.  Off anticoagulation due to a history of recurrent falls.  Overflow incontinence/chronic urinary retention: barragan catheter was placed after an episode of incontinence. The patient was still found to be retaining ~1.2L.  Continue barragan catheter upon discharged and follow-up with Dr. Matthews as outpatient.  DMII: continue Lantus/Lispro and monitor FS with meals  Hydrocephalus status post  shunt: follow-up with Dr. Wayne as outpatient.  JOHANNY/Obesity Hypoventilation Syndrome: continue NIPPV at night  GI/DVT prophylaxis  Continue bedside physical therapy    Discharge to SNF; time spent =35 minutes  Case discussed with son at bedside

## 2019-02-27 NOTE — PROGRESS NOTE ADULT - SUBJECTIVE AND OBJECTIVE BOX
VINCENZO EDMONDSON [id number]    Hospitalist Note  82yo M with Past Medical History DMII (insulin dependent), HTN, Hydrocephalus status post  shunt, Afib (off AC) admitted status post fall.  His hospital course has been complicated by acute hypoxic respiratory failure status post self-extubation and episodes of NSVT.     Overnight events/Updates: The patient was found to have a distended bladder despite voiding this AM.  His barragan catheter was replaced, and a total of 1.2L of urine was drained.    Vital Signs Last 24 Hrs  T(C): 35.8 (27 Feb 2019 13:18), Max: 36.3 (27 Feb 2019 05:21)  T(F): 96.5 (27 Feb 2019 13:18), Max: 97.3 (27 Feb 2019 05:21)  HR: 70 (27 Feb 2019 13:18) (66 - 71)  BP: 122/65 (27 Feb 2019 13:18) (122/65 - 133/65)  BP(mean): --  RR: 18 (27 Feb 2019 13:18) (18 - 19)  SpO2: 97% (27 Feb 2019 15:00) (97% - 97%)    Physical Examination:  General: AAO x 3  HEENT: PERRLA, EOMI, NC  CV= S1 & S2 appreciated  Lungs= no wheezes or rales  Abdominal Examination= + BS, Obese, Soft, NT/ND,  + barragan catheter  Extremity Examination= No C/C.  Mild peripheral edema    ROS: No chest pain, no shortness of breath.  + urinary incontinence  All other systems reviewed and are within normal limits except for the complaints in the HPI.    MEDICATIONS  (STANDING):  atorvastatin 20 milliGRAM(s) Oral at bedtime  calcium carbonate 1250 mG  + Vitamin D (OsCal 500 + D) 1 Tablet(s) Oral three times a day  chlorhexidine 4% Liquid 1 Application(s) Topical <User Schedule>  dextrose 5%. 1000 milliLiter(s) (50 mL/Hr) IV Continuous <Continuous>  dextrose 50% Injectable 12.5 Gram(s) IV Push once  dextrose 50% Injectable 25 Gram(s) IV Push once  dextrose 50% Injectable 25 Gram(s) IV Push once  furosemide    Tablet 40 milliGRAM(s) Oral daily  guaiFENesin  milliGRAM(s) Oral every 12 hours  heparin  Injectable 5000 Unit(s) SubCutaneous every 8 hours  insulin glargine Injectable (LANTUS) 10 Unit(s) SubCutaneous at bedtime  insulin lispro Injectable (HumaLOG) 3 Unit(s) SubCutaneous three times a day before meals  metolazone 2.5 milliGRAM(s) Oral daily  metoprolol tartrate 25 milliGRAM(s) Oral three times a day  nystatin Powder 1 Application(s) Topical three times a day  pantoprazole    Tablet 40 milliGRAM(s) Oral before breakfast  tamsulosin 0.4 milliGRAM(s) Oral at bedtime    MEDICATIONS  (PRN):  acetaminophen   Tablet .. 650 milliGRAM(s) Oral every 6 hours PRN Moderate Pain (4 - 6)  ALBUTerol/ipratropium for Nebulization 3 milliLiter(s) Nebulizer every 6 hours PRN Shortness of Breath and/or Wheezing  dextrose 40% Gel 15 Gram(s) Oral once PRN Blood Glucose LESS THAN 70 milliGRAM(s)/deciliter  glucagon  Injectable 1 milliGRAM(s) IntraMuscular once PRN Glucose LESS THAN 70 milligrams/deciliter                            10.3   8.32  )-----------( 246      ( 27 Feb 2019 06:48 )             34.2     02-27    148<H>  |  103  |  33<H>  ----------------------------<  122<H>  3.5   |  26  |  1.6<H>    Ca    8.5      27 Feb 2019 06:48  Mg     2.2     02-27    TPro  5.8<L>  /  Alb  3.5  /  TBili  0.6  /  DBili  x   /  AST  18  /  ALT  13  /  AlkPhos  83  02-27      Case discussed with housestaff & family  COOPER Amezcua [id number]

## 2019-02-27 NOTE — PROGRESS NOTE ADULT - PROVIDER SPECIALTY LIST ADULT
CCU
Critical Care
Hospitalist
Infectious Disease
Internal Medicine
Nephrology
Pulmonology
Urology
Critical Care
Hospitalist
Nephrology
Pulmonology

## 2019-02-27 NOTE — PROGRESS NOTE ADULT - SUBJECTIVE AND OBJECTIVE BOX
NEPHROLOGY FOLLOW UP NOTE    pt seen and examined  d/w hospitalist, 2 sons and  PA  barragan replaced - now with good uop  cr better  bp's fine  no sob    PAST MEDICAL & SURGICAL HISTORY:  Hypertension  Hydrocephalus  CKD (chronic kidney disease)  DM (diabetes mellitus)  Afib  History of prostate surgery    Allergies:  No Known Allergies    Home Medications Reviewed    SOCIAL HISTORY:  Denies ETOH,Smoking,   FAMILY HISTORY:  Family history of diabetes mellitus (Mother, Sibling)        REVIEW OF SYSTEMS  All other review of systems is negative unless indicated above.      PHYSICAL EXAM:  NAD  awake   o2 via nc  dry mm  supple  decreased bs b/l  distant HS, irreg  soft  + edema  [facility] Medications:   MEDICATIONS  (STANDING):  atorvastatin 20 milliGRAM(s) Oral at bedtime  calcium carbonate 1250 mG  + Vitamin D (OsCal 500 + D) 1 Tablet(s) Oral three times a day  chlorhexidine 4% Liquid 1 Application(s) Topical <User Schedule>  dextrose 5%. 1000 milliLiter(s) (50 mL/Hr) IV Continuous <Continuous>  dextrose 50% Injectable 12.5 Gram(s) IV Push once  dextrose 50% Injectable 25 Gram(s) IV Push once  dextrose 50% Injectable 25 Gram(s) IV Push once  furosemide    Tablet 40 milliGRAM(s) Oral daily  guaiFENesin  milliGRAM(s) Oral every 12 hours  heparin  Injectable 5000 Unit(s) SubCutaneous every 8 hours  insulin glargine Injectable (LANTUS) 10 Unit(s) SubCutaneous at bedtime  insulin lispro Injectable (HumaLOG) 3 Unit(s) SubCutaneous three times a day before meals  metolazone 2.5 milliGRAM(s) Oral daily  metoprolol tartrate 25 milliGRAM(s) Oral three times a day  nystatin Powder 1 Application(s) Topical three times a day  pantoprazole    Tablet 40 milliGRAM(s) Oral before breakfast  tamsulosin 0.4 milliGRAM(s) Oral at bedtime        VITALS:  T(F): 96.5 (02-27-19 @ 13:18), Max: 97.3 (02-27-19 @ 05:21)  HR: 70 (02-27-19 @ 13:18)  BP: 122/65 (02-27-19 @ 13:18)  RR: 18 (02-27-19 @ 13:18)  SpO2: --  Wt(kg): --    02-25 @ 07:01  -  02-26 @ 07:00  --------------------------------------------------------  IN: 250 mL / OUT: 1150 mL / NET: -900 mL    02-26 @ 07:01 - 02-27 @ 07:00  --------------------------------------------------------  IN: 0 mL / OUT: 640 mL / NET: -640 mL    02-27 @ 07:01 - 02-27 @ 14:41  --------------------------------------------------------  IN: 940 mL / OUT: 220 mL / NET: 720 mL          LABS:  02-27    148<H>  |  103  |  33<H>  ----------------------------<  122<H>  3.5   |  26  |  1.6<H>    Ca    8.5      27 Feb 2019 06:48  Mg     2.2     02-27    TPro  5.8<L>  /  Alb  3.5  /  TBili  0.6  /  DBili      /  AST  18  /  ALT  13  /  AlkPhos  83  02-27                          10.3   8.32  )-----------( 246      ( 27 Feb 2019 06:48 )             34.2       Urine Studies:        RADIOLOGY & ADDITIONAL STUDIES:

## 2019-02-28 LAB
CREATININE, URINE RESULT: 72 MG/DL — SIGNIFICANT CHANGE UP
INTERPRETATION 24H UR IFE-IMP: SIGNIFICANT CHANGE UP

## 2019-03-01 ENCOUNTER — OUTPATIENT (OUTPATIENT)
Dept: OUTPATIENT SERVICES | Facility: HOSPITAL | Age: 84
LOS: 1 days | Discharge: HOME | End: 2019-03-01

## 2019-03-01 DIAGNOSIS — E10.9 TYPE 1 DIABETES MELLITUS WITHOUT COMPLICATIONS: ICD-10-CM

## 2019-03-01 DIAGNOSIS — Z98.890 OTHER SPECIFIED POSTPROCEDURAL STATES: Chronic | ICD-10-CM

## 2019-03-04 ENCOUNTER — OUTPATIENT (OUTPATIENT)
Dept: OUTPATIENT SERVICES | Facility: HOSPITAL | Age: 84
LOS: 1 days | Discharge: HOME | End: 2019-03-04

## 2019-03-04 DIAGNOSIS — I21.4 NON-ST ELEVATION (NSTEMI) MYOCARDIAL INFARCTION: ICD-10-CM

## 2019-03-04 DIAGNOSIS — Z98.890 OTHER SPECIFIED POSTPROCEDURAL STATES: Chronic | ICD-10-CM

## 2019-03-06 DIAGNOSIS — E11.40 TYPE 2 DIABETES MELLITUS WITH DIABETIC NEUROPATHY, UNSPECIFIED: ICD-10-CM

## 2019-03-06 DIAGNOSIS — I13.0 HYPERTENSIVE HEART AND CHRONIC KIDNEY DISEASE WITH HEART FAILURE AND STAGE 1 THROUGH STAGE 4 CHRONIC KIDNEY DISEASE, OR UNSPECIFIED CHRONIC KIDNEY DISEASE: ICD-10-CM

## 2019-03-06 DIAGNOSIS — J96.01 ACUTE RESPIRATORY FAILURE WITH HYPOXIA: ICD-10-CM

## 2019-03-06 DIAGNOSIS — N18.4 CHRONIC KIDNEY DISEASE, STAGE 4 (SEVERE): ICD-10-CM

## 2019-03-06 DIAGNOSIS — N30.01 ACUTE CYSTITIS WITH HEMATURIA: ICD-10-CM
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
## 2019-03-06 DIAGNOSIS — E11.22 TYPE 2 DIABETES MELLITUS WITH DIABETIC CHRONIC KIDNEY DISEASE: ICD-10-CM

## 2019-03-06 DIAGNOSIS — E66.2 MORBID (SEVERE) OBESITY WITH ALVEOLAR HYPOVENTILATION: ICD-10-CM

## 2019-03-06 DIAGNOSIS — J69.0 PNEUMONITIS DUE TO INHALATION OF FOOD AND VOMIT: ICD-10-CM

## 2019-03-06 DIAGNOSIS — N17.9 ACUTE KIDNEY FAILURE, UNSPECIFIED: ICD-10-CM

## 2019-03-06 DIAGNOSIS — R33.9 RETENTION OF URINE, UNSPECIFIED: ICD-10-CM

## 2019-03-06 DIAGNOSIS — R13.10 DYSPHAGIA, UNSPECIFIED: ICD-10-CM

## 2019-03-06 DIAGNOSIS — I49.3 VENTRICULAR PREMATURE DEPOLARIZATION: ICD-10-CM

## 2019-03-06 DIAGNOSIS — I95.2 HYPOTENSION DUE TO DRUGS: ICD-10-CM

## 2019-03-06 DIAGNOSIS — Z79.4 LONG TERM (CURRENT) USE OF INSULIN: ICD-10-CM

## 2019-03-06 DIAGNOSIS — I27.29 OTHER SECONDARY PULMONARY HYPERTENSION: ICD-10-CM

## 2019-03-06 DIAGNOSIS — G93.41 METABOLIC ENCEPHALOPATHY: ICD-10-CM

## 2019-03-06 DIAGNOSIS — E83.39 OTHER DISORDERS OF PHOSPHORUS METABOLISM: ICD-10-CM

## 2019-03-06 DIAGNOSIS — I47.1 SUPRAVENTRICULAR TACHYCARDIA: ICD-10-CM

## 2019-03-06 DIAGNOSIS — E83.51 HYPOCALCEMIA: ICD-10-CM

## 2019-03-06 DIAGNOSIS — J96.02 ACUTE RESPIRATORY FAILURE WITH HYPERCAPNIA: ICD-10-CM

## 2019-03-06 DIAGNOSIS — E87.0 HYPEROSMOLALITY AND HYPERNATREMIA: ICD-10-CM

## 2019-03-06 DIAGNOSIS — E86.0 DEHYDRATION: ICD-10-CM

## 2019-03-06 DIAGNOSIS — D64.9 ANEMIA, UNSPECIFIED: ICD-10-CM

## 2019-03-06 DIAGNOSIS — J98.11 ATELECTASIS: ICD-10-CM

## 2019-03-06 DIAGNOSIS — S32.029A UNSPECIFIED FRACTURE OF SECOND LUMBAR VERTEBRA, INITIAL ENCOUNTER FOR CLOSED FRACTURE: ICD-10-CM

## 2019-03-06 DIAGNOSIS — Z87.891 PERSONAL HISTORY OF NICOTINE DEPENDENCE: ICD-10-CM

## 2019-03-06 DIAGNOSIS — E11.65 TYPE 2 DIABETES MELLITUS WITH HYPERGLYCEMIA: ICD-10-CM

## 2019-03-06 DIAGNOSIS — I50.33 ACUTE ON CHRONIC DIASTOLIC (CONGESTIVE) HEART FAILURE: ICD-10-CM

## 2019-03-06 DIAGNOSIS — I48.91 UNSPECIFIED ATRIAL FIBRILLATION: ICD-10-CM

## 2019-03-09 ENCOUNTER — OUTPATIENT (OUTPATIENT)
Dept: OUTPATIENT SERVICES | Facility: HOSPITAL | Age: 84
LOS: 1 days | Discharge: HOME | End: 2019-03-09

## 2019-03-09 DIAGNOSIS — Z98.890 OTHER SPECIFIED POSTPROCEDURAL STATES: Chronic | ICD-10-CM

## 2019-03-11 ENCOUNTER — OUTPATIENT (OUTPATIENT)
Dept: OUTPATIENT SERVICES | Facility: HOSPITAL | Age: 84
LOS: 1 days | Discharge: HOME | End: 2019-03-11

## 2019-03-11 DIAGNOSIS — Z98.890 OTHER SPECIFIED POSTPROCEDURAL STATES: Chronic | ICD-10-CM

## 2019-03-11 DIAGNOSIS — R79.9 ABNORMAL FINDING OF BLOOD CHEMISTRY, UNSPECIFIED: ICD-10-CM

## 2019-03-11 DIAGNOSIS — E11.9 TYPE 2 DIABETES MELLITUS WITHOUT COMPLICATIONS: ICD-10-CM

## 2019-03-11 DIAGNOSIS — E08.40 DIABETES MELLITUS DUE TO UNDERLYING CONDITION WITH DIABETIC NEUROPATHY, UNSPECIFIED: ICD-10-CM

## 2019-03-12 NOTE — ED PROVIDER NOTE - CHIEF COMPLAINT
-- Message is from the [facility]--    Reason for Call: Nurse Daly is requesting Dr. Mg Santiago to call Dr. Sadia Godoy back at their direct number [phone] to discuss the visit from today further.    Caller Information       Type Contact Phone    03/12/2019 03:31 PM Phone (Incoming) Daly (Nurse) [phone]     Pediatric office          Alternative phone number: n/a    Turnaround time given to caller:   \"This message will be sent to [state Provider's name]. The clinical team will fulfill your request as soon as they review your message.\"     The patient is a 83y Male complaining of fall.

## 2019-03-14 ENCOUNTER — OUTPATIENT (OUTPATIENT)
Dept: OUTPATIENT SERVICES | Facility: HOSPITAL | Age: 84
LOS: 1 days | Discharge: HOME | End: 2019-03-14

## 2019-03-14 DIAGNOSIS — E87.6 HYPOKALEMIA: ICD-10-CM

## 2019-03-14 DIAGNOSIS — Z98.890 OTHER SPECIFIED POSTPROCEDURAL STATES: Chronic | ICD-10-CM

## 2019-03-23 ENCOUNTER — OUTPATIENT (OUTPATIENT)
Dept: OUTPATIENT SERVICES | Facility: HOSPITAL | Age: 84
LOS: 1 days | Discharge: HOME | End: 2019-03-23

## 2019-03-23 DIAGNOSIS — Z98.890 OTHER SPECIFIED POSTPROCEDURAL STATES: Chronic | ICD-10-CM

## 2019-03-23 DIAGNOSIS — E10.9 TYPE 1 DIABETES MELLITUS WITHOUT COMPLICATIONS: ICD-10-CM

## 2019-03-23 DIAGNOSIS — E87.1 HYPO-OSMOLALITY AND HYPONATREMIA: ICD-10-CM

## 2019-04-03 ENCOUNTER — OUTPATIENT (OUTPATIENT)
Dept: OUTPATIENT SERVICES | Facility: HOSPITAL | Age: 84
LOS: 1 days | Discharge: HOME | End: 2019-04-03

## 2019-04-03 DIAGNOSIS — Z98.890 OTHER SPECIFIED POSTPROCEDURAL STATES: Chronic | ICD-10-CM

## 2019-04-03 DIAGNOSIS — E11.9 TYPE 2 DIABETES MELLITUS WITHOUT COMPLICATIONS: ICD-10-CM

## 2019-04-17 ENCOUNTER — OUTPATIENT (OUTPATIENT)
Dept: OUTPATIENT SERVICES | Facility: HOSPITAL | Age: 84
LOS: 1 days | Discharge: HOME | End: 2019-04-17

## 2019-04-17 ENCOUNTER — OUTPATIENT (OUTPATIENT)
Dept: OUTPATIENT SERVICES | Facility: HOSPITAL | Age: 84
LOS: 1 days | Discharge: HOME | End: 2019-04-17
Payer: COMMERCIAL

## 2019-04-17 DIAGNOSIS — Z98.890 OTHER SPECIFIED POSTPROCEDURAL STATES: Chronic | ICD-10-CM

## 2019-04-17 DIAGNOSIS — R47.02 DYSPHASIA: ICD-10-CM

## 2019-04-17 PROCEDURE — 74230 X-RAY XM SWLNG FUNCJ C+: CPT | Mod: 26

## 2019-04-24 DIAGNOSIS — R13.10 DYSPHAGIA, UNSPECIFIED: ICD-10-CM

## 2019-05-06 ENCOUNTER — OUTPATIENT (OUTPATIENT)
Dept: OUTPATIENT SERVICES | Facility: HOSPITAL | Age: 84
LOS: 1 days | Discharge: HOME | End: 2019-05-06

## 2019-05-06 DIAGNOSIS — Z98.890 OTHER SPECIFIED POSTPROCEDURAL STATES: Chronic | ICD-10-CM

## 2019-05-06 DIAGNOSIS — N18.3 CHRONIC KIDNEY DISEASE, STAGE 3 (MODERATE): ICD-10-CM

## 2019-07-02 ENCOUNTER — OUTPATIENT (OUTPATIENT)
Dept: OUTPATIENT SERVICES | Facility: HOSPITAL | Age: 84
LOS: 1 days | Discharge: HOME | End: 2019-07-02

## 2019-07-02 DIAGNOSIS — E11.65 TYPE 2 DIABETES MELLITUS WITH HYPERGLYCEMIA: ICD-10-CM

## 2019-07-02 DIAGNOSIS — Z98.890 OTHER SPECIFIED POSTPROCEDURAL STATES: Chronic | ICD-10-CM

## 2019-07-02 DIAGNOSIS — N18.4 CHRONIC KIDNEY DISEASE, STAGE 4 (SEVERE): ICD-10-CM

## 2019-07-02 DIAGNOSIS — E11.8 TYPE 2 DIABETES MELLITUS WITH UNSPECIFIED COMPLICATIONS: ICD-10-CM

## 2019-07-02 DIAGNOSIS — R97.20 ELEVATED PROSTATE SPECIFIC ANTIGEN [PSA]: ICD-10-CM

## 2019-08-22 ENCOUNTER — OUTPATIENT (OUTPATIENT)
Dept: OUTPATIENT SERVICES | Facility: HOSPITAL | Age: 84
LOS: 1 days | Discharge: HOME | End: 2019-08-22

## 2019-08-22 DIAGNOSIS — Z98.890 OTHER SPECIFIED POSTPROCEDURAL STATES: Chronic | ICD-10-CM

## 2019-08-22 DIAGNOSIS — N18.3 CHRONIC KIDNEY DISEASE, STAGE 3 (MODERATE): ICD-10-CM

## 2019-10-02 ENCOUNTER — OUTPATIENT (OUTPATIENT)
Dept: OUTPATIENT SERVICES | Facility: HOSPITAL | Age: 84
LOS: 1 days | Discharge: HOME | End: 2019-10-02

## 2019-10-02 DIAGNOSIS — N18.4 CHRONIC KIDNEY DISEASE, STAGE 4 (SEVERE): ICD-10-CM

## 2019-10-02 DIAGNOSIS — Z98.890 OTHER SPECIFIED POSTPROCEDURAL STATES: Chronic | ICD-10-CM

## 2019-11-15 ENCOUNTER — OUTPATIENT (OUTPATIENT)
Dept: OUTPATIENT SERVICES | Facility: HOSPITAL | Age: 84
LOS: 1 days | Discharge: HOME | End: 2019-11-15

## 2019-11-15 DIAGNOSIS — Z98.890 OTHER SPECIFIED POSTPROCEDURAL STATES: Chronic | ICD-10-CM

## 2019-11-15 DIAGNOSIS — E11.65 TYPE 2 DIABETES MELLITUS WITH HYPERGLYCEMIA: ICD-10-CM

## 2020-04-03 NOTE — SWALLOW FEES ASSESSMENT ADULT - SLP PERTINENT HISTORY OF CURRENT PROBLEM
5
83 y.o m s/p multiple mechanical falls 4 in past ten days. PMHx: hydrocephalus s/p  shunt, Afib,DM II. course complicated by aspiration PNA and intubation. pt s/p self extubation
83 y.o m s/p multiple mechanical falls 4 in past ten days. PMHx: hydrocephalus s/p  shunt, Afib,DM II. course complicated by aspiration PNA and intubation. pt s/p self extubation

## 2020-10-09 NOTE — CONSULT NOTE ADULT - CONSULT REASON
Requested Prescriptions     Name from pharmacy: SIMVASTATIN 20MG TABLETS         Will file in chart as: SIMVASTATIN 20 MG Oral Tab    Sig: TAKE 1 TABLET BY MOUTH DAILY    Disp:  90 tablet    Refills:  3 (Pharmacy requested: Not specified)    Start: 10/9/20
Hx  shunt with dizziness
PVC's
Uncontrolled diabeted
gait ab falls dizziness

## 2020-12-24 NOTE — PROGRESS NOTE ADULT - SUBJECTIVE AND OBJECTIVE BOX
Progress Note    Subjective  82 y/o Male with known urologic hx. Called to see pt 2/2 difficulty Aguirre catheter placement.  Pt in retention with over 900 cc on bladder scan after he voided. Pt has no urge to void further.  difficult to palpate his bladder 2/2 to his obesity.      Vital signs  T(C): , Max: 36.3 (02-27-19 @ 05:21)  HR: 70 (02-27-19 @ 13:18)  BP: 122/65 (02-27-19 @ 13:18)      Gen in NAD,   Abd obese soft non tender   under sterile technique a 16 fr coude catheter was easily passed into the bladder.  The balloon was inflated with 10 cc of water. The pt tolerated the procedure well.  1.2 L of clear yellow urine was drained.    Labs                        10.3   8.32  )-----------( 246      ( 27 Feb 2019 06:48 )             34.2     27 Feb 2019 06:48    148    |  103    |  33     ----------------------------<  122    3.5     |  26     |  1.6      Ca    8.5        27 Feb 2019 06:48  Mg     2.2       27 Feb 2019 06:48 depressed/flat affect

## 2021-01-01 ENCOUNTER — APPOINTMENT (OUTPATIENT)
Dept: CARDIOLOGY | Facility: CLINIC | Age: 86
End: 2021-01-01
Payer: MEDICARE

## 2021-01-01 ENCOUNTER — APPOINTMENT (OUTPATIENT)
Dept: CARDIOLOGY | Facility: CLINIC | Age: 86
End: 2021-01-01

## 2021-01-01 ENCOUNTER — RESULT CHARGE (OUTPATIENT)
Age: 86
End: 2021-01-01

## 2021-01-01 ENCOUNTER — TRANSCRIPTION ENCOUNTER (OUTPATIENT)
Age: 86
End: 2021-01-01

## 2021-01-01 ENCOUNTER — OUTPATIENT (OUTPATIENT)
Dept: OUTPATIENT SERVICES | Facility: HOSPITAL | Age: 86
LOS: 1 days | Discharge: HOME | End: 2021-01-01
Payer: MEDICARE

## 2021-01-01 ENCOUNTER — APPOINTMENT (OUTPATIENT)
Dept: OPHTHALMOLOGY | Facility: CLINIC | Age: 86
End: 2021-01-01

## 2021-01-01 ENCOUNTER — INPATIENT (INPATIENT)
Facility: HOSPITAL | Age: 86
LOS: 16 days | Discharge: NURSING, REHAB OR LONG-TERM CARE | End: 2021-11-20
Attending: HOSPITALIST | Admitting: HOSPITALIST
Payer: MEDICARE

## 2021-01-01 ENCOUNTER — INPATIENT (INPATIENT)
Facility: HOSPITAL | Age: 86
LOS: 11 days | Discharge: HOME HEALTH | End: 2021-07-12
Attending: INTERNAL MEDICINE | Admitting: INTERNAL MEDICINE
Payer: MEDICARE

## 2021-01-01 VITALS
SYSTOLIC BLOOD PRESSURE: 102 MMHG | TEMPERATURE: 97.2 F | WEIGHT: 242 LBS | BODY MASS INDEX: 32.78 KG/M2 | OXYGEN SATURATION: 98 % | HEIGHT: 72 IN | DIASTOLIC BLOOD PRESSURE: 60 MMHG | HEART RATE: 52 BPM

## 2021-01-01 VITALS
TEMPERATURE: 96 F | RESPIRATION RATE: 18 BRPM | DIASTOLIC BLOOD PRESSURE: 61 MMHG | SYSTOLIC BLOOD PRESSURE: 119 MMHG | HEART RATE: 69 BPM

## 2021-01-01 VITALS
TEMPERATURE: 98 F | DIASTOLIC BLOOD PRESSURE: 61 MMHG | HEART RATE: 62 BPM | RESPIRATION RATE: 18 BRPM | WEIGHT: 223.33 LBS | SYSTOLIC BLOOD PRESSURE: 127 MMHG

## 2021-01-01 VITALS
DIASTOLIC BLOOD PRESSURE: 60 MMHG | OXYGEN SATURATION: 96 % | TEMPERATURE: 97.9 F | BODY MASS INDEX: 30.88 KG/M2 | HEART RATE: 46 BPM | HEIGHT: 72 IN | SYSTOLIC BLOOD PRESSURE: 100 MMHG | WEIGHT: 228 LBS

## 2021-01-01 VITALS
HEART RATE: 62 BPM | OXYGEN SATURATION: 95 % | WEIGHT: 270.07 LBS | RESPIRATION RATE: 20 BRPM | DIASTOLIC BLOOD PRESSURE: 79 MMHG | HEIGHT: 72 IN | TEMPERATURE: 98 F | SYSTOLIC BLOOD PRESSURE: 179 MMHG

## 2021-01-01 VITALS
BODY MASS INDEX: 32.23 KG/M2 | TEMPERATURE: 97.1 F | OXYGEN SATURATION: 97 % | HEIGHT: 72 IN | RESPIRATION RATE: 18 BRPM | SYSTOLIC BLOOD PRESSURE: 130 MMHG | HEART RATE: 60 BPM | WEIGHT: 238 LBS | DIASTOLIC BLOOD PRESSURE: 70 MMHG

## 2021-01-01 VITALS
RESPIRATION RATE: 16 BRPM | OXYGEN SATURATION: 95 % | TEMPERATURE: 97.7 F | BODY MASS INDEX: 33.59 KG/M2 | DIASTOLIC BLOOD PRESSURE: 84 MMHG | SYSTOLIC BLOOD PRESSURE: 130 MMHG | WEIGHT: 248 LBS | HEART RATE: 72 BPM | HEIGHT: 72 IN

## 2021-01-01 VITALS
HEART RATE: 64 BPM | TEMPERATURE: 97.3 F | SYSTOLIC BLOOD PRESSURE: 119 MMHG | HEIGHT: 71 IN | WEIGHT: 225.13 LBS | DIASTOLIC BLOOD PRESSURE: 75 MMHG | BODY MASS INDEX: 31.52 KG/M2

## 2021-01-01 VITALS
DIASTOLIC BLOOD PRESSURE: 75 MMHG | RESPIRATION RATE: 20 BRPM | TEMPERATURE: 97 F | SYSTOLIC BLOOD PRESSURE: 121 MMHG | HEART RATE: 45 BPM | HEIGHT: 72 IN | OXYGEN SATURATION: 98 %

## 2021-01-01 DIAGNOSIS — Z02.9 ENCOUNTER FOR ADMINISTRATIVE EXAMINATIONS, UNSPECIFIED: ICD-10-CM

## 2021-01-01 DIAGNOSIS — Z91.14 PATIENT'S OTHER NONCOMPLIANCE WITH MEDICATION REGIMEN: ICD-10-CM

## 2021-01-01 DIAGNOSIS — I48.20 CHRONIC ATRIAL FIBRILLATION, UNSPECIFIED: ICD-10-CM

## 2021-01-01 DIAGNOSIS — Z87.891 PERSONAL HISTORY OF NICOTINE DEPENDENCE: ICD-10-CM

## 2021-01-01 DIAGNOSIS — L03.116 CELLULITIS OF LEFT LOWER LIMB: ICD-10-CM

## 2021-01-01 DIAGNOSIS — N18.4 CHRONIC KIDNEY DISEASE, STAGE 4 (SEVERE): ICD-10-CM

## 2021-01-01 DIAGNOSIS — Z79.4 LONG TERM (CURRENT) USE OF INSULIN: ICD-10-CM

## 2021-01-01 DIAGNOSIS — E11.22 TYPE 2 DIABETES MELLITUS WITH DIABETIC CHRONIC KIDNEY DISEASE: ICD-10-CM

## 2021-01-01 DIAGNOSIS — E86.0 DEHYDRATION: ICD-10-CM

## 2021-01-01 DIAGNOSIS — T83.091A OTHER MECHANICAL COMPLICATION OF INDWELLING URETHRAL CATHETER, INITIAL ENCOUNTER: ICD-10-CM

## 2021-01-01 DIAGNOSIS — I50.33 ACUTE ON CHRONIC DIASTOLIC (CONGESTIVE) HEART FAILURE: ICD-10-CM

## 2021-01-01 DIAGNOSIS — I95.1 ORTHOSTATIC HYPOTENSION: ICD-10-CM

## 2021-01-01 DIAGNOSIS — I25.10 ATHEROSCLEROTIC HEART DISEASE OF NATIVE CORONARY ARTERY WITHOUT ANGINA PECTORIS: ICD-10-CM

## 2021-01-01 DIAGNOSIS — Z98.890 OTHER SPECIFIED POSTPROCEDURAL STATES: Chronic | ICD-10-CM

## 2021-01-01 DIAGNOSIS — R06.02 SHORTNESS OF BREATH: ICD-10-CM

## 2021-01-01 DIAGNOSIS — N17.9 ACUTE KIDNEY FAILURE, UNSPECIFIED: ICD-10-CM

## 2021-01-01 DIAGNOSIS — N35.811 OTHER URETHRAL STRICTURE, MALE, MEATAL: ICD-10-CM

## 2021-01-01 DIAGNOSIS — E83.39 OTHER DISORDERS OF PHOSPHORUS METABOLISM: ICD-10-CM

## 2021-01-01 DIAGNOSIS — I27.81 COR PULMONALE (CHRONIC): ICD-10-CM

## 2021-01-01 DIAGNOSIS — I13.2 HYPERTENSIVE HEART AND CHRONIC KIDNEY DISEASE WITH HEART FAILURE AND WITH STAGE 5 CHRONIC KIDNEY DISEASE, OR END STAGE RENAL DISEASE: ICD-10-CM

## 2021-01-01 DIAGNOSIS — I27.20 PULMONARY HYPERTENSION, UNSPECIFIED: ICD-10-CM

## 2021-01-01 DIAGNOSIS — I87.8 OTHER SPECIFIED DISORDERS OF VEINS: ICD-10-CM

## 2021-01-01 DIAGNOSIS — I47.2 VENTRICULAR TACHYCARDIA: ICD-10-CM

## 2021-01-01 DIAGNOSIS — I10 ESSENTIAL (PRIMARY) HYPERTENSION: ICD-10-CM

## 2021-01-01 DIAGNOSIS — R00.1 BRADYCARDIA, UNSPECIFIED: ICD-10-CM

## 2021-01-01 DIAGNOSIS — I50.9 HEART FAILURE, UNSPECIFIED: ICD-10-CM

## 2021-01-01 DIAGNOSIS — E11.40 TYPE 2 DIABETES MELLITUS WITH DIABETIC NEUROPATHY, UNSPECIFIED: ICD-10-CM

## 2021-01-01 DIAGNOSIS — J44.9 CHRONIC OBSTRUCTIVE PULMONARY DISEASE, UNSPECIFIED: ICD-10-CM

## 2021-01-01 DIAGNOSIS — N18.5 CHRONIC KIDNEY DISEASE, STAGE 5: ICD-10-CM

## 2021-01-01 DIAGNOSIS — E87.6 HYPOKALEMIA: ICD-10-CM

## 2021-01-01 DIAGNOSIS — N39.490 OVERFLOW INCONTINENCE: ICD-10-CM

## 2021-01-01 DIAGNOSIS — E83.51 HYPOCALCEMIA: ICD-10-CM

## 2021-01-01 DIAGNOSIS — Z00.00 ENCOUNTER FOR GENERAL ADULT MEDICAL EXAMINATION W/OUT ABNORMAL FINDINGS: ICD-10-CM

## 2021-01-01 DIAGNOSIS — I49.3 VENTRICULAR PREMATURE DEPOLARIZATION: ICD-10-CM

## 2021-01-01 DIAGNOSIS — I50.32 CHRONIC DIASTOLIC (CONGESTIVE) HEART FAILURE: ICD-10-CM

## 2021-01-01 DIAGNOSIS — G91.2 (IDIOPATHIC) NORMAL PRESSURE HYDROCEPHALUS: ICD-10-CM

## 2021-01-01 DIAGNOSIS — N40.1 BENIGN PROSTATIC HYPERPLASIA WITH LOWER URINARY TRACT SYMPTOMS: ICD-10-CM

## 2021-01-01 DIAGNOSIS — L76.32 POSTPROCEDURAL HEMATOMA OF SKIN AND SUBCUTANEOUS TISSUE FOLLOWING OTHER PROCEDURE: ICD-10-CM

## 2021-01-01 DIAGNOSIS — E66.9 OBESITY, UNSPECIFIED: ICD-10-CM

## 2021-01-01 DIAGNOSIS — G47.33 OBSTRUCTIVE SLEEP APNEA (ADULT) (PEDIATRIC): ICD-10-CM

## 2021-01-01 DIAGNOSIS — I13.0 HYPERTENSIVE HEART AND CHRONIC KIDNEY DISEASE WITH HEART FAILURE AND STAGE 1 THROUGH STAGE 4 CHRONIC KIDNEY DISEASE, OR UNSPECIFIED CHRONIC KIDNEY DISEASE: ICD-10-CM

## 2021-01-01 DIAGNOSIS — J98.11 ATELECTASIS: ICD-10-CM

## 2021-01-01 DIAGNOSIS — I48.91 UNSPECIFIED ATRIAL FIBRILLATION: ICD-10-CM

## 2021-01-01 DIAGNOSIS — I21.A1 MYOCARDIAL INFARCTION TYPE 2: ICD-10-CM

## 2021-01-01 DIAGNOSIS — I25.5 ISCHEMIC CARDIOMYOPATHY: ICD-10-CM

## 2021-01-01 DIAGNOSIS — I50.30 UNSPECIFIED DIASTOLIC (CONGESTIVE) HEART FAILURE: ICD-10-CM

## 2021-01-01 DIAGNOSIS — E83.52 HYPERCALCEMIA: ICD-10-CM

## 2021-01-01 DIAGNOSIS — T82.318A BREAKDOWN (MECHANICAL) OF OTHER VASCULAR GRAFTS, INITIAL ENCOUNTER: ICD-10-CM

## 2021-01-01 DIAGNOSIS — I47.1 SUPRAVENTRICULAR TACHYCARDIA: ICD-10-CM

## 2021-01-01 DIAGNOSIS — D72.829 ELEVATED WHITE BLOOD CELL COUNT, UNSPECIFIED: ICD-10-CM

## 2021-01-01 DIAGNOSIS — Z98.2 PRESENCE OF CEREBROSPINAL FLUID DRAINAGE DEVICE: ICD-10-CM

## 2021-01-01 LAB
24R-OH-CALCIDIOL SERPL-MCNC: 57 NG/ML — SIGNIFICANT CHANGE UP (ref 30–80)
A1C WITH ESTIMATED AVERAGE GLUCOSE RESULT: 5.5 % — SIGNIFICANT CHANGE UP (ref 4–5.6)
A1C WITH ESTIMATED AVERAGE GLUCOSE RESULT: 5.7 % — HIGH (ref 4–5.6)
ALBUMIN SERPL ELPH-MCNC: 3.5 G/DL — SIGNIFICANT CHANGE UP (ref 3.5–5.2)
ALBUMIN SERPL ELPH-MCNC: 3.5 G/DL — SIGNIFICANT CHANGE UP (ref 3.5–5.2)
ALBUMIN SERPL ELPH-MCNC: 3.6 G/DL — SIGNIFICANT CHANGE UP (ref 3.5–5.2)
ALBUMIN SERPL ELPH-MCNC: 3.6 G/DL — SIGNIFICANT CHANGE UP (ref 3.5–5.2)
ALBUMIN SERPL ELPH-MCNC: 3.7 G/DL — SIGNIFICANT CHANGE UP (ref 3.5–5.2)
ALBUMIN SERPL ELPH-MCNC: 3.8 G/DL — SIGNIFICANT CHANGE UP (ref 3.5–5.2)
ALBUMIN SERPL ELPH-MCNC: 3.9 G/DL — SIGNIFICANT CHANGE UP (ref 3.5–5.2)
ALBUMIN SERPL ELPH-MCNC: 4 G/DL — SIGNIFICANT CHANGE UP (ref 3.5–5.2)
ALBUMIN SERPL ELPH-MCNC: 4.1 G/DL — SIGNIFICANT CHANGE UP (ref 3.5–5.2)
ALBUMIN SERPL ELPH-MCNC: 4.2 G/DL — SIGNIFICANT CHANGE UP (ref 3.5–5.2)
ALBUMIN SERPL ELPH-MCNC: 4.3 G/DL — SIGNIFICANT CHANGE UP (ref 3.5–5.2)
ALBUMIN SERPL ELPH-MCNC: 4.4 G/DL — SIGNIFICANT CHANGE UP (ref 3.5–5.2)
ALBUMIN SERPL ELPH-MCNC: 4.6 G/DL — SIGNIFICANT CHANGE UP (ref 3.5–5.2)
ALBUMIN SERPL ELPH-MCNC: 4.6 G/DL — SIGNIFICANT CHANGE UP (ref 3.5–5.2)
ALBUMIN SERPL ELPH-MCNC: 4.9 G/DL — SIGNIFICANT CHANGE UP (ref 3.5–5.2)
ALP SERPL-CCNC: 101 U/L — SIGNIFICANT CHANGE UP (ref 30–115)
ALP SERPL-CCNC: 102 U/L — SIGNIFICANT CHANGE UP (ref 30–115)
ALP SERPL-CCNC: 102 U/L — SIGNIFICANT CHANGE UP (ref 30–115)
ALP SERPL-CCNC: 103 U/L — SIGNIFICANT CHANGE UP (ref 30–115)
ALP SERPL-CCNC: 104 U/L — SIGNIFICANT CHANGE UP (ref 30–115)
ALP SERPL-CCNC: 105 U/L — SIGNIFICANT CHANGE UP (ref 30–115)
ALP SERPL-CCNC: 106 U/L — SIGNIFICANT CHANGE UP (ref 30–115)
ALP SERPL-CCNC: 107 U/L — SIGNIFICANT CHANGE UP (ref 30–115)
ALP SERPL-CCNC: 107 U/L — SIGNIFICANT CHANGE UP (ref 30–115)
ALP SERPL-CCNC: 108 U/L — SIGNIFICANT CHANGE UP (ref 30–115)
ALP SERPL-CCNC: 108 U/L — SIGNIFICANT CHANGE UP (ref 30–115)
ALP SERPL-CCNC: 109 U/L — SIGNIFICANT CHANGE UP (ref 30–115)
ALP SERPL-CCNC: 109 U/L — SIGNIFICANT CHANGE UP (ref 30–115)
ALP SERPL-CCNC: 111 U/L — SIGNIFICANT CHANGE UP (ref 30–115)
ALP SERPL-CCNC: 112 U/L — SIGNIFICANT CHANGE UP (ref 30–115)
ALP SERPL-CCNC: 115 U/L — SIGNIFICANT CHANGE UP (ref 30–115)
ALP SERPL-CCNC: 116 U/L — HIGH (ref 30–115)
ALP SERPL-CCNC: 124 U/L — HIGH (ref 30–115)
ALP SERPL-CCNC: 127 U/L — HIGH (ref 30–115)
ALP SERPL-CCNC: 83 U/L — SIGNIFICANT CHANGE UP (ref 30–115)
ALP SERPL-CCNC: 87 U/L — SIGNIFICANT CHANGE UP (ref 30–115)
ALP SERPL-CCNC: 88 U/L — SIGNIFICANT CHANGE UP (ref 30–115)
ALP SERPL-CCNC: 88 U/L — SIGNIFICANT CHANGE UP (ref 30–115)
ALP SERPL-CCNC: 90 U/L — SIGNIFICANT CHANGE UP (ref 30–115)
ALP SERPL-CCNC: 91 U/L — SIGNIFICANT CHANGE UP (ref 30–115)
ALP SERPL-CCNC: 92 U/L — SIGNIFICANT CHANGE UP (ref 30–115)
ALP SERPL-CCNC: 94 U/L — SIGNIFICANT CHANGE UP (ref 30–115)
ALP SERPL-CCNC: 95 U/L — SIGNIFICANT CHANGE UP (ref 30–115)
ALP SERPL-CCNC: 95 U/L — SIGNIFICANT CHANGE UP (ref 30–115)
ALP SERPL-CCNC: 96 U/L — SIGNIFICANT CHANGE UP (ref 30–115)
ALP SERPL-CCNC: 97 U/L — SIGNIFICANT CHANGE UP (ref 30–115)
ALP SERPL-CCNC: 99 U/L — SIGNIFICANT CHANGE UP (ref 30–115)
ALT FLD-CCNC: 5 U/L — SIGNIFICANT CHANGE UP (ref 0–41)
ALT FLD-CCNC: 6 U/L — SIGNIFICANT CHANGE UP (ref 0–41)
ALT FLD-CCNC: 7 U/L — SIGNIFICANT CHANGE UP (ref 0–41)
ALT FLD-CCNC: 8 U/L — SIGNIFICANT CHANGE UP (ref 0–41)
ALT FLD-CCNC: 9 U/L — SIGNIFICANT CHANGE UP (ref 0–41)
ALT FLD-CCNC: <5 U/L — SIGNIFICANT CHANGE UP (ref 0–41)
ANION GAP SERPL CALC-SCNC: 12 MMOL/L — SIGNIFICANT CHANGE UP (ref 7–14)
ANION GAP SERPL CALC-SCNC: 13 MMOL/L — SIGNIFICANT CHANGE UP (ref 7–14)
ANION GAP SERPL CALC-SCNC: 13 MMOL/L — SIGNIFICANT CHANGE UP (ref 7–14)
ANION GAP SERPL CALC-SCNC: 15 MMOL/L
ANION GAP SERPL CALC-SCNC: 15 MMOL/L — HIGH (ref 7–14)
ANION GAP SERPL CALC-SCNC: 16 MMOL/L — HIGH (ref 7–14)
ANION GAP SERPL CALC-SCNC: 17 MMOL/L — HIGH (ref 7–14)
ANION GAP SERPL CALC-SCNC: 18 MMOL/L — HIGH (ref 7–14)
ANION GAP SERPL CALC-SCNC: 19 MMOL/L
ANION GAP SERPL CALC-SCNC: 20 MMOL/L — HIGH (ref 7–14)
ANION GAP SERPL CALC-SCNC: 20 MMOL/L — HIGH (ref 7–14)
ANION GAP SERPL CALC-SCNC: 22 MMOL/L
ANION GAP SERPL CALC-SCNC: 22 MMOL/L — HIGH (ref 7–14)
ANION GAP SERPL CALC-SCNC: 23 MMOL/L — HIGH (ref 7–14)
ANION GAP SERPL CALC-SCNC: 24 MMOL/L — HIGH (ref 7–14)
ANION GAP SERPL CALC-SCNC: 25 MMOL/L — HIGH (ref 7–14)
ANION GAP SERPL CALC-SCNC: 27 MMOL/L — HIGH (ref 7–14)
ANION GAP SERPL CALC-SCNC: 27 MMOL/L — HIGH (ref 7–14)
APPEARANCE UR: CLEAR — SIGNIFICANT CHANGE UP
APTT BLD: 33.2 SEC — SIGNIFICANT CHANGE UP (ref 27–39.2)
AST SERPL-CCNC: 10 U/L — SIGNIFICANT CHANGE UP (ref 0–41)
AST SERPL-CCNC: 11 U/L — SIGNIFICANT CHANGE UP (ref 0–41)
AST SERPL-CCNC: 12 U/L — SIGNIFICANT CHANGE UP (ref 0–41)
AST SERPL-CCNC: 13 U/L — SIGNIFICANT CHANGE UP (ref 0–41)
AST SERPL-CCNC: 20 U/L — SIGNIFICANT CHANGE UP (ref 0–41)
AST SERPL-CCNC: 8 U/L — SIGNIFICANT CHANGE UP (ref 0–41)
AST SERPL-CCNC: 9 U/L — SIGNIFICANT CHANGE UP (ref 0–41)
BASE EXCESS BLDV CALC-SCNC: 2.3 MMOL/L — HIGH (ref -2–2)
BASE EXCESS BLDV CALC-SCNC: 3.5 MMOL/L — HIGH (ref -2–3)
BASOPHILS # BLD AUTO: 0.01 K/UL — SIGNIFICANT CHANGE UP (ref 0–0.2)
BASOPHILS # BLD AUTO: 0.02 K/UL — SIGNIFICANT CHANGE UP (ref 0–0.2)
BASOPHILS # BLD AUTO: 0.03 K/UL — SIGNIFICANT CHANGE UP (ref 0–0.2)
BASOPHILS # BLD AUTO: 0.04 K/UL — SIGNIFICANT CHANGE UP (ref 0–0.2)
BASOPHILS # BLD AUTO: 0.05 K/UL — SIGNIFICANT CHANGE UP (ref 0–0.2)
BASOPHILS NFR BLD AUTO: 0.1 % — SIGNIFICANT CHANGE UP (ref 0–1)
BASOPHILS NFR BLD AUTO: 0.2 % — SIGNIFICANT CHANGE UP (ref 0–1)
BASOPHILS NFR BLD AUTO: 0.3 % — SIGNIFICANT CHANGE UP (ref 0–1)
BASOPHILS NFR BLD AUTO: 0.4 % — SIGNIFICANT CHANGE UP (ref 0–1)
BASOPHILS NFR BLD AUTO: 0.5 % — SIGNIFICANT CHANGE UP (ref 0–1)
BASOPHILS NFR BLD AUTO: 0.6 % — SIGNIFICANT CHANGE UP (ref 0–1)
BILIRUB SERPL-MCNC: 0.5 MG/DL — SIGNIFICANT CHANGE UP (ref 0.2–1.2)
BILIRUB SERPL-MCNC: 0.5 MG/DL — SIGNIFICANT CHANGE UP (ref 0.2–1.2)
BILIRUB SERPL-MCNC: 0.6 MG/DL — SIGNIFICANT CHANGE UP (ref 0.2–1.2)
BILIRUB SERPL-MCNC: 0.7 MG/DL — SIGNIFICANT CHANGE UP (ref 0.2–1.2)
BILIRUB SERPL-MCNC: 0.8 MG/DL — SIGNIFICANT CHANGE UP (ref 0.2–1.2)
BILIRUB SERPL-MCNC: 0.8 MG/DL — SIGNIFICANT CHANGE UP (ref 0.2–1.2)
BILIRUB SERPL-MCNC: 0.9 MG/DL — SIGNIFICANT CHANGE UP (ref 0.2–1.2)
BILIRUB SERPL-MCNC: 1 MG/DL — SIGNIFICANT CHANGE UP (ref 0.2–1.2)
BILIRUB SERPL-MCNC: 1.1 MG/DL — SIGNIFICANT CHANGE UP (ref 0.2–1.2)
BILIRUB SERPL-MCNC: 1.4 MG/DL — HIGH (ref 0.2–1.2)
BILIRUB SERPL-MCNC: 1.4 MG/DL — HIGH (ref 0.2–1.2)
BILIRUB UR-MCNC: NEGATIVE — SIGNIFICANT CHANGE UP
BLD GP AB SCN SERPL QL: SIGNIFICANT CHANGE UP
BUN SERPL-MCNC: 101 MG/DL — CRITICAL HIGH (ref 10–20)
BUN SERPL-MCNC: 102 MG/DL — CRITICAL HIGH (ref 10–20)
BUN SERPL-MCNC: 102 MG/DL — CRITICAL HIGH (ref 10–20)
BUN SERPL-MCNC: 103 MG/DL — CRITICAL HIGH (ref 10–20)
BUN SERPL-MCNC: 104 MG/DL — CRITICAL HIGH (ref 10–20)
BUN SERPL-MCNC: 105 MG/DL — CRITICAL HIGH (ref 10–20)
BUN SERPL-MCNC: 106 MG/DL — CRITICAL HIGH (ref 10–20)
BUN SERPL-MCNC: 108 MG/DL — CRITICAL HIGH (ref 10–20)
BUN SERPL-MCNC: 108 MG/DL — CRITICAL HIGH (ref 10–20)
BUN SERPL-MCNC: 109 MG/DL — CRITICAL HIGH (ref 10–20)
BUN SERPL-MCNC: 110 MG/DL — CRITICAL HIGH (ref 10–20)
BUN SERPL-MCNC: 114 MG/DL
BUN SERPL-MCNC: 118 MG/DL — CRITICAL HIGH (ref 10–20)
BUN SERPL-MCNC: 119 MG/DL
BUN SERPL-MCNC: 121 MG/DL — CRITICAL HIGH (ref 10–20)
BUN SERPL-MCNC: 122 MG/DL — CRITICAL HIGH (ref 10–20)
BUN SERPL-MCNC: 125 MG/DL — CRITICAL HIGH (ref 10–20)
BUN SERPL-MCNC: 126 MG/DL — CRITICAL HIGH (ref 10–20)
BUN SERPL-MCNC: 34 MG/DL — HIGH (ref 10–20)
BUN SERPL-MCNC: 34 MG/DL — HIGH (ref 10–20)
BUN SERPL-MCNC: 35 MG/DL — HIGH (ref 10–20)
BUN SERPL-MCNC: 40 MG/DL — HIGH (ref 10–20)
BUN SERPL-MCNC: 41 MG/DL — HIGH (ref 10–20)
BUN SERPL-MCNC: 45 MG/DL
BUN SERPL-MCNC: 45 MG/DL — HIGH (ref 10–20)
BUN SERPL-MCNC: 49 MG/DL — HIGH (ref 10–20)
BUN SERPL-MCNC: 60 MG/DL — HIGH (ref 10–20)
BUN SERPL-MCNC: 64 MG/DL — CRITICAL HIGH (ref 10–20)
BUN SERPL-MCNC: 75 MG/DL — CRITICAL HIGH (ref 10–20)
BUN SERPL-MCNC: 76 MG/DL — CRITICAL HIGH (ref 10–20)
BUN SERPL-MCNC: 87 MG/DL — CRITICAL HIGH (ref 10–20)
BUN SERPL-MCNC: 90 MG/DL — CRITICAL HIGH (ref 10–20)
BUN SERPL-MCNC: 90 MG/DL — CRITICAL HIGH (ref 10–20)
BUN SERPL-MCNC: 91 MG/DL — CRITICAL HIGH (ref 10–20)
BUN SERPL-MCNC: 94 MG/DL — CRITICAL HIGH (ref 10–20)
BUN SERPL-MCNC: 94 MG/DL — CRITICAL HIGH (ref 10–20)
BUN SERPL-MCNC: 98 MG/DL — CRITICAL HIGH (ref 10–20)
CA-I SERPL-SCNC: 0.93 MMOL/L — LOW (ref 1.15–1.33)
CA-I SERPL-SCNC: 1.02 MMOL/L — LOW (ref 1.12–1.3)
CALCIUM SERPL-MCNC: 10 MG/DL — SIGNIFICANT CHANGE UP (ref 8.5–10.1)
CALCIUM SERPL-MCNC: 10 MG/DL — SIGNIFICANT CHANGE UP (ref 8.5–10.1)
CALCIUM SERPL-MCNC: 10.1 MG/DL — SIGNIFICANT CHANGE UP (ref 8.5–10.1)
CALCIUM SERPL-MCNC: 10.1 MG/DL — SIGNIFICANT CHANGE UP (ref 8.5–10.1)
CALCIUM SERPL-MCNC: 10.2 MG/DL — HIGH (ref 8.5–10.1)
CALCIUM SERPL-MCNC: 10.2 MG/DL — HIGH (ref 8.5–10.1)
CALCIUM SERPL-MCNC: 10.4 MG/DL — HIGH (ref 8.5–10.1)
CALCIUM SERPL-MCNC: 10.6 MG/DL
CALCIUM SERPL-MCNC: 11.3 MG/DL — HIGH (ref 8.5–10.1)
CALCIUM SERPL-MCNC: 7.5 MG/DL — LOW (ref 8.5–10.1)
CALCIUM SERPL-MCNC: 7.6 MG/DL — LOW (ref 8.5–10.1)
CALCIUM SERPL-MCNC: 7.7 MG/DL
CALCIUM SERPL-MCNC: 7.7 MG/DL — LOW (ref 8.5–10.1)
CALCIUM SERPL-MCNC: 7.8 MG/DL — LOW (ref 8.5–10.1)
CALCIUM SERPL-MCNC: 7.8 MG/DL — LOW (ref 8.5–10.1)
CALCIUM SERPL-MCNC: 7.9 MG/DL — LOW (ref 8.5–10.1)
CALCIUM SERPL-MCNC: 8 MG/DL — LOW (ref 8.5–10.1)
CALCIUM SERPL-MCNC: 8.1 MG/DL — LOW (ref 8.5–10.1)
CALCIUM SERPL-MCNC: 8.3 MG/DL — LOW (ref 8.5–10.1)
CALCIUM SERPL-MCNC: 8.4 MG/DL — LOW (ref 8.5–10.1)
CALCIUM SERPL-MCNC: 8.4 MG/DL — LOW (ref 8.5–10.1)
CALCIUM SERPL-MCNC: 8.8 MG/DL — SIGNIFICANT CHANGE UP (ref 8.5–10.1)
CALCIUM SERPL-MCNC: 8.8 MG/DL — SIGNIFICANT CHANGE UP (ref 8.5–10.1)
CALCIUM SERPL-MCNC: 9.1 MG/DL — SIGNIFICANT CHANGE UP (ref 8.5–10.1)
CALCIUM SERPL-MCNC: 9.3 MG/DL — SIGNIFICANT CHANGE UP (ref 8.5–10.1)
CALCIUM SERPL-MCNC: 9.3 MG/DL — SIGNIFICANT CHANGE UP (ref 8.5–10.1)
CALCIUM SERPL-MCNC: 9.4 MG/DL — SIGNIFICANT CHANGE UP (ref 8.5–10.1)
CALCIUM SERPL-MCNC: 9.5 MG/DL — SIGNIFICANT CHANGE UP (ref 8.5–10.1)
CALCIUM SERPL-MCNC: 9.5 MG/DL — SIGNIFICANT CHANGE UP (ref 8.5–10.1)
CALCIUM SERPL-MCNC: 9.6 MG/DL
CALCIUM SERPL-MCNC: 9.6 MG/DL — SIGNIFICANT CHANGE UP (ref 8.5–10.1)
CALCIUM SERPL-MCNC: 9.7 MG/DL — SIGNIFICANT CHANGE UP (ref 8.5–10.1)
CALCIUM SERPL-MCNC: 9.8 MG/DL — SIGNIFICANT CHANGE UP (ref 8.5–10.1)
CALCIUM SERPL-MCNC: 9.8 MG/DL — SIGNIFICANT CHANGE UP (ref 8.5–10.1)
CALCIUM SERPL-MCNC: 9.9 MG/DL — SIGNIFICANT CHANGE UP (ref 8.5–10.1)
CHLORIDE SERPL-SCNC: 100 MMOL/L — SIGNIFICANT CHANGE UP (ref 98–110)
CHLORIDE SERPL-SCNC: 100 MMOL/L — SIGNIFICANT CHANGE UP (ref 98–110)
CHLORIDE SERPL-SCNC: 101 MMOL/L — SIGNIFICANT CHANGE UP (ref 98–110)
CHLORIDE SERPL-SCNC: 101 MMOL/L — SIGNIFICANT CHANGE UP (ref 98–110)
CHLORIDE SERPL-SCNC: 102 MMOL/L — SIGNIFICANT CHANGE UP (ref 98–110)
CHLORIDE SERPL-SCNC: 104 MMOL/L — SIGNIFICANT CHANGE UP (ref 98–110)
CHLORIDE SERPL-SCNC: 104 MMOL/L — SIGNIFICANT CHANGE UP (ref 98–110)
CHLORIDE SERPL-SCNC: 105 MMOL/L
CHLORIDE SERPL-SCNC: 105 MMOL/L — SIGNIFICANT CHANGE UP (ref 98–110)
CHLORIDE SERPL-SCNC: 107 MMOL/L — SIGNIFICANT CHANGE UP (ref 98–110)
CHLORIDE SERPL-SCNC: 108 MMOL/L — SIGNIFICANT CHANGE UP (ref 98–110)
CHLORIDE SERPL-SCNC: 88 MMOL/L
CHLORIDE SERPL-SCNC: 89 MMOL/L — LOW (ref 98–110)
CHLORIDE SERPL-SCNC: 89 MMOL/L — LOW (ref 98–110)
CHLORIDE SERPL-SCNC: 91 MMOL/L — LOW (ref 98–110)
CHLORIDE SERPL-SCNC: 92 MMOL/L
CHLORIDE SERPL-SCNC: 92 MMOL/L — LOW (ref 98–110)
CHLORIDE SERPL-SCNC: 92 MMOL/L — LOW (ref 98–110)
CHLORIDE SERPL-SCNC: 93 MMOL/L — LOW (ref 98–110)
CHLORIDE SERPL-SCNC: 94 MMOL/L — LOW (ref 98–110)
CHLORIDE SERPL-SCNC: 95 MMOL/L — LOW (ref 98–110)
CHLORIDE SERPL-SCNC: 95 MMOL/L — LOW (ref 98–110)
CHLORIDE SERPL-SCNC: 96 MMOL/L — LOW (ref 98–110)
CHLORIDE SERPL-SCNC: 96 MMOL/L — LOW (ref 98–110)
CHLORIDE SERPL-SCNC: 97 MMOL/L — LOW (ref 98–110)
CHLORIDE SERPL-SCNC: 98 MMOL/L — SIGNIFICANT CHANGE UP (ref 98–110)
CHLORIDE SERPL-SCNC: 98 MMOL/L — SIGNIFICANT CHANGE UP (ref 98–110)
CHLORIDE UR-SCNC: 30 — SIGNIFICANT CHANGE UP
CK MB CFR SERPL CALC: 3.2 NG/ML — SIGNIFICANT CHANGE UP (ref 0.6–6.3)
CO2 SERPL-SCNC: 18 MMOL/L — SIGNIFICANT CHANGE UP (ref 17–32)
CO2 SERPL-SCNC: 20 MMOL/L — SIGNIFICANT CHANGE UP (ref 17–32)
CO2 SERPL-SCNC: 22 MMOL/L — SIGNIFICANT CHANGE UP (ref 17–32)
CO2 SERPL-SCNC: 22 MMOL/L — SIGNIFICANT CHANGE UP (ref 17–32)
CO2 SERPL-SCNC: 23 MMOL/L — SIGNIFICANT CHANGE UP (ref 17–32)
CO2 SERPL-SCNC: 24 MMOL/L
CO2 SERPL-SCNC: 24 MMOL/L — SIGNIFICANT CHANGE UP (ref 17–32)
CO2 SERPL-SCNC: 25 MMOL/L — SIGNIFICANT CHANGE UP (ref 17–32)
CO2 SERPL-SCNC: 26 MMOL/L — SIGNIFICANT CHANGE UP (ref 17–32)
CO2 SERPL-SCNC: 26 MMOL/L — SIGNIFICANT CHANGE UP (ref 17–32)
CO2 SERPL-SCNC: 27 MMOL/L — SIGNIFICANT CHANGE UP (ref 17–32)
CO2 SERPL-SCNC: 29 MMOL/L — SIGNIFICANT CHANGE UP (ref 17–32)
CO2 SERPL-SCNC: 30 MMOL/L
CO2 SERPL-SCNC: 30 MMOL/L — SIGNIFICANT CHANGE UP (ref 17–32)
CO2 SERPL-SCNC: 30 MMOL/L — SIGNIFICANT CHANGE UP (ref 17–32)
CO2 SERPL-SCNC: 31 MMOL/L
CO2 SERPL-SCNC: 31 MMOL/L — SIGNIFICANT CHANGE UP (ref 17–32)
CO2 SERPL-SCNC: 32 MMOL/L — SIGNIFICANT CHANGE UP (ref 17–32)
CO2 SERPL-SCNC: 33 MMOL/L — HIGH (ref 17–32)
CO2 SERPL-SCNC: 34 MMOL/L — HIGH (ref 17–32)
CO2 SERPL-SCNC: 35 MMOL/L — HIGH (ref 17–32)
CO2 SERPL-SCNC: 35 MMOL/L — HIGH (ref 17–32)
COLOR SPEC: SIGNIFICANT CHANGE UP
COVID-19 NUCLEOCAPSID GAM AB INTERP: NEGATIVE — SIGNIFICANT CHANGE UP
COVID-19 NUCLEOCAPSID TOTAL GAM ANTIBODY RESULT: 0.07 INDEX — SIGNIFICANT CHANGE UP
COVID-19 SPIKE DOMAIN AB INTERP: POSITIVE
COVID-19 SPIKE DOMAIN AB INTERP: POSITIVE
COVID-19 SPIKE DOMAIN ANTIBODY RESULT: 193 U/ML — HIGH
COVID-19 SPIKE DOMAIN ANTIBODY RESULT: 75.3 U/ML — HIGH
CREAT ?TM UR-MCNC: 51 MG/DL — SIGNIFICANT CHANGE UP
CREAT SERPL-MCNC: 1.9 MG/DL — HIGH (ref 0.7–1.5)
CREAT SERPL-MCNC: 1.9 MG/DL — HIGH (ref 0.7–1.5)
CREAT SERPL-MCNC: 2 MG/DL — HIGH (ref 0.7–1.5)
CREAT SERPL-MCNC: 2.1 MG/DL — HIGH (ref 0.7–1.5)
CREAT SERPL-MCNC: 2.1 MG/DL — HIGH (ref 0.7–1.5)
CREAT SERPL-MCNC: 2.3 MG/DL
CREAT SERPL-MCNC: 2.3 MG/DL — HIGH (ref 0.7–1.5)
CREAT SERPL-MCNC: 2.4 MG/DL — HIGH (ref 0.7–1.5)
CREAT SERPL-MCNC: 2.7 MG/DL — HIGH (ref 0.7–1.5)
CREAT SERPL-MCNC: 2.7 MG/DL — HIGH (ref 0.7–1.5)
CREAT SERPL-MCNC: 2.8 MG/DL — HIGH (ref 0.7–1.5)
CREAT SERPL-MCNC: 2.9 MG/DL — HIGH (ref 0.7–1.5)
CREAT SERPL-MCNC: 3 MG/DL — HIGH (ref 0.7–1.5)
CREAT SERPL-MCNC: 3.1 MG/DL
CREAT SERPL-MCNC: 3.1 MG/DL — HIGH (ref 0.7–1.5)
CREAT SERPL-MCNC: 3.2 MG/DL
CREAT SERPL-MCNC: 3.2 MG/DL — HIGH (ref 0.7–1.5)
CREAT SERPL-MCNC: 3.3 MG/DL — HIGH (ref 0.7–1.5)
CREAT SERPL-MCNC: 3.4 MG/DL — HIGH (ref 0.7–1.5)
CREAT SERPL-MCNC: 3.5 MG/DL — HIGH (ref 0.7–1.5)
CREAT SERPL-MCNC: 3.7 MG/DL — HIGH (ref 0.7–1.5)
CREAT SERPL-MCNC: 3.9 MG/DL — HIGH (ref 0.7–1.5)
CULTURE RESULTS: NO GROWTH — SIGNIFICANT CHANGE UP
CULTURE RESULTS: NO GROWTH — SIGNIFICANT CHANGE UP
DIFF PNL FLD: NEGATIVE — SIGNIFICANT CHANGE UP
EOSINOPHIL # BLD AUTO: 0.02 K/UL — SIGNIFICANT CHANGE UP (ref 0–0.7)
EOSINOPHIL # BLD AUTO: 0.1 K/UL — SIGNIFICANT CHANGE UP (ref 0–0.7)
EOSINOPHIL # BLD AUTO: 0.12 K/UL — SIGNIFICANT CHANGE UP (ref 0–0.7)
EOSINOPHIL # BLD AUTO: 0.14 K/UL — SIGNIFICANT CHANGE UP (ref 0–0.7)
EOSINOPHIL # BLD AUTO: 0.16 K/UL — SIGNIFICANT CHANGE UP (ref 0–0.7)
EOSINOPHIL # BLD AUTO: 0.16 K/UL — SIGNIFICANT CHANGE UP (ref 0–0.7)
EOSINOPHIL # BLD AUTO: 0.17 K/UL — SIGNIFICANT CHANGE UP (ref 0–0.7)
EOSINOPHIL # BLD AUTO: 0.18 K/UL — SIGNIFICANT CHANGE UP (ref 0–0.7)
EOSINOPHIL # BLD AUTO: 0.18 K/UL — SIGNIFICANT CHANGE UP (ref 0–0.7)
EOSINOPHIL # BLD AUTO: 0.19 K/UL — SIGNIFICANT CHANGE UP (ref 0–0.7)
EOSINOPHIL # BLD AUTO: 0.2 K/UL — SIGNIFICANT CHANGE UP (ref 0–0.7)
EOSINOPHIL # BLD AUTO: 0.2 K/UL — SIGNIFICANT CHANGE UP (ref 0–0.7)
EOSINOPHIL # BLD AUTO: 0.21 K/UL — SIGNIFICANT CHANGE UP (ref 0–0.7)
EOSINOPHIL # BLD AUTO: 0.22 K/UL — SIGNIFICANT CHANGE UP (ref 0–0.7)
EOSINOPHIL # BLD AUTO: 0.23 K/UL — SIGNIFICANT CHANGE UP (ref 0–0.7)
EOSINOPHIL # BLD AUTO: 0.23 K/UL — SIGNIFICANT CHANGE UP (ref 0–0.7)
EOSINOPHIL # BLD AUTO: 0.24 K/UL — SIGNIFICANT CHANGE UP (ref 0–0.7)
EOSINOPHIL # BLD AUTO: 0.26 K/UL — SIGNIFICANT CHANGE UP (ref 0–0.7)
EOSINOPHIL # BLD AUTO: 0.27 K/UL — SIGNIFICANT CHANGE UP (ref 0–0.7)
EOSINOPHIL # BLD AUTO: 0.28 K/UL — SIGNIFICANT CHANGE UP (ref 0–0.7)
EOSINOPHIL # BLD AUTO: 0.3 K/UL — SIGNIFICANT CHANGE UP (ref 0–0.7)
EOSINOPHIL # BLD AUTO: 0.31 K/UL — SIGNIFICANT CHANGE UP (ref 0–0.7)
EOSINOPHIL # BLD AUTO: 0.33 K/UL — SIGNIFICANT CHANGE UP (ref 0–0.7)
EOSINOPHIL # BLD AUTO: 0.34 K/UL — SIGNIFICANT CHANGE UP (ref 0–0.7)
EOSINOPHIL # BLD AUTO: 0.35 K/UL — SIGNIFICANT CHANGE UP (ref 0–0.7)
EOSINOPHIL NFR BLD AUTO: 0.3 % — SIGNIFICANT CHANGE UP (ref 0–8)
EOSINOPHIL NFR BLD AUTO: 0.8 % — SIGNIFICANT CHANGE UP (ref 0–8)
EOSINOPHIL NFR BLD AUTO: 1.2 % — SIGNIFICANT CHANGE UP (ref 0–8)
EOSINOPHIL NFR BLD AUTO: 1.4 % — SIGNIFICANT CHANGE UP (ref 0–8)
EOSINOPHIL NFR BLD AUTO: 1.5 % — SIGNIFICANT CHANGE UP (ref 0–8)
EOSINOPHIL NFR BLD AUTO: 1.8 % — SIGNIFICANT CHANGE UP (ref 0–8)
EOSINOPHIL NFR BLD AUTO: 1.8 % — SIGNIFICANT CHANGE UP (ref 0–8)
EOSINOPHIL NFR BLD AUTO: 1.9 % — SIGNIFICANT CHANGE UP (ref 0–8)
EOSINOPHIL NFR BLD AUTO: 2 % — SIGNIFICANT CHANGE UP (ref 0–8)
EOSINOPHIL NFR BLD AUTO: 2 % — SIGNIFICANT CHANGE UP (ref 0–8)
EOSINOPHIL NFR BLD AUTO: 2.2 % — SIGNIFICANT CHANGE UP (ref 0–8)
EOSINOPHIL NFR BLD AUTO: 2.3 % — SIGNIFICANT CHANGE UP (ref 0–8)
EOSINOPHIL NFR BLD AUTO: 2.4 % — SIGNIFICANT CHANGE UP (ref 0–8)
EOSINOPHIL NFR BLD AUTO: 2.4 % — SIGNIFICANT CHANGE UP (ref 0–8)
EOSINOPHIL NFR BLD AUTO: 2.5 % — SIGNIFICANT CHANGE UP (ref 0–8)
EOSINOPHIL NFR BLD AUTO: 2.6 % — SIGNIFICANT CHANGE UP (ref 0–8)
EOSINOPHIL NFR BLD AUTO: 2.6 % — SIGNIFICANT CHANGE UP (ref 0–8)
EOSINOPHIL NFR BLD AUTO: 2.7 % — SIGNIFICANT CHANGE UP (ref 0–8)
EOSINOPHIL NFR BLD AUTO: 3 % — SIGNIFICANT CHANGE UP (ref 0–8)
EOSINOPHIL NFR BLD AUTO: 3 % — SIGNIFICANT CHANGE UP (ref 0–8)
EOSINOPHIL NFR BLD AUTO: 3.2 % — SIGNIFICANT CHANGE UP (ref 0–8)
EOSINOPHIL NFR BLD AUTO: 3.3 % — SIGNIFICANT CHANGE UP (ref 0–8)
EOSINOPHIL NFR BLD AUTO: 3.9 % — SIGNIFICANT CHANGE UP (ref 0–8)
ESTIMATED AVERAGE GLUCOSE: 111 MG/DL — SIGNIFICANT CHANGE UP (ref 68–114)
ESTIMATED AVERAGE GLUCOSE: 117 MG/DL — HIGH (ref 68–114)
FERRITIN SERPL-MCNC: 265 NG/ML — SIGNIFICANT CHANGE UP (ref 30–400)
FERRITIN SERPL-MCNC: 89 NG/ML
GAS PNL BLDV: 138 MMOL/L — SIGNIFICANT CHANGE UP (ref 136–145)
GAS PNL BLDV: 145 MMOL/L — SIGNIFICANT CHANGE UP (ref 136–145)
GAS PNL BLDV: SIGNIFICANT CHANGE UP
GAS PNL BLDV: SIGNIFICANT CHANGE UP
GLUCOSE BLDC GLUCOMTR-MCNC: 100 MG/DL — HIGH (ref 70–99)
GLUCOSE BLDC GLUCOMTR-MCNC: 102 MG/DL — HIGH (ref 70–99)
GLUCOSE BLDC GLUCOMTR-MCNC: 106 MG/DL — HIGH (ref 70–99)
GLUCOSE BLDC GLUCOMTR-MCNC: 109 MG/DL — HIGH (ref 70–99)
GLUCOSE BLDC GLUCOMTR-MCNC: 109 MG/DL — HIGH (ref 70–99)
GLUCOSE BLDC GLUCOMTR-MCNC: 112 MG/DL — HIGH (ref 70–99)
GLUCOSE BLDC GLUCOMTR-MCNC: 113 MG/DL — HIGH (ref 70–99)
GLUCOSE BLDC GLUCOMTR-MCNC: 113 MG/DL — HIGH (ref 70–99)
GLUCOSE BLDC GLUCOMTR-MCNC: 114 MG/DL — HIGH (ref 70–99)
GLUCOSE BLDC GLUCOMTR-MCNC: 116 MG/DL — HIGH (ref 70–99)
GLUCOSE BLDC GLUCOMTR-MCNC: 116 MG/DL — HIGH (ref 70–99)
GLUCOSE BLDC GLUCOMTR-MCNC: 117 MG/DL — HIGH (ref 70–99)
GLUCOSE BLDC GLUCOMTR-MCNC: 117 MG/DL — HIGH (ref 70–99)
GLUCOSE BLDC GLUCOMTR-MCNC: 118 MG/DL — HIGH (ref 70–99)
GLUCOSE BLDC GLUCOMTR-MCNC: 119 MG/DL — HIGH (ref 70–99)
GLUCOSE BLDC GLUCOMTR-MCNC: 120 MG/DL — HIGH (ref 70–99)
GLUCOSE BLDC GLUCOMTR-MCNC: 122 MG/DL — HIGH (ref 70–99)
GLUCOSE BLDC GLUCOMTR-MCNC: 123 MG/DL — HIGH (ref 70–99)
GLUCOSE BLDC GLUCOMTR-MCNC: 124 MG/DL — HIGH (ref 70–99)
GLUCOSE BLDC GLUCOMTR-MCNC: 125 MG/DL — HIGH (ref 70–99)
GLUCOSE BLDC GLUCOMTR-MCNC: 125 MG/DL — HIGH (ref 70–99)
GLUCOSE BLDC GLUCOMTR-MCNC: 126 MG/DL — HIGH (ref 70–99)
GLUCOSE BLDC GLUCOMTR-MCNC: 129 MG/DL — HIGH (ref 70–99)
GLUCOSE BLDC GLUCOMTR-MCNC: 129 MG/DL — HIGH (ref 70–99)
GLUCOSE BLDC GLUCOMTR-MCNC: 130 MG/DL — HIGH (ref 70–99)
GLUCOSE BLDC GLUCOMTR-MCNC: 131 MG/DL — HIGH (ref 70–99)
GLUCOSE BLDC GLUCOMTR-MCNC: 132 MG/DL — HIGH (ref 70–99)
GLUCOSE BLDC GLUCOMTR-MCNC: 133 MG/DL — HIGH (ref 70–99)
GLUCOSE BLDC GLUCOMTR-MCNC: 133 MG/DL — HIGH (ref 70–99)
GLUCOSE BLDC GLUCOMTR-MCNC: 134 MG/DL — HIGH (ref 70–99)
GLUCOSE BLDC GLUCOMTR-MCNC: 135 MG/DL — HIGH (ref 70–99)
GLUCOSE BLDC GLUCOMTR-MCNC: 136 MG/DL — HIGH (ref 70–99)
GLUCOSE BLDC GLUCOMTR-MCNC: 136 MG/DL — HIGH (ref 70–99)
GLUCOSE BLDC GLUCOMTR-MCNC: 137 MG/DL — HIGH (ref 70–99)
GLUCOSE BLDC GLUCOMTR-MCNC: 137 MG/DL — HIGH (ref 70–99)
GLUCOSE BLDC GLUCOMTR-MCNC: 139 MG/DL — HIGH (ref 70–99)
GLUCOSE BLDC GLUCOMTR-MCNC: 141 MG/DL — HIGH (ref 70–99)
GLUCOSE BLDC GLUCOMTR-MCNC: 142 MG/DL — HIGH (ref 70–99)
GLUCOSE BLDC GLUCOMTR-MCNC: 142 MG/DL — HIGH (ref 70–99)
GLUCOSE BLDC GLUCOMTR-MCNC: 143 MG/DL — HIGH (ref 70–99)
GLUCOSE BLDC GLUCOMTR-MCNC: 143 MG/DL — HIGH (ref 70–99)
GLUCOSE BLDC GLUCOMTR-MCNC: 145 MG/DL — HIGH (ref 70–99)
GLUCOSE BLDC GLUCOMTR-MCNC: 147 MG/DL — HIGH (ref 70–99)
GLUCOSE BLDC GLUCOMTR-MCNC: 148 MG/DL — HIGH (ref 70–99)
GLUCOSE BLDC GLUCOMTR-MCNC: 149 MG/DL — HIGH (ref 70–99)
GLUCOSE BLDC GLUCOMTR-MCNC: 154 MG/DL — HIGH (ref 70–99)
GLUCOSE BLDC GLUCOMTR-MCNC: 156 MG/DL — HIGH (ref 70–99)
GLUCOSE BLDC GLUCOMTR-MCNC: 156 MG/DL — HIGH (ref 70–99)
GLUCOSE BLDC GLUCOMTR-MCNC: 160 MG/DL — HIGH (ref 70–99)
GLUCOSE BLDC GLUCOMTR-MCNC: 164 MG/DL — HIGH (ref 70–99)
GLUCOSE BLDC GLUCOMTR-MCNC: 164 MG/DL — HIGH (ref 70–99)
GLUCOSE BLDC GLUCOMTR-MCNC: 166 MG/DL — HIGH (ref 70–99)
GLUCOSE BLDC GLUCOMTR-MCNC: 166 MG/DL — HIGH (ref 70–99)
GLUCOSE BLDC GLUCOMTR-MCNC: 168 MG/DL — HIGH (ref 70–99)
GLUCOSE BLDC GLUCOMTR-MCNC: 171 MG/DL — HIGH (ref 70–99)
GLUCOSE BLDC GLUCOMTR-MCNC: 171 MG/DL — HIGH (ref 70–99)
GLUCOSE BLDC GLUCOMTR-MCNC: 172 MG/DL — HIGH (ref 70–99)
GLUCOSE BLDC GLUCOMTR-MCNC: 174 MG/DL — HIGH (ref 70–99)
GLUCOSE BLDC GLUCOMTR-MCNC: 175 MG/DL — HIGH (ref 70–99)
GLUCOSE BLDC GLUCOMTR-MCNC: 176 MG/DL — HIGH (ref 70–99)
GLUCOSE BLDC GLUCOMTR-MCNC: 178 MG/DL — HIGH (ref 70–99)
GLUCOSE BLDC GLUCOMTR-MCNC: 180 MG/DL — HIGH (ref 70–99)
GLUCOSE BLDC GLUCOMTR-MCNC: 180 MG/DL — HIGH (ref 70–99)
GLUCOSE BLDC GLUCOMTR-MCNC: 182 MG/DL — HIGH (ref 70–99)
GLUCOSE BLDC GLUCOMTR-MCNC: 183 MG/DL — HIGH (ref 70–99)
GLUCOSE BLDC GLUCOMTR-MCNC: 184 MG/DL — HIGH (ref 70–99)
GLUCOSE BLDC GLUCOMTR-MCNC: 186 MG/DL — HIGH (ref 70–99)
GLUCOSE BLDC GLUCOMTR-MCNC: 188 MG/DL — HIGH (ref 70–99)
GLUCOSE BLDC GLUCOMTR-MCNC: 190 MG/DL — HIGH (ref 70–99)
GLUCOSE BLDC GLUCOMTR-MCNC: 195 MG/DL — HIGH (ref 70–99)
GLUCOSE BLDC GLUCOMTR-MCNC: 199 MG/DL — HIGH (ref 70–99)
GLUCOSE BLDC GLUCOMTR-MCNC: 202 MG/DL — HIGH (ref 70–99)
GLUCOSE BLDC GLUCOMTR-MCNC: 207 MG/DL — HIGH (ref 70–99)
GLUCOSE BLDC GLUCOMTR-MCNC: 208 MG/DL — HIGH (ref 70–99)
GLUCOSE BLDC GLUCOMTR-MCNC: 213 MG/DL — HIGH (ref 70–99)
GLUCOSE BLDC GLUCOMTR-MCNC: 214 MG/DL — HIGH (ref 70–99)
GLUCOSE BLDC GLUCOMTR-MCNC: 227 MG/DL — HIGH (ref 70–99)
GLUCOSE BLDC GLUCOMTR-MCNC: 233 MG/DL — HIGH (ref 70–99)
GLUCOSE BLDC GLUCOMTR-MCNC: 235 MG/DL — HIGH (ref 70–99)
GLUCOSE BLDC GLUCOMTR-MCNC: 61 MG/DL — LOW (ref 70–99)
GLUCOSE BLDC GLUCOMTR-MCNC: 78 MG/DL — SIGNIFICANT CHANGE UP (ref 70–99)
GLUCOSE BLDC GLUCOMTR-MCNC: 79 MG/DL — SIGNIFICANT CHANGE UP (ref 70–99)
GLUCOSE BLDC GLUCOMTR-MCNC: 80 MG/DL — SIGNIFICANT CHANGE UP (ref 70–99)
GLUCOSE BLDC GLUCOMTR-MCNC: 89 MG/DL — SIGNIFICANT CHANGE UP (ref 70–99)
GLUCOSE BLDC GLUCOMTR-MCNC: 89 MG/DL — SIGNIFICANT CHANGE UP (ref 70–99)
GLUCOSE BLDC GLUCOMTR-MCNC: 92 MG/DL — SIGNIFICANT CHANGE UP (ref 70–99)
GLUCOSE BLDC GLUCOMTR-MCNC: 92 MG/DL — SIGNIFICANT CHANGE UP (ref 70–99)
GLUCOSE BLDC GLUCOMTR-MCNC: 94 MG/DL — SIGNIFICANT CHANGE UP (ref 70–99)
GLUCOSE BLDC GLUCOMTR-MCNC: 94 MG/DL — SIGNIFICANT CHANGE UP (ref 70–99)
GLUCOSE BLDC GLUCOMTR-MCNC: 96 MG/DL — SIGNIFICANT CHANGE UP (ref 70–99)
GLUCOSE BLDC GLUCOMTR-MCNC: 96 MG/DL — SIGNIFICANT CHANGE UP (ref 70–99)
GLUCOSE BLDC GLUCOMTR-MCNC: 97 MG/DL — SIGNIFICANT CHANGE UP (ref 70–99)
GLUCOSE SERPL-MCNC: 100 MG/DL — HIGH (ref 70–99)
GLUCOSE SERPL-MCNC: 102 MG/DL — HIGH (ref 70–99)
GLUCOSE SERPL-MCNC: 106 MG/DL
GLUCOSE SERPL-MCNC: 107 MG/DL — HIGH (ref 70–99)
GLUCOSE SERPL-MCNC: 109 MG/DL — HIGH (ref 70–99)
GLUCOSE SERPL-MCNC: 110 MG/DL — HIGH (ref 70–99)
GLUCOSE SERPL-MCNC: 110 MG/DL — HIGH (ref 70–99)
GLUCOSE SERPL-MCNC: 111 MG/DL — HIGH (ref 70–99)
GLUCOSE SERPL-MCNC: 114 MG/DL — HIGH (ref 70–99)
GLUCOSE SERPL-MCNC: 115 MG/DL — HIGH (ref 70–99)
GLUCOSE SERPL-MCNC: 118 MG/DL — HIGH (ref 70–99)
GLUCOSE SERPL-MCNC: 118 MG/DL — HIGH (ref 70–99)
GLUCOSE SERPL-MCNC: 119 MG/DL — HIGH (ref 70–99)
GLUCOSE SERPL-MCNC: 119 MG/DL — HIGH (ref 70–99)
GLUCOSE SERPL-MCNC: 120 MG/DL — HIGH (ref 70–99)
GLUCOSE SERPL-MCNC: 120 MG/DL — HIGH (ref 70–99)
GLUCOSE SERPL-MCNC: 121 MG/DL — HIGH (ref 70–99)
GLUCOSE SERPL-MCNC: 121 MG/DL — HIGH (ref 70–99)
GLUCOSE SERPL-MCNC: 125 MG/DL — HIGH (ref 70–99)
GLUCOSE SERPL-MCNC: 125 MG/DL — HIGH (ref 70–99)
GLUCOSE SERPL-MCNC: 126 MG/DL
GLUCOSE SERPL-MCNC: 126 MG/DL — HIGH (ref 70–99)
GLUCOSE SERPL-MCNC: 126 MG/DL — HIGH (ref 70–99)
GLUCOSE SERPL-MCNC: 127 MG/DL — HIGH (ref 70–99)
GLUCOSE SERPL-MCNC: 131 MG/DL — HIGH (ref 70–99)
GLUCOSE SERPL-MCNC: 133 MG/DL — HIGH (ref 70–99)
GLUCOSE SERPL-MCNC: 133 MG/DL — HIGH (ref 70–99)
GLUCOSE SERPL-MCNC: 138 MG/DL — HIGH (ref 70–99)
GLUCOSE SERPL-MCNC: 140 MG/DL — HIGH (ref 70–99)
GLUCOSE SERPL-MCNC: 166 MG/DL — HIGH (ref 70–99)
GLUCOSE SERPL-MCNC: 173 MG/DL — HIGH (ref 70–99)
GLUCOSE SERPL-MCNC: 201 MG/DL — HIGH (ref 70–99)
GLUCOSE SERPL-MCNC: 230 MG/DL — HIGH (ref 70–99)
GLUCOSE SERPL-MCNC: 57 MG/DL — LOW (ref 70–99)
GLUCOSE SERPL-MCNC: 79 MG/DL — SIGNIFICANT CHANGE UP (ref 70–99)
GLUCOSE SERPL-MCNC: 87 MG/DL
GLUCOSE SERPL-MCNC: 91 MG/DL — SIGNIFICANT CHANGE UP (ref 70–99)
GLUCOSE SERPL-MCNC: 96 MG/DL — SIGNIFICANT CHANGE UP (ref 70–99)
GLUCOSE SERPL-MCNC: 98 MG/DL — SIGNIFICANT CHANGE UP (ref 70–99)
GLUCOSE SERPL-MCNC: 98 MG/DL — SIGNIFICANT CHANGE UP (ref 70–99)
GLUCOSE SERPL-MCNC: 99 MG/DL — SIGNIFICANT CHANGE UP (ref 70–99)
GLUCOSE UR QL: NEGATIVE — SIGNIFICANT CHANGE UP
HCO3 BLDV-SCNC: 28 MMOL/L — SIGNIFICANT CHANGE UP (ref 22–29)
HCO3 BLDV-SCNC: 29 MMOL/L — SIGNIFICANT CHANGE UP (ref 22–29)
HCT VFR BLD CALC: 36.1 % — LOW (ref 42–52)
HCT VFR BLD CALC: 36.1 % — LOW (ref 42–52)
HCT VFR BLD CALC: 36.5 % — LOW (ref 42–52)
HCT VFR BLD CALC: 37.1 % — LOW (ref 42–52)
HCT VFR BLD CALC: 37.2 % — LOW (ref 42–52)
HCT VFR BLD CALC: 37.3 % — LOW (ref 42–52)
HCT VFR BLD CALC: 37.4 % — LOW (ref 42–52)
HCT VFR BLD CALC: 38.1 % — LOW (ref 42–52)
HCT VFR BLD CALC: 38.4 % — LOW (ref 42–52)
HCT VFR BLD CALC: 38.8 % — LOW (ref 42–52)
HCT VFR BLD CALC: 38.8 % — LOW (ref 42–52)
HCT VFR BLD CALC: 38.9 % — LOW (ref 42–52)
HCT VFR BLD CALC: 39.1 % — LOW (ref 42–52)
HCT VFR BLD CALC: 39.2 % — LOW (ref 42–52)
HCT VFR BLD CALC: 39.3 % — LOW (ref 42–52)
HCT VFR BLD CALC: 39.7 % — LOW (ref 42–52)
HCT VFR BLD CALC: 40.2 % — LOW (ref 42–52)
HCT VFR BLD CALC: 40.2 % — LOW (ref 42–52)
HCT VFR BLD CALC: 41 % — LOW (ref 42–52)
HCT VFR BLD CALC: 41.1 % — LOW (ref 42–52)
HCT VFR BLD CALC: 41.2 % — LOW (ref 42–52)
HCT VFR BLD CALC: 41.2 % — LOW (ref 42–52)
HCT VFR BLD CALC: 41.3 % — LOW (ref 42–52)
HCT VFR BLD CALC: 42 % — SIGNIFICANT CHANGE UP (ref 42–52)
HCT VFR BLD CALC: 42.8 % — SIGNIFICANT CHANGE UP (ref 42–52)
HCT VFR BLD CALC: 44 % — SIGNIFICANT CHANGE UP (ref 42–52)
HCT VFR BLD CALC: 45.4 % — SIGNIFICANT CHANGE UP (ref 42–52)
HCT VFR BLD CALC: 45.7 % — SIGNIFICANT CHANGE UP (ref 42–52)
HCT VFR BLD CALC: 46.2 % — SIGNIFICANT CHANGE UP (ref 42–52)
HCT VFR BLD CALC: 46.4 % — SIGNIFICANT CHANGE UP (ref 42–52)
HCT VFR BLD CALC: 46.8 % — SIGNIFICANT CHANGE UP (ref 42–52)
HCT VFR BLD CALC: 47.1 % — SIGNIFICANT CHANGE UP (ref 42–52)
HCT VFR BLD CALC: 48.3 % — SIGNIFICANT CHANGE UP (ref 42–52)
HCT VFR BLD CALC: 48.5 % — SIGNIFICANT CHANGE UP (ref 42–52)
HCT VFR BLDA CALC: 36.7 % — SIGNIFICANT CHANGE UP (ref 34–44)
HCT VFR BLDA CALC: 39 % — SIGNIFICANT CHANGE UP (ref 39–51)
HGB BLD CALC-MCNC: 12 G/DL — LOW (ref 14–18)
HGB BLD CALC-MCNC: 13 G/DL — SIGNIFICANT CHANGE UP (ref 12.6–17.4)
HGB BLD-MCNC: 10.7 G/DL — LOW (ref 14–18)
HGB BLD-MCNC: 11 G/DL — LOW (ref 14–18)
HGB BLD-MCNC: 11.1 G/DL — LOW (ref 14–18)
HGB BLD-MCNC: 11.4 G/DL — LOW (ref 14–18)
HGB BLD-MCNC: 11.5 G/DL — LOW (ref 14–18)
HGB BLD-MCNC: 11.5 G/DL — LOW (ref 14–18)
HGB BLD-MCNC: 11.6 G/DL — LOW (ref 14–18)
HGB BLD-MCNC: 11.6 G/DL — LOW (ref 14–18)
HGB BLD-MCNC: 11.7 G/DL — LOW (ref 14–18)
HGB BLD-MCNC: 12 G/DL — LOW (ref 14–18)
HGB BLD-MCNC: 12.1 G/DL — LOW (ref 14–18)
HGB BLD-MCNC: 12.2 G/DL — LOW (ref 14–18)
HGB BLD-MCNC: 12.2 G/DL — LOW (ref 14–18)
HGB BLD-MCNC: 12.4 G/DL — LOW (ref 14–18)
HGB BLD-MCNC: 12.4 G/DL — LOW (ref 14–18)
HGB BLD-MCNC: 12.5 G/DL — LOW (ref 14–18)
HGB BLD-MCNC: 12.6 G/DL — LOW (ref 14–18)
HGB BLD-MCNC: 12.7 G/DL — LOW (ref 14–18)
HGB BLD-MCNC: 13.4 G/DL — LOW (ref 14–18)
HGB BLD-MCNC: 13.6 G/DL — LOW (ref 14–18)
HGB BLD-MCNC: 13.9 G/DL — LOW (ref 14–18)
HGB BLD-MCNC: 14 G/DL — SIGNIFICANT CHANGE UP (ref 14–18)
HGB BLD-MCNC: 14.1 G/DL — SIGNIFICANT CHANGE UP (ref 14–18)
HGB BLD-MCNC: 14.2 G/DL — SIGNIFICANT CHANGE UP (ref 14–18)
HGB BLD-MCNC: 14.7 G/DL — SIGNIFICANT CHANGE UP (ref 14–18)
HGB BLD-MCNC: 14.8 G/DL — SIGNIFICANT CHANGE UP (ref 14–18)
IMM GRANULOCYTES NFR BLD AUTO: 0.3 % — SIGNIFICANT CHANGE UP (ref 0.1–0.3)
IMM GRANULOCYTES NFR BLD AUTO: 0.4 % — HIGH (ref 0.1–0.3)
IMM GRANULOCYTES NFR BLD AUTO: 0.5 % — HIGH (ref 0.1–0.3)
IMM GRANULOCYTES NFR BLD AUTO: 0.6 % — HIGH (ref 0.1–0.3)
IMM GRANULOCYTES NFR BLD AUTO: 0.7 % — HIGH (ref 0.1–0.3)
IMM GRANULOCYTES NFR BLD AUTO: 0.8 % — HIGH (ref 0.1–0.3)
IMM GRANULOCYTES NFR BLD AUTO: 0.9 % — HIGH (ref 0.1–0.3)
INR BLD: 1.11 RATIO — SIGNIFICANT CHANGE UP (ref 0.65–1.3)
IRON SATN MFR SERPL: 18 %
IRON SATN MFR SERPL: 22 % — SIGNIFICANT CHANGE UP (ref 15–50)
IRON SATN MFR SERPL: 48 UG/DL — SIGNIFICANT CHANGE UP (ref 35–150)
IRON SERPL-MCNC: 57 UG/DL
KETONES UR-MCNC: NEGATIVE — SIGNIFICANT CHANGE UP
LACTATE BLDV-MCNC: 1 MMOL/L — SIGNIFICANT CHANGE UP (ref 0.5–2)
LACTATE BLDV-MCNC: 1.2 MMOL/L — SIGNIFICANT CHANGE UP (ref 0.5–1.6)
LEUKOCYTE ESTERASE UR-ACNC: NEGATIVE — SIGNIFICANT CHANGE UP
LYMPHOCYTES # BLD AUTO: 0.6 K/UL — LOW (ref 1.2–3.4)
LYMPHOCYTES # BLD AUTO: 0.94 K/UL — LOW (ref 1.2–3.4)
LYMPHOCYTES # BLD AUTO: 0.98 K/UL — LOW (ref 1.2–3.4)
LYMPHOCYTES # BLD AUTO: 1.09 K/UL — LOW (ref 1.2–3.4)
LYMPHOCYTES # BLD AUTO: 1.13 K/UL — LOW (ref 1.2–3.4)
LYMPHOCYTES # BLD AUTO: 1.16 K/UL — LOW (ref 1.2–3.4)
LYMPHOCYTES # BLD AUTO: 1.2 K/UL — SIGNIFICANT CHANGE UP (ref 1.2–3.4)
LYMPHOCYTES # BLD AUTO: 1.22 K/UL — SIGNIFICANT CHANGE UP (ref 1.2–3.4)
LYMPHOCYTES # BLD AUTO: 1.23 K/UL — SIGNIFICANT CHANGE UP (ref 1.2–3.4)
LYMPHOCYTES # BLD AUTO: 1.27 K/UL — SIGNIFICANT CHANGE UP (ref 1.2–3.4)
LYMPHOCYTES # BLD AUTO: 1.32 K/UL — SIGNIFICANT CHANGE UP (ref 1.2–3.4)
LYMPHOCYTES # BLD AUTO: 1.35 K/UL — SIGNIFICANT CHANGE UP (ref 1.2–3.4)
LYMPHOCYTES # BLD AUTO: 1.36 K/UL — SIGNIFICANT CHANGE UP (ref 1.2–3.4)
LYMPHOCYTES # BLD AUTO: 1.39 K/UL — SIGNIFICANT CHANGE UP (ref 1.2–3.4)
LYMPHOCYTES # BLD AUTO: 1.43 K/UL — SIGNIFICANT CHANGE UP (ref 1.2–3.4)
LYMPHOCYTES # BLD AUTO: 1.48 K/UL — SIGNIFICANT CHANGE UP (ref 1.2–3.4)
LYMPHOCYTES # BLD AUTO: 1.5 K/UL — SIGNIFICANT CHANGE UP (ref 1.2–3.4)
LYMPHOCYTES # BLD AUTO: 1.51 K/UL — SIGNIFICANT CHANGE UP (ref 1.2–3.4)
LYMPHOCYTES # BLD AUTO: 1.52 K/UL — SIGNIFICANT CHANGE UP (ref 1.2–3.4)
LYMPHOCYTES # BLD AUTO: 1.53 K/UL — SIGNIFICANT CHANGE UP (ref 1.2–3.4)
LYMPHOCYTES # BLD AUTO: 1.55 K/UL — SIGNIFICANT CHANGE UP (ref 1.2–3.4)
LYMPHOCYTES # BLD AUTO: 1.57 K/UL — SIGNIFICANT CHANGE UP (ref 1.2–3.4)
LYMPHOCYTES # BLD AUTO: 1.58 K/UL — SIGNIFICANT CHANGE UP (ref 1.2–3.4)
LYMPHOCYTES # BLD AUTO: 1.58 K/UL — SIGNIFICANT CHANGE UP (ref 1.2–3.4)
LYMPHOCYTES # BLD AUTO: 1.59 K/UL — SIGNIFICANT CHANGE UP (ref 1.2–3.4)
LYMPHOCYTES # BLD AUTO: 1.6 K/UL — SIGNIFICANT CHANGE UP (ref 1.2–3.4)
LYMPHOCYTES # BLD AUTO: 1.64 K/UL — SIGNIFICANT CHANGE UP (ref 1.2–3.4)
LYMPHOCYTES # BLD AUTO: 1.7 K/UL — SIGNIFICANT CHANGE UP (ref 1.2–3.4)
LYMPHOCYTES # BLD AUTO: 1.74 K/UL — SIGNIFICANT CHANGE UP (ref 1.2–3.4)
LYMPHOCYTES # BLD AUTO: 1.76 K/UL — SIGNIFICANT CHANGE UP (ref 1.2–3.4)
LYMPHOCYTES # BLD AUTO: 1.83 K/UL — SIGNIFICANT CHANGE UP (ref 1.2–3.4)
LYMPHOCYTES # BLD AUTO: 10.2 % — LOW (ref 20.5–51.1)
LYMPHOCYTES # BLD AUTO: 11.6 % — LOW (ref 20.5–51.1)
LYMPHOCYTES # BLD AUTO: 12 % — LOW (ref 20.5–51.1)
LYMPHOCYTES # BLD AUTO: 12.2 % — LOW (ref 20.5–51.1)
LYMPHOCYTES # BLD AUTO: 12.2 % — LOW (ref 20.5–51.1)
LYMPHOCYTES # BLD AUTO: 12.4 % — LOW (ref 20.5–51.1)
LYMPHOCYTES # BLD AUTO: 12.5 % — LOW (ref 20.5–51.1)
LYMPHOCYTES # BLD AUTO: 12.6 % — LOW (ref 20.5–51.1)
LYMPHOCYTES # BLD AUTO: 13.2 % — LOW (ref 20.5–51.1)
LYMPHOCYTES # BLD AUTO: 13.9 % — LOW (ref 20.5–51.1)
LYMPHOCYTES # BLD AUTO: 14 % — LOW (ref 20.5–51.1)
LYMPHOCYTES # BLD AUTO: 14.1 % — LOW (ref 20.5–51.1)
LYMPHOCYTES # BLD AUTO: 14.1 % — LOW (ref 20.5–51.1)
LYMPHOCYTES # BLD AUTO: 14.3 % — LOW (ref 20.5–51.1)
LYMPHOCYTES # BLD AUTO: 14.5 % — LOW (ref 20.5–51.1)
LYMPHOCYTES # BLD AUTO: 14.8 % — LOW (ref 20.5–51.1)
LYMPHOCYTES # BLD AUTO: 15 % — LOW (ref 20.5–51.1)
LYMPHOCYTES # BLD AUTO: 15.1 % — LOW (ref 20.5–51.1)
LYMPHOCYTES # BLD AUTO: 15.2 % — LOW (ref 20.5–51.1)
LYMPHOCYTES # BLD AUTO: 15.7 % — LOW (ref 20.5–51.1)
LYMPHOCYTES # BLD AUTO: 16.2 % — LOW (ref 20.5–51.1)
LYMPHOCYTES # BLD AUTO: 16.2 % — LOW (ref 20.5–51.1)
LYMPHOCYTES # BLD AUTO: 17.2 % — LOW (ref 20.5–51.1)
LYMPHOCYTES # BLD AUTO: 17.5 % — LOW (ref 20.5–51.1)
LYMPHOCYTES # BLD AUTO: 17.8 % — LOW (ref 20.5–51.1)
LYMPHOCYTES # BLD AUTO: 18.9 % — LOW (ref 20.5–51.1)
LYMPHOCYTES # BLD AUTO: 19 % — LOW (ref 20.5–51.1)
LYMPHOCYTES # BLD AUTO: 20.9 % — SIGNIFICANT CHANGE UP (ref 20.5–51.1)
LYMPHOCYTES # BLD AUTO: 7.5 % — LOW (ref 20.5–51.1)
LYMPHOCYTES # BLD AUTO: 9 % — LOW (ref 20.5–51.1)
LYMPHOCYTES # BLD AUTO: 9 % — LOW (ref 20.5–51.1)
MAGNESIUM SERPL-MCNC: 2 MG/DL — SIGNIFICANT CHANGE UP (ref 1.8–2.4)
MAGNESIUM SERPL-MCNC: 2.1 MG/DL — SIGNIFICANT CHANGE UP (ref 1.8–2.4)
MAGNESIUM SERPL-MCNC: 2.2 MG/DL — SIGNIFICANT CHANGE UP (ref 1.8–2.4)
MAGNESIUM SERPL-MCNC: 2.3 MG/DL — SIGNIFICANT CHANGE UP (ref 1.8–2.4)
MAGNESIUM SERPL-MCNC: 2.3 MG/DL — SIGNIFICANT CHANGE UP (ref 1.8–2.4)
MAGNESIUM SERPL-MCNC: 2.4 MG/DL — SIGNIFICANT CHANGE UP (ref 1.8–2.4)
MAGNESIUM SERPL-MCNC: 2.5 MG/DL — HIGH (ref 1.8–2.4)
MAGNESIUM SERPL-MCNC: 2.6 MG/DL — HIGH (ref 1.8–2.4)
MAGNESIUM SERPL-MCNC: 2.7 MG/DL — HIGH (ref 1.8–2.4)
MAGNESIUM SERPL-MCNC: 2.8 MG/DL — HIGH (ref 1.8–2.4)
MAGNESIUM SERPL-MCNC: 2.8 MG/DL — HIGH (ref 1.8–2.4)
MAGNESIUM SERPL-MCNC: 2.9 MG/DL — HIGH (ref 1.8–2.4)
MAGNESIUM SERPL-MCNC: 3 MG/DL — HIGH (ref 1.8–2.4)
MAGNESIUM SERPL-MCNC: 3 MG/DL — HIGH (ref 1.8–2.4)
MAGNESIUM SERPL-MCNC: 3.1 MG/DL — CRITICAL HIGH (ref 1.8–2.4)
MCHC RBC-ENTMCNC: 23.7 PG — LOW (ref 27–31)
MCHC RBC-ENTMCNC: 23.8 PG — LOW (ref 27–31)
MCHC RBC-ENTMCNC: 24.1 PG — LOW (ref 27–31)
MCHC RBC-ENTMCNC: 24.1 PG — LOW (ref 27–31)
MCHC RBC-ENTMCNC: 24.3 PG — LOW (ref 27–31)
MCHC RBC-ENTMCNC: 24.3 PG — LOW (ref 27–31)
MCHC RBC-ENTMCNC: 24.4 PG — LOW (ref 27–31)
MCHC RBC-ENTMCNC: 24.5 PG — LOW (ref 27–31)
MCHC RBC-ENTMCNC: 24.7 PG — LOW (ref 27–31)
MCHC RBC-ENTMCNC: 24.7 PG — LOW (ref 27–31)
MCHC RBC-ENTMCNC: 24.8 PG — LOW (ref 27–31)
MCHC RBC-ENTMCNC: 24.8 PG — LOW (ref 27–31)
MCHC RBC-ENTMCNC: 24.9 PG — LOW (ref 27–31)
MCHC RBC-ENTMCNC: 24.9 PG — LOW (ref 27–31)
MCHC RBC-ENTMCNC: 25 PG — LOW (ref 27–31)
MCHC RBC-ENTMCNC: 25.1 PG — LOW (ref 27–31)
MCHC RBC-ENTMCNC: 25.2 PG — LOW (ref 27–31)
MCHC RBC-ENTMCNC: 25.3 PG — LOW (ref 27–31)
MCHC RBC-ENTMCNC: 25.3 PG — LOW (ref 27–31)
MCHC RBC-ENTMCNC: 25.4 PG — LOW (ref 27–31)
MCHC RBC-ENTMCNC: 25.4 PG — LOW (ref 27–31)
MCHC RBC-ENTMCNC: 29.2 G/DL — LOW (ref 32–37)
MCHC RBC-ENTMCNC: 29.4 G/DL — LOW (ref 32–37)
MCHC RBC-ENTMCNC: 29.4 G/DL — LOW (ref 32–37)
MCHC RBC-ENTMCNC: 29.6 G/DL — LOW (ref 32–37)
MCHC RBC-ENTMCNC: 29.6 G/DL — LOW (ref 32–37)
MCHC RBC-ENTMCNC: 29.7 G/DL — LOW (ref 32–37)
MCHC RBC-ENTMCNC: 29.8 G/DL — LOW (ref 32–37)
MCHC RBC-ENTMCNC: 29.8 G/DL — LOW (ref 32–37)
MCHC RBC-ENTMCNC: 29.9 G/DL — LOW (ref 32–37)
MCHC RBC-ENTMCNC: 29.9 G/DL — LOW (ref 32–37)
MCHC RBC-ENTMCNC: 30 G/DL — LOW (ref 32–37)
MCHC RBC-ENTMCNC: 30.1 G/DL — LOW (ref 32–37)
MCHC RBC-ENTMCNC: 30.2 G/DL — LOW (ref 32–37)
MCHC RBC-ENTMCNC: 30.3 G/DL — LOW (ref 32–37)
MCHC RBC-ENTMCNC: 30.3 G/DL — LOW (ref 32–37)
MCHC RBC-ENTMCNC: 30.4 G/DL — LOW (ref 32–37)
MCHC RBC-ENTMCNC: 30.5 G/DL — LOW (ref 32–37)
MCHC RBC-ENTMCNC: 30.6 G/DL — LOW (ref 32–37)
MCHC RBC-ENTMCNC: 30.6 G/DL — LOW (ref 32–37)
MCHC RBC-ENTMCNC: 30.7 G/DL — LOW (ref 32–37)
MCHC RBC-ENTMCNC: 30.8 G/DL — LOW (ref 32–37)
MCHC RBC-ENTMCNC: 30.8 G/DL — LOW (ref 32–37)
MCHC RBC-ENTMCNC: 30.9 G/DL — LOW (ref 32–37)
MCHC RBC-ENTMCNC: 31 G/DL — LOW (ref 32–37)
MCHC RBC-ENTMCNC: 31.1 G/DL — LOW (ref 32–37)
MCHC RBC-ENTMCNC: 31.4 G/DL — LOW (ref 32–37)
MCHC RBC-ENTMCNC: 31.9 G/DL — LOW (ref 32–37)
MCV RBC AUTO: 79.2 FL — LOW (ref 80–94)
MCV RBC AUTO: 79.3 FL — LOW (ref 80–94)
MCV RBC AUTO: 79.5 FL — LOW (ref 80–94)
MCV RBC AUTO: 79.9 FL — LOW (ref 80–94)
MCV RBC AUTO: 80 FL — SIGNIFICANT CHANGE UP (ref 80–94)
MCV RBC AUTO: 80.3 FL — SIGNIFICANT CHANGE UP (ref 80–94)
MCV RBC AUTO: 80.3 FL — SIGNIFICANT CHANGE UP (ref 80–94)
MCV RBC AUTO: 80.6 FL — SIGNIFICANT CHANGE UP (ref 80–94)
MCV RBC AUTO: 80.6 FL — SIGNIFICANT CHANGE UP (ref 80–94)
MCV RBC AUTO: 80.7 FL — SIGNIFICANT CHANGE UP (ref 80–94)
MCV RBC AUTO: 80.7 FL — SIGNIFICANT CHANGE UP (ref 80–94)
MCV RBC AUTO: 80.9 FL — SIGNIFICANT CHANGE UP (ref 80–94)
MCV RBC AUTO: 81 FL — SIGNIFICANT CHANGE UP (ref 80–94)
MCV RBC AUTO: 81 FL — SIGNIFICANT CHANGE UP (ref 80–94)
MCV RBC AUTO: 81.4 FL — SIGNIFICANT CHANGE UP (ref 80–94)
MCV RBC AUTO: 81.4 FL — SIGNIFICANT CHANGE UP (ref 80–94)
MCV RBC AUTO: 81.5 FL — SIGNIFICANT CHANGE UP (ref 80–94)
MCV RBC AUTO: 81.8 FL — SIGNIFICANT CHANGE UP (ref 80–94)
MCV RBC AUTO: 81.9 FL — SIGNIFICANT CHANGE UP (ref 80–94)
MCV RBC AUTO: 82 FL — SIGNIFICANT CHANGE UP (ref 80–94)
MCV RBC AUTO: 82.1 FL — SIGNIFICANT CHANGE UP (ref 80–94)
MCV RBC AUTO: 82.4 FL — SIGNIFICANT CHANGE UP (ref 80–94)
MCV RBC AUTO: 82.4 FL — SIGNIFICANT CHANGE UP (ref 80–94)
MCV RBC AUTO: 82.5 FL — SIGNIFICANT CHANGE UP (ref 80–94)
MCV RBC AUTO: 82.7 FL — SIGNIFICANT CHANGE UP (ref 80–94)
MCV RBC AUTO: 82.8 FL — SIGNIFICANT CHANGE UP (ref 80–94)
MCV RBC AUTO: 83.1 FL — SIGNIFICANT CHANGE UP (ref 80–94)
MCV RBC AUTO: 83.8 FL — SIGNIFICANT CHANGE UP (ref 80–94)
MCV RBC AUTO: 85.2 FL — SIGNIFICANT CHANGE UP (ref 80–94)
MONOCYTES # BLD AUTO: 0.55 K/UL — SIGNIFICANT CHANGE UP (ref 0.1–0.6)
MONOCYTES # BLD AUTO: 0.55 K/UL — SIGNIFICANT CHANGE UP (ref 0.1–0.6)
MONOCYTES # BLD AUTO: 0.59 K/UL — SIGNIFICANT CHANGE UP (ref 0.1–0.6)
MONOCYTES # BLD AUTO: 0.61 K/UL — HIGH (ref 0.1–0.6)
MONOCYTES # BLD AUTO: 0.62 K/UL — HIGH (ref 0.1–0.6)
MONOCYTES # BLD AUTO: 0.62 K/UL — HIGH (ref 0.1–0.6)
MONOCYTES # BLD AUTO: 0.63 K/UL — HIGH (ref 0.1–0.6)
MONOCYTES # BLD AUTO: 0.65 K/UL — HIGH (ref 0.1–0.6)
MONOCYTES # BLD AUTO: 0.72 K/UL — HIGH (ref 0.1–0.6)
MONOCYTES # BLD AUTO: 0.73 K/UL — HIGH (ref 0.1–0.6)
MONOCYTES # BLD AUTO: 0.74 K/UL — HIGH (ref 0.1–0.6)
MONOCYTES # BLD AUTO: 0.74 K/UL — HIGH (ref 0.1–0.6)
MONOCYTES # BLD AUTO: 0.75 K/UL — HIGH (ref 0.1–0.6)
MONOCYTES # BLD AUTO: 0.75 K/UL — HIGH (ref 0.1–0.6)
MONOCYTES # BLD AUTO: 0.77 K/UL — HIGH (ref 0.1–0.6)
MONOCYTES # BLD AUTO: 0.78 K/UL — HIGH (ref 0.1–0.6)
MONOCYTES # BLD AUTO: 0.78 K/UL — HIGH (ref 0.1–0.6)
MONOCYTES # BLD AUTO: 0.8 K/UL — HIGH (ref 0.1–0.6)
MONOCYTES # BLD AUTO: 0.81 K/UL — HIGH (ref 0.1–0.6)
MONOCYTES # BLD AUTO: 0.81 K/UL — HIGH (ref 0.1–0.6)
MONOCYTES # BLD AUTO: 0.84 K/UL — HIGH (ref 0.1–0.6)
MONOCYTES # BLD AUTO: 0.85 K/UL — HIGH (ref 0.1–0.6)
MONOCYTES # BLD AUTO: 0.86 K/UL — HIGH (ref 0.1–0.6)
MONOCYTES # BLD AUTO: 0.89 K/UL — HIGH (ref 0.1–0.6)
MONOCYTES # BLD AUTO: 0.9 K/UL — HIGH (ref 0.1–0.6)
MONOCYTES # BLD AUTO: 0.96 K/UL — HIGH (ref 0.1–0.6)
MONOCYTES # BLD AUTO: 0.96 K/UL — HIGH (ref 0.1–0.6)
MONOCYTES # BLD AUTO: 0.97 K/UL — HIGH (ref 0.1–0.6)
MONOCYTES # BLD AUTO: 1.04 K/UL — HIGH (ref 0.1–0.6)
MONOCYTES NFR BLD AUTO: 10.1 % — HIGH (ref 1.7–9.3)
MONOCYTES NFR BLD AUTO: 5.9 % — SIGNIFICANT CHANGE UP (ref 1.7–9.3)
MONOCYTES NFR BLD AUTO: 5.9 % — SIGNIFICANT CHANGE UP (ref 1.7–9.3)
MONOCYTES NFR BLD AUTO: 6.1 % — SIGNIFICANT CHANGE UP (ref 1.7–9.3)
MONOCYTES NFR BLD AUTO: 6.4 % — SIGNIFICANT CHANGE UP (ref 1.7–9.3)
MONOCYTES NFR BLD AUTO: 6.5 % — SIGNIFICANT CHANGE UP (ref 1.7–9.3)
MONOCYTES NFR BLD AUTO: 6.8 % — SIGNIFICANT CHANGE UP (ref 1.7–9.3)
MONOCYTES NFR BLD AUTO: 6.9 % — SIGNIFICANT CHANGE UP (ref 1.7–9.3)
MONOCYTES NFR BLD AUTO: 7 % — SIGNIFICANT CHANGE UP (ref 1.7–9.3)
MONOCYTES NFR BLD AUTO: 7.2 % — SIGNIFICANT CHANGE UP (ref 1.7–9.3)
MONOCYTES NFR BLD AUTO: 7.3 % — SIGNIFICANT CHANGE UP (ref 1.7–9.3)
MONOCYTES NFR BLD AUTO: 7.3 % — SIGNIFICANT CHANGE UP (ref 1.7–9.3)
MONOCYTES NFR BLD AUTO: 7.4 % — SIGNIFICANT CHANGE UP (ref 1.7–9.3)
MONOCYTES NFR BLD AUTO: 7.6 % — SIGNIFICANT CHANGE UP (ref 1.7–9.3)
MONOCYTES NFR BLD AUTO: 7.6 % — SIGNIFICANT CHANGE UP (ref 1.7–9.3)
MONOCYTES NFR BLD AUTO: 7.7 % — SIGNIFICANT CHANGE UP (ref 1.7–9.3)
MONOCYTES NFR BLD AUTO: 7.8 % — SIGNIFICANT CHANGE UP (ref 1.7–9.3)
MONOCYTES NFR BLD AUTO: 7.9 % — SIGNIFICANT CHANGE UP (ref 1.7–9.3)
MONOCYTES NFR BLD AUTO: 8 % — SIGNIFICANT CHANGE UP (ref 1.7–9.3)
MONOCYTES NFR BLD AUTO: 8.2 % — SIGNIFICANT CHANGE UP (ref 1.7–9.3)
MONOCYTES NFR BLD AUTO: 8.2 % — SIGNIFICANT CHANGE UP (ref 1.7–9.3)
MONOCYTES NFR BLD AUTO: 8.4 % — SIGNIFICANT CHANGE UP (ref 1.7–9.3)
MONOCYTES NFR BLD AUTO: 8.5 % — SIGNIFICANT CHANGE UP (ref 1.7–9.3)
MONOCYTES NFR BLD AUTO: 9.2 % — SIGNIFICANT CHANGE UP (ref 1.7–9.3)
MONOCYTES NFR BLD AUTO: 9.5 % — HIGH (ref 1.7–9.3)
MONOCYTES NFR BLD AUTO: 9.6 % — HIGH (ref 1.7–9.3)
MONOCYTES NFR BLD AUTO: 9.8 % — HIGH (ref 1.7–9.3)
NEUTROPHILS # BLD AUTO: 10.17 K/UL — HIGH (ref 1.4–6.5)
NEUTROPHILS # BLD AUTO: 11.15 K/UL — HIGH (ref 1.4–6.5)
NEUTROPHILS # BLD AUTO: 5.65 K/UL — SIGNIFICANT CHANGE UP (ref 1.4–6.5)
NEUTROPHILS # BLD AUTO: 5.89 K/UL — SIGNIFICANT CHANGE UP (ref 1.4–6.5)
NEUTROPHILS # BLD AUTO: 6.25 K/UL — SIGNIFICANT CHANGE UP (ref 1.4–6.5)
NEUTROPHILS # BLD AUTO: 6.29 K/UL — SIGNIFICANT CHANGE UP (ref 1.4–6.5)
NEUTROPHILS # BLD AUTO: 6.34 K/UL — SIGNIFICANT CHANGE UP (ref 1.4–6.5)
NEUTROPHILS # BLD AUTO: 6.38 K/UL — SIGNIFICANT CHANGE UP (ref 1.4–6.5)
NEUTROPHILS # BLD AUTO: 6.39 K/UL — SIGNIFICANT CHANGE UP (ref 1.4–6.5)
NEUTROPHILS # BLD AUTO: 6.56 K/UL — HIGH (ref 1.4–6.5)
NEUTROPHILS # BLD AUTO: 6.67 K/UL — HIGH (ref 1.4–6.5)
NEUTROPHILS # BLD AUTO: 6.75 K/UL — HIGH (ref 1.4–6.5)
NEUTROPHILS # BLD AUTO: 6.99 K/UL — HIGH (ref 1.4–6.5)
NEUTROPHILS # BLD AUTO: 7.05 K/UL — HIGH (ref 1.4–6.5)
NEUTROPHILS # BLD AUTO: 7.14 K/UL — HIGH (ref 1.4–6.5)
NEUTROPHILS # BLD AUTO: 7.21 K/UL — HIGH (ref 1.4–6.5)
NEUTROPHILS # BLD AUTO: 7.26 K/UL — HIGH (ref 1.4–6.5)
NEUTROPHILS # BLD AUTO: 7.27 K/UL — HIGH (ref 1.4–6.5)
NEUTROPHILS # BLD AUTO: 7.35 K/UL — HIGH (ref 1.4–6.5)
NEUTROPHILS # BLD AUTO: 7.42 K/UL — HIGH (ref 1.4–6.5)
NEUTROPHILS # BLD AUTO: 7.61 K/UL — HIGH (ref 1.4–6.5)
NEUTROPHILS # BLD AUTO: 7.76 K/UL — HIGH (ref 1.4–6.5)
NEUTROPHILS # BLD AUTO: 8.07 K/UL — HIGH (ref 1.4–6.5)
NEUTROPHILS # BLD AUTO: 8.11 K/UL — HIGH (ref 1.4–6.5)
NEUTROPHILS # BLD AUTO: 8.15 K/UL — HIGH (ref 1.4–6.5)
NEUTROPHILS # BLD AUTO: 8.16 K/UL — HIGH (ref 1.4–6.5)
NEUTROPHILS # BLD AUTO: 8.33 K/UL — HIGH (ref 1.4–6.5)
NEUTROPHILS # BLD AUTO: 8.34 K/UL — HIGH (ref 1.4–6.5)
NEUTROPHILS # BLD AUTO: 8.46 K/UL — HIGH (ref 1.4–6.5)
NEUTROPHILS # BLD AUTO: 8.51 K/UL — HIGH (ref 1.4–6.5)
NEUTROPHILS # BLD AUTO: 8.62 K/UL — HIGH (ref 1.4–6.5)
NEUTROPHILS NFR BLD AUTO: 64.8 % — SIGNIFICANT CHANGE UP (ref 42.2–75.2)
NEUTROPHILS NFR BLD AUTO: 69 % — SIGNIFICANT CHANGE UP (ref 42.2–75.2)
NEUTROPHILS NFR BLD AUTO: 70.1 % — SIGNIFICANT CHANGE UP (ref 42.2–75.2)
NEUTROPHILS NFR BLD AUTO: 70.7 % — SIGNIFICANT CHANGE UP (ref 42.2–75.2)
NEUTROPHILS NFR BLD AUTO: 70.9 % — SIGNIFICANT CHANGE UP (ref 42.2–75.2)
NEUTROPHILS NFR BLD AUTO: 72 % — SIGNIFICANT CHANGE UP (ref 42.2–75.2)
NEUTROPHILS NFR BLD AUTO: 72.3 % — SIGNIFICANT CHANGE UP (ref 42.2–75.2)
NEUTROPHILS NFR BLD AUTO: 72.3 % — SIGNIFICANT CHANGE UP (ref 42.2–75.2)
NEUTROPHILS NFR BLD AUTO: 72.9 % — SIGNIFICANT CHANGE UP (ref 42.2–75.2)
NEUTROPHILS NFR BLD AUTO: 73.2 % — SIGNIFICANT CHANGE UP (ref 42.2–75.2)
NEUTROPHILS NFR BLD AUTO: 73.5 % — SIGNIFICANT CHANGE UP (ref 42.2–75.2)
NEUTROPHILS NFR BLD AUTO: 73.6 % — SIGNIFICANT CHANGE UP (ref 42.2–75.2)
NEUTROPHILS NFR BLD AUTO: 73.6 % — SIGNIFICANT CHANGE UP (ref 42.2–75.2)
NEUTROPHILS NFR BLD AUTO: 73.7 % — SIGNIFICANT CHANGE UP (ref 42.2–75.2)
NEUTROPHILS NFR BLD AUTO: 74.7 % — SIGNIFICANT CHANGE UP (ref 42.2–75.2)
NEUTROPHILS NFR BLD AUTO: 75.1 % — SIGNIFICANT CHANGE UP (ref 42.2–75.2)
NEUTROPHILS NFR BLD AUTO: 75.3 % — HIGH (ref 42.2–75.2)
NEUTROPHILS NFR BLD AUTO: 75.5 % — HIGH (ref 42.2–75.2)
NEUTROPHILS NFR BLD AUTO: 75.9 % — HIGH (ref 42.2–75.2)
NEUTROPHILS NFR BLD AUTO: 76.3 % — HIGH (ref 42.2–75.2)
NEUTROPHILS NFR BLD AUTO: 76.5 % — HIGH (ref 42.2–75.2)
NEUTROPHILS NFR BLD AUTO: 76.8 % — HIGH (ref 42.2–75.2)
NEUTROPHILS NFR BLD AUTO: 76.8 % — HIGH (ref 42.2–75.2)
NEUTROPHILS NFR BLD AUTO: 77.1 % — HIGH (ref 42.2–75.2)
NEUTROPHILS NFR BLD AUTO: 77.3 % — HIGH (ref 42.2–75.2)
NEUTROPHILS NFR BLD AUTO: 77.7 % — HIGH (ref 42.2–75.2)
NEUTROPHILS NFR BLD AUTO: 78.6 % — HIGH (ref 42.2–75.2)
NEUTROPHILS NFR BLD AUTO: 80.7 % — HIGH (ref 42.2–75.2)
NEUTROPHILS NFR BLD AUTO: 81.6 % — HIGH (ref 42.2–75.2)
NEUTROPHILS NFR BLD AUTO: 81.8 % — HIGH (ref 42.2–75.2)
NEUTROPHILS NFR BLD AUTO: 83.6 % — HIGH (ref 42.2–75.2)
NITRITE UR-MCNC: NEGATIVE — SIGNIFICANT CHANGE UP
NRBC # BLD: 0 /100 WBCS — SIGNIFICANT CHANGE UP (ref 0–0)
NT-PROBNP SERPL-MCNC: 6698 PG/ML
NT-PROBNP SERPL-SCNC: 3334 PG/ML — HIGH (ref 0–300)
NT-PROBNP SERPL-SCNC: 8749 PG/ML — HIGH (ref 0–300)
OSMOLALITY UR: 375 MOS/KG — SIGNIFICANT CHANGE UP (ref 50–1200)
PCO2 BLDV: 47 MMHG — SIGNIFICANT CHANGE UP (ref 41–51)
PCO2 BLDV: 47 MMHG — SIGNIFICANT CHANGE UP (ref 42–55)
PH BLDV: 7.38 — SIGNIFICANT CHANGE UP (ref 7.26–7.43)
PH BLDV: 7.4 — SIGNIFICANT CHANGE UP (ref 7.32–7.43)
PH UR: 6 — SIGNIFICANT CHANGE UP (ref 5–8)
PHOSPHATE SERPL-MCNC: 4.2 MG/DL — SIGNIFICANT CHANGE UP (ref 2.1–4.9)
PHOSPHATE SERPL-MCNC: 4.8 MG/DL — SIGNIFICANT CHANGE UP (ref 2.1–4.9)
PHOSPHATE SERPL-MCNC: 6.1 MG/DL — HIGH (ref 2.1–4.9)
PHOSPHATE SERPL-MCNC: 7.7 MG/DL — HIGH (ref 2.1–4.9)
PLATELET # BLD AUTO: 192 K/UL — SIGNIFICANT CHANGE UP (ref 130–400)
PLATELET # BLD AUTO: 194 K/UL — SIGNIFICANT CHANGE UP (ref 130–400)
PLATELET # BLD AUTO: 198 K/UL — SIGNIFICANT CHANGE UP (ref 130–400)
PLATELET # BLD AUTO: 200 K/UL — SIGNIFICANT CHANGE UP (ref 130–400)
PLATELET # BLD AUTO: 201 K/UL — SIGNIFICANT CHANGE UP (ref 130–400)
PLATELET # BLD AUTO: 205 K/UL — SIGNIFICANT CHANGE UP (ref 130–400)
PLATELET # BLD AUTO: 205 K/UL — SIGNIFICANT CHANGE UP (ref 130–400)
PLATELET # BLD AUTO: 206 K/UL — SIGNIFICANT CHANGE UP (ref 130–400)
PLATELET # BLD AUTO: 210 K/UL — SIGNIFICANT CHANGE UP (ref 130–400)
PLATELET # BLD AUTO: 212 K/UL — SIGNIFICANT CHANGE UP (ref 130–400)
PLATELET # BLD AUTO: 213 K/UL — SIGNIFICANT CHANGE UP (ref 130–400)
PLATELET # BLD AUTO: 213 K/UL — SIGNIFICANT CHANGE UP (ref 130–400)
PLATELET # BLD AUTO: 216 K/UL — SIGNIFICANT CHANGE UP (ref 130–400)
PLATELET # BLD AUTO: 220 K/UL — SIGNIFICANT CHANGE UP (ref 130–400)
PLATELET # BLD AUTO: 221 K/UL — SIGNIFICANT CHANGE UP (ref 130–400)
PLATELET # BLD AUTO: 231 K/UL — SIGNIFICANT CHANGE UP (ref 130–400)
PLATELET # BLD AUTO: 232 K/UL — SIGNIFICANT CHANGE UP (ref 130–400)
PLATELET # BLD AUTO: 233 K/UL — SIGNIFICANT CHANGE UP (ref 130–400)
PLATELET # BLD AUTO: 234 K/UL — SIGNIFICANT CHANGE UP (ref 130–400)
PLATELET # BLD AUTO: 246 K/UL — SIGNIFICANT CHANGE UP (ref 130–400)
PLATELET # BLD AUTO: 252 K/UL — SIGNIFICANT CHANGE UP (ref 130–400)
PLATELET # BLD AUTO: 252 K/UL — SIGNIFICANT CHANGE UP (ref 130–400)
PLATELET # BLD AUTO: 258 K/UL — SIGNIFICANT CHANGE UP (ref 130–400)
PLATELET # BLD AUTO: 263 K/UL — SIGNIFICANT CHANGE UP (ref 130–400)
PLATELET # BLD AUTO: 265 K/UL — SIGNIFICANT CHANGE UP (ref 130–400)
PLATELET # BLD AUTO: 268 K/UL — SIGNIFICANT CHANGE UP (ref 130–400)
PLATELET # BLD AUTO: 273 K/UL — SIGNIFICANT CHANGE UP (ref 130–400)
PLATELET # BLD AUTO: 273 K/UL — SIGNIFICANT CHANGE UP (ref 130–400)
PLATELET # BLD AUTO: 278 K/UL — SIGNIFICANT CHANGE UP (ref 130–400)
PLATELET # BLD AUTO: 280 K/UL — SIGNIFICANT CHANGE UP (ref 130–400)
PLATELET # BLD AUTO: 284 K/UL — SIGNIFICANT CHANGE UP (ref 130–400)
PLATELET # BLD AUTO: 289 K/UL — SIGNIFICANT CHANGE UP (ref 130–400)
PLATELET # BLD AUTO: 294 K/UL — SIGNIFICANT CHANGE UP (ref 130–400)
PLATELET # BLD AUTO: 298 K/UL — SIGNIFICANT CHANGE UP (ref 130–400)
PO2 BLDV: 31 MMHG — SIGNIFICANT CHANGE UP (ref 20–40)
PO2 BLDV: 34 MMHG — SIGNIFICANT CHANGE UP
POTASSIUM BLDV-SCNC: 2.4 MMOL/L — CRITICAL LOW (ref 3.5–5.1)
POTASSIUM BLDV-SCNC: 3.5 MMOL/L — SIGNIFICANT CHANGE UP (ref 3.3–5.6)
POTASSIUM SERPL-MCNC: 3.2 MMOL/L — LOW (ref 3.5–5)
POTASSIUM SERPL-MCNC: 3.2 MMOL/L — LOW (ref 3.5–5)
POTASSIUM SERPL-MCNC: 3.3 MMOL/L — LOW (ref 3.5–5)
POTASSIUM SERPL-MCNC: 3.3 MMOL/L — LOW (ref 3.5–5)
POTASSIUM SERPL-MCNC: 3.4 MMOL/L — LOW (ref 3.5–5)
POTASSIUM SERPL-MCNC: 3.4 MMOL/L — LOW (ref 3.5–5)
POTASSIUM SERPL-MCNC: 3.5 MMOL/L — SIGNIFICANT CHANGE UP (ref 3.5–5)
POTASSIUM SERPL-MCNC: 3.6 MMOL/L — SIGNIFICANT CHANGE UP (ref 3.5–5)
POTASSIUM SERPL-MCNC: 3.7 MMOL/L — SIGNIFICANT CHANGE UP (ref 3.5–5)
POTASSIUM SERPL-MCNC: 3.8 MMOL/L — SIGNIFICANT CHANGE UP (ref 3.5–5)
POTASSIUM SERPL-MCNC: 3.8 MMOL/L — SIGNIFICANT CHANGE UP (ref 3.5–5)
POTASSIUM SERPL-MCNC: 3.9 MMOL/L — SIGNIFICANT CHANGE UP (ref 3.5–5)
POTASSIUM SERPL-MCNC: 4 MMOL/L — SIGNIFICANT CHANGE UP (ref 3.5–5)
POTASSIUM SERPL-MCNC: 4 MMOL/L — SIGNIFICANT CHANGE UP (ref 3.5–5)
POTASSIUM SERPL-MCNC: 4.1 MMOL/L — SIGNIFICANT CHANGE UP (ref 3.5–5)
POTASSIUM SERPL-MCNC: 4.2 MMOL/L — SIGNIFICANT CHANGE UP (ref 3.5–5)
POTASSIUM SERPL-MCNC: 4.3 MMOL/L — SIGNIFICANT CHANGE UP (ref 3.5–5)
POTASSIUM SERPL-MCNC: 4.4 MMOL/L — SIGNIFICANT CHANGE UP (ref 3.5–5)
POTASSIUM SERPL-MCNC: 4.4 MMOL/L — SIGNIFICANT CHANGE UP (ref 3.5–5)
POTASSIUM SERPL-MCNC: 4.5 MMOL/L — SIGNIFICANT CHANGE UP (ref 3.5–5)
POTASSIUM SERPL-MCNC: 4.5 MMOL/L — SIGNIFICANT CHANGE UP (ref 3.5–5)
POTASSIUM SERPL-SCNC: 3.2 MMOL/L — LOW (ref 3.5–5)
POTASSIUM SERPL-SCNC: 3.2 MMOL/L — LOW (ref 3.5–5)
POTASSIUM SERPL-SCNC: 3.3 MMOL/L — LOW (ref 3.5–5)
POTASSIUM SERPL-SCNC: 3.3 MMOL/L — LOW (ref 3.5–5)
POTASSIUM SERPL-SCNC: 3.4 MMOL/L — LOW (ref 3.5–5)
POTASSIUM SERPL-SCNC: 3.4 MMOL/L — LOW (ref 3.5–5)
POTASSIUM SERPL-SCNC: 3.5 MMOL/L — SIGNIFICANT CHANGE UP (ref 3.5–5)
POTASSIUM SERPL-SCNC: 3.6 MMOL/L — SIGNIFICANT CHANGE UP (ref 3.5–5)
POTASSIUM SERPL-SCNC: 3.7 MMOL/L
POTASSIUM SERPL-SCNC: 3.7 MMOL/L
POTASSIUM SERPL-SCNC: 3.7 MMOL/L — SIGNIFICANT CHANGE UP (ref 3.5–5)
POTASSIUM SERPL-SCNC: 3.8 MMOL/L
POTASSIUM SERPL-SCNC: 3.8 MMOL/L — SIGNIFICANT CHANGE UP (ref 3.5–5)
POTASSIUM SERPL-SCNC: 3.8 MMOL/L — SIGNIFICANT CHANGE UP (ref 3.5–5)
POTASSIUM SERPL-SCNC: 3.9 MMOL/L — SIGNIFICANT CHANGE UP (ref 3.5–5)
POTASSIUM SERPL-SCNC: 4 MMOL/L — SIGNIFICANT CHANGE UP (ref 3.5–5)
POTASSIUM SERPL-SCNC: 4 MMOL/L — SIGNIFICANT CHANGE UP (ref 3.5–5)
POTASSIUM SERPL-SCNC: 4.1 MMOL/L — SIGNIFICANT CHANGE UP (ref 3.5–5)
POTASSIUM SERPL-SCNC: 4.2 MMOL/L — SIGNIFICANT CHANGE UP (ref 3.5–5)
POTASSIUM SERPL-SCNC: 4.3 MMOL/L — SIGNIFICANT CHANGE UP (ref 3.5–5)
POTASSIUM SERPL-SCNC: 4.4 MMOL/L — SIGNIFICANT CHANGE UP (ref 3.5–5)
POTASSIUM SERPL-SCNC: 4.4 MMOL/L — SIGNIFICANT CHANGE UP (ref 3.5–5)
POTASSIUM SERPL-SCNC: 4.5 MMOL/L — SIGNIFICANT CHANGE UP (ref 3.5–5)
POTASSIUM SERPL-SCNC: 4.5 MMOL/L — SIGNIFICANT CHANGE UP (ref 3.5–5)
PROCALCITONIN SERPL-MCNC: 0.25 NG/ML — HIGH (ref 0.02–0.1)
PROT SERPL-MCNC: 6 G/DL — SIGNIFICANT CHANGE UP (ref 6–8)
PROT SERPL-MCNC: 6.1 G/DL — SIGNIFICANT CHANGE UP (ref 6–8)
PROT SERPL-MCNC: 6.1 G/DL — SIGNIFICANT CHANGE UP (ref 6–8)
PROT SERPL-MCNC: 6.2 G/DL — SIGNIFICANT CHANGE UP (ref 6–8)
PROT SERPL-MCNC: 6.2 G/DL — SIGNIFICANT CHANGE UP (ref 6–8)
PROT SERPL-MCNC: 6.3 G/DL — SIGNIFICANT CHANGE UP (ref 6–8)
PROT SERPL-MCNC: 6.4 G/DL — SIGNIFICANT CHANGE UP (ref 6–8)
PROT SERPL-MCNC: 6.5 G/DL — SIGNIFICANT CHANGE UP (ref 6–8)
PROT SERPL-MCNC: 6.6 G/DL — SIGNIFICANT CHANGE UP (ref 6–8)
PROT SERPL-MCNC: 6.6 G/DL — SIGNIFICANT CHANGE UP (ref 6–8)
PROT SERPL-MCNC: 6.7 G/DL — SIGNIFICANT CHANGE UP (ref 6–8)
PROT SERPL-MCNC: 6.7 G/DL — SIGNIFICANT CHANGE UP (ref 6–8)
PROT SERPL-MCNC: 6.8 G/DL — SIGNIFICANT CHANGE UP (ref 6–8)
PROT SERPL-MCNC: 6.9 G/DL — SIGNIFICANT CHANGE UP (ref 6–8)
PROT SERPL-MCNC: 7 G/DL — SIGNIFICANT CHANGE UP (ref 6–8)
PROT SERPL-MCNC: 7 G/DL — SIGNIFICANT CHANGE UP (ref 6–8)
PROT SERPL-MCNC: 7.1 G/DL — SIGNIFICANT CHANGE UP (ref 6–8)
PROT SERPL-MCNC: 7.2 G/DL — SIGNIFICANT CHANGE UP (ref 6–8)
PROT SERPL-MCNC: 7.4 G/DL — SIGNIFICANT CHANGE UP (ref 6–8)
PROT SERPL-MCNC: 7.4 G/DL — SIGNIFICANT CHANGE UP (ref 6–8)
PROT SERPL-MCNC: 7.5 G/DL — SIGNIFICANT CHANGE UP (ref 6–8)
PROT SERPL-MCNC: 7.8 G/DL — SIGNIFICANT CHANGE UP (ref 6–8)
PROT SERPL-MCNC: 7.9 G/DL — SIGNIFICANT CHANGE UP (ref 6–8)
PROT UR-MCNC: NEGATIVE — SIGNIFICANT CHANGE UP
PROTHROM AB SERPL-ACNC: 12.8 SEC — SIGNIFICANT CHANGE UP (ref 9.95–12.87)
RBC # BLD: 4.36 M/UL — LOW (ref 4.7–6.1)
RBC # BLD: 4.38 M/UL — LOW (ref 4.7–6.1)
RBC # BLD: 4.5 M/UL — LOW (ref 4.7–6.1)
RBC # BLD: 4.5 M/UL — LOW (ref 4.7–6.1)
RBC # BLD: 4.56 M/UL — LOW (ref 4.7–6.1)
RBC # BLD: 4.56 M/UL — LOW (ref 4.7–6.1)
RBC # BLD: 4.61 M/UL — LOW (ref 4.7–6.1)
RBC # BLD: 4.61 M/UL — LOW (ref 4.7–6.1)
RBC # BLD: 4.68 M/UL — LOW (ref 4.7–6.1)
RBC # BLD: 4.72 M/UL — SIGNIFICANT CHANGE UP (ref 4.7–6.1)
RBC # BLD: 4.75 M/UL — SIGNIFICANT CHANGE UP (ref 4.7–6.1)
RBC # BLD: 4.78 M/UL — SIGNIFICANT CHANGE UP (ref 4.7–6.1)
RBC # BLD: 4.8 M/UL — SIGNIFICANT CHANGE UP (ref 4.7–6.1)
RBC # BLD: 4.83 M/UL — SIGNIFICANT CHANGE UP (ref 4.7–6.1)
RBC # BLD: 4.85 M/UL — SIGNIFICANT CHANGE UP (ref 4.7–6.1)
RBC # BLD: 4.87 M/UL — SIGNIFICANT CHANGE UP (ref 4.7–6.1)
RBC # BLD: 4.9 M/UL — SIGNIFICANT CHANGE UP (ref 4.7–6.1)
RBC # BLD: 4.93 M/UL — SIGNIFICANT CHANGE UP (ref 4.7–6.1)
RBC # BLD: 4.98 M/UL — SIGNIFICANT CHANGE UP (ref 4.7–6.1)
RBC # BLD: 5.02 M/UL — SIGNIFICANT CHANGE UP (ref 4.7–6.1)
RBC # BLD: 5.13 M/UL — SIGNIFICANT CHANGE UP (ref 4.7–6.1)
RBC # BLD: 5.14 M/UL — SIGNIFICANT CHANGE UP (ref 4.7–6.1)
RBC # BLD: 5.21 M/UL — SIGNIFICANT CHANGE UP (ref 4.7–6.1)
RBC # BLD: 5.28 M/UL — SIGNIFICANT CHANGE UP (ref 4.7–6.1)
RBC # BLD: 5.31 M/UL — SIGNIFICANT CHANGE UP (ref 4.7–6.1)
RBC # BLD: 5.44 M/UL — SIGNIFICANT CHANGE UP (ref 4.7–6.1)
RBC # BLD: 5.57 M/UL — SIGNIFICANT CHANGE UP (ref 4.7–6.1)
RBC # BLD: 5.69 M/UL — SIGNIFICANT CHANGE UP (ref 4.7–6.1)
RBC # BLD: 5.71 M/UL — SIGNIFICANT CHANGE UP (ref 4.7–6.1)
RBC # BLD: 5.75 M/UL — SIGNIFICANT CHANGE UP (ref 4.7–6.1)
RBC # BLD: 5.76 M/UL — SIGNIFICANT CHANGE UP (ref 4.7–6.1)
RBC # BLD: 5.8 M/UL — SIGNIFICANT CHANGE UP (ref 4.7–6.1)
RBC # BLD: 5.95 M/UL — SIGNIFICANT CHANGE UP (ref 4.7–6.1)
RBC # BLD: 5.96 M/UL — SIGNIFICANT CHANGE UP (ref 4.7–6.1)
RBC # FLD: 16.6 % — HIGH (ref 11.5–14.5)
RBC # FLD: 16.7 % — HIGH (ref 11.5–14.5)
RBC # FLD: 16.8 % — HIGH (ref 11.5–14.5)
RBC # FLD: 16.9 % — HIGH (ref 11.5–14.5)
RBC # FLD: 17 % — HIGH (ref 11.5–14.5)
RBC # FLD: 17 % — HIGH (ref 11.5–14.5)
RBC # FLD: 17.2 % — HIGH (ref 11.5–14.5)
RBC # FLD: 17.3 % — HIGH (ref 11.5–14.5)
RBC # FLD: 17.4 % — HIGH (ref 11.5–14.5)
RBC # FLD: 17.4 % — HIGH (ref 11.5–14.5)
RBC # FLD: 17.5 % — HIGH (ref 11.5–14.5)
RBC # FLD: 17.5 % — HIGH (ref 11.5–14.5)
RBC # FLD: 17.6 % — HIGH (ref 11.5–14.5)
RBC # FLD: 17.7 % — HIGH (ref 11.5–14.5)
RBC # FLD: 17.8 % — HIGH (ref 11.5–14.5)
RBC # FLD: 17.9 % — HIGH (ref 11.5–14.5)
RBC # FLD: 17.9 % — HIGH (ref 11.5–14.5)
RBC # FLD: 18 % — HIGH (ref 11.5–14.5)
RBC # FLD: 18.1 % — HIGH (ref 11.5–14.5)
RBC # FLD: 18.1 % — HIGH (ref 11.5–14.5)
RBC # FLD: 18.2 % — HIGH (ref 11.5–14.5)
RBC # FLD: 18.2 % — HIGH (ref 11.5–14.5)
RBC # FLD: 18.4 % — HIGH (ref 11.5–14.5)
RBC # FLD: 18.5 % — HIGH (ref 11.5–14.5)
RBC # FLD: 18.5 % — HIGH (ref 11.5–14.5)
RBC # FLD: 18.6 % — HIGH (ref 11.5–14.5)
SAO2 % BLDV: 58 % — SIGNIFICANT CHANGE UP
SAO2 % BLDV: 58.8 % — SIGNIFICANT CHANGE UP
SARS-COV-2 IGG+IGM SERPL QL IA: 0.07 INDEX — SIGNIFICANT CHANGE UP
SARS-COV-2 IGG+IGM SERPL QL IA: 193 U/ML — HIGH
SARS-COV-2 IGG+IGM SERPL QL IA: 75.3 U/ML — HIGH
SARS-COV-2 IGG+IGM SERPL QL IA: NEGATIVE — SIGNIFICANT CHANGE UP
SARS-COV-2 IGG+IGM SERPL QL IA: POSITIVE
SARS-COV-2 IGG+IGM SERPL QL IA: POSITIVE
SARS-COV-2 RNA SPEC QL NAA+PROBE: SIGNIFICANT CHANGE UP
SODIUM SERPL-SCNC: 137 MMOL/L — SIGNIFICANT CHANGE UP (ref 135–146)
SODIUM SERPL-SCNC: 137 MMOL/L — SIGNIFICANT CHANGE UP (ref 135–146)
SODIUM SERPL-SCNC: 139 MMOL/L — SIGNIFICANT CHANGE UP (ref 135–146)
SODIUM SERPL-SCNC: 140 MMOL/L
SODIUM SERPL-SCNC: 140 MMOL/L — SIGNIFICANT CHANGE UP (ref 135–146)
SODIUM SERPL-SCNC: 141 MMOL/L — SIGNIFICANT CHANGE UP (ref 135–146)
SODIUM SERPL-SCNC: 142 MMOL/L
SODIUM SERPL-SCNC: 142 MMOL/L — SIGNIFICANT CHANGE UP (ref 135–146)
SODIUM SERPL-SCNC: 143 MMOL/L — SIGNIFICANT CHANGE UP (ref 135–146)
SODIUM SERPL-SCNC: 144 MMOL/L
SODIUM SERPL-SCNC: 144 MMOL/L — SIGNIFICANT CHANGE UP (ref 135–146)
SODIUM SERPL-SCNC: 145 MMOL/L — SIGNIFICANT CHANGE UP (ref 135–146)
SODIUM SERPL-SCNC: 146 MMOL/L — SIGNIFICANT CHANGE UP (ref 135–146)
SODIUM SERPL-SCNC: 147 MMOL/L — HIGH (ref 135–146)
SODIUM SERPL-SCNC: 147 MMOL/L — HIGH (ref 135–146)
SODIUM SERPL-SCNC: 149 MMOL/L — HIGH (ref 135–146)
SODIUM UR-SCNC: 27 MMOL/L — SIGNIFICANT CHANGE UP
SP GR SPEC: 1.01 — SIGNIFICANT CHANGE UP (ref 1.01–1.03)
SPECIMEN SOURCE: SIGNIFICANT CHANGE UP
SPECIMEN SOURCE: SIGNIFICANT CHANGE UP
T3 SERPL-MCNC: 85 NG/DL — SIGNIFICANT CHANGE UP (ref 80–200)
T4 FREE SERPL-MCNC: 1.9 NG/DL — HIGH (ref 0.9–1.8)
TIBC SERPL-MCNC: 214 UG/DL — LOW (ref 220–430)
TIBC SERPL-MCNC: 315 UG/DL
TRANSFERRIN SERPL-MCNC: 171 MG/DL — LOW (ref 200–360)
TRANSFERRIN SERPL-MCNC: 263 MG/DL
TROPONIN T SERPL-MCNC: 0.03 NG/ML — CRITICAL HIGH
TROPONIN T SERPL-MCNC: 0.04 NG/ML — CRITICAL HIGH
TROPONIN T SERPL-MCNC: 0.04 NG/ML — CRITICAL HIGH
TROPONIN T SERPL-MCNC: 0.05 NG/ML — CRITICAL HIGH
TROPONIN T SERPL-MCNC: 0.06 NG/ML — CRITICAL HIGH
TROPONIN T SERPL-MCNC: 0.06 NG/ML — CRITICAL HIGH
TROPONIN T SERPL-MCNC: 0.08 NG/ML — CRITICAL HIGH
TROPONIN T SERPL-MCNC: 0.08 NG/ML — CRITICAL HIGH
TSH SERPL-ACNC: 0.93 UIU/ML
TSH SERPL-MCNC: 0.2 UIU/ML — LOW (ref 0.27–4.2)
TSH SERPL-MCNC: 0.45 UIU/ML — SIGNIFICANT CHANGE UP (ref 0.27–4.2)
UIBC SERPL-MCNC: 166 UG/DL — SIGNIFICANT CHANGE UP (ref 110–370)
UIBC SERPL-MCNC: 258 UG/DL
UROBILINOGEN FLD QL: SIGNIFICANT CHANGE UP
WBC # BLD: 10.05 K/UL — SIGNIFICANT CHANGE UP (ref 4.8–10.8)
WBC # BLD: 10.09 K/UL — SIGNIFICANT CHANGE UP (ref 4.8–10.8)
WBC # BLD: 10.33 K/UL — SIGNIFICANT CHANGE UP (ref 4.8–10.8)
WBC # BLD: 10.43 K/UL — SIGNIFICANT CHANGE UP (ref 4.8–10.8)
WBC # BLD: 10.54 K/UL — SIGNIFICANT CHANGE UP (ref 4.8–10.8)
WBC # BLD: 10.69 K/UL — SIGNIFICANT CHANGE UP (ref 4.8–10.8)
WBC # BLD: 10.8 K/UL — SIGNIFICANT CHANGE UP (ref 4.8–10.8)
WBC # BLD: 10.92 K/UL — HIGH (ref 4.8–10.8)
WBC # BLD: 10.93 K/UL — HIGH (ref 4.8–10.8)
WBC # BLD: 11.07 K/UL — HIGH (ref 4.8–10.8)
WBC # BLD: 11.09 K/UL — HIGH (ref 4.8–10.8)
WBC # BLD: 11.47 K/UL — HIGH (ref 4.8–10.8)
WBC # BLD: 12.12 K/UL — HIGH (ref 4.8–10.8)
WBC # BLD: 12.93 K/UL — HIGH (ref 4.8–10.8)
WBC # BLD: 13.63 K/UL — HIGH (ref 4.8–10.8)
WBC # BLD: 7.97 K/UL — SIGNIFICANT CHANGE UP (ref 4.8–10.8)
WBC # BLD: 8.34 K/UL — SIGNIFICANT CHANGE UP (ref 4.8–10.8)
WBC # BLD: 8.46 K/UL — SIGNIFICANT CHANGE UP (ref 4.8–10.8)
WBC # BLD: 8.56 K/UL — SIGNIFICANT CHANGE UP (ref 4.8–10.8)
WBC # BLD: 8.59 K/UL — SIGNIFICANT CHANGE UP (ref 4.8–10.8)
WBC # BLD: 8.7 K/UL — SIGNIFICANT CHANGE UP (ref 4.8–10.8)
WBC # BLD: 8.74 K/UL — SIGNIFICANT CHANGE UP (ref 4.8–10.8)
WBC # BLD: 8.82 K/UL — SIGNIFICANT CHANGE UP (ref 4.8–10.8)
WBC # BLD: 8.97 K/UL — SIGNIFICANT CHANGE UP (ref 4.8–10.8)
WBC # BLD: 9.06 K/UL — SIGNIFICANT CHANGE UP (ref 4.8–10.8)
WBC # BLD: 9.12 K/UL — SIGNIFICANT CHANGE UP (ref 4.8–10.8)
WBC # BLD: 9.18 K/UL — SIGNIFICANT CHANGE UP (ref 4.8–10.8)
WBC # BLD: 9.47 K/UL — SIGNIFICANT CHANGE UP (ref 4.8–10.8)
WBC # BLD: 9.56 K/UL — SIGNIFICANT CHANGE UP (ref 4.8–10.8)
WBC # BLD: 9.57 K/UL — SIGNIFICANT CHANGE UP (ref 4.8–10.8)
WBC # BLD: 9.62 K/UL — SIGNIFICANT CHANGE UP (ref 4.8–10.8)
WBC # BLD: 9.86 K/UL — SIGNIFICANT CHANGE UP (ref 4.8–10.8)
WBC # BLD: 9.87 K/UL — SIGNIFICANT CHANGE UP (ref 4.8–10.8)
WBC # BLD: 9.91 K/UL — SIGNIFICANT CHANGE UP (ref 4.8–10.8)
WBC # FLD AUTO: 10.05 K/UL — SIGNIFICANT CHANGE UP (ref 4.8–10.8)
WBC # FLD AUTO: 10.09 K/UL — SIGNIFICANT CHANGE UP (ref 4.8–10.8)
WBC # FLD AUTO: 10.33 K/UL — SIGNIFICANT CHANGE UP (ref 4.8–10.8)
WBC # FLD AUTO: 10.43 K/UL — SIGNIFICANT CHANGE UP (ref 4.8–10.8)
WBC # FLD AUTO: 10.54 K/UL — SIGNIFICANT CHANGE UP (ref 4.8–10.8)
WBC # FLD AUTO: 10.69 K/UL — SIGNIFICANT CHANGE UP (ref 4.8–10.8)
WBC # FLD AUTO: 10.8 K/UL — SIGNIFICANT CHANGE UP (ref 4.8–10.8)
WBC # FLD AUTO: 10.92 K/UL — HIGH (ref 4.8–10.8)
WBC # FLD AUTO: 10.93 K/UL — HIGH (ref 4.8–10.8)
WBC # FLD AUTO: 11.07 K/UL — HIGH (ref 4.8–10.8)
WBC # FLD AUTO: 11.09 K/UL — HIGH (ref 4.8–10.8)
WBC # FLD AUTO: 11.47 K/UL — HIGH (ref 4.8–10.8)
WBC # FLD AUTO: 12.12 K/UL — HIGH (ref 4.8–10.8)
WBC # FLD AUTO: 12.93 K/UL — HIGH (ref 4.8–10.8)
WBC # FLD AUTO: 13.63 K/UL — HIGH (ref 4.8–10.8)
WBC # FLD AUTO: 7.97 K/UL — SIGNIFICANT CHANGE UP (ref 4.8–10.8)
WBC # FLD AUTO: 8.34 K/UL — SIGNIFICANT CHANGE UP (ref 4.8–10.8)
WBC # FLD AUTO: 8.46 K/UL — SIGNIFICANT CHANGE UP (ref 4.8–10.8)
WBC # FLD AUTO: 8.56 K/UL — SIGNIFICANT CHANGE UP (ref 4.8–10.8)
WBC # FLD AUTO: 8.59 K/UL — SIGNIFICANT CHANGE UP (ref 4.8–10.8)
WBC # FLD AUTO: 8.7 K/UL — SIGNIFICANT CHANGE UP (ref 4.8–10.8)
WBC # FLD AUTO: 8.74 K/UL — SIGNIFICANT CHANGE UP (ref 4.8–10.8)
WBC # FLD AUTO: 8.82 K/UL — SIGNIFICANT CHANGE UP (ref 4.8–10.8)
WBC # FLD AUTO: 8.97 K/UL — SIGNIFICANT CHANGE UP (ref 4.8–10.8)
WBC # FLD AUTO: 9.06 K/UL — SIGNIFICANT CHANGE UP (ref 4.8–10.8)
WBC # FLD AUTO: 9.12 K/UL — SIGNIFICANT CHANGE UP (ref 4.8–10.8)
WBC # FLD AUTO: 9.18 K/UL — SIGNIFICANT CHANGE UP (ref 4.8–10.8)
WBC # FLD AUTO: 9.47 K/UL — SIGNIFICANT CHANGE UP (ref 4.8–10.8)
WBC # FLD AUTO: 9.56 K/UL — SIGNIFICANT CHANGE UP (ref 4.8–10.8)
WBC # FLD AUTO: 9.57 K/UL — SIGNIFICANT CHANGE UP (ref 4.8–10.8)
WBC # FLD AUTO: 9.62 K/UL — SIGNIFICANT CHANGE UP (ref 4.8–10.8)
WBC # FLD AUTO: 9.86 K/UL — SIGNIFICANT CHANGE UP (ref 4.8–10.8)
WBC # FLD AUTO: 9.87 K/UL — SIGNIFICANT CHANGE UP (ref 4.8–10.8)
WBC # FLD AUTO: 9.91 K/UL — SIGNIFICANT CHANGE UP (ref 4.8–10.8)

## 2021-01-01 PROCEDURE — 99233 SBSQ HOSP IP/OBS HIGH 50: CPT

## 2021-01-01 PROCEDURE — 99223 1ST HOSP IP/OBS HIGH 75: CPT

## 2021-01-01 PROCEDURE — 99072 ADDL SUPL MATRL&STAF TM PHE: CPT

## 2021-01-01 PROCEDURE — 71045 X-RAY EXAM CHEST 1 VIEW: CPT | Mod: 26

## 2021-01-01 PROCEDURE — 93010 ELECTROCARDIOGRAM REPORT: CPT

## 2021-01-01 PROCEDURE — 99232 SBSQ HOSP IP/OBS MODERATE 35: CPT

## 2021-01-01 PROCEDURE — 93016 CV STRESS TEST SUPVJ ONLY: CPT

## 2021-01-01 PROCEDURE — 93290 INTERROG DEV EVAL ICPMS IP: CPT | Mod: 26

## 2021-01-01 PROCEDURE — 93000 ELECTROCARDIOGRAM COMPLETE: CPT | Mod: 59

## 2021-01-01 PROCEDURE — 99232 SBSQ HOSP IP/OBS MODERATE 35: CPT | Mod: 24

## 2021-01-01 PROCEDURE — 99233 SBSQ HOSP IP/OBS HIGH 50: CPT | Mod: 24

## 2021-01-01 PROCEDURE — 93000 ELECTROCARDIOGRAM COMPLETE: CPT

## 2021-01-01 PROCEDURE — 76770 US EXAM ABDO BACK WALL COMP: CPT | Mod: 26

## 2021-01-01 PROCEDURE — 71045 X-RAY EXAM CHEST 1 VIEW: CPT | Mod: 26,77

## 2021-01-01 PROCEDURE — 93010 ELECTROCARDIOGRAM REPORT: CPT | Mod: 76

## 2021-01-01 PROCEDURE — 93018 CV STRESS TEST I&R ONLY: CPT

## 2021-01-01 PROCEDURE — 92004 COMPRE OPH EXAM NEW PT 1/>: CPT

## 2021-01-01 PROCEDURE — 99285 EMERGENCY DEPT VISIT HI MDM: CPT

## 2021-01-01 PROCEDURE — 99239 HOSP IP/OBS DSCHRG MGMT >30: CPT

## 2021-01-01 PROCEDURE — 93925 LOWER EXTREMITY STUDY: CPT | Mod: 26

## 2021-01-01 PROCEDURE — 99233 SBSQ HOSP IP/OBS HIGH 50: CPT | Mod: 57

## 2021-01-01 PROCEDURE — 99214 OFFICE O/P EST MOD 30 MIN: CPT

## 2021-01-01 PROCEDURE — 51703 INSERT BLADDER CATH COMPLEX: CPT

## 2021-01-01 PROCEDURE — 99221 1ST HOSP IP/OBS SF/LOW 40: CPT | Mod: 25

## 2021-01-01 PROCEDURE — 99215 OFFICE O/P EST HI 40 MIN: CPT

## 2021-01-01 PROCEDURE — 99497 ADVNCD CARE PLAN 30 MIN: CPT | Mod: 25

## 2021-01-01 PROCEDURE — 93283 PRGRMG EVAL IMPLANTABLE DFB: CPT

## 2021-01-01 PROCEDURE — 93306 TTE W/DOPPLER COMPLETE: CPT | Mod: 26

## 2021-01-01 PROCEDURE — 70450 CT HEAD/BRAIN W/O DYE: CPT | Mod: 26,MA

## 2021-01-01 PROCEDURE — 99024 POSTOP FOLLOW-UP VISIT: CPT

## 2021-01-01 PROCEDURE — 93283 PRGRMG EVAL IMPLANTABLE DFB: CPT | Mod: 26

## 2021-01-01 PROCEDURE — 78645 CSF SHUNT EVALUATION: CPT | Mod: 26

## 2021-01-01 PROCEDURE — 33249 INSJ/RPLCMT DEFIB W/LEAD(S): CPT

## 2021-01-01 PROCEDURE — 71046 X-RAY EXAM CHEST 2 VIEWS: CPT | Mod: 26

## 2021-01-01 PROCEDURE — ZZZZZ: CPT

## 2021-01-01 PROCEDURE — 78452 HT MUSCLE IMAGE SPECT MULT: CPT | Mod: 26

## 2021-01-01 PROCEDURE — 75809 NONVASCULAR SHUNT X-RAY: CPT | Mod: 26

## 2021-01-01 RX ORDER — CHLORHEXIDINE GLUCONATE 213 G/1000ML
1 SOLUTION TOPICAL DAILY
Refills: 0 | Status: DISCONTINUED | OUTPATIENT
Start: 2021-01-01 | End: 2021-01-01

## 2021-01-01 RX ORDER — ROSUVASTATIN CALCIUM 5 MG/1
5 TABLET, FILM COATED ORAL DAILY
Refills: 0 | Status: ACTIVE | COMMUNITY

## 2021-01-01 RX ORDER — METOPROLOL TARTRATE 50 MG
1 TABLET ORAL
Qty: 90 | Refills: 0
Start: 2021-01-01 | End: 2021-01-01

## 2021-01-01 RX ORDER — POTASSIUM CHLORIDE 20 MEQ
20 PACKET (EA) ORAL
Refills: 0 | Status: COMPLETED | OUTPATIENT
Start: 2021-01-01 | End: 2021-01-01

## 2021-01-01 RX ORDER — METOPROLOL TARTRATE 50 MG
1 TABLET ORAL
Qty: 60 | Refills: 3
Start: 2021-01-01 | End: 2022-03-18

## 2021-01-01 RX ORDER — ASPIRIN/CALCIUM CARB/MAGNESIUM 324 MG
1 TABLET ORAL
Qty: 30 | Refills: 3
Start: 2021-01-01 | End: 2022-03-18

## 2021-01-01 RX ORDER — PIOGLITAZONE HYDROCHLORIDE 15 MG/1
1 TABLET ORAL
Qty: 0 | Refills: 0 | DISCHARGE

## 2021-01-01 RX ORDER — DEXTROSE 50 % IN WATER 50 %
12.5 SYRINGE (ML) INTRAVENOUS ONCE
Refills: 0 | Status: DISCONTINUED | OUTPATIENT
Start: 2021-01-01 | End: 2021-01-01

## 2021-01-01 RX ORDER — METOLAZONE 5 MG/1
5 TABLET ORAL
Qty: 10 | Refills: 0 | Status: DISCONTINUED | COMMUNITY
Start: 2021-01-01 | End: 2021-01-01

## 2021-01-01 RX ORDER — ACETAMINOPHEN 500 MG
650 TABLET ORAL ONCE
Refills: 0 | Status: COMPLETED | OUTPATIENT
Start: 2021-01-01 | End: 2021-01-01

## 2021-01-01 RX ORDER — GLUCAGON INJECTION, SOLUTION 0.5 MG/.1ML
1 INJECTION, SOLUTION SUBCUTANEOUS ONCE
Refills: 0 | Status: DISCONTINUED | OUTPATIENT
Start: 2021-01-01 | End: 2021-01-01

## 2021-01-01 RX ORDER — COLLAGENASE CLOSTRIDIUM HIST. 250 UNIT/G
1 OINTMENT (GRAM) TOPICAL DAILY
Refills: 0 | Status: DISCONTINUED | OUTPATIENT
Start: 2021-01-01 | End: 2021-01-01

## 2021-01-01 RX ORDER — SODIUM CHLORIDE 9 MG/ML
1000 INJECTION, SOLUTION INTRAVENOUS
Refills: 0 | Status: DISCONTINUED | OUTPATIENT
Start: 2021-01-01 | End: 2021-01-01

## 2021-01-01 RX ORDER — CALCITRIOL 0.5 UG/1
0.25 CAPSULE ORAL DAILY
Refills: 0 | Status: DISCONTINUED | OUTPATIENT
Start: 2021-01-01 | End: 2021-01-01

## 2021-01-01 RX ORDER — POTASSIUM CHLORIDE 20 MEQ
20 PACKET (EA) ORAL ONCE
Refills: 0 | Status: COMPLETED | OUTPATIENT
Start: 2021-01-01 | End: 2021-01-01

## 2021-01-01 RX ORDER — SODIUM CHLORIDE 5 G/100ML
150 INJECTION, SOLUTION INTRAVENOUS
Refills: 0 | Status: DISCONTINUED | OUTPATIENT
Start: 2021-01-01 | End: 2021-01-01

## 2021-01-01 RX ORDER — SPIRONOLACTONE 25 MG/1
25 TABLET ORAL
Qty: 60 | Refills: 2 | Status: DISCONTINUED | COMMUNITY
Start: 2021-01-01 | End: 2021-01-01

## 2021-01-01 RX ORDER — CALCIUM ACETATE 667 MG
1 TABLET ORAL
Qty: 90 | Refills: 0
Start: 2021-01-01 | End: 2021-01-01

## 2021-01-01 RX ORDER — DEXTROSE 50 % IN WATER 50 %
25 SYRINGE (ML) INTRAVENOUS ONCE
Refills: 0 | Status: DISCONTINUED | OUTPATIENT
Start: 2021-01-01 | End: 2021-01-01

## 2021-01-01 RX ORDER — INSULIN GLARGINE 100 [IU]/ML
20 INJECTION, SOLUTION SUBCUTANEOUS AT BEDTIME
Refills: 0 | Status: DISCONTINUED | OUTPATIENT
Start: 2021-01-01 | End: 2021-01-01

## 2021-01-01 RX ORDER — ATORVASTATIN CALCIUM 80 MG/1
20 TABLET, FILM COATED ORAL AT BEDTIME
Refills: 0 | Status: DISCONTINUED | OUTPATIENT
Start: 2021-01-01 | End: 2021-01-01

## 2021-01-01 RX ORDER — GABAPENTIN 400 MG/1
300 CAPSULE ORAL AT BEDTIME
Refills: 0 | Status: DISCONTINUED | OUTPATIENT
Start: 2021-01-01 | End: 2021-01-01

## 2021-01-01 RX ORDER — VANCOMYCIN HCL 1 G
1000 VIAL (EA) INTRAVENOUS ONCE
Refills: 0 | Status: COMPLETED | OUTPATIENT
Start: 2021-01-01 | End: 2021-01-01

## 2021-01-01 RX ORDER — HEPARIN SODIUM 5000 [USP'U]/ML
5000 INJECTION INTRAVENOUS; SUBCUTANEOUS EVERY 8 HOURS
Refills: 0 | Status: DISCONTINUED | OUTPATIENT
Start: 2021-01-01 | End: 2021-01-01

## 2021-01-01 RX ORDER — POTASSIUM CHLORIDE 20 MEQ
40 PACKET (EA) ORAL ONCE
Refills: 0 | Status: COMPLETED | OUTPATIENT
Start: 2021-01-01 | End: 2021-01-01

## 2021-01-01 RX ORDER — FINASTERIDE 5 MG/1
5 TABLET, FILM COATED ORAL DAILY
Refills: 0 | Status: DISCONTINUED | OUTPATIENT
Start: 2021-01-01 | End: 2021-01-01

## 2021-01-01 RX ORDER — INSULIN GLARGINE 100 [IU]/ML
20 INJECTION, SOLUTION SUBCUTANEOUS
Qty: 0 | Refills: 0 | DISCHARGE

## 2021-01-01 RX ORDER — CEFAZOLIN SODIUM 1 G
1000 VIAL (EA) INJECTION EVERY 8 HOURS
Refills: 0 | Status: DISCONTINUED | OUTPATIENT
Start: 2021-01-01 | End: 2021-01-01

## 2021-01-01 RX ORDER — SPIRONOLACTONE 25 MG/1
25 TABLET, FILM COATED ORAL DAILY
Refills: 0 | Status: DISCONTINUED | OUTPATIENT
Start: 2021-01-01 | End: 2021-01-01

## 2021-01-01 RX ORDER — METOPROLOL TARTRATE 50 MG
25 TABLET ORAL ONCE
Refills: 0 | Status: COMPLETED | OUTPATIENT
Start: 2021-01-01 | End: 2021-01-01

## 2021-01-01 RX ORDER — SODIUM CHLORIDE 9 MG/ML
500 INJECTION, SOLUTION INTRAVENOUS
Refills: 0 | Status: DISCONTINUED | OUTPATIENT
Start: 2021-01-01 | End: 2021-01-01

## 2021-01-01 RX ORDER — DEXTROSE 50 % IN WATER 50 %
15 SYRINGE (ML) INTRAVENOUS ONCE
Refills: 0 | Status: DISCONTINUED | OUTPATIENT
Start: 2021-01-01 | End: 2021-01-01

## 2021-01-01 RX ORDER — SODIUM CHLORIDE 9 MG/ML
1000 INJECTION INTRAMUSCULAR; INTRAVENOUS; SUBCUTANEOUS
Refills: 0 | Status: DISCONTINUED | OUTPATIENT
Start: 2021-01-01 | End: 2021-01-01

## 2021-01-01 RX ORDER — METOPROLOL TARTRATE 50 MG
25 TABLET ORAL EVERY 8 HOURS
Refills: 0 | Status: DISCONTINUED | OUTPATIENT
Start: 2021-01-01 | End: 2021-01-01

## 2021-01-01 RX ORDER — METOPROLOL SUCCINATE 25 MG/1
25 TABLET, EXTENDED RELEASE ORAL DAILY
Refills: 0 | Status: ACTIVE | COMMUNITY

## 2021-01-01 RX ORDER — METOLAZONE 5 MG/1
5 TABLET ORAL
Qty: 60 | Refills: 1 | Status: COMPLETED | COMMUNITY
Start: 2021-01-01 | End: 2021-01-01

## 2021-01-01 RX ORDER — TORSEMIDE 20 MG/1
20 TABLET ORAL
Qty: 60 | Refills: 3 | Status: COMPLETED | COMMUNITY
Start: 2021-01-01 | End: 2021-01-01

## 2021-01-01 RX ORDER — CALCIUM ACETATE 667 MG
667 TABLET ORAL
Refills: 0 | Status: DISCONTINUED | OUTPATIENT
Start: 2021-01-01 | End: 2021-01-01

## 2021-01-01 RX ORDER — GUAIFENESIN/DEXTROMETHORPHAN 600MG-30MG
10 TABLET, EXTENDED RELEASE 12 HR ORAL EVERY 6 HOURS
Refills: 0 | Status: COMPLETED | OUTPATIENT
Start: 2021-01-01 | End: 2021-01-01

## 2021-01-01 RX ORDER — AMIODARONE HYDROCHLORIDE 200 MG/1
200 TABLET ORAL
Qty: 30 | Refills: 5 | Status: ACTIVE | COMMUNITY
Start: 2021-01-01 | End: 1900-01-01

## 2021-01-01 RX ORDER — INSULIN LISPRO 100/ML
VIAL (ML) SUBCUTANEOUS
Refills: 0 | Status: DISCONTINUED | OUTPATIENT
Start: 2021-01-01 | End: 2021-01-01

## 2021-01-01 RX ORDER — CHLORHEXIDINE GLUCONATE 213 G/1000ML
1 SOLUTION TOPICAL
Refills: 0 | Status: DISCONTINUED | OUTPATIENT
Start: 2021-01-01 | End: 2021-01-01

## 2021-01-01 RX ORDER — LANOLIN ALCOHOL/MO/W.PET/CERES
5 CREAM (GRAM) TOPICAL AT BEDTIME
Refills: 0 | Status: DISCONTINUED | OUTPATIENT
Start: 2021-01-01 | End: 2021-01-01

## 2021-01-01 RX ORDER — CALCIUM ACETATE 667 MG/1
667 CAPSULE ORAL 3 TIMES DAILY
Refills: 0 | Status: DISCONTINUED | COMMUNITY

## 2021-01-01 RX ORDER — ACETAMINOPHEN 500 MG
650 TABLET ORAL EVERY 6 HOURS
Refills: 0 | Status: DISCONTINUED | OUTPATIENT
Start: 2021-01-01 | End: 2021-01-01

## 2021-01-01 RX ORDER — FUROSEMIDE 40 MG
40 TABLET ORAL EVERY 12 HOURS
Refills: 0 | Status: DISCONTINUED | OUTPATIENT
Start: 2021-01-01 | End: 2021-01-01

## 2021-01-01 RX ORDER — CALCITRIOL 0.5 UG/1
1 CAPSULE ORAL
Qty: 0 | Refills: 0 | DISCHARGE

## 2021-01-01 RX ORDER — TAMSULOSIN HYDROCHLORIDE 0.4 MG/1
0.4 CAPSULE ORAL AT BEDTIME
Refills: 0 | Status: DISCONTINUED | OUTPATIENT
Start: 2021-01-01 | End: 2021-01-01

## 2021-01-01 RX ORDER — ADENOSINE 3 MG/ML
60 INJECTION INTRAVENOUS ONCE
Refills: 0 | Status: COMPLETED | OUTPATIENT
Start: 2021-01-01 | End: 2021-01-01

## 2021-01-01 RX ORDER — METOPROLOL SUCCINATE 25 MG/1
25 TABLET, EXTENDED RELEASE ORAL DAILY
Qty: 90 | Refills: 1 | Status: ACTIVE | COMMUNITY
Start: 2021-01-01 | End: 1900-01-01

## 2021-01-01 RX ORDER — AZITHROMYCIN 500 MG/1
500 TABLET, FILM COATED ORAL EVERY 24 HOURS
Refills: 0 | Status: DISCONTINUED | OUTPATIENT
Start: 2021-01-01 | End: 2021-01-01

## 2021-01-01 RX ORDER — ASPIRIN/CALCIUM CARB/MAGNESIUM 324 MG
81 TABLET ORAL DAILY
Refills: 0 | Status: DISCONTINUED | OUTPATIENT
Start: 2021-01-01 | End: 2021-01-01

## 2021-01-01 RX ORDER — CLOPIDOGREL BISULFATE 75 MG/1
75 TABLET, FILM COATED ORAL DAILY
Refills: 0 | Status: DISCONTINUED | OUTPATIENT
Start: 2021-01-01 | End: 2021-01-01

## 2021-01-01 RX ORDER — DUTASTERIDE 0.5 MG/1
1 CAPSULE, LIQUID FILLED ORAL
Qty: 0 | Refills: 0 | DISCHARGE

## 2021-01-01 RX ORDER — CALCITRIOL 0.25 UG/1
0.25 CAPSULE, LIQUID FILLED ORAL DAILY
Refills: 0 | Status: ACTIVE | COMMUNITY

## 2021-01-01 RX ORDER — FUROSEMIDE 40 MG
40 TABLET ORAL ONCE
Refills: 0 | Status: COMPLETED | OUTPATIENT
Start: 2021-01-01 | End: 2021-01-01

## 2021-01-01 RX ORDER — SEMAGLUTIDE 0.68 MG/ML
20 INJECTION, SOLUTION SUBCUTANEOUS
Qty: 0 | Refills: 0 | DISCHARGE

## 2021-01-01 RX ORDER — AMIODARONE HYDROCHLORIDE 400 MG/1
0.5 TABLET ORAL
Qty: 900 | Refills: 0 | Status: DISCONTINUED | OUTPATIENT
Start: 2021-01-01 | End: 2021-01-01

## 2021-01-01 RX ORDER — SODIUM CHLORIDE 9 MG/ML
500 INJECTION INTRAMUSCULAR; INTRAVENOUS; SUBCUTANEOUS ONCE
Refills: 0 | Status: DISCONTINUED | OUTPATIENT
Start: 2021-01-01 | End: 2021-01-01

## 2021-01-01 RX ORDER — METOPROLOL SUCCINATE 50 MG/1
50 TABLET, EXTENDED RELEASE ORAL
Qty: 60 | Refills: 0 | Status: DISCONTINUED | COMMUNITY
Start: 2021-01-01 | End: 2021-01-01

## 2021-01-01 RX ORDER — SODIUM CHLORIDE 5 G/100ML
150 INJECTION, SOLUTION INTRAVENOUS
Refills: 0 | Status: COMPLETED | OUTPATIENT
Start: 2021-01-01 | End: 2021-01-01

## 2021-01-01 RX ORDER — ASPIRIN/CALCIUM CARB/MAGNESIUM 324 MG
324 TABLET ORAL ONCE
Refills: 0 | Status: COMPLETED | OUTPATIENT
Start: 2021-01-01 | End: 2021-01-01

## 2021-01-01 RX ORDER — OXYCODONE AND ACETAMINOPHEN 5; 325 MG/1; MG/1
1 TABLET ORAL ONCE
Refills: 0 | Status: DISCONTINUED | OUTPATIENT
Start: 2021-01-01 | End: 2021-01-01

## 2021-01-01 RX ORDER — METOPROLOL TARTRATE 50 MG
25 TABLET ORAL
Refills: 0 | Status: DISCONTINUED | OUTPATIENT
Start: 2021-01-01 | End: 2021-01-01

## 2021-01-01 RX ORDER — TETANUS TOXOID, REDUCED DIPHTHERIA TOXOID AND ACELLULAR PERTUSSIS VACCINE, ADSORBED 5; 2.5; 8; 8; 2.5 [IU]/.5ML; [IU]/.5ML; UG/.5ML; UG/.5ML; UG/.5ML
0.5 SUSPENSION INTRAMUSCULAR ONCE
Refills: 0 | Status: COMPLETED | OUTPATIENT
Start: 2021-01-01 | End: 2021-01-01

## 2021-01-01 RX ORDER — INSULIN LISPRO 100/ML
7 VIAL (ML) SUBCUTANEOUS
Refills: 0 | Status: DISCONTINUED | OUTPATIENT
Start: 2021-01-01 | End: 2021-01-01

## 2021-01-01 RX ORDER — GABAPENTIN 400 MG/1
100 CAPSULE ORAL AT BEDTIME
Refills: 0 | Status: DISCONTINUED | OUTPATIENT
Start: 2021-01-01 | End: 2021-01-01

## 2021-01-01 RX ORDER — METOPROLOL TARTRATE 50 MG
50 TABLET ORAL
Refills: 0 | Status: DISCONTINUED | OUTPATIENT
Start: 2021-01-01 | End: 2021-01-01

## 2021-01-01 RX ORDER — SEMAGLUTIDE 0.68 MG/ML
1 INJECTION, SOLUTION SUBCUTANEOUS
Qty: 0 | Refills: 0 | DISCHARGE

## 2021-01-01 RX ORDER — ACETAMINOPHEN 500 MG
2 TABLET ORAL
Qty: 0 | Refills: 0 | DISCHARGE
Start: 2021-01-01

## 2021-01-01 RX ORDER — CEPHALEXIN 500 MG
500 CAPSULE ORAL EVERY 12 HOURS
Refills: 0 | Status: COMPLETED | OUTPATIENT
Start: 2021-01-01 | End: 2021-01-01

## 2021-01-01 RX ORDER — TORSEMIDE 10 MG/1
10 TABLET ORAL
Qty: 90 | Refills: 3 | Status: DISCONTINUED | COMMUNITY
Start: 2021-01-01 | End: 2021-01-01

## 2021-01-01 RX ORDER — AMIODARONE HYDROCHLORIDE 400 MG/1
200 TABLET ORAL
Refills: 0 | Status: DISCONTINUED | OUTPATIENT
Start: 2021-01-01 | End: 2021-01-01

## 2021-01-01 RX ORDER — GABAPENTIN 400 MG/1
1 CAPSULE ORAL
Qty: 0 | Refills: 0 | DISCHARGE

## 2021-01-01 RX ORDER — NYSTATIN CREAM 100000 [USP'U]/G
1 CREAM TOPICAL
Refills: 0 | Status: DISCONTINUED | OUTPATIENT
Start: 2021-01-01 | End: 2021-01-01

## 2021-01-01 RX ORDER — GABAPENTIN 400 MG/1
100 CAPSULE ORAL THREE TIMES A DAY
Refills: 0 | Status: DISCONTINUED | OUTPATIENT
Start: 2021-01-01 | End: 2021-01-01

## 2021-01-01 RX ORDER — GABAPENTIN 400 MG/1
100 CAPSULE ORAL DAILY
Refills: 0 | Status: DISCONTINUED | OUTPATIENT
Start: 2021-01-01 | End: 2021-01-01

## 2021-01-01 RX ORDER — SODIUM CHLORIDE 9 MG/ML
90 INJECTION INTRAMUSCULAR; INTRAVENOUS; SUBCUTANEOUS ONCE
Refills: 0 | Status: DISCONTINUED | OUTPATIENT
Start: 2021-01-01 | End: 2021-01-01

## 2021-01-01 RX ORDER — METOPROLOL TARTRATE 50 MG
1 TABLET ORAL
Qty: 0 | Refills: 0 | DISCHARGE

## 2021-01-01 RX ORDER — METOPROLOL TARTRATE 50 MG
12.5 TABLET ORAL
Refills: 0 | Status: DISCONTINUED | OUTPATIENT
Start: 2021-01-01 | End: 2021-01-01

## 2021-01-01 RX ORDER — GUAIFENESIN/DEXTROMETHORPHAN 600MG-30MG
5 TABLET, EXTENDED RELEASE 12 HR ORAL EVERY 6 HOURS
Refills: 0 | Status: DISCONTINUED | OUTPATIENT
Start: 2021-01-01 | End: 2021-01-01

## 2021-01-01 RX ORDER — CLOPIDOGREL BISULFATE 75 MG/1
300 TABLET, FILM COATED ORAL ONCE
Refills: 0 | Status: COMPLETED | OUTPATIENT
Start: 2021-01-01 | End: 2021-01-01

## 2021-01-01 RX ORDER — METOPROLOL TARTRATE 50 MG
1 TABLET ORAL
Qty: 0 | Refills: 0 | DISCHARGE
Start: 2021-01-01

## 2021-01-01 RX ORDER — METOPROLOL TARTRATE 50 MG
50 TABLET ORAL THREE TIMES A DAY
Refills: 0 | Status: DISCONTINUED | OUTPATIENT
Start: 2021-01-01 | End: 2021-01-01

## 2021-01-01 RX ORDER — METOPROLOL TARTRATE 50 MG
5 TABLET ORAL ONCE
Refills: 0 | Status: DISCONTINUED | OUTPATIENT
Start: 2021-01-01 | End: 2021-01-01

## 2021-01-01 RX ORDER — METOPROLOL SUCCINATE 25 MG/1
25 TABLET, EXTENDED RELEASE ORAL DAILY
Qty: 90 | Refills: 1 | Status: DISCONTINUED | COMMUNITY
Start: 2021-01-01 | End: 2021-01-01

## 2021-01-01 RX ORDER — BUMETANIDE 0.25 MG/ML
2 INJECTION INTRAMUSCULAR; INTRAVENOUS ONCE
Refills: 0 | Status: COMPLETED | OUTPATIENT
Start: 2021-01-01 | End: 2021-01-01

## 2021-01-01 RX ORDER — POTASSIUM CHLORIDE 20 MEQ
40 PACKET (EA) ORAL
Refills: 0 | Status: COMPLETED | OUTPATIENT
Start: 2021-01-01 | End: 2021-01-01

## 2021-01-01 RX ORDER — AMIODARONE HYDROCHLORIDE 400 MG/1
1 TABLET ORAL
Qty: 60 | Refills: 2
Start: 2021-01-01 | End: 2022-02-16

## 2021-01-01 RX ORDER — AMIODARONE HYDROCHLORIDE 400 MG/1
1 TABLET ORAL
Qty: 900 | Refills: 0 | Status: DISCONTINUED | OUTPATIENT
Start: 2021-01-01 | End: 2021-01-01

## 2021-01-01 RX ORDER — FUROSEMIDE 40 MG
80 TABLET ORAL EVERY 12 HOURS
Refills: 0 | Status: DISCONTINUED | OUTPATIENT
Start: 2021-01-01 | End: 2021-01-01

## 2021-01-01 RX ORDER — MAGNESIUM SULFATE 500 MG/ML
2 VIAL (ML) INJECTION ONCE
Refills: 0 | Status: COMPLETED | OUTPATIENT
Start: 2021-01-01 | End: 2021-01-01

## 2021-01-01 RX ORDER — POTASSIUM CHLORIDE 20 MEQ
1 PACKET (EA) ORAL
Qty: 0 | Refills: 0 | DISCHARGE

## 2021-01-01 RX ORDER — LANOLIN ALCOHOL/MO/W.PET/CERES
1 CREAM (GRAM) TOPICAL AT BEDTIME
Refills: 0 | Status: DISCONTINUED | OUTPATIENT
Start: 2021-01-01 | End: 2021-01-01

## 2021-01-01 RX ORDER — AMIODARONE HYDROCHLORIDE 400 MG/1
1 TABLET ORAL
Qty: 0 | Refills: 0 | DISCHARGE
Start: 2021-01-01

## 2021-01-01 RX ORDER — AMIODARONE HYDROCHLORIDE 400 MG/1
1 TABLET ORAL
Qty: 30 | Refills: 2
Start: 2021-01-01 | End: 2022-02-16

## 2021-01-01 RX ORDER — COLLAGENASE CLOSTRIDIUM HIST. 250 UNIT/G
1 OINTMENT (GRAM) TOPICAL
Qty: 30 | Refills: 0
Start: 2021-01-01 | End: 2021-01-01

## 2021-01-01 RX ORDER — FUROSEMIDE 40 MG
80 TABLET ORAL EVERY 8 HOURS
Refills: 0 | Status: DISCONTINUED | OUTPATIENT
Start: 2021-01-01 | End: 2021-01-01

## 2021-01-01 RX ORDER — MECLIZINE HCL 12.5 MG
50 TABLET ORAL ONCE
Refills: 0 | Status: COMPLETED | OUTPATIENT
Start: 2021-01-01 | End: 2021-01-01

## 2021-01-01 RX ORDER — DAPAGLIFLOZIN 10 MG/1
1 TABLET, FILM COATED ORAL
Qty: 0 | Refills: 0 | DISCHARGE

## 2021-01-01 RX ORDER — GABAPENTIN 100 MG/1
100 CAPSULE ORAL 3 TIMES DAILY
Refills: 0 | Status: COMPLETED | COMMUNITY
End: 2021-01-01

## 2021-01-01 RX ORDER — AMIODARONE HYDROCHLORIDE 400 MG/1
150 TABLET ORAL ONCE
Refills: 0 | Status: COMPLETED | OUTPATIENT
Start: 2021-01-01 | End: 2021-01-01

## 2021-01-01 RX ORDER — SPIRONOLACTONE 25 MG/1
1 TABLET, FILM COATED ORAL
Qty: 0 | Refills: 0 | DISCHARGE

## 2021-01-01 RX ORDER — COLLAGENASE CLOSTRIDIUM HIST. 250 UNIT/G
1 OINTMENT (GRAM) TOPICAL
Qty: 0 | Refills: 0 | DISCHARGE
Start: 2021-01-01

## 2021-01-01 RX ORDER — DILTIAZEM HCL 120 MG
60 CAPSULE, EXT RELEASE 24 HR ORAL EVERY 12 HOURS
Refills: 0 | Status: DISCONTINUED | OUTPATIENT
Start: 2021-01-01 | End: 2021-01-01

## 2021-01-01 RX ORDER — GUAIFENESIN/DEXTROMETHORPHAN 600MG-30MG
10 TABLET, EXTENDED RELEASE 12 HR ORAL EVERY 4 HOURS
Refills: 0 | Status: DISCONTINUED | OUTPATIENT
Start: 2021-01-01 | End: 2021-01-01

## 2021-01-01 RX ORDER — FUROSEMIDE 40 MG
80 TABLET ORAL
Refills: 0 | Status: DISCONTINUED | OUTPATIENT
Start: 2021-01-01 | End: 2021-01-01

## 2021-01-01 RX ORDER — BUMETANIDE 0.25 MG/ML
2 INJECTION INTRAMUSCULAR; INTRAVENOUS
Refills: 0 | Status: DISCONTINUED | OUTPATIENT
Start: 2021-01-01 | End: 2021-01-01

## 2021-01-01 RX ORDER — OXYCODONE HYDROCHLORIDE 5 MG/1
5 TABLET ORAL ONCE
Refills: 0 | Status: DISCONTINUED | OUTPATIENT
Start: 2021-01-01 | End: 2021-01-01

## 2021-01-01 RX ORDER — SPIRONOLACTONE 25 MG/1
25 TABLET ORAL TWICE DAILY
Qty: 60 | Refills: 5 | Status: COMPLETED | COMMUNITY
Start: 2021-01-01 | End: 2021-01-01

## 2021-01-01 RX ORDER — CLOPIDOGREL BISULFATE 75 MG/1
1 TABLET, FILM COATED ORAL
Qty: 30 | Refills: 3
Start: 2021-01-01 | End: 2022-03-18

## 2021-01-01 RX ORDER — DAPAGLIFLOZIN 10 MG/1
10 TABLET, FILM COATED ORAL
Qty: 90 | Refills: 3 | Status: ACTIVE | COMMUNITY
Start: 2021-01-01 | End: 1900-01-01

## 2021-01-01 RX ORDER — AMIODARONE HYDROCHLORIDE 400 MG/1
200 TABLET ORAL DAILY
Refills: 0 | Status: DISCONTINUED | OUTPATIENT
Start: 2021-01-01 | End: 2021-01-01

## 2021-01-01 RX ORDER — POTASSIUM CHLORIDE 20 MEQ
20 PACKET (EA) ORAL DAILY
Refills: 0 | Status: DISCONTINUED | OUTPATIENT
Start: 2021-01-01 | End: 2021-01-01

## 2021-01-01 RX ADMIN — HEPARIN SODIUM 5000 UNIT(S): 5000 INJECTION INTRAVENOUS; SUBCUTANEOUS at 21:37

## 2021-01-01 RX ADMIN — TAMSULOSIN HYDROCHLORIDE 0.4 MILLIGRAM(S): 0.4 CAPSULE ORAL at 21:24

## 2021-01-01 RX ADMIN — Medication 1: at 12:55

## 2021-01-01 RX ADMIN — GABAPENTIN 100 MILLIGRAM(S): 400 CAPSULE ORAL at 05:26

## 2021-01-01 RX ADMIN — Medication 667 MILLIGRAM(S): at 07:53

## 2021-01-01 RX ADMIN — Medication 7 UNIT(S): at 08:00

## 2021-01-01 RX ADMIN — Medication 7 UNIT(S): at 08:02

## 2021-01-01 RX ADMIN — Medication 500 MILLIGRAM(S): at 17:22

## 2021-01-01 RX ADMIN — INSULIN GLARGINE 20 UNIT(S): 100 INJECTION, SOLUTION SUBCUTANEOUS at 23:11

## 2021-01-01 RX ADMIN — Medication 7 UNIT(S): at 11:53

## 2021-01-01 RX ADMIN — Medication 50 MILLIGRAM(S): at 05:42

## 2021-01-01 RX ADMIN — Medication 25 MILLIGRAM(S): at 06:34

## 2021-01-01 RX ADMIN — INSULIN GLARGINE 20 UNIT(S): 100 INJECTION, SOLUTION SUBCUTANEOUS at 22:14

## 2021-01-01 RX ADMIN — Medication 667 MILLIGRAM(S): at 11:47

## 2021-01-01 RX ADMIN — Medication 667 MILLIGRAM(S): at 17:55

## 2021-01-01 RX ADMIN — Medication 40 MILLIEQUIVALENT(S): at 21:35

## 2021-01-01 RX ADMIN — Medication 1: at 11:59

## 2021-01-01 RX ADMIN — Medication 5 MILLILITER(S): at 05:37

## 2021-01-01 RX ADMIN — INSULIN GLARGINE 20 UNIT(S): 100 INJECTION, SOLUTION SUBCUTANEOUS at 21:40

## 2021-01-01 RX ADMIN — HEPARIN SODIUM 5000 UNIT(S): 5000 INJECTION INTRAVENOUS; SUBCUTANEOUS at 05:08

## 2021-01-01 RX ADMIN — Medication 650 MILLIGRAM(S): at 19:49

## 2021-01-01 RX ADMIN — TAMSULOSIN HYDROCHLORIDE 0.4 MILLIGRAM(S): 0.4 CAPSULE ORAL at 22:54

## 2021-01-01 RX ADMIN — Medication 25 MILLIGRAM(S): at 05:05

## 2021-01-01 RX ADMIN — HEPARIN SODIUM 5000 UNIT(S): 5000 INJECTION INTRAVENOUS; SUBCUTANEOUS at 21:31

## 2021-01-01 RX ADMIN — Medication 81 MILLIGRAM(S): at 22:26

## 2021-01-01 RX ADMIN — CALCITRIOL 0.25 MICROGRAM(S): 0.5 CAPSULE ORAL at 11:38

## 2021-01-01 RX ADMIN — Medication 81 MILLIGRAM(S): at 12:24

## 2021-01-01 RX ADMIN — CHLORHEXIDINE GLUCONATE 1 APPLICATION(S): 213 SOLUTION TOPICAL at 05:35

## 2021-01-01 RX ADMIN — SODIUM CHLORIDE 100 MILLILITER(S): 9 INJECTION, SOLUTION INTRAVENOUS at 11:49

## 2021-01-01 RX ADMIN — Medication 7 UNIT(S): at 13:09

## 2021-01-01 RX ADMIN — Medication 650 MILLIGRAM(S): at 21:59

## 2021-01-01 RX ADMIN — Medication 60 MILLIGRAM(S): at 05:33

## 2021-01-01 RX ADMIN — ATORVASTATIN CALCIUM 20 MILLIGRAM(S): 80 TABLET, FILM COATED ORAL at 21:24

## 2021-01-01 RX ADMIN — INSULIN GLARGINE 20 UNIT(S): 100 INJECTION, SOLUTION SUBCUTANEOUS at 21:12

## 2021-01-01 RX ADMIN — Medication 650 MILLIGRAM(S): at 05:55

## 2021-01-01 RX ADMIN — FINASTERIDE 5 MILLIGRAM(S): 5 TABLET, FILM COATED ORAL at 11:29

## 2021-01-01 RX ADMIN — NYSTATIN CREAM 1 APPLICATION(S): 100000 CREAM TOPICAL at 17:10

## 2021-01-01 RX ADMIN — GABAPENTIN 100 MILLIGRAM(S): 400 CAPSULE ORAL at 05:46

## 2021-01-01 RX ADMIN — Medication 2: at 18:19

## 2021-01-01 RX ADMIN — CLOPIDOGREL BISULFATE 75 MILLIGRAM(S): 75 TABLET, FILM COATED ORAL at 11:47

## 2021-01-01 RX ADMIN — Medication 40 MILLIGRAM(S): at 05:37

## 2021-01-01 RX ADMIN — CHLORHEXIDINE GLUCONATE 1 APPLICATION(S): 213 SOLUTION TOPICAL at 05:38

## 2021-01-01 RX ADMIN — Medication 667 MILLIGRAM(S): at 12:01

## 2021-01-01 RX ADMIN — TAMSULOSIN HYDROCHLORIDE 0.4 MILLIGRAM(S): 0.4 CAPSULE ORAL at 21:12

## 2021-01-01 RX ADMIN — Medication 80 MILLIGRAM(S): at 13:21

## 2021-01-01 RX ADMIN — Medication 7 UNIT(S): at 17:15

## 2021-01-01 RX ADMIN — AMIODARONE HYDROCHLORIDE 200 MILLIGRAM(S): 400 TABLET ORAL at 05:37

## 2021-01-01 RX ADMIN — HEPARIN SODIUM 5000 UNIT(S): 5000 INJECTION INTRAVENOUS; SUBCUTANEOUS at 13:22

## 2021-01-01 RX ADMIN — GABAPENTIN 100 MILLIGRAM(S): 400 CAPSULE ORAL at 21:37

## 2021-01-01 RX ADMIN — GABAPENTIN 100 MILLIGRAM(S): 400 CAPSULE ORAL at 13:35

## 2021-01-01 RX ADMIN — Medication 667 MILLIGRAM(S): at 17:27

## 2021-01-01 RX ADMIN — TAMSULOSIN HYDROCHLORIDE 0.4 MILLIGRAM(S): 0.4 CAPSULE ORAL at 21:13

## 2021-01-01 RX ADMIN — TAMSULOSIN HYDROCHLORIDE 0.4 MILLIGRAM(S): 0.4 CAPSULE ORAL at 21:47

## 2021-01-01 RX ADMIN — Medication 650 MILLIGRAM(S): at 03:42

## 2021-01-01 RX ADMIN — GABAPENTIN 100 MILLIGRAM(S): 400 CAPSULE ORAL at 05:08

## 2021-01-01 RX ADMIN — Medication 50 MILLIGRAM(S): at 21:37

## 2021-01-01 RX ADMIN — Medication 7 UNIT(S): at 08:26

## 2021-01-01 RX ADMIN — BUMETANIDE 2 MILLIGRAM(S): 0.25 INJECTION INTRAMUSCULAR; INTRAVENOUS at 18:10

## 2021-01-01 RX ADMIN — Medication 500 MILLIGRAM(S): at 17:16

## 2021-01-01 RX ADMIN — Medication 7 UNIT(S): at 15:47

## 2021-01-01 RX ADMIN — Medication 7 UNIT(S): at 08:45

## 2021-01-01 RX ADMIN — GABAPENTIN 300 MILLIGRAM(S): 400 CAPSULE ORAL at 21:24

## 2021-01-01 RX ADMIN — ATORVASTATIN CALCIUM 20 MILLIGRAM(S): 80 TABLET, FILM COATED ORAL at 22:54

## 2021-01-01 RX ADMIN — HEPARIN SODIUM 5000 UNIT(S): 5000 INJECTION INTRAVENOUS; SUBCUTANEOUS at 12:17

## 2021-01-01 RX ADMIN — Medication 667 MILLIGRAM(S): at 12:26

## 2021-01-01 RX ADMIN — GABAPENTIN 100 MILLIGRAM(S): 400 CAPSULE ORAL at 06:07

## 2021-01-01 RX ADMIN — HEPARIN SODIUM 5000 UNIT(S): 5000 INJECTION INTRAVENOUS; SUBCUTANEOUS at 22:29

## 2021-01-01 RX ADMIN — TAMSULOSIN HYDROCHLORIDE 0.4 MILLIGRAM(S): 0.4 CAPSULE ORAL at 22:12

## 2021-01-01 RX ADMIN — SPIRONOLACTONE 25 MILLIGRAM(S): 25 TABLET, FILM COATED ORAL at 05:37

## 2021-01-01 RX ADMIN — INSULIN GLARGINE 20 UNIT(S): 100 INJECTION, SOLUTION SUBCUTANEOUS at 21:37

## 2021-01-01 RX ADMIN — INSULIN GLARGINE 20 UNIT(S): 100 INJECTION, SOLUTION SUBCUTANEOUS at 21:29

## 2021-01-01 RX ADMIN — CLOPIDOGREL BISULFATE 75 MILLIGRAM(S): 75 TABLET, FILM COATED ORAL at 12:25

## 2021-01-01 RX ADMIN — Medication 50 MILLIGRAM(S): at 14:28

## 2021-01-01 RX ADMIN — TAMSULOSIN HYDROCHLORIDE 0.4 MILLIGRAM(S): 0.4 CAPSULE ORAL at 21:50

## 2021-01-01 RX ADMIN — Medication 50 GRAM(S): at 23:25

## 2021-01-01 RX ADMIN — NYSTATIN CREAM 1 APPLICATION(S): 100000 CREAM TOPICAL at 06:08

## 2021-01-01 RX ADMIN — HEPARIN SODIUM 5000 UNIT(S): 5000 INJECTION INTRAVENOUS; SUBCUTANEOUS at 13:13

## 2021-01-01 RX ADMIN — Medication 667 MILLIGRAM(S): at 11:42

## 2021-01-01 RX ADMIN — Medication 667 MILLIGRAM(S): at 17:30

## 2021-01-01 RX ADMIN — FINASTERIDE 5 MILLIGRAM(S): 5 TABLET, FILM COATED ORAL at 11:47

## 2021-01-01 RX ADMIN — Medication 5 MILLIGRAM(S): at 21:41

## 2021-01-01 RX ADMIN — Medication 650 MILLIGRAM(S): at 12:40

## 2021-01-01 RX ADMIN — Medication 7 UNIT(S): at 12:30

## 2021-01-01 RX ADMIN — SODIUM CHLORIDE 50 MILLILITER(S): 5 INJECTION, SOLUTION INTRAVENOUS at 21:11

## 2021-01-01 RX ADMIN — GABAPENTIN 300 MILLIGRAM(S): 400 CAPSULE ORAL at 22:26

## 2021-01-01 RX ADMIN — HEPARIN SODIUM 5000 UNIT(S): 5000 INJECTION INTRAVENOUS; SUBCUTANEOUS at 13:01

## 2021-01-01 RX ADMIN — CHLORHEXIDINE GLUCONATE 1 APPLICATION(S): 213 SOLUTION TOPICAL at 12:28

## 2021-01-01 RX ADMIN — AMIODARONE HYDROCHLORIDE 200 MILLIGRAM(S): 400 TABLET ORAL at 17:31

## 2021-01-01 RX ADMIN — HEPARIN SODIUM 5000 UNIT(S): 5000 INJECTION INTRAVENOUS; SUBCUTANEOUS at 22:58

## 2021-01-01 RX ADMIN — Medication 1: at 11:53

## 2021-01-01 RX ADMIN — HEPARIN SODIUM 5000 UNIT(S): 5000 INJECTION INTRAVENOUS; SUBCUTANEOUS at 12:39

## 2021-01-01 RX ADMIN — GABAPENTIN 300 MILLIGRAM(S): 400 CAPSULE ORAL at 22:54

## 2021-01-01 RX ADMIN — Medication 667 MILLIGRAM(S): at 17:43

## 2021-01-01 RX ADMIN — Medication 1: at 08:22

## 2021-01-01 RX ADMIN — HEPARIN SODIUM 5000 UNIT(S): 5000 INJECTION INTRAVENOUS; SUBCUTANEOUS at 21:40

## 2021-01-01 RX ADMIN — GABAPENTIN 100 MILLIGRAM(S): 400 CAPSULE ORAL at 12:39

## 2021-01-01 RX ADMIN — CHLORHEXIDINE GLUCONATE 1 APPLICATION(S): 213 SOLUTION TOPICAL at 12:12

## 2021-01-01 RX ADMIN — GABAPENTIN 100 MILLIGRAM(S): 400 CAPSULE ORAL at 13:05

## 2021-01-01 RX ADMIN — Medication 7 UNIT(S): at 08:22

## 2021-01-01 RX ADMIN — HEPARIN SODIUM 5000 UNIT(S): 5000 INJECTION INTRAVENOUS; SUBCUTANEOUS at 05:26

## 2021-01-01 RX ADMIN — Medication 50 MILLIGRAM(S): at 05:33

## 2021-01-01 RX ADMIN — HEPARIN SODIUM 5000 UNIT(S): 5000 INJECTION INTRAVENOUS; SUBCUTANEOUS at 14:14

## 2021-01-01 RX ADMIN — Medication 5 MILLILITER(S): at 17:01

## 2021-01-01 RX ADMIN — Medication 500 MILLIGRAM(S): at 05:33

## 2021-01-01 RX ADMIN — Medication 81 MILLIGRAM(S): at 12:25

## 2021-01-01 RX ADMIN — Medication 60 MILLIGRAM(S): at 17:11

## 2021-01-01 RX ADMIN — Medication 60 MILLIGRAM(S): at 17:05

## 2021-01-01 RX ADMIN — GABAPENTIN 100 MILLIGRAM(S): 400 CAPSULE ORAL at 21:47

## 2021-01-01 RX ADMIN — Medication 50 MILLIGRAM(S): at 05:35

## 2021-01-01 RX ADMIN — HEPARIN SODIUM 5000 UNIT(S): 5000 INJECTION INTRAVENOUS; SUBCUTANEOUS at 13:47

## 2021-01-01 RX ADMIN — Medication 7 UNIT(S): at 08:17

## 2021-01-01 RX ADMIN — HEPARIN SODIUM 5000 UNIT(S): 5000 INJECTION INTRAVENOUS; SUBCUTANEOUS at 05:37

## 2021-01-01 RX ADMIN — GABAPENTIN 100 MILLIGRAM(S): 400 CAPSULE ORAL at 12:11

## 2021-01-01 RX ADMIN — Medication 5 MILLIGRAM(S): at 22:26

## 2021-01-01 RX ADMIN — AMIODARONE HYDROCHLORIDE 200 MILLIGRAM(S): 400 TABLET ORAL at 17:38

## 2021-01-01 RX ADMIN — HEPARIN SODIUM 5000 UNIT(S): 5000 INJECTION INTRAVENOUS; SUBCUTANEOUS at 05:35

## 2021-01-01 RX ADMIN — Medication 81 MILLIGRAM(S): at 12:00

## 2021-01-01 RX ADMIN — Medication 100 MILLIGRAM(S): at 11:49

## 2021-01-01 RX ADMIN — BUMETANIDE 2 MILLIGRAM(S): 0.25 INJECTION INTRAMUSCULAR; INTRAVENOUS at 18:53

## 2021-01-01 RX ADMIN — ATORVASTATIN CALCIUM 20 MILLIGRAM(S): 80 TABLET, FILM COATED ORAL at 22:57

## 2021-01-01 RX ADMIN — HEPARIN SODIUM 5000 UNIT(S): 5000 INJECTION INTRAVENOUS; SUBCUTANEOUS at 13:58

## 2021-01-01 RX ADMIN — Medication 667 MILLIGRAM(S): at 17:03

## 2021-01-01 RX ADMIN — Medication 81 MILLIGRAM(S): at 12:14

## 2021-01-01 RX ADMIN — Medication 60 MILLIGRAM(S): at 17:16

## 2021-01-01 RX ADMIN — GABAPENTIN 100 MILLIGRAM(S): 400 CAPSULE ORAL at 13:12

## 2021-01-01 RX ADMIN — NYSTATIN CREAM 1 APPLICATION(S): 100000 CREAM TOPICAL at 05:24

## 2021-01-01 RX ADMIN — FINASTERIDE 5 MILLIGRAM(S): 5 TABLET, FILM COATED ORAL at 11:58

## 2021-01-01 RX ADMIN — Medication 5 MILLILITER(S): at 15:40

## 2021-01-01 RX ADMIN — Medication 2 MILLIGRAM(S): at 03:00

## 2021-01-01 RX ADMIN — OXYCODONE HYDROCHLORIDE 5 MILLIGRAM(S): 5 TABLET ORAL at 12:05

## 2021-01-01 RX ADMIN — CALCITRIOL 0.25 MICROGRAM(S): 0.5 CAPSULE ORAL at 11:42

## 2021-01-01 RX ADMIN — Medication 500 MILLIGRAM(S): at 05:46

## 2021-01-01 RX ADMIN — Medication 1 APPLICATION(S): at 11:56

## 2021-01-01 RX ADMIN — AMIODARONE HYDROCHLORIDE 200 MILLIGRAM(S): 400 TABLET ORAL at 17:03

## 2021-01-01 RX ADMIN — Medication 667 MILLIGRAM(S): at 08:11

## 2021-01-01 RX ADMIN — SODIUM CHLORIDE 50 MILLILITER(S): 9 INJECTION INTRAMUSCULAR; INTRAVENOUS; SUBCUTANEOUS at 18:44

## 2021-01-01 RX ADMIN — Medication 1 APPLICATION(S): at 12:33

## 2021-01-01 RX ADMIN — NYSTATIN CREAM 1 APPLICATION(S): 100000 CREAM TOPICAL at 18:09

## 2021-01-01 RX ADMIN — Medication 250 MILLIGRAM(S): at 15:53

## 2021-01-01 RX ADMIN — HEPARIN SODIUM 5000 UNIT(S): 5000 INJECTION INTRAVENOUS; SUBCUTANEOUS at 13:36

## 2021-01-01 RX ADMIN — Medication 650 MILLIGRAM(S): at 08:20

## 2021-01-01 RX ADMIN — GABAPENTIN 300 MILLIGRAM(S): 400 CAPSULE ORAL at 21:57

## 2021-01-01 RX ADMIN — GABAPENTIN 100 MILLIGRAM(S): 400 CAPSULE ORAL at 13:18

## 2021-01-01 RX ADMIN — Medication 7 UNIT(S): at 08:42

## 2021-01-01 RX ADMIN — INSULIN GLARGINE 20 UNIT(S): 100 INJECTION, SOLUTION SUBCUTANEOUS at 22:54

## 2021-01-01 RX ADMIN — FINASTERIDE 5 MILLIGRAM(S): 5 TABLET, FILM COATED ORAL at 11:59

## 2021-01-01 RX ADMIN — Medication 667 MILLIGRAM(S): at 11:59

## 2021-01-01 RX ADMIN — ATORVASTATIN CALCIUM 20 MILLIGRAM(S): 80 TABLET, FILM COATED ORAL at 21:28

## 2021-01-01 RX ADMIN — Medication 50 MILLIGRAM(S): at 22:00

## 2021-01-01 RX ADMIN — GABAPENTIN 100 MILLIGRAM(S): 400 CAPSULE ORAL at 11:40

## 2021-01-01 RX ADMIN — HEPARIN SODIUM 5000 UNIT(S): 5000 INJECTION INTRAVENOUS; SUBCUTANEOUS at 05:24

## 2021-01-01 RX ADMIN — HEPARIN SODIUM 5000 UNIT(S): 5000 INJECTION INTRAVENOUS; SUBCUTANEOUS at 21:32

## 2021-01-01 RX ADMIN — HEPARIN SODIUM 5000 UNIT(S): 5000 INJECTION INTRAVENOUS; SUBCUTANEOUS at 21:27

## 2021-01-01 RX ADMIN — Medication 650 MILLIGRAM(S): at 01:30

## 2021-01-01 RX ADMIN — Medication 1: at 09:47

## 2021-01-01 RX ADMIN — Medication 81 MILLIGRAM(S): at 12:11

## 2021-01-01 RX ADMIN — Medication 12.5 MILLIGRAM(S): at 17:23

## 2021-01-01 RX ADMIN — Medication 12.5 MILLIGRAM(S): at 06:02

## 2021-01-01 RX ADMIN — Medication 25 MILLIGRAM(S): at 06:25

## 2021-01-01 RX ADMIN — GABAPENTIN 100 MILLIGRAM(S): 400 CAPSULE ORAL at 22:00

## 2021-01-01 RX ADMIN — Medication 1 MILLIGRAM(S): at 21:37

## 2021-01-01 RX ADMIN — NYSTATIN CREAM 1 APPLICATION(S): 100000 CREAM TOPICAL at 17:14

## 2021-01-01 RX ADMIN — Medication 81 MILLIGRAM(S): at 11:43

## 2021-01-01 RX ADMIN — TAMSULOSIN HYDROCHLORIDE 0.4 MILLIGRAM(S): 0.4 CAPSULE ORAL at 22:28

## 2021-01-01 RX ADMIN — Medication 12.5 MILLIGRAM(S): at 05:17

## 2021-01-01 RX ADMIN — FINASTERIDE 5 MILLIGRAM(S): 5 TABLET, FILM COATED ORAL at 13:18

## 2021-01-01 RX ADMIN — ATORVASTATIN CALCIUM 20 MILLIGRAM(S): 80 TABLET, FILM COATED ORAL at 21:37

## 2021-01-01 RX ADMIN — Medication 40 MILLIEQUIVALENT(S): at 11:58

## 2021-01-01 RX ADMIN — Medication 40 MILLIEQUIVALENT(S): at 18:18

## 2021-01-01 RX ADMIN — Medication 650 MILLIGRAM(S): at 21:20

## 2021-01-01 RX ADMIN — TAMSULOSIN HYDROCHLORIDE 0.4 MILLIGRAM(S): 0.4 CAPSULE ORAL at 22:52

## 2021-01-01 RX ADMIN — HEPARIN SODIUM 5000 UNIT(S): 5000 INJECTION INTRAVENOUS; SUBCUTANEOUS at 21:21

## 2021-01-01 RX ADMIN — Medication 650 MILLIGRAM(S): at 14:42

## 2021-01-01 RX ADMIN — Medication 7 UNIT(S): at 17:09

## 2021-01-01 RX ADMIN — Medication 667 MILLIGRAM(S): at 17:00

## 2021-01-01 RX ADMIN — OXYCODONE AND ACETAMINOPHEN 1 TABLET(S): 5; 325 TABLET ORAL at 11:23

## 2021-01-01 RX ADMIN — Medication 40 MILLIGRAM(S): at 06:03

## 2021-01-01 RX ADMIN — Medication 50 MILLIGRAM(S): at 17:52

## 2021-01-01 RX ADMIN — AMIODARONE HYDROCHLORIDE 200 MILLIGRAM(S): 400 TABLET ORAL at 05:32

## 2021-01-01 RX ADMIN — ATORVASTATIN CALCIUM 20 MILLIGRAM(S): 80 TABLET, FILM COATED ORAL at 22:24

## 2021-01-01 RX ADMIN — Medication 650 MILLIGRAM(S): at 16:19

## 2021-01-01 RX ADMIN — Medication 1 APPLICATION(S): at 11:37

## 2021-01-01 RX ADMIN — Medication 7 UNIT(S): at 13:17

## 2021-01-01 RX ADMIN — Medication 25 MILLIGRAM(S): at 17:16

## 2021-01-01 RX ADMIN — Medication 12.5 MILLIGRAM(S): at 17:58

## 2021-01-01 RX ADMIN — CALCITRIOL 0.25 MICROGRAM(S): 0.5 CAPSULE ORAL at 11:29

## 2021-01-01 RX ADMIN — Medication 500 MILLIGRAM(S): at 17:00

## 2021-01-01 RX ADMIN — HEPARIN SODIUM 5000 UNIT(S): 5000 INJECTION INTRAVENOUS; SUBCUTANEOUS at 14:10

## 2021-01-01 RX ADMIN — Medication 20 MILLIEQUIVALENT(S): at 04:18

## 2021-01-01 RX ADMIN — HEPARIN SODIUM 5000 UNIT(S): 5000 INJECTION INTRAVENOUS; SUBCUTANEOUS at 21:25

## 2021-01-01 RX ADMIN — Medication 7 UNIT(S): at 11:48

## 2021-01-01 RX ADMIN — Medication 1: at 17:45

## 2021-01-01 RX ADMIN — OXYCODONE HYDROCHLORIDE 5 MILLIGRAM(S): 5 TABLET ORAL at 11:35

## 2021-01-01 RX ADMIN — CALCITRIOL 0.25 MICROGRAM(S): 0.5 CAPSULE ORAL at 11:55

## 2021-01-01 RX ADMIN — TAMSULOSIN HYDROCHLORIDE 0.4 MILLIGRAM(S): 0.4 CAPSULE ORAL at 21:37

## 2021-01-01 RX ADMIN — ATORVASTATIN CALCIUM 20 MILLIGRAM(S): 80 TABLET, FILM COATED ORAL at 21:59

## 2021-01-01 RX ADMIN — HEPARIN SODIUM 5000 UNIT(S): 5000 INJECTION INTRAVENOUS; SUBCUTANEOUS at 13:05

## 2021-01-01 RX ADMIN — Medication 667 MILLIGRAM(S): at 08:44

## 2021-01-01 RX ADMIN — Medication 50 MILLIGRAM(S): at 17:28

## 2021-01-01 RX ADMIN — Medication 81 MILLIGRAM(S): at 13:00

## 2021-01-01 RX ADMIN — Medication 1: at 17:31

## 2021-01-01 RX ADMIN — Medication 50 MILLIGRAM(S): at 17:44

## 2021-01-01 RX ADMIN — Medication 25 MILLIGRAM(S): at 17:05

## 2021-01-01 RX ADMIN — Medication 25 MILLIGRAM(S): at 05:36

## 2021-01-01 RX ADMIN — Medication 650 MILLIGRAM(S): at 23:17

## 2021-01-01 RX ADMIN — NYSTATIN CREAM 1 APPLICATION(S): 100000 CREAM TOPICAL at 05:27

## 2021-01-01 RX ADMIN — TAMSULOSIN HYDROCHLORIDE 0.4 MILLIGRAM(S): 0.4 CAPSULE ORAL at 21:21

## 2021-01-01 RX ADMIN — GABAPENTIN 100 MILLIGRAM(S): 400 CAPSULE ORAL at 13:01

## 2021-01-01 RX ADMIN — HEPARIN SODIUM 5000 UNIT(S): 5000 INJECTION INTRAVENOUS; SUBCUTANEOUS at 05:31

## 2021-01-01 RX ADMIN — Medication 80 MILLIGRAM(S): at 21:34

## 2021-01-01 RX ADMIN — Medication 7 UNIT(S): at 17:12

## 2021-01-01 RX ADMIN — Medication 7 UNIT(S): at 12:03

## 2021-01-01 RX ADMIN — AMIODARONE HYDROCHLORIDE 200 MILLIGRAM(S): 400 TABLET ORAL at 17:05

## 2021-01-01 RX ADMIN — INSULIN GLARGINE 20 UNIT(S): 100 INJECTION, SOLUTION SUBCUTANEOUS at 22:26

## 2021-01-01 RX ADMIN — Medication 7 UNIT(S): at 11:57

## 2021-01-01 RX ADMIN — FINASTERIDE 5 MILLIGRAM(S): 5 TABLET, FILM COATED ORAL at 12:17

## 2021-01-01 RX ADMIN — FINASTERIDE 5 MILLIGRAM(S): 5 TABLET, FILM COATED ORAL at 12:32

## 2021-01-01 RX ADMIN — TAMSULOSIN HYDROCHLORIDE 0.4 MILLIGRAM(S): 0.4 CAPSULE ORAL at 22:00

## 2021-01-01 RX ADMIN — Medication 650 MILLIGRAM(S): at 22:07

## 2021-01-01 RX ADMIN — Medication 20 MILLIGRAM(S): at 06:31

## 2021-01-01 RX ADMIN — GABAPENTIN 300 MILLIGRAM(S): 400 CAPSULE ORAL at 22:24

## 2021-01-01 RX ADMIN — CALCITRIOL 0.25 MICROGRAM(S): 0.5 CAPSULE ORAL at 12:21

## 2021-01-01 RX ADMIN — Medication 50 MILLIGRAM(S): at 05:37

## 2021-01-01 RX ADMIN — Medication 20 MILLIEQUIVALENT(S): at 23:25

## 2021-01-01 RX ADMIN — Medication 650 MILLIGRAM(S): at 11:44

## 2021-01-01 RX ADMIN — Medication 650 MILLIGRAM(S): at 17:53

## 2021-01-01 RX ADMIN — Medication 7 UNIT(S): at 18:19

## 2021-01-01 RX ADMIN — INSULIN GLARGINE 20 UNIT(S): 100 INJECTION, SOLUTION SUBCUTANEOUS at 22:12

## 2021-01-01 RX ADMIN — ATORVASTATIN CALCIUM 20 MILLIGRAM(S): 80 TABLET, FILM COATED ORAL at 22:26

## 2021-01-01 RX ADMIN — Medication 25 MILLIGRAM(S): at 05:38

## 2021-01-01 RX ADMIN — FINASTERIDE 5 MILLIGRAM(S): 5 TABLET, FILM COATED ORAL at 13:17

## 2021-01-01 RX ADMIN — CLOPIDOGREL BISULFATE 75 MILLIGRAM(S): 75 TABLET, FILM COATED ORAL at 13:18

## 2021-01-01 RX ADMIN — FINASTERIDE 5 MILLIGRAM(S): 5 TABLET, FILM COATED ORAL at 11:36

## 2021-01-01 RX ADMIN — Medication 50 MILLIGRAM(S): at 17:31

## 2021-01-01 RX ADMIN — SODIUM CHLORIDE 50 MILLILITER(S): 5 INJECTION, SOLUTION INTRAVENOUS at 06:09

## 2021-01-01 RX ADMIN — Medication 20 MILLIEQUIVALENT(S): at 11:38

## 2021-01-01 RX ADMIN — Medication 40 MILLIGRAM(S): at 11:54

## 2021-01-01 RX ADMIN — Medication 650 MILLIGRAM(S): at 08:50

## 2021-01-01 RX ADMIN — Medication 7 UNIT(S): at 12:00

## 2021-01-01 RX ADMIN — Medication 1: at 12:21

## 2021-01-01 RX ADMIN — Medication 50 MILLIGRAM(S): at 17:38

## 2021-01-01 RX ADMIN — Medication 50 MILLIGRAM(S): at 13:24

## 2021-01-01 RX ADMIN — Medication 7 UNIT(S): at 09:29

## 2021-01-01 RX ADMIN — Medication 80 MILLIGRAM(S): at 20:45

## 2021-01-01 RX ADMIN — FINASTERIDE 5 MILLIGRAM(S): 5 TABLET, FILM COATED ORAL at 11:55

## 2021-01-01 RX ADMIN — ATORVASTATIN CALCIUM 20 MILLIGRAM(S): 80 TABLET, FILM COATED ORAL at 23:33

## 2021-01-01 RX ADMIN — CLOPIDOGREL BISULFATE 75 MILLIGRAM(S): 75 TABLET, FILM COATED ORAL at 08:30

## 2021-01-01 RX ADMIN — Medication 50 MILLIGRAM(S): at 15:19

## 2021-01-01 RX ADMIN — NYSTATIN CREAM 1 APPLICATION(S): 100000 CREAM TOPICAL at 05:34

## 2021-01-01 RX ADMIN — Medication 650 MILLIGRAM(S): at 22:01

## 2021-01-01 RX ADMIN — Medication 50 MILLIGRAM(S): at 14:10

## 2021-01-01 RX ADMIN — HEPARIN SODIUM 5000 UNIT(S): 5000 INJECTION INTRAVENOUS; SUBCUTANEOUS at 22:00

## 2021-01-01 RX ADMIN — Medication 40 MILLIEQUIVALENT(S): at 10:26

## 2021-01-01 RX ADMIN — GABAPENTIN 300 MILLIGRAM(S): 400 CAPSULE ORAL at 22:57

## 2021-01-01 RX ADMIN — Medication 5 MILLILITER(S): at 11:35

## 2021-01-01 RX ADMIN — Medication 50 MILLIGRAM(S): at 06:32

## 2021-01-01 RX ADMIN — GABAPENTIN 100 MILLIGRAM(S): 400 CAPSULE ORAL at 13:17

## 2021-01-01 RX ADMIN — Medication 20 MILLIEQUIVALENT(S): at 12:34

## 2021-01-01 RX ADMIN — TAMSULOSIN HYDROCHLORIDE 0.4 MILLIGRAM(S): 0.4 CAPSULE ORAL at 21:31

## 2021-01-01 RX ADMIN — ATORVASTATIN CALCIUM 20 MILLIGRAM(S): 80 TABLET, FILM COATED ORAL at 21:35

## 2021-01-01 RX ADMIN — TAMSULOSIN HYDROCHLORIDE 0.4 MILLIGRAM(S): 0.4 CAPSULE ORAL at 22:24

## 2021-01-01 RX ADMIN — Medication 1: at 11:54

## 2021-01-01 RX ADMIN — Medication 25 MILLIGRAM(S): at 18:20

## 2021-01-01 RX ADMIN — AMIODARONE HYDROCHLORIDE 200 MILLIGRAM(S): 400 TABLET ORAL at 05:42

## 2021-01-01 RX ADMIN — Medication 667 MILLIGRAM(S): at 08:13

## 2021-01-01 RX ADMIN — Medication 60 MILLIGRAM(S): at 05:46

## 2021-01-01 RX ADMIN — NYSTATIN CREAM 1 APPLICATION(S): 100000 CREAM TOPICAL at 17:00

## 2021-01-01 RX ADMIN — Medication 81 MILLIGRAM(S): at 12:17

## 2021-01-01 RX ADMIN — Medication 40 MILLIGRAM(S): at 18:20

## 2021-01-01 RX ADMIN — Medication 7 UNIT(S): at 16:58

## 2021-01-01 RX ADMIN — Medication 81 MILLIGRAM(S): at 11:58

## 2021-01-01 RX ADMIN — GABAPENTIN 100 MILLIGRAM(S): 400 CAPSULE ORAL at 05:37

## 2021-01-01 RX ADMIN — CALCITRIOL 0.25 MICROGRAM(S): 0.5 CAPSULE ORAL at 11:49

## 2021-01-01 RX ADMIN — Medication 667 MILLIGRAM(S): at 17:52

## 2021-01-01 RX ADMIN — HEPARIN SODIUM 5000 UNIT(S): 5000 INJECTION INTRAVENOUS; SUBCUTANEOUS at 05:33

## 2021-01-01 RX ADMIN — FINASTERIDE 5 MILLIGRAM(S): 5 TABLET, FILM COATED ORAL at 11:38

## 2021-01-01 RX ADMIN — AMIODARONE HYDROCHLORIDE 200 MILLIGRAM(S): 400 TABLET ORAL at 05:10

## 2021-01-01 RX ADMIN — Medication 667 MILLIGRAM(S): at 18:26

## 2021-01-01 RX ADMIN — Medication 1 MILLIGRAM(S): at 21:40

## 2021-01-01 RX ADMIN — Medication 20 MILLIGRAM(S): at 18:18

## 2021-01-01 RX ADMIN — SODIUM CHLORIDE 50 MILLILITER(S): 5 INJECTION, SOLUTION INTRAVENOUS at 18:17

## 2021-01-01 RX ADMIN — SPIRONOLACTONE 25 MILLIGRAM(S): 25 TABLET, FILM COATED ORAL at 05:26

## 2021-01-01 RX ADMIN — Medication 500 MILLIGRAM(S): at 05:37

## 2021-01-01 RX ADMIN — AMIODARONE HYDROCHLORIDE 200 MILLIGRAM(S): 400 TABLET ORAL at 17:52

## 2021-01-01 RX ADMIN — ATORVASTATIN CALCIUM 20 MILLIGRAM(S): 80 TABLET, FILM COATED ORAL at 21:51

## 2021-01-01 RX ADMIN — Medication 1 MILLIGRAM(S): at 22:00

## 2021-01-01 RX ADMIN — GABAPENTIN 100 MILLIGRAM(S): 400 CAPSULE ORAL at 21:21

## 2021-01-01 RX ADMIN — HEPARIN SODIUM 5000 UNIT(S): 5000 INJECTION INTRAVENOUS; SUBCUTANEOUS at 21:47

## 2021-01-01 RX ADMIN — GABAPENTIN 100 MILLIGRAM(S): 400 CAPSULE ORAL at 13:24

## 2021-01-01 RX ADMIN — HEPARIN SODIUM 5000 UNIT(S): 5000 INJECTION INTRAVENOUS; SUBCUTANEOUS at 14:02

## 2021-01-01 RX ADMIN — HEPARIN SODIUM 5000 UNIT(S): 5000 INJECTION INTRAVENOUS; SUBCUTANEOUS at 22:26

## 2021-01-01 RX ADMIN — NYSTATIN CREAM 1 APPLICATION(S): 100000 CREAM TOPICAL at 18:00

## 2021-01-01 RX ADMIN — FINASTERIDE 5 MILLIGRAM(S): 5 TABLET, FILM COATED ORAL at 12:21

## 2021-01-01 RX ADMIN — Medication 650 MILLIGRAM(S): at 17:06

## 2021-01-01 RX ADMIN — Medication 50 MILLIGRAM(S): at 05:38

## 2021-01-01 RX ADMIN — Medication 7 UNIT(S): at 18:52

## 2021-01-01 RX ADMIN — TAMSULOSIN HYDROCHLORIDE 0.4 MILLIGRAM(S): 0.4 CAPSULE ORAL at 22:20

## 2021-01-01 RX ADMIN — Medication 667 MILLIGRAM(S): at 13:17

## 2021-01-01 RX ADMIN — HEPARIN SODIUM 5000 UNIT(S): 5000 INJECTION INTRAVENOUS; SUBCUTANEOUS at 05:18

## 2021-01-01 RX ADMIN — Medication 100 MILLIGRAM(S): at 11:55

## 2021-01-01 RX ADMIN — Medication 500 MILLIGRAM(S): at 05:26

## 2021-01-01 RX ADMIN — Medication 650 MILLIGRAM(S): at 09:54

## 2021-01-01 RX ADMIN — CLOPIDOGREL BISULFATE 75 MILLIGRAM(S): 75 TABLET, FILM COATED ORAL at 12:01

## 2021-01-01 RX ADMIN — Medication 667 MILLIGRAM(S): at 11:40

## 2021-01-01 RX ADMIN — CLOPIDOGREL BISULFATE 75 MILLIGRAM(S): 75 TABLET, FILM COATED ORAL at 11:58

## 2021-01-01 RX ADMIN — Medication 40 MILLIGRAM(S): at 13:32

## 2021-01-01 RX ADMIN — Medication 500 MILLIGRAM(S): at 05:24

## 2021-01-01 RX ADMIN — Medication 50 MILLIGRAM(S): at 17:55

## 2021-01-01 RX ADMIN — GABAPENTIN 100 MILLIGRAM(S): 400 CAPSULE ORAL at 21:40

## 2021-01-01 RX ADMIN — HEPARIN SODIUM 5000 UNIT(S): 5000 INJECTION INTRAVENOUS; SUBCUTANEOUS at 05:46

## 2021-01-01 RX ADMIN — INSULIN GLARGINE 20 UNIT(S): 100 INJECTION, SOLUTION SUBCUTANEOUS at 21:47

## 2021-01-01 RX ADMIN — Medication 650 MILLIGRAM(S): at 22:13

## 2021-01-01 RX ADMIN — Medication 667 MILLIGRAM(S): at 12:17

## 2021-01-01 RX ADMIN — TAMSULOSIN HYDROCHLORIDE 0.4 MILLIGRAM(S): 0.4 CAPSULE ORAL at 21:28

## 2021-01-01 RX ADMIN — CLOPIDOGREL BISULFATE 75 MILLIGRAM(S): 75 TABLET, FILM COATED ORAL at 11:59

## 2021-01-01 RX ADMIN — HEPARIN SODIUM 5000 UNIT(S): 5000 INJECTION INTRAVENOUS; SUBCUTANEOUS at 05:34

## 2021-01-01 RX ADMIN — Medication 1: at 12:01

## 2021-01-01 RX ADMIN — CALCITRIOL 0.25 MICROGRAM(S): 0.5 CAPSULE ORAL at 11:36

## 2021-01-01 RX ADMIN — Medication 7 UNIT(S): at 17:05

## 2021-01-01 RX ADMIN — INSULIN GLARGINE 20 UNIT(S): 100 INJECTION, SOLUTION SUBCUTANEOUS at 21:27

## 2021-01-01 RX ADMIN — CALCITRIOL 0.25 MICROGRAM(S): 0.5 CAPSULE ORAL at 11:23

## 2021-01-01 RX ADMIN — Medication 100 MILLIGRAM(S): at 21:27

## 2021-01-01 RX ADMIN — Medication 7 UNIT(S): at 07:48

## 2021-01-01 RX ADMIN — Medication 667 MILLIGRAM(S): at 08:27

## 2021-01-01 RX ADMIN — Medication 50 MILLIGRAM(S): at 05:32

## 2021-01-01 RX ADMIN — CLOPIDOGREL BISULFATE 75 MILLIGRAM(S): 75 TABLET, FILM COATED ORAL at 13:01

## 2021-01-01 RX ADMIN — FINASTERIDE 5 MILLIGRAM(S): 5 TABLET, FILM COATED ORAL at 11:48

## 2021-01-01 RX ADMIN — BUMETANIDE 2 MILLIGRAM(S): 0.25 INJECTION INTRAMUSCULAR; INTRAVENOUS at 05:34

## 2021-01-01 RX ADMIN — Medication 500 MILLIGRAM(S): at 17:02

## 2021-01-01 RX ADMIN — Medication 50 MILLIGRAM(S): at 21:31

## 2021-01-01 RX ADMIN — AMIODARONE HYDROCHLORIDE 200 MILLIGRAM(S): 400 TABLET ORAL at 18:18

## 2021-01-01 RX ADMIN — Medication 7 UNIT(S): at 12:09

## 2021-01-01 RX ADMIN — Medication 10 MILLILITER(S): at 17:07

## 2021-01-01 RX ADMIN — Medication 81 MILLIGRAM(S): at 11:55

## 2021-01-01 RX ADMIN — Medication 7 UNIT(S): at 07:59

## 2021-01-01 RX ADMIN — Medication 1: at 11:49

## 2021-01-01 RX ADMIN — CHLORHEXIDINE GLUCONATE 1 APPLICATION(S): 213 SOLUTION TOPICAL at 13:18

## 2021-01-01 RX ADMIN — CHLORHEXIDINE GLUCONATE 1 APPLICATION(S): 213 SOLUTION TOPICAL at 12:13

## 2021-01-01 RX ADMIN — Medication 10 MILLILITER(S): at 12:39

## 2021-01-01 RX ADMIN — Medication 1: at 12:09

## 2021-01-01 RX ADMIN — ATORVASTATIN CALCIUM 20 MILLIGRAM(S): 80 TABLET, FILM COATED ORAL at 21:33

## 2021-01-01 RX ADMIN — Medication 650 MILLIGRAM(S): at 00:39

## 2021-01-01 RX ADMIN — NYSTATIN CREAM 1 APPLICATION(S): 100000 CREAM TOPICAL at 18:37

## 2021-01-01 RX ADMIN — AMIODARONE HYDROCHLORIDE 200 MILLIGRAM(S): 400 TABLET ORAL at 06:31

## 2021-01-01 RX ADMIN — ATORVASTATIN CALCIUM 20 MILLIGRAM(S): 80 TABLET, FILM COATED ORAL at 21:21

## 2021-01-01 RX ADMIN — Medication 7 UNIT(S): at 17:54

## 2021-01-01 RX ADMIN — HEPARIN SODIUM 5000 UNIT(S): 5000 INJECTION INTRAVENOUS; SUBCUTANEOUS at 05:05

## 2021-01-01 RX ADMIN — Medication 7 UNIT(S): at 07:57

## 2021-01-01 RX ADMIN — ATORVASTATIN CALCIUM 20 MILLIGRAM(S): 80 TABLET, FILM COATED ORAL at 22:52

## 2021-01-01 RX ADMIN — Medication 60 MILLIGRAM(S): at 17:02

## 2021-01-01 RX ADMIN — FINASTERIDE 5 MILLIGRAM(S): 5 TABLET, FILM COATED ORAL at 12:25

## 2021-01-01 RX ADMIN — Medication 1 MILLIGRAM(S): at 21:21

## 2021-01-01 RX ADMIN — Medication 7 UNIT(S): at 17:28

## 2021-01-01 RX ADMIN — NYSTATIN CREAM 1 APPLICATION(S): 100000 CREAM TOPICAL at 17:15

## 2021-01-01 RX ADMIN — Medication 500 MILLIGRAM(S): at 17:03

## 2021-01-01 RX ADMIN — Medication 10 MILLILITER(S): at 23:42

## 2021-01-01 RX ADMIN — Medication 667 MILLIGRAM(S): at 17:05

## 2021-01-01 RX ADMIN — AMIODARONE HYDROCHLORIDE 200 MILLIGRAM(S): 400 TABLET ORAL at 17:43

## 2021-01-01 RX ADMIN — Medication 667 MILLIGRAM(S): at 11:57

## 2021-01-01 RX ADMIN — Medication 667 MILLIGRAM(S): at 08:08

## 2021-01-01 RX ADMIN — Medication 81 MILLIGRAM(S): at 11:47

## 2021-01-01 RX ADMIN — Medication 650 MILLIGRAM(S): at 05:24

## 2021-01-01 RX ADMIN — Medication 25 MILLIGRAM(S): at 05:46

## 2021-01-01 RX ADMIN — Medication 80 MILLIGRAM(S): at 21:27

## 2021-01-01 RX ADMIN — Medication 10 MILLILITER(S): at 06:00

## 2021-01-01 RX ADMIN — Medication 650 MILLIGRAM(S): at 22:56

## 2021-01-01 RX ADMIN — Medication 667 MILLIGRAM(S): at 08:21

## 2021-01-01 RX ADMIN — Medication 50 MILLIEQUIVALENT(S): at 07:53

## 2021-01-01 RX ADMIN — Medication 25 MILLIGRAM(S): at 13:35

## 2021-01-01 RX ADMIN — HEPARIN SODIUM 5000 UNIT(S): 5000 INJECTION INTRAVENOUS; SUBCUTANEOUS at 16:04

## 2021-01-01 RX ADMIN — ATORVASTATIN CALCIUM 20 MILLIGRAM(S): 80 TABLET, FILM COATED ORAL at 21:31

## 2021-01-01 RX ADMIN — Medication 667 MILLIGRAM(S): at 11:49

## 2021-01-01 RX ADMIN — SPIRONOLACTONE 25 MILLIGRAM(S): 25 TABLET, FILM COATED ORAL at 05:05

## 2021-01-01 RX ADMIN — Medication 667 MILLIGRAM(S): at 11:58

## 2021-01-01 RX ADMIN — GABAPENTIN 300 MILLIGRAM(S): 400 CAPSULE ORAL at 22:29

## 2021-01-01 RX ADMIN — HEPARIN SODIUM 5000 UNIT(S): 5000 INJECTION INTRAVENOUS; SUBCUTANEOUS at 14:29

## 2021-01-01 RX ADMIN — Medication 650 MILLIGRAM(S): at 10:40

## 2021-01-01 RX ADMIN — BUMETANIDE 2 MILLIGRAM(S): 0.25 INJECTION INTRAMUSCULAR; INTRAVENOUS at 05:38

## 2021-01-01 RX ADMIN — AZITHROMYCIN 255 MILLIGRAM(S): 500 TABLET, FILM COATED ORAL at 11:48

## 2021-01-01 RX ADMIN — INSULIN GLARGINE 20 UNIT(S): 100 INJECTION, SOLUTION SUBCUTANEOUS at 21:52

## 2021-01-01 RX ADMIN — CLOPIDOGREL BISULFATE 300 MILLIGRAM(S): 75 TABLET, FILM COATED ORAL at 21:58

## 2021-01-01 RX ADMIN — Medication 7 UNIT(S): at 17:32

## 2021-01-01 RX ADMIN — CALCITRIOL 0.25 MICROGRAM(S): 0.5 CAPSULE ORAL at 11:58

## 2021-01-01 RX ADMIN — Medication 7 UNIT(S): at 17:04

## 2021-01-01 RX ADMIN — Medication 25 MILLIGRAM(S): at 06:16

## 2021-01-01 RX ADMIN — Medication 80 MILLIGRAM(S): at 05:05

## 2021-01-01 RX ADMIN — NYSTATIN CREAM 1 APPLICATION(S): 100000 CREAM TOPICAL at 05:45

## 2021-01-01 RX ADMIN — HEPARIN SODIUM 5000 UNIT(S): 5000 INJECTION INTRAVENOUS; SUBCUTANEOUS at 21:13

## 2021-01-01 RX ADMIN — TAMSULOSIN HYDROCHLORIDE 0.4 MILLIGRAM(S): 0.4 CAPSULE ORAL at 23:33

## 2021-01-01 RX ADMIN — Medication 650 MILLIGRAM(S): at 23:03

## 2021-01-01 RX ADMIN — Medication 7 UNIT(S): at 17:22

## 2021-01-01 RX ADMIN — Medication 7 UNIT(S): at 17:44

## 2021-01-01 RX ADMIN — Medication 80 MILLIGRAM(S): at 05:45

## 2021-01-01 RX ADMIN — Medication 667 MILLIGRAM(S): at 18:47

## 2021-01-01 RX ADMIN — Medication 7 UNIT(S): at 17:38

## 2021-01-01 RX ADMIN — Medication 25 MILLIGRAM(S): at 17:08

## 2021-01-01 RX ADMIN — GABAPENTIN 100 MILLIGRAM(S): 400 CAPSULE ORAL at 05:35

## 2021-01-01 RX ADMIN — FINASTERIDE 5 MILLIGRAM(S): 5 TABLET, FILM COATED ORAL at 13:01

## 2021-01-01 RX ADMIN — GABAPENTIN 100 MILLIGRAM(S): 400 CAPSULE ORAL at 13:47

## 2021-01-01 RX ADMIN — Medication 40 MILLIGRAM(S): at 14:27

## 2021-01-01 RX ADMIN — Medication 60 MILLIGRAM(S): at 05:26

## 2021-01-01 RX ADMIN — ATORVASTATIN CALCIUM 20 MILLIGRAM(S): 80 TABLET, FILM COATED ORAL at 21:40

## 2021-01-01 RX ADMIN — ADENOSINE 600 MILLIGRAM(S): 3 INJECTION INTRAVENOUS at 16:15

## 2021-01-01 RX ADMIN — AMIODARONE HYDROCHLORIDE 600 MILLIGRAM(S): 400 TABLET ORAL at 19:12

## 2021-01-01 RX ADMIN — Medication 20 MILLIGRAM(S): at 06:33

## 2021-01-01 RX ADMIN — AMIODARONE HYDROCHLORIDE 200 MILLIGRAM(S): 400 TABLET ORAL at 06:33

## 2021-01-01 RX ADMIN — Medication 324 MILLIGRAM(S): at 13:32

## 2021-01-01 RX ADMIN — Medication 667 MILLIGRAM(S): at 09:48

## 2021-01-01 RX ADMIN — ATORVASTATIN CALCIUM 20 MILLIGRAM(S): 80 TABLET, FILM COATED ORAL at 22:00

## 2021-01-01 RX ADMIN — Medication 1: at 11:44

## 2021-01-01 RX ADMIN — Medication 667 MILLIGRAM(S): at 12:21

## 2021-01-01 RX ADMIN — FINASTERIDE 5 MILLIGRAM(S): 5 TABLET, FILM COATED ORAL at 11:23

## 2021-01-01 RX ADMIN — GABAPENTIN 100 MILLIGRAM(S): 400 CAPSULE ORAL at 22:28

## 2021-01-01 RX ADMIN — Medication 650 MILLIGRAM(S): at 16:00

## 2021-01-01 RX ADMIN — OXYCODONE AND ACETAMINOPHEN 1 TABLET(S): 5; 325 TABLET ORAL at 12:18

## 2021-01-01 RX ADMIN — TAMSULOSIN HYDROCHLORIDE 0.4 MILLIGRAM(S): 0.4 CAPSULE ORAL at 21:42

## 2021-01-01 RX ADMIN — Medication 80 MILLIGRAM(S): at 05:08

## 2021-01-01 RX ADMIN — Medication 80 MILLIGRAM(S): at 17:59

## 2021-01-01 RX ADMIN — Medication 50 MILLIEQUIVALENT(S): at 03:41

## 2021-01-01 RX ADMIN — INSULIN GLARGINE 20 UNIT(S): 100 INJECTION, SOLUTION SUBCUTANEOUS at 21:34

## 2021-01-01 RX ADMIN — Medication 650 MILLIGRAM(S): at 00:00

## 2021-01-01 RX ADMIN — Medication 650 MILLIGRAM(S): at 21:43

## 2021-01-01 RX ADMIN — AMIODARONE HYDROCHLORIDE 200 MILLIGRAM(S): 400 TABLET ORAL at 06:30

## 2021-01-01 RX ADMIN — Medication 667 MILLIGRAM(S): at 08:04

## 2021-01-01 RX ADMIN — Medication 50 MILLIGRAM(S): at 21:40

## 2021-01-01 RX ADMIN — TAMSULOSIN HYDROCHLORIDE 0.4 MILLIGRAM(S): 0.4 CAPSULE ORAL at 21:30

## 2021-01-01 RX ADMIN — Medication 50 MILLIGRAM(S): at 13:05

## 2021-01-01 RX ADMIN — INSULIN GLARGINE 20 UNIT(S): 100 INJECTION, SOLUTION SUBCUTANEOUS at 21:56

## 2021-01-01 RX ADMIN — GABAPENTIN 100 MILLIGRAM(S): 400 CAPSULE ORAL at 14:28

## 2021-01-01 RX ADMIN — Medication 667 MILLIGRAM(S): at 08:30

## 2021-01-01 RX ADMIN — Medication 667 MILLIGRAM(S): at 12:33

## 2021-01-01 RX ADMIN — Medication 667 MILLIGRAM(S): at 17:34

## 2021-01-01 RX ADMIN — Medication 12.5 MILLIGRAM(S): at 17:30

## 2021-01-01 RX ADMIN — INSULIN GLARGINE 20 UNIT(S): 100 INJECTION, SOLUTION SUBCUTANEOUS at 21:28

## 2021-01-01 RX ADMIN — Medication 1: at 15:47

## 2021-01-01 RX ADMIN — Medication 20 MILLIGRAM(S): at 05:37

## 2021-01-01 RX ADMIN — Medication 7 UNIT(S): at 11:50

## 2021-01-01 RX ADMIN — GABAPENTIN 300 MILLIGRAM(S): 400 CAPSULE ORAL at 21:31

## 2021-01-01 RX ADMIN — Medication 7 UNIT(S): at 12:27

## 2021-01-01 RX ADMIN — Medication 25 MILLIGRAM(S): at 13:01

## 2021-01-01 RX ADMIN — INSULIN GLARGINE 20 UNIT(S): 100 INJECTION, SOLUTION SUBCUTANEOUS at 22:51

## 2021-01-01 RX ADMIN — HEPARIN SODIUM 5000 UNIT(S): 5000 INJECTION INTRAVENOUS; SUBCUTANEOUS at 06:14

## 2021-01-01 RX ADMIN — TAMSULOSIN HYDROCHLORIDE 0.4 MILLIGRAM(S): 0.4 CAPSULE ORAL at 21:40

## 2021-01-01 RX ADMIN — NYSTATIN CREAM 1 APPLICATION(S): 100000 CREAM TOPICAL at 17:22

## 2021-01-01 RX ADMIN — Medication 40 MILLIGRAM(S): at 06:14

## 2021-01-01 RX ADMIN — GABAPENTIN 100 MILLIGRAM(S): 400 CAPSULE ORAL at 11:55

## 2021-01-01 RX ADMIN — AMIODARONE HYDROCHLORIDE 200 MILLIGRAM(S): 400 TABLET ORAL at 17:55

## 2021-01-01 RX ADMIN — HEPARIN SODIUM 5000 UNIT(S): 5000 INJECTION INTRAVENOUS; SUBCUTANEOUS at 21:57

## 2021-01-01 RX ADMIN — BUMETANIDE 2 MILLIGRAM(S): 0.25 INJECTION INTRAMUSCULAR; INTRAVENOUS at 18:32

## 2021-01-01 RX ADMIN — Medication 40 MILLIGRAM(S): at 06:16

## 2021-01-01 RX ADMIN — Medication 7 UNIT(S): at 17:46

## 2021-01-01 RX ADMIN — FINASTERIDE 5 MILLIGRAM(S): 5 TABLET, FILM COATED ORAL at 14:27

## 2021-01-01 RX ADMIN — Medication 650 MILLIGRAM(S): at 22:03

## 2021-01-01 RX ADMIN — Medication 500 MILLIGRAM(S): at 17:07

## 2021-01-01 RX ADMIN — HEPARIN SODIUM 5000 UNIT(S): 5000 INJECTION INTRAVENOUS; SUBCUTANEOUS at 07:15

## 2021-01-01 RX ADMIN — Medication 20 MILLIEQUIVALENT(S): at 18:56

## 2021-01-01 RX ADMIN — TAMSULOSIN HYDROCHLORIDE 0.4 MILLIGRAM(S): 0.4 CAPSULE ORAL at 21:38

## 2021-01-01 RX ADMIN — HEPARIN SODIUM 5000 UNIT(S): 5000 INJECTION INTRAVENOUS; SUBCUTANEOUS at 06:08

## 2021-01-01 RX ADMIN — HEPARIN SODIUM 5000 UNIT(S): 5000 INJECTION INTRAVENOUS; SUBCUTANEOUS at 21:12

## 2021-01-01 RX ADMIN — TAMSULOSIN HYDROCHLORIDE 0.4 MILLIGRAM(S): 0.4 CAPSULE ORAL at 22:57

## 2021-01-01 RX ADMIN — HEPARIN SODIUM 5000 UNIT(S): 5000 INJECTION INTRAVENOUS; SUBCUTANEOUS at 06:33

## 2021-01-01 RX ADMIN — Medication 7 UNIT(S): at 11:52

## 2021-01-01 RX ADMIN — Medication 1 APPLICATION(S): at 11:55

## 2021-01-01 RX ADMIN — FINASTERIDE 5 MILLIGRAM(S): 5 TABLET, FILM COATED ORAL at 12:24

## 2021-01-01 RX ADMIN — CHLORHEXIDINE GLUCONATE 1 APPLICATION(S): 213 SOLUTION TOPICAL at 12:25

## 2021-01-01 RX ADMIN — AMIODARONE HYDROCHLORIDE 200 MILLIGRAM(S): 400 TABLET ORAL at 06:25

## 2021-01-01 RX ADMIN — NYSTATIN CREAM 1 APPLICATION(S): 100000 CREAM TOPICAL at 05:38

## 2021-01-01 RX ADMIN — INSULIN GLARGINE 20 UNIT(S): 100 INJECTION, SOLUTION SUBCUTANEOUS at 22:55

## 2021-01-01 RX ADMIN — Medication 80 MILLIGRAM(S): at 13:01

## 2021-01-01 RX ADMIN — ATORVASTATIN CALCIUM 20 MILLIGRAM(S): 80 TABLET, FILM COATED ORAL at 22:28

## 2021-01-01 RX ADMIN — TAMSULOSIN HYDROCHLORIDE 0.4 MILLIGRAM(S): 0.4 CAPSULE ORAL at 21:57

## 2021-01-01 RX ADMIN — HEPARIN SODIUM 5000 UNIT(S): 5000 INJECTION INTRAVENOUS; SUBCUTANEOUS at 13:24

## 2021-01-01 RX ADMIN — GABAPENTIN 100 MILLIGRAM(S): 400 CAPSULE ORAL at 11:23

## 2021-01-01 RX ADMIN — Medication 667 MILLIGRAM(S): at 08:26

## 2021-01-01 RX ADMIN — Medication 7 UNIT(S): at 12:23

## 2021-01-01 RX ADMIN — GABAPENTIN 100 MILLIGRAM(S): 400 CAPSULE ORAL at 13:22

## 2021-01-01 RX ADMIN — INSULIN GLARGINE 20 UNIT(S): 100 INJECTION, SOLUTION SUBCUTANEOUS at 22:24

## 2021-01-01 RX ADMIN — Medication 50 MILLIGRAM(S): at 06:07

## 2021-01-01 RX ADMIN — TAMSULOSIN HYDROCHLORIDE 0.4 MILLIGRAM(S): 0.4 CAPSULE ORAL at 21:35

## 2021-01-01 RX ADMIN — Medication 80 MILLIGRAM(S): at 05:37

## 2021-01-01 RX ADMIN — ATORVASTATIN CALCIUM 20 MILLIGRAM(S): 80 TABLET, FILM COATED ORAL at 21:12

## 2021-01-01 RX ADMIN — GABAPENTIN 100 MILLIGRAM(S): 400 CAPSULE ORAL at 12:25

## 2021-01-01 RX ADMIN — Medication 50 MILLIGRAM(S): at 05:24

## 2021-01-01 RX ADMIN — ATORVASTATIN CALCIUM 20 MILLIGRAM(S): 80 TABLET, FILM COATED ORAL at 21:13

## 2021-01-01 RX ADMIN — SODIUM CHLORIDE 50 MILLILITER(S): 5 INJECTION, SOLUTION INTRAVENOUS at 06:01

## 2021-01-01 RX ADMIN — Medication 40 MILLIEQUIVALENT(S): at 13:18

## 2021-01-01 RX ADMIN — AMIODARONE HYDROCHLORIDE 33.3 MG/MIN: 400 TABLET ORAL at 19:16

## 2021-01-01 RX ADMIN — INSULIN GLARGINE 20 UNIT(S): 100 INJECTION, SOLUTION SUBCUTANEOUS at 22:05

## 2021-01-01 RX ADMIN — FINASTERIDE 5 MILLIGRAM(S): 5 TABLET, FILM COATED ORAL at 12:39

## 2021-01-01 RX ADMIN — GABAPENTIN 100 MILLIGRAM(S): 400 CAPSULE ORAL at 22:51

## 2021-01-01 RX ADMIN — GABAPENTIN 100 MILLIGRAM(S): 400 CAPSULE ORAL at 05:24

## 2021-01-01 RX ADMIN — SODIUM CHLORIDE 65 MILLILITER(S): 9 INJECTION INTRAMUSCULAR; INTRAVENOUS; SUBCUTANEOUS at 04:31

## 2021-01-01 RX ADMIN — SODIUM CHLORIDE 75 MILLILITER(S): 9 INJECTION INTRAMUSCULAR; INTRAVENOUS; SUBCUTANEOUS at 15:11

## 2021-01-01 RX ADMIN — Medication 667 MILLIGRAM(S): at 13:18

## 2021-01-01 RX ADMIN — GABAPENTIN 100 MILLIGRAM(S): 400 CAPSULE ORAL at 21:12

## 2021-01-01 RX ADMIN — CHLORHEXIDINE GLUCONATE 1 APPLICATION(S): 213 SOLUTION TOPICAL at 12:16

## 2021-01-01 RX ADMIN — INSULIN GLARGINE 20 UNIT(S): 100 INJECTION, SOLUTION SUBCUTANEOUS at 22:00

## 2021-01-01 RX ADMIN — Medication 667 MILLIGRAM(S): at 08:02

## 2021-01-01 RX ADMIN — Medication 2: at 09:29

## 2021-01-01 RX ADMIN — Medication 1 MILLIGRAM(S): at 23:41

## 2021-01-01 RX ADMIN — Medication 650 MILLIGRAM(S): at 19:31

## 2021-01-01 RX ADMIN — Medication 667 MILLIGRAM(S): at 07:55

## 2021-01-01 RX ADMIN — Medication 650 MILLIGRAM(S): at 14:27

## 2021-01-01 RX ADMIN — HEPARIN SODIUM 5000 UNIT(S): 5000 INJECTION INTRAVENOUS; SUBCUTANEOUS at 21:34

## 2021-01-01 RX ADMIN — HEPARIN SODIUM 5000 UNIT(S): 5000 INJECTION INTRAVENOUS; SUBCUTANEOUS at 21:39

## 2021-01-01 RX ADMIN — Medication 1: at 12:15

## 2021-01-01 RX ADMIN — HEPARIN SODIUM 5000 UNIT(S): 5000 INJECTION INTRAVENOUS; SUBCUTANEOUS at 06:00

## 2021-01-01 RX ADMIN — Medication 10 MILLILITER(S): at 17:45

## 2021-01-01 RX ADMIN — GABAPENTIN 100 MILLIGRAM(S): 400 CAPSULE ORAL at 14:10

## 2021-01-01 RX ADMIN — ATORVASTATIN CALCIUM 20 MILLIGRAM(S): 80 TABLET, FILM COATED ORAL at 21:47

## 2021-01-01 RX ADMIN — ATORVASTATIN CALCIUM 20 MILLIGRAM(S): 80 TABLET, FILM COATED ORAL at 21:42

## 2021-01-01 RX ADMIN — Medication 667 MILLIGRAM(S): at 17:22

## 2021-01-01 RX ADMIN — Medication 650 MILLIGRAM(S): at 17:07

## 2021-01-01 RX ADMIN — GABAPENTIN 100 MILLIGRAM(S): 400 CAPSULE ORAL at 21:39

## 2021-01-01 RX ADMIN — Medication 1 APPLICATION(S): at 14:20

## 2021-01-01 RX ADMIN — Medication 7 UNIT(S): at 08:12

## 2021-01-01 RX ADMIN — Medication 40 MILLIEQUIVALENT(S): at 13:57

## 2021-01-01 RX ADMIN — Medication 25 MILLIGRAM(S): at 18:17

## 2021-01-01 RX ADMIN — Medication 667 MILLIGRAM(S): at 12:39

## 2021-01-01 RX ADMIN — Medication 667 MILLIGRAM(S): at 17:08

## 2021-01-01 RX ADMIN — Medication 12.5 MILLIGRAM(S): at 17:07

## 2021-01-01 RX ADMIN — NYSTATIN CREAM 1 APPLICATION(S): 100000 CREAM TOPICAL at 05:35

## 2021-01-01 RX ADMIN — Medication 2: at 12:26

## 2021-01-01 RX ADMIN — Medication 500 MILLIGRAM(S): at 05:05

## 2021-01-01 RX ADMIN — ATORVASTATIN CALCIUM 20 MILLIGRAM(S): 80 TABLET, FILM COATED ORAL at 22:20

## 2021-01-01 RX ADMIN — Medication 650 MILLIGRAM(S): at 13:57

## 2021-01-01 RX ADMIN — AMIODARONE HYDROCHLORIDE 200 MILLIGRAM(S): 400 TABLET ORAL at 05:17

## 2021-01-01 RX ADMIN — CALCITRIOL 0.25 MICROGRAM(S): 0.5 CAPSULE ORAL at 12:32

## 2021-01-01 RX ADMIN — FINASTERIDE 5 MILLIGRAM(S): 5 TABLET, FILM COATED ORAL at 12:11

## 2021-01-01 RX ADMIN — GABAPENTIN 100 MILLIGRAM(S): 400 CAPSULE ORAL at 05:05

## 2021-01-01 RX ADMIN — Medication 650 MILLIGRAM(S): at 20:54

## 2021-01-01 RX ADMIN — Medication 12.5 MILLIGRAM(S): at 06:00

## 2021-01-01 RX ADMIN — Medication 1: at 17:09

## 2021-01-01 RX ADMIN — Medication 1 MILLIGRAM(S): at 21:31

## 2021-01-01 RX ADMIN — AMIODARONE HYDROCHLORIDE 200 MILLIGRAM(S): 400 TABLET ORAL at 19:07

## 2021-01-01 RX ADMIN — GABAPENTIN 100 MILLIGRAM(S): 400 CAPSULE ORAL at 14:05

## 2021-01-01 RX ADMIN — Medication 50 MILLIGRAM(S): at 05:26

## 2021-01-01 RX ADMIN — Medication 667 MILLIGRAM(S): at 17:23

## 2021-01-01 RX ADMIN — Medication 650 MILLIGRAM(S): at 13:43

## 2021-01-01 RX ADMIN — AMIODARONE HYDROCHLORIDE 200 MILLIGRAM(S): 400 TABLET ORAL at 06:16

## 2021-01-01 RX ADMIN — Medication 1 APPLICATION(S): at 14:08

## 2021-01-01 RX ADMIN — Medication 50 MILLIGRAM(S): at 17:14

## 2021-01-01 RX ADMIN — Medication 1: at 12:31

## 2021-01-01 RX ADMIN — Medication 667 MILLIGRAM(S): at 19:15

## 2021-01-01 RX ADMIN — Medication 667 MILLIGRAM(S): at 07:57

## 2021-01-01 RX ADMIN — Medication 1 APPLICATION(S): at 11:57

## 2021-01-01 RX ADMIN — GABAPENTIN 300 MILLIGRAM(S): 400 CAPSULE ORAL at 23:33

## 2021-01-01 RX ADMIN — Medication 7 UNIT(S): at 16:47

## 2021-01-01 RX ADMIN — HEPARIN SODIUM 5000 UNIT(S): 5000 INJECTION INTRAVENOUS; SUBCUTANEOUS at 23:11

## 2021-01-01 RX ADMIN — NYSTATIN CREAM 1 APPLICATION(S): 100000 CREAM TOPICAL at 05:26

## 2021-01-01 RX ADMIN — Medication 7 UNIT(S): at 12:19

## 2021-01-01 RX ADMIN — Medication 667 MILLIGRAM(S): at 17:31

## 2021-01-01 RX ADMIN — Medication 667 MILLIGRAM(S): at 16:46

## 2021-01-01 RX ADMIN — GABAPENTIN 100 MILLIGRAM(S): 400 CAPSULE ORAL at 21:27

## 2021-01-01 RX ADMIN — TAMSULOSIN HYDROCHLORIDE 0.4 MILLIGRAM(S): 0.4 CAPSULE ORAL at 21:39

## 2021-01-01 RX ADMIN — Medication 50 MILLIGRAM(S): at 05:11

## 2021-01-01 RX ADMIN — Medication 50 MILLIGRAM(S): at 14:13

## 2021-01-01 RX ADMIN — Medication 667 MILLIGRAM(S): at 07:47

## 2021-01-01 RX ADMIN — Medication 81 MILLIGRAM(S): at 13:18

## 2021-01-01 RX ADMIN — Medication 650 MILLIGRAM(S): at 15:30

## 2021-01-01 RX ADMIN — ATORVASTATIN CALCIUM 20 MILLIGRAM(S): 80 TABLET, FILM COATED ORAL at 21:39

## 2021-01-01 RX ADMIN — Medication 50 MILLIGRAM(S): at 13:46

## 2021-01-01 RX ADMIN — Medication 667 MILLIGRAM(S): at 18:18

## 2021-01-01 RX ADMIN — GABAPENTIN 300 MILLIGRAM(S): 400 CAPSULE ORAL at 22:55

## 2021-01-01 RX ADMIN — FINASTERIDE 5 MILLIGRAM(S): 5 TABLET, FILM COATED ORAL at 11:57

## 2021-01-01 RX ADMIN — Medication 667 MILLIGRAM(S): at 11:55

## 2021-01-01 RX ADMIN — GABAPENTIN 300 MILLIGRAM(S): 400 CAPSULE ORAL at 21:13

## 2021-01-01 RX ADMIN — Medication 650 MILLIGRAM(S): at 21:12

## 2021-01-01 RX ADMIN — HEPARIN SODIUM 5000 UNIT(S): 5000 INJECTION INTRAVENOUS; SUBCUTANEOUS at 22:52

## 2021-01-01 RX ADMIN — Medication 20 MILLIGRAM(S): at 06:25

## 2021-01-01 RX ADMIN — Medication 25 MILLIGRAM(S): at 05:26

## 2021-01-01 RX ADMIN — GABAPENTIN 100 MILLIGRAM(S): 400 CAPSULE ORAL at 05:33

## 2021-01-01 RX ADMIN — SPIRONOLACTONE 25 MILLIGRAM(S): 25 TABLET, FILM COATED ORAL at 18:30

## 2021-01-01 RX ADMIN — Medication 1 APPLICATION(S): at 11:36

## 2021-01-01 RX ADMIN — Medication 7 UNIT(S): at 17:16

## 2021-01-01 RX ADMIN — Medication 7 UNIT(S): at 12:59

## 2021-01-01 RX ADMIN — Medication 650 MILLIGRAM(S): at 14:40

## 2021-01-01 RX ADMIN — Medication 12.5 MILLIGRAM(S): at 12:34

## 2021-01-01 RX ADMIN — Medication 667 MILLIGRAM(S): at 12:24

## 2021-01-01 RX ADMIN — Medication 667 MILLIGRAM(S): at 10:17

## 2021-01-01 RX ADMIN — GABAPENTIN 100 MILLIGRAM(S): 400 CAPSULE ORAL at 21:31

## 2021-01-01 RX ADMIN — Medication 60 MILLIGRAM(S): at 05:38

## 2021-01-01 RX ADMIN — Medication 650 MILLIGRAM(S): at 18:53

## 2021-01-01 RX ADMIN — Medication 650 MILLIGRAM(S): at 21:37

## 2021-01-01 RX ADMIN — SODIUM CHLORIDE 50 MILLILITER(S): 5 INJECTION, SOLUTION INTRAVENOUS at 18:09

## 2021-01-01 RX ADMIN — Medication 80 MILLIGRAM(S): at 21:47

## 2021-01-01 RX ADMIN — BUMETANIDE 2 MILLIGRAM(S): 0.25 INJECTION INTRAMUSCULAR; INTRAVENOUS at 06:07

## 2021-01-01 RX ADMIN — Medication 650 MILLIGRAM(S): at 05:40

## 2021-01-01 RX ADMIN — Medication 1 MILLIGRAM(S): at 21:28

## 2021-01-01 RX ADMIN — Medication 1 APPLICATION(S): at 11:30

## 2021-01-01 RX ADMIN — Medication 25 MILLIGRAM(S): at 05:08

## 2021-01-01 RX ADMIN — Medication 1 APPLICATION(S): at 13:17

## 2021-01-01 RX ADMIN — Medication 2: at 12:19

## 2021-01-01 RX ADMIN — Medication 50 MILLIGRAM(S): at 17:03

## 2021-01-01 RX ADMIN — GABAPENTIN 100 MILLIGRAM(S): 400 CAPSULE ORAL at 21:35

## 2021-01-01 RX ADMIN — FINASTERIDE 5 MILLIGRAM(S): 5 TABLET, FILM COATED ORAL at 12:01

## 2021-01-01 RX ADMIN — Medication 667 MILLIGRAM(S): at 17:58

## 2021-01-01 RX ADMIN — Medication 20 MILLIEQUIVALENT(S): at 12:16

## 2021-01-01 RX ADMIN — Medication 667 MILLIGRAM(S): at 17:38

## 2021-01-01 RX ADMIN — Medication 650 MILLIGRAM(S): at 17:57

## 2021-01-01 RX ADMIN — Medication 650 MILLIGRAM(S): at 23:29

## 2021-01-01 RX ADMIN — Medication 667 MILLIGRAM(S): at 13:00

## 2021-01-01 RX ADMIN — Medication 81 MILLIGRAM(S): at 11:59

## 2021-01-01 RX ADMIN — Medication 1: at 12:03

## 2021-01-01 RX ADMIN — Medication 1 APPLICATION(S): at 11:43

## 2021-01-01 RX ADMIN — FINASTERIDE 5 MILLIGRAM(S): 5 TABLET, FILM COATED ORAL at 11:42

## 2021-01-01 RX ADMIN — Medication 7 UNIT(S): at 12:13

## 2021-01-01 RX ADMIN — Medication 12.5 MILLIGRAM(S): at 19:20

## 2021-01-01 RX ADMIN — Medication 500 MILLIGRAM(S): at 17:10

## 2021-01-01 RX ADMIN — Medication 650 MILLIGRAM(S): at 11:59

## 2021-01-01 RX ADMIN — AMIODARONE HYDROCHLORIDE 200 MILLIGRAM(S): 400 TABLET ORAL at 17:30

## 2021-01-01 RX ADMIN — GABAPENTIN 100 MILLIGRAM(S): 400 CAPSULE ORAL at 12:02

## 2021-01-01 RX ADMIN — CHLORHEXIDINE GLUCONATE 1 APPLICATION(S): 213 SOLUTION TOPICAL at 05:05

## 2021-01-01 RX ADMIN — Medication 20 MILLIEQUIVALENT(S): at 01:31

## 2021-01-01 RX ADMIN — Medication 50 MILLIGRAM(S): at 06:31

## 2021-01-01 RX ADMIN — SODIUM CHLORIDE 50 MILLILITER(S): 5 INJECTION, SOLUTION INTRAVENOUS at 18:36

## 2021-01-01 RX ADMIN — CHLORHEXIDINE GLUCONATE 1 APPLICATION(S): 213 SOLUTION TOPICAL at 05:08

## 2021-01-01 RX ADMIN — GABAPENTIN 100 MILLIGRAM(S): 400 CAPSULE ORAL at 05:38

## 2021-01-01 RX ADMIN — GABAPENTIN 100 MILLIGRAM(S): 400 CAPSULE ORAL at 12:17

## 2021-01-01 RX ADMIN — Medication 12.5 MILLIGRAM(S): at 05:31

## 2021-01-01 RX ADMIN — ATORVASTATIN CALCIUM 20 MILLIGRAM(S): 80 TABLET, FILM COATED ORAL at 22:12

## 2021-01-01 RX ADMIN — GABAPENTIN 100 MILLIGRAM(S): 400 CAPSULE ORAL at 06:14

## 2021-01-01 RX ADMIN — Medication 667 MILLIGRAM(S): at 17:10

## 2021-01-01 RX ADMIN — Medication 12.5 MILLIGRAM(S): at 17:22

## 2021-01-01 RX ADMIN — BUMETANIDE 2 MILLIGRAM(S): 0.25 INJECTION INTRAMUSCULAR; INTRAVENOUS at 21:11

## 2021-01-01 RX ADMIN — SPIRONOLACTONE 25 MILLIGRAM(S): 25 TABLET, FILM COATED ORAL at 05:33

## 2021-01-01 RX ADMIN — Medication 667 MILLIGRAM(S): at 12:11

## 2021-01-01 RX ADMIN — TAMSULOSIN HYDROCHLORIDE 0.4 MILLIGRAM(S): 0.4 CAPSULE ORAL at 22:26

## 2021-01-01 RX ADMIN — CHLORHEXIDINE GLUCONATE 1 APPLICATION(S): 213 SOLUTION TOPICAL at 06:09

## 2021-01-01 RX ADMIN — Medication 2: at 11:48

## 2021-01-01 RX ADMIN — Medication 100 MILLIGRAM(S): at 11:28

## 2021-01-01 RX ADMIN — Medication 80 MILLIGRAM(S): at 18:29

## 2021-01-01 RX ADMIN — Medication 650 MILLIGRAM(S): at 21:58

## 2021-01-01 RX ADMIN — Medication 50 MILLIGRAM(S): at 21:22

## 2021-01-01 RX ADMIN — ATORVASTATIN CALCIUM 20 MILLIGRAM(S): 80 TABLET, FILM COATED ORAL at 21:30

## 2021-01-01 RX ADMIN — Medication 1: at 11:57

## 2021-01-01 RX ADMIN — Medication 40 MILLIEQUIVALENT(S): at 09:50

## 2021-01-01 RX ADMIN — Medication 12.5 MILLIGRAM(S): at 05:18

## 2021-01-01 RX ADMIN — GABAPENTIN 100 MILLIGRAM(S): 400 CAPSULE ORAL at 14:13

## 2021-06-22 NOTE — CONSULT NOTE ADULT - PROVIDER SPECIALTY LIST ADULT
Cardiology
Endocrinology
Neurosurgery
Physiatry
Goal BP <130/80 consider ACEI if micro/cr eleavted
pt will require repeat instrumental swallow study to assess for advancement of PO diet.

## 2021-06-30 NOTE — ED PROVIDER NOTE - PHYSICAL EXAMINATION
Constitutional: chronically ill appearing.   Eyes: PERRLA. Extraocular movements intact, no entrapment. Conjunctiva normal.   ENT: No nasal discharge. Moist mucus membranes.  Neck: Supple, non tender, full range of motion.  CV: irregular no murmurs, rubs, or gallops. +S1S2.   Pulm: diminished air movememt b/l bases otherwise Clear to auscultation bilaterally.   Abd: soft NT ND +BS.   Ext: Warm and well perfused x4, moving all extremities, 2+ pitting edema to the bilateral lower extremities, small abrasions. no skin changes.   Psy: Cooperative, appropriate.   Skin: Warm, dry, no rash  Neuro: CN2-12 grossly intact no sensory or motor deficits throughout, no drift

## 2021-06-30 NOTE — H&P ADULT - ASSESSMENT
87yo M history of HTN DM AFib no AC 2/2 falls, HFpEF JOHANNY, presents for shortness of breath.    # Shortness of breath secondary to HF exacerbation  - CXR showed bilateral parenchymal opacities and pleural effusions  - BNP 8749  - ECG showed afib, no ischemic changes  - Troponin first set 0.04, f/u serial troponins  - s/p lasix 40 mg iv in the ED  - c/w lasix 40 mg iv bid  - daily weight, strict I/Os. fluid restriction  - Repeat TTE  - patient follows up with Dr Harris    # Chronic Afib  - not on AC secondary to falls  - rate controlled  - c/w metoprolol bid    # CKD 3  - creatinine at baseline  - avoid nephrotoxic drugs    # DM  - basal and nutritional insulin    # HTN  - c/w metoprolol    # JOHANNY  - patient uses CPAP at night    # DVT ppx: heparin  # PPI ppx: no need  # DIET: DASH, fluid restriction  # ACTIVITY: IAT  # DISPO: From home

## 2021-06-30 NOTE — H&P ADULT - NSICDXPASTMEDICALHX_GEN_ALL_CORE_FT
PAST MEDICAL HISTORY:  Afib     CKD (chronic kidney disease)     DM (diabetes mellitus)     Hydrocephalus     Hypertension

## 2021-06-30 NOTE — H&P ADULT - HISTORY OF PRESENT ILLNESS
85yo M history of HTN DM AFib no AC 2/2 falls, HFpEF JOHANNY, presents for shortness of breath. Symptoms started 3 days ago with gradual onset of shortness of breath, first at exertion and gradually progressing and is present now even at rest, associated with orthopnea and chest tightness. Patient says he has been adhering to his medications and has not missed any doses. He also watches his salt intake with no increase in consumption last few days. He went yesterday to urgent care who started him on antibiotics and po steroids for suspicion of PNA vs COPD. He denied fevers, chills, cough or sputum production, nausea or vomiting, diarrhea or other associated symptoms.    In the ED VS showed elevated /79.  Chest Xray showed bilateral parenchymal opacities and pleural effusions.  He received 325 mg of aspirin and 1 dose of lasix IV.

## 2021-06-30 NOTE — ED PROVIDER NOTE - CLINICAL SUMMARY MEDICAL DECISION MAKING FREE TEXT BOX
85yo M history of HTN DM AFib no AC 2/2 falls, JOHANNY, presenting with chest pain, shortness of breath, DAN, orthopnea, cough x2-3d getting progressively worse. Went to urgent care yesterday, XR showing ?fluid in the lungs, given steroids, called back today saying to come in for further eval. chest pain b/l, intermittent, non radiating, worse on laying down. cough productive of dark sputum. +b/l LE edema. Takes Lasix 40mg BID and metolazone 4x a wk, took today. No other acute complaints. labs ekg imaging reviewed. pt fluid overloaded. diuresed. will admit for further eval. ekg non ischemic

## 2021-06-30 NOTE — H&P ADULT - ATTENDING COMMENTS
87 YO M with a PMH of HTN, DM2, chronic AFib (no AC due to fall risk), HFpEF, and JOHANNY who presents to the hospital with a c/o progressively worsening SOB for the past x 3 days. Associated with B/L LE swelling. +/- orthopnea, +/- non-productive cough. Sleeps on x pillows. Takes medications daily. Follows strict fluid/salt restriction diet. Denies any fevers/chills, CP, palpitations, N/V/D, ABD pain, dysuria, headaches, or rashes.     In the ED, Chest X-ray shows x. B/L LE duplex shows xx. BNP elevated. Started on IV Lasix.     FMHx: Reviewed, not relevant    Physical exam shows pt in NAD. VSS, afebrile, not hypoxic on RA (<90% on RA). A&Ox3. Non-focal neuro exam. Muscle strength/sensation intact. Crackles noted in B/L lung fields. RRR, no M/G/R. ABD is soft and non-tender, normoactive BSs. B/L LEs with 2+ pitting edema that extends to the mid-shin. No rashes. Labs and radiology as above.    Dyspnea due to acute on chronic HFrEF. IV Lasix and transition to PO. Echo. Monitor daily weights, Is&Os, and diet/fluid restriction. BMP in the AM. Restart home meds.   -If pt is not on BB at home, do not start during acute decompensated HFE w/ volume overload. Although, there is evidence to support (IMPACT-HF) initiating BB prior to discharge in a stable pt improves long term outcomes.         Incomplete 87 YO M with a PMH of HTN, DM2, chronic AFib (no AC due to fall risk), HFpEF, and JOHANNY who presents to the hospital with a c/o progressively worsening SOB for the past x 3 days. Associated with B/L LE swelling. + orthopnea, + non-productive cough. Takes medications daily. Does not follows strict fluid/salt restriction diet. Denies any fevers/chills, CP, palpitations, N/V/D, ABD pain, dysuria, headaches, or rashes.     In the ED, Chest X-ray shows pulm congestion. BNP elevated. Started on IV Lasix.     FMHx: Reviewed, not relevant    Physical exam shows pt in NAD. VSS, afebrile, not hypoxic on RA. A&Ox3. Non-focal neuro exam. Muscle strength/sensation intact. Crackles noted in B/L lung fields. RRR, no M/G/R. ABD is soft and non-tender, normoactive BSs. B/L LEs with 2+ pitting edema that extends to the mid-shin. Large skin tear present over the anterior aspect of the LLE. Labs and radiology as above.    Dyspnea due to acute on chronic HFpEF. IV Lasix and transition to PO. Echo. Monitor daily weights, Is&Os, and diet/fluid restriction. BMP in the AM. Restart home meds.     TOBIAS on CKD3 vs progression of CKD3, likely CRS; Doubt ATN. Send UA, urine lytes, urine pr:cr ratio, and trend BMP. IV Diuresis. Monitor urinary out-put. Hold nephrotoxic agents.     Normocytic anemia, at baseline. Pt denies bleeding symptoms. Replace as necessary.     Troponin elevation, suspected cause is CKD3. Doubt ACS. No CP or EKG changes. Serial cardiac enzymes and EKGs.     HX of HTN, DM2, chronic AFib (no AC due to fall risk), and JOHANNY. Restart home meds, except as stated above. DVT PPX. Inform PCP of pt's admission to hospital. My note supersedes the residents note.

## 2021-06-30 NOTE — ED PROVIDER NOTE - OBJECTIVE STATEMENT
85yo M history of HTN DM AFib no AC 2/2 falls, JOHANNY, presenting with chest pain, shortness of breath, DAN, orthopnea, cough x2-3d getting progressively worse. Went to urgent care yesterday, XR showing ?fluid in the lungs, given steroids, called back today saying to come in for further eval. chest pain b/l, intermittent, non radiating, worse on laying down. cough productive of dark sputum. +b/l LE edema. Takes Lasix 40mg BID and metolazone 4x a wk, took today. No other acute complaints.

## 2021-06-30 NOTE — ED PROVIDER NOTE - NS ED ROS FT
Constitutional:  See HPI.   Eyes:  No visual changes, eye pain or discharge.  ENMT:  No hearing changes, pain, discharge or infections. No neck pain or stiffness.  Cardiac:  +CP  Respiratory:  No hemoptysis.  GI:  No nausea, vomiting, diarrhea, abdominal pain.  :  No dysuria, frequency, hematuria  MS:  No joint pain or back pain.  Neuro:  No LOC. No headache or weakness.    Skin:  No skin rash.  Except as in HPI, all other review of systems is negative

## 2021-06-30 NOTE — ED ADULT NURSE NOTE - OBJECTIVE STATEMENT
C/o chest discomfort and shortness of breath with increase b/l lower extremity edema, orthopnea and cough x 3 days. Was seen by  yesterday and told he had "fluid on his lungs"

## 2021-06-30 NOTE — H&P ADULT - NSHPLABSRESULTS_GEN_ALL_CORE
11.7   7.97  )-----------( 232      ( 30 Jun 2021 12:12 )             38.4   06-30    145  |  105  |  34<H>  ----------------------------<  126<H>  3.8   |  25  |  1.9<H>    Ca    7.9<L>      30 Jun 2021 12:12  Mg     2.1     06-30    TPro  6.5  /  Alb  4.1  /  TBili  1.1  /  DBili  x   /  AST  13  /  ALT  9   /  AlkPhos  107  06-30

## 2021-06-30 NOTE — ED ADULT NURSE REASSESSMENT NOTE - NS ED NURSE REASSESS COMMENT FT1
assumed care of pt. he is resting on chair with bed alarm activated. pt A&Ox3. pt denies and complaints @ this time. will continue to monitor pt.

## 2021-06-30 NOTE — H&P ADULT - NSICDXFAMILYHX_GEN_ALL_CORE_FT
FAMILY HISTORY:  Mother  Still living? Unknown  Family history of diabetes mellitus, Age at diagnosis: Age Unknown    Sibling  Still living? Unknown  Family history of diabetes mellitus, Age at diagnosis: Age Unknown

## 2021-07-01 NOTE — ED ADULT NURSE REASSESSMENT NOTE - NS ED NURSE REASSESS COMMENT FT1
admitting MD on [id number] aware of patients concern for leg wounds. Advised per MD to leave open to air for the night. Pt will be reassessed in am.

## 2021-07-01 NOTE — CONSULT NOTE ADULT - ASSESSMENT
CKD stage 3-4  acute on chronic HFpEF  fluid overload, pulm congestion and weeping pedal edema   - metolazone recently DC'ed  JOHANNY on CPAP with cor pulmonale    proteinuria with nl albumin  hypocalcemia  afib  HTN   s/p  shunt  DM 2    dm neuropathy    plan:    increase lasix 80mg iv q12h  start metolazone 2.5mg po qd  add KCl 20meq po qd  monitor UOP via urinal, avoid barragan  trend renal function, lytes with iv diureses  cont calcitriol, check phos and 25D  send UA and Uprot/Cr  cardiology eval  d/w resident and wife at bedside           CKD stage 3-4  acute on chronic HFpEF  fluid overload, pulm congestion and weeping pedal edema   - per wife, stopped metolazone recently  JOHANNY on CPAP with cor pulmonale    proteinuria with nl albumin  hypocalcemia  afib  HTN   s/p  shunt  DM 2    dm neuropathy    plan:    increase lasix 80mg iv q12h  start metolazone 2.5mg po qd  add KCl 20meq po qd  monitor UOP via urinal, avoid barragan  trend renal function, lytes with iv diureses  cont calcitriol, check phos and 25D  send UA and Uprot/Cr  cardiology eval  d/w resident and wife at bedside

## 2021-07-01 NOTE — CONSULT NOTE ADULT - SUBJECTIVE AND OBJECTIVE BOX
NEPHROLOGY CONSULTATION NOTE    87yo M history of HTN DM AFib no AC 2/2 falls, HFpEF JOHANNY, presents for shortness of breath. Symptoms started 3 days ago with gradual onset of shortness of breath, first at exertion and gradually progressing and is present now even at rest, associated with orthopnea and chest tightness. Patient says he has been adhering to his medications and has not missed any doses. He also watches his salt intake with no increase in consumption last few days. He went yesterday to urgent care who started him on antibiotics and po steroids for suspicion of PNA vs COPD. He denied fevers, chills, cough or sputum production, nausea or vomiting, diarrhea or other associated symptoms.    In the ED VS showed elevated /79.  Chest Xray showed bilateral parenchymal opacities and pleural effusions.  He received 325 mg of aspirin and 1 dose of lasix IV.      PAST MEDICAL & SURGICAL HISTORY:  Hypertension  Hydrocephalus  CKD (chronic kidney disease)  DM (diabetes mellitus)  Afib  History of prostate surgery    Allergies:  No Known Allergies    Home Medications Reviewed    SOCIAL HISTORY:  Denies ETOH,Smoking,   FAMILY HISTORY:  Family history of diabetes mellitus (Mother, Sibling)        REVIEW OF SYSTEMS  All other review of systems is negative unless indicated above.    PHYSICAL EXAM:  NAD  obese  sitting in chair, unable to lie flat  moist mm  supple  decreased BS b/l  distant HS, irregular   soft  + edema - [facility] Medications:   MEDICATIONS  (STANDING):  atorvastatin 20 milliGRAM(s) Oral at bedtime  calcitriol   Capsule 0.25 MICROGram(s) Oral daily  chlorhexidine 4% Liquid 1 Application(s) Topical <User Schedule>  dextrose 40% Gel 15 Gram(s) Oral once  dextrose 5%. 1000 milliLiter(s) (50 mL/Hr) IV Continuous <Continuous>  dextrose 5%. 1000 milliLiter(s) (100 mL/Hr) IV Continuous <Continuous>  dextrose 50% Injectable 25 Gram(s) IV Push once  dextrose 50% Injectable 12.5 Gram(s) IV Push once  dextrose 50% Injectable 25 Gram(s) IV Push once  finasteride 5 milliGRAM(s) Oral daily  furosemide   Injectable 40 milliGRAM(s) IV Push every 12 hours  gabapentin 100 milliGRAM(s) Oral three times a day  glucagon  Injectable 1 milliGRAM(s) IntraMuscular once  heparin   Injectable 5000 Unit(s) SubCutaneous every 8 hours  insulin glargine Injectable (LANTUS) 20 Unit(s) SubCutaneous at bedtime  insulin lispro (ADMELOG) corrective regimen sliding scale   SubCutaneous three times a day before meals  insulin lispro Injectable (ADMELOG) 7 Unit(s) SubCutaneous three times a day before meals  metoprolol tartrate 25 milliGRAM(s) Oral two times a day  tamsulosin 0.4 milliGRAM(s) Oral at bedtime        VITALS:  T(F): 97 (07-01-21 @ 16:23), Max: 98.5 (06-30-21 @ 20:29)  HR: 93 (07-01-21 @ 16:23)  BP: 135/66 (07-01-21 @ 16:23)  RR: 20 (07-01-21 @ 16:23)  SpO2: 98% (07-01-21 @ 16:23)  Wt(kg): --        LABS:  07-01    144  |  102  |  35<H>  ----------------------------<  133<H>  3.5   |  26  |  2.0<H>    Ca    7.8<L>      01 Jul 2021 11:00  Mg     2.0     07-01    TPro  6.5  /  Alb  4.1  /  TBili  1.1  /  DBili      /  AST  13  /  ALT  9   /  AlkPhos  106  07-01                          12.0   9.12  )-----------( 268      ( 01 Jul 2021 11:00 )             40.2       Urine Studies:        RADIOLOGY & ADDITIONAL STUDIES:

## 2021-07-01 NOTE — ADVANCED PRACTICE NURSE CONSULT - RECOMMEDATIONS
1. BLE wounds- Cleanse skin w/ normal saline, gently pat dry. Apply Silvadene cream to provide anti-microbial properties to wound bed & assist w/  debridement of devitalized tissue. Cover w/ ABD pad & wrap w/ Kerlex dressing. Apply Silvadene & change dressing daily & prn for strike-through drainage or soiling.   -Recommend ID consult for ABx coverage of ? cellulitis to BLEs.   -Assess skin/wound qshift, report changes to primary provider.        Plan of Care: Primary RN Daryl made aware of above recs. Spoke w/ covering/primary MD Bux (at [phone]) in regards to above. No further needs/recs from [facility] service at this time. Staff RN to perform routine skin/wound assessment and manage wound care. Questions or concerns or if wound worsens and reconsult needed, please contact [facility], [facility] [phone].

## 2021-07-01 NOTE — ADVANCED PRACTICE NURSE CONSULT - ASSESSMENT
87yo M history of HTN DM AFib no AC 2/2 falls, HFpEF JOHANNY, presents (6/30) for shortness of breath. Symptoms started 3 days ago with gradual onset of shortness of breath, first at exertion and gradually progressing and is present now even at rest, associated with orthopnea and chest tightness.  He went yesterday to urgent care who started him on antibiotics and po steroids for suspicion of PNA vs COPD. In the ED VS showed elevated /79. Chest Xray showed bilateral parenchymal opacities and pleural effusions. He received 325 mg of aspirin and 1 dose of lasix IV.  Currently admitted to medicine with the primary diagnosis of CHF exacerbation; Today is hospital day 1d, currently in ED hold, being managed for Shortness of breath secondary to HF exacerbation;  Chronic Afib; CKD3; DM2; HTN; JOHANNY.     Received patient in ED hold, sitting up in recliner chair, pt awake, A&Ox3, wife at bedside, both made aware of purpose of WOCN visit, agreeable to consult. Wife reports BLEs wounds present "for some time", wife reports managing wounds at home w/ gauze wrap dressings that she purchases at [facility]. Pt and wife are requesting home care RN services upon d/c for wound care management.   Kerlex wrap dressings to BLEs in place, moderately saturated w/ serous strike-through drainage, dressings removed for skin assessment.     BLEs edematous, skin taut, erythematous (L > R), warmth to touch- cellulitis. Full thickness ulcerations to b/l shins presenting as ruptured blisters, wound base moist, marbleized w/ pink and yellow subcutaneous tissue w/ scattered areas of yellow devitalized tissue.   Moderate amount of serous drainage present on removed dressing, no active drainage present from wound bed. No odor present. Pt reports some tenderness to area during wound cleansing.

## 2021-07-01 NOTE — PROGRESS NOTE ADULT - SUBJECTIVE AND OBJECTIVE BOX
VINCENZO EDMONDSON 86y Male  MRN#: [id number]   CODE STATUS:________    SUBJECTIVE  Patient is a 86y old Male who presents with a chief complaint of shortness of breath (30 Jun 2021 15:00)  Currently admitted to medicine with the primary diagnosis of CHF exacerbation    Today is hospital day 1d, and this morning he is resting comfortably and reports no overnight events.       OBJECTIVE  PAST MEDICAL & SURGICAL HISTORY  Afib    DM (diabetes mellitus)    CKD (chronic kidney disease)    Hydrocephalus    Hypertension    History of prostate surgery      ALLERGIES:  No Known Allergies    MEDICATIONS:  STANDING MEDICATIONS  atorvastatin 20 milliGRAM(s) Oral at bedtime  calcitriol   Capsule 0.25 MICROGram(s) Oral daily  chlorhexidine 4% Liquid 1 Application(s) Topical <User Schedule>  dextrose 40% Gel 15 Gram(s) Oral once  dextrose 5%. 1000 milliLiter(s) IV Continuous <Continuous>  dextrose 5%. 1000 milliLiter(s) IV Continuous <Continuous>  dextrose 50% Injectable 25 Gram(s) IV Push once  dextrose 50% Injectable 12.5 Gram(s) IV Push once  dextrose 50% Injectable 25 Gram(s) IV Push once  finasteride 5 milliGRAM(s) Oral daily  furosemide   Injectable 40 milliGRAM(s) IV Push every 12 hours  gabapentin 100 milliGRAM(s) Oral three times a day  glucagon  Injectable 1 milliGRAM(s) IntraMuscular once  heparin   Injectable 5000 Unit(s) SubCutaneous every 8 hours  insulin glargine Injectable (LANTUS) 20 Unit(s) SubCutaneous at bedtime  insulin lispro (ADMELOG) corrective regimen sliding scale   SubCutaneous three times a day before meals  insulin lispro Injectable (ADMELOG) 7 Unit(s) SubCutaneous three times a day before meals  metoprolol tartrate 25 milliGRAM(s) Oral two times a day  tamsulosin 0.4 milliGRAM(s) Oral at bedtime    PRN MEDICATIONS      VITAL SIGNS: Last 24 Hours  T(C): 36.4 (01 Jul 2021 07:34), Max: 36.9 (30 Jun 2021 11:12)  T(F): 97.5 (01 Jul 2021 07:34), Max: 98.5 (30 Jun 2021 20:29)  HR: 77 (01 Jul 2021 07:34) (62 - 94)  BP: 167/88 (01 Jul 2021 07:34) (133/91 - 179/79)  BP(mean): --  RR: 20 (01 Jul 2021 07:34) (16 - 20)  SpO2: 96% (01 Jul 2021 07:34) (95% - 98%)    LABS:                        11.7   7.97  )-----------( 232      ( 30 Jun 2021 12:12 )             38.4     06-30    145  |  105  |  34<H>  ----------------------------<  126<H>  3.8   |  25  |  1.9<H>    Ca    7.9<L>      30 Jun 2021 12:12  Mg     2.1     06-30    TPro  6.5  /  Alb  4.1  /  TBili  1.1  /  DBili  x   /  AST  13  /  ALT  9   /  AlkPhos  107  06-30          Troponin T, Serum: 0.03 ng/mL *HH* (06-30-21 @ 16:39)  Troponin T, Serum: 0.04 ng/mL *HH* (06-30-21 @ 12:12)      CARDIAC MARKERS ( 30 Jun 2021 16:39 )  x     / 0.03 ng/mL / x     / x     / x      CARDIAC MARKERS ( 30 Jun 2021 12:12 )  x     / 0.04 ng/mL / x     / x     / x          RADIOLOGY:    PHYSICAL EXAM:    GENERAL: NAD, alert  HEENT:  Atraumatic, Normocephalic.  PULMONARY: bibasilar crackles heard b/l; No wheeze  CARDIOVASCULAR: irregular rhythm with regular rate; No murmurs  GASTROINTESTINAL: Soft, Nontender, Nondistended; Bowel sounds present  MUSCULOSKELETAL:  No clubbing, cyanosis, or edema  NEUROLOGY: non-focal  SKIN: No rashes or lesions      ASSESSMENT & PLAN  87yo M history of HTN DM AFib no AC 2/2 falls, HFpEF JOHANNY, presents for shortness of breath.    # Shortness of breath secondary to HF exacerbation  - CXR showed bilateral parenchymal opacities and pleural effusions  - BNP 8749  - ECG showed afib, no ischemic changes  - Troponin first set 0.04>0.03, pending repeat  - s/p lasix 40 mg iv in the ED  - c/w lasix 40 mg iv bid  - daily weight, strict I/Os. fluid restriction  - Repeat TTE, pending  - patient follows up with Dr Harris    # Chronic Afib  - not on AC secondary to falls  - rate controlled  - c/w metoprolol bid    # CKD 3  - creatinine at baseline  - avoid nephrotoxic drugs    # DM  - basal and nutritional insulin    # HTN  - c/w metoprolol    # JOHANNY  - patient uses CPAP at night  - ordered CPAP inpatient    # DVT ppx: heparin  # PPI ppx: no need  # DIET: DASH, fluid restriction  # ACTIVITY: IAT  # DISPO: From home, pending echo

## 2021-07-01 NOTE — DISCHARGE NOTE NURSING/CASE MANAGEMENT/SOCIAL WORK - PATIENT PORTAL LINK FT
You can access the FollowMyHealth Patient Portal offered by [facility] by registering at the following website: [email or web address]. By joining [facility]’s FollowMyHealth portal, you will also be able to view your health information using other applications (apps) compatible with our system.

## 2021-07-01 NOTE — PROGRESS NOTE ADULT - ATTENDING COMMENTS
Patient seen and examined independently. Agree with resident note/ history / physical exam and plan of care with following exceptions/additions/updates. Case discussed with patient/pt decision maker, house-staff and nursing. My notes supersedes the resident's notes in case of discrepancies.    #Progress Note Handoff  Pending (specify):  Consults cardiology   Family discussion: raven pt, fully aaox3  Disposition: Home

## 2021-07-02 NOTE — PROGRESS NOTE ADULT - ATTENDING COMMENTS
#Progress Note Handoff  Pending (specify): HF, Cardio Dr. Harris, ID, Duplex  Family discussion: house staff updated pt family  Disposition: Home___/SNF___/Other___/Unknown at this time___ #Progress Note Handoff  Pending (specify): HF, Cardio Dr. Harris, ID, Duplex  Family discussion: house staff updated pt family  Disposition: F9

## 2021-07-02 NOTE — CONSULT NOTE ADULT - ASSESSMENT
decompensated HFpEF due to afib and frequent ventricular arrhythmia  right heart failure and left heart failure pattern  CKD  SVT very fast, AT vs AVNRT, multifcal PVCs, Afib, salvos couplets    I reviewed dr Merchant's note, 240 mg iv Lasix was recommended  at this point I think he benefits from Aldactone start with 25 and increase to 50 mg daily ( K 3.2, BP normal), after stopping IV Lasix probably Torsemide is a better option with 2 times weekly Metolazone    he benefits from the lower ext wrapping during the day  keep the Mg >2.5, K & Na in normal range  hypothetically he needs EPS, ablation and for that ischemic w/u, but for now will try medical Rx

## 2021-07-02 NOTE — PROGRESS NOTE ADULT - ASSESSMENT
CKD stage 3-4  acute on chronic HFpEF  fluid overload, pulm congestion and weeping pedal edema   - per wife, stopped metolazone recently  JOHANNY on CPAP with cor pulmonale    proteinuria with nl albumin  hypocalcemia  afib  HTN   s/p  shunt  DM 2    dm neuropathy    plan:    increase lasix 80mg iv 8h  increase metolazone 5mg po qd  KCl 40eq po x 1 given  can add KCl 20meq po qd  monitor UOP via urinal, need more accurate recordings,  avoid barragan  trend renal function, lytes with iv diuresis   cont calcitriol, check phos and 25D  full code  cardiology eval  d/w team

## 2021-07-02 NOTE — PROGRESS NOTE ADULT - SUBJECTIVE AND OBJECTIVE BOX
NEPHROLOGY FOLLOW UP NOTE    pt seen and examined  still edematous  uop inaccurate  cr same  wants to go home   on RA, but orthopneic and cant lay down      PAST MEDICAL & SURGICAL HISTORY:  Hypertension  Hydrocephalus  CKD (chronic kidney disease)  DM (diabetes mellitus)  Afib  History of prostate surgery    Allergies:  No Known Allergies    Home Medications Reviewed    SOCIAL HISTORY:  Denies ETOH,Smoking,   FAMILY HISTORY:  Family history of diabetes mellitus (Mother, Sibling)        REVIEW OF SYSTEMS  All other review of systems is negative unless indicated above.    PHYSICAL EXAM:  NAD  obese  sitting in chair, unable to lie flat  moist mm  supple  decreased BS b/l  distant HS, irregular   soft  + edema - le weeping    advance care planning and advance care directives reviewed     Hospital Medications:   MEDICATIONS  (STANDING):  atorvastatin 20 milliGRAM(s) Oral at bedtime  calcitriol   Capsule 0.25 MICROGram(s) Oral daily  ceFAZolin   IVPB 1000 milliGRAM(s) IV Intermittent every 8 hours  chlorhexidine 4% Liquid 1 Application(s) Topical <User Schedule>  dextrose 40% Gel 15 Gram(s) Oral once  dextrose 5%. 1000 milliLiter(s) (50 mL/Hr) IV Continuous <Continuous>  dextrose 5%. 1000 milliLiter(s) (100 mL/Hr) IV Continuous <Continuous>  dextrose 50% Injectable 25 Gram(s) IV Push once  dextrose 50% Injectable 12.5 Gram(s) IV Push once  dextrose 50% Injectable 25 Gram(s) IV Push once  finasteride 5 milliGRAM(s) Oral daily  furosemide   Injectable 80 milliGRAM(s) IV Push every 8 hours  gabapentin 100 milliGRAM(s) Oral three times a day  glucagon  Injectable 1 milliGRAM(s) IntraMuscular once  heparin   Injectable 5000 Unit(s) SubCutaneous every 8 hours  insulin glargine Injectable (LANTUS) 20 Unit(s) SubCutaneous at bedtime  insulin lispro (ADMELOG) corrective regimen sliding scale   SubCutaneous three times a day before meals  insulin lispro Injectable (ADMELOG) 7 Unit(s) SubCutaneous three times a day before meals  metolazone 5 milliGRAM(s) Oral daily  metoprolol tartrate 25 milliGRAM(s) Oral two times a day  silver sulfADIAZINE 1% Cream 1 Application(s) Topical daily  tamsulosin 0.4 milliGRAM(s) Oral at bedtime        VITALS:  T(F): 96.5 (07-02-21 @ 13:12), Max: 97.4 (07-02-21 @ 05:03)  HR: 89 (07-02-21 @ 13:12)  BP: 129/60 (07-02-21 @ 13:12)  RR: 20 (07-02-21 @ 13:12)  SpO2: 100% (07-02-21 @ 05:03)  Wt(kg): --    07-01 @ 07:01  -  07-02 @ 07:00  --------------------------------------------------------  IN: 480 mL / OUT: 990 mL / NET: -510 mL    07-02 @ 07:01  -  07-02 @ 14:44  --------------------------------------------------------  IN: 700 mL / OUT: 650 mL / NET: 50 mL          LABS:  07-02    145  |  102  |  34<H>  ----------------------------<  98  3.3<L>   |  27  |  1.9<H>    Ca    7.5<L>      02 Jul 2021 06:14  Phos  4.2     07-02  Mg     2.0     07-02    TPro  6.2  /  Alb  3.9  /  TBili  1.1  /  DBili      /  AST  9   /  ALT  8   /  AlkPhos  103  07-02                          12.4   9.06  )-----------( 252      ( 02 Jul 2021 06:14 )             41.2       Urine Studies:        RADIOLOGY & ADDITIONAL STUDIES:

## 2021-07-02 NOTE — CONSULT NOTE ADULT - SUBJECTIVE AND OBJECTIVE BOX
Patient is a 86y old  Male who presents with a chief complaint of shortness of breath (02 Jul 2021 14:43)      HPI:  A patient of dr Harris admitted for the sob, leg edema, right heart failure picture afib, numerous pvc, couplet, triplets and a period of SVT    87yo M history of HTN DM AFib no AC 2/2 falls, HFpEF JOHANNY, presents for shortness of breath. Symptoms started 3 days ago with gradual onset of shortness of breath, first at exertion and gradually progressing and is present now even at rest, associated with orthopnea and chest tightness. Patient says he has been adhering to his medications and has not missed any doses. He also watches his salt intake with no increase in consumption last few days. He went yesterday to urgent care who started him on antibiotics and po steroids for suspicion of PNA vs COPD. He denied fevers, chills, cough or sputum production, nausea or vomiting, diarrhea or other associated symptoms.    In the ED VS showed elevated /79.  Chest Xray showed bilateral parenchymal opacities and pleural effusions.  He received 325 mg of aspirin and 1 dose of lasix IV. (30 Jun 2021 15:00)      PAST MEDICAL & SURGICAL HISTORY:  Afib    DM (diabetes mellitus)    CKD (chronic kidney disease)    Hydrocephalus    Hypertension    History of prostate surgery        PREVIOUS DIAGNOSTIC TESTING:      ECHO  FINDINGS:  in the office recently, normal ef 60%, mild diastolic dysfunction, grossly normal valves    prior in 2019: < from: Transthoracic Echocardiogram (02.16.19 @ 13:35) >   1. Normal left ventricular internal cavity size.   2. Mildly enlarged left atrium.   3. Normal right atrial size.   6. Moderate tricuspid regurgitation.   7. Pulmonic valve regurgitation.    < end of copied text >      STRESS TEST  FINDINGS:    CATHETERIZATION  FINDINGS:    MEDICATIONS  (STANDING):  atorvastatin 20 milliGRAM(s) Oral at bedtime  calcitriol   Capsule 0.25 MICROGram(s) Oral daily  cephalexin 500 milliGRAM(s) Oral every 12 hours  chlorhexidine 4% Liquid 1 Application(s) Topical <User Schedule>  dextrose 40% Gel 15 Gram(s) Oral once  dextrose 5%. 1000 milliLiter(s) (50 mL/Hr) IV Continuous <Continuous>  dextrose 5%. 1000 milliLiter(s) (100 mL/Hr) IV Continuous <Continuous>  dextrose 50% Injectable 25 Gram(s) IV Push once  dextrose 50% Injectable 12.5 Gram(s) IV Push once  dextrose 50% Injectable 25 Gram(s) IV Push once  finasteride 5 milliGRAM(s) Oral daily  furosemide   Injectable 80 milliGRAM(s) IV Push every 8 hours  gabapentin 100 milliGRAM(s) Oral three times a day  glucagon  Injectable 1 milliGRAM(s) IntraMuscular once  heparin   Injectable 5000 Unit(s) SubCutaneous every 8 hours  insulin glargine Injectable (LANTUS) 20 Unit(s) SubCutaneous at bedtime  insulin lispro (ADMELOG) corrective regimen sliding scale   SubCutaneous three times a day before meals  insulin lispro Injectable (ADMELOG) 7 Unit(s) SubCutaneous three times a day before meals  metolazone 5 milliGRAM(s) Oral daily  metoprolol tartrate 25 milliGRAM(s) Oral two times a day  silver sulfADIAZINE 1% Cream 1 Application(s) Topical daily  spironolactone 25 milliGRAM(s) Oral daily  tamsulosin 0.4 milliGRAM(s) Oral at bedtime    MEDICATIONS  (PRN):      FAMILY HISTORY:  Family history of diabetes mellitus (Mother, Sibling)        SOCIAL HISTORY:  CIGARETTES: ex smoker  ALCOHOL: no  DRUGS: no                      REVIEW OF SYSTEMS:  CONSTITUTIONAL: No distress, Looks stable, just has productive cough  NECK: No pain   RESPIRATORY: cough, no wheezing, recovered shortness of breath  CARDIOVASCULAR: No chest pain,, palpitations, severe leg swelling  GASTROINTESTINAL: No abdominal or epigastric pain. No nausea, vomiting, or hematemesis;  No melena.  NEUROLOGICAL: No dizziness, headaches, memory loss, loss of strength  SKIN: oozing LE skin, no rashes   ENDOCRINE: No heat or cold intolerance  MUSCULOSKELETAL: No joint pain, No  swelling; No muscle pain  ALLERGY: No hives, itching, rash          Vital Signs Last 24 Hrs  T(C): 35.8 (02 Jul 2021 13:12), Max: 36.3 (02 Jul 2021 05:03)  T(F): 96.5 (02 Jul 2021 13:12), Max: 97.4 (02 Jul 2021 05:03)  HR: 89 (02 Jul 2021 13:12) (70 - 89)  BP: 129/60 (02 Jul 2021 13:12) (129/60 - 160/87)  BP(mean): --  RR: 20 (02 Jul 2021 13:12) (20 - 20)  SpO2: 100% (02 Jul 2021 05:03) (97% - 100%)                      PHYSICAL EXAM:  GENERAL: No distress, well developed  HEAD:  Atraumatic, Normocephalic  NECK: Supple, No JVD, No Bruit   NERVOUS SYSTEM:  Alert, Awake, Oriented to time, place, person; Normal memory and speech;   CHEST/LUNG: Normal air entry to lung base bilaterally; No wheeze, scattered crackles, no more rales  HEART: Irregular heart beat, S1, A2, P2, No S3, No gallop, No murmur  ABDOMEN: Soft, Non tender, Non distended; Bowel sounds present  EXTREMITIES:  1+ Peripheral Pulses, No clubbing, 3+ pitting edema  SKIN: No rashes or lesions    TELEMETRY: Afib, PVCs, couplets, triplets, SVT    ECG: < from: 12 Lead ECG (06.30.21 @ 11:13) >  Atrial fibrillation with premature ventricular or aberrantly conducted  complexes  Septal infarct , age undetermined    < end of copied text >      I&O's Detail    01 Jul 2021 07:01  -  02 Jul 2021 07:00  --------------------------------------------------------  IN:    Oral Fluid: 480 mL  Total IN: 480 mL    OUT:    Voided (mL): 990 mL  Total OUT: 990 mL    Total NET: -510 mL      02 Jul 2021 07:01  -  02 Jul 2021 17:13  --------------------------------------------------------  IN:    Oral Fluid: 700 mL  Total IN: 700 mL    OUT:    Voided (mL): 650 mL  Total OUT: 650 mL    Total NET: 50 mL          LABS:                        12.4   9.06  )-----------( 252      ( 02 Jul 2021 06:14 )             41.2     07-02    145  |  102  |  34<H>  ----------------------------<  98  3.3<L>   |  27  |  1.9<H>    Ca    7.5<L>      02 Jul 2021 06:14  Phos  4.2     07-02  Mg     2.0     07-02    TPro  6.2  /  Alb  3.9  /  TBili  1.1  /  DBili  x   /  AST  9   /  ALT  8   /  AlkPhos  103  07-02    CARDIAC MARKERS ( 01 Jul 2021 16:16 )  x     / 0.04 ng/mL / x     / x     / x      CARDIAC MARKERS ( 01 Jul 2021 11:00 )  x     / 0.05 ng/mL / x     / x     / x              I&O's Summary    01 Jul 2021 07:01  -  02 Jul 2021 07:00  --------------------------------------------------------  IN: 480 mL / OUT: 990 mL / NET: -510 mL    02 Jul 2021 07:01  -  02 Jul 2021 17:13  --------------------------------------------------------  IN: 700 mL / OUT: 650 mL / NET: 50 mL        RADIOLOGY & ADDITIONAL STUDIES: < from: Xray Chest 1 View-PORTABLE IMMEDIATE (Xray Chest 1 View-PORTABLE IMMEDIATE .) (07.02.21 @ 09:59) >  Bibasilar pleural-parenchymal opacities and interstitial opacities/edema, unchanged.        < end of copied text >  < from: Xray Chest 1 View-PORTABLE IMMEDIATE (06.30.21 @ 13:31) >  Right greater than left bibasal pleural-parenchymal opacities. Diffuse vascular/interstitial prominence. Findings reflecting pulmonary edema in the appropriate clinical setting.      < end of copied text >

## 2021-07-02 NOTE — PROGRESS NOTE ADULT - ASSESSMENT
87yo M history of HTN DM AFib no AC 2/2 falls, HFpEF JOHANNY, presents for shortness of breath.    # Shortness of breath secondary to HF exacerbation  - CXR showed bilateral parenchymal opacities and pleural effusions  - BNP 8749  - ECG showed afib, no ischemic changes  - Troponin first set 0.04>0.03, pending repeat  - s/p lasix 40 mg iv in the ED  - get echo   - c/w lasix 80mg IV BID and metolazone 2.5mg po daily  - daily weight, strict I/Os. fluid restriction  - patient follows up with Dr Harris    #suspected left LE cellulitis  - monitor off abx  - ID consult     # Chronic Afib  - not on AC secondary to falls  - rate controlled, c/w metoprolol bid    # CKD 3  - creatinine at baseline  - avoid nephrotoxic drugs    # DM  - basal and nutritional insulin    # HTN  - c/w metoprolol    # JOHANNY  - patient uses CPAP at night  - ordered CPAP inpatient    # DVT ppx: heparin  # PPI ppx: no need  # DIET: DASH, fluid restriction  # DISPO: From home, pending echo 87yo M history of HTN DM AFib no AC 2/2 falls, HFpEF JOHANNY, presents for shortness of breath.    # Shortness of breath secondary to HF exacerbation  - Son said that PCP recently switched patient from lasix 40mg bid to daily and stopped metolazone as well  - CXR showed bilateral parenchymal opacities and pleural effusions  - BNP 8749  - ECG showed afib, no ischemic changes  - Troponin first set 0.04>0.03, pending repeat  - s/p lasix 40 mg iv in the ED  - get echo   - c/w lasix 80mg IV BID and metolazone 2.5mg po daily  - daily weight, strict I/Os. fluid restriction  - cardio consult, HF consult     #suspected left LE cellulitis  - start ancef 1g q8hrs IV   - ID consult     # Chronic Afib  - not on AC secondary to falls  - rate controlled, c/w metoprolol bid    # CKD 3  - creatinine at baseline  - avoid nephrotoxic drugs    # DM  - basal and nutritional insulin    # HTN  - c/w metoprolol    # JOHANNY  - patient uses CPAP at night  - ordered CPAP inpatient    # DVT ppx: heparin  # PPI ppx: no need  # DIET: DASH, fluid restriction  # DISPO: From home; echo, HF consult, cardio consult, ID consult  87yo M history of HTN DM AFib no AC 2/2 falls, HFpEF JOHANNY, presents for shortness of breath.    # Shortness of breath secondary to HFpEF exacerbation 2/2 noncompliance   - Son said that PCP recently switched patient from lasix 40mg bid to daily and stopped metolazone as well  - CXR showed bilateral parenchymal opacities and pleural effusions  - BNP 8749  - ECG showed afib, no ischemic changes  - Troponin first set 0.04>0.03, pending repeat  - s/p lasix 40 mg iv in the ED  - get echo   - c/w lasix 80mg IV BID and metolazone 2.5mg po daily  - daily weight, strict I/Os. fluid restriction  - cardio consult, HF consult   -Keep K>4 and Mg >2    #hypokalemia- replete    #suspected left LE cellulitis  - start ancef 1g q8hrs IV   - ID consult   - duplex     # Chronic Afib  - not on AC secondary to falls  - rate controlled, c/w metoprolol bid    # CKD 3  - creatinine at baseline  - avoid nephrotoxic drugs    # DM  - basal and nutritional insulin    # HTN  - c/w metoprolol    # JOHANNY  - patient uses CPAP at night  - ordered CPAP inpatient    # DVT ppx: heparin  # PPI ppx: no need  # DIET: DASH, fluid restriction  # DISPO: From home; echo, HF consult, cardio consult, ID consult

## 2021-07-02 NOTE — PROGRESS NOTE ADULT - SUBJECTIVE AND OBJECTIVE BOX
Hospital Day:  2d    Subjective: Patient is a 86y old  Male who presents with a chief complaint of shortness of breath (01 Jul 2021 19:07)      Pt seen and evaluated at bedside.   Complaints:  Over the night Events:    Past Medical Hx:   Afib    DM (diabetes mellitus)    CKD (chronic kidney disease)    Hydrocephalus    Hypertension      Past Sx:  No significant past surgical history    History of prostate surgery      Allergies:  No Known Allergies    Current Meds:   Standng Meds:  atorvastatin 20 milliGRAM(s) Oral at bedtime  calcitriol   Capsule 0.25 MICROGram(s) Oral daily  chlorhexidine 4% Liquid 1 Application(s) Topical <User Schedule>  dextrose 40% Gel 15 Gram(s) Oral once  dextrose 5%. 1000 milliLiter(s) (50 mL/Hr) IV Continuous <Continuous>  dextrose 5%. 1000 milliLiter(s) (100 mL/Hr) IV Continuous <Continuous>  dextrose 50% Injectable 25 Gram(s) IV Push once  dextrose 50% Injectable 12.5 Gram(s) IV Push once  dextrose 50% Injectable 25 Gram(s) IV Push once  finasteride 5 milliGRAM(s) Oral daily  furosemide   Injectable 80 milliGRAM(s) IV Push every 12 hours  gabapentin 100 milliGRAM(s) Oral three times a day  glucagon  Injectable 1 milliGRAM(s) IntraMuscular once  heparin   Injectable 5000 Unit(s) SubCutaneous every 8 hours  insulin glargine Injectable (LANTUS) 20 Unit(s) SubCutaneous at bedtime  insulin lispro (ADMELOG) corrective regimen sliding scale   SubCutaneous three times a day before meals  insulin lispro Injectable (ADMELOG) 7 Unit(s) SubCutaneous three times a day before meals  metolazone 2.5 milliGRAM(s) Oral daily  metoprolol tartrate 25 milliGRAM(s) Oral two times a day  tamsulosin 0.4 milliGRAM(s) Oral at bedtime    PRN Meds:      Vital Signs:   T(F): 97.4 (07-02-21 @ 05:03), Max: 97.5 (07-01-21 @ 07:34)  HR: 70 (07-02-21 @ 05:03) (70 - 93)  BP: 145/65 (07-02-21 @ 05:03) (135/66 - 167/88)  RR: 20 (07-02-21 @ 05:03) (20 - 20)  SpO2: 100% (07-02-21 @ 05:03) (96% - 100%)    Physical Exam:   GENERAL: NAD, Resting in bed  HEENT: NCAT  CHEST/LUNG: Clear to auscultation bilaterally; No wheezing or rubs.   HEART: Regular rate and rhythm; No murmurs, rubs, or gallops  ABDOMEN: Bowel sounds present; Soft, Nontender, Nondistended.   EXTREMITIES:  No clubbing, cyanosis, or edema  NERVOUS SYSTEM:  Alert & Oriented X3    FLUID BALANCE    07-01-21 @ 07:01  -  07-02-21 @ 06:34  --------------------------------------------------------  IN: 480 mL / OUT: 990 mL / NET: -510 mL        Labs:                         12.0   9.12  )-----------( 268      ( 01 Jul 2021 11:00 )             40.2       01 Jul 2021 11:00    144    |  102    |  35     ----------------------------<  133    3.5     |  26     |  2.0      Ca    7.8        01 Jul 2021 11:00  Mg     2.0       01 Jul 2021 11:00    TPro  6.5    /  Alb  4.1    /  TBili  1.1    /  DBili  x      /  AST  13     /  ALT  9      /  AlkPhos  106    01 Jul 2021 11:00            Serum Pro-Brain Natriuretic Peptide: 8749 pg/mL (06-30-21 @ 12:12)    Troponin 0.04, CKMB --, CK -- 07-01-21 @ 16:16  Troponin 0.05, CKMB --, CK -- 07-01-21 @ 11:00  Troponin 0.03, CKMB --, CK -- 06-30-21 @ 16:39  Troponin 0.04, CKMB --, CK -- 06-30-21 @ 12:12                          Radiology:  Hospital Day:  2d    Subjective: Patient is a 86y old  Male who presents with a chief complaint of shortness of breath (01 Jul 2021 19:07)      Pt seen and evaluated at bedside.   Complaints: patient wants to go home but son convincing him to stay. Son said that PCP recently switched patient from lasix 40 bid to daily and stopped metolazone as well.   Over the night Events: none    Past Medical Hx:   Afib    DM (diabetes mellitus)    CKD (chronic kidney disease)    Hydrocephalus    Hypertension      Past Sx:  No significant past surgical history    History of prostate surgery      Allergies:  No Known Allergies    Current Meds:   Standng Meds:  atorvastatin 20 milliGRAM(s) Oral at bedtime  calcitriol   Capsule 0.25 MICROGram(s) Oral daily  chlorhexidine 4% Liquid 1 Application(s) Topical <User Schedule>  dextrose 40% Gel 15 Gram(s) Oral once  dextrose 5%. 1000 milliLiter(s) (50 mL/Hr) IV Continuous <Continuous>  dextrose 5%. 1000 milliLiter(s) (100 mL/Hr) IV Continuous <Continuous>  dextrose 50% Injectable 25 Gram(s) IV Push once  dextrose 50% Injectable 12.5 Gram(s) IV Push once  dextrose 50% Injectable 25 Gram(s) IV Push once  finasteride 5 milliGRAM(s) Oral daily  furosemide   Injectable 80 milliGRAM(s) IV Push every 12 hours  gabapentin 100 milliGRAM(s) Oral three times a day  glucagon  Injectable 1 milliGRAM(s) IntraMuscular once  heparin   Injectable 5000 Unit(s) SubCutaneous every 8 hours  insulin glargine Injectable (LANTUS) 20 Unit(s) SubCutaneous at bedtime  insulin lispro (ADMELOG) corrective regimen sliding scale   SubCutaneous three times a day before meals  insulin lispro Injectable (ADMELOG) 7 Unit(s) SubCutaneous three times a day before meals  metolazone 2.5 milliGRAM(s) Oral daily  metoprolol tartrate 25 milliGRAM(s) Oral two times a day  tamsulosin 0.4 milliGRAM(s) Oral at bedtime    PRN Meds:      Vital Signs:   T(F): 97.4 (07-02-21 @ 05:03), Max: 97.5 (07-01-21 @ 07:34)  HR: 70 (07-02-21 @ 05:03) (70 - 93)  BP: 145/65 (07-02-21 @ 05:03) (135/66 - 167/88)  RR: 20 (07-02-21 @ 05:03) (20 - 20)  SpO2: 100% (07-02-21 @ 05:03) (96% - 100%)    Physical Exam:   GENERAL: NAD, Resting in bed  HEENT: NCAT  CHEST/LUNG: Clear to auscultation bilaterally; No wheezing or rubs.   HEART: Regular rate and rhythm; No murmurs, rubs, or gallops  ABDOMEN: Bowel sounds present; Soft, Nontender, Nondistended.   EXTREMITIES:  2+ b/l pitting edema; b/l LE wrapped in kerlix   NERVOUS SYSTEM:  Alert & Oriented X3    FLUID BALANCE    07-01-21 @ 07:01  -  07-02-21 @ 06:34  --------------------------------------------------------  IN: 480 mL / OUT: 990 mL / NET: -510 mL        Labs:                         12.0   9.12  )-----------( 268      ( 01 Jul 2021 11:00 )             40.2       01 Jul 2021 11:00    144    |  102    |  35     ----------------------------<  133    3.5     |  26     |  2.0      Ca    7.8        01 Jul 2021 11:00  Mg     2.0       01 Jul 2021 11:00    TPro  6.5    /  Alb  4.1    /  TBili  1.1    /  DBili  x      /  AST  13     /  ALT  9      /  AlkPhos  106    01 Jul 2021 11:00            Serum Pro-Brain Natriuretic Peptide: 8749 pg/mL (06-30-21 @ 12:12)    Troponin 0.04, CKMB --, CK -- 07-01-21 @ 16:16  Troponin 0.05, CKMB --, CK -- 07-01-21 @ 11:00  Troponin 0.03, CKMB --, CK -- 06-30-21 @ 16:39  Troponin 0.04, CKMB --, CK -- 06-30-21 @ 12:12                       Hospital Day:  2d    Subjective: Patient is a 86y old  Male who presents with a chief complaint of shortness of breath (01 Jul 2021 19:07)      Pt seen and evaluated at bedside.   Complaints: patient wants to go home but son convincing him to stay. Son said that PCP recently switched patient from lasix 40 bid to daily and stopped metolazone as well.   Over the night Events: none    Attending note: Pt seen and examined at bedside. No cp or sob. Pt states that he eats everything and has been non compliant with his diet. His primary care physician took him off the metolazone recently. The son states that his "dry weight" is around 258-260, Pt now 269. Pt started having sob and came to the ED.     Past Medical Hx:   Afib    DM (diabetes mellitus)    CKD (chronic kidney disease)    Hydrocephalus    Hypertension      Past Sx:  No significant past surgical history    History of prostate surgery      Allergies:  No Known Allergies    Current Meds:   Standng Meds:  atorvastatin 20 milliGRAM(s) Oral at bedtime  calcitriol   Capsule 0.25 MICROGram(s) Oral daily  chlorhexidine 4% Liquid 1 Application(s) Topical <User Schedule>  dextrose 40% Gel 15 Gram(s) Oral once  dextrose 5%. 1000 milliLiter(s) (50 mL/Hr) IV Continuous <Continuous>  dextrose 5%. 1000 milliLiter(s) (100 mL/Hr) IV Continuous <Continuous>  dextrose 50% Injectable 25 Gram(s) IV Push once  dextrose 50% Injectable 12.5 Gram(s) IV Push once  dextrose 50% Injectable 25 Gram(s) IV Push once  finasteride 5 milliGRAM(s) Oral daily  furosemide   Injectable 80 milliGRAM(s) IV Push every 12 hours  gabapentin 100 milliGRAM(s) Oral three times a day  glucagon  Injectable 1 milliGRAM(s) IntraMuscular once  heparin   Injectable 5000 Unit(s) SubCutaneous every 8 hours  insulin glargine Injectable (LANTUS) 20 Unit(s) SubCutaneous at bedtime  insulin lispro (ADMELOG) corrective regimen sliding scale   SubCutaneous three times a day before meals  insulin lispro Injectable (ADMELOG) 7 Unit(s) SubCutaneous three times a day before meals  metolazone 2.5 milliGRAM(s) Oral daily  metoprolol tartrate 25 milliGRAM(s) Oral two times a day  tamsulosin 0.4 milliGRAM(s) Oral at bedtime    PRN Meds:      Vital Signs:   T(F): 97.4 (07-02-21 @ 05:03), Max: 97.5 (07-01-21 @ 07:34)  HR: 70 (07-02-21 @ 05:03) (70 - 93)  BP: 145/65 (07-02-21 @ 05:03) (135/66 - 167/88)  RR: 20 (07-02-21 @ 05:03) (20 - 20)  SpO2: 100% (07-02-21 @ 05:03) (96% - 100%)    Physical Exam:   GENERAL: NAD, Resting in bed  HEENT: NCAT  CHEST/LUNG: Clear to auscultation bilaterally; No wheezing or rubs.   HEART: Regular rate and rhythm; No murmurs, rubs, or gallops  ABDOMEN: Bowel sounds present; Soft, Nontender, Nondistended.   EXTREMITIES:  2+ b/l pitting edema; b/l LE wrapped in kerlix   NERVOUS SYSTEM:  Alert & Oriented X3    FLUID BALANCE    07-01-21 @ 07:01  -  07-02-21 @ 06:34  --------------------------------------------------------  IN: 480 mL / OUT: 990 mL / NET: -510 mL        Labs:                         12.0   9.12  )-----------( 268      ( 01 Jul 2021 11:00 )             40.2       01 Jul 2021 11:00    144    |  102    |  35     ----------------------------<  133    3.5     |  26     |  2.0      Ca    7.8        01 Jul 2021 11:00  Mg     2.0       01 Jul 2021 11:00    TPro  6.5    /  Alb  4.1    /  TBili  1.1    /  DBili  x      /  AST  13     /  ALT  9      /  AlkPhos  106    01 Jul 2021 11:00            Serum Pro-Brain Natriuretic Peptide: 8749 pg/mL (06-30-21 @ 12:12)    Troponin 0.04, CKMB --, CK -- 07-01-21 @ 16:16  Troponin 0.05, CKMB --, CK -- 07-01-21 @ 11:00  Troponin 0.03, CKMB --, CK -- 06-30-21 @ 16:39  Troponin 0.04, CKMB --, CK -- 06-30-21 @ 12:12

## 2021-07-02 NOTE — CONSULT NOTE ADULT - ASSESSMENT
ASSESSMENT  86yMale with a PMH of HTN, DM, AFib not on AC, HFpEF, JOHANNY on CPAp, CKD who presented to the ED with SOB due to HF exacerbation, found to have left lower  extremity erythematous lesion, with ulceration and blistering highly suggestive of LLE cellulitis.     IMPRESSION  # LLE Cellulitis, likely due to chronic lower extremity edema  # Right lower extremity erythema and swelling, likely stasis dermatitis changes due to chronic edema  #    RECOMMENDATIONS  - Wound care - continue applying dressing with silver sulfadiazine.  - Stable for discharge from ID standpoint on Keflex 500 mg BID, complete 7 day course antibiotics.

## 2021-07-02 NOTE — CONSULT NOTE ADULT - SUBJECTIVE AND OBJECTIVE BOX
DELVIS, JOSEPH  86y, Male  Allergy: No Known Allergies    CHIEF COMPLAINT: shortness of breath (02 Jul 2021 06:34)    HPI:  HPI:  87yo M history of HTN DM AFib no AC 2/2 falls, HFpEF JOHANNY, presents for shortness of breath. Symptoms started 3 days ago with gradual onset of shortness of breath, first at exertion and gradually progressing and is present now even at rest, associated with orthopnea and chest tightness.     He began to notice redness and swelling in his lower extremities bilaterally, which he says has gotten bigger since his hospitalization. He describes pain on palpation of his left lower extremity (5/10), more so than than palpation of his right lower extremity (1/10). He denies any pain when walking.   He notes having similar presentation of bilateral LE swelling and redness previously, for which his legs were wrapped and his presentation resolved.     He denies any fever, chills, night sweats. ROS positive for cough and SOB, otherwise negative      Infectious Diseases History:  Old Micro Data/Cultures:     FAMILY HISTORY:  Family history of diabetes mellitus (Mother, Sibling)      PAST MEDICAL & SURGICAL HISTORY:  Afib    DM (diabetes mellitus)    CKD (chronic kidney disease)    Hydrocephalus    Hypertension    History of prostate surgery        SOCIAL HISTORY  Social History:  Ex smoker with 30 pack yr history, quit 30 yrs bcak  Non alcoholic  No IV drug abuse    Lives in [place] with family and has health aid (15 Feb 2019 08:39)      Recent Travel:  Other Exposures:     ROS  General: Denies rigors, nightsweats  HEENT: Denies headache, sore throat,   CV: Denies CP  PULM: + Cough, + SOB   GI: Denies N/V, diarrhea, constipation   NEURO: Denies paresthesias, weakness      VITALS:  T(F): 97.4, Max: 97.4 (07-02-21 @ 05:03)  HR: 70  BP: 145/65  RR: 20Vital Signs Last 24 Hrs  T(C): 36.3 (02 Jul 2021 05:03), Max: 36.3 (02 Jul 2021 05:03)  T(F): 97.4 (02 Jul 2021 05:03), Max: 97.4 (02 Jul 2021 05:03)  HR: 70 (02 Jul 2021 05:03) (70 - 93)  BP: 145/65 (02 Jul 2021 05:03) (135/66 - 160/87)  BP(mean): --  RR: 20 (02 Jul 2021 05:03) (20 - 20)  SpO2: 100% (02 Jul 2021 05:03) (97% - 100%)    PHYSICAL EXAM:  Gen: NAD, resting in bed  CV: Irregular rate rhythm  Lungs: CTAB,   Abdomen: Soft, BS present  Ext: Warm, well perfused. Difficult to appreciate DP and PT pulses due to bilateral LE edema   Skin: Tense, warm erythematous lesions bilaterally, worse on left. Weeping, ulcerated, with bullae noted along lesion.       TESTS & MEASUREMENTS:                        12.4   9.06  )-----------( 252      ( 02 Jul 2021 06:14 )             41.2     07-02    145  |  102  |  34<H>  ----------------------------<  98  3.3<L>   |  27  |  1.9<H>    Ca    7.5<L>      02 Jul 2021 06:14  Phos  4.2     07-02  Mg     2.0     07-02    TPro  6.2  /  Alb  3.9  /  TBili  1.1  /  DBili  x   /  AST  9   /  ALT  8   /  AlkPhos  103  07-02    eGFR if Non African American: 31 mL/min/1.73M2 (07-02-21 @ 06:14)  eGFR if African American: 36 mL/min/1.73M2 (07-02-21 @ 06:14)  eGFR if Non African American: 30 mL/min/1.73M2 (07-01-21 @ 11:00)  eGFR if : 34 mL/min/1.73M2 (07-01-21 @ 11:00)    LIVER FUNCTIONS - ( 02 Jul 2021 06:14 )  Alb: 3.9 g/dL / Pro: 6.2 g/dL / ALK PHOS: 103 U/L / ALT: 8 U/L / AST: 9 U/L / GGT: x                 Blood Gas Venous - Lactate: 1.2 mmoL/L (06-30-21 @ 12:18)      INFECTIOUS DISEASES TESTING      RADIOLOGY & ADDITIONAL TESTS:  I have personally reviewed the last available Chest xray  CXR      CT      CARDIOLOGY TESTING  12 Lead ECG:   Ventricular Rate 96 BPM    QRS Duration 96 ms    Q-T Interval 392 ms    QTC Calculation(Bazett) 495 ms    R Axis 50 degrees    T Axis 47 degrees    Diagnosis Line Atrial fibrillation with premature ventricular or aberrantly conducted  complexes  Septal infarct , age undetermined  Abnormal ECG    Confirmed by Matteo Mayen ([id number]) on 6/30/2021 11:29:17 AM (06-30-21 @ 11:13)      All available historical records have been reviewed    MEDICATIONS  atorvastatin 20  calcitriol   Capsule 0.25  ceFAZolin   IVPB 1000  chlorhexidine 4% Liquid 1  dextrose 40% Gel 15  dextrose 5%. 1000  dextrose 5%. 1000  dextrose 50% Injectable 25  dextrose 50% Injectable 12.5  dextrose 50% Injectable 25  finasteride 5  furosemide   Injectable 80  gabapentin 100  glucagon  Injectable 1  heparin   Injectable 5000  insulin glargine Injectable (LANTUS) 20  insulin lispro (ADMELOG) corrective regimen sliding scale   insulin lispro Injectable (ADMELOG) 7  metolazone 2.5  metoprolol tartrate 25  silver sulfADIAZINE 1% Cream 1  tamsulosin 0.4      ANTIBIOTICS:  ceFAZolin   IVPB 1000 milliGRAM(s) IV Intermittent every 8 hours      All available historical data has been reviewed    ASSESSMENT  86yMale with a PMH of HTN, DM, AFib not on AC, HFpEF, JOHANNY on CPAp, CKD who presented to the ED with SOB due to HF exacerbation, found to have bilateral lower extremity erythematous lesions suggestive of cellulitis.     IMPRESSION  # Cellulitis along LLE, likely due to chronic lower extremity edema  # Changes to right lower extremity likely skin change due to chronic edema  #    RECOMMENDATIONS  - Wound care - continue applying dressing with silver sulfadiazine  - Continue with Cefazolin 1000 mg IVPB q8h   - Mateo outline/border of erythematous lesion to evaluate for change over time.     This is a pended note. All final recommendations to follow pending discussion with ID Attending    DELVIS, JOSEPH  86y, Male  Allergy: No Known Allergies    CHIEF COMPLAINT: shortness of breath (02 Jul 2021 06:34)    HPI:  HPI:  87yo M history of HTN DM AFib no AC 2/2 falls, HFpEF JOHANNY, presents for shortness of breath. Symptoms started 3 days ago with gradual onset of shortness of breath, first at exertion and gradually progressing and is present now even at rest, associated with orthopnea and chest tightness.     He began to notice redness and swelling in his lower extremities bilaterally, which he says has gotten bigger since his hospitalization. He describes pain on palpation of his left lower extremity (5/10), more so than than palpation of his right lower extremity (1/10). He denies any pain when walking.   He notes having similar presentation of bilateral LE swelling and redness previously, for which his legs were wrapped and his presentation resolved.     He denies any fever, chills, night sweats. ROS positive for cough and SOB, otherwise negative      Infectious Diseases History:  Old Micro Data/Cultures:     FAMILY HISTORY:  Family history of diabetes mellitus (Mother, Sibling)      PAST MEDICAL & SURGICAL HISTORY:  Afib    DM (diabetes mellitus)    CKD (chronic kidney disease)    Hydrocephalus    Hypertension    History of prostate surgery        SOCIAL HISTORY  Social History:  Ex smoker with 30 pack yr history, quit 30 yrs bcak  Non alcoholic  No IV drug abuse    Lives in [place] with family and has health aid (15 Feb 2019 08:39)      Recent Travel:  Other Exposures:     ROS  General: Denies rigors, nightsweats  HEENT: Denies headache, sore throat,   CV: Denies CP  PULM: + Cough, + SOB   GI: Denies N/V, diarrhea, constipation   NEURO: Denies paresthesias, weakness      VITALS:  T(F): 97.4, Max: 97.4 (07-02-21 @ 05:03)  HR: 70  BP: 145/65  RR: 20Vital Signs Last 24 Hrs  T(C): 36.3 (02 Jul 2021 05:03), Max: 36.3 (02 Jul 2021 05:03)  T(F): 97.4 (02 Jul 2021 05:03), Max: 97.4 (02 Jul 2021 05:03)  HR: 70 (02 Jul 2021 05:03) (70 - 93)  BP: 145/65 (02 Jul 2021 05:03) (135/66 - 160/87)  BP(mean): --  RR: 20 (02 Jul 2021 05:03) (20 - 20)  SpO2: 100% (02 Jul 2021 05:03) (97% - 100%)    PHYSICAL EXAM:  Gen: NAD, resting in bed  CV: Irregular rate rhythm  Lungs: CTAB,   Abdomen: Soft, BS present  Ext: Warm, well perfused. Difficult to appreciate DP and PT pulses due to bilateral LE edema   Skin: Tense, warm erythematous lesions bilaterally, worse on left. Weeping, ulcerated, with bullae noted along lesion.       TESTS & MEASUREMENTS:                        12.4   9.06  )-----------( 252      ( 02 Jul 2021 06:14 )             41.2     07-02    145  |  102  |  34<H>  ----------------------------<  98  3.3<L>   |  27  |  1.9<H>    Ca    7.5<L>      02 Jul 2021 06:14  Phos  4.2     07-02  Mg     2.0     07-02    TPro  6.2  /  Alb  3.9  /  TBili  1.1  /  DBili  x   /  AST  9   /  ALT  8   /  AlkPhos  103  07-02    eGFR if Non African American: 31 mL/min/1.73M2 (07-02-21 @ 06:14)  eGFR if African American: 36 mL/min/1.73M2 (07-02-21 @ 06:14)  eGFR if Non African American: 30 mL/min/1.73M2 (07-01-21 @ 11:00)  eGFR if : 34 mL/min/1.73M2 (07-01-21 @ 11:00)    LIVER FUNCTIONS - ( 02 Jul 2021 06:14 )  Alb: 3.9 g/dL / Pro: 6.2 g/dL / ALK PHOS: 103 U/L / ALT: 8 U/L / AST: 9 U/L / GGT: x                 Blood Gas Venous - Lactate: 1.2 mmoL/L (06-30-21 @ 12:18)      INFECTIOUS DISEASES TESTING      RADIOLOGY & ADDITIONAL TESTS:  I have personally reviewed the last available Chest xray  CXR      CT      CARDIOLOGY TESTING  12 Lead ECG:   Ventricular Rate 96 BPM    QRS Duration 96 ms    Q-T Interval 392 ms    QTC Calculation(Bazett) 495 ms    R Axis 50 degrees    T Axis 47 degrees    Diagnosis Line Atrial fibrillation with premature ventricular or aberrantly conducted  complexes  Septal infarct , age undetermined  Abnormal ECG    Confirmed by Matteo Mayen ([id number]) on 6/30/2021 11:29:17 AM (06-30-21 @ 11:13)      All available historical records have been reviewed    MEDICATIONS  atorvastatin 20  calcitriol   Capsule 0.25  ceFAZolin   IVPB 1000  chlorhexidine 4% Liquid 1  dextrose 40% Gel 15  dextrose 5%. 1000  dextrose 5%. 1000  dextrose 50% Injectable 25  dextrose 50% Injectable 12.5  dextrose 50% Injectable 25  finasteride 5  furosemide   Injectable 80  gabapentin 100  glucagon  Injectable 1  heparin   Injectable 5000  insulin glargine Injectable (LANTUS) 20  insulin lispro (ADMELOG) corrective regimen sliding scale   insulin lispro Injectable (ADMELOG) 7  metolazone 2.5  metoprolol tartrate 25  silver sulfADIAZINE 1% Cream 1  tamsulosin 0.4      ANTIBIOTICS:  ceFAZolin   IVPB 1000 milliGRAM(s) IV Intermittent every 8 hours      All available historical data has been reviewed    ASSESSMENT  86yMale with a PMH of HTN, DM, AFib not on AC, HFpEF, JOHANNY on CPAp, CKD who presented to the ED with SOB due to HF exacerbation, found to have left lower  extremity erythematous lesion, with ulceration and blistering highly suggestive of LLE cellulitis.     IMPRESSION  # LLE Cellulitis, likely due to chronic lower extremity edema  # Right lower extremity erythema and swelling, likely stasis dermatitis changes due to chronic edema  #    RECOMMENDATIONS  - Wound care - continue applying dressing with silver sulfadiazine.  - Stable for discharge from ID standpoint on Cefalexin/Keflex 500 mg BID, complete 7 day course antibiotics.   - Can mira outline/border of erythematous lesion to evaluate for change over time.     This is a pended note. All final recommendations to follow pending discussion with ID Attending    DELVIS, JOSEPH  86y, Male  Allergy: No Known Allergies    CHIEF COMPLAINT: shortness of breath (02 Jul 2021 06:34)    HPI:  HPI:  87yo M history of HTN DM AFib no AC 2/2 falls, HFpEF JOHANNY, presents for shortness of breath. Symptoms started 3 days ago with gradual onset of shortness of breath, first at exertion and gradually progressing and is present now even at rest, associated with orthopnea and chest tightness.     He began to notice redness and swelling in his lower extremities bilaterally, which he says has gotten bigger since his hospitalization. He describes pain on palpation of his left lower extremity (5/10), more so than than palpation of his right lower extremity (1/10). He denies any pain when walking.   He notes having similar presentation of bilateral LE swelling and redness previously, for which his legs were wrapped and his presentation resolved.     He denies any fever, chills, night sweats. ROS positive for cough and SOB, otherwise negative      Infectious Diseases History:  Old Micro Data/Cultures: na    FAMILY HISTORY:  Family history of diabetes mellitus (Mother, Sibling)      PAST MEDICAL & SURGICAL HISTORY:  Afib    DM (diabetes mellitus)    CKD (chronic kidney disease)    Hydrocephalus    Hypertension    History of prostate surgery        SOCIAL HISTORY  Social History:  Ex smoker with 30 pack yr history, quit 30 yrs bcak  Non alcoholic  No IV drug abuse    Lives in [place] with family and has health aid (15 Feb 2019 08:39)      Recent Travel:  Other Exposures:     ROS  General: Denies rigors, nightsweats  HEENT: Denies headache, sore throat,   CV: Denies CP  PULM: + Cough, + SOB   GI: Denies N/V, diarrhea, constipation   NEURO: Denies paresthesias, weakness      VITALS:  T(F): 97.4, Max: 97.4 (07-02-21 @ 05:03)  HR: 70  BP: 145/65  RR: 20Vital Signs Last 24 Hrs  T(C): 36.3 (02 Jul 2021 05:03), Max: 36.3 (02 Jul 2021 05:03)  T(F): 97.4 (02 Jul 2021 05:03), Max: 97.4 (02 Jul 2021 05:03)  HR: 70 (02 Jul 2021 05:03) (70 - 93)  BP: 145/65 (02 Jul 2021 05:03) (135/66 - 160/87)  BP(mean): --  RR: 20 (02 Jul 2021 05:03) (20 - 20)  SpO2: 100% (02 Jul 2021 05:03) (97% - 100%)    PHYSICAL EXAM:  Gen: NAD, resting in bed  CV: Irregular rate rhythm  Lungs: CTAB,   Abdomen: Soft, BS present  Ext: Warm, well perfused. Difficult to appreciate DP and PT pulses due to bilateral LE edema   Skin: Tense, warm erythematous lesions bilaterally, worse on left. Weeping, ulcerated, with bullae noted along lesion.       TESTS & MEASUREMENTS:                        12.4   9.06  )-----------( 252      ( 02 Jul 2021 06:14 )             41.2     07-02    145  |  102  |  34<H>  ----------------------------<  98  3.3<L>   |  27  |  1.9<H>    Ca    7.5<L>      02 Jul 2021 06:14  Phos  4.2     07-02  Mg     2.0     07-02    TPro  6.2  /  Alb  3.9  /  TBili  1.1  /  DBili  x   /  AST  9   /  ALT  8   /  AlkPhos  103  07-02    eGFR if Non African American: 31 mL/min/1.73M2 (07-02-21 @ 06:14)  eGFR if African American: 36 mL/min/1.73M2 (07-02-21 @ 06:14)  eGFR if Non African American: 30 mL/min/1.73M2 (07-01-21 @ 11:00)  eGFR if : 34 mL/min/1.73M2 (07-01-21 @ 11:00)    LIVER FUNCTIONS - ( 02 Jul 2021 06:14 )  Alb: 3.9 g/dL / Pro: 6.2 g/dL / ALK PHOS: 103 U/L / ALT: 8 U/L / AST: 9 U/L / GGT: x                 Blood Gas Venous - Lactate: 1.2 mmoL/L (06-30-21 @ 12:18)      INFECTIOUS DISEASES TESTING      RADIOLOGY & ADDITIONAL TESTS:  I have personally reviewed the last available Chest xray  CXR      CT      CARDIOLOGY TESTING  12 Lead ECG:   Ventricular Rate 96 BPM    QRS Duration 96 ms    Q-T Interval 392 ms    QTC Calculation(Bazett) 495 ms    R Axis 50 degrees    T Axis 47 degrees    Diagnosis Line Atrial fibrillation with premature ventricular or aberrantly conducted  complexes  Septal infarct , age undetermined  Abnormal ECG    Confirmed by Matteo Mayen ([id number]) on 6/30/2021 11:29:17 AM (06-30-21 @ 11:13)      All available historical records have been reviewed    MEDICATIONS  atorvastatin 20  calcitriol   Capsule 0.25  ceFAZolin   IVPB 1000  chlorhexidine 4% Liquid 1  dextrose 40% Gel 15  dextrose 5%. 1000  dextrose 5%. 1000  dextrose 50% Injectable 25  dextrose 50% Injectable 12.5  dextrose 50% Injectable 25  finasteride 5  furosemide   Injectable 80  gabapentin 100  glucagon  Injectable 1  heparin   Injectable 5000  insulin glargine Injectable (LANTUS) 20  insulin lispro (ADMELOG) corrective regimen sliding scale   insulin lispro Injectable (ADMELOG) 7  metolazone 2.5  metoprolol tartrate 25  silver sulfADIAZINE 1% Cream 1  tamsulosin 0.4      ANTIBIOTICS:  ceFAZolin   IVPB 1000 milliGRAM(s) IV Intermittent every 8 hours      All available historical data has been reviewed

## 2021-07-03 NOTE — PROGRESS NOTE ADULT - ATTENDING COMMENTS
#Progress Note Handoff  Pending (specify):  nephro. HF, cardiology, EP, continue HF treatment, monitor I/O  Family discussion: raven family at bedside  Disposition: f9

## 2021-07-03 NOTE — CONSULT NOTE ADULT - SUBJECTIVE AND OBJECTIVE BOX
Patient is a 86y old  Male who presents with a chief complaint of shortness of breath (2021 12:07)        HPI:  85yo M history of HTN DM AFib no AC 2/2 falls, HFpEF JOHANNY, presents for shortness of breath. Symptoms started 3 days ago with gradual onset of shortness of breath, first at exertion and gradually progressing and is present now even at rest, associated with orthopnea and chest tightness. Patient says he has been adhering to his medications and has not missed any doses. He also watches his salt intake with no increase in consumption last few days. He went yesterday to urgent care who started him on antibiotics and po steroids for suspicion of PNA vs COPD. He denied fevers, chills, cough or sputum production, nausea or vomiting, diarrhea or other associated symptoms.    In the ED VS showed elevated /79.  Chest Xray showed bilateral parenchymal opacities and pleural effusions.  He received 325 mg of aspirin and 1 dose of lasix IV. (2021 15:00)      Electrophysiology:  86y Male with h/o HTN, DM, chronic AF not on AC due to falls, COPD, JOHANNY, on bipap at home, pulmonary HTN, HFpEF, presented to [facility] c/o worsening dyspnea with minimal exertion and orthopnea for 1-2 weeks. no chest discomfort palpitations, dizziness, LOC.  Patient reports being complaint with medications, PMD stopped Metolazone 2 months ago.  On tele frequent PVC, occasional NSVT, longest 5 sec, asymptomatic  Last ischemic eval unknown denies any testing since last available in 2018    REVIEW OF SYSTEMS    [ x] A ten-point review of systems was otherwise negative except as noted.  [ ] Due to altered mental status/intubation, subjective information were not able to be obtained from the patient. History was obtained, to the extent possible, from review of the chart and collateral sources of information.      PAST MEDICAL & SURGICAL HISTORY:  Afib    DM (diabetes mellitus)    CKD (chronic kidney disease)    Hydrocephalus    Hypertension    History of prostate surgery        Home Medications:  calcitriol 0.25 mcg oral capsule: 1 cap(s) orally once a day (2021 15:07)  dutasteride 0.5 mg oral capsule: 1 cap(s) orally once a day (2021 15:07)  gabapentin 100 mg oral capsule: 1 cap(s) orally 3 times a day (2021 15:07)  Klor-Con 20 mEq oral powder for reconstitution: 1 each orally 2 times a day (2021 15:07)  Lantus Solostar Pen 100 units/mL subcutaneous solution: 20 unit(s) subcutaneous once a day (at bedtime) (2021 15:07)  Lasix 40 mg oral tablet: 1 tab(s) orally 2 times a day (2021 15:07)  metOLazone 2.5 mg oral tablet: 1 tab(s) orally once a day (2021 15:07)  Metoprolol Tartrate 25 mg oral tablet: 1 tab(s) orally 2 times a day (2021 15:07)  Ozempic (1 mg dose) 4 mg/3 mL subcutaneous solution: 1 milligram(s) subcutaneous once a week (2021 15:07)      Allergies:  No Known Allergies      FAMILY HISTORY:  Family history of diabetes mellitus (Mother, Sibling)        SOCIAL HISTORY: denies tobacco / ETOH / illicit drug use     CIGARETTES:  ALCOHOL:        PREVIOUS DIAGNOSTIC TESTING:      ECHO  FINDINGS:  Dr Dr Harris office recently :  normal ef 60%, mild diastolic dysfunction, grossly normal valves    < from: TTE Echo Complete w/o Contrast w/ Doppler (21 @ 14:44) >  Summary:   1. Normal global left ventricular systolic function.   2. LVEjection Fraction by Lemon's Method with a biplane EF of 54 %.   3. Mild left ventricular hypertrophy.   4. Mildly increased LV wall thickness.   5. Normal left ventricular internal cavity size.   6. Moderately enlarged left atrium.   7. Mild mitral annular calcification.   8. Mild thickening and calcification of the anterior and posterior mitral valve leaflets.   9. Mild to moderate mitral valve regurgitation.  10. Mild-moderate tricuspid regurgitation.  11. Mild aortic regurgitation.  12. Sclerotic aortic valve with normal opening.  13. Estimated pulmonary artery systolic pressure is 54.2 mmHg assuming a right atrial pressure of 5 mmHg, which is consistent with moderate pulmonary hypertension.  14. LA volume Index is 51.8 ml/m² ml/m2.  15. There is moderate aortic root calcification.    < end of copied text >        < from: Transthoracic Echocardiogram (19 @ 13:35) >  Summary:   1. Normal left ventricular internal cavity size.   2. Mildly enlarged left atrium.   3. Normal right atrial size.   4. There is no evidence of pericardial effusion.   5. Thickening and calcification of the anterior and posterior mitral   valve leaflets.   6. Moderate tricuspid regurgitation.   7. Pulmonic valve regurgitation.    PHYSICIAN INTERPRETATION:  Left Ventricle: The left ventricular internal cavity size is normal. Left   ventricular wall thickness is normal. LV Ejection Fraction by Lemon's   Method 55-60%.  Right Ventricle: The right ventricular size is normal. RV systolic   function isnormal.  Left Atrium: Mildly enlarged left atrium.  Right Atrium: Normal right atrial size.  Pericardium: There is no evidence of pericardial effusion.  Mitral Valve: The mitral valve is normal in structure. Thickening and   calcification of the anterior and posterior mitral valve leaflets.  Tricuspid Valve: Moderate tricuspid regurgitation is visualized. The   tricuspid valve is normal.  Aortic Valve: No evidence of aortic valve regurgitation is seen. The   aortic valve is trileaflet. Peak Av velocity is 2.16 m/s, mean   transaortic gradient equals 10.2 mmHg, the calculated aortic valve area   equals 2.10 cm² by the continuity equation consistent with mild aortic   stenosis.  Pulmonic Valve: Mild pulmonic valve regurgitation. The pulmonic valve is   thickened with good excursion.    < end of copied text >  STRESS  FINDINGS:  < from: NM Nuclear Stress Pharmacologic Multiple (18 @ 14:16) >  Impression:   1. NORMAL ADENOSINE / REST MYOCARDIAL PERFUSION TOMOGRAPHY, WITH NO   EVIDENCE FOR ISCHEMIA DURING ADENOSINE INFUSION.   2. BORDERLINE NORMAL RESTING LEFT VENTRICULAR WALL MOTION AND WALL   THICKENING.  3. LEFT VENTRICULAR EJECTION FRACTION OF   49 % WHICH IS JUST BELOW THE   LOWER LIMIT OF NORMAL OF 50%. WALL MOTION ANALYSIS SUGGESTS EJECTION   FRACTION OF 55%. ECHOCARDIOGRAPHIC ESTIMATED EJECTION FRACTION WAS 54% ON   2018.     < end of copied text >    VENOUS DUPLEX SCAN:  FINDINGS:  < from: VA Duplex Lower Ext Vein Scan, Bilat (21 @ 14:33) >  IMPRESSION:  No evidence of deep venous thrombosis in either lower extremity. Neither peroneal vein is visualized. Bilateral edema.        < end of copied text >      MEDICATIONS  (STANDING):  acetaminophen   Tablet .. 650 milliGRAM(s) Oral once  atorvastatin 20 milliGRAM(s) Oral at bedtime  calcitriol   Capsule 0.25 MICROGram(s) Oral daily  cephalexin 500 milliGRAM(s) Oral every 12 hours  chlorhexidine 4% Liquid 1 Application(s) Topical <User Schedule>  dextrose 40% Gel 15 Gram(s) Oral once  dextrose 5%. 1000 milliLiter(s) (50 mL/Hr) IV Continuous <Continuous>  dextrose 5%. 1000 milliLiter(s) (100 mL/Hr) IV Continuous <Continuous>  dextrose 50% Injectable 25 Gram(s) IV Push once  dextrose 50% Injectable 12.5 Gram(s) IV Push once  dextrose 50% Injectable 25 Gram(s) IV Push once  diltiazem    Tablet 60 milliGRAM(s) Oral every 12 hours  finasteride 5 milliGRAM(s) Oral daily  furosemide   Injectable 80 milliGRAM(s) IV Push every 8 hours  gabapentin 100 milliGRAM(s) Oral three times a day  glucagon  Injectable 1 milliGRAM(s) IntraMuscular once  heparin   Injectable 5000 Unit(s) SubCutaneous every 8 hours  insulin glargine Injectable (LANTUS) 20 Unit(s) SubCutaneous at bedtime  insulin lispro (ADMELOG) corrective regimen sliding scale   SubCutaneous three times a day before meals  insulin lispro Injectable (ADMELOG) 7 Unit(s) SubCutaneous three times a day before meals  metolazone 5 milliGRAM(s) Oral daily  metoprolol tartrate 25 milliGRAM(s) Oral two times a day  silver sulfADIAZINE 1% Cream 1 Application(s) Topical daily  spironolactone 25 milliGRAM(s) Oral daily  tamsulosin 0.4 milliGRAM(s) Oral at bedtime    MEDICATIONS  (PRN):  guaifenesin/dextromethorphan Oral Liquid 5 milliLiter(s) Oral every 6 hours PRN Cough      Vital Signs Last 24 Hrs  T(C): 35.9 (2021 13:36), Max: 36.4 (2021 21:20)  T(F): 96.6 (2021 13:36), Max: 97.6 (2021 21:20)  HR: 90 (2021 13:36) (74 - 90)  BP: 147/65 (2021 13:36) (118/56 - 149/65)  BP(mean): --  RR: 18 (2021 13:36) (18 - 20)  SpO2: 97% (2021 08:00) (97% - 97%)    PHYSICAL EXAM:    GENERAL: In no apparent distress, well nourished, and hydrated.  HEAD:  Atraumatic, Normocephalic  EYES: EOMI, PERRLA, conjunctiva and sclera clear  NECK: Supple and normal thyroid.  No JVD or carotid bruit.  Carotid pulse is 2+ bilaterally.  HEART: IRRegular rate and rhythm; No murmurs, rubs, or gallops.  PULMONARY: bilateral rales  No rales, wheezing, or rhonchi bilaterally.  ABDOMEN: Soft, Nontender, Nondistended; Bowel sounds present  EXTREMITIES:  2+ Peripheral Pulses, No clubbing, cyanosis, 3+ edema b/l  NEUROLOGICAL: Grossly nonfocal    I&O's Detail    2021 07:01  -  2021 07:00  --------------------------------------------------------  IN:    Oral Fluid: 700 mL  Total IN: 700 mL    OUT:    Voided (mL): 1800 mL  Total OUT: 1800 mL    Total NET: -1100 mL      2021 07:01  -  2021 15:41  --------------------------------------------------------  IN:    Oral Fluid: 480 mL  Total IN: 480 mL    OUT:    Voided (mL): 1055 mL  Total OUT: 1055 mL    Total NET: -575 mL        Daily     Daily Weight in k (2021 08:30)    INTERPRETATION OF TELEMETRY:    ECG:  < from: 12 Lead ECG (21 @ 11:13) >  Ventricular Rate 96 BPM    QRS Duration 96 ms    Q-T Interval 392 ms    QTC Calculation(Bazett) 495 ms    R Axis 50 degrees    T Axis 47 degrees    Diagnosis Line Atrial fibrillation with premature ventricular or aberrantly conducted  complexes  Septal infarct , age undetermined  Abnormal ECG    < end of copied text >        LABS:                        12.2   8.97  )-----------( 273      ( 2021 06:57 )             41.1     07-03    145  |  98  |  41<H>  ----------------------------<  98  3.5   |  31  |  2.4<H>    Ca    8.0<L>      2021 06:57  Phos  4.8     07-03  Mg     2.4     07-03    TPro  6.6  /  Alb  4.0  /  TBili  0.9  /  DBili  x   /  AST  9   /  ALT  7   /  AlkPhos  109  07-03    CARDIAC MARKERS ( 2021 16:16 )  x     / 0.04 ng/mL / x     / x     / x              BNP            RADIOLOGY & ADDITIONAL STUDIES:

## 2021-07-03 NOTE — PROGRESS NOTE ADULT - SUBJECTIVE AND OBJECTIVE BOX
HPI  Patient is a 86y old Male who presents with a chief complaint of shortness of breath (02 Jul 2021 17:06)    Currently admitted to medicine with the primary diagnosis of CHF exacerbation       Today is hospital day 3d.     INTERVAL HPI / OVERNIGHT EVENTS:  Patient was examined and seen at bedside. This morning he is resting comfortably in bed. Overnight he had 1min of VTach, asymptomatic.     ROS: Otherwise unremarkable     PAST MEDICAL & SURGICAL HISTORY  Afib    DM (diabetes mellitus)    CKD (chronic kidney disease)    Hydrocephalus    Hypertension    History of prostate surgery      ALLERGIES  No Known Allergies    MEDICATIONS  STANDING MEDICATIONS  atorvastatin 20 milliGRAM(s) Oral at bedtime  calcitriol   Capsule 0.25 MICROGram(s) Oral daily  cephalexin 500 milliGRAM(s) Oral every 12 hours  chlorhexidine 4% Liquid 1 Application(s) Topical <User Schedule>  dextrose 40% Gel 15 Gram(s) Oral once  dextrose 5%. 1000 milliLiter(s) IV Continuous <Continuous>  dextrose 5%. 1000 milliLiter(s) IV Continuous <Continuous>  dextrose 50% Injectable 25 Gram(s) IV Push once  dextrose 50% Injectable 12.5 Gram(s) IV Push once  dextrose 50% Injectable 25 Gram(s) IV Push once  finasteride 5 milliGRAM(s) Oral daily  furosemide   Injectable 80 milliGRAM(s) IV Push every 8 hours  gabapentin 100 milliGRAM(s) Oral three times a day  glucagon  Injectable 1 milliGRAM(s) IntraMuscular once  heparin   Injectable 5000 Unit(s) SubCutaneous every 8 hours  insulin glargine Injectable (LANTUS) 20 Unit(s) SubCutaneous at bedtime  insulin lispro (ADMELOG) corrective regimen sliding scale   SubCutaneous three times a day before meals  insulin lispro Injectable (ADMELOG) 7 Unit(s) SubCutaneous three times a day before meals  metolazone 5 milliGRAM(s) Oral daily  metoprolol tartrate 25 milliGRAM(s) Oral every 8 hours  potassium chloride    Tablet ER 20 milliEquivalent(s) Oral daily  silver sulfADIAZINE 1% Cream 1 Application(s) Topical daily  spironolactone 25 milliGRAM(s) Oral daily  tamsulosin 0.4 milliGRAM(s) Oral at bedtime    PRN MEDICATIONS  guaifenesin/dextromethorphan Oral Liquid 5 milliLiter(s) Oral every 6 hours PRN    VITALS:  T(F): 96.6  HR: 74  BP: 118/56  RR: 18  SpO2: 97%    PHYSICAL EXAM  GEN: NAD, Resting comfortably in chair  PULM: Decreased breath sounds bilateral, no wheezing  CVS: Regular rate and rhythm, S1-S2, no murmurs  ABD: Soft, distended, no tenderness  EXT: B/l LE edema, 2+ pitting  NEURO: A&Ox3, no focal deficits    LABS                        12.2   8.97  )-----------( 273      ( 03 Jul 2021 06:57 )             41.1     07-03    145  |  98  |  41<H>  ----------------------------<  98  3.5   |  31  |  2.4<H>    Ca    8.0<L>      03 Jul 2021 06:57  Phos  4.8     07-03  Mg     2.4     07-03    TPro  6.6  /  Alb  4.0  /  TBili  0.9  /  DBili  x   /  AST  9   /  ALT  7   /  AlkPhos  109  07-03              CARDIAC MARKERS ( 01 Jul 2021 16:16 )  x     / 0.04 ng/mL / x     / x     / x      CARDIAC MARKERS ( 01 Jul 2021 11:00 )  x     / 0.05 ng/mL / x     / x     / x          RADIOLOGY    EXAM:  XR CHEST PORTABLE IMMED 1V            PROCEDURE DATE:  07/02/2021            INTERPRETATION:  Clinical History / Reason for exam: Heart failure    Comparison : Chest radiograph June 30, 2021.    Technique/Positioning: Single AP view of the chest.    Findings:    Support devices: Right-sided  shunt, unchanged. Telemetry leads overlie chest.    Cardiac/mediastinum/hilum: Cardiomegaly, unchanged.    Lung parenchyma/Pleura: Bibasilar pleural-parenchymal opacities, right greater than left, unchanged. Bilateral interstitial opacities/edema. No pneumothorax.    Skeleton/soft tissues: Unchanged.    Impression:    Bibasilar pleural-parenchymal opacities and interstitial opacities/edema, unchanged.   HPI  Patient is a 86y old Male who presents with a chief complaint of shortness of breath (02 Jul 2021 17:06)    Currently admitted to medicine with the primary diagnosis of CHF exacerbation       Today is hospital day 3d.     INTERVAL HPI / OVERNIGHT EVENTS:  Patient was examined and seen at bedside. This morning he is resting comfortably in bed. Overnight he had 1min of VTach, asymptomatic.     ROS: Otherwise unremarkable     Attending Note: Pt seen and examined at bedside. No cp or sob.. Tele shows multiple runs pvc, nsv    PAST MEDICAL & SURGICAL HISTORY  Afib    DM (diabetes mellitus)    CKD (chronic kidney disease)    Hydrocephalus    Hypertension    History of prostate surgery      ALLERGIES  No Known Allergies    MEDICATIONS  STANDING MEDICATIONS  atorvastatin 20 milliGRAM(s) Oral at bedtime  calcitriol   Capsule 0.25 MICROGram(s) Oral daily  cephalexin 500 milliGRAM(s) Oral every 12 hours  chlorhexidine 4% Liquid 1 Application(s) Topical <User Schedule>  dextrose 40% Gel 15 Gram(s) Oral once  dextrose 5%. 1000 milliLiter(s) IV Continuous <Continuous>  dextrose 5%. 1000 milliLiter(s) IV Continuous <Continuous>  dextrose 50% Injectable 25 Gram(s) IV Push once  dextrose 50% Injectable 12.5 Gram(s) IV Push once  dextrose 50% Injectable 25 Gram(s) IV Push once  finasteride 5 milliGRAM(s) Oral daily  furosemide   Injectable 80 milliGRAM(s) IV Push every 8 hours  gabapentin 100 milliGRAM(s) Oral three times a day  glucagon  Injectable 1 milliGRAM(s) IntraMuscular once  heparin   Injectable 5000 Unit(s) SubCutaneous every 8 hours  insulin glargine Injectable (LANTUS) 20 Unit(s) SubCutaneous at bedtime  insulin lispro (ADMELOG) corrective regimen sliding scale   SubCutaneous three times a day before meals  insulin lispro Injectable (ADMELOG) 7 Unit(s) SubCutaneous three times a day before meals  metolazone 5 milliGRAM(s) Oral daily  metoprolol tartrate 25 milliGRAM(s) Oral every 8 hours  potassium chloride    Tablet ER 20 milliEquivalent(s) Oral daily  silver sulfADIAZINE 1% Cream 1 Application(s) Topical daily  spironolactone 25 milliGRAM(s) Oral daily  tamsulosin 0.4 milliGRAM(s) Oral at bedtime    PRN MEDICATIONS  guaifenesin/dextromethorphan Oral Liquid 5 milliLiter(s) Oral every 6 hours PRN    VITALS:  T(F): 96.6  HR: 74  BP: 118/56  RR: 18  SpO2: 97%    PHYSICAL EXAM  GEN: NAD, Resting comfortably in chair  PULM: Decreased breath sounds bilateral, no wheezing  CVS: Regular rate and rhythm, S1-S2, no murmurs  ABD: Soft, distended, no tenderness  EXT: B/l LE edema, 2+ pitting  NEURO: A&Ox3, no focal deficits    LABS                        12.2   8.97  )-----------( 273      ( 03 Jul 2021 06:57 )             41.1     07-03    145  |  98  |  41<H>  ----------------------------<  98  3.5   |  31  |  2.4<H>    Ca    8.0<L>      03 Jul 2021 06:57  Phos  4.8     07-03  Mg     2.4     07-03    TPro  6.6  /  Alb  4.0  /  TBili  0.9  /  DBili  x   /  AST  9   /  ALT  7   /  AlkPhos  109  07-03              CARDIAC MARKERS ( 01 Jul 2021 16:16 )  x     / 0.04 ng/mL / x     / x     / x      CARDIAC MARKERS ( 01 Jul 2021 11:00 )  x     / 0.05 ng/mL / x     / x     / x          RADIOLOGY    EXAM:  XR CHEST PORTABLE IMMED 1V            PROCEDURE DATE:  07/02/2021            INTERPRETATION:  Clinical History / Reason for exam: Heart failure    Comparison : Chest radiograph June 30, 2021.    Technique/Positioning: Single AP view of the chest.    Findings:    Support devices: Right-sided  shunt, unchanged. Telemetry leads overlie chest.    Cardiac/mediastinum/hilum: Cardiomegaly, unchanged.    Lung parenchyma/Pleura: Bibasilar pleural-parenchymal opacities, right greater than left, unchanged. Bilateral interstitial opacities/edema. No pneumothorax.    Skeleton/soft tissues: Unchanged.    Impression:    Bibasilar pleural-parenchymal opacities and interstitial opacities/edema, unchanged.

## 2021-07-03 NOTE — PROGRESS NOTE ADULT - ASSESSMENT
87yo M history of HTN DM AFib no AC 2/2 falls, HFpEF JOHANNY, presents for shortness of breath.    #Shortness of breath secondary to HFpEF exacerbation 2/2 noncompliance   #Hypokalemia - improved  - Son said that PCP recently switched patient from lasix 40mg bid to daily and stopped metolazone as well  - CXR showed bilateral parenchymal opacities and pleural effusions  - BNP 8749, ECG showed afib, no ischemic changes  - Troponin elevated 0.04>0.03>0.05>0.04  - Echo: EF 54%, moderate pHTN, mild AR, mid-mod MVR< TR  - LE duplex: negative, noted bilateral edema  - s/p lasix 40 mg iv in the ED  - Cardiology following: increased Lasix 80mg q8, metolazone 5mg daily, started on Aldactone 25mg. Plan to increase Aldactone to 50mg daily if BP tolerates.   - continue with KCL 20mg po daily, monitor K  - Follow BMP q12  - daily weight, strict I/Os. fluid restriction  - Keep K>4 and Mg >2    #Multiple NSVT, PVCs  - increase metoprolol 25mg q8  - EP consult     #suspected left LE cellulitis  - continue with ancef 1g q8hrs IV   - ID consult: can be discharged on Keflex 500mg BID for 7 day course    # Chronic Afib  - not on AC secondary to falls  - rate controlled, c/w metoprolol bid    # CKD 3  - creatinine at baseline  - avoid nephrotoxic drugs    # DM  - basal and nutritional insulin    # HTN  - c/w metoprolol    # JOHANNY  - patient uses CPAP at night  - continue with CPAP inpatient    # DVT ppx: heparin  # PPI ppx: no need  # DIET: DASH, fluid restriction  # DISPO: From home; HF consult    87yo M history of HTN DM AFib no AC 2/2 falls, HFpEF JOHANNY, presents for shortness of breath.    #Shortness of breath secondary to HFpEF exacerbation 2/2 noncompliance   #Hypokalemia - improved  - Son said that PCP recently switched patient from lasix 40mg bid to daily and stopped metolazone as well  - CXR showed bilateral parenchymal opacities and pleural effusions  - BNP 8749, ECG showed afib, no ischemic changes  - Troponin elevated 0.04>0.03>0.05>0.04  - Echo: EF 54%, moderate pHTN, mild AR, mid-mod MVR< TR  - LE duplex: negative, noted bilateral edema  - s/p lasix 40 mg iv in the ED  - Cardiology following: increased Lasix 80mg q8, metolazone 5mg daily, started on Aldactone 25mg. Plan to increase Aldactone to 50mg daily if BP tolerates.   - continue with KCL 20mg po daily, monitor K  - Follow BMP q12  - daily weight, strict I/Os. fluid restriction  - Keep K>4 and Mg >2    #Multiple NSVT, PVCs  #Chronic Afib not on A/C  - not on A/C due to frequent falls  - increase metoprolol 25mg q8  - EP consult     #suspected left LE cellulitis  - continue with ancef 1g q8hrs IV   - ID consult: can be discharged on Keflex 500mg BID for 7 day course    # CKD 3  - creatinine at baseline  - avoid nephrotoxic drugs    # DM  - basal and nutritional insulin    # HTN  - c/w metoprolol    # JOHANNY  - patient uses CPAP at night  - continue with CPAP inpatient    # DVT ppx: heparin  # PPI ppx: no need  # DIET: DASH, fluid restriction  # DISPO: From home; HF consult

## 2021-07-03 NOTE — PROGRESS NOTE ADULT - ASSESSMENT
CKD stage 3-4  acute on chronic HFpEF  fluid overload, pulm congestion and weeping pedal edema   - per wife, stopped metolazone recently  JOHANNY on CPAP with cor pulmonale    proteinuria with nl albumin  hypocalcemia  afib  HTN   s/p  shunt  DM 2    dm neuropathy    plan:    continue lasix 80mg iv 8h  continue metolazone 5mg po qd  spironolactone added 7/2  stop potassium supplementation  monitor UOP via urinal, need more accurate recordings  trend renal function, lytes with iv diuresis

## 2021-07-03 NOTE — CONSULT NOTE ADULT - ASSESSMENT
Cards; Dr Harris    86y Male with h/o HTN, DM, chronic AF not on AC due to falls, CKDCOPD, JOHANNY, on bipap at home, pulmonary HTN, HFpEF,   admitted with acute on chronic HF exacerbation  telemetry with AF, SVT, PVCs, NSVT    NSVT, frequent PVC  acute on chronic HF exacerbation  AF  CKD    con't tele  continue current meds, can add Cardizem 60mg q12h for arrhythmia suppression  or can increase Metoprolol  ischemic work up per cardiology  con't diuresis  Maintain electrolytes K>4.0 Mg >2.0, check bid considering diuresis    Check TSH / Free T4   follow up as out patient to discuss ablation if medical therapy only is not sufficient

## 2021-07-03 NOTE — PROGRESS NOTE ADULT - SUBJECTIVE AND OBJECTIVE BOX
[facility] FOLLOW UP NOTE  --------------------------------------------------------------------------------  24 hour events/subjective: Patient examined with family at bedside. Appears comfortable.    PAST HISTORY  --------------------------------------------------------------------------------  No significant changes to PMH, PSH, FHx, SHx, unless otherwise noted    ALLERGIES & MEDICATIONS  --------------------------------------------------------------------------------  Allergies    No Known Allergies    Standing Inpatient Medications  atorvastatin 20 milliGRAM(s) Oral at bedtime  calcitriol   Capsule 0.25 MICROGram(s) Oral daily  cephalexin 500 milliGRAM(s) Oral every 12 hours  chlorhexidine 4% Liquid 1 Application(s) Topical <User Schedule>  dextrose 40% Gel 15 Gram(s) Oral once  dextrose 5%. 1000 milliLiter(s) IV Continuous <Continuous>  dextrose 5%. 1000 milliLiter(s) IV Continuous <Continuous>  dextrose 50% Injectable 25 Gram(s) IV Push once  dextrose 50% Injectable 12.5 Gram(s) IV Push once  dextrose 50% Injectable 25 Gram(s) IV Push once  finasteride 5 milliGRAM(s) Oral daily  furosemide   Injectable 80 milliGRAM(s) IV Push every 8 hours  gabapentin 100 milliGRAM(s) Oral three times a day  glucagon  Injectable 1 milliGRAM(s) IntraMuscular once  heparin   Injectable 5000 Unit(s) SubCutaneous every 8 hours  insulin glargine Injectable (LANTUS) 20 Unit(s) SubCutaneous at bedtime  insulin lispro (ADMELOG) corrective regimen sliding scale   SubCutaneous three times a day before meals  insulin lispro Injectable (ADMELOG) 7 Unit(s) SubCutaneous three times a day before meals  metolazone 5 milliGRAM(s) Oral daily  metoprolol tartrate 25 milliGRAM(s) Oral every 8 hours  potassium chloride    Tablet ER 20 milliEquivalent(s) Oral daily  silver sulfADIAZINE 1% Cream 1 Application(s) Topical daily  spironolactone 25 milliGRAM(s) Oral daily  tamsulosin 0.4 milliGRAM(s) Oral at bedtime    PRN Inpatient Medications  guaifenesin/dextromethorphan Oral Liquid 5 milliLiter(s) Oral every 6 hours PRN    VITALS/PHYSICAL EXAM  --------------------------------------------------------------------------------  T(C): 35.9 (07-03-21 @ 05:00), Max: 36.4 (07-02-21 @ 21:20)  HR: 74 (07-03-21 @ 05:00) (74 - 89)  BP: 118/56 (07-03-21 @ 05:00) (118/56 - 149/65)  RR: 18 (07-03-21 @ 05:00) (18 - 20)  SpO2: 97% (07-03-21 @ 08:00) (97% - 97%)  Weight (kg): 109 (07-03-21 @ 08:30)    07-02-21 @ 07:01  -  07-03-21 @ 07:00  --------------------------------------------------------  IN: 700 mL / OUT: 1800 mL / NET: -1100 mL    07-03-21 @ 07:01  -  07-03-21 @ 12:08  --------------------------------------------------------  IN: 0 mL / OUT: 580 mL / NET: -580 mL      Physical Exam:  	Gen: NAD  	Pulm: CTA B/L  	CV: RRR, S1S2  	Abd: +BS, soft, nontender/nondistended  	: No suprapubic tenderness  	LE: Warm,  edema  	  LABS/STUDIES  --------------------------------------------------------------------------------              12.2   8.97  >-----------<  273      [07-03-21 @ 06:57]              41.1     145  |  98  |  41  ----------------------------<  98      [07-03-21 @ 06:57]  3.5   |  31  |  2.4        Ca     8.0     [07-03-21 @ 06:57]      Mg     2.4     [07-03-21 @ 06:57]      Phos  4.8     [07-03-21 @ 06:57]    TPro  6.6  /  Alb  4.0  /  TBili  0.9  /  DBili  x   /  AST  9   /  ALT  7   /  AlkPhos  109  [07-03-21 @ 06:57]    Troponin 0.04      [07-01-21 @ 16:16]    Creatinine Trend:  SCr 2.4 [07-03 @ 06:57]  SCr 2.3 [07-02 @ 20:42]  SCr 1.9 [07-02 @ 06:14]  SCr 2.0 [07-01 @ 11:00]  SCr 1.9 [06-30 @ 12:12]    HbA1c 11.5      [12-04-18 @ 07:33]

## 2021-07-03 NOTE — CONSULT NOTE ADULT - ATTENDING COMMENTS
PVCs  NSVT-pleomorphic. Primarily LVOT variety    - Continue with metoprolol. Increase further as tolerated. if no response, may consider adding cardizem  - Patient appears to be not a candidate for hx of liver issues in past. Will reassess as needed  - ischemic w-up and management of fluid status as per cardio/nephro  - Will sign off for now  - Recall as needed  - OP f-up
I have personally seen and examined this patient.  I have fully participated in the care of this patient.  I have reviewed all pertinent clinical information, including history, physical exam, plan and note.  I have reviewed all pertinent clinical information and reviewed all relevant imaging and diagnostic studies personally.  Corrections and edits were made wherever needed.     #Stasis dermatitis with superinfection of blister on L  #Sepsis ruled out on admission   #Obesity   #CKD    - plan as per above

## 2021-07-04 NOTE — PROGRESS NOTE ADULT - SUBJECTIVE AND OBJECTIVE BOX
Pt seen and examined at bedside. No CP or SOB.  Pt complained of cough. afebrile, also leg pain.  No events on cardiac telemonitoring       PAST MEDICAL & SURGICAL HISTORY:  Afib    DM (diabetes mellitus)    CKD (chronic kidney disease)    Hydrocephalus    Hypertension    History of prostate surgery        VITAL SIGNS (Last 24 hrs):  T(C): 35.8 (07-04-21 @ 05:00), Max: 36.4 (07-03-21 @ 21:10)  HR: 99 (07-04-21 @ 05:00) (72 - 99)  BP: 113/54 (07-04-21 @ 05:00) (111/61 - 147/65)  RR: 19 (07-04-21 @ 05:00) (18 - 19)  SpO2: 97% (07-04-21 @ 08:30) (97% - 97%)  Wt(kg): --  Daily     Daily     I&O's Summary    03 Jul 2021 07:01  -  04 Jul 2021 07:00  --------------------------------------------------------  IN: 780 mL / OUT: 2195 mL / NET: -1415 mL    04 Jul 2021 07:01  -  04 Jul 2021 11:01  --------------------------------------------------------  IN: 340 mL / OUT: 350 mL / NET: -10 mL        PHYSICAL EXAM:  GENERAL: NAD, well-developed  HEAD:  Atraumatic, Normocephalic  EYES: EOMI, PERRLA, conjunctiva and sclera clear  NECK: Supple, No JVD  CHEST/LUNG: Clear to auscultation bilaterally; No wheeze  HEART: Regular rate and rhythm; No murmurs, rubs, or gallops  ABDOMEN: Soft, Nontender, Nondistended; Bowel sounds present  EXTREMITIES:  2+ Peripheral Pulses, No clubbing, cyanosis, o +1 edema  PSYCH: AAOx3  NEUROLOGY: non-focal  SKIN: No rashes or lesions    Labs Reviewed  Spoke to patient in regards to abnormal labs.    CBC Full  -  ( 04 Jul 2021 04:47 )  WBC Count : 11.07 K/uL  Hemoglobin : 12.6 g/dL  Hematocrit : 42.8 %  Platelet Count - Automated : 294 K/uL  Mean Cell Volume : 80.6 fL  Mean Cell Hemoglobin : 23.7 pg  Mean Cell Hemoglobin Concentration : 29.4 g/dL  Auto Neutrophil # : 8.34 K/uL  Auto Lymphocyte # : 1.60 K/uL  Auto Monocyte # : 0.81 K/uL  Auto Eosinophil # : 0.21 K/uL  Auto Basophil # : 0.03 K/uL  Auto Neutrophil % : 75.3 %  Auto Lymphocyte % : 14.5 %  Auto Monocyte % : 7.3 %  Auto Eosinophil % : 1.9 %  Auto Basophil % : 0.3 %    BMP:    07-04 @ 04:47    Blood Urea Nitrogen - 49  Calcium - 8.4  Carbond Dioxide - 33  Chloride - 96  Creatinine - 2.3  Glucose - 118  Potassium - 4.0  Sodium - 145      Hemoglobin A1c -     Urine Culture:        COVID Labs      D-Dimer:        Imaging reviewed: < from: Xray Chest 1 View- PORTABLE-Urgent (Xray Chest 1 View- PORTABLE-Urgent .) (07.03.21 @ 10:34) >  Impression:    Right basilar opacity/pleural effusion, increased. Left basilar opacity, decreased. Stable cardiomegaly    < end of copied text >        MEDICATIONS  (STANDING):  atorvastatin 20 milliGRAM(s) Oral at bedtime  calcitriol   Capsule 0.25 MICROGram(s) Oral daily  cephalexin 500 milliGRAM(s) Oral every 12 hours  chlorhexidine 4% Liquid 1 Application(s) Topical <User Schedule>  dextrose 40% Gel 15 Gram(s) Oral once  dextrose 5%. 1000 milliLiter(s) (50 mL/Hr) IV Continuous <Continuous>  dextrose 5%. 1000 milliLiter(s) (100 mL/Hr) IV Continuous <Continuous>  dextrose 50% Injectable 25 Gram(s) IV Push once  dextrose 50% Injectable 12.5 Gram(s) IV Push once  dextrose 50% Injectable 25 Gram(s) IV Push once  diltiazem    Tablet 60 milliGRAM(s) Oral every 12 hours  finasteride 5 milliGRAM(s) Oral daily  furosemide   Injectable 80 milliGRAM(s) IV Push every 8 hours  gabapentin 100 milliGRAM(s) Oral three times a day  glucagon  Injectable 1 milliGRAM(s) IntraMuscular once  heparin   Injectable 5000 Unit(s) SubCutaneous every 8 hours  insulin glargine Injectable (LANTUS) 20 Unit(s) SubCutaneous at bedtime  insulin lispro (ADMELOG) corrective regimen sliding scale   SubCutaneous three times a day before meals  insulin lispro Injectable (ADMELOG) 7 Unit(s) SubCutaneous three times a day before meals  melatonin 1 milliGRAM(s) Oral at bedtime  metolazone 5 milliGRAM(s) Oral daily  metoprolol tartrate 25 milliGRAM(s) Oral two times a day  nystatin Powder 1 Application(s) Topical two times a day  silver sulfADIAZINE 1% Cream 1 Application(s) Topical daily  spironolactone 25 milliGRAM(s) Oral daily  tamsulosin 0.4 milliGRAM(s) Oral at bedtime    MEDICATIONS  (PRN):  benzonatate 100 milliGRAM(s) Oral three times a day PRN Cough  guaifenesin/dextromethorphan Oral Liquid 5 milliLiter(s) Oral every 6 hours PRN Cough

## 2021-07-04 NOTE — PROGRESS NOTE ADULT - ASSESSMENT
CKD stage 3-4  acute on chronic HFpEF  fluid overload, pulm congestion and weeping pedal edema   - per wife, stopped metolazone recently  JOHANNY on CPAP with cor pulmonale    proteinuria with nl albumin  hypocalcemia  afib  HTN   s/p  shunt  DM 2    dm neuropathy    plan:    continue lasix 80mg iv 8h  continue metolazone 5mg po qd  spironolactone added 7/2  monitor potassium  monitor UOP via urinal, need accurate recordings  trend renal function, lytes with iv diuresis

## 2021-07-04 NOTE — PROGRESS NOTE ADULT - SUBJECTIVE AND OBJECTIVE BOX
[facility] FOLLOW UP NOTE  --------------------------------------------------------------------------------  24 hour events/subjective: Patient examined. Appears comfortable.    PAST HISTORY  --------------------------------------------------------------------------------  No significant changes to PMH, PSH, FHx, SHx, unless otherwise noted    ALLERGIES & MEDICATIONS  --------------------------------------------------------------------------------  Allergies    No Known Allergies    Standing Inpatient Medications  atorvastatin 20 milliGRAM(s) Oral at bedtime  calcitriol   Capsule 0.25 MICROGram(s) Oral daily  cephalexin 500 milliGRAM(s) Oral every 12 hours  chlorhexidine 4% Liquid 1 Application(s) Topical <User Schedule>  dextrose 40% Gel 15 Gram(s) Oral once  dextrose 5%. 1000 milliLiter(s) IV Continuous <Continuous>  dextrose 5%. 1000 milliLiter(s) IV Continuous <Continuous>  dextrose 50% Injectable 25 Gram(s) IV Push once  dextrose 50% Injectable 12.5 Gram(s) IV Push once  dextrose 50% Injectable 25 Gram(s) IV Push once  diltiazem    Tablet 60 milliGRAM(s) Oral every 12 hours  finasteride 5 milliGRAM(s) Oral daily  furosemide   Injectable 80 milliGRAM(s) IV Push every 8 hours  gabapentin 100 milliGRAM(s) Oral three times a day  glucagon  Injectable 1 milliGRAM(s) IntraMuscular once  heparin   Injectable 5000 Unit(s) SubCutaneous every 8 hours  insulin glargine Injectable (LANTUS) 20 Unit(s) SubCutaneous at bedtime  insulin lispro (ADMELOG) corrective regimen sliding scale   SubCutaneous three times a day before meals  insulin lispro Injectable (ADMELOG) 7 Unit(s) SubCutaneous three times a day before meals  melatonin 1 milliGRAM(s) Oral at bedtime  metolazone 5 milliGRAM(s) Oral daily  metoprolol tartrate 25 milliGRAM(s) Oral two times a day  nystatin Powder 1 Application(s) Topical two times a day  silver sulfADIAZINE 1% Cream 1 Application(s) Topical daily  spironolactone 25 milliGRAM(s) Oral daily  tamsulosin 0.4 milliGRAM(s) Oral at bedtime    PRN Inpatient Medications  benzonatate 100 milliGRAM(s) Oral three times a day PRN  guaifenesin/dextromethorphan Oral Liquid 5 milliLiter(s) Oral every 6 hours PRN    VITALS/PHYSICAL EXAM  --------------------------------------------------------------------------------  T(C): 35.8 (07-04-21 @ 05:00), Max: 36.4 (07-03-21 @ 21:10)  HR: 99 (07-04-21 @ 05:00) (72 - 99)  BP: 113/54 (07-04-21 @ 05:00) (111/61 - 147/65)  RR: 19 (07-04-21 @ 05:00) (18 - 19)  SpO2: 97% (07-04-21 @ 08:30) (97% - 97%)  Weight (kg): 109.6 (07-04-21 @ 09:50)    07-03-21 @ 07:01  -  07-04-21 @ 07:00  --------------------------------------------------------  IN: 780 mL / OUT: 2195 mL / NET: -1415 mL    07-04-21 @ 07:01  -  07-04-21 @ 12:15  --------------------------------------------------------  IN: 340 mL / OUT: 350 mL / NET: -10 mL    Physical Exam:  	Gen: NAD  	Pulm: CTA B/L  	CV: RRR, S1S2  	Abd: +BS, soft, nontender/nondistended  	: No suprapubic tenderness  	LE: Warm, edema    LABS/STUDIES  --------------------------------------------------------------------------------              12.6   11.07 >-----------<  294      [07-04-21 @ 04:47]              42.8     145  |  96  |  49  ----------------------------<  118      [07-04-21 @ 04:47]  4.0   |  33  |  2.3        Ca     8.4     [07-04-21 @ 04:47]      Mg     2.3     [07-04-21 @ 04:47]      Phos  4.8     [07-03-21 @ 06:57]    TPro  6.9  /  Alb  4.0  /  TBili  1.0  /  DBili  x   /  AST  10  /  ALT  7   /  AlkPhos  115  [07-04-21 @ 04:47]    Creatinine Trend:  SCr 2.3 [07-04 @ 04:47]  SCr 2.3 [07-03 @ 16:35]  SCr 2.4 [07-03 @ 06:57]  SCr 2.3 [07-02 @ 20:42]  SCr 1.9 [07-02 @ 06:14]    Vitamin D (25OH) 57      [07-03-21 @ 06:57]  HbA1c 11.5      [12-04-18 @ 07:33]

## 2021-07-04 NOTE — PROGRESS NOTE ADULT - ASSESSMENT
87yo M history of HTN DM AFib no AC 2/2 falls, HFpEF JOHANNY, presents for shortness of breath.    #Shortness of breath secondary to HFpEF exacerbation 2/2 noncompliance   #Hypokalemia - improved  #moderate pulm htn  - Son said that PCP recently switched patient from lasix 40mg bid to daily and stopped metolazone as well  - CXR showed bilateral parenchymal opacities and pleural effusions  - BNP 8749, ECG showed afib, no ischemic changes  - Troponin elevated 0.04>0.03>0.05>0.04  - Echo: EF 54%, moderate pHTN, mild AR, mid-mod MVR< TR  - LE duplex: negative, noted bilateral edema  - s/p lasix 40 mg iv in the ED  - Cardiology following: increased Lasix 80mg q8, metolazone 5mg daily, started on Aldactone 25mg. Plan to increase Aldactone to 50mg daily if BP tolerates.   - continue with KCL 20mg po daily, monitor K  - Follow BMP q12  - daily weight, strict I/Os. fluid restriction  - Keep K>4 and Mg >2  - put out neg 1.4L yesterday  - pt was 269 lb when he came in now 241??, edema is improving and jvd improved.     #Multiple NSVT, PVCs  #Chronic Afib not on A/C  - not on A/C due to frequent falls  - increase metoprolol 25mg q8  - EP consult appreciated- added cardizem 60 mg q12  - may need cath if no improvement.     #suspected left LE cellulitis  #lower ext pain  - continue with ancef 1g q8hrs IV   - ID consult: can be discharged on Keflex 500mg BID for 7 day course  - PT   - duplex venous neg  - check arterial duplex    #cough suspected 2/2 HF   - afebrile  - mild wbc 8-11  - cxr shows Right basilar opacity/pleural effusion, increased. Left basilar opacity, decreased. Stable cardiomegaly  -  likely CHF  - will repeat cxr today  and compare  - check procal   - tessalon pearls.   - if fever can start broad spectrum antibiotics     # CKD 3  - creatinine at baseline  - avoid nephrotoxic drugs    # DM  - basal and nutritional insulin    # HTN  - c/w metoprolol    # JOHANNY  - patient uses CPAP at night  - continue with CPAP inpatient    #Progress Note Handoff  Pending (specify): continue HF management, PT, arterial duplex     Family discussion: spoke to family and pt at bedside.   Disposition: cardiac telemonitoring

## 2021-07-05 NOTE — PHYSICAL THERAPY INITIAL EVALUATION ADULT - LEVEL OF INDEPENDENCE: STAND/SIT, REHAB EVAL
close contact guard Detail Level: Simple Duration Of Freeze Thaw-Cycle (Seconds): 5 Render Post-Care Instructions In Note?: no Post-Care Instructions: I reviewed with the patient in detail post-care instructions. Patient is to wear sunprotection, and avoid picking at any of the treated lesions. Pt may apply Vaseline to crusted or scabbing areas. Consent: The patient's consent was obtained including but not limited to risks of crusting, scabbing, blistering, scarring, darker or lighter pigmentary change, recurrence, incomplete removal and infection.

## 2021-07-05 NOTE — PROGRESS NOTE ADULT - ATTENDING COMMENTS
#Progress Note Handoff  Pending (specify): continue intravenous lasix, follow Up renal, PT   Family discussion: DW pt family at bedside.   Disposition: f9

## 2021-07-05 NOTE — PHYSICAL THERAPY INITIAL EVALUATION ADULT - PERTINENT HX OF CURRENT PROBLEM, REHAB EVAL
pt is an 85 y/o male admitted for 5yo M history of HTN DM AFib no AC 2/2 falls, HFpEF JOHANNY, presents for shortness of breath. Symptoms started 3 days ago with gradual onset of shortness of breath, first at exertion and gradually progressing and is present now even at rest, associated with orthopnea and chest tightness

## 2021-07-05 NOTE — PHYSICAL THERAPY INITIAL EVALUATION ADULT - GENERAL OBSERVATIONS, REHAB EVAL
11:04 Pt encountered semifowler in bed in NAD. + tele. B ace wraps to LE. Spouse at bedside. Pt c/o of BLE pain described as "burning". Pt reports received Tylenol but c/o of pain with WB through BLE. RN made aware.

## 2021-07-05 NOTE — CONSULT NOTE ADULT - SUBJECTIVE AND OBJECTIVE BOX
HPI: 86 y o M history of HTN DM AFib no AC 2/2 falls, HFpEF JOHANNY, presents for shortness of breath. Symptoms started 3 days ago with gradual onset of shortness of breath, first at exertion and gradually progressing and is present now even at rest, associated with orthopnea and chest tightness. Patient says he has been adhering to his medications and has not missed any doses. He also watches his salt intake with no increase in consumption last few days. He went yesterday to urgent care who started him on antibiotics and po steroids for suspicion of PNA vs COPD. He denied fevers, chills, cough or sputum production, nausea or vomiting, diarrhea or other associated symptoms.  In the ED VS showed elevated /79.  Chest Xray showed bilateral parenchymal opacities and pleural effusions.  He received 325 mg of aspirin and 1 dose of lasix IV. ptn  seen at  bed  side  wife  is  giving  hx  ptn  is oob   to [facility] [place]     PTN  REFERRED TO ACUTE  REHAB  FOR  EVAL AND  TX   PAST MEDICAL & SURGICAL HISTORY:  Afib    DM (diabetes mellitus)    CKD (chronic kidney disease)    Hydrocephalus    Hypertension    History of prostate surgery        Hospital Course:    TODAY'S SUBJECTIVE & REVIEW OF SYMPTOMS:     Constitutional WNL   Cardio WNL   Resp WNL   GI WNL  Heme WNL  Endo WNL  Skin WNL  MSK WNL  Neuro WNL  Cognitive WNL  Psych WNL      MEDICATIONS  (STANDING):  atorvastatin 20 milliGRAM(s) Oral at bedtime  calcitriol   Capsule 0.25 MICROGram(s) Oral daily  cephalexin 500 milliGRAM(s) Oral every 12 hours  chlorhexidine 4% Liquid 1 Application(s) Topical <User Schedule>  dextrose 40% Gel 15 Gram(s) Oral once  dextrose 5%. 1000 milliLiter(s) (100 mL/Hr) IV Continuous <Continuous>  dextrose 5%. 1000 milliLiter(s) (50 mL/Hr) IV Continuous <Continuous>  dextrose 50% Injectable 25 Gram(s) IV Push once  dextrose 50% Injectable 12.5 Gram(s) IV Push once  dextrose 50% Injectable 25 Gram(s) IV Push once  diltiazem    Tablet 60 milliGRAM(s) Oral every 12 hours  finasteride 5 milliGRAM(s) Oral daily  furosemide   Injectable 80 milliGRAM(s) IV Push two times a day  gabapentin 100 milliGRAM(s) Oral three times a day  glucagon  Injectable 1 milliGRAM(s) IntraMuscular once  heparin   Injectable 5000 Unit(s) SubCutaneous every 8 hours  insulin glargine Injectable (LANTUS) 20 Unit(s) SubCutaneous at bedtime  insulin lispro (ADMELOG) corrective regimen sliding scale   SubCutaneous three times a day before meals  insulin lispro Injectable (ADMELOG) 7 Unit(s) SubCutaneous three times a day before meals  melatonin 1 milliGRAM(s) Oral at bedtime  metoprolol tartrate 25 milliGRAM(s) Oral two times a day  nystatin Powder 1 Application(s) Topical two times a day  silver sulfADIAZINE 1% Cream 1 Application(s) Topical daily  spironolactone 25 milliGRAM(s) Oral daily  tamsulosin 0.4 milliGRAM(s) Oral at bedtime    MEDICATIONS  (PRN):  acetaminophen   Tablet .. 650 milliGRAM(s) Oral every 6 hours PRN Moderate Pain (4 - 6)  benzonatate 100 milliGRAM(s) Oral three times a day PRN Cough  guaifenesin/dextromethorphan Oral Liquid 5 milliLiter(s) Oral every 6 hours PRN Cough      FAMILY HISTORY:  Family history of diabetes mellitus (Mother, Sibling)        Allergies    No Known Allergies    Intolerances        SOCIAL HISTORY:    [  ] Etoh  [  ] Smoking  [  ] Substance abuse     Home Environment:  [  ] Home Alone  [ x ] Lives with Family wife   [  ] Home Health Aid    Dwelling:  [  ] Apartment  [  x] Private House  [  ] Adult Home  [  ] Skilled Nursing Facility      [  ] Short Term  [  ] Long Term  [ x ] Stairs  one      Elevator [  ]    FUNCTIONAL STATUS PTA: (Check all that apply)  Ambulation: [   x]Independent    [  ] Dependent     [  ] Non-Ambulatory  Assistive Device: [ x ] SA Cane  [  ]  Q Cane  [  ] Walker  [  ]  Wheelchair  ADL : [  x] Independent  [  ]  Dependent       Vital Signs Last 24 Hrs  T(C): 35.8 (05 Jul 2021 13:34), Max: 37.2 (04 Jul 2021 20:34)  T(F): 96.4 (05 Jul 2021 13:34), Max: 98.9 (04 Jul 2021 20:34)  HR: 81 (05 Jul 2021 13:34) (56 - 101)  BP: 93/50 (05 Jul 2021 13:34) (93/50 - 152/72)  BP(mean): --  RR: 20 (05 Jul 2021 13:34) (18 - 20)  SpO2: --      PHYSICAL EXAM: Alert & Oriented X3  GENERAL: NAD, well-groomed, well-developed  HEAD:  Atraumatic, Normocephalic  EYES: EOMI, PERRLA, conjunctiva and sclera clear  NECK: Supple, No JVD, Normal thyroid  CHEST/LUNG: Clear to percussion bilaterally; No rales, rhonchi, wheezing, or rubs  HEART: Regular rate and rhythm; No murmurs, rubs, or gallops  ABDOMEN: Soft, Nontender, Nondistended; Bowel sounds present  EXTREMITIES:  2+ Peripheral Pulses, No clubbing, cyanosis, or edema    NERVOUS SYSTEM:  Cranial Nerves 2-12 intact [ x ] Abnormal  [  ]  ROM: WFL all extremities [ x ]  Abnormal [  ]  Motor Strength: WFL all extremities  [x  ]  Abnormal [  ]  Sensation: intact to light touch [x  ] Abnormal [  ]  Reflexes: Symmetric [ x ]  Abnormal [  ]    FUNCTIONAL STATUS:  Bed Mobility: Independent [  ]  Supervision [  ]  Needs Assistance [ x ]  N/A [  ]  Transfers: Independent [  ]  Supervision [  ]  Needs Assistance [  x]  N/A [  ]   Ambulation: Independent [  ]  Supervision [  ]  Needs Assistance [x  ]  N/A [  ]  ADL: Independent [  ] Requires Assistance [  ] N/A [ x ]  SEE PT/OT IE NOTES    LABS:                        12.4   12.93 )-----------( 298      ( 05 Jul 2021 05:31 )             41.3     07-05    142  |  91<L>  |  60<H>  ----------------------------<  140<H>  3.9   |  33<H>  |  2.7<H>    Ca    8.3<L>      05 Jul 2021 05:31  Mg     2.2     07-05    TPro  6.6  /  Alb  3.9  /  TBili  1.1  /  DBili  x   /  AST  8   /  ALT  5   /  AlkPhos  112  07-05          RADIOLOGY & ADDITIONAL STUDIES:    Assesment:

## 2021-07-05 NOTE — PROGRESS NOTE ADULT - SUBJECTIVE AND OBJECTIVE BOX
VINCENZO EDMONDSON 86y Male  MRN#: [id number]   CODE STATUS:________    SUBJECTIVE  Patient is a 86y old Male who presents with a chief complaint of shortness of breath (04 Jul 2021 12:15)  Currently admitted to medicine with the primary diagnosis of CHF exacerbation    Today is hospital day 5d, and this morning he is resting comfortably and reports no overnight events.       OBJECTIVE  PAST MEDICAL & SURGICAL HISTORY  Afib    DM (diabetes mellitus)    CKD (chronic kidney disease)    Hydrocephalus    Hypertension    History of prostate surgery      ALLERGIES:  No Known Allergies    MEDICATIONS:  STANDING MEDICATIONS  atorvastatin 20 milliGRAM(s) Oral at bedtime  azithromycin  IVPB 500 milliGRAM(s) IV Intermittent every 24 hours  calcitriol   Capsule 0.25 MICROGram(s) Oral daily  cephalexin 500 milliGRAM(s) Oral every 12 hours  chlorhexidine 4% Liquid 1 Application(s) Topical <User Schedule>  dextrose 40% Gel 15 Gram(s) Oral once  dextrose 5%. 1000 milliLiter(s) IV Continuous <Continuous>  dextrose 5%. 1000 milliLiter(s) IV Continuous <Continuous>  dextrose 50% Injectable 25 Gram(s) IV Push once  dextrose 50% Injectable 12.5 Gram(s) IV Push once  dextrose 50% Injectable 25 Gram(s) IV Push once  diltiazem    Tablet 60 milliGRAM(s) Oral every 12 hours  finasteride 5 milliGRAM(s) Oral daily  furosemide   Injectable 80 milliGRAM(s) IV Push every 8 hours  gabapentin 100 milliGRAM(s) Oral three times a day  glucagon  Injectable 1 milliGRAM(s) IntraMuscular once  heparin   Injectable 5000 Unit(s) SubCutaneous every 8 hours  insulin glargine Injectable (LANTUS) 20 Unit(s) SubCutaneous at bedtime  insulin lispro (ADMELOG) corrective regimen sliding scale   SubCutaneous three times a day before meals  insulin lispro Injectable (ADMELOG) 7 Unit(s) SubCutaneous three times a day before meals  melatonin 1 milliGRAM(s) Oral at bedtime  metoprolol tartrate 25 milliGRAM(s) Oral two times a day  nystatin Powder 1 Application(s) Topical two times a day  silver sulfADIAZINE 1% Cream 1 Application(s) Topical daily  spironolactone 25 milliGRAM(s) Oral daily  tamsulosin 0.4 milliGRAM(s) Oral at bedtime    PRN MEDICATIONS  acetaminophen   Tablet .. 650 milliGRAM(s) Oral every 6 hours PRN  benzonatate 100 milliGRAM(s) Oral three times a day PRN  guaifenesin/dextromethorphan Oral Liquid 5 milliLiter(s) Oral every 6 hours PRN      VITAL SIGNS: Last 24 Hours  T(C): 36 (05 Jul 2021 05:14), Max: 37.2 (04 Jul 2021 20:34)  T(F): 96.8 (05 Jul 2021 05:14), Max: 98.9 (04 Jul 2021 20:34)  HR: 101 (05 Jul 2021 05:14) (56 - 101)  BP: 131/71 (05 Jul 2021 05:14) (121/59 - 152/72)  BP(mean): --  RR: 18 (05 Jul 2021 05:14) (18 - 18)  SpO2: --    LABS:                        12.4   12.93 )-----------( 298      ( 05 Jul 2021 05:31 )             41.3     07-05    142  |  91<L>  |  60<H>  ----------------------------<  140<H>  3.9   |  33<H>  |  2.7<H>    Ca    8.3<L>      05 Jul 2021 05:31  Mg     2.2     07-05    TPro  6.6  /  Alb  3.9  /  TBili  1.1  /  DBili  x   /  AST  8   /  ALT  5   /  AlkPhos  112  07-05                  RADIOLOGY:    PHYSICAL EXAM:  GEN: NAD, Resting comfortably in chair  PULM: Decreased breath sounds bilateral, no wheezing  CVS: Regular rate and rhythm, S1-S2, no murmurs  ABD: Soft, distended, no tenderness  EXT: B/l LE edema  NEURO: A&Ox3, no focal deficits      ASSESSMENT & PLAN  87yo M history of HTN DM AFib no AC 2/2 falls, HFpEF JOHANNY, presents for shortness of breath.    #Shortness of breath secondary to HFpEF exacerbation 2/2 noncompliance   #Hypokalemia - improved  #Cough, leukocytosis  - Son said that PCP recently switched patient from lasix 40mg bid to daily and stopped metolazone as well  - CXR showed bilateral parenchymal opacities and pleural effusions  - BNP 8749, ECG showed afib, no ischemic changes  - Troponin elevated 0.04>0.03>0.05>0.04  - Echo: EF 54%, moderate pHTN, mild AR, mid-mod MVR< TR  - LE duplex: negative, noted bilateral edema  - s/p lasix 40 mg iv in the ED  - Cardiology following: increased Lasix 80mg q8, metolazone 5mg daily, started on Aldactone 25mg. Plan to increase Aldactone to 50mg daily if BP tolerates.   - continue with KCL 20mg po daily, monitor K  - Follow BMP q12  - daily weight, strict I/Os. fluid restriction  - Keep K>4 and Mg >2  - c/w mucinex and benzonate  - f/u procal  - Per ID, hold off on abx and monitor WBC--can be due to ateclectasis    #Multiple NSVT, PVCs  #Chronic Afib not on A/C  - not on A/C due to frequent falls  - c/w metoprolol 25mg bid and cardizem 60 mg bid  - EP consulted, recs: continue with metoprolol, inc at tolerated and if no response can add cardizem, ischemic w/w per cardio    #suspected left LE cellulitis  - continue with ancef 1g q8hrs IV   - ID consult: can be discharged on Keflex 500mg BID for 7 day course    # CKD 3  - creatinine at baseline  - avoid nephrotoxic drugs    # DM  - basal and nutritional insulin    # HTN  - c/w metoprolol    # JOHANNY  - patient uses CPAP at night  - continue with CPAP inpatient    # DVT ppx: heparin  # PPI ppx: no need  # DIET: DASH, fluid restriction  # DISPO: From home; HF consult    VINCENZO EDMONDSON 86y Male  MRN#: [id number]   CODE STATUS:________    SUBJECTIVE  Patient is a 86y old Male who presents with a chief complaint of shortness of breath (04 Jul 2021 12:15)  Currently admitted to medicine with the primary diagnosis of CHF exacerbation    Today is hospital day 5d, and this morning he is resting comfortably and reports no overnight events.     Attending Note: Pt seen and examined at bedside. No cp or sob.       OBJECTIVE  PAST MEDICAL & SURGICAL HISTORY  Afib    DM (diabetes mellitus)    CKD (chronic kidney disease)    Hydrocephalus    Hypertension    History of prostate surgery      ALLERGIES:  No Known Allergies    MEDICATIONS:  STANDING MEDICATIONS  atorvastatin 20 milliGRAM(s) Oral at bedtime  azithromycin  IVPB 500 milliGRAM(s) IV Intermittent every 24 hours  calcitriol   Capsule 0.25 MICROGram(s) Oral daily  cephalexin 500 milliGRAM(s) Oral every 12 hours  chlorhexidine 4% Liquid 1 Application(s) Topical <User Schedule>  dextrose 40% Gel 15 Gram(s) Oral once  dextrose 5%. 1000 milliLiter(s) IV Continuous <Continuous>  dextrose 5%. 1000 milliLiter(s) IV Continuous <Continuous>  dextrose 50% Injectable 25 Gram(s) IV Push once  dextrose 50% Injectable 12.5 Gram(s) IV Push once  dextrose 50% Injectable 25 Gram(s) IV Push once  diltiazem    Tablet 60 milliGRAM(s) Oral every 12 hours  finasteride 5 milliGRAM(s) Oral daily  furosemide   Injectable 80 milliGRAM(s) IV Push every 8 hours  gabapentin 100 milliGRAM(s) Oral three times a day  glucagon  Injectable 1 milliGRAM(s) IntraMuscular once  heparin   Injectable 5000 Unit(s) SubCutaneous every 8 hours  insulin glargine Injectable (LANTUS) 20 Unit(s) SubCutaneous at bedtime  insulin lispro (ADMELOG) corrective regimen sliding scale   SubCutaneous three times a day before meals  insulin lispro Injectable (ADMELOG) 7 Unit(s) SubCutaneous three times a day before meals  melatonin 1 milliGRAM(s) Oral at bedtime  metoprolol tartrate 25 milliGRAM(s) Oral two times a day  nystatin Powder 1 Application(s) Topical two times a day  silver sulfADIAZINE 1% Cream 1 Application(s) Topical daily  spironolactone 25 milliGRAM(s) Oral daily  tamsulosin 0.4 milliGRAM(s) Oral at bedtime    PRN MEDICATIONS  acetaminophen   Tablet .. 650 milliGRAM(s) Oral every 6 hours PRN  benzonatate 100 milliGRAM(s) Oral three times a day PRN  guaifenesin/dextromethorphan Oral Liquid 5 milliLiter(s) Oral every 6 hours PRN      VITAL SIGNS: Last 24 Hours  T(C): 36 (05 Jul 2021 05:14), Max: 37.2 (04 Jul 2021 20:34)  T(F): 96.8 (05 Jul 2021 05:14), Max: 98.9 (04 Jul 2021 20:34)  HR: 101 (05 Jul 2021 05:14) (56 - 101)  BP: 131/71 (05 Jul 2021 05:14) (121/59 - 152/72)  BP(mean): --  RR: 18 (05 Jul 2021 05:14) (18 - 18)  SpO2: --    LABS:                        12.4   12.93 )-----------( 298      ( 05 Jul 2021 05:31 )             41.3     07-05    142  |  91<L>  |  60<H>  ----------------------------<  140<H>  3.9   |  33<H>  |  2.7<H>    Ca    8.3<L>      05 Jul 2021 05:31  Mg     2.2     07-05    TPro  6.6  /  Alb  3.9  /  TBili  1.1  /  DBili  x   /  AST  8   /  ALT  5   /  AlkPhos  112  07-05                  RADIOLOGY:    PHYSICAL EXAM:  GEN: NAD, Resting comfortably in chair  PULM: Decreased breath sounds bilateral, no wheezing  CVS: Regular rate and rhythm, S1-S2, no murmurs  ABD: Soft, distended, no tenderness  EXT: B/l LE edema  NEURO: A&Ox3, no focal deficits      ASSESSMENT & PLAN  85yo M history of HTN DM AFib no AC 2/2 falls, HFpEF JOHANNY, presents for shortness of breath.    #Shortness of breath secondary to HFpEF exacerbation 2/2 noncompliance   #Hypokalemia - improved  #Cough, leukocytosis  - Son said that PCP recently switched patient from lasix 40mg bid to daily and stopped metolazone as well  - CXR showed bilateral parenchymal opacities and pleural effusions  - BNP 8749, ECG showed afib, no ischemic changes  - Troponin elevated 0.04>0.03>0.05>0.04  - Echo: EF 54%, moderate pHTN, mild AR, mid-mod MVR< TR  - LE duplex: negative, noted bilateral edema  - s/p lasix 40 mg iv in the ED  - Cardiology following: increased Lasix 80mg q8, metolazone 5mg daily, started on Aldactone 25mg. Plan to increase Aldactone to 50mg daily if BP tolerates.   - continue with KCL 20mg po daily, monitor K  - Follow BMP q12  - daily weight, strict I/Os. fluid restriction  - Keep K>4 and Mg >2  - c/w mucinex and benzonate  - f/u procal  - Per ID, hold off on abx and monitor WBC--can be due to ateclectasis    Attending Note: CR bumped today 2.3-->2.7. DW Renal Dr. Faustin, hold metalazone and started on lasix 80 mg BID, DCed TID, pt rancholey getting intervascularly depleted. Cardiac W/U outp. Continue aldactone     #Multiple NSVT, PVCs  #Chronic Afib not on A/C  - not on A/C due to frequent falls  - EP consulted, recs: continue with metoprolol, inc at tolerated and if no response can add cardizem, ischemic w/w per cardiology- EP consulted, recs: continue with metoprolol, inc at tolerated and if no response can add cardizem, ischemic w/w per cardio      #suspected left LE cellulitis  - continue with ancef 1g q8hrs IV   - ID consult: can be discharged on Keflex 500mg BID for 7 day course    #cough  - WBC elevated  - afebrile  - atalectasis on CXR  - procal pending   - ID following, as per ID hold antibiotics for now for possible bronchitis   - tessalon pearls     # CKD 3  - creatinine at baseline  - avoid nephrotoxic drugs    # DM  - basal and nutritional insulin    # HTN  - c/w metoprolol    # JOHANNY  - patient uses CPAP at night  - continue with CPAP inpatient    # DVT ppx: heparin  # PPI ppx: no need  # DIET: DASH, fluid restriction  # DISPO: From home; HF consult

## 2021-07-05 NOTE — PROGRESS NOTE ADULT - ASSESSMENT
CKD stage 3-4  acute on chronic HFpEF  fluid overload, pulm congestion and weeping pedal edema   - per wife, stopped metolazone recently  JOHANNY on CPAP with cor pulmonale    proteinuria with nl albumin  hypocalcemia  afib  HTN   s/p  shunt  DM 2    dm neuropathy    plan:    decrease lasix to 80mg iv every 12h  stop metolazone   continue spironolactone   monitor potassium  monitor UOP via urinal, need accurate recordings  trend renal function, lytes with iv diuresis

## 2021-07-05 NOTE — PROGRESS NOTE ADULT - SUBJECTIVE AND OBJECTIVE BOX
[facility] FOLLOW UP NOTE  --------------------------------------------------------------------------------  24 hour events/subjective: Patient examined. Appears comfortable.    PAST HISTORY  --------------------------------------------------------------------------------  No significant changes to PMH, PSH, FHx, SHx, unless otherwise noted    ALLERGIES & MEDICATIONS  --------------------------------------------------------------------------------  Allergies    No Known Allergies    Standing Inpatient Medications  atorvastatin 20 milliGRAM(s) Oral at bedtime  calcitriol   Capsule 0.25 MICROGram(s) Oral daily  cephalexin 500 milliGRAM(s) Oral every 12 hours  chlorhexidine 4% Liquid 1 Application(s) Topical <User Schedule>  dextrose 40% Gel 15 Gram(s) Oral once  dextrose 5%. 1000 milliLiter(s) IV Continuous <Continuous>  dextrose 5%. 1000 milliLiter(s) IV Continuous <Continuous>  dextrose 50% Injectable 25 Gram(s) IV Push once  dextrose 50% Injectable 12.5 Gram(s) IV Push once  dextrose 50% Injectable 25 Gram(s) IV Push once  diltiazem    Tablet 60 milliGRAM(s) Oral every 12 hours  finasteride 5 milliGRAM(s) Oral daily  furosemide   Injectable 80 milliGRAM(s) IV Push two times a day  gabapentin 100 milliGRAM(s) Oral three times a day  glucagon  Injectable 1 milliGRAM(s) IntraMuscular once  heparin   Injectable 5000 Unit(s) SubCutaneous every 8 hours  insulin glargine Injectable (LANTUS) 20 Unit(s) SubCutaneous at bedtime  insulin lispro (ADMELOG) corrective regimen sliding scale   SubCutaneous three times a day before meals  insulin lispro Injectable (ADMELOG) 7 Unit(s) SubCutaneous three times a day before meals  melatonin 1 milliGRAM(s) Oral at bedtime  metoprolol tartrate 25 milliGRAM(s) Oral two times a day  nystatin Powder 1 Application(s) Topical two times a day  silver sulfADIAZINE 1% Cream 1 Application(s) Topical daily  spironolactone 25 milliGRAM(s) Oral daily  tamsulosin 0.4 milliGRAM(s) Oral at bedtime    PRN Inpatient Medications  acetaminophen   Tablet .. 650 milliGRAM(s) Oral every 6 hours PRN  benzonatate 100 milliGRAM(s) Oral three times a day PRN  guaifenesin/dextromethorphan Oral Liquid 5 milliLiter(s) Oral every 6 hours PRN    VITALS/PHYSICAL EXAM  --------------------------------------------------------------------------------  T(C): 35.8 (07-05-21 @ 13:34), Max: 37.2 (07-04-21 @ 20:34)  HR: 81 (07-05-21 @ 13:34) (56 - 101)  BP: 93/50 (07-05-21 @ 13:34) (93/50 - 152/72)  RR: 20 (07-05-21 @ 13:34) (18 - 20)  Weight (kg): 109.6 (07-04-21 @ 09:50)    07-04-21 @ 07:01  -  07-05-21 @ 07:00  --------------------------------------------------------  IN: 1000 mL / OUT: 1075 mL / NET: -75 mL    07-05-21 @ 07:01  -  07-05-21 @ 13:37  --------------------------------------------------------  IN: 450 mL / OUT: 150 mL / NET: 300 mL    Physical Exam:  	Gen: NAD  	Pulm: CTA B/L  	CV: RRR, S1S2  	Abd: +BS, soft, nontender/nondistended  	: No suprapubic tenderness  	LE: Warm, no edema    LABS/STUDIES  --------------------------------------------------------------------------------              12.4   12.93 >-----------<  298      [07-05-21 @ 05:31]              41.3     142  |  91  |  60  ----------------------------<  140      [07-05-21 @ 05:31]  3.9   |  33  |  2.7        Ca     8.3     [07-05-21 @ 05:31]      Mg     2.2     [07-05-21 @ 05:31]    TPro  6.6  /  Alb  3.9  /  TBili  1.1  /  DBili  x   /  AST  8   /  ALT  5   /  AlkPhos  112  [07-05-21 @ 05:31]    Creatinine Trend:  SCr 2.7 [07-05 @ 05:31]  SCr 2.3 [07-04 @ 04:47]  SCr 2.3 [07-03 @ 16:35]  SCr 2.4 [07-03 @ 06:57]  SCr 2.3 [07-02 @ 20:42]    Vitamin D (25OH) 57      [07-03-21 @ 06:57]  HbA1c 11.5      [12-04-18 @ 07:33]

## 2021-07-05 NOTE — CONSULT NOTE ADULT - ASSESSMENT
IMPRESSION: Rehab of 85 y/o m  rehab  for  debility  chf  bl  le swelling      PRECAUTIONS: [  x] Cardiac  [  x] Respiratory  [  ] Seizures [  ] Contact Isolation  [  ] Droplet Isolation  [ FALL ] Other    Weight Bearing Status:     RECOMMENDATION:    Out of Bed to Chair     DVT/Decubiti Prophylaxis    REHAB PLAN:     [  xx ] Bedside P/T 3-5 times a week   [   ]   Bedside O/T  2-3 times a week             [   ] No Rehab Therapy Indicated                   [   ]  Speech Therapy   Conditioning/ROM                                    ADL  Bed Mobility                                               Conditioning/ROM  Transfers                                                     Bed Mobility  Sitting /Standing Balance                         Transfers                                        Gait Training                                               Sitting/Standing Balance  Stair Training [   ]Applicable                    Home equipment Eval                                                                        Splinting  [   ] Only      GOALS:   ADL   [  x ]   Independent                    Transfers  [x   ] Independent                          Ambulation  [   xx] Independent     xx[    ] With device                           xx  ]  CG                                                         [ x  ]  CG                                                                  [  x ] CG                            [    ] Min A                                                   [   ] Min A                                                              [   ] Min  A          DISCHARGE PLAN:   [   ]  Good candidate for Intensive Rehabilitation/Hospital based-[facility]A [facility]                                             Will tolerate 3hrs Intensive Rehab Daily                                       [  xx  ]  Short Term Rehab in Skilled Nursing Facility                                       [ x   ]  Home with Outpatient or VN services                                         [    ]  Possible Candidate for Intensive Hospital based Rehab

## 2021-07-06 NOTE — PROGRESS NOTE ADULT - ASSESSMENT
CKD stage 3-4 with diuretic induced prerenal azotemia  acute on chronic HFpEF  JOHANNY on CPAP with cor pulmonale    proteinuria  hypocalcemia and hyperphosphatemia  afib  HTN   s/p  shunt  DM 2    dm neuropathy    plan:    agree with holding metolazone and lasix today, but will need to resume diuretics and accept a level of azotemia to keep from overload state  add phoslo 667mg po tid with meals  f/u labs in AM  fluid and salt restriction  monitor UOP via urinal   cont calcitriol  d/w hospitalist and heart failure team

## 2021-07-06 NOTE — CONSULT NOTE ADULT - SUBJECTIVE AND OBJECTIVE BOX
Date of Admission: 6/30/21    HISTORY OF PRESENT ILLNESS:     87yo M history of HTN DM AFib no AC 2/2 falls, HFpEF JOHANNY, presents for shortness of breath. Symptoms started 3 days ago with gradual onset of shortness of breath, first at exertion and gradually progressing and is present now even at rest, associated with orthopnea and chest tightness. Patient says he has been adhering to his medications and has not missed any doses. He also watches his salt intake with no increase in consumption last few days. He went yesterday to urgent care who started him on antibiotics and po steroids for suspicion of PNA vs COPD. He denied fevers, chills, cough or sputum production, nausea or vomiting, diarrhea or other associated symptoms.      PAST MEDICAL & SURGICAL HISTORY:    Afib  DM (diabetes mellitus)  CKD (chronic kidney disease)  Hydrocephalus  Hypertension  History of prostate surgery        FAMILY HISTORY:  [ ] no pertinent family history of premature cardiovascular disease in first degree relatives.  Mother:   Father:   Siblings:     SOCIAL HISTORY:    [ ] Non-smoker  [ ] Smoker  [ ] Alcohol    Allergies    No Known Allergies    Intolerances    	    REVIEW OF SYSTEMS:  CONSTITUTIONAL: No fever, weight loss, or fatigue  CARDIOLOGY: PAtient denies chest pain, shortness of breath or syncopal episodes.   RESPIRATORY: denies shortness of breath, wheezeing.   NEUROLOGICAL: NO weakness, no focal deficits to report.  ENDOCRINOLOGICAL: no recent change in diabetic medications.   GI: no BRBPR, no N,V,diarrhea.    PSYCHIATRY: normal mood and affect  HEENT: no nasal discharge, no ecchymosis  SKIN: no ecchymosis, no breakdown  MUSCULOSKELETAL: Full range of motion x4.     PHYSICAL EXAM:  T(C): 35.6 (07-06-21 @ 05:53), Max: 35.9 (07-05-21 @ 20:59)  HR: 83 (07-06-21 @ 05:53) (81 - 94)  BP: 97/50 (07-06-21 @ 05:53) (93/50 - 121/56)  RR: 19 (07-06-21 @ 05:53) (18 - 20)  SpO2: 95% (07-05-21 @ 22:09) (95% - 95%)  Wt(kg): --  I&O's Summary    05 Jul 2021 07:01  -  06 Jul 2021 07:00  --------------------------------------------------------  IN: 940 mL / OUT: 900 mL / NET: 40 mL        General Appearance: well appearing, normal for age and gender. 	  Neck: normal JVP, no bruit.   Eyes: No xanthomalasia, Extra Ocular muscles intact.   Cardiovascular: regular rate and rhythm S1 S2, No JVD, No murmurs, No edema  Respiratory: Lungs clear to auscultation	  Psychiatry: Alert and oriented x 3, Mood & affect appropriate  Gastrointestinal:  Soft, Non-tender  Skin/Integumen: No rashes, No ecchymoses, No cyanosis	  Neurologic: Non-focal  Musculoskeletal/ extremities: Normal range of motion, No clubbing, cyanosis or edema  Vascular: Peripheral pulses palpable 2+ bilaterally    LABS:	 	                          12.4   12.93 )-----------( 298      ( 05 Jul 2021 05:31 )             41.3     07-05    142  |  91<L>  |  60<H>  ----------------------------<  140<H>  3.9   |  33<H>  |  2.7<H>    	    PREVIOUS DIAGNOSTIC TESTING:      TTE 7/2/21    Summary:   1. Normal global left ventricular systolic function.   2. LV Ejection Fraction by Lemon's Method with a biplane EF of 54 %.   3. Mild left ventricular hypertrophy.   4. Mildly increased LV wall thickness.   5. Normal left ventricular internal cavity size.   6. Moderately enlarged left atrium.   7. Mild mitral annular calcification.   8. Mild thickening and calcification of the anterior and posterior mitral valve leaflets.   9. Mild to moderate mitral valve regurgitation.  10. Mild-moderate tricuspid regurgitation.  11. Mild aortic regurgitation.  12. Sclerotic aortic valve with normal opening.  13. Estimated pulmonary artery systolic pressure is 54.2 mmHg assuming a right atrial pressure of 5 mmHg, which is consistent with moderate pulmonary hypertension.  14. LA volume Index is 51.8 ml/m² ml/m2.  15. There is moderate aortic root calcification.    	    Home Medications:  calcitriol 0.25 mcg oral capsule: 1 cap(s) orally once a day (30 Jun 2021 15:07)  dutasteride 0.5 mg oral capsule: 1 cap(s) orally once a day (30 Jun 2021 15:07)  gabapentin 100 mg oral capsule: 1 cap(s) orally 3 times a day (30 Jun 2021 15:07)  Klor-Con 20 mEq oral powder for reconstitution: 1 each orally 2 times a day (30 Jun 2021 15:07)  Lantus Solostar Pen 100 units/mL subcutaneous solution: 20 unit(s) subcutaneous once a day (at bedtime) (30 Jun 2021 15:07)  Lasix 40 mg oral tablet: 1 tab(s) orally 2 times a day (30 Jun 2021 15:07)  metOLazone 2.5 mg oral tablet: 1 tab(s) orally once a day (30 Jun 2021 15:07)  Metoprolol Tartrate 25 mg oral tablet: 1 tab(s) orally 2 times a day (30 Jun 2021 15:07)  Ozempic (1 mg dose) 4 mg/3 mL subcutaneous solution: 1 milligram(s) subcutaneous once a week (30 Jun 2021 15:07)    MEDICATIONS  (STANDING):  atorvastatin 20 milliGRAM(s) Oral at bedtime  calcitriol   Capsule 0.25 MICROGram(s) Oral daily  cephalexin 500 milliGRAM(s) Oral every 12 hours  chlorhexidine 4% Liquid 1 Application(s) Topical <User Schedule>  dextrose 40% Gel 15 Gram(s) Oral once  dextrose 5%. 1000 milliLiter(s) (50 mL/Hr) IV Continuous <Continuous>  dextrose 5%. 1000 milliLiter(s) (100 mL/Hr) IV Continuous <Continuous>  dextrose 50% Injectable 25 Gram(s) IV Push once  dextrose 50% Injectable 12.5 Gram(s) IV Push once  dextrose 50% Injectable 25 Gram(s) IV Push once  diltiazem    Tablet 60 milliGRAM(s) Oral every 12 hours  finasteride 5 milliGRAM(s) Oral daily  furosemide   Injectable 80 milliGRAM(s) IV Push two times a day  gabapentin 100 milliGRAM(s) Oral three times a day  glucagon  Injectable 1 milliGRAM(s) IntraMuscular once  heparin   Injectable 5000 Unit(s) SubCutaneous every 8 hours  insulin glargine Injectable (LANTUS) 20 Unit(s) SubCutaneous at bedtime  insulin lispro (ADMELOG) corrective regimen sliding scale   SubCutaneous three times a day before meals  insulin lispro Injectable (ADMELOG) 7 Unit(s) SubCutaneous three times a day before meals  melatonin 1 milliGRAM(s) Oral at bedtime  metoprolol tartrate 25 milliGRAM(s) Oral two times a day  nystatin Powder 1 Application(s) Topical two times a day  silver sulfADIAZINE 1% Cream 1 Application(s) Topical daily  spironolactone 25 milliGRAM(s) Oral daily  tamsulosin 0.4 milliGRAM(s) Oral at bedtime    MEDICATIONS  (PRN):  acetaminophen   Tablet .. 650 milliGRAM(s) Oral every 6 hours PRN Moderate Pain (4 - 6)  benzonatate 100 milliGRAM(s) Oral three times a day PRN Cough  guaifenesin/dextromethorphan Oral Liquid 5 milliLiter(s) Oral every 6 hours PRN Cough         Date of Admission: 21    HISTORY OF PRESENT ILLNESS:     85yo M history of HTN DM AFib no AC 2/2 falls, HFpEF JOHANNY, presents for shortness of breath. Symptoms started 3 days ago with gradual onset of shortness of breath, first at exertion and gradually progressing and is present now even at rest, associated with orthopnea and chest tightness. Patient says he has been adhering to his medications and has not missed any doses. He also watches his salt intake with no increase in consumption last few days. He went yesterday to urgent care who started him on antibiotics and po steroids for suspicion of PNA vs COPD. He denied fevers, chills, cough or sputum production, nausea or vomiting, diarrhea or other associated symptoms.    Patient states at his baseline he is able to walk around the house, about half of a block with his cane or walker. For the past week he developed a worsening SOB when walking less than 10 steps, it got to the point he was SOB even at rest. Patient denies c/p, palpitations, headaches, bleeding, LH, dizziness, LOC, cough, feeling bloated, N/V/D.  Endorses compliance with medications and follows a low sodium diet     PAST MEDICAL & SURGICAL HISTORY:    Afib  DM (diabetes mellitus)  CKD (chronic kidney disease)  Hydrocephalus  Hypertension  History of prostate surgery        FAMILY HISTORY:    Mother:  at 72 of HF  Father:  at 68 of a brain aneurism    SOCIAL HISTORY:      Former smoker, denies alcohol or drug use     Allergies    No Known Allergies    Intolerances    	    REVIEW OF SYSTEMS:    CONSTITUTIONAL: No fever, weight loss, or fatigue  CARDIOLOGY: denies chest pain, + shortness of breath or syncopal episodes.   RESPIRATORY: + shortness of breath   NEUROLOGICAL: no weakness, no focal deficits to report.  ENDOCRINOLOGICAL: no recent change in diabetic medications.   GI: no BRBPR, no N,V, diarrhea.    PSYCHIATRY: normal mood and affect  HEENT: no nasal discharge, no ecchymosis  SKIN: no ecchymosis, no breakdown  MUSCULOSKELETAL: Full range of motion x4.     PHYSICAL EXAM:    General Appearance: well appearing, normal for age and gender. 	  Neck: normal JVP, no bruit.   Eyes: Extra Ocular muscles intact.   Cardiovascular: regular rate and rhythm S1 S2, No JVD, No murmurs, BLE edema  Respiratory: Lungs clear to auscultation	  Psychiatry: Alert and oriented x 3, Mood & affect appropriate  Gastrointestinal:  Soft, Non-tender  Skin/Integumen: No rashes, No ecchymoses, No cyanosis	  Neurologic: Non-focal  Musculoskeletal/ extremities: Normal range of motion, No clubbing, cyanosis, BLE edema  Vascular: Peripheral pulses palpable 2+ bilaterally      TTE 21    Summary:   1. Normal global left ventricular systolic function.   2. LV Ejection Fraction by Lemon's Method with a biplane EF of 54 %.   3. Mild left ventricular hypertrophy.   4. Mildly increased LV wall thickness.   5. Normal left ventricular internal cavity size.   6. Moderately enlarged left atrium.   7. Mild mitral annular calcification.   8. Mild thickening and calcification of the anterior and posterior mitral valve leaflets.   9. Mild to moderate mitral valve regurgitation.  10. Mild-moderate tricuspid regurgitation.  11. Mild aortic regurgitation.  12. Sclerotic aortic valve with normal opening.  13. Estimated pulmonary artery systolic pressure is 54.2 mmHg assuming a right atrial pressure of 5 mmHg, which is consistent with moderate pulmonary hypertension.  14. LA volume Index is 51.8 ml/m² ml/m2.  15. There is moderate aortic root calcification.    	    Home Medications:  calcitriol 0.25 mcg oral capsule: 1 cap(s) orally once a day (2021 15:07)  dutasteride 0.5 mg oral capsule: 1 cap(s) orally once a day (2021 15:07)  gabapentin 100 mg oral capsule: 1 cap(s) orally 3 times a day (2021 15:07)  Klor-Con 20 mEq oral powder for reconstitution: 1 each orally 2 times a day (2021 15:07)  Lantus Solostar Pen 100 units/mL subcutaneous solution: 20 unit(s) subcutaneous once a day (at bedtime) (2021 15:07)  Lasix 40 mg oral tablet: 1 tab(s) orally 2 times a day (2021 15:07)  metOLazone 2.5 mg oral tablet: 1 tab(s) orally once a day (2021 15:07)  Metoprolol Tartrate 25 mg oral tablet: 1 tab(s) orally 2 times a day (2021 15:07)  Ozempic (1 mg dose) 4 mg/3 mL subcutaneous solution: 1 milligram(s) subcutaneous once a week (2021 15:07)    MEDICATIONS  (STANDING):  atorvastatin 20 milliGRAM(s) Oral at bedtime  calcitriol   Capsule 0.25 MICROGram(s) Oral daily  cephalexin 500 milliGRAM(s) Oral every 12 hours  chlorhexidine 4% Liquid 1 Application(s) Topical <User Schedule>  dextrose 40% Gel 15 Gram(s) Oral once  dextrose 5%. 1000 milliLiter(s) (50 mL/Hr) IV Continuous <Continuous>  dextrose 5%. 1000 milliLiter(s) (100 mL/Hr) IV Continuous <Continuous>  dextrose 50% Injectable 25 Gram(s) IV Push once  dextrose 50% Injectable 12.5 Gram(s) IV Push once  dextrose 50% Injectable 25 Gram(s) IV Push once  diltiazem    Tablet 60 milliGRAM(s) Oral every 12 hours  finasteride 5 milliGRAM(s) Oral daily  furosemide   Injectable 80 milliGRAM(s) IV Push two times a day  gabapentin 100 milliGRAM(s) Oral three times a day  glucagon  Injectable 1 milliGRAM(s) IntraMuscular once  heparin   Injectable 5000 Unit(s) SubCutaneous every 8 hours  insulin glargine Injectable (LANTUS) 20 Unit(s) SubCutaneous at bedtime  insulin lispro (ADMELOG) corrective regimen sliding scale   SubCutaneous three times a day before meals  insulin lispro Injectable (ADMELOG) 7 Unit(s) SubCutaneous three times a day before meals  melatonin 1 milliGRAM(s) Oral at bedtime  metoprolol tartrate 25 milliGRAM(s) Oral two times a day  nystatin Powder 1 Application(s) Topical two times a day  silver sulfADIAZINE 1% Cream 1 Application(s) Topical daily  spironolactone 25 milliGRAM(s) Oral daily  tamsulosin 0.4 milliGRAM(s) Oral at bedtime    MEDICATIONS  (PRN):  acetaminophen   Tablet .. 650 milliGRAM(s) Oral every 6 hours PRN Moderate Pain (4 - 6)  benzonatate 100 milliGRAM(s) Oral three times a day PRN Cough  guaifenesin/dextromethorphan Oral Liquid 5 milliLiter(s) Oral every 6 hours PRN Cough

## 2021-07-06 NOTE — PROGRESS NOTE ADULT - ATTENDING COMMENTS
#Progress Note Handoff  Pending (specify): follow up nephro, TOBIAS, HF, follow up Cr.   Family discussion: house staff updated pt family  Disposition: Likely DC 24 hrs

## 2021-07-06 NOTE — PROGRESS NOTE ADULT - SUBJECTIVE AND OBJECTIVE BOX
NEPHROLOGY FOLLOW UP NOTE    pt seen and examined  sob less  less swollen  lasix held, off metolazone  bp's low      PAST MEDICAL & SURGICAL HISTORY:  Hypertension  Hydrocephalus  CKD (chronic kidney disease)  DM (diabetes mellitus)  Afib  History of prostate surgery    Allergies:  No Known Allergies    Home Medications Reviewed    SOCIAL HISTORY:  Denies ETOH,Smoking,   FAMILY HISTORY:  Family history of diabetes mellitus (Mother, Sibling)        REVIEW OF SYSTEMS  All other review of systems is negative unless indicated above.    PHYSICAL EXAM:  NAD  obese  sitting in chair, unable to lie flat  moist mm  supple  decreased BS b/l  distant HS, irregular   soft  + edema      Hospital Medications:   MEDICATIONS  (STANDING):  atorvastatin 20 milliGRAM(s) Oral at bedtime  calcitriol   Capsule 0.25 MICROGram(s) Oral daily  cephalexin 500 milliGRAM(s) Oral every 12 hours  chlorhexidine 4% Liquid 1 Application(s) Topical <User Schedule>  dextrose 40% Gel 15 Gram(s) Oral once  dextrose 5%. 1000 milliLiter(s) (50 mL/Hr) IV Continuous <Continuous>  dextrose 5%. 1000 milliLiter(s) (100 mL/Hr) IV Continuous <Continuous>  dextrose 50% Injectable 25 Gram(s) IV Push once  dextrose 50% Injectable 12.5 Gram(s) IV Push once  dextrose 50% Injectable 25 Gram(s) IV Push once  diltiazem    Tablet 60 milliGRAM(s) Oral every 12 hours  finasteride 5 milliGRAM(s) Oral daily  gabapentin 100 milliGRAM(s) Oral three times a day  glucagon  Injectable 1 milliGRAM(s) IntraMuscular once  heparin   Injectable 5000 Unit(s) SubCutaneous every 8 hours  insulin glargine Injectable (LANTUS) 20 Unit(s) SubCutaneous at bedtime  insulin lispro (ADMELOG) corrective regimen sliding scale   SubCutaneous three times a day before meals  insulin lispro Injectable (ADMELOG) 7 Unit(s) SubCutaneous three times a day before meals  melatonin 1 milliGRAM(s) Oral at bedtime  metoprolol tartrate 50 milliGRAM(s) Oral two times a day  nystatin Powder 1 Application(s) Topical two times a day  silver sulfADIAZINE 1% Cream 1 Application(s) Topical daily  spironolactone 25 milliGRAM(s) Oral daily  tamsulosin 0.4 milliGRAM(s) Oral at bedtime        VITALS:  T(F): 97.5 (07-06-21 @ 12:49), Max: 97.5 (07-06-21 @ 12:49)  HR: 66 (07-06-21 @ 12:49)  BP: 104/53 (07-06-21 @ 12:49)  RR: 18 (07-06-21 @ 12:49)  SpO2: 95% (07-06-21 @ 14:35)  Wt(kg): --    07-04 @ 07:01  -  07-05 @ 07:00  --------------------------------------------------------  IN: 1000 mL / OUT: 1075 mL / NET: -75 mL    07-05 @ 07:01  -  07-06 @ 07:00  --------------------------------------------------------  IN: 940 mL / OUT: 900 mL / NET: 40 mL          LABS:  07-06    141  |  89<L>  |  76<HH>  ----------------------------<  131<H>  3.6   |  35<H>  |  3.4<H>    Ca    8.0<L>      06 Jul 2021 07:30  Phos  7.7     07-06  Mg     2.4     07-06    TPro  6.7  /  Alb  3.9  /  TBili  1.0  /  DBili      /  AST  8   /  ALT  <5  /  AlkPhos  108  07-06                          11.6   11.47 )-----------( 284      ( 06 Jul 2021 07:30 )             38.8       Urine Studies:        RADIOLOGY & ADDITIONAL STUDIES:

## 2021-07-06 NOTE — CONSULT NOTE ADULT - ASSESSMENT
Patient is fluid overloaded on exam  Continue to hold diuretics for today  Continue Metoprolol Tartrate 50 mg BID  Continue Diltiazem   Continue Spironolactone 25 mg daily  Get BMP daily   Maintain potassium >4.0, Mg >2.1  Strict intake and output  Daily weight   Will continue to follow    Acute on chronic diastolic HF/Fluid overload/ TOBIAS     Patient is fluid overloaded on exam  Rise in BUN/ Creat today   Hold diuretics today  Monitor H/H closely  Continue Metoprolol Tartrate 50 mg BID  Continue Diltiazem   Continue Spironolactone 25 mg daily  Get BMP daily   Maintain potassium >4.0, Mg >2.1  Strict intake and output  Daily weight   Counseling provided regarding low sodium diet  Discussed with Dr. Merchant from renal   Will continue to follow

## 2021-07-06 NOTE — PROGRESS NOTE ADULT - ASSESSMENT
ASSESSMENT & PLAN  85yo M history of HTN DM AFib no AC 2/2 falls, HFpEF JOHANNY, presents for shortness of breath. Symptoms started 3 days ago with gradual onset of shortness of breath, first at exertion and gradually progressing and is present now even at rest, associated with orthopnea and chest tightness. Patient says he has been adhering to his medications and has not missed any doses. He also watches his salt intake with no increase in consumption last few days. He went yesterday to urgent care who started him on antibiotics and po steroids for suspicion of PNA vs COPD. He denied fevers, chills, cough or sputum production, nausea or vomiting, diarrhea or other associated symptoms. In the ED VS showed elevated /79. Chest Xray showed bilateral parenchymal opacities and pleural effusions. He received 325 mg of aspirin and 1 dose of lasix IV.    #SOB 2/2 HFpEF exacerbation 2/2 medication noncompliance  -per son, PCP recently switch pt from lasix 40mg BID to qd, stopped metolazone  -CXR 7/6 - Atelectasis, without difference.  -BNP 8749  -EKG 6/30 - Atrial fibrillation with premature ventricular  -Trops (6/30-7/1) - 0.04 -> 0.03 -> 0.05 -> 0.04  -Echo EF 54%, mod pHTN, mild AR, mild-mod MVR, TR  -LE duplex negative, b/l edema  -s/p lasix 40mg IV in ED  -daily weight, strict I/Os, fluid restriction  -to be seen by HF team today    #Hypokalemia  -4.0->3.9->3.6  -f/u BMP  -replete as needed    #Cough, leukocytosis  -c/w mucinex, benzoate  -Procal 7/4: 0.25  -per ID, hold off on abx, monitor WBC, might be due to atelectasis    #Multiple NSVT, PVCs  #Chronic Afib not on AC  -not on AC due to frequent falls  -EP consulted - continue with metoprolol, increase as tolerated and if no response can add Cardizem, ischemic w/u per cardiology.     #Suspected Left LE Cellulitis  -on Keflex 500mg BID PO x7d (finish 7/9)    #CKD3  -creatinine 2.3 -> 2.7 -> 3.4  -Lasix changed from q8h to BID 7/5  -d/c lasix for now due to increase in Cr    #JOHANNY  -pt uses CPAP at night  -cont CPAP inpatient    #DM2  -FS: 139, 186, 89  -cont lantus, ISS    #HTN  -BP: 97/50 (93/50 - 121/56)  -cont metoprolol 25mg PO BID    PT/REHAB: Seen by PT - STR vs home PT  ACTIVITY: Increase as tolerated   DVT PPX: heparin  GI PPX:  Not indicated  DIET: Diet, DASH/TLC  CODE: Full code   DIsposition: from home; home with home care  Pending: prepare for d/c tomorrow ASSESSMENT & PLAN  87yo M history of HTN DM AFib no AC 2/2 falls, HFpEF JOHANNY, presents for shortness of breath. Symptoms started 3 days ago with gradual onset of shortness of breath, first at exertion and gradually progressing and is present now even at rest, associated with orthopnea and chest tightness. Patient says he has been adhering to his medications and has not missed any doses. He also watches his salt intake with no increase in consumption last few days. He went yesterday to urgent care who started him on antibiotics and po steroids for suspicion of PNA vs COPD. He denied fevers, chills, cough or sputum production, nausea or vomiting, diarrhea or other associated symptoms. In the ED VS showed elevated /79. Chest Xray showed bilateral parenchymal opacities and pleural effusions. He received 325 mg of aspirin and 1 dose of lasix IV.    #SOB 2/2 HFpEF exacerbation 2/2 medication noncompliance  -per son, PCP recently switch pt from lasix 40mg BID to qd, stopped metolazone  -CXR 7/6 - Atelectasis, without difference.  -BNP 8749  -EKG 6/30 - Atrial fibrillation with premature ventricular  -Trops (6/30-7/1) - 0.04 -> 0.03 -> 0.05 -> 0.04  -Echo EF 54%, mod pHTN, mild AR, mild-mod MVR, TR  -LE duplex negative, b/l edema  -s/p lasix 40mg IV in ED  -daily weight, strict I/Os, fluid restriction  -to be seen by HF team today  Attending note:   Lasix held today as appears dry and Cr increased 2.7-->3.4  follow up HF reccs today, likely will need transition to oral in am     #Hypokalemia  -4.0->3.9->3.6  -f/u BMP  -replete as needed    #Cough, leukocytosis  -c/w mucinex, benzoate  -Procal 7/4: 0.25  -per ID, hold off on abx, monitor WBC, might be due to atelectasis  - improving     #Multiple NSVT, PVCs  #Chronic Afib not on AC  -not on AC due to frequent falls  -EP consulted - continue with metoprolol, increase as tolerated and if no response can add Cardizem, ischemic w/u per cardiology.   - outpt cardiac cath     #Suspected Left LE Cellulitis  -on Keflex 500mg BID PO x7d (finish 7/9)    #TOBIAS on CKD3  -creatinine 2.3 -> 2.7 -> 3.4  -Lasix changed from q8h to BID 7/5  -d/c lasix for now due to increase in Cr  Attending note:  Lasix held today, Pt Cr bumped, likely dry, Follow up Nephro and HF today    #hyperphosphatemia likely 2/2 CKD, follow up nephro     #JOHANNY  -pt uses CPAP at night  -cont CPAP inpatient    #DM2  -FS: 139, 186, 89  -cont lantus, ISS    #HTN  -BP: 97/50 (93/50 - 121/56)  -cont metoprolol 25mg PO BID    PT/REHAB: Seen by PT - STR vs home PT  ACTIVITY: Increase as tolerated   DVT PPX: heparin  GI PPX:  Not indicated  DIET: Diet, DASH/TLC  CODE: Full code   DIsposition: from home; home with home care  Pending: prepare for d/c tomorrow

## 2021-07-07 NOTE — PROGRESS NOTE ADULT - ASSESSMENT
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 Sheth

## 2021-07-07 NOTE — PROGRESS NOTE ADULT - SUBJECTIVE AND OBJECTIVE BOX
DELVIS VINCENZO  86y Male    INTERVAL HPI/OVERNIGHT EVENTS:    Pt seen this morning with wife and son at bedside.  He was sitting in a chair and felt well.  No SOB at rest, chest pain, N/V, abdominal pain  They state that LE swelling has improved.    T(F): 98.2 (21 @ 13:28), Max: 98.2 (21 @ 13:28)  HR: 73 (21 @ 13:28) (61 - 73)  BP: 114/56 (21 @ 13:28) (87/51 - 114/56)  RR: 17 (21 @ 13:28) (17 - 19)  SpO2: 97% (21 @ 08:17) (97% - 97%) on RA      I&O's Summary    2021 07:01  -  2021 07:00  --------------------------------------------------------  IN: 120 mL / OUT: 670 mL / NET: -550 mL    2021 07:01  -  2021 19:13  --------------------------------------------------------  IN: 480 mL / OUT: 1250 mL / NET: -770 mL        Daily     Daily Weight in k (2021 05:11)    CAPILLARY BLOOD GLUCOSE      POCT Blood Glucose.: 125 mg/dL (2021 16:31)  POCT Blood Glucose.: 166 mg/dL (2021 11:37)  POCT Blood Glucose.: 102 mg/dL (2021 07:45)  POCT Blood Glucose.: 137 mg/dL (2021 21:30)        PHYSICAL EXAM:  GENERAL: NAD  HEAD:  Normocephalic  EYES:  conjunctiva and sclera clear  ENMT: Moist mucous membranes  NERVOUS SYSTEM:  Alert, awake, Good concentration  CHEST/LUNG: decreased BS b/l but CTA  HEART: IRR  ABDOMEN: Soft, Nontender, Nondistended; Bowel sounds present, obese  EXTREMITIES:  2+LE edema, ACE wraps      Consultant(s) Notes Reviewed:  [x ] YES  [ ] NO  Care Discussed with Consultants/Other Providers [ x] YES  [ ] NO    MEDICATIONS  (STANDING):  atorvastatin 20 milliGRAM(s) Oral at bedtime  calcitriol   Capsule 0.25 MICROGram(s) Oral daily  calcium acetate 667 milliGRAM(s) Oral three times a day with meals  cephalexin 500 milliGRAM(s) Oral every 12 hours  chlorhexidine 4% Liquid 1 Application(s) Topical <User Schedule>  dextrose 40% Gel 15 Gram(s) Oral once  dextrose 5%. 1000 milliLiter(s) (50 mL/Hr) IV Continuous <Continuous>  dextrose 5%. 1000 milliLiter(s) (100 mL/Hr) IV Continuous <Continuous>  dextrose 50% Injectable 25 Gram(s) IV Push once  dextrose 50% Injectable 12.5 Gram(s) IV Push once  dextrose 50% Injectable 25 Gram(s) IV Push once  finasteride 5 milliGRAM(s) Oral daily  gabapentin 100 milliGRAM(s) Oral three times a day  glucagon  Injectable 1 milliGRAM(s) IntraMuscular once  heparin   Injectable 5000 Unit(s) SubCutaneous every 8 hours  insulin glargine Injectable (LANTUS) 20 Unit(s) SubCutaneous at bedtime  insulin lispro (ADMELOG) corrective regimen sliding scale   SubCutaneous three times a day before meals  insulin lispro Injectable (ADMELOG) 7 Unit(s) SubCutaneous three times a day before meals  melatonin 1 milliGRAM(s) Oral at bedtime  metoprolol tartrate 50 milliGRAM(s) Oral three times a day  nystatin Powder 1 Application(s) Topical two times a day  silver sulfADIAZINE 1% Cream 1 Application(s) Topical daily  tamsulosin 0.4 milliGRAM(s) Oral at bedtime    MEDICATIONS  (PRN):  acetaminophen   Tablet .. 650 milliGRAM(s) Oral every 6 hours PRN Moderate Pain (4 - 6)  benzonatate 100 milliGRAM(s) Oral three times a day PRN Cough  guaifenesin/dextromethorphan Oral Liquid 5 milliLiter(s) Oral every 6 hours PRN Cough    EKG reviewed by me  Telemetry reviewed by [facility]    LABS:                        11.0   9.87  )-----------( 252      ( 2021 05:45 )             36.5     07-07    140  |  91<L>  |  94<HH>  ----------------------------<  102<H>  3.7   |  33<H>  |  3.9<H>    Ca    7.7<L>      2021 05:45  Phos  7.7     07-  Mg     2.5     -    TPro  6.4  /  Alb  3.7  /  TBili  0.7  /  DBili  x   /  AST  8   /  ALT  <5  /  AlkPhos  105                RADIOLOGY & ADDITIONAL TESTS:    Imaging or report Personally Reviewed:  [ ] YES  [ ] NO    < from: TTE Echo Complete w/o Contrast w/ Doppler (21 @ 14:44) >  Summary:   1. Normal global left ventricular systolic function.   2. LVEjection Fraction by Lemon's Method with a biplane EF of 54 %.   3. Mild left ventricular hypertrophy.   4. Mildly increased LV wall thickness.   5. Normal left ventricular internal cavity size.   6. Moderately enlarged left atrium.   7. Mild mitral annular calcification.   8. Mild thickening and calcification of the anterior and posterior mitral valve leaflets.   9. Mild to moderate mitral valve regurgitation.  10. Mild-moderate tricuspid regurgitation.  11. Mild aortic regurgitation.  12. Sclerotic aortic valve with normal opening.  13. Estimated pulmonary artery systolic pressure is 54.2 mmHg assuming a right atrial pressure of 5 mmHg, which is consistent with moderate pulmonary hypertension.  14. LA volume Index is 51.8 ml/m² ml/m2.  15. There is moderate aortic root calcification.    < end of copied text >      Case discussed with residents and RN on rounds today    Care discussed with pt and family

## 2021-07-07 NOTE — PROGRESS NOTE ADULT - SUBJECTIVE AND OBJECTIVE BOX
Date of Admission: 21    Interval History:    - Patient sitting in a chair, in NAD  - Renal function worsened today      HISTORY OF PRESENT ILLNESS:     87yo M history of HTN DM AFib no AC 2/2 falls, HFpEF JOHANNY, presents for shortness of breath. Symptoms started 3 days ago with gradual onset of shortness of breath, first at exertion and gradually progressing and is present now even at rest, associated with orthopnea and chest tightness. Patient says he has been adhering to his medications and has not missed any doses. He also watches his salt intake with no increase in consumption last few days. He went yesterday to urgent care who started him on antibiotics and po steroids for suspicion of PNA vs COPD. He denied fevers, chills, cough or sputum production, nausea or vomiting, diarrhea or other associated symptoms.    Patient states at his baseline he is able to walk around the house, about half of a block with his cane or walker. For the past week he developed a worsening SOB when walking less than 10 steps, it got to the point he was SOB even at rest. Patient denies c/p, palpitations, headaches, bleeding, LH, dizziness, LOC, cough, feeling bloated, N/V/D.  Endorses compliance with medications and follows a low sodium diet     PAST MEDICAL & SURGICAL HISTORY:    Afib  DM (diabetes mellitus)  CKD (chronic kidney disease)  Hydrocephalus  Hypertension  History of prostate surgery        FAMILY HISTORY:    Mother:  at 72 of HF  Father:  at 68 of a brain aneurism    SOCIAL HISTORY:      Former smoker, denies alcohol or drug use     Allergies    No Known Allergies    Intolerances    	    REVIEW OF SYSTEMS:    CONSTITUTIONAL: No fever, weight loss, or fatigue  CARDIOLOGY: denies chest pain, + shortness of breath or syncopal episodes.   RESPIRATORY: + shortness of breath   NEUROLOGICAL: no weakness, no focal deficits to report.  ENDOCRINOLOGICAL: no recent change in diabetic medications.   GI: no BRBPR, no N,V, diarrhea.    PSYCHIATRY: normal mood and affect  HEENT: no nasal discharge, no ecchymosis  SKIN: no ecchymosis, no breakdown  MUSCULOSKELETAL: Full range of motion x4.     PHYSICAL EXAM:    General Appearance: well appearing, normal for age and gender. 	  Neck: normal JVP, no bruit.   Eyes: Extra Ocular muscles intact.   Cardiovascular: regular rate and rhythm S1 S2, No JVD, No murmurs, BLE edema  Respiratory: Lungs clear to auscultation	  Psychiatry: Alert and oriented x 3, Mood & affect appropriate  Gastrointestinal:  Soft, Non-tender  Skin/Integumen: No rashes, No ecchymoses, No cyanosis	  Neurologic: Non-focal  Musculoskeletal/ extremities: Normal range of motion, No clubbing, cyanosis, BLE edema  Vascular: Peripheral pulses palpable 2+ bilaterally      TTE 21    Summary:   1. Normal global left ventricular systolic function.   2. LV Ejection Fraction by Lemon's Method with a biplane EF of 54 %.   3. Mild left ventricular hypertrophy.   4. Mildly increased LV wall thickness.   5. Normal left ventricular internal cavity size.   6. Moderately enlarged left atrium.   7. Mild mitral annular calcification.   8. Mild thickening and calcification of the anterior and posterior mitral valve leaflets.   9. Mild to moderate mitral valve regurgitation.  10. Mild-moderate tricuspid regurgitation.  11. Mild aortic regurgitation.  12. Sclerotic aortic valve with normal opening.  13. Estimated pulmonary artery systolic pressure is 54.2 mmHg assuming a right atrial pressure of 5 mmHg, which is consistent with moderate pulmonary hypertension.  14. LA volume Index is 51.8 ml/m² ml/m2.  15. There is moderate aortic root calcification.    	    Home Medications:  calcitriol 0.25 mcg oral capsule: 1 cap(s) orally once a day (2021 15:07)  dutasteride 0.5 mg oral capsule: 1 cap(s) orally once a day (2021 15:07)  gabapentin 100 mg oral capsule: 1 cap(s) orally 3 times a day (2021 15:07)  Klor-Con 20 mEq oral powder for reconstitution: 1 each orally 2 times a day (2021 15:07)  Lantus Solostar Pen 100 units/mL subcutaneous solution: 20 unit(s) subcutaneous once a day (at bedtime) (2021 15:07)  Lasix 40 mg oral tablet: 1 tab(s) orally 2 times a day (2021 15:07)  metOLazone 2.5 mg oral tablet: 1 tab(s) orally once a day (2021 15:07)  Metoprolol Tartrate 25 mg oral tablet: 1 tab(s) orally 2 times a day (2021 15:07)  Ozempic (1 mg dose) 4 mg/3 mL subcutaneous solution: 1 milligram(s) subcutaneous once a week (2021 15:07)    MEDICATIONS  (STANDING):  atorvastatin 20 milliGRAM(s) Oral at bedtime  calcitriol   Capsule 0.25 MICROGram(s) Oral daily  cephalexin 500 milliGRAM(s) Oral every 12 hours  chlorhexidine 4% Liquid 1 Application(s) Topical <User Schedule>  dextrose 40% Gel 15 Gram(s) Oral once  dextrose 5%. 1000 milliLiter(s) (50 mL/Hr) IV Continuous <Continuous>  dextrose 5%. 1000 milliLiter(s) (100 mL/Hr) IV Continuous <Continuous>  dextrose 50% Injectable 25 Gram(s) IV Push once  dextrose 50% Injectable 12.5 Gram(s) IV Push once  dextrose 50% Injectable 25 Gram(s) IV Push once  diltiazem    Tablet 60 milliGRAM(s) Oral every 12 hours  finasteride 5 milliGRAM(s) Oral daily  furosemide   Injectable 80 milliGRAM(s) IV Push two times a day  gabapentin 100 milliGRAM(s) Oral three times a day  glucagon  Injectable 1 milliGRAM(s) IntraMuscular once  heparin   Injectable 5000 Unit(s) SubCutaneous every 8 hours  insulin glargine Injectable (LANTUS) 20 Unit(s) SubCutaneous at bedtime  insulin lispro (ADMELOG) corrective regimen sliding scale   SubCutaneous three times a day before meals  insulin lispro Injectable (ADMELOG) 7 Unit(s) SubCutaneous three times a day before meals  melatonin 1 milliGRAM(s) Oral at bedtime  metoprolol tartrate 25 milliGRAM(s) Oral two times a day  nystatin Powder 1 Application(s) Topical two times a day  silver sulfADIAZINE 1% Cream 1 Application(s) Topical daily  spironolactone 25 milliGRAM(s) Oral daily  tamsulosin 0.4 milliGRAM(s) Oral at bedtime    MEDICATIONS  (PRN):  acetaminophen   Tablet .. 650 milliGRAM(s) Oral every 6 hours PRN Moderate Pain (4 - 6)  benzonatate 100 milliGRAM(s) Oral three times a day PRN Cough  guaifenesin/dextromethorphan Oral Liquid 5 milliLiter(s) Oral every 6 hours PRN Cough

## 2021-07-07 NOTE — PROGRESS NOTE ADULT - ASSESSMENT
ASSESSMENT & PLAN  87yo M history of HTN DM AFib no AC 2/2 falls, HFpEF JOHANNY, presents for shortness of breath. Symptoms started 3 days ago with gradual onset of shortness of breath, first at exertion and gradually progressing and is present now even at rest, associated with orthopnea and chest tightness. Patient says he has been adhering to his medications and has not missed any doses. He also watches his salt intake with no increase in consumption last few days. He went yesterday to urgent care who started him on antibiotics and po steroids for suspicion of PNA vs COPD. He denied fevers, chills, cough or sputum production, nausea or vomiting, diarrhea or other associated symptoms.    #SOB 2/2 HFpEF exacerbation 2/2 medication noncompliance  -per son, PCP recently switch pt from lasix 40mg BID to qd, stopped metolazone  -CXR 7/6 - Atelectasis, without difference.  -BNP 8749  -EKG 6/30 - Atrial fibrillation with premature ventricular  -Trops (6/30-7/1) - 0.04 -> 0.03 -> 0.05 -> 0.04  -Echo EF 54%, mod pHTN, mild AR, mild-mod MVR, TR  -LE duplex negative, b/l edema  -daily weight, strict I/Os, fluid restriction  -seen by HF team today - increase metoprolol tartrate 50mg to TID, d/c diltiazem, d/c spironolactone, cont to hold Lasix    -Cr continues to rise today 3.4 -> 3.7 -> 3.9. Cont to monitor  -will need emergent dialysis if pt decompensates    #Hypokalemia  -3.6->4.1->3.7  -f/u BMP  -replete as needed    #Cough, leukocytosis  -c/w mucinex, benzoate  -WBC wnl today  -Procal 7/4: 0.25  -per ID, hold off on abx, monitor WBC, might be due to atelectasis  -improving     #Multiple NSVT, PVCs  #Chronic Afib not on AC  -not on AC due to frequent falls  -EP consulted - continue with metoprolol, increase as tolerated and if no response can add Cardizem, ischemic w/u per cardiology.   -outpt cardiac cath     #Suspected Left LE Cellulitis  -on Keflex 500mg BID PO x7d (finish 7/9)    #TOBIAS on CKD3  -creatinine 3.4 -> 3.7 -> 3.9.  -cont to hold lasix    #hyperphosphatemia likely 2/2 CKD, follow up nephro   -cont phoslo 667mg po tid with meals    #JOHANNY  -pt uses CPAP at night  -cont CPAP inpatient    #DM2  -FS: 166, 102, 137, 166  -cont lantus, ISS     #HTN  -BP: 114/56 (87/51 - 114/56)  -cont lopressor as above    PT/REHAB: seen by PT 7/5 - was only able to OOBTC due to LE pain  ACTIVITY: Increase as tolerated   DVT PPX: heparin   Injectable  GI PPX:  Not indicated  DIET: Diet, DASH/TLC, 1200mL fluid restriction  CODE: Full code   DIsposition: from home; acute  Pending: acute - pending improvement in renal fxn

## 2021-07-07 NOTE — PROGRESS NOTE ADULT - ASSESSMENT
85 y/o man with PMN of HTN, DM type 2 on insulin, obesity, AFib not on anticoagulation due to h/o falls, HFpEF and JOHANNY presents for shortness of breath and is being treated for acute over chronic HFpEF.    1. Acute over chronic HFpEF  - due to arrhythmias per cardio (Afib, frequent PVCs, NSVT)  - volume status improved with diuresis but pt remains volume overloaded  - lasix now held due to rising Cr  - avoid hypotension - meds adjusted by heart failure team  - rate control   - daily wts, fluid restriction, low sodium diet  - continue PT    2. Multiple NSVTs, PVCs  Chronic Afib not on A/C due to frequent falls  - seen by EP  - on metoprolol 50mg q8hr  - ischemic workup recommended - medical therapy for now per cardio  - continue telemetry  - pt with TSH 0.20 (low) and fT4 1.9 (upper limit of normal is 1.8) - check fT3    3. TOBIAS over CKD 3 - nonoliguric  - due to CRS  - avoid hypotension  - lasix and metolazone held  - monitor urine output  - renal f/u appreciated  - BMP in AM  - continue phoslo for hyperphosphatemia    4. Left LE cellulitis superimposed on stasis dermatitis  - seen by ID  - complete course of keflex - last day 7/9  - wound care, ACE wrap, elevation  - duplex venous negative and pt with normal arterial flow on duplex    5. DM type 2 - controlled on insulin    6. JOHANNY - on CPAP nightly    7. DVT prophylaxis      PROGRESS NOTE HANDOFF    Pending: improvement in TOBIAS, labs in AM, continued PT    Family discussion: with wife and son at bedside today    Disposition: wants home with services

## 2021-07-07 NOTE — PROGRESS NOTE ADULT - SUBJECTIVE AND OBJECTIVE BOX
VINCENZO EDMONDSON MRN#: [id number]  CODE STATUS: FULL CODE     SUBJECTIVE   Chief complaint: shortness of breath    Currently admitted to medicine with the primary diagnosis of CHF EXACERBATION;ELEVATED TROPONIN      Today is hospital day 7d,   INTERVAL HPI/OVERNIGHT EVENTS: No acute events overnight    This morning, he is sitting in chair comfortably on NC. Denies chest pain, shortness of breath, abdominal pain, nausea, vomiting or changes in bowel habits. He has no complaints today. Endorses orthopnea. He wants to go home.      Urinating and stooling appropriately.    Present Today:           Aguirre Catheter (x)No/ ()Yes?   Indication:             Central Line (x)No/ ()Yes?  Indication:          IV Fluids (x)No/ ()Yes?   Indication:     OBJECTIVE  PAST MEDICAL & SURGICAL HISTORY  Afib    DM (diabetes mellitus)    CKD (chronic kidney disease)    Hydrocephalus    Hypertension    History of prostate surgery      ALLERGIES:  No Known Allergies    HOME MEDICATIONS:  Home Medications:  calcitriol 0.25 mcg oral capsule: 1 cap(s) orally once a day (30 Jun 2021 15:07)  dutasteride 0.5 mg oral capsule: 1 cap(s) orally once a day (30 Jun 2021 15:07)  gabapentin 100 mg oral capsule: 1 cap(s) orally 3 times a day (30 Jun 2021 15:07)  Klor-Con 20 mEq oral powder for reconstitution: 1 each orally 2 times a day (30 Jun 2021 15:07)  Lantus Solostar Pen 100 units/mL subcutaneous solution: 20 unit(s) subcutaneous once a day (at bedtime) (30 Jun 2021 15:07)  Lasix 40 mg oral tablet: 1 tab(s) orally 2 times a day (30 Jun 2021 15:07)  metOLazone 2.5 mg oral tablet: 1 tab(s) orally once a day (30 Jun 2021 15:07)  Metoprolol Tartrate 25 mg oral tablet: 1 tab(s) orally 2 times a day (30 Jun 2021 15:07)  Ozempic (1 mg dose) 4 mg/3 mL subcutaneous solution: 1 milligram(s) subcutaneous once a week (30 Jun 2021 15:07)    MEDICATIONS:  STANDING MEDICATIONS  MEDICATIONS  (STANDING):  atorvastatin 20 milliGRAM(s) Oral at bedtime  calcitriol   Capsule 0.25 MICROGram(s) Oral daily  calcium acetate 667 milliGRAM(s) Oral three times a day with meals  cephalexin 500 milliGRAM(s) Oral every 12 hours  chlorhexidine 4% Liquid 1 Application(s) Topical <User Schedule>  dextrose 40% Gel 15 Gram(s) Oral once  dextrose 5%. 1000 milliLiter(s) (50 mL/Hr) IV Continuous <Continuous>  dextrose 5%. 1000 milliLiter(s) (100 mL/Hr) IV Continuous <Continuous>  dextrose 50% Injectable 25 Gram(s) IV Push once  dextrose 50% Injectable 12.5 Gram(s) IV Push once  dextrose 50% Injectable 25 Gram(s) IV Push once  finasteride 5 milliGRAM(s) Oral daily  gabapentin 100 milliGRAM(s) Oral three times a day  glucagon  Injectable 1 milliGRAM(s) IntraMuscular once  heparin   Injectable 5000 Unit(s) SubCutaneous every 8 hours  insulin glargine Injectable (LANTUS) 20 Unit(s) SubCutaneous at bedtime  insulin lispro (ADMELOG) corrective regimen sliding scale   SubCutaneous three times a day before meals  insulin lispro Injectable (ADMELOG) 7 Unit(s) SubCutaneous three times a day before meals  melatonin 1 milliGRAM(s) Oral at bedtime  metoprolol tartrate 50 milliGRAM(s) Oral three times a day  nystatin Powder 1 Application(s) Topical two times a day  silver sulfADIAZINE 1% Cream 1 Application(s) Topical daily  tamsulosin 0.4 milliGRAM(s) Oral at bedtime    PRN MEDICATIONS  MEDICATIONS  (PRN):  acetaminophen   Tablet .. 650 milliGRAM(s) Oral every 6 hours PRN Moderate Pain (4 - 6)  benzonatate 100 milliGRAM(s) Oral three times a day PRN Cough  guaifenesin/dextromethorphan Oral Liquid 5 milliLiter(s) Oral every 6 hours PRN Cough      VITAL SIGNS  T(F): 98.2 (07-07 @ 13:28), Max: 98.2 (07-07 @ 13:28)  HR: 73 (07-07 @ 13:28) (61 - 73)  BP: 114/56 (07-07 @ 13:28) (87/51 - 114/56)  RR: 17 (07-07 @ 13:28) (17 - 19)  SpO2: 97% (07-07 @ 08:17) (97% - 97%)    LABS:                        11.0   9.87  )-----------( 252      ( 07 Jul 2021 05:45 )             36.5     07-07    140  |  91<L>  |  94<HH>  ----------------------------<  102<H>  3.7   |  33<H>  |  3.9<H>    Ca    7.7<L>      07 Jul 2021 05:45  Phos  7.7     07-06  Mg     2.5     07-07    TPro  6.4  /  Alb  3.7  /  TBili  0.7  /  DBili  x   /  AST  8   /  ALT  <5  /  AlkPhos  105  07-07      RADIOLOGY:  CXR 7/6 - Atelectasis, without difference. Follow-up as needed.  LE Art Duplex 7/4 - Normal arterial flow in the bilateral lower extremities.  LE Venous Duplex 7/2 - No evidence of deep venous thrombosis in either lower extremity. Neither peroneal vein is visualized. Bilateral edema.    ECHO:  1. Normal global left ventricular systolic function.   2. LVEjection Fraction by Lemon's Method with a biplane EF of 54 %.   3. Mild left ventricular hypertrophy.   4. Mildly increased LV wall thickness.   5. Normal left ventricular internal cavity size.   6. Moderately enlarged left atrium.   7. Mild mitral annular calcification.   8. Mild thickening and calcification of the anterior and posterior mitral valve leaflets.   9. Mild to moderate mitral valve regurgitation.  10. Mild-moderate tricuspid regurgitation.  11. Mild aortic regurgitation.  12. Sclerotic aortic valve with normal opening.  13. Estimated pulmonary artery systolic pressure is 54.2 mmHg assuming a right atrial pressure of 5 mmHg, which is consistent with moderate pulmonary hypertension.  14. LA volume Index is 51.8 ml/m² ml/m2.  15. There is moderate aortic root calcification.      PHYSICAL EXAM:  General: Comfortably sitting in chair. NAD. AAOx3.  HEENT: Atraumatic. Normocephalic. EOMI. PERRL. Conjunctiva and sclera clear.  Cardiac: Normal S1, S2. RRR. No murmurs, rubs, or gallops.  Pulm: Crackles at b/l bases. Otherwise CTA.  GI: Soft, nontender, nondistended. [id number]  Ext: 2+ peripheral pulses. Moving all 4 extremities. Trace edema b/l LE  Neuro: Grossly intact  Skin: No lesions or rashes

## 2021-07-07 NOTE — PROGRESS NOTE ADULT - SUBJECTIVE AND OBJECTIVE BOX
NEPHROLOGY FOLLOW UP NOTE    pt seen and examined  bp's low earlier, now better  off lasix and metolazone  tolerating PO  sits in chair only, cannot lay flat      PAST MEDICAL & SURGICAL HISTORY:  Hypertension  Hydrocephalus  CKD (chronic kidney disease)  DM (diabetes mellitus)  Afib  History of prostate surgery    Allergies:  No Known Allergies    Home Medications Reviewed    SOCIAL HISTORY:  Denies ETOH,Smoking,   FAMILY HISTORY:  Family history of diabetes mellitus (Mother, Sibling)        REVIEW OF SYSTEMS  All other review of systems is negative unless indicated above.    PHYSICAL EXAM:  NAD  obese  sitting in chair, unable to lie flat  moist mm  supple  decreased BS b/l  distant HS, irregular   soft  + edema      Hospital Medications:   MEDICATIONS  (STANDING):  atorvastatin 20 milliGRAM(s) Oral at bedtime  calcitriol   Capsule 0.25 MICROGram(s) Oral daily  calcium acetate 667 milliGRAM(s) Oral three times a day with meals  cephalexin 500 milliGRAM(s) Oral every 12 hours  chlorhexidine 4% Liquid 1 Application(s) Topical <User Schedule>  dextrose 40% Gel 15 Gram(s) Oral once  dextrose 5%. 1000 milliLiter(s) (50 mL/Hr) IV Continuous <Continuous>  dextrose 5%. 1000 milliLiter(s) (100 mL/Hr) IV Continuous <Continuous>  dextrose 50% Injectable 25 Gram(s) IV Push once  dextrose 50% Injectable 12.5 Gram(s) IV Push once  dextrose 50% Injectable 25 Gram(s) IV Push once  finasteride 5 milliGRAM(s) Oral daily  gabapentin 100 milliGRAM(s) Oral three times a day  glucagon  Injectable 1 milliGRAM(s) IntraMuscular once  heparin   Injectable 5000 Unit(s) SubCutaneous every 8 hours  insulin glargine Injectable (LANTUS) 20 Unit(s) SubCutaneous at bedtime  insulin lispro (ADMELOG) corrective regimen sliding scale   SubCutaneous three times a day before meals  insulin lispro Injectable (ADMELOG) 7 Unit(s) SubCutaneous three times a day before meals  melatonin 1 milliGRAM(s) Oral at bedtime  metoprolol tartrate 50 milliGRAM(s) Oral three times a day  nystatin Powder 1 Application(s) Topical two times a day  silver sulfADIAZINE 1% Cream 1 Application(s) Topical daily  tamsulosin 0.4 milliGRAM(s) Oral at bedtime        VITALS:  T(F): 98.2 (07-07-21 @ 13:28), Max: 98.2 (07-07-21 @ 13:28)  HR: 73 (07-07-21 @ 13:28)  BP: 114/56 (07-07-21 @ 13:28)  RR: 17 (07-07-21 @ 13:28)  SpO2: 97% (07-07-21 @ 08:17)  Wt(kg): --    07-05 @ 07:01  -  07-06 @ 07:00  --------------------------------------------------------  IN: 940 mL / OUT: 900 mL / NET: 40 mL    07-06 @ 07:01  -  07-07 @ 07:00  --------------------------------------------------------  IN: 120 mL / OUT: 670 mL / NET: -550 mL    07-07 @ 07:01  -  07-07 @ 19:04  --------------------------------------------------------  IN: 480 mL / OUT: 1250 mL / NET: -770 mL          LABS:  07-07    140  |  91<L>  |  94<HH>  ----------------------------<  102<H>  3.7   |  33<H>  |  3.9<H>    Ca    7.7<L>      07 Jul 2021 05:45  Phos  7.7     07-06  Mg     2.5     07-07    TPro  6.4  /  Alb  3.7  /  TBili  0.7  /  DBili      /  AST  8   /  ALT  <5  /  AlkPhos  105  07-07                          11.0   9.87  )-----------( 252      ( 07 Jul 2021 05:45 )             36.5       Urine Studies:        RADIOLOGY & ADDITIONAL STUDIES:

## 2021-07-07 NOTE — PROGRESS NOTE ADULT - ASSESSMENT
Acute on chronic diastolic HF/Fluid overload/ TOBIAS     Patient is fluid overloaded on exam  Rise in BUN/ Creat today   Continue to hold diuretics   Monitor H/H closely  Increase Metoprolol Tartrate 50 mg to TID  D/C Diltiazem   D/C Spironolactone   Get BMP daily   Maintain potassium >4.0, Mg >2.1  Strict intake and output  Daily weight   Counseling provided regarding low sodium diet  Discussed with Dr. Merchant from renal   Will continue to follow    Acute on chronic diastolic HF/Fluid overload/ TOBIAS     Patient is fluid overloaded on exam  Rise in BUN/ Creat today   Continue to hold diuretics   Monitor H/H closely  D/C Diltiazem   D/C Spironolactone   Increase Metoprolol Tartrate 50 mg to TID  Get BMP daily   Maintain potassium >4.0, Mg >2.1  Strict intake and output  Daily weight   Counseling provided regarding low sodium diet  Discussed with primary team and family/patient at bedside

## 2021-07-08 NOTE — PROGRESS NOTE ADULT - SUBJECTIVE AND OBJECTIVE BOX
DELVIS, JOSEPH  86y Male    INTERVAL HPI/OVERNIGHT EVENTS:    No complaints  Prefers to sleep upright in the chair and not in bed  Denies dyspnea at rest, chest pain, N/V, abdominal pain, other complaints.    T(F): 97.7 (07-08-21 @ 05:07), Max: 98.2 (07-07-21 @ 13:28)  HR: 65 (07-08-21 @ 05:07) (65 - 79)  BP: 130/59 (07-08-21 @ 05:07) (114/56 - 130/59)  RR: 18 (07-08-21 @ 05:07) (17 - 18)  SpO2: --    I&O's Summary    07 Jul 2021 07:01  -  08 Jul 2021 07:00  --------------------------------------------------------  IN: 600 mL / OUT: 1895 mL / NET: -1295 mL    08 Jul 2021 07:01  -  08 Jul 2021 13:02  --------------------------------------------------------  IN: 0 mL / OUT: 400 mL / NET: -400 mL    CAPILLARY BLOOD GLUCOSE      POCT Blood Glucose.: 176 mg/dL (08 Jul 2021 11:25)  POCT Blood Glucose.: 116 mg/dL (08 Jul 2021 07:48)  POCT Blood Glucose.: 172 mg/dL (07 Jul 2021 21:52)  POCT Blood Glucose.: 125 mg/dL (07 Jul 2021 16:31)        PHYSICAL EXAM:  GENERAL: NAD  HEAD:  Normocephalic  EYES:  conjunctiva and sclera clear  ENMT: Moist mucous membranes  NERVOUS SYSTEM:  Alert, awake, Good concentration  CHEST/LUNG: decreased BS but CTA b/l  HEART: IRR  ABDOMEN: Soft, Nontender, Nondistended  EXTREMITIES: 2+ LE edema - ACE wraps  SKIN:  skin tears    Consultant(s) Notes Reviewed:  [x ] YES  [ ] NO  Care Discussed with Consultants/Other Providers [ x] YES  [ ] NO    MEDICATIONS  (STANDING):  atorvastatin 20 milliGRAM(s) Oral at bedtime  calcitriol   Capsule 0.25 MICROGram(s) Oral daily  calcium acetate 667 milliGRAM(s) Oral three times a day with meals  cephalexin 500 milliGRAM(s) Oral every 12 hours  chlorhexidine 4% Liquid 1 Application(s) Topical <User Schedule>  dextrose 40% Gel 15 Gram(s) Oral once  dextrose 5%. 1000 milliLiter(s) (50 mL/Hr) IV Continuous <Continuous>  dextrose 5%. 1000 milliLiter(s) (100 mL/Hr) IV Continuous <Continuous>  dextrose 50% Injectable 25 Gram(s) IV Push once  dextrose 50% Injectable 12.5 Gram(s) IV Push once  dextrose 50% Injectable 25 Gram(s) IV Push once  finasteride 5 milliGRAM(s) Oral daily  gabapentin 100 milliGRAM(s) Oral three times a day  glucagon  Injectable 1 milliGRAM(s) IntraMuscular once  heparin   Injectable 5000 Unit(s) SubCutaneous every 8 hours  insulin glargine Injectable (LANTUS) 20 Unit(s) SubCutaneous at bedtime  insulin lispro (ADMELOG) corrective regimen sliding scale   SubCutaneous three times a day before meals  insulin lispro Injectable (ADMELOG) 7 Unit(s) SubCutaneous three times a day before meals  melatonin 1 milliGRAM(s) Oral at bedtime  metoprolol tartrate 50 milliGRAM(s) Oral three times a day  nystatin Powder 1 Application(s) Topical two times a day  silver sulfADIAZINE 1% Cream 1 Application(s) Topical daily  tamsulosin 0.4 milliGRAM(s) Oral at bedtime    MEDICATIONS  (PRN):  acetaminophen   Tablet .. 650 milliGRAM(s) Oral every 6 hours PRN Moderate Pain (4 - 6)  benzonatate 100 milliGRAM(s) Oral three times a day PRN Cough  guaifenesin/dextromethorphan Oral Liquid 5 milliLiter(s) Oral every 6 hours PRN Cough      Telemetry reviewed by me    LABS:                        11.1   10.09 )-----------( 278      ( 08 Jul 2021 06:26 )             37.2     07-08    140  |  93<L>  |  101<HH>  ----------------------------<  96  4.3   |  29  |  3.5<H>    Ca    8.1<L>      08 Jul 2021 06:26  Mg     2.7     07-08    TPro  6.9  /  Alb  3.8  /  TBili  0.6  /  DBili  x   /  AST  11  /  ALT  6   /  AlkPhos  116<H>  07-08                  Case discussed with residents and RN on rounds today    Care discussed with pt and family

## 2021-07-08 NOTE — PROGRESS NOTE ADULT - ASSESSMENT
85 y/o man with PMN of HTN, DM type 2 on insulin, obesity, AFib not on anticoagulation due to h/o falls, HFpEF and JOHANNY presents for shortness of breath and is being treated for acute over chronic HFpEF.    1. Acute over chronic HFpEF  - due to arrhythmias per cardio (Afib, frequent PVCs, NSVT)  - volume status improved with diuresis but pt remains volume overloaded  - in negative balance  - lasix held due to rising Cr - Cr now trending down  - avoid hypotension - meds adjusted by heart failure team with improvement in BP  - rate control   - daily wts, fluid restriction, low sodium diet  - continue PT    2. Multiple NSVTs, PVCs  Chronic Afib not on A/C due to frequent falls  - seen by EP  - on metoprolol 50mg q8hr  - ischemic workup recommended - medical therapy for now per cardio  - continue telemetry  - pt with TSH 0.20 (low) and fT4 1.9 (upper limit of normal is 1.8) - check fT3    3. TOBIAS over CKD 3 - nonoliguric  - due to CRS  - Cr now trending down  - avoid hypotension  - lasix and metolazone held  - monitor urine output  - renal f/u   - BMP in AM  - continue phoslo for hyperphosphatemia    4. Left LE cellulitis superimposed on stasis dermatitis  - seen by ID  - complete course of keflex - last day 7/9  - wound care, ACE wrap, elevation  - duplex venous negative and pt with normal arterial flow on duplex    5. DM type 2 - controlled on insulin    6. JOHANNY - on CPAP nightly    7. DVT prophylaxis      PROGRESS NOTE HANDOFF    Pending: improvement in TOBIAS, labs in AM, continued PT  heart failure and renal f/u    Family discussion: with wife at bedside today    Disposition: wants home with services

## 2021-07-08 NOTE — PROGRESS NOTE ADULT - ASSESSMENT
Acute on chronic diastolic HF/Fluid overload/ TOBIAS     Patient is fluid overloaded on exam  Give one dose of Bumex 2 mg iv push now + 3% hypertonic saline 150 ml over 3 hrs   Monitor BUN and Creat closely  Continue Metoprolol Tartrate 50 mg to TID  Get BMP daily   Maintain potassium >4.0, Mg >2.1  Strict intake and output  Daily weight   Counseling provided regarding low sodium diet  Discussed with primary team/renal and family/patient at bedside

## 2021-07-08 NOTE — PROGRESS NOTE ADULT - SUBJECTIVE AND OBJECTIVE BOX
Date of Admission: 21    Interval History:    - Patient sitting in a chair, in NAD  - Remains fluid overloaded.         HISTORY OF PRESENT ILLNESS:     87yo M history of HTN DM AFib no AC 2/2 falls, HFpEF JOHANNY, presents for shortness of breath. Symptoms started 3 days ago with gradual onset of shortness of breath, first at exertion and gradually progressing and is present now even at rest, associated with orthopnea and chest tightness. Patient says he has been adhering to his medications and has not missed any doses. He also watches his salt intake with no increase in consumption last few days. He went yesterday to urgent care who started him on antibiotics and po steroids for suspicion of PNA vs COPD. He denied fevers, chills, cough or sputum production, nausea or vomiting, diarrhea or other associated symptoms.    Patient states at his baseline he is able to walk around the house, about half of a block with his cane or walker. For the past week he developed a worsening SOB when walking less than 10 steps, it got to the point he was SOB even at rest. Patient denies c/p, palpitations, headaches, bleeding, LH, dizziness, LOC, cough, feeling bloated, N/V/D.  Endorses compliance with medications and follows a low sodium diet     PAST MEDICAL & SURGICAL HISTORY:    Afib  DM (diabetes mellitus)  CKD (chronic kidney disease)  Hydrocephalus  Hypertension  History of prostate surgery        FAMILY HISTORY:    Mother:  at 72 of HF  Father:  at 68 of a brain aneurism    SOCIAL HISTORY:      Former smoker, denies alcohol or drug use     Allergies    No Known Allergies    Intolerances    	  PHYSICAL EXAM:    General Appearance: well appearing, normal for age and gender. 	  Neck: normal JVP, no bruit.   Eyes: Extra Ocular muscles intact.   Cardiovascular: regular rate and rhythm S1 S2, No JVD, No murmurs, BLE edema  Respiratory: Lungs clear to auscultation	  Psychiatry: Alert and oriented x 3, Mood & affect appropriate  Gastrointestinal:  Soft, Non-tender  Skin/Integumen: No rashes, No ecchymoses, No cyanosis	  Neurologic: Non-focal  Musculoskeletal/ extremities: Normal range of motion, No clubbing, cyanosis, BLE edema  Vascular: Peripheral pulses palpable 2+ bilaterally      TTE 21    Summary:   1. Normal global left ventricular systolic function.   2. LV Ejection Fraction by Lemon's Method with a biplane EF of 54 %.   3. Mild left ventricular hypertrophy.   4. Mildly increased LV wall thickness.   5. Normal left ventricular internal cavity size.   6. Moderately enlarged left atrium.   7. Mild mitral annular calcification.   8. Mild thickening and calcification of the anterior and posterior mitral valve leaflets.   9. Mild to moderate mitral valve regurgitation.  10. Mild-moderate tricuspid regurgitation.  11. Mild aortic regurgitation.  12. Sclerotic aortic valve with normal opening.  13. Estimated pulmonary artery systolic pressure is 54.2 mmHg assuming a right atrial pressure of 5 mmHg, which is consistent with moderate pulmonary hypertension.  14. LA volume Index is 51.8 ml/m² ml/m2.  15. There is moderate aortic root calcification.    	    Home Medications:  calcitriol 0.25 mcg oral capsule: 1 cap(s) orally once a day (2021 15:07)  dutasteride 0.5 mg oral capsule: 1 cap(s) orally once a day (2021 15:07)  gabapentin 100 mg oral capsule: 1 cap(s) orally 3 times a day (2021 15:07)  Klor-Con 20 mEq oral powder for reconstitution: 1 each orally 2 times a day (2021 15:07)  Lantus Solostar Pen 100 units/mL subcutaneous solution: 20 unit(s) subcutaneous once a day (at bedtime) (2021 15:07)  Lasix 40 mg oral tablet: 1 tab(s) orally 2 times a day (2021 15:07)  metOLazone 2.5 mg oral tablet: 1 tab(s) orally once a day (2021 15:07)  Metoprolol Tartrate 25 mg oral tablet: 1 tab(s) orally 2 times a day (2021 15:07)  Ozempic (1 mg dose) 4 mg/3 mL subcutaneous solution: 1 milligram(s) subcutaneous once a week (2021 15:07)    MEDICATIONS  (STANDING):  atorvastatin 20 milliGRAM(s) Oral at bedtime  calcitriol   Capsule 0.25 MICROGram(s) Oral daily  cephalexin 500 milliGRAM(s) Oral every 12 hours  chlorhexidine 4% Liquid 1 Application(s) Topical <User Schedule>  dextrose 40% Gel 15 Gram(s) Oral once  dextrose 5%. 1000 milliLiter(s) (50 mL/Hr) IV Continuous <Continuous>  dextrose 5%. 1000 milliLiter(s) (100 mL/Hr) IV Continuous <Continuous>  dextrose 50% Injectable 25 Gram(s) IV Push once  dextrose 50% Injectable 12.5 Gram(s) IV Push once  dextrose 50% Injectable 25 Gram(s) IV Push once  diltiazem    Tablet 60 milliGRAM(s) Oral every 12 hours  finasteride 5 milliGRAM(s) Oral daily  furosemide   Injectable 80 milliGRAM(s) IV Push two times a day  gabapentin 100 milliGRAM(s) Oral three times a day  glucagon  Injectable 1 milliGRAM(s) IntraMuscular once  heparin   Injectable 5000 Unit(s) SubCutaneous every 8 hours  insulin glargine Injectable (LANTUS) 20 Unit(s) SubCutaneous at bedtime  insulin lispro (ADMELOG) corrective regimen sliding scale   SubCutaneous three times a day before meals  insulin lispro Injectable (ADMELOG) 7 Unit(s) SubCutaneous three times a day before meals  melatonin 1 milliGRAM(s) Oral at bedtime  metoprolol tartrate 25 milliGRAM(s) Oral two times a day  nystatin Powder 1 Application(s) Topical two times a day  silver sulfADIAZINE 1% Cream 1 Application(s) Topical daily  spironolactone 25 milliGRAM(s) Oral daily  tamsulosin 0.4 milliGRAM(s) Oral at bedtime    MEDICATIONS  (PRN):  acetaminophen   Tablet .. 650 milliGRAM(s) Oral every 6 hours PRN Moderate Pain (4 - 6)  benzonatate 100 milliGRAM(s) Oral three times a day PRN Cough  guaifenesin/dextromethorphan Oral Liquid 5 milliLiter(s) Oral every 6 hours PRN Cough

## 2021-07-08 NOTE — PROGRESS NOTE ADULT - ASSESSMENT
ASSESSMENT & PLAN  85yo M history of HTN DM AFib no AC 2/2 falls, HFpEF JOHANNY, presents for shortness of breath. Symptoms started 3 days ago with gradual onset of shortness of breath, first at exertion and gradually progressing and is present now even at rest, associated with orthopnea and chest tightness. Patient says he has been adhering to his medications and has not missed any doses. He also watches his salt intake with no increase in consumption last few days. He went yesterday to urgent care who started him on antibiotics and po steroids for suspicion of PNA vs COPD. He denied fevers, chills, cough or sputum production, nausea or vomiting, diarrhea or other associated symptoms.    #SOB 2/2 HFpEF exacerbation 2/2 medication noncompliance  -per son, PCP recently switch pt from lasix 40mg BID to qd, stopped metolazone  -CXR 7/6 - Atelectasis  -BNP 8749  -EKG 6/30 - Atrial fibrillation with PVCs  -Trops (6/30-7/1) - 0.04 -> 0.03 -> 0.05 -> 0.04  -Echo EF 54%, mod pHTN, mild AR, mild-mod MVR, TR  -LE duplex negative, b/l edema  -daily weight, strict I/Os, fluid restriction  -seen by HF team 7/7 - cont metoprolol tartrate 50mg TID, cont to hold Lasix    -Cr 3.7 -> 3.9 -> 3.5  -may need emergent dialysis if pt decompensates    #Hypokalemia  -3.6->4.1->3.7->4.3  -f/u BMP  -replete as needed, keep >4    #?Hyperthyroidism  -clinically euthyroid  -TSH 0.20 (low)  -T4 1.9 (borderline high)  -obtain T3    #Cough, leukocytosis  -c/w mucinex, benzoate  -WBC wnl  -Procal 7/4: 0.25  -per ID, hold off on abx, monitor WBC, might be due to atelectasis  -improving    #Multiple NSVT, PVCs  #Chronic Afib not on AC  -not on AC due to frequent falls  -EP consulted - continue with metoprolol, increase as tolerated and if no response can add Cardizem, ischemic w/u per cardiology.   -outpt cardiac cath     #Suspected Left LE Cellulitis  -on Keflex 500mg BID PO x7d (finish 7/9)    #TOBIAS on CKD3  -Cr 3.7 -> 3.9 -> 3.5  -cont to hold lasix    #hyperphosphatemia likely 2/2 CKD, follow up nephro   -cont phoslo 667mg po tid with meals    #JOHANNY  -pt uses CPAP at night  -cont CPAP inpatient    #DM2  -FS: 176, 116, 172, 125  -cont lantus, ISS     #HTN  -BP: 126/57 (118/57 - 130/59)  -cont lopressor as above      PT/REHAB: seen by PT 7/5 - was only able to OOBTC due to LE pain  ACTIVITY: Increase as tolerated   DVT PPX: heparin   Injectable  GI PPX:  Not indicated  DIET: Diet, DASH/TLC  CODE: Full code   DIsposition: from home; acute  Pending: improvement in Cr ASSESSMENT & PLAN  87yo M history of HTN DM AFib no AC 2/2 falls, HFpEF JOHANNY, presents for shortness of breath. Symptoms started 3 days ago with gradual onset of shortness of breath, first at exertion and gradually progressing and is present now even at rest, associated with orthopnea and chest tightness. Patient says he has been adhering to his medications and has not missed any doses. He also watches his salt intake with no increase in consumption last few days. He went yesterday to urgent care who started him on antibiotics and po steroids for suspicion of PNA vs COPD. He denied fevers, chills, cough or sputum production, nausea or vomiting, diarrhea or other associated symptoms.    #SOB 2/2 HFpEF exacerbation 2/2 medication noncompliance  -per son, PCP recently switch pt from lasix 40mg BID to qd, stopped metolazone  -CXR 7/6 - Atelectasis  -BNP 8749  -EKG 6/30 - Atrial fibrillation with PVCs  -Trops (6/30-7/1) - 0.04 -> 0.03 -> 0.05 -> 0.04  -Echo EF 54%, mod pHTN, mild AR, mild-mod MVR, TR  -LE duplex negative, b/l edema  -daily weight, strict I/Os, fluid restriction  -seen by HF team 7/7 - cont metoprolol tartrate 50mg TID, cont to hold Lasix    -Cr 3.7 -> 3.9 -> 3.5  -may need emergent dialysis if pt decompensates    #Hypokalemia  -3.6->4.1->3.7->4.3  -f/u BMP  -replete as needed, keep >4    #?Hyperthyroidism  -clinically euthyroid  -TSH 0.20 (low)  -T4 1.9 (borderline high)  -obtain T3, consider endocrine consult    #Cough, leukocytosis  -c/w mucinex, benzoate  -WBC wnl  -Procal 7/4: 0.25  -per ID, hold off on abx, monitor WBC, might be due to atelectasis  -improving    #Multiple NSVT, PVCs  #Chronic Afib not on AC  -not on AC due to frequent falls  -EP consulted - continue with metoprolol, increase as tolerated and if no response can add Cardizem, ischemic w/u per cardiology.   -outpt cardiac cath     #Suspected Left LE Cellulitis  -on Keflex 500mg BID PO x7d (finish 7/9)    #TOBIAS on CKD3  -Cr 3.7 -> 3.9 -> 3.5  -cont to hold lasix    #hyperphosphatemia likely 2/2 CKD, follow up nephro   -cont phoslo 667mg po tid with meals    #JOHANNY  -pt uses CPAP at night  -cont CPAP inpatient    #DM2  -FS: 176, 116, 172, 125  -cont lantus, ISS     #HTN  -BP: 126/57 (118/57 - 130/59)  -cont lopressor as above      PT/REHAB: seen by PT 7/5 - was only able to OOBTC due to LE pain  ACTIVITY: Increase as tolerated   DVT PPX: heparin   Injectable  GI PPX:  Not indicated  DIET: Diet, DASH/TLC  CODE: Full code   DIsposition: from home; acute  Pending: improvement in Cr

## 2021-07-08 NOTE — PROGRESS NOTE ADULT - SUBJECTIVE AND OBJECTIVE BOX
VINCENZO EDMONDSON MRN#: [id number]  CODE STATUS: FULL CODE     SUBJECTIVE   Chief complaint: shortness of breath    Currently admitted to medicine with the primary diagnosis of CHF EXACERBATION;ELEVATED TROPONIN      Today is hospital day 8d,   INTERVAL HPI/OVERNIGHT EVENTS: No acute events overnight    This morning, he is laying in bed comfortably. Denies chest pain, shortness of breath, abdominal pain, nausea, vomiting or changes in bowel habits. He has no complaints today.     Urinating and stooling appropriately.    Present Today:           Aguirre Catheter (x)No/ ()Yes?   Indication:             Central Line (x)No/ ()Yes?  Indication:          IV Fluids (x)No/ ()Yes?   Indication:     OBJECTIVE  PAST MEDICAL & SURGICAL HISTORY  Afib    DM (diabetes mellitus)    CKD (chronic kidney disease)    Hydrocephalus    Hypertension    History of prostate surgery      ALLERGIES:  No Known Allergies    HOME MEDICATIONS:  Home Medications:  calcitriol 0.25 mcg oral capsule: 1 cap(s) orally once a day (30 Jun 2021 15:07)  dutasteride 0.5 mg oral capsule: 1 cap(s) orally once a day (30 Jun 2021 15:07)  gabapentin 100 mg oral capsule: 1 cap(s) orally 3 times a day (30 Jun 2021 15:07)  Klor-Con 20 mEq oral powder for reconstitution: 1 each orally 2 times a day (30 Jun 2021 15:07)  Lantus Solostar Pen 100 units/mL subcutaneous solution: 20 unit(s) subcutaneous once a day (at bedtime) (30 Jun 2021 15:07)  Lasix 40 mg oral tablet: 1 tab(s) orally 2 times a day (30 Jun 2021 15:07)  metOLazone 2.5 mg oral tablet: 1 tab(s) orally once a day (30 Jun 2021 15:07)  Metoprolol Tartrate 25 mg oral tablet: 1 tab(s) orally 2 times a day (30 Jun 2021 15:07)  Ozempic (1 mg dose) 4 mg/3 mL subcutaneous solution: 1 milligram(s) subcutaneous once a week (30 Jun 2021 15:07)    MEDICATIONS:  STANDING MEDICATIONS  MEDICATIONS  (STANDING):  atorvastatin 20 milliGRAM(s) Oral at bedtime  calcitriol   Capsule 0.25 MICROGram(s) Oral daily  calcium acetate 667 milliGRAM(s) Oral three times a day with meals  cephalexin 500 milliGRAM(s) Oral every 12 hours  chlorhexidine 4% Liquid 1 Application(s) Topical <User Schedule>  dextrose 40% Gel 15 Gram(s) Oral once  dextrose 5%. 1000 milliLiter(s) (50 mL/Hr) IV Continuous <Continuous>  dextrose 5%. 1000 milliLiter(s) (100 mL/Hr) IV Continuous <Continuous>  dextrose 50% Injectable 25 Gram(s) IV Push once  dextrose 50% Injectable 12.5 Gram(s) IV Push once  dextrose 50% Injectable 25 Gram(s) IV Push once  finasteride 5 milliGRAM(s) Oral daily  gabapentin 100 milliGRAM(s) Oral three times a day  glucagon  Injectable 1 milliGRAM(s) IntraMuscular once  heparin   Injectable 5000 Unit(s) SubCutaneous every 8 hours  insulin glargine Injectable (LANTUS) 20 Unit(s) SubCutaneous at bedtime  insulin lispro (ADMELOG) corrective regimen sliding scale   SubCutaneous three times a day before meals  insulin lispro Injectable (ADMELOG) 7 Unit(s) SubCutaneous three times a day before meals  melatonin 1 milliGRAM(s) Oral at bedtime  metoprolol tartrate 50 milliGRAM(s) Oral three times a day  nystatin Powder 1 Application(s) Topical two times a day  silver sulfADIAZINE 1% Cream 1 Application(s) Topical daily  tamsulosin 0.4 milliGRAM(s) Oral at bedtime    PRN MEDICATIONS  MEDICATIONS  (PRN):  acetaminophen   Tablet .. 650 milliGRAM(s) Oral every 6 hours PRN Moderate Pain (4 - 6)  benzonatate 100 milliGRAM(s) Oral three times a day PRN Cough  guaifenesin/dextromethorphan Oral Liquid 5 milliLiter(s) Oral every 6 hours PRN Cough      VITAL SIGNS  T(F): 97.2 (07-08 @ 13:07), Max: 98 (07-07 @ 20:37)  HR: 78 (07-08 @ 13:07) (65 - 79)  BP: 126/57 (07-08 @ 13:07) (118/57 - 130/59)  RR: 18 (07-08 @ 13:07) (18 - 18)  SpO2: --    LABS:                        11.1   10.09 )-----------( 278      ( 08 Jul 2021 06:26 )             37.2     07-08    140  |  93<L>  |  101<HH>  ----------------------------<  96  4.3   |  29  |  3.5<H>    Ca    8.1<L>      08 Jul 2021 06:26  Mg     2.7     07-08    TPro  6.9  /  Alb  3.8  /  TBili  0.6  /  DBili  x   /  AST  11  /  ALT  6   /  AlkPhos  116<H>  07-08                  RADIOLOGY:      ECHO:      PHYSICAL EXAM:  General: Comfortably laying on the hospital bed. NAD. AAOx3.  HEENT: Atraumatic. Normocephalic. EOMI. PERRL. Conjunctiva and sclera clear.  Cardiac: Normal S1, S2. RRR. No murmurs, rubs, or gallops.  Pulm: CTA b/l no wheezes, rhonchi, or rales.  GI: Soft, nontender, nondistended. BSx4q  Ext: 2+ peripheral pulses. Moving all 4 extremities. No edema, cyanosis.  Neuro: Grossly intact  Skin: No lesions or rashes    ASSESSMENT & PLAN  HPI:  85yo M history of HTN DM AFib no AC 2/2 falls, HFpEF JOHANNY, presents for shortness of breath. Symptoms started 3 days ago with gradual onset of shortness of breath, first at exertion and gradually progressing and is present now even at rest, associated with orthopnea and chest tightness. Patient says he has been adhering to his medications and has not missed any doses. He also watches his salt intake with no increase in consumption last few days. He went yesterday to urgent care who started him on antibiotics and po steroids for suspicion of PNA vs COPD. He denied fevers, chills, cough or sputum production, nausea or vomiting, diarrhea or other associated symptoms.    In the ED VS showed elevated /79.  Chest Xray showed bilateral parenchymal opacities and pleural effusions.  He received 325 mg of aspirin and 1 dose of lasix IV. (30 Jun 2021 15:00)        #DM2  -FS: 176, 116, 172, 125  -cont     #HTN  -BP: 126/57 (118/57 - 130/59)    PT/REHAB   ACTIVITY: Increase as tolerated   DVT PPX: heparin   Injectable    GI PPX:  Not indicated  DIET: Diet, DASH/TLC        CODE: Full code   DIsposition: from home;   Pending:   VINCENZO EDMONDSON MRN#: [id number]  CODE STATUS: FULL CODE     SUBJECTIVE   Chief complaint: shortness of breath    Currently admitted to medicine with the primary diagnosis of CHF EXACERBATION;ELEVATED TROPONIN      Today is hospital day 8d,   INTERVAL HPI/OVERNIGHT EVENTS: No acute events overnight    This morning, he is laying in bed comfortably. Denies chest pain, shortness of breath, abdominal pain, nausea, vomiting or changes in bowel habits. He has no complaints today.     Urinating and stooling appropriately.    Present Today:           Aguirre Catheter (x)No/ ()Yes?   Indication:             Central Line (x)No/ ()Yes?  Indication:          IV Fluids (x)No/ ()Yes?   Indication:     OBJECTIVE  PAST MEDICAL & SURGICAL HISTORY  Afib    DM (diabetes mellitus)    CKD (chronic kidney disease)    Hydrocephalus    Hypertension    History of prostate surgery      ALLERGIES:  No Known Allergies    HOME MEDICATIONS:  Home Medications:  calcitriol 0.25 mcg oral capsule: 1 cap(s) orally once a day (30 Jun 2021 15:07)  dutasteride 0.5 mg oral capsule: 1 cap(s) orally once a day (30 Jun 2021 15:07)  gabapentin 100 mg oral capsule: 1 cap(s) orally 3 times a day (30 Jun 2021 15:07)  Klor-Con 20 mEq oral powder for reconstitution: 1 each orally 2 times a day (30 Jun 2021 15:07)  Lantus Solostar Pen 100 units/mL subcutaneous solution: 20 unit(s) subcutaneous once a day (at bedtime) (30 Jun 2021 15:07)  Lasix 40 mg oral tablet: 1 tab(s) orally 2 times a day (30 Jun 2021 15:07)  metOLazone 2.5 mg oral tablet: 1 tab(s) orally once a day (30 Jun 2021 15:07)  Metoprolol Tartrate 25 mg oral tablet: 1 tab(s) orally 2 times a day (30 Jun 2021 15:07)  Ozempic (1 mg dose) 4 mg/3 mL subcutaneous solution: 1 milligram(s) subcutaneous once a week (30 Jun 2021 15:07)    MEDICATIONS:  STANDING MEDICATIONS  MEDICATIONS  (STANDING):  atorvastatin 20 milliGRAM(s) Oral at bedtime  calcitriol   Capsule 0.25 MICROGram(s) Oral daily  calcium acetate 667 milliGRAM(s) Oral three times a day with meals  cephalexin 500 milliGRAM(s) Oral every 12 hours  chlorhexidine 4% Liquid 1 Application(s) Topical <User Schedule>  dextrose 40% Gel 15 Gram(s) Oral once  dextrose 5%. 1000 milliLiter(s) (50 mL/Hr) IV Continuous <Continuous>  dextrose 5%. 1000 milliLiter(s) (100 mL/Hr) IV Continuous <Continuous>  dextrose 50% Injectable 25 Gram(s) IV Push once  dextrose 50% Injectable 12.5 Gram(s) IV Push once  dextrose 50% Injectable 25 Gram(s) IV Push once  finasteride 5 milliGRAM(s) Oral daily  gabapentin 100 milliGRAM(s) Oral three times a day  glucagon  Injectable 1 milliGRAM(s) IntraMuscular once  heparin   Injectable 5000 Unit(s) SubCutaneous every 8 hours  insulin glargine Injectable (LANTUS) 20 Unit(s) SubCutaneous at bedtime  insulin lispro (ADMELOG) corrective regimen sliding scale   SubCutaneous three times a day before meals  insulin lispro Injectable (ADMELOG) 7 Unit(s) SubCutaneous three times a day before meals  melatonin 1 milliGRAM(s) Oral at bedtime  metoprolol tartrate 50 milliGRAM(s) Oral three times a day  nystatin Powder 1 Application(s) Topical two times a day  silver sulfADIAZINE 1% Cream 1 Application(s) Topical daily  tamsulosin 0.4 milliGRAM(s) Oral at bedtime    PRN MEDICATIONS  MEDICATIONS  (PRN):  acetaminophen   Tablet .. 650 milliGRAM(s) Oral every 6 hours PRN Moderate Pain (4 - 6)  benzonatate 100 milliGRAM(s) Oral three times a day PRN Cough  guaifenesin/dextromethorphan Oral Liquid 5 milliLiter(s) Oral every 6 hours PRN Cough      VITAL SIGNS  T(F): 97.2 (07-08 @ 13:07), Max: 98 (07-07 @ 20:37)  HR: 78 (07-08 @ 13:07) (65 - 79)  BP: 126/57 (07-08 @ 13:07) (118/57 - 130/59)  RR: 18 (07-08 @ 13:07) (18 - 18)  SpO2: --    LABS:                        11.1   10.09 )-----------( 278      ( 08 Jul 2021 06:26 )             37.2     07-08    140  |  93<L>  |  101<HH>  ----------------------------<  96  4.3   |  29  |  3.5<H>    Ca    8.1<L>      08 Jul 2021 06:26  Mg     2.7     07-08    TPro  6.9  /  Alb  3.8  /  TBili  0.6  /  DBili  x   /  AST  11  /  ALT  6   /  AlkPhos  116<H>  07-08      RADIOLOGY:  CXR 7/6 - Atelectasis, without difference. Follow-up as needed.  LE Art Duplex 7/4 - Normal arterial flow in the bilateral lower extremities.  LE Venous Duplex 7/2 - No evidence of deep venous thrombosis in either lower extremity. Neither peroneal vein is visualized. Bilateral edema.    ECHO:  1. Normal global left ventricular systolic function.   2. LVEjection Fraction by Lemon's Method with a biplane EF of 54 %.   3. Mild left ventricular hypertrophy.   4. Mildly increased LV wall thickness.   5. Normal left ventricular internal cavity size.   6. Moderately enlarged left atrium.   7. Mild mitral annular calcification.   8. Mild thickening and calcification of the anterior and posterior mitral valve leaflets.   9. Mild to moderate mitral valve regurgitation.  10. Mild-moderate tricuspid regurgitation.  11. Mild aortic regurgitation.  12. Sclerotic aortic valve with normal opening.  13. Estimated pulmonary artery systolic pressure is 54.2 mmHg assuming a right atrial pressure of 5 mmHg, which is consistent with moderate pulmonary hypertension.  14. LA volume Index is 51.8 ml/m² ml/m2.  15. There is moderate aortic root calcification.      PHYSICAL EXAM:  General: Comfortably sitting in the chair. NAD. AAOx3.  HEENT: Atraumatic. Normocephalic. EOMI. Conjunctiva and sclera clear.  Cardiac: Normal S1, S2. RRR. No murmurs, rubs, or gallops.  Pulm: on RA. CTA b/l no wheezes, rhonchi, or rales.  GI: Soft, nontender, nondistended. BSx4q  Ext: 2+ peripheral pulses. Moving all 4 extremities. 1+ pitting edema of b/l LE  Neuro: Grossly intact  Skin: No lesions or rashes

## 2021-07-08 NOTE — PROGRESS NOTE ADULT - ASSESSMENT
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    Patient declines dialysis, even if life or death.  He has reiterated this opinion on multiple occasions this hospitalization.    As far as diuretics, Dr. Huerta would like to give bumex iv with hypertonic saline to try diuresing again.  Im in agreement with close renal fx monitoring  f/u bmp in AM  cont phoslo / calcitriol  cpap qhs  leg wraps  lantus / humalog  physical therapy  d/w wife

## 2021-07-08 NOTE — PROGRESS NOTE ADULT - SUBJECTIVE AND OBJECTIVE BOX
NEPHROLOGY FOLLOW UP NOTE    pt seen and examined  BP's better  off diurectics  eating well      PAST MEDICAL & SURGICAL HISTORY:  Hypertension  Hydrocephalus  CKD (chronic kidney disease)  DM (diabetes mellitus)  Afib  History of prostate surgery    Allergies:  No Known Allergies    Home Medications Reviewed    SOCIAL HISTORY:  Denies ETOH,Smoking,   FAMILY HISTORY:  Family history of diabetes mellitus (Mother, Sibling)        REVIEW OF SYSTEMS  All other review of systems is negative unless indicated above.    PHYSICAL EXAM:  NAD  obese  sitting in chair, unable to lie flat  moist mm  supple  decreased BS b/l  distant HS, irregular   soft  + edema      Hospital Medications:   MEDICATIONS  (STANDING):  atorvastatin 20 milliGRAM(s) Oral at bedtime  calcitriol   Capsule 0.25 MICROGram(s) Oral daily  calcium acetate 667 milliGRAM(s) Oral three times a day with meals  cephalexin 500 milliGRAM(s) Oral every 12 hours  chlorhexidine 4% Liquid 1 Application(s) Topical <User Schedule>  dextrose 40% Gel 15 Gram(s) Oral once  dextrose 5%. 1000 milliLiter(s) (50 mL/Hr) IV Continuous <Continuous>  dextrose 5%. 1000 milliLiter(s) (100 mL/Hr) IV Continuous <Continuous>  dextrose 50% Injectable 25 Gram(s) IV Push once  dextrose 50% Injectable 12.5 Gram(s) IV Push once  dextrose 50% Injectable 25 Gram(s) IV Push once  finasteride 5 milliGRAM(s) Oral daily  gabapentin 100 milliGRAM(s) Oral three times a day  glucagon  Injectable 1 milliGRAM(s) IntraMuscular once  heparin   Injectable 5000 Unit(s) SubCutaneous every 8 hours  insulin glargine Injectable (LANTUS) 20 Unit(s) SubCutaneous at bedtime  insulin lispro (ADMELOG) corrective regimen sliding scale   SubCutaneous three times a day before meals  insulin lispro Injectable (ADMELOG) 7 Unit(s) SubCutaneous three times a day before meals  melatonin 1 milliGRAM(s) Oral at bedtime  metoprolol tartrate 50 milliGRAM(s) Oral three times a day  nystatin Powder 1 Application(s) Topical two times a day  silver sulfADIAZINE 1% Cream 1 Application(s) Topical daily  tamsulosin 0.4 milliGRAM(s) Oral at bedtime        VITALS:  T(F): 97.2 (07-08-21 @ 13:07), Max: 98 (07-07-21 @ 20:37)  HR: 78 (07-08-21 @ 13:07)  BP: 126/57 (07-08-21 @ 13:07)  RR: 18 (07-08-21 @ 13:07)  SpO2: --  Wt(kg): --    07-06 @ 07:01  -  07-07 @ 07:00  --------------------------------------------------------  IN: 120 mL / OUT: 670 mL / NET: -550 mL    07-07 @ 07:01  -  07-08 @ 07:00  --------------------------------------------------------  IN: 600 mL / OUT: 1895 mL / NET: -1295 mL    07-08 @ 07:01  -  07-08 @ 18:15  --------------------------------------------------------  IN: 0 mL / OUT: 850 mL / NET: -850 mL          LABS:  07-08    140  |  93<L>  |  101<HH>  ----------------------------<  96  4.3   |  29  |  3.5<H>    Ca    8.1<L>      08 Jul 2021 06:26  Mg     2.7     07-08    TPro  6.9  /  Alb  3.8  /  TBili  0.6  /  DBili      /  AST  11  /  ALT  6   /  AlkPhos  116<H>  07-08                          11.1   10.09 )-----------( 278      ( 08 Jul 2021 06:26 )             37.2       Urine Studies:        RADIOLOGY & ADDITIONAL STUDIES:

## 2021-07-09 NOTE — PROGRESS NOTE ADULT - SUBJECTIVE AND OBJECTIVE BOX
DELVIS VINCENZO  86y Male    INTERVAL HPI/OVERNIGHT EVENTS:    Pt feels well - no c/o SOB, chest pain, abdominal pain  Sitting in a chair  ambulated with PT  case discussed with heart failure specialist and nephrologist    T(F): 97.4 (21 @ 05:19), Max: 97.4 (21 @ 22:13)  HR: 88 (21 @ 05:19) (78 - 90)  BP: 130/67 (21 @ 05:19) (126/57 - 130/67)  RR: 18 (21 @ 05:19) (18 - 18)  SpO2: --    I&O's Summary    2021 07:01  -  2021 07:00  --------------------------------------------------------  IN: 237 mL / OUT: 1100 mL / NET: -863 mL    2021 07:01  -  2021 12:33  --------------------------------------------------------  IN: 0 mL / OUT: 300 mL / NET: -300 mL        Daily     Daily Weight in k.1 (2021 05:19)    CAPILLARY BLOOD GLUCOSE      POCT Blood Glucose.: 171 mg/dL (2021 11:11)  POCT Blood Glucose.: 120 mg/dL (2021 07:38)  POCT Blood Glucose.: 190 mg/dL (2021 21:50)  POCT Blood Glucose.: 114 mg/dL (2021 16:32)        PHYSICAL EXAM:  GENERAL: NAD  HEAD:  Normocephalic  EYES:  conjunctiva and sclera clear  ENMT: Moist mucous membranes  NERVOUS SYSTEM:  Alert, awake, Good concentration  CHEST/LUNG: decreased BS but CTA b/l; No rales, rhonchi, wheezing  HEART: IRR  ABDOMEN: Soft, Nontender, Nondistended; Bowel sounds present  EXTREMITIES: + edema of LE (left > right)/ACE wraps in place      Consultant(s) Notes Reviewed:  [x ] YES  [ ] NO  Care Discussed with Consultants/Other Providers [ x] YES  [ ] NO    MEDICATIONS  (STANDING):  atorvastatin 20 milliGRAM(s) Oral at bedtime  calcitriol   Capsule 0.25 MICROGram(s) Oral daily  calcium acetate 667 milliGRAM(s) Oral three times a day with meals  chlorhexidine 4% Liquid 1 Application(s) Topical <User Schedule>  dextrose 40% Gel 15 Gram(s) Oral once  dextrose 5%. 1000 milliLiter(s) (50 mL/Hr) IV Continuous <Continuous>  dextrose 5%. 1000 milliLiter(s) (100 mL/Hr) IV Continuous <Continuous>  dextrose 50% Injectable 25 Gram(s) IV Push once  dextrose 50% Injectable 25 Gram(s) IV Push once  dextrose 50% Injectable 12.5 Gram(s) IV Push once  finasteride 5 milliGRAM(s) Oral daily  gabapentin 100 milliGRAM(s) Oral three times a day  glucagon  Injectable 1 milliGRAM(s) IntraMuscular once  heparin   Injectable 5000 Unit(s) SubCutaneous every 8 hours  insulin glargine Injectable (LANTUS) 20 Unit(s) SubCutaneous at bedtime  insulin lispro (ADMELOG) corrective regimen sliding scale   SubCutaneous three times a day before meals  insulin lispro Injectable (ADMELOG) 7 Unit(s) SubCutaneous three times a day before meals  melatonin 1 milliGRAM(s) Oral at bedtime  metoprolol tartrate 50 milliGRAM(s) Oral three times a day  nystatin Powder 1 Application(s) Topical two times a day  silver sulfADIAZINE 1% Cream 1 Application(s) Topical daily  sodium chloride 3%. 150 milliLiter(s) (50 mL/Hr) IV Continuous <Continuous>  tamsulosin 0.4 milliGRAM(s) Oral at bedtime    MEDICATIONS  (PRN):  acetaminophen   Tablet .. 650 milliGRAM(s) Oral every 6 hours PRN Moderate Pain (4 - 6)  benzonatate 100 milliGRAM(s) Oral three times a day PRN Cough  guaifenesin/dextromethorphan Oral Liquid 5 milliLiter(s) Oral every 6 hours PRN Cough      Telemetry reviewed by me    LABS:                        11.5   8.70  )-----------( 263      ( 2021 05:34 )             39.1         144  |  98  |  102<HH>  ----------------------------<  125<H>  4.1   |  34<H>  |  3.2<H>    Ca    8.4<L>      2021 05:34  Mg     2.7         TPro  6.9  /  Alb  4.0  /  TBili  0.6  /  DBili  x   /  AST  10  /  ALT  7   /  AlkPhos  124<H>                  Case discussed with residents and RN on rounds today    Care discussed with pt and family

## 2021-07-09 NOTE — PROGRESS NOTE ADULT - ASSESSMENT
CKD stage 4 / cardiorenal syndrome  - Cr improving  acute on chronic HFpEF  JOHANNY on CPAP    proteinuria  hypocalcemia and hyperphosphatemia  afib  HTN   s/p  shunt  DM 2    dm neuropathy    recommend:      pt once again declining elective HD start and does not want dialysis even if illness becomes immediately life threatening  can cont bumex iv with hypertonic saline   would eventually discharge home on torsemide 20mg po bid and metolazone 2.5mg po MWF  close monitoring of renal function and electrolytes  avoid ACE-I / ARB / entresto.  Did not tolerate aldactone due to low BP's  cont phoslo / calcitriol  leg wraps  lantus / humalog  physical therapy  d/w wife and hospitalist

## 2021-07-09 NOTE — PROGRESS NOTE ADULT - ASSESSMENT
Acute on chronic diastolic HF/Fluid overload/ TOBIAS     Patient is fluid overloaded on exam  Continue  Bumex 2 mg IV  3% hypertonic saline 150 ml over 3 hrs BID  Obtain iron profile  Monitor BUN and Creat closely  Continue Metoprolol Tartrate 50 mg to TID  Get BMP daily   Maintain potassium >4.0, Mg >2.1  Strict intake and output  Daily weight   Counseling provided regarding low sodium diet  Discussed with primary team/renal and family/patient at bedside

## 2021-07-09 NOTE — PROGRESS NOTE ADULT - SUBJECTIVE AND OBJECTIVE BOX
NEPHROLOGY FOLLOW UP NOTE    pt seen and examined  given bumex / hypertonic saline x 1 yesterday - 1.1L UOP recorded  pt wants to go home  no cp / fever   bp's fair  chronic orthopnea      PAST MEDICAL & SURGICAL HISTORY:  Hypertension  Hydrocephalus  CKD (chronic kidney disease)  DM (diabetes mellitus)  Afib  History of prostate surgery    Allergies:  No Known Allergies    Home Medications Reviewed    SOCIAL HISTORY:  Denies ETOH,Smoking,   FAMILY HISTORY:  Family history of diabetes mellitus (Mother, Sibling)        REVIEW OF SYSTEMS  All other review of systems is negative unless indicated above.    PHYSICAL EXAM:  NAD  obese  sitting in chair, unable to lie flat  moist mm  supple  decreased BS b/l  distant HS, irregular   soft  + edema      Hospital Medications:   MEDICATIONS  (STANDING):  atorvastatin 20 milliGRAM(s) Oral at bedtime  calcitriol   Capsule 0.25 MICROGram(s) Oral daily  calcium acetate 667 milliGRAM(s) Oral three times a day with meals  chlorhexidine 4% Liquid 1 Application(s) Topical <User Schedule>  dextrose 40% Gel 15 Gram(s) Oral once  dextrose 5%. 1000 milliLiter(s) (50 mL/Hr) IV Continuous <Continuous>  dextrose 5%. 1000 milliLiter(s) (100 mL/Hr) IV Continuous <Continuous>  dextrose 50% Injectable 25 Gram(s) IV Push once  dextrose 50% Injectable 25 Gram(s) IV Push once  dextrose 50% Injectable 12.5 Gram(s) IV Push once  finasteride 5 milliGRAM(s) Oral daily  gabapentin 100 milliGRAM(s) Oral three times a day  glucagon  Injectable 1 milliGRAM(s) IntraMuscular once  heparin   Injectable 5000 Unit(s) SubCutaneous every 8 hours  insulin glargine Injectable (LANTUS) 20 Unit(s) SubCutaneous at bedtime  insulin lispro (ADMELOG) corrective regimen sliding scale   SubCutaneous three times a day before meals  insulin lispro Injectable (ADMELOG) 7 Unit(s) SubCutaneous three times a day before meals  melatonin 1 milliGRAM(s) Oral at bedtime  metoprolol tartrate 50 milliGRAM(s) Oral three times a day  nystatin Powder 1 Application(s) Topical two times a day  silver sulfADIAZINE 1% Cream 1 Application(s) Topical daily  sodium chloride 3%. 150 milliLiter(s) (50 mL/Hr) IV Continuous <Continuous>  tamsulosin 0.4 milliGRAM(s) Oral at bedtime        VITALS:  T(F): 97.4 (07-09-21 @ 05:19), Max: 97.4 (07-08-21 @ 22:13)  HR: 88 (07-09-21 @ 05:19)  BP: 130/67 (07-09-21 @ 05:19)  RR: 18 (07-09-21 @ 05:19)  SpO2: --  Wt(kg): --    07-07 @ 07:01  -  07-08 @ 07:00  --------------------------------------------------------  IN: 600 mL / OUT: 1895 mL / NET: -1295 mL    07-08 @ 07:01  -  07-09 @ 07:00  --------------------------------------------------------  IN: 237 mL / OUT: 1100 mL / NET: -863 mL    07-09 @ 07:01  -  07-09 @ 12:11  --------------------------------------------------------  IN: 0 mL / OUT: 300 mL / NET: -300 mL          LABS:  07-09    144  |  98  |  102<HH>  ----------------------------<  125<H>  4.1   |  34<H>  |  3.2<H>    Ca    8.4<L>      09 Jul 2021 05:34  Mg     2.7     07-09    TPro  6.9  /  Alb  4.0  /  TBili  0.6  /  DBili      /  AST  10  /  ALT  7   /  AlkPhos  124<H>  07-09                          11.5   8.70  )-----------( 263      ( 09 Jul 2021 05:34 )             39.1       Urine Studies:        RADIOLOGY & ADDITIONAL STUDIES:

## 2021-07-09 NOTE — PROGRESS NOTE ADULT - ASSESSMENT
85 y/o man with PMN of HTN, DM type 2 on insulin, obesity, AFib not on anticoagulation due to h/o falls, HFpEF and JOHANNY presents for shortness of breath and is being treated for acute over chronic HFpEF.    1. Acute over chronic HFpEF  - due to arrhythmias per cardio (Afib, frequent PVCs, NSVT)  - volume status improved with diuresis but pt remains volume overloaded  - now on bumex 2mg bid and 3% saline  - in negative balance  - avoid hypotension - meds adjusted by heart failure team with improvement in BP  - rate control   - daily wts, fluid restriction, low sodium diet  - continue PT    2. Multiple NSVTs, PVCs  Chronic Afib not on A/C due to frequent falls  - seen by EP  - on metoprolol 50mg q8hr  - ischemic workup recommended - medical therapy for now per cardio  - continue telemetry  - pt with TSH 0.20 (low) and fT4 1.9 (upper limit of normal is 1.8) and T3 is 85 (WNL) - needs repeat TFTs as outpt to monitor for subclinical hyperthyroidism/overt hyperthyroidism    3. TOBIAS over CKD 3 - nonoliguric  - due to CRS  - Cr now trending down  - avoid hypotension  - now on bumex and hypertonic saline for diuresis  - monitor urine output  - renal f/u appreciated  - pt declines dialysis electively or emergently  - BMP in AM  - continue phoslo for hyperphosphatemia    4. Left LE cellulitis superimposed on stasis dermatitis  - seen by ID  - complete course of keflex - last day 7/9  - wound care, ACE wrap, elevation  - duplex venous negative and pt with normal arterial flow on duplex  - Burn eval pending    5. DM type 2 - controlled on insulin    6. JOHANNY - on CPAP nightly    7. DVT prophylaxis      PROGRESS NOTE HANDOFF    Pending: improvement in TOBIAS, tolerance of diuresis, labs in AM, continued PT    Family discussion: with wife at bedside today    Disposition: wants home with services - hopefully early next week

## 2021-07-09 NOTE — PROGRESS NOTE ADULT - ASSESSMENT
ASSESSMENT & PLAN  85yo M history of HTN DM AFib no AC 2/2 falls, HFpEF JOHANNY, presents for shortness of breath. Symptoms started 3 days ago with gradual onset of shortness of breath, first at exertion and gradually progressing and is present now even at rest, associated with orthopnea and chest tightness. Patient says he has been adhering to his medications and has not missed any doses. He also watches his salt intake with no increase in consumption last few days. He went yesterday to urgent care who started him on antibiotics and po steroids for suspicion of PNA vs COPD. He denied fevers, chills, cough or sputum production, nausea or vomiting, diarrhea or other associated symptoms.      #SOB 2/2 HFpEF exacerbation 2/2 medication noncompliance  #TOBIAS on CKD3  -per son, PCP recently switch pt from lasix 40mg BID to qd, stopped metolazone  -CXR 7/6 - Atelectasis  -BNP 8749  -EKG 6/30 - Atrial fibrillation with PVCs  -Trops (6/30-7/1) - 0.04 -> 0.03 -> 0.05 -> 0.04  -Echo EF 54%, mod pHTN, mild AR, mild-mod MVR, TR  -LE duplex negative, b/l edema  -daily weight, strict I/Os, fluid restriction  -seen by HF team 7/7 - cont metoprolol tartrate 50mg TID  -Cr 3.9 -> 3.5 -> 3.2  -pt does not want dialysis under any circumstance  -fluid overloaded on exam  -seen by nephro 7/9 - will start bumex 2mg IV BID with hypertonic saline    #Hypokalemia  -4.1->3.7->4.3->4.1  -f/u BMP  -replete as needed, keep >4    #?Hyperthyroidism  -clinically euthyroid  -TSH 0.20 (low)  -T4 1.9 (borderline high)  -T3 85 (wnl)  -consider endocrine consult    #Cough, leukocytosis  -WBC wnl  -Procal 7/4: 0.25  -likely atelectasis   -resolved    #Multiple NSVT, PVCs  #Chronic Afib not on AC  -not on AC due to frequent falls  -EP consulted - continue with metoprolol, increase as tolerated and if no response can add Cardizem, ischemic w/u per cardiology.   -outpt cardiac cath     #Suspected Left LE Cellulitis  -completed course of Keflex today  -f/u burn consult    #hyperphosphatemia likely 2/2 CKD, follow up nephro   -cont phoslo 667mg po tid with meals    #JOHANNY  -pt uses CPAP at night  -cont CPAP inpatient    #DM2  -FS: 171, 120, 190, 114  -cont lantus, ISS     #HTN  -BP: 101/61 (101/61 - 130/67)  -cont lopressor as above      PT/REHAB: 15ft x2 with rolling walker  ACTIVITY: Activity - Ambulate with Walker  Activity - Increase As Tolerated  DVT PPX: heparin   Injectable  GI PPX:  Not indicated  DIET: Diet, DASH/TLC  CODE: Full code   Disposition: from home; home with [facility]  Pending: improvement in Cr, continued diuresis

## 2021-07-09 NOTE — PROGRESS NOTE ADULT - SUBJECTIVE AND OBJECTIVE BOX
VINCENZO EDMONDSON MRN#: [id number]  CODE STATUS: FULL CODE     SUBJECTIVE   Chief complaint: shortness of breath    Currently admitted to medicine with the primary diagnosis of CHF EXACERBATION;ELEVATED TROPONIN      Today is hospital day 9d,   INTERVAL HPI/OVERNIGHT EVENTS: No acute events overnight    This morning, he is sitting in the chair comfortably. Denies chest pain, shortness of breath, abdominal pain, nausea, vomiting or changes in bowel habits. He has no complaints today.     Urinating and stooling appropriately.    Present Today:           Aguirre Catheter (x)No/ ()Yes?   Indication:             Central Line (x)No/ ()Yes?  Indication:          IV Fluids (x)No/ ()Yes?   Indication:     OBJECTIVE  PAST MEDICAL & SURGICAL HISTORY  Afib    DM (diabetes mellitus)    CKD (chronic kidney disease)    Hydrocephalus    Hypertension    History of prostate surgery      ALLERGIES:  No Known Allergies    HOME MEDICATIONS:  Home Medications:  calcitriol 0.25 mcg oral capsule: 1 cap(s) orally once a day (30 Jun 2021 15:07)  dutasteride 0.5 mg oral capsule: 1 cap(s) orally once a day (30 Jun 2021 15:07)  gabapentin 100 mg oral capsule: 1 cap(s) orally 3 times a day (30 Jun 2021 15:07)  Klor-Con 20 mEq oral powder for reconstitution: 1 each orally 2 times a day (30 Jun 2021 15:07)  Lantus Solostar Pen 100 units/mL subcutaneous solution: 20 unit(s) subcutaneous once a day (at bedtime) (30 Jun 2021 15:07)  Lasix 40 mg oral tablet: 1 tab(s) orally 2 times a day (30 Jun 2021 15:07)  metOLazone 2.5 mg oral tablet: 1 tab(s) orally once a day (30 Jun 2021 15:07)  Metoprolol Tartrate 25 mg oral tablet: 1 tab(s) orally 2 times a day (30 Jun 2021 15:07)  Ozempic (1 mg dose) 4 mg/3 mL subcutaneous solution: 1 milligram(s) subcutaneous once a week (30 Jun 2021 15:07)    MEDICATIONS:  STANDING MEDICATIONS  MEDICATIONS  (STANDING):  atorvastatin 20 milliGRAM(s) Oral at bedtime  buMETAnide Injectable 2 milliGRAM(s) IV Push <User Schedule>  calcitriol   Capsule 0.25 MICROGram(s) Oral daily  calcium acetate 667 milliGRAM(s) Oral three times a day with meals  chlorhexidine 4% Liquid 1 Application(s) Topical <User Schedule>  dextrose 40% Gel 15 Gram(s) Oral once  dextrose 5%. 1000 milliLiter(s) (50 mL/Hr) IV Continuous <Continuous>  dextrose 5%. 1000 milliLiter(s) (100 mL/Hr) IV Continuous <Continuous>  dextrose 50% Injectable 25 Gram(s) IV Push once  dextrose 50% Injectable 12.5 Gram(s) IV Push once  dextrose 50% Injectable 25 Gram(s) IV Push once  finasteride 5 milliGRAM(s) Oral daily  gabapentin 100 milliGRAM(s) Oral three times a day  glucagon  Injectable 1 milliGRAM(s) IntraMuscular once  heparin   Injectable 5000 Unit(s) SubCutaneous every 8 hours  insulin glargine Injectable (LANTUS) 20 Unit(s) SubCutaneous at bedtime  insulin lispro (ADMELOG) corrective regimen sliding scale   SubCutaneous three times a day before meals  insulin lispro Injectable (ADMELOG) 7 Unit(s) SubCutaneous three times a day before meals  melatonin 1 milliGRAM(s) Oral at bedtime  metoprolol tartrate 50 milliGRAM(s) Oral three times a day  nystatin Powder 1 Application(s) Topical two times a day  silver sulfADIAZINE 1% Cream 1 Application(s) Topical daily  sodium chloride 3%. 150 milliLiter(s) (50 mL/Hr) IV Continuous <Continuous>  sodium chloride 3%. 150 milliLiter(s) (50 mL/Hr) IV Continuous <Continuous>  sodium chloride 3%. 150 milliLiter(s) (50 mL/Hr) IV Continuous <Continuous>  tamsulosin 0.4 milliGRAM(s) Oral at bedtime    PRN MEDICATIONS  MEDICATIONS  (PRN):  acetaminophen   Tablet .. 650 milliGRAM(s) Oral every 6 hours PRN Moderate Pain (4 - 6)  benzonatate 100 milliGRAM(s) Oral three times a day PRN Cough  guaifenesin/dextromethorphan Oral Liquid 5 milliLiter(s) Oral every 6 hours PRN Cough      VITAL SIGNS  T(F): 96.6 (07-09 @ 13:03), Max: 97.4 (07-08 @ 22:13)  HR: 60 (07-09 @ 13:03) (60 - 90)  BP: 101/61 (07-09 @ 13:03) (101/61 - 130/67)  RR: 18 (07-09 @ 13:03) (18 - 18)  SpO2: 98% (07-09 @ 15:21) (98% - 98%)    LABS:                        11.5   8.70  )-----------( 263      ( 09 Jul 2021 05:34 )             39.1     07-09    144  |  98  |  102<HH>  ----------------------------<  125<H>  4.1   |  34<H>  |  3.2<H>    Ca    8.4<L>      09 Jul 2021 05:34  Mg     2.7     07-09    TPro  6.9  /  Alb  4.0  /  TBili  0.6  /  DBili  x   /  AST  10  /  ALT  7   /  AlkPhos  124<H>  07-09      RADIOLOGY:  CXR 7/6 - Atelectasis, without difference. Follow-up as needed.  LE Art Duplex 7/4 - Normal arterial flow in the bilateral lower extremities.  LE Venous Duplex 7/2 - No evidence of deep venous thrombosis in either lower extremity. Neither peroneal vein is visualized. Bilateral edema.    ECHO:  1. Normal global left ventricular systolic function.   2. LVEjection Fraction by Lemon's Method with a biplane EF of 54 %.   3. Mild left ventricular hypertrophy.   4. Mildly increased LV wall thickness.   5. Normal left ventricular internal cavity size.   6. Moderately enlarged left atrium.   7. Mild mitral annular calcification.   8. Mild thickening and calcification of the anterior and posterior mitral valve leaflets.   9. Mild to moderate mitral valve regurgitation.  10. Mild-moderate tricuspid regurgitation.  11. Mild aortic regurgitation.  12. Sclerotic aortic valve with normal opening.  13. Estimated pulmonary artery systolic pressure is 54.2 mmHg assuming a right atrial pressure of 5 mmHg, which is consistent with moderate pulmonary hypertension.  14. LA volume Index is 51.8 ml/m² ml/m2.  15. There is moderate aortic root calcification.      PHYSICAL EXAM:  General: Comfortably laying on the hospital bed. NAD. AAOx3.  HEENT: Atraumatic. Normocephalic. EOMI. PERRL. Conjunctiva and sclera clear.  Cardiac: Normal S1, S2. RRR. No murmurs, rubs, or gallops.  Pulm: Trace fine crackles to b/l lung bases. Otherwise CTA.  GI: Soft, nontender, nondistended. BSx4q  Ext: 2+ peripheral pulses. Moving all 4 extremities. 1+ pitting edema of b/l LE.   Neuro: Grossly intact  Skin: No lesions or rashes

## 2021-07-09 NOTE — PROGRESS NOTE ADULT - SUBJECTIVE AND OBJECTIVE BOX
Date of Admission: 21    Interval History:    - Patient sitting in a chair, in NAD      HISTORY OF PRESENT ILLNESS:     87yo M history of HTN DM AFib no AC 2/2 falls, HFpEF JOHANNY, presents for shortness of breath. Symptoms started 3 days ago with gradual onset of shortness of breath, first at exertion and gradually progressing and is present now even at rest, associated with orthopnea and chest tightness. Patient says he has been adhering to his medications and has not missed any doses. He also watches his salt intake with no increase in consumption last few days. He went yesterday to urgent care who started him on antibiotics and po steroids for suspicion of PNA vs COPD. He denied fevers, chills, cough or sputum production, nausea or vomiting, diarrhea or other associated symptoms.    Patient states at his baseline he is able to walk around the house, about half of a block with his cane or walker. For the past week he developed a worsening SOB when walking less than 10 steps, it got to the point he was SOB even at rest. Patient denies c/p, palpitations, headaches, bleeding, LH, dizziness, LOC, cough, feeling bloated, N/V/D.  Endorses compliance with medications and follows a low sodium diet     PAST MEDICAL & SURGICAL HISTORY:    Afib  DM (diabetes mellitus)  CKD (chronic kidney disease)  Hydrocephalus  Hypertension  History of prostate surgery        FAMILY HISTORY:    Mother:  at 72 of HF  Father:  at 68 of a brain aneurism    SOCIAL HISTORY:      Former smoker, denies alcohol or drug use     Allergies    No Known Allergies    Intolerances    	  PHYSICAL EXAM:    General Appearance: well appearing, normal for age and gender. 	  Neck: normal JVP, no bruit.   Eyes: Extra Ocular muscles intact.   Cardiovascular: regular rate and rhythm S1 S2, No JVD, No murmurs, BLE edema  Respiratory: Lungs clear to auscultation	  Psychiatry: Alert and oriented x 3, Mood & affect appropriate  Gastrointestinal:  Soft, Non-tender  Skin/Integumen: No rashes, No ecchymoses, No cyanosis	  Neurologic: Non-focal  Musculoskeletal/ extremities: Normal range of motion, No clubbing, cyanosis, BLE edema  Vascular: Peripheral pulses palpable 2+ bilaterally      TTE 21    Summary:   1. Normal global left ventricular systolic function.   2. LV Ejection Fraction by Lemon's Method with a biplane EF of 54 %.   3. Mild left ventricular hypertrophy.   4. Mildly increased LV wall thickness.   5. Normal left ventricular internal cavity size.   6. Moderately enlarged left atrium.   7. Mild mitral annular calcification.   8. Mild thickening and calcification of the anterior and posterior mitral valve leaflets.   9. Mild to moderate mitral valve regurgitation.  10. Mild-moderate tricuspid regurgitation.  11. Mild aortic regurgitation.  12. Sclerotic aortic valve with normal opening.  13. Estimated pulmonary artery systolic pressure is 54.2 mmHg assuming a right atrial pressure of 5 mmHg, which is consistent with moderate pulmonary hypertension.  14. LA volume Index is 51.8 ml/m² ml/m2.  15. There is moderate aortic root calcification.    	    Home Medications:  calcitriol 0.25 mcg oral capsule: 1 cap(s) orally once a day (2021 15:07)  dutasteride 0.5 mg oral capsule: 1 cap(s) orally once a day (2021 15:07)  gabapentin 100 mg oral capsule: 1 cap(s) orally 3 times a day (2021 15:07)  Klor-Con 20 mEq oral powder for reconstitution: 1 each orally 2 times a day (2021 15:07)  Lantus Solostar Pen 100 units/mL subcutaneous solution: 20 unit(s) subcutaneous once a day (at bedtime) (2021 15:07)  Lasix 40 mg oral tablet: 1 tab(s) orally 2 times a day (2021 15:07)  metOLazone 2.5 mg oral tablet: 1 tab(s) orally once a day (2021 15:07)  Metoprolol Tartrate 25 mg oral tablet: 1 tab(s) orally 2 times a day (2021 15:07)  Ozempic (1 mg dose) 4 mg/3 mL subcutaneous solution: 1 milligram(s) subcutaneous once a week (2021 15:07)    MEDICATIONS  (STANDING):  atorvastatin 20 milliGRAM(s) Oral at bedtime  calcitriol   Capsule 0.25 MICROGram(s) Oral daily  cephalexin 500 milliGRAM(s) Oral every 12 hours  chlorhexidine 4% Liquid 1 Application(s) Topical <User Schedule>  dextrose 40% Gel 15 Gram(s) Oral once  dextrose 5%. 1000 milliLiter(s) (50 mL/Hr) IV Continuous <Continuous>  dextrose 5%. 1000 milliLiter(s) (100 mL/Hr) IV Continuous <Continuous>  dextrose 50% Injectable 25 Gram(s) IV Push once  dextrose 50% Injectable 12.5 Gram(s) IV Push once  dextrose 50% Injectable 25 Gram(s) IV Push once  diltiazem    Tablet 60 milliGRAM(s) Oral every 12 hours  finasteride 5 milliGRAM(s) Oral daily  furosemide   Injectable 80 milliGRAM(s) IV Push two times a day  gabapentin 100 milliGRAM(s) Oral three times a day  glucagon  Injectable 1 milliGRAM(s) IntraMuscular once  heparin   Injectable 5000 Unit(s) SubCutaneous every 8 hours  insulin glargine Injectable (LANTUS) 20 Unit(s) SubCutaneous at bedtime  insulin lispro (ADMELOG) corrective regimen sliding scale   SubCutaneous three times a day before meals  insulin lispro Injectable (ADMELOG) 7 Unit(s) SubCutaneous three times a day before meals  melatonin 1 milliGRAM(s) Oral at bedtime  metoprolol tartrate 25 milliGRAM(s) Oral two times a day  nystatin Powder 1 Application(s) Topical two times a day  silver sulfADIAZINE 1% Cream 1 Application(s) Topical daily  spironolactone 25 milliGRAM(s) Oral daily  tamsulosin 0.4 milliGRAM(s) Oral at bedtime    MEDICATIONS  (PRN):  acetaminophen   Tablet .. 650 milliGRAM(s) Oral every 6 hours PRN Moderate Pain (4 - 6)  benzonatate 100 milliGRAM(s) Oral three times a day PRN Cough  guaifenesin/dextromethorphan Oral Liquid 5 milliLiter(s) Oral every 6 hours PRN Cough

## 2021-07-10 NOTE — PROGRESS NOTE ADULT - SUBJECTIVE AND OBJECTIVE BOX
DELVIS VINCENZO  86y Male    INTERVAL HPI/OVERNIGHT EVENTS:    Pt denies chest pain, SOB, N/V, abdominal pain  sitting in a chair due to chronic orthopnea    T(F): 97.6 (07-10-21 @ 05:00), Max: 98.7 (07-09-21 @ 20:50)  HR: 79 (07-10-21 @ 05:00) (56 - 83)  BP: 123/75 (07-10-21 @ 05:00) (101/61 - 146/66)  RR: 18 (07-10-21 @ 09:59) (18 - 21)  SpO2: 97% (07-10-21 @ 09:59) (97% - 100%) on RA      I&O's Summary    09 Jul 2021 07:01  -  10 Jul 2021 07:00  --------------------------------------------------------  IN: 100 mL / OUT: 1905 mL / NET: -1805 mL    10 Jul 2021 07:01  -  10 Jul 2021 12:48  --------------------------------------------------------  IN: 0 mL / OUT: 500 mL / NET: -500 mL        CAPILLARY BLOOD GLUCOSE      POCT Blood Glucose.: 154 mg/dL (10 Jul 2021 11:23)  POCT Blood Glucose.: 113 mg/dL (10 Jul 2021 07:36)  POCT Blood Glucose.: 148 mg/dL (09 Jul 2021 21:16)  POCT Blood Glucose.: 141 mg/dL (09 Jul 2021 16:26)        PHYSICAL EXAM:  GENERAL: NAD  HEAD:  Normocephalic  EYES:  conjunctiva and sclera clear  ENMT: Moist mucous membranes  NERVOUS SYSTEM:  Alert, awake, Good concentration  CHEST/LUNG: decreased BS at bases but CTA b/l; No rales, rhonchi, wheezing  HEART: IRR  ABDOMEN: Soft, Nontender, Nondistended; obese  EXTREMITIES: 2+ LE edema  SKIN: ACE wraps    Consultant(s) Notes Reviewed:  [x ] YES  [ ] NO  Care Discussed with Consultants/Other Providers [ x] YES  [ ] NO    MEDICATIONS  (STANDING):  atorvastatin 20 milliGRAM(s) Oral at bedtime  buMETAnide Injectable 2 milliGRAM(s) IV Push <User Schedule>  calcitriol   Capsule 0.25 MICROGram(s) Oral daily  calcium acetate 667 milliGRAM(s) Oral three times a day with meals  chlorhexidine 4% Liquid 1 Application(s) Topical <User Schedule>  dextrose 40% Gel 15 Gram(s) Oral once  dextrose 5%. 1000 milliLiter(s) (50 mL/Hr) IV Continuous <Continuous>  dextrose 5%. 1000 milliLiter(s) (100 mL/Hr) IV Continuous <Continuous>  dextrose 50% Injectable 25 Gram(s) IV Push once  dextrose 50% Injectable 12.5 Gram(s) IV Push once  dextrose 50% Injectable 25 Gram(s) IV Push once  finasteride 5 milliGRAM(s) Oral daily  gabapentin 100 milliGRAM(s) Oral three times a day  glucagon  Injectable 1 milliGRAM(s) IntraMuscular once  heparin   Injectable 5000 Unit(s) SubCutaneous every 8 hours  insulin glargine Injectable (LANTUS) 20 Unit(s) SubCutaneous at bedtime  insulin lispro (ADMELOG) corrective regimen sliding scale   SubCutaneous three times a day before meals  insulin lispro Injectable (ADMELOG) 7 Unit(s) SubCutaneous three times a day before meals  melatonin 1 milliGRAM(s) Oral at bedtime  metoprolol tartrate 50 milliGRAM(s) Oral three times a day  nystatin Powder 1 Application(s) Topical two times a day  silver sulfADIAZINE 1% Cream 1 Application(s) Topical daily  sodium chloride 3%. 150 milliLiter(s) (50 mL/Hr) IV Continuous <Continuous>  sodium chloride 3%. 150 milliLiter(s) (50 mL/Hr) IV Continuous <Continuous>  sodium chloride 3%. 150 milliLiter(s) (50 mL/Hr) IV Continuous <Continuous>  sodium chloride 3%. 150 milliLiter(s) (50 mL/Hr) IV Continuous <Continuous>  sodium chloride 3%. 150 milliLiter(s) (50 mL/Hr) IV Continuous <Continuous>  tamsulosin 0.4 milliGRAM(s) Oral at bedtime    MEDICATIONS  (PRN):  acetaminophen   Tablet .. 650 milliGRAM(s) Oral every 6 hours PRN Moderate Pain (4 - 6)  benzonatate 100 milliGRAM(s) Oral three times a day PRN Cough  guaifenesin/dextromethorphan Oral Liquid 5 milliLiter(s) Oral every 6 hours PRN Cough      Telemetry reviewed by me - multiple episodes of NSVT    LABS:                        11.6   8.56  )-----------( 273      ( 10 Jul 2021 06:00 )             39.7     07-10    145  |  97<L>  |  98<HH>  ----------------------------<  120<H>  4.5   |  35<H>  |  2.9<H>    Ca    8.8      10 Jul 2021 06:00  Mg     2.7     07-10    TPro  6.8  /  Alb  3.8  /  TBili  0.6  /  DBili  x   /  AST  12  /  ALT  9   /  AlkPhos  127<H>  07-10                Case discussed with residents and RN on rounds today    Care discussed with pt

## 2021-07-10 NOTE — CONSULT NOTE ADULT - CONSULT REQUESTED DATE/TIME
02-Jul-2021
03-Jul-2021 15:41
05-Jul-2021 15:15
06-Jul-2021 09:07
10-Jul-2021 14:15
01-Jul-2021 19:07
02-Jul-2021

## 2021-07-10 NOTE — CONSULT NOTE ADULT - SUBJECTIVE AND OBJECTIVE BOX
bilateral lower leg wounds left > right for about 1 month treated at home and hosp with silvadene cream/ ace wrap compression due to edema    PE: right lower leg--> 5x5cm full thickness wound healing with pink tissue    left lower leg--> [id number] full thickness wound with adherent necrotic tissue    wounds irrigated with NS--> cultures sent-->

## 2021-07-10 NOTE — CONSULT NOTE ADULT - ASSESSMENT
bilateral lower leg wounds left > right due to edema     rec: soap and water qd, d/c silvadene cream  start santyl ointment / xeroform gauze, kerlex  wraping gauze, ace wrap compression from toes to knees    no surgery needed    f/u Dr Buitrago in office [phone]

## 2021-07-10 NOTE — PROGRESS NOTE ADULT - SUBJECTIVE AND OBJECTIVE BOX
KALPANA FOLLOW UP NOTE  --------------------------------------------------------------------------------  Chief Complaint/24 hour events/subjective:    pt seen and examined, improving SOB    PAST HISTORY  --------------------------------------------------------------------------------  No significant changes to PMH, PSH, FHx, SHx, unless otherwise noted    ALLERGIES & MEDICATIONS  --------------------------------------------------------------------------------  Allergies    No Known Allergies    Intolerances      Standing Inpatient Medications  atorvastatin 20 milliGRAM(s) Oral at bedtime  buMETAnide Injectable 2 milliGRAM(s) IV Push <User Schedule>  calcitriol   Capsule 0.25 MICROGram(s) Oral daily  calcium acetate 667 milliGRAM(s) Oral three times a day with meals  chlorhexidine 4% Liquid 1 Application(s) Topical <User Schedule>  dextrose 40% Gel 15 Gram(s) Oral once  dextrose 5%. 1000 milliLiter(s) IV Continuous <Continuous>  dextrose 5%. 1000 milliLiter(s) IV Continuous <Continuous>  dextrose 50% Injectable 25 Gram(s) IV Push once  dextrose 50% Injectable 12.5 Gram(s) IV Push once  dextrose 50% Injectable 25 Gram(s) IV Push once  finasteride 5 milliGRAM(s) Oral daily  gabapentin 100 milliGRAM(s) Oral three times a day  glucagon  Injectable 1 milliGRAM(s) IntraMuscular once  heparin   Injectable 5000 Unit(s) SubCutaneous every 8 hours  insulin glargine Injectable (LANTUS) 20 Unit(s) SubCutaneous at bedtime  insulin lispro (ADMELOG) corrective regimen sliding scale   SubCutaneous three times a day before meals  insulin lispro Injectable (ADMELOG) 7 Unit(s) SubCutaneous three times a day before meals  melatonin 1 milliGRAM(s) Oral at bedtime  metoprolol tartrate 50 milliGRAM(s) Oral three times a day  nystatin Powder 1 Application(s) Topical two times a day  silver sulfADIAZINE 1% Cream 1 Application(s) Topical daily  sodium chloride 3%. 150 milliLiter(s) IV Continuous <Continuous>  sodium chloride 3%. 150 milliLiter(s) IV Continuous <Continuous>  sodium chloride 3%. 150 milliLiter(s) IV Continuous <Continuous>  sodium chloride 3%. 150 milliLiter(s) IV Continuous <Continuous>  sodium chloride 3%. 150 milliLiter(s) IV Continuous <Continuous>  tamsulosin 0.4 milliGRAM(s) Oral at bedtime    PRN Inpatient Medications  acetaminophen   Tablet .. 650 milliGRAM(s) Oral every 6 hours PRN  benzonatate 100 milliGRAM(s) Oral three times a day PRN  guaifenesin/dextromethorphan Oral Liquid 5 milliLiter(s) Oral every 6 hours PRN      REVIEW OF SYSTEMS  --------------------------------------------------------------------------------    All other systems were reviewed and are negative, except as noted.    VITALS/PHYSICAL EXAM  --------------------------------------------------------------------------------  T(C): 35.6 (07-10-21 @ 13:36), Max: 37.1 (07-09-21 @ 20:50)  HR: 80 (07-10-21 @ 13:36) (56 - 83)  BP: 140/63 (07-10-21 @ 13:36) (123/75 - 146/66)  RR: 18 (07-10-21 @ 13:36) (18 - 21)  SpO2: 97% (07-10-21 @ 09:59) (97% - 100%)  Wt(kg): --        07-09-21 @ 07:01  -  07-10-21 @ 07:00  --------------------------------------------------------  IN: 100 mL / OUT: 1905 mL / NET: -1805 mL    07-10-21 @ 07:01  -  07-10-21 @ 14:39  --------------------------------------------------------  IN: 0 mL / OUT: 500 mL / NET: -500 mL      Physical Exam:    	Gen: no distress   	Pulm: B/L low BS  	CV: S1S2; irregular HR  	Abd: +BS, soft, nontender/nondistended  	LE:  edema      LABS/STUDIES  --------------------------------------------------------------------------------              11.6   8.56  >-----------<  273      [07-10-21 @ 06:00]              39.7     145  |  97  |  98  ----------------------------<  120      [07-10-21 @ 06:00]  4.5   |  35  |  2.9        Ca     8.8     [07-10-21 @ 06:00]      Mg     2.7     [07-10-21 @ 06:00]    TPro  6.8  /  Alb  3.8  /  TBili  0.6  /  DBili  x   /  AST  12  /  ALT  9   /  AlkPhos  127  [07-10-21 @ 06:00]          Creatinine Trend:  SCr 2.9 [07-10 @ 06:00]  SCr 3.2 [07-09 @ 05:34]  SCr 3.5 [07-08 @ 06:26]  SCr 3.9 [07-07 @ 05:45]  SCr 3.7 [07-06 @ 21:23]        Iron 48, TIBC 214, %sat 22      [07-06-21 @ 07:30]  Ferritin 265      [07-06-21 @ 07:30]  Vitamin D (25OH) 57      [07-03-21 @ 06:57]  HbA1c 11.5      [12-04-18 @ 07:33]  TSH 0.20      [07-04-21 @ 04:47]

## 2021-07-10 NOTE — PROGRESS NOTE ADULT - ASSESSMENT
CKD stage 4 / cardiorenal syndrome  - Cr improving  acute on chronic HFpEF  JOHANNY on CPAP    proteinuria  afib  HTN   s/p  shunt  DM 2    dm neuropathy    recommend:    can cont bumex iv with hypertonic saline   would eventually discharge home on torsemide 20mg po bid and metolazone 2.5mg po MWF  close monitoring of renal function and electrolytes  avoid ACE-I / ARB / entresto.    cont phoslo / calcitriol  lantus / humalog  physical therapy

## 2021-07-10 NOTE — PROGRESS NOTE ADULT - ASSESSMENT
85 y/o man with PMN of HTN, DM type 2 on insulin, obesity, AFib not on anticoagulation due to h/o falls, HFpEF and JOHANNY presents for shortness of breath and is being treated for acute over chronic HFpEF.    1. Acute over chronic HFpEF  - due to arrhythmias per cardio (Afib, frequent PVCs, NSVT)  - volume status improved with diuresis but pt remains volume overloaded  - now on bumex 2mg IV bid and 3% saline  - in negative balance  - avoid hypotension - meds adjusted by heart failure team with improvement in BP  - rate control   - daily wts, fluid restriction, low sodium diet  - continue PT    2. Multiple NSVTs, PVCs  Chronic Afib not on A/C due to frequent falls  - seen by EP  - on metoprolol 50mg q8hr  - ischemic workup recommended - medical therapy for now per cardio  - continue telemetry  - pt with TSH 0.20 (low) and fT4 1.9 (upper limit of normal is 1.8) and T3 is 85 (WNL) - needs repeat TFTs as outpt to monitor for subclinical hyperthyroidism/overt hyperthyroidism    3. TOBIAS over CKD 3 - nonoliguric  - due to CRS  - Cr now trending down  - avoid hypotension  - now on bumex and hypertonic saline for diuresis  - monitor urine output  - renal f/u appreciated  - pt declines dialysis electively or emergently  - BMP in AM  - continue phoslo for hyperphosphatemia    4. Left LE cellulitis superimposed on stasis dermatitis  - seen by ID  - completed course of keflex   - wound care, ACE wrap, elevation  - duplex venous negative and pt with normal arterial flow on duplex  - Burn eval pending    5. DM type 2 - controlled on insulin    6. JOHANNY - on CPAP nightly    7. DVT prophylaxis      PROGRESS NOTE HANDOFF    Pending: improvement in TOBIAS, tolerance of diuresis, labs in AM, continued PT    pt aware of plan of care    Disposition: wants home with services - hopefully early next week

## 2021-07-11 NOTE — PROGRESS NOTE ADULT - SUBJECTIVE AND OBJECTIVE BOX
VINCENZO EDMONDSON  86y Male    INTERVAL HPI/OVERNIGHT EVENTS:    Pt feels well and has no complaints.  No SOB or pain  sitting in the chair    T(F): 97 (21 @ 12:48), Max: 97.4 (21 @ 05:00)  HR: 85 (21 @ 12:48) (73 - 85)  BP: 107/52 (21 @ 12:48) (107/52 - 140/63)  RR: 18 (21 @ 12:48) (18 - 18)  SpO2: --    I&O's Summary    10 Jul 2021 07:01  -  2021 07:00  --------------------------------------------------------  IN: 237 mL / OUT: 1700 mL / NET: -1463 mL    2021 07:01  -  2021 13:05  --------------------------------------------------------  IN: 600 mL / OUT: 1100 mL / NET: -500 mL        Daily Weight in k.8 (2021 05:00)    CAPILLARY BLOOD GLUCOSE      POCT Blood Glucose.: 199 mg/dL (2021 11:55)  POCT Blood Glucose.: 122 mg/dL (2021 07:35)  POCT Blood Glucose.: 183 mg/dL (10 Jul 2021 21:48)  POCT Blood Glucose.: 94 mg/dL (10 Jul 2021 16:27)        PHYSICAL EXAM:  GENERAL: NAD  HEAD:  Normocephalic  EYES:  conjunctiva and sclera clear  ENMT: Moist mucous membranes  NERVOUS SYSTEM:  Alert, awake, Good concentration  CHEST/LUNG: decreased BS but CTA b/l  HEART: IRR  ABDOMEN: Soft, Nontender, Nondistended  EXTREMITIES: improving LE edema  SKIN: +ACE wraps LE    Consultant(s) Notes Reviewed:  [x ] YES  [ ] NO  Care Discussed with Consultants/Other Providers [ x] YES  [ ] NO    MEDICATIONS  (STANDING):  atorvastatin 20 milliGRAM(s) Oral at bedtime  buMETAnide Injectable 2 milliGRAM(s) IV Push <User Schedule>  calcitriol   Capsule 0.25 MICROGram(s) Oral daily  calcium acetate 667 milliGRAM(s) Oral three times a day with meals  chlorhexidine 4% Liquid 1 Application(s) Topical <User Schedule>  collagenase Ointment 1 Application(s) Topical daily  dextrose 40% Gel 15 Gram(s) Oral once  dextrose 5%. 1000 milliLiter(s) (50 mL/Hr) IV Continuous <Continuous>  dextrose 5%. 1000 milliLiter(s) (100 mL/Hr) IV Continuous <Continuous>  dextrose 50% Injectable 25 Gram(s) IV Push once  dextrose 50% Injectable 12.5 Gram(s) IV Push once  dextrose 50% Injectable 25 Gram(s) IV Push once  finasteride 5 milliGRAM(s) Oral daily  gabapentin 100 milliGRAM(s) Oral three times a day  glucagon  Injectable 1 milliGRAM(s) IntraMuscular once  heparin   Injectable 5000 Unit(s) SubCutaneous every 8 hours  insulin glargine Injectable (LANTUS) 20 Unit(s) SubCutaneous at bedtime  insulin lispro (ADMELOG) corrective regimen sliding scale   SubCutaneous three times a day before meals  insulin lispro Injectable (ADMELOG) 7 Unit(s) SubCutaneous three times a day before meals  melatonin 1 milliGRAM(s) Oral at bedtime  metoprolol tartrate 50 milliGRAM(s) Oral three times a day  nystatin Powder 1 Application(s) Topical two times a day  sodium chloride 3%. 150 milliLiter(s) (50 mL/Hr) IV Continuous <Continuous>  sodium chloride 3%. 150 milliLiter(s) (50 mL/Hr) IV Continuous <Continuous>  sodium chloride 3%. 150 milliLiter(s) (50 mL/Hr) IV Continuous <Continuous>  sodium chloride 3%. 150 milliLiter(s) (50 mL/Hr) IV Continuous <Continuous>  sodium chloride 3%. 150 milliLiter(s) (50 mL/Hr) IV Continuous <Continuous>  sodium chloride 3%. 150 milliLiter(s) (50 mL/Hr) IV Continuous <Continuous>  tamsulosin 0.4 milliGRAM(s) Oral at bedtime    MEDICATIONS  (PRN):  acetaminophen   Tablet .. 650 milliGRAM(s) Oral every 6 hours PRN Moderate Pain (4 - 6)  benzonatate 100 milliGRAM(s) Oral three times a day PRN Cough  guaifenesin/dextromethorphan Oral Liquid 5 milliLiter(s) Oral every 6 hours PRN Cough      Telemetry reviewed by me    LABS:                        11.0   8.82  )-----------( 258      ( 2021 05:09 )             37.1         147<H>  |  102  |  91<HH>  ----------------------------<  109<H>  4.4   |  30  |  2.3<H>    Ca    8.8      2021 05:09  Mg     2.5         TPro  6.2  /  Alb  3.5  /  TBili  0.6  /  DBili  x   /  AST  13  /  ALT  7   /  AlkPhos  111                Case discussed with resident today    Care discussed with pt

## 2021-07-11 NOTE — PROGRESS NOTE ADULT - ASSESSMENT
87 y/o man with PMN of HTN, DM type 2 on insulin, obesity, AFib not on anticoagulation due to h/o falls, HFpEF and JOHANNY presents for shortness of breath and is being treated for acute over chronic HFpEF.    1. Acute over chronic HFpEF  - due to arrhythmias per cardio (Afib, frequent PVCs, NSVT)  - volume status improving with diuresis   - now on bumex 2mg IV bid and 3% saline  - in negative balance (although intake is likely not accurate)  - avoid hypotension   - heart failure f/u  - can likely change to torsemide 20mg po bid and metolazone 2.5mg po MWF soon  - rate control   - daily wts, fluid restriction, low sodium diet  - continue PT    2. Multiple NSVTs, PVCs  Chronic Afib not on A/C due to frequent falls  - seen by EP  - on metoprolol 50mg q8hr  - ischemic workup recommended - medical therapy for now per cardio  - continue telemetry  - pt with TSH 0.20 (low) and fT4 1.9 (upper limit of normal is 1.8) and T3 is 85 (WNL) - needs repeat TFTs as outpt to monitor for subclinical hyperthyroidism/overt hyperthyroidism    3. TOBIAS over CKD 3 - nonoliguric  - due to CRS  - Cr now trending down  - avoid hypotension  - now on bumex and hypertonic saline for diuresis  - monitor urine output  - renal f/u appreciated  - pt declines dialysis electively or emergently  - BMP in AM  - continue phoslo for hyperphosphatemia    4. Left LE cellulitis superimposed on stasis dermatitis  - seen by ID  - completed course of keflex   - wound care, ACE wrap, elevation  - duplex venous negative and pt with normal arterial flow on duplex  - Burn eval appreciated: continue local wound care    5. DM type 2 - controlled on insulin    6. JOHANNY - on CPAP nightly    7. DVT prophylaxis      PROGRESS NOTE HANDOFF    Pending: heart failure f/u, labs in AM, continued PT    pt aware of plan of care    Disposition: wants home with services - hopefully early this week

## 2021-07-11 NOTE — PROGRESS NOTE ADULT - ASSESSMENT
CKD stage 4 / cardiorenal syndrome  - Cr improving  acute on chronic HFpEF  JOHANNY on CPAP    proteinuria  afib  HTN   s/p  shunt  DM 2    dm neuropathy    recommend:    cont Bumex  restrict Na intake  would eventually discharge home on torsemide 20mg po bid and metolazone 2.5mg po MWF  close monitoring of renal function and electrolytes  avoid ACE-I / ARB / entresto.    cont phoslo / calcitriol  lantus / humalog      D/W medical taem

## 2021-07-11 NOTE — PROGRESS NOTE ADULT - SUBJECTIVE AND OBJECTIVE BOX
KALPANA FOLLOW UP NOTE  --------------------------------------------------------------------------------  Chief Complaint/24 hour events/subjective:    pt seen, feel better, edema improved    PAST HISTORY  --------------------------------------------------------------------------------  No significant changes to PMH, PSH, FHx, SHx, unless otherwise noted    ALLERGIES & MEDICATIONS  --------------------------------------------------------------------------------  Allergies    No Known Allergies    Intolerances      Standing Inpatient Medications  atorvastatin 20 milliGRAM(s) Oral at bedtime  buMETAnide Injectable 2 milliGRAM(s) IV Push <User Schedule>  calcitriol   Capsule 0.25 MICROGram(s) Oral daily  calcium acetate 667 milliGRAM(s) Oral three times a day with meals  chlorhexidine 4% Liquid 1 Application(s) Topical <User Schedule>  collagenase Ointment 1 Application(s) Topical daily  dextrose 40% Gel 15 Gram(s) Oral once  dextrose 5%. 1000 milliLiter(s) IV Continuous <Continuous>  dextrose 5%. 1000 milliLiter(s) IV Continuous <Continuous>  dextrose 50% Injectable 25 Gram(s) IV Push once  dextrose 50% Injectable 12.5 Gram(s) IV Push once  dextrose 50% Injectable 25 Gram(s) IV Push once  finasteride 5 milliGRAM(s) Oral daily  gabapentin 100 milliGRAM(s) Oral three times a day  glucagon  Injectable 1 milliGRAM(s) IntraMuscular once  heparin   Injectable 5000 Unit(s) SubCutaneous every 8 hours  insulin glargine Injectable (LANTUS) 20 Unit(s) SubCutaneous at bedtime  insulin lispro (ADMELOG) corrective regimen sliding scale   SubCutaneous three times a day before meals  insulin lispro Injectable (ADMELOG) 7 Unit(s) SubCutaneous three times a day before meals  melatonin 1 milliGRAM(s) Oral at bedtime  metoprolol tartrate 50 milliGRAM(s) Oral three times a day  nystatin Powder 1 Application(s) Topical two times a day  sodium chloride 3%. 150 milliLiter(s) IV Continuous <Continuous>  sodium chloride 3%. 150 milliLiter(s) IV Continuous <Continuous>  sodium chloride 3%. 150 milliLiter(s) IV Continuous <Continuous>  sodium chloride 3%. 150 milliLiter(s) IV Continuous <Continuous>  sodium chloride 3%. 150 milliLiter(s) IV Continuous <Continuous>  sodium chloride 3%. 150 milliLiter(s) IV Continuous <Continuous>  tamsulosin 0.4 milliGRAM(s) Oral at bedtime    PRN Inpatient Medications  acetaminophen   Tablet .. 650 milliGRAM(s) Oral every 6 hours PRN  benzonatate 100 milliGRAM(s) Oral three times a day PRN  guaifenesin/dextromethorphan Oral Liquid 5 milliLiter(s) Oral every 6 hours PRN      REVIEW OF SYSTEMS  --------------------------------------------------------------------------------    All other systems were reviewed and are negative, except as noted.    VITALS/PHYSICAL EXAM  --------------------------------------------------------------------------------  T(C): 36.1 (07-11-21 @ 12:48), Max: 36.3 (07-11-21 @ 05:00)  HR: 85 (07-11-21 @ 12:48) (73 - 85)  BP: 107/52 (07-11-21 @ 12:48) (107/52 - 136/63)  RR: 18 (07-11-21 @ 12:48) (18 - 18)  SpO2: --  Wt(kg): --        07-10-21 @ 07:01  -  07-11-21 @ 07:00  --------------------------------------------------------  IN: 237 mL / OUT: 1700 mL / NET: -1463 mL    07-11-21 @ 07:01  -  07-11-21 @ 14:44  --------------------------------------------------------  IN: 1080 mL / OUT: 1350 mL / NET: -270 mL      Physical Exam:    	Gen: no distress   	Pulm: B/L low BS  	CV: S1S2; irregular HR  	Abd: +BS, soft, nontender/nondistended  	LE:  edema      LABS/STUDIES  --------------------------------------------------------------------------------              11.0   8.82  >-----------<  258      [07-11-21 @ 05:09]              37.1     147  |  102  |  91  ----------------------------<  109      [07-11-21 @ 05:09]  4.4   |  30  |  2.3        Ca     8.8     [07-11-21 @ 05:09]      Mg     2.5     [07-11-21 @ 05:09]    TPro  6.2  /  Alb  3.5  /  TBili  0.6  /  DBili  x   /  AST  13  /  ALT  7   /  AlkPhos  111  [07-11-21 @ 05:09]          Creatinine Trend:  SCr 2.3 [07-11 @ 05:09]  SCr 2.9 [07-10 @ 06:00]  SCr 3.2 [07-09 @ 05:34]  SCr 3.5 [07-08 @ 06:26]  SCr 3.9 [07-07 @ 05:45]        Iron 48, TIBC 214, %sat 22      [07-06-21 @ 07:30]  Ferritin 265      [07-06-21 @ 07:30]  Vitamin D (25OH) 57      [07-03-21 @ 06:57]  HbA1c 11.5      [12-04-18 @ 07:33]  TSH 0.20      [07-04-21 @ 04:47]

## 2021-07-11 NOTE — PROGRESS NOTE ADULT - ASSESSMENT
ASSESSMENT & PLAN  87yo M history of HTN DM AFib no AC 2/2 falls, HFpEF JOHANNY, presents for shortness of breath. Symptoms started 3 days ago with gradual onset of shortness of breath, first at exertion and gradually progressing and is present now even at rest, associated with orthopnea and chest tightness. Patient says he has been adhering to his medications and has not missed any doses. He also watches his salt intake with no increase in consumption last few days. He went yesterday to urgent care who started him on antibiotics and po steroids for suspicion of PNA vs COPD. He denied fevers, chills, cough or sputum production, nausea or vomiting, diarrhea or other associated symptoms.      #SOB 2/2 HFpEF exacerbation 2/2 medication noncompliance  #TOBIAS on CKD3  -per son, PCP recently switch pt from lasix 40mg BID to qd, stopped metolazone  -CXR 7/6 - Atelectasis  -BNP 8749  -EKG 6/30 - Atrial fibrillation with PVCs  -Trops (6/30-7/1) - 0.04 -> 0.03 -> 0.05 -> 0.04  -Echo EF 54%, mod pHTN, mild AR, mild-mod MVR, TR  -LE duplex negative, b/l edema  -daily weight, strict I/Os, fluid restriction  -seen by HF team 7/7 - cont metoprolol tartrate 50mg TID  -Cr continues to trend down 2.3 this AM  -pt does not want dialysis under any circumstance  -seen by nephro - cont bumex + hypertonic saline, can likely change to PO meds soon    #Hypokalemia, resolved  -replete as needed, keep >4    #?Hyperthyroidism  -clinically euthyroid  -TSH 0.20 (low)  -T4 1.9 (borderline high)  -T3 85 (wnl)  -consider endocrine consult    #Multiple NSVT, PVCs  #Chronic Afib not on AC  -not on AC due to frequent falls  -EP consulted - continue with metoprolol, increase as tolerated and if no response can add Cardizem, ischemic w/u per cardiology.   -outpt cardiac cath  -cont tele    #Suspected Left LE Cellulitis  -completed course of Keflex 7/9  -appreciate burn recs    #hyperphosphatemia likely 2/2 CKD, follow up nephro   -cont phoslo 667mg po tid with meals    #JOHANNY  -pt uses CPAP at night  -cont CPAP inpatient    #DM2  -FS: 61, 199, 122, 183  -cont lantus, ISS     #HTN  -BP: 107/52 (107/52 - 136/63)  -cont lopressor as above      PT/REHAB: 15ft x2 with rolling walker  ACTIVITY: Activity - Ambulate with Walker  Activity - Increase As Tolerated  DVT PPX: heparin   Injectable  GI PPX:  Not indicated  DIET: Diet, DASH/TLC  CODE: Full code   Disposition: from home; home with [facility]  Pending: improvement in Cr, convert to PO meds

## 2021-07-11 NOTE — PROGRESS NOTE ADULT - SUBJECTIVE AND OBJECTIVE BOX
VINCENZO EDMONDSON MRN#: [id number]  CODE STATUS: FULL CODE     SUBJECTIVE   Chief complaint: shortness of breath    Currently admitted to medicine with the primary diagnosis of CHF EXACERBATION;ELEVATED TROPONIN      Today is hospital day 11d,   INTERVAL HPI/OVERNIGHT EVENTS: No acute events overnight    This morning, he is laying in bed comfortably. Denies chest pain, shortness of breath, abdominal pain, nausea, vomiting or changes in bowel habits. He has no complaints today.     Urinating and stooling appropriately.    Present Today:           Aguirre Catheter (x)No/ ()Yes?   Indication:             Central Line (x)No/ ()Yes?  Indication:          IV Fluids (x)No/ ()Yes?   Indication:     OBJECTIVE  PAST MEDICAL & SURGICAL HISTORY  Afib    DM (diabetes mellitus)    CKD (chronic kidney disease)    Hydrocephalus    Hypertension    History of prostate surgery      ALLERGIES:  No Known Allergies    HOME MEDICATIONS:  Home Medications:  calcitriol 0.25 mcg oral capsule: 1 cap(s) orally once a day (30 Jun 2021 15:07)  dutasteride 0.5 mg oral capsule: 1 cap(s) orally once a day (30 Jun 2021 15:07)  gabapentin 100 mg oral capsule: 1 cap(s) orally 3 times a day (30 Jun 2021 15:07)  Klor-Con 20 mEq oral powder for reconstitution: 1 each orally 2 times a day (30 Jun 2021 15:07)  Lantus Solostar Pen 100 units/mL subcutaneous solution: 20 unit(s) subcutaneous once a day (at bedtime) (30 Jun 2021 15:07)  Lasix 40 mg oral tablet: 1 tab(s) orally 2 times a day (30 Jun 2021 15:07)  metOLazone 2.5 mg oral tablet: 1 tab(s) orally once a day (30 Jun 2021 15:07)  Metoprolol Tartrate 25 mg oral tablet: 1 tab(s) orally 2 times a day (30 Jun 2021 15:07)  Ozempic (1 mg dose) 4 mg/3 mL subcutaneous solution: 1 milligram(s) subcutaneous once a week (30 Jun 2021 15:07)    MEDICATIONS:  STANDING MEDICATIONS  MEDICATIONS  (STANDING):  atorvastatin 20 milliGRAM(s) Oral at bedtime  buMETAnide Injectable 2 milliGRAM(s) IV Push <User Schedule>  calcitriol   Capsule 0.25 MICROGram(s) Oral daily  calcium acetate 667 milliGRAM(s) Oral three times a day with meals  chlorhexidine 4% Liquid 1 Application(s) Topical <User Schedule>  collagenase Ointment 1 Application(s) Topical daily  dextrose 40% Gel 15 Gram(s) Oral once  dextrose 5%. 1000 milliLiter(s) (50 mL/Hr) IV Continuous <Continuous>  dextrose 5%. 1000 milliLiter(s) (100 mL/Hr) IV Continuous <Continuous>  dextrose 50% Injectable 25 Gram(s) IV Push once  dextrose 50% Injectable 12.5 Gram(s) IV Push once  dextrose 50% Injectable 25 Gram(s) IV Push once  finasteride 5 milliGRAM(s) Oral daily  gabapentin 100 milliGRAM(s) Oral three times a day  glucagon  Injectable 1 milliGRAM(s) IntraMuscular once  heparin   Injectable 5000 Unit(s) SubCutaneous every 8 hours  insulin glargine Injectable (LANTUS) 20 Unit(s) SubCutaneous at bedtime  insulin lispro (ADMELOG) corrective regimen sliding scale   SubCutaneous three times a day before meals  insulin lispro Injectable (ADMELOG) 7 Unit(s) SubCutaneous three times a day before meals  melatonin 1 milliGRAM(s) Oral at bedtime  metoprolol tartrate 50 milliGRAM(s) Oral three times a day  nystatin Powder 1 Application(s) Topical two times a day  sodium chloride 3%. 150 milliLiter(s) (50 mL/Hr) IV Continuous <Continuous>  sodium chloride 3%. 150 milliLiter(s) (50 mL/Hr) IV Continuous <Continuous>  sodium chloride 3%. 150 milliLiter(s) (50 mL/Hr) IV Continuous <Continuous>  sodium chloride 3%. 150 milliLiter(s) (50 mL/Hr) IV Continuous <Continuous>  sodium chloride 3%. 150 milliLiter(s) (50 mL/Hr) IV Continuous <Continuous>  sodium chloride 3%. 150 milliLiter(s) (50 mL/Hr) IV Continuous <Continuous>  tamsulosin 0.4 milliGRAM(s) Oral at bedtime    PRN MEDICATIONS  MEDICATIONS  (PRN):  acetaminophen   Tablet .. 650 milliGRAM(s) Oral every 6 hours PRN Moderate Pain (4 - 6)  benzonatate 100 milliGRAM(s) Oral three times a day PRN Cough  guaifenesin/dextromethorphan Oral Liquid 5 milliLiter(s) Oral every 6 hours PRN Cough      VITAL SIGNS  T(F): 97 (07-11 @ 12:48), Max: 97.4 (07-11 @ 05:00)  HR: 85 (07-11 @ 12:48) (73 - 85)  BP: 107/52 (07-11 @ 12:48) (107/52 - 136/63)  RR: 18 (07-11 @ 12:48) (18 - 18)    LABS:                        11.0   8.82  )-----------( 258      ( 11 Jul 2021 05:09 )             37.1     07-11    147<H>  |  102  |  91<HH>  ----------------------------<  109<H>  4.4   |  30  |  2.3<H>    Ca    8.8      11 Jul 2021 05:09  Mg     2.5     07-11    TPro  6.2  /  Alb  3.5  /  TBili  0.6  /  DBili  x   /  AST  13  /  ALT  7   /  AlkPhos  111  07-11          RADIOLOGY:  CXR 7/6 - Atelectasis, without difference. Follow-up as needed.  LE Art Duplex 7/4 - Normal arterial flow in the bilateral lower extremities.  LE Venous Duplex 7/2 - No evidence of deep venous thrombosis in either lower extremity. Neither peroneal vein is visualized. Bilateral edema.    ECHO:  1. Normal global left ventricular systolic function.   2. LVEjection Fraction by Lemon's Method with a biplane EF of 54 %.   3. Mild left ventricular hypertrophy.   4. Mildly increased LV wall thickness.   5. Normal left ventricular internal cavity size.   6. Moderately enlarged left atrium.   7. Mild mitral annular calcification.   8. Mild thickening and calcification of the anterior and posterior mitral valve leaflets.   9. Mild to moderate mitral valve regurgitation.  10. Mild-moderate tricuspid regurgitation.  11. Mild aortic regurgitation.  12. Sclerotic aortic valve with normal opening.  13. Estimated pulmonary artery systolic pressure is 54.2 mmHg assuming a right atrial pressure of 5 mmHg, which is consistent with moderate pulmonary hypertension.  14. LA volume Index is 51.8 ml/m² ml/m2.  15. There is moderate aortic root calcification.      PHYSICAL EXAM:  General: Comfortably laying on the hospital bed. NAD. AAOx3.  HEENT: Atraumatic. Normocephalic. EOMI. PERRL. Conjunctiva and sclera clear.  Cardiac: Normal S1, S2. RRR. No murmurs, rubs, or gallops.  Pulm: CTA b/l no wheezes, rhonchi, or rales.  GI: Soft, nontender, nondistended. [id number].  Ext: 2+ peripheral pulses. Moving all 4 extremities. 1+ pitting edema of b/l LE.  Neuro: Grossly intact  Skin: No lesions or rashes

## 2021-07-12 NOTE — PROGRESS NOTE ADULT - SUBJECTIVE AND OBJECTIVE BOX
Date of Admission: 21    Interval History:    - Patient sitting in a chair, euvolemic on exam      HISTORY OF PRESENT ILLNESS:     87yo M history of HTN DM AFib no AC 2/2 falls, HFpEF JOHANNY, presents for shortness of breath. Symptoms started 3 days ago with gradual onset of shortness of breath, first at exertion and gradually progressing and is present now even at rest, associated with orthopnea and chest tightness. Patient says he has been adhering to his medications and has not missed any doses. He also watches his salt intake with no increase in consumption last few days. He went yesterday to urgent care who started him on antibiotics and po steroids for suspicion of PNA vs COPD. He denied fevers, chills, cough or sputum production, nausea or vomiting, diarrhea or other associated symptoms.    Patient states at his baseline he is able to walk around the house, about half of a block with his cane or walker. For the past week he developed a worsening SOB when walking less than 10 steps, it got to the point he was SOB even at rest. Patient denies c/p, palpitations, headaches, bleeding, LH, dizziness, LOC, cough, feeling bloated, N/V/D.  Endorses compliance with medications and follows a low sodium diet     PAST MEDICAL & SURGICAL HISTORY:    Afib  DM (diabetes mellitus)  CKD (chronic kidney disease)  Hydrocephalus  Hypertension  History of prostate surgery        FAMILY HISTORY:    Mother:  at 72 of HF  Father:  at 68 of a brain aneurism    SOCIAL HISTORY:      Former smoker, denies alcohol or drug use     Allergies    No Known Allergies    Intolerances    	  PHYSICAL EXAM:    General Appearance: well appearing, normal for age and gender. 	  Neck: normal JVP, no bruit.   Eyes: Extra Ocular muscles intact.   Cardiovascular: regular rate and rhythm S1 S2, No JVD, No murmurs, BLE edema  Respiratory: Lungs clear to auscultation	  Psychiatry: Alert and oriented x 3, Mood & affect appropriate  Gastrointestinal:  Soft, Non-tender  Skin/Integumen: No rashes, No ecchymoses, No cyanosis	  Neurologic: Non-focal  Musculoskeletal/ extremities: Normal range of motion, No clubbing, cyanosis, BLE edema  Vascular: Peripheral pulses palpable 2+ bilaterally      TTE 21    Summary:   1. Normal global left ventricular systolic function.   2. LV Ejection Fraction by Lemon's Method with a biplane EF of 54 %.   3. Mild left ventricular hypertrophy.   4. Mildly increased LV wall thickness.   5. Normal left ventricular internal cavity size.   6. Moderately enlarged left atrium.   7. Mild mitral annular calcification.   8. Mild thickening and calcification of the anterior and posterior mitral valve leaflets.   9. Mild to moderate mitral valve regurgitation.  10. Mild-moderate tricuspid regurgitation.  11. Mild aortic regurgitation.  12. Sclerotic aortic valve with normal opening.  13. Estimated pulmonary artery systolic pressure is 54.2 mmHg assuming a right atrial pressure of 5 mmHg, which is consistent with moderate pulmonary hypertension.  14. LA volume Index is 51.8 ml/m² ml/m2.  15. There is moderate aortic root calcification.    	    Home Medications:  calcitriol 0.25 mcg oral capsule: 1 cap(s) orally once a day (2021 15:07)  dutasteride 0.5 mg oral capsule: 1 cap(s) orally once a day (2021 15:07)  gabapentin 100 mg oral capsule: 1 cap(s) orally 3 times a day (2021 15:07)  Klor-Con 20 mEq oral powder for reconstitution: 1 each orally 2 times a day (2021 15:07)  Lantus Solostar Pen 100 units/mL subcutaneous solution: 20 unit(s) subcutaneous once a day (at bedtime) (2021 15:07)  Lasix 40 mg oral tablet: 1 tab(s) orally 2 times a day (2021 15:07)  metOLazone 2.5 mg oral tablet: 1 tab(s) orally once a day (2021 15:07)  Metoprolol Tartrate 25 mg oral tablet: 1 tab(s) orally 2 times a day (2021 15:07)  Ozempic (1 mg dose) 4 mg/3 mL subcutaneous solution: 1 milligram(s) subcutaneous once a week (2021 15:07)    MEDICATIONS  (STANDING):  atorvastatin 20 milliGRAM(s) Oral at bedtime  calcitriol   Capsule 0.25 MICROGram(s) Oral daily  cephalexin 500 milliGRAM(s) Oral every 12 hours  chlorhexidine 4% Liquid 1 Application(s) Topical <User Schedule>  dextrose 40% Gel 15 Gram(s) Oral once  dextrose 5%. 1000 milliLiter(s) (50 mL/Hr) IV Continuous <Continuous>  dextrose 5%. 1000 milliLiter(s) (100 mL/Hr) IV Continuous <Continuous>  dextrose 50% Injectable 25 Gram(s) IV Push once  dextrose 50% Injectable 12.5 Gram(s) IV Push once  dextrose 50% Injectable 25 Gram(s) IV Push once  diltiazem    Tablet 60 milliGRAM(s) Oral every 12 hours  finasteride 5 milliGRAM(s) Oral daily  furosemide   Injectable 80 milliGRAM(s) IV Push two times a day  gabapentin 100 milliGRAM(s) Oral three times a day  glucagon  Injectable 1 milliGRAM(s) IntraMuscular once  heparin   Injectable 5000 Unit(s) SubCutaneous every 8 hours  insulin glargine Injectable (LANTUS) 20 Unit(s) SubCutaneous at bedtime  insulin lispro (ADMELOG) corrective regimen sliding scale   SubCutaneous three times a day before meals  insulin lispro Injectable (ADMELOG) 7 Unit(s) SubCutaneous three times a day before meals  melatonin 1 milliGRAM(s) Oral at bedtime  metoprolol tartrate 25 milliGRAM(s) Oral two times a day  nystatin Powder 1 Application(s) Topical two times a day  silver sulfADIAZINE 1% Cream 1 Application(s) Topical daily  spironolactone 25 milliGRAM(s) Oral daily  tamsulosin 0.4 milliGRAM(s) Oral at bedtime    MEDICATIONS  (PRN):  acetaminophen   Tablet .. 650 milliGRAM(s) Oral every 6 hours PRN Moderate Pain (4 - 6)  benzonatate 100 milliGRAM(s) Oral three times a day PRN Cough  guaifenesin/dextromethorphan Oral Liquid 5 milliLiter(s) Oral every 6 hours PRN Cough

## 2021-07-12 NOTE — PROGRESS NOTE ADULT - ASSESSMENT
Acute on chronic diastolic HF/Fluid overload/ TOBIAS     Patient is fluid overloaded on exam  Torsemide 20 mg daily , 20 extra as needed for weight gain of 2 lbs or more in a day  Monitor BUN and Creat closely  Continue Metoprolol Tartrate 50 mg to TID  Get BMP daily   Maintain potassium >4.0, Mg >2.1  Strict intake and output  Daily weight   Counseling provided regarding low sodium diet  Discussed with primary team/renal and family/patient at bedside    Acute on chronic diastolic HF/Fluid overload/ TOBIAS     Patient is euvolemic on exam  Torsemide 20 mg daily , 20 extra as needed for weight gain of 2 lbs or more in a day  Monitor BUN and Creat closely  Continue Metoprolol Tartrate 50 mg to TID  Get BMP daily   Maintain potassium >4.0, Mg >2.1  Strict intake and output  Daily weight   Stable for DC from HF standpoint - needs follow up in less than a week for volume assessment  Discussed with primary team/renal and family/patient at bedside

## 2021-07-12 NOTE — PROGRESS NOTE ADULT - ASSESSMENT
CKD stage 4 / cardiorenal syndrome  - Cr improving  acute on chronic HFpEF  JOHANNY on CPAP    proteinuria  afib  HTN   s/p  shunt  DM 2    dm neuropathy    recommend:        for change to po torsemide  cont phoslo / calcitriol  ckd education and counselling  CHF education  diuretics per Dr. Huerta  daily wt at home  outpt renal follow up  d/w heart failure team

## 2021-07-12 NOTE — DISCHARGE NOTE PROVIDER - NSDCCPCAREPLAN_GEN_ALL_CORE_FT
PRINCIPAL DISCHARGE DIAGNOSIS  Diagnosis: CHF exacerbation  Assessment and Plan of Treatment: You presented with volume overload and lower extremity swelling from CHF. You must take do daily weights at home, follow a low sodium diet (NO processed foods, fast foods, Chinese food for example) and do not drink more than 1.5 liters of fluid daily.  Take your medications as directed and follow up with cardiology, heart failure and your kidney doctor.  Take torsemide 20mg a day. If you notice weight gain, then take an extra dose of torsemide 20mg. If no improvement, then call your doctor. Return to the emergency room for shortness of breath, chest pain or other concerns.      SECONDARY DISCHARGE DIAGNOSES  Diagnosis: Cardiac arrhythmia  Assessment and Plan of Treatment: YOU have multiple arrhythmias which are now better controlled on metoprolol 50mg three times a day (increased from your previous dose). Take your medication as directed

## 2021-07-12 NOTE — DISCHARGE NOTE PROVIDER - HOSPITAL COURSE
87 y/o man with PMN of HTN, DM type 2 on insulin, obesity, AFib not on anticoagulation due to h/o falls, HFpEF and JOHANNY presents for shortness of breath and is being treated for acute over chronic HFpEF.    1. Acute over chronic HFpEF  - due to arrhythmias per cardio (Afib, frequent PVCs, NSVT)  - volume status improved with diuresis   - now euvolemic per heart failure team  - torsemide 20mg daily - can take an extra dose for wt gain  - outpt f/u in 3-5 days  - stressed the importance of diet compliance with the pt   - low sodium diet  - daily wts at home  - rate control     2. Multiple NSVTs, PVCs  Chronic Afib not on A/C due to frequent falls  - seen by EP  - on metoprolol 50mg q8hr  - ischemic workup recommended - medical therapy for now per cardio  - continue telemetry  - pt with TSH 0.20 (low) and fT4 1.9 (upper limit of normal is 1.8) and T3 is 85 (WNL) - needs repeat TFTs as outpt to monitor for subclinical hyperthyroidism/overt hyperthyroidism    3. TOBIAS over CKD 3 - nonoliguric  - due to CRS  - Cr now trending down and stable  - avoid hypotension  - pt declines dialysis electively or emergently  - on phoslo for hyperphosphatemia   - outpt f/u with Dr. Merchant    4. Left LE cellulitis superimposed on stasis dermatitis  - seen by ID  - completed course of keflex   - wound care, ACE wrap, elevation  - duplex venous negative and pt with normal arterial flow on duplex  - Burn eval appreciated: continue local wound care    5. DM type 2 - controlled on insulin    6. OJHANNY - on CPAP nightly

## 2021-07-12 NOTE — DISCHARGE NOTE PROVIDER - NSDCFUADDINST_GEN_ALL_CORE_FT
bilateral lower leg wounds left > right due to edema     soap and water qd  santyl ointment / xeroform gauze, kerlex  wrapping gauze, ace wrap compression from toes to knees

## 2021-07-12 NOTE — PROGRESS NOTE ADULT - NSICDXPILOT_GEN_ALL_CORE
[place]

## 2021-07-12 NOTE — DISCHARGE NOTE PROVIDER - NSDCMRMEDTOKEN_GEN_ALL_CORE_FT
acetaminophen 325 mg oral tablet: 2 tab(s) orally every 6 hours, As needed, Moderate Pain (4 - 6)  calcitriol 0.25 mcg oral capsule: 1 cap(s) orally once a day  calcium acetate 667 mg oral tablet: 1 tab(s) orally 3 times a day (with meals)   collagenase 250 units/g topical ointment: 1 application topically once a day  Crestor 5 mg oral tablet: 1 tab(s) orally once a day (at bedtime)  dutasteride 0.5 mg oral capsule: 1 cap(s) orally once a day  Flomax 0.4 mg oral capsule: 1 cap(s) orally once a day  gabapentin 100 mg oral capsule: 1 cap(s) orally 3 times a day  Lantus Solostar Pen 100 units/mL subcutaneous solution: 20 unit(s) subcutaneous once a day (at bedtime)  metoprolol tartrate 50 mg oral tablet: 1 tab(s) orally 3 times a day  Ozempic (1 mg dose) 4 mg/3 mL subcutaneous solution: 1 milligram(s) subcutaneous once a week  torsemide 20 mg oral tablet: 1 tab(s) orally once a day

## 2021-07-12 NOTE — PROGRESS NOTE ADULT - SUBJECTIVE AND OBJECTIVE BOX
Discharge note    VINCENZO EDMONDSON  86y Male    INTERVAL HPI/OVERNIGHT EVENTS:    Pt feels well and has no complaints.  Anxious to go home  case discussed with heart failure team - he will be discharged on torsemide 20mg daily and can take an extra 20mg for wt gain    T(F): 96.2 (21 @ 04:56), Max: 96.8 (21 @ 20:58)  HR: 81 (21 @ 04:56) (81 - 81)  BP: 118/56 (21 @ 04:56) (118/56 - 135/61)  RR: 18 (21 @ 04:56) (18 - 18)  SpO2: 95% (21 @ 20:06) (95% - 95%) on RA    I&O's Summary    2021 07:01  -  2021 07:00  --------------------------------------------------------  IN: 1647 mL / OUT: 2150 mL / NET: -503 mL    2021 07:01  -  2021 12:49  --------------------------------------------------------  IN: 120 mL / OUT: 900 mL / NET: -780 mL        Daily     Daily Weight in k.8 (2021 04:56)    CAPILLARY BLOOD GLUCOSE      POCT Blood Glucose.: 164 mg/dL (2021 11:40)  POCT Blood Glucose.: 118 mg/dL (2021 07:50)  POCT Blood Glucose.: 182 mg/dL (2021 21:24)  POCT Blood Glucose.: 133 mg/dL (2021 17:33)  POCT Blood Glucose.: 61 mg/dL (2021 16:52)        PHYSICAL EXAM:  GENERAL: NAD  HEAD:  Normocephalic  EYES:  conjunctiva and sclera clear  ENMT: Moist mucous membranes  NECK: Supple, No JVD  NERVOUS SYSTEM:  Alert, awake, Good concentration  CHEST/LUNG: decreased BS but CTA b/l  HEART: IRR  ABDOMEN: Soft, Nontender, Nondistended; obese  EXTREMITIES: much improved LE edema  SKIN: ACE wraps    Consultant(s) Notes Reviewed:  [x ] YES  [ ] NO  Care Discussed with Consultants/Other Providers [ x] YES  [ ] NO    MEDICATIONS  (STANDING):  atorvastatin 20 milliGRAM(s) Oral at bedtime  buMETAnide Injectable 2 milliGRAM(s) IV Push <User Schedule>  calcitriol   Capsule 0.25 MICROGram(s) Oral daily  calcium acetate 667 milliGRAM(s) Oral three times a day with meals  chlorhexidine 4% Liquid 1 Application(s) Topical <User Schedule>  collagenase Ointment 1 Application(s) Topical daily  dextrose 40% Gel 15 Gram(s) Oral once  dextrose 5%. 1000 milliLiter(s) (50 mL/Hr) IV Continuous <Continuous>  dextrose 5%. 1000 milliLiter(s) (100 mL/Hr) IV Continuous <Continuous>  dextrose 50% Injectable 25 Gram(s) IV Push once  dextrose 50% Injectable 12.5 Gram(s) IV Push once  dextrose 50% Injectable 25 Gram(s) IV Push once  finasteride 5 milliGRAM(s) Oral daily  gabapentin 100 milliGRAM(s) Oral three times a day  glucagon  Injectable 1 milliGRAM(s) IntraMuscular once  heparin   Injectable 5000 Unit(s) SubCutaneous every 8 hours  insulin glargine Injectable (LANTUS) 20 Unit(s) SubCutaneous at bedtime  insulin lispro (ADMELOG) corrective regimen sliding scale   SubCutaneous three times a day before meals  insulin lispro Injectable (ADMELOG) 7 Unit(s) SubCutaneous three times a day before meals  melatonin 1 milliGRAM(s) Oral at bedtime  metoprolol tartrate 50 milliGRAM(s) Oral three times a day  nystatin Powder 1 Application(s) Topical two times a day  sodium chloride 3%. 150 milliLiter(s) (50 mL/Hr) IV Continuous <Continuous>  sodium chloride 3%. 150 milliLiter(s) (50 mL/Hr) IV Continuous <Continuous>  sodium chloride 3%. 150 milliLiter(s) (50 mL/Hr) IV Continuous <Continuous>  sodium chloride 3%. 150 milliLiter(s) (50 mL/Hr) IV Continuous <Continuous>  sodium chloride 3%. 150 milliLiter(s) (50 mL/Hr) IV Continuous <Continuous>  sodium chloride 3%. 150 milliLiter(s) (50 mL/Hr) IV Continuous <Continuous>  sodium chloride 3%. 150 milliLiter(s) (50 mL/Hr) IV Continuous <Continuous>  sodium chloride 3%. 150 milliLiter(s) (50 mL/Hr) IV Continuous <Continuous>  tamsulosin 0.4 milliGRAM(s) Oral at bedtime    MEDICATIONS  (PRN):  acetaminophen   Tablet .. 650 milliGRAM(s) Oral every 6 hours PRN Moderate Pain (4 - 6)  benzonatate 100 milliGRAM(s) Oral three times a day PRN Cough  guaifenesin/dextromethorphan Oral Liquid 5 milliLiter(s) Oral every 6 hours PRN Cough      Telemetry reviewed by me    LABS:                        10.7   8.34  )-----------( 280      ( 2021 05:37 )             36.1     -    144  |  100  |  87<HH>  ----------------------------<  111<H>  4.1   |  31  |  2.3<H>    Ca    9.1      2021 05:37  Mg     2.3         TPro  6.3  /  Alb  3.7  /  TBili  0.5  /  DBili  x   /  AST  12  /  ALT  8   /  AlkPhos  107  07-12            Culture - Other (collected 10 Jul 2021 13:54)  Source: .Other left leg wound  Preliminary Report (2021 18:08):    No growth    Culture - Other (collected 10 Jul 2021 13:54)  Source: .Other Right leg wound  Preliminary Report (2021 18:09):    No growth          Case discussed with residents and RN on rounds today    Care discussed with pt and family

## 2021-07-12 NOTE — DISCHARGE NOTE PROVIDER - CARE PROVIDER_API CALL
Alena Escobar (MD; MD)  [facility]  [address]  [place] [address]  Phone: [phone]  Fax: [phone]  Follow Up Time: 1 week    Ok Harris  CARDIOVASCULAR DISEASE  [address]  [place] [address]  Phone: [phone]  Fax: [phone]  Established Patient  Follow Up Time: 2 weeks    Vinay Merchant  INTERNAL MEDICINE  [address] [facility] [place] [address]  Phone: [phone]  Fax: [phone]  Established Patient  Follow Up Time: 1 week

## 2021-07-12 NOTE — PROGRESS NOTE ADULT - PROVIDER SPECIALTY LIST ADULT
Heart Failure
Internal Medicine
Nephrology
Nephrology
Heart Failure
Hospitalist
Internal Medicine
Internal Medicine
Nephrology
Heart Failure
Heart Failure
Hospitalist
Hospitalist
Internal Medicine
Internal Medicine
Nephrology
Hospitalist
Hospitalist
Internal Medicine

## 2021-07-12 NOTE — DISCHARGE NOTE PROVIDER - PROVIDER TOKENS
PROVIDER:[TOKEN:[[id number]],FOLLOWUP:[1 week]],PROVIDER:[TOKEN:[[id number]],FOLLOWUP:[2 weeks],ESTABLISHEDPATIENT:[T]],PROVIDER:[TOKEN:[[id number]],FOLLOWUP:[1 week],ESTABLISHEDPATIENT:[T]]

## 2021-07-12 NOTE — PROGRESS NOTE ADULT - SUBJECTIVE AND OBJECTIVE BOX
NEPHROLOGY FOLLOW UP NOTE    pt seen and examined  feels better  legs less swollen  pt wants to go home  cr better  bp's fair         PAST MEDICAL & SURGICAL HISTORY:  Hypertension  Hydrocephalus  CKD (chronic kidney disease)  DM (diabetes mellitus)  Afib  History of prostate surgery    Allergies:  No Known Allergies    Home Medications Reviewed    SOCIAL HISTORY:  Denies ETOH,Smoking,   FAMILY HISTORY:  Family history of diabetes mellitus (Mother, Sibling)        REVIEW OF SYSTEMS  All other review of systems is negative unless indicated above.    PHYSICAL EXAM:  NAD  obese  sitting in chair, unable to lie flat  moist mm  supple  decreased BS b/l  distant HS, irregular   soft  + edema      Hospital Medications:   MEDICATIONS  (STANDING):  atorvastatin 20 milliGRAM(s) Oral at bedtime  buMETAnide Injectable 2 milliGRAM(s) IV Push <User Schedule>  calcitriol   Capsule 0.25 MICROGram(s) Oral daily  calcium acetate 667 milliGRAM(s) Oral three times a day with meals  chlorhexidine 4% Liquid 1 Application(s) Topical <User Schedule>  collagenase Ointment 1 Application(s) Topical daily  dextrose 40% Gel 15 Gram(s) Oral once  dextrose 5%. 1000 milliLiter(s) (50 mL/Hr) IV Continuous <Continuous>  dextrose 5%. 1000 milliLiter(s) (100 mL/Hr) IV Continuous <Continuous>  dextrose 50% Injectable 25 Gram(s) IV Push once  dextrose 50% Injectable 12.5 Gram(s) IV Push once  dextrose 50% Injectable 25 Gram(s) IV Push once  finasteride 5 milliGRAM(s) Oral daily  gabapentin 100 milliGRAM(s) Oral three times a day  glucagon  Injectable 1 milliGRAM(s) IntraMuscular once  heparin   Injectable 5000 Unit(s) SubCutaneous every 8 hours  insulin glargine Injectable (LANTUS) 20 Unit(s) SubCutaneous at bedtime  insulin lispro (ADMELOG) corrective regimen sliding scale   SubCutaneous three times a day before meals  insulin lispro Injectable (ADMELOG) 7 Unit(s) SubCutaneous three times a day before meals  melatonin 1 milliGRAM(s) Oral at bedtime  metoprolol tartrate 50 milliGRAM(s) Oral three times a day  nystatin Powder 1 Application(s) Topical two times a day  sodium chloride 3%. 150 milliLiter(s) (50 mL/Hr) IV Continuous <Continuous>  sodium chloride 3%. 150 milliLiter(s) (50 mL/Hr) IV Continuous <Continuous>  sodium chloride 3%. 150 milliLiter(s) (50 mL/Hr) IV Continuous <Continuous>  sodium chloride 3%. 150 milliLiter(s) (50 mL/Hr) IV Continuous <Continuous>  sodium chloride 3%. 150 milliLiter(s) (50 mL/Hr) IV Continuous <Continuous>  sodium chloride 3%. 150 milliLiter(s) (50 mL/Hr) IV Continuous <Continuous>  sodium chloride 3%. 150 milliLiter(s) (50 mL/Hr) IV Continuous <Continuous>  sodium chloride 3%. 150 milliLiter(s) (50 mL/Hr) IV Continuous <Continuous>  tamsulosin 0.4 milliGRAM(s) Oral at bedtime        VITALS:  T(F): 96.3 (07-12-21 @ 13:49), Max: 96.8 (07-11-21 @ 20:58)  HR: 69 (07-12-21 @ 13:49)  BP: 119/61 (07-12-21 @ 13:49)  RR: 18 (07-12-21 @ 13:49)  SpO2: 95% (07-11-21 @ 20:06)  Wt(kg): --    07-10 @ 07:01  -  07-11 @ 07:00  --------------------------------------------------------  IN: 237 mL / OUT: 1700 mL / NET: -1463 mL    07-11 @ 07:01  -  07-12 @ 07:00  --------------------------------------------------------  IN: 1647 mL / OUT: 2150 mL / NET: -503 mL    07-12 @ 07:01  -  07-12 @ 15:30  --------------------------------------------------------  IN: 460 mL / OUT: 1200 mL / NET: -740 mL          LABS:  07-12    144  |  100  |  87<HH>  ----------------------------<  111<H>  4.1   |  31  |  2.3<H>    Ca    9.1      12 Jul 2021 05:37  Mg     2.3     07-12    TPro  6.3  /  Alb  3.7  /  TBili  0.5  /  DBili      /  AST  12  /  ALT  8   /  AlkPhos  107  07-12                          10.7   8.34  )-----------( 280      ( 12 Jul 2021 05:37 )             36.1       Urine Studies:        RADIOLOGY & ADDITIONAL STUDIES:

## 2021-07-12 NOTE — PROGRESS NOTE ADULT - ASSESSMENT
85 y/o man with PMN of HTN, DM type 2 on insulin, obesity, AFib not on anticoagulation due to h/o falls, HFpEF and JOHANNY presents for shortness of breath and is being treated for acute over chronic HFpEF.    1. Acute over chronic HFpEF  - due to arrhythmias per cardio (Afib, frequent PVCs, NSVT)  - volume status improved with diuresis   - now euvolemic per heart failure team  - torsemide 20mg daily - can take an extra dose for wt gain  - outpt f/u in 3-5 days  - stressed the importance of diet compliance with the pt   - low sodium diet  - daily wts at home  - rate control     2. Multiple NSVTs, PVCs  Chronic Afib not on A/C due to frequent falls  - seen by EP  - on metoprolol 50mg q8hr  - ischemic workup recommended - medical therapy for now per cardio  - continue telemetry  - pt with TSH 0.20 (low) and fT4 1.9 (upper limit of normal is 1.8) and T3 is 85 (WNL) - needs repeat TFTs as outpt to monitor for subclinical hyperthyroidism/overt hyperthyroidism    3. TOBIAS over CKD 3 - nonoliguric  - due to CRS  - Cr now trending down and stable  - avoid hypotension  - pt declines dialysis electively or emergently  - on phoslo for hyperphosphatemia - check level today  - outpt f/u with Dr. Merchant    4. Left LE cellulitis superimposed on stasis dermatitis  - seen by ID  - completed course of keflex   - wound care, ACE wrap, elevation  - duplex venous negative and pt with normal arterial flow on duplex  - Burn eval appreciated: continue local wound care    5. DM type 2 - controlled on insulin    6. JOHANNY - on CPAP nightly      Medication reconciliation reviewed by me  discharge papers will be reviewed by me    spent 40 minutes on the discharge process of this pt

## 2021-07-14 PROBLEM — Z00.00 ENCOUNTER FOR PREVENTIVE HEALTH EXAMINATION: Status: ACTIVE | Noted: 2020-02-26

## 2021-07-14 NOTE — REASON FOR VISIT
[FreeTextEntry1] : \par 87 y/o M with h/o HFpEF, DM, HTN, afib, JOHANNY, CKD coming for follow up post hospital discharge. He was admitted for acute on chronic diastolic HF exacerbation. Course was complicated by TOBIAS. He was diuresed to euvolemia. Patient reports feeling better at home, he can walk in the house without any significant SOB, LOC, chest pain, N/V. He claims to be compliant with low sodium diet and meds. \par \par Visiting RN noted swelling of his LE. His weight has been stable since discharged. He takes torsemide 20 mg daily.

## 2021-07-28 NOTE — REASON FOR VISIT
[FreeTextEntry1] : 87 y/o M with h/o HFpEF, DM, HTN, afib, JOHANNY, CKD coming for follow up after visiting RN found HR in 30s. I instructed him to hold metoprolol XL 50 mg BID and come today. HR in am was already 40-50s. Last week, blood work showed Creat 3.2 and , diuretics were held for several days and resumed two days ago as torsemide once daily. \par \par Patient denies any significant SOB with minimal exertion, LOC,  chest pain, however, reports feeling weak and tired.

## 2021-08-25 NOTE — ASSESSMENT
[FreeTextEntry1] : Chronic diastolic HF /afib with ventricular response/HTN/ DM / TOBIAS\par \par Patient is fluid overloaded, Wt 248 from 228 on previous visit \par Will give furosemide 80 mg iv push today \par Start torsemide to 20 mg BID for 7 days\par Metolazone 5 mg twice per week \par Add spironolactone 25 mg BID \par c/w metoprolol succinate 25 mg daily\par Continue A/C for afib \par Monitor daily weight and BP at home \par Low sodium diet \par Follow up with nephrology \par Blood work done today, results pending \par RTC in 4 weeks\par \par \par \par \par Alena Guerrero MD, FACC, FHFSA \par Advanced Heart Failure/ Mechanical Circulatory Support\par Pulmonary Hypertension and Cardiac Amyloidosis \par [facility]\par [facility]  \par \par

## 2021-08-25 NOTE — HISTORY OF PRESENT ILLNESS
[FreeTextEntry1] : 85 y/o M with h/o HFpEF, DM, HTN, afib, JOHANNY, CKD coming for follow up. Cr stable at 3.1. Patient reports having more SOB than usual, he didn't sleep well last night. His weight is up 20 lbs since last visit, he is taking torsemide 10 mg daily and lately has been getting an extra 10 mg in PM. Per family, patient is very non compliant with salt. \par \par

## 2021-10-06 PROBLEM — I10 ESSENTIAL HYPERTENSION: Status: ACTIVE | Noted: 2021-01-01

## 2021-10-20 NOTE — H&P ADULT - NSHPATTENDINGPLANDISCUSS_GEN_ALL_CORE
General: No fevers, no chills, no irritability, no decrease in activity.  Head: No headache.  Eyes: No eye discharge, no eye redness, no eyelid swelling.  ENT: (+) Rhinorrhea. No throat pain, no nasal congestion, no otalgia.  Neck: No pain, no swollen lymph nodes.  RESP: (+) Cough. No wheezing, no shortness of breath.  CVS: No chest pain, no palpitations.  GI: No abdominal pain, no nausea, no vomiting, no diarrhea, no constipation.  : No dysuria, no frequency, no urgency, no hematuria.   Neuro: No numbness, no weakness, no tingling.  MSK: No joint pain, no decreased ROM, no swelling, no erythema of joints.  SKIN: No itching, no rashes.
resident

## 2021-10-22 PROBLEM — I48.91 ATRIAL FIBRILLATION: Status: ACTIVE | Noted: 2021-01-01

## 2021-10-22 PROBLEM — N18.4 CKD (CHRONIC KIDNEY DISEASE), STAGE IV: Status: ACTIVE | Noted: 2021-01-01

## 2021-10-22 PROBLEM — I50.33: Status: ACTIVE | Noted: 2021-01-01

## 2021-10-22 PROBLEM — I50.30 (HFPEF) HEART FAILURE WITH PRESERVED EJECTION FRACTION: Status: ACTIVE | Noted: 2021-01-01

## 2021-10-22 PROBLEM — I50.9 HEART FAILURE, NYHA CLASS 3: Status: ACTIVE | Noted: 2021-01-01

## 2021-10-26 NOTE — ASSESSMENT
[FreeTextEntry1] : Chronic diastolic HF /afib with ventricular response/HTN/ DM / CKD\par \par Remains  volume overloaded  on exam \par Encouraged dietary compliance \par Torsemide to 20 mg BID for 7 days\par Start Farxiga 10 mg daily\par Metolazone 5 mg three times per week for one week only\par Continue spironolactone 25 mg BID \par c/w metoprolol succinate 25 mg  BID \par Not on  A/C for afib due  to falls\par Monitor daily weight and BP at home \par When optimized need IV infusion and cardiac Rehab \par Endocrinology follow up for diabetes mellitus \par Low sodium diet \par Follow up with nephrology \par Blood work done today,\par RTO in 4 weeks \par \par \par Patient seen with resident/fellow. I agree with above assessment and plan. I modified the note as appropriate to  show my clinical impression. I personally examined and interviewed the patient.\par \par \par Alena Guerrero MD, FACC, FSA \par Advanced Heart Failure/ Mechanical Circulatory Support\par Pulmonary Hypertension and Cardiac Amyloidosis \par [facility]\par [facility]  \par \par

## 2021-10-26 NOTE — PHYSICAL EXAM
[Normal S1, S2] : normal S1, S2 [Normal] : soft, non-tender, no masses/organomegaly, normal bowel sounds [Edema ___] : edema [unfilled] [de-identified] : IVC:  volume  overload, Irregular rate  and  rhythm

## 2021-10-26 NOTE — HISTORY OF PRESENT ILLNESS
[FreeTextEntry1] : 85 y/o M with h/o HFpEF, DM, HTN, afib not on Ac due to mult falls, JOHANNY, CKD coming for follow up. Patient states that his symptoms have greatly improved.  Decreased sob, LE edema,  lethargy. He states that  he recently was treated for PNA. Patient states that he is compliant with medication but has not been complaint with dietary restrictions.  He states that his weight has not fluctuated much 10 lb lower.\par \par

## 2021-11-03 NOTE — H&P ADULT - HISTORY OF PRESENT ILLNESS
86 yr old male with hx of HTN, DM, AFib no AC 2/2 falls, HFpEF, JOHANNY presents         He denied fevers, chills, cough or sputum production, nausea or vomiting, diarrhea or other associated symptoms. 86 yr old male with hx of HTN, DM, AFib no AC 2/2 falls, HFpEF, JOHANNY presented to ED today 2/2 dizziness on/off since 11/1. States he was feeling dizzy yesterday and fell x2 and again felt dizzy today when he  went to urgent care where he was lowered to ground to prevent from falling. Son states pt saw his cardiologist Dr Huerta who changed up his meds last week and states this may be contributing to dad feeling dizzy. Son stated pts torsemide was increased and farxiga was added. Currently, pt states he feels fine and is asymptomatic. Pt denied fevers, chills, cough or sputum production, nausea or vomiting, diarrhea or other associated symptoms. 86 yr old male with hx of HTN, DM, AFib no AC 2/2 falls, HFpEF, JOHANNY presented to ED today 2/2 dizziness on/off since 11/1. States he was feeling dizzy yesterday and fell x2 and again felt dizzy today when he  went to urgent care where he was lowered to ground to prevent from falling. Son states pt saw his cardiologist Dr Huerta who changed up his meds last week and states this may be contributing to dad feeling dizzy. Son stated pts torsemide was increased and farxiga was added. Currently, pt states he feels fine and is asymptomatic. Pt denied loc, fevers, chills, cough or sputum production, nausea or vomiting, diarrhea or other associated symptoms.

## 2021-11-03 NOTE — ED PROVIDER NOTE - ATTENDING CONTRIBUTION TO CARE
I was present for and supervised the key and critical aspects of the procedures performed during the care of the patient.  86 y.o male w/ hx of NPH s/p  shunt, HTN, DM type 2 on insulin, obesity, AFib not on anticoagulation due to h/o falls, HFpEF and JOHANNY presents to the ED for evaluation of lightheaded sensation x 1 week.  Intermittent, worse w/ standing up, moderate severity, no radiation of pain.  Went to urgent care today, had near syncopal episode, lowered to ground.  Staff was not able to obtain BP, bradycardic.  Upon EMS arrival BP 80s systolic, HR 40s.  Upon arrival to ED states he is asx patient's daughter reports increasing falls   on exam patient is hard of hearing, he is bradycardic but asymptomatic at this time, normotensive, he has no signs of trauma he is nc/at perrla eomi oropharynx clear cta b/l, patient has equal radial pulses 2 += pedal pulses 2 += no edema noted.  abd-soft no guarding no rebound ext from   neuro- patient has from full strength, full sensation.  patient has no focal deficits   a/p- we have obtained ct head, shunt series, ekg, labs and troponin I will admit for further monitoring at this time. I was present for and supervised the key and critical aspects of the procedures performed during the care of the patient.  86 y.o male w/ hx of NPH s/p  shunt, HTN, DM type 2 on insulin, obesity, AFib not on anticoagulation due to h/o falls, HFpEF and JOHANNY presents to the ED for evaluation of lightheaded sensation x 1 week.  Intermittent, worse w/ standing up, moderate severity, no radiation of pain.  Went to urgent care today, had near syncopal episode, lowered to ground.  Staff was not able to obtain BP, bradycardic.  Upon EMS arrival BP 80s systolic, HR 40s.  Upon arrival to ED states he is asx patient's daughter reports increasing falls   on exam patient is hard of hearing, he is bradycardic but asymptomatic at this time, normotensive, he has no signs of trauma he is nc/at perrla eomi oropharynx clear cta b/l, patient has equal radial pulses 2 += pedal pulses 2 += no edema noted.  abd-soft no guarding no rebound ext from   neuro- patient has from full strength, full sensation.  patient has no focal deficits   a/p- we have obtained ct head, shunt series, ekg, labs and troponin I will admit for further monitoring at this time.  on exam the patient has no signs of redness or pain no signs of infection at  shunt site

## 2021-11-03 NOTE — ED PROVIDER NOTE - PHYSICAL EXAMINATION
CONST: Well appearing in NAD  EYES: Sclera and conjunctiva clear.  NECK: Non-tender, no meningeal signs, supple  CARD: bradycardic, regular.   RESP: Equal BS B/L, No wheezes, rhonchi or rales. No distress  GI: Soft, non-tender, non-distended.  MS: Normal ROM in all extremities. 1+ edema of lower extremities, no calf pain, radial pulses 2+ bilaterally  SKIN: superficial abrasions of upper extremities and shin.   NEURO: A&Ox3, CN II-XII intact, no focal deficits, no facial asymmetry, no pronator drift, normal finger to nose, no nystagmus, gross sensation intact, UE/LE strength 5/5, unsteady gait.

## 2021-11-03 NOTE — ED ADULT TRIAGE NOTE - CHIEF COMPLAINT QUOTE
pt BIBA from home, pt states over the past 3 days he has been becoming weak and dizzy and subsequently falling to the ground, denies loc. c/o back pain

## 2021-11-03 NOTE — H&P ADULT - NSHPPHYSICALEXAM_GEN_ALL_CORE
GENERAL: NAD, well-developed, Non-toxic, stated age   HEAD:  Atraumatic, Normocephalic  EYES: EOMI, Sclera White   NECK: Supple, No JVD  CHEST/LUNG: clear b/l breath sounds; No wheezing, rhonchi, or crackles  HEART: Regular rate and rhythm; s1, s2, No murmurs, rubs, or gallops  ABDOMEN: Soft, Nontender, Nondistended; Bowel sounds present, No rebound or guarding noted   EXTREMITIES:  No lower extremity edema or calf tenderness to palpation.  No clubbing or cyanosis  PSYCH: AAOx3, pleasant, cooperative, not anxious  NEUROLOGY: CN intact, 5/5 strength in all extremities, Sensation grossly intact.   SKIN: No rashes or lesions

## 2021-11-03 NOTE — H&P ADULT - NSHPSOCIALHISTORY_GEN_ALL_CORE
Ex smoker with 30 pack yr history, quit 30 yrs bcak  Non alcoholic  No IV drug abuse    Lives in [place] with family and has health aid

## 2021-11-03 NOTE — ED PROVIDER NOTE - CARE PLAN
1 Principal Discharge DX:	TOBIAS (acute kidney injury)  Secondary Diagnosis:	Bradycardia  Secondary Diagnosis:	Unsteady gait

## 2021-11-03 NOTE — ED PROVIDER NOTE - PROGRESS NOTE DETAILS
discussed case w/ ICU.  concern for TOBIAS, bradycardia and metoprolol. ok for soft tele. Discussed case w/ hospitalist.  requests NS consult.  Discussed case w/ NS.  requests pt is admitted [place].  Discussed case w/ hospitalist.  aware of admission. attempted to reach Dr. hill.  no call back.

## 2021-11-03 NOTE — ED PROVIDER NOTE - OBJECTIVE STATEMENT
86 y.o male w/ hx of NPH s/p  shunt, HTN, DM type 2 on insulin, obesity, AFib not on anticoagulation due to h/o falls, HFpEF and JOHANNY presents to the ED for evaluation of lightheaded sensation x 1 week.  Intermittent, worse w/ standing up, moderate severity, no radiation of pain.  Went to urgent care today, had near syncopal episode, lowered to ground.  Staff was not able to obtain BP, bradycardic.  Upon EMS arrival BP 80s systolic, HR 40s.  Upon arrival to ED states he is asx.  HR 50s-70s, normotensive.  Family reports multiple falls over past few days.

## 2021-11-03 NOTE — H&P ADULT - ASSESSMENT
86 yr old male with hx of HTN, DM, AFib no AC 2/2 falls, HFpEF, JOHANNY    # Near Syncope  # Elevated trop   - etiology multifactorial: Hx of Multiple NSVTs, PVCs vs Afib with RVR vs cushing triad secondary to increase ICP ( shunt in place) vs orthostatic hypotension  - CT Head No Cont (11.03.21): since prior CT of 2/15/2019 there is increased ventricular dilation with frontal ventriculoperitoneal shunt catheter in place  - trop:  0.08 - likely NSTEMI type 2  - EKG: Afib. bradycardia, HR 40s  - not on A/C due to frequent falls  - BNP: 3000  -Echo (07/2021) EF 54%, mod pHTN, mild AR, mild-mod MVR, TR  - check TSH  - check orthostatics   - cardiology eval   - NeuroSx Eval   - admit to tele     # Hypokalemia   - K repleted     # TOBIAS over CKD 3  - Cr 3.2; BUN 94  - likely overdiuresis   - hold torsemide  - start NS@65  - on Phoslo for hyperphosphatemia   - outpt f/u with Dr. Merchant    # HTN  - controlled  - hold lopressor due to jeanette  - start amlodipine in bp elevated    # DM type 2  - monitor FS  - start SS insulin 20U; 7U+7U+7U+    # JOHANNY  - CPAP qhs    # BPH  - c/w Flomax and finasteride      # Ambulate as tolerated   - Ambulate with Walker  - fall precaution    # DVT PPX: heparin   Injectable    # DASH diet, CHO consistent     # Full code  86 yr old male with hx of HTN, DM, AFib no AC 2/2 falls, HFpEF, JOHANNY admitted for recurrent near syncope on ambulation     # Near Syncope  # Elevated trop   - etiology multifactorial: Hx of Multiple NSVTs, PVCs vs Afib with RVR vs orthostatic hypotension vs overdiuresis vs bb s/e vs cushing triad secondary to increase ICP  - clinically dry on exam   - CT Head No Cont (11.03.21): since prior CT of 2/15/2019 there is increased ventricular dilation with frontal ventriculoperitoneal shunt catheter in place  - trop:  0.08 - likely NSTEMI type 2  - EKG: Afib; bradycardia (HR 40s)  - not on A/C due to frequent falls  - BNP: 3000  -Echo (07/2021) EF 54%, mod pHTN, mild AR, mild-mod MVR, TR  - check TSH  - check orthostatics   - check ambulatory pulse  - cardiology eval   - NeuroSx Eval   - admit to tele     # Hypokalemia   - K replete    # TOBIAS over CKD 3  - Cr 3.2; BUN 94  - clinically dry on exam   - likely overdiuresis   - hold all diuretics   - start NS@65  - on Phoslo for hyperphosphatemia   - outpt f/u with Dr. Merchant    # HTN  - controlled  - hold lopressor due to jeanette  - start amlodipine in bp elevated    # DM type 2  - monitor FS  - start SS insulin 20U; 7U+7U+7U  - check HbA1C    # JOHANNY  - CPAP qhs    # BPH  - c/w Flomax and finasteride      # Ambulate as tolerated   - Ambulate with Walker  - fall precaution  - PT eval     # DVT Ppx  - start heparin s/q    # DASH diet, CHO consistent     # Full code

## 2021-11-03 NOTE — ED PROVIDER NOTE - CLINICAL SUMMARY MEDICAL DECISION MAKING FREE TEXT BOX
patient has improved at this time stable however, found to have worsening boom, in addition we have consulted ns for evaluation of ct findings I will admit to the [facility] for further evalution and monitoring at this time

## 2021-11-03 NOTE — H&P ADULT - NSHPLABSRESULTS_GEN_ALL_CORE
14.2   9.57  )-----------( 233      ( 03 Nov 2021 15:00 )             46.8         11-03    140  |  89<L>  |  94<HH>  ----------------------------<  114<H>  3.4<L>   |  33<H>  |  3.2<H>    Ca    9.9      03 Nov 2021 15:00  Mg     2.8     11-03    TPro  7.9  /  Alb  4.9  /  TBili  1.1  /  DBili  x   /  AST  10  /  ALT  8   /  AlkPhos  102  11-03      CT Head No Cont (11.03.21)  Since prior CT of 2/15/2019 there is increased ventricular dilation with frontal ventriculoperitoneal shunt catheter in place.  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  Chronic microvascular type changes as well as a chronic left basal ganglia lacunar infarct.

## 2021-11-03 NOTE — ED PROVIDER NOTE - NS ED ROS FT
Constitutional: See HPI.  Eyes: No visual changes, eye pain or discharge.   ENMT: No hearing changes, pain, discharge or infections. No neck pain or stiffness. No limited ROM  Cardiac: + near syncope. No SOB or edema. No chest pain with exertion.  Respiratory: No cough or respiratory distress.   GI: No nausea, vomiting, diarrhea or abdominal pain.  : No dysuria, frequency or burning. No Discharge  MS: No myalgia, muscle weakness, joint pain or back pain.  Neuro: No headache or weakness. No LOC.  Skin: No skin rash.  Except as documented in the HPI, all other systems are negative.

## 2021-11-04 NOTE — ED ADULT NURSE REASSESSMENT NOTE - NS ED NURSE REASSESS COMMENT FT1
Assumed pt from day shift primary care RN c/o TOBIAS, bradycardia, unsteady gait , telemetry admission , called ED3 for report , ED3 RN refusing report this time cokelsea changed of shift ED charge nurse April made aware , HR now 83 , no respiratory distress noted , fall alarm on , on fall precaution ,fall prevention health teachings done , denies any pain , IVL intact , no SOB , will continue to monitor
Paul (son ) [phone]    contact for update
Pt BIBA transferred from [facility], Tele admit. Pt arrived to ED stable, VS WDL
Report given to ARLENE Trammell Charge at [place] of pt transfer.   [employer] also notified.
Report given to LORRIE Mcfarland Lead at [facility] ED. Pt admitted to telemetry and going north for neuro consult. Pt VSS.
Transport came to take patient to ED at 0500. patient left in no distress.
transported to ED3 , endorsed to new ED3 primary nurse , franchesca SOB , denies pain this time , IVL intact , belongings sent
Pt transferred from [facility]. Pt is AAOX4 spont unlab breathing on RA, NAD. PT put on CM. Denies any complaints at this time. Pt waiting neurosurgery consult and in patient bed assignment. Will monitor.

## 2021-11-04 NOTE — PROGRESS NOTE ADULT - SUBJECTIVE AND OBJECTIVE BOX
Patient Information:  VINCENZO EDMONDSON / 86y / Male / MRN#:[id number]    Hospital Day: 1d    Interval History:  Patient seen and examined at bedside. No acute events overnight.     Past Medical History:  Afib    DM (diabetes mellitus)    CKD (chronic kidney disease)    Hydrocephalus    Hypertension      Past Surgical History:  No significant past surgical history    History of prostate surgery      Allergies:  No Known Allergies    Medications:  PRN:    Standing:  atorvastatin 20 milliGRAM(s) Oral at bedtime  calcium acetate 667 milliGRAM(s) Oral three times a day with meals  dextrose 40% Gel 15 Gram(s) Oral once  dextrose 5%. 1000 milliLiter(s) (50 mL/Hr) IV Continuous <Continuous>  dextrose 5%. 1000 milliLiter(s) (100 mL/Hr) IV Continuous <Continuous>  dextrose 50% Injectable 25 Gram(s) IV Push once  dextrose 50% Injectable 12.5 Gram(s) IV Push once  dextrose 50% Injectable 25 Gram(s) IV Push once  finasteride 5 milliGRAM(s) Oral daily  gabapentin 100 milliGRAM(s) Oral daily  gabapentin 300 milliGRAM(s) Oral at bedtime  glucagon  Injectable 1 milliGRAM(s) IntraMuscular once  heparin   Injectable 5000 Unit(s) SubCutaneous every 8 hours  insulin glargine Injectable (LANTUS) 20 Unit(s) SubCutaneous at bedtime  insulin lispro (ADMELOG) corrective regimen sliding scale   SubCutaneous three times a day before meals  insulin lispro Injectable (ADMELOG) 7 Unit(s) SubCutaneous before breakfast  insulin lispro Injectable (ADMELOG) 7 Unit(s) SubCutaneous before lunch  insulin lispro Injectable (ADMELOG) 7 Unit(s) SubCutaneous before dinner  tamsulosin 0.4 milliGRAM(s) Oral at bedtime    Home:  acetaminophen 325 mg oral tablet: 2 tab(s) orally every 6 hours, As needed, Moderate Pain (4 - 6)  Basaglar KwikPen: 20 unit(s) subcutaneous once a day (at bedtime)  calcitriol 0.25 mcg oral capsule: 1 cap(s) orally once a day  dutasteride 0.5 mg oral capsule: 1 cap(s) orally once a day  Farxiga 10 mg oral tablet: 1 tab(s) orally once a day  gabapentin 100 mg oral capsule: 1 cap(s) orally once a day in AM  gabapentin 300 mg oral capsule: 1 cap(s) orally once a day (at bedtime)  metOLazone 5 mg oral tablet: orally 3 times a week M-W-F  Metoprolol Succinate ER 25 mg oral tablet, extended release: 1 tab(s) orally once a day  Ozempic (1 mg dose) 4 mg/3 mL subcutaneous solution: 1 milligram(s) subcutaneous once a week on Thursday  spironolactone 25 mg oral tablet: 1 tab(s) orally once a day    Vitals:  T(C): 36.5, Max: 37.1 (11-04-21 @ 04:29)  T(F): 97.7, Max: 98.7 (11-04-21 @ 04:29)  HR: 65 (45 - 92)  BP: 118/59 (118/59 - 141/70)  RR: 18 (17 - 20)  SpO2: 98% (96% - 98%)    Physical Exam:  General: Awake, alert, NAD, sitting up in bed, on RA  HEENT: Head NC/AT  Heart: RRR; S1/S2; no rubs apprec  Lungs: Good air entry bilaterally  Abdomen: Soft, nontender, nondistended, BS+  Extremities: No edema, clubbing, cyanosis in upper or lower extremities  Neuro: AAOx3, NFD    Labs:  CBC (11-03 @ 15:00)                        Hgb: 14.2   WBC: 9.57  )-----------------( Plts: 233                              Hct: 46.8     Chem (11-03 @ 15:00)  Na: 140  |     Cl: 89     |  BUN: 94  -----------------------------------------< Gluc: 114    K: 3.4   |    HCO3: 33  |  Cr: 3.2    Ca 9.9 (11-03 @ 15:00)  Mg 2.8 (11-03 @ 15:00)    LFTs (11-03 @ 15:00)  TPro 7.9  /  Alb 4.9  TBili 1.1  /  DBili     AST 10  /  ALT 8   /  AlkPhos 102    Cardiac Markers (11-03 @ 15:00)  Troponin I X  Troponin T 0.08  CK X  CKMB X  CKMB Units X  Myoglobin X  Lactate X  ESR X        PT/INR (11-03 @ 15:00)  PT: 12.80; INR: 1.11   PTT: 33.2           Patient Information:  VINCENZO EDMONDSON / 86y / Male / MRN#:[id number]    Hospital Day: 1d    Interval History:  Patient seen and examined at bedside. No acute events overnight.     Attending note: Pt seen and examined at bedside. No cp or sob. Pt feeling better today. Still has dizziness when he stands.     Past Medical History:  Afib    DM (diabetes mellitus)    CKD (chronic kidney disease)    Hydrocephalus    Hypertension      Past Surgical History:  No significant past surgical history    History of prostate surgery      Allergies:  No Known Allergies    Medications:  PRN:    Standing:  atorvastatin 20 milliGRAM(s) Oral at bedtime  calcium acetate 667 milliGRAM(s) Oral three times a day with meals  dextrose 40% Gel 15 Gram(s) Oral once  dextrose 5%. 1000 milliLiter(s) (50 mL/Hr) IV Continuous <Continuous>  dextrose 5%. 1000 milliLiter(s) (100 mL/Hr) IV Continuous <Continuous>  dextrose 50% Injectable 25 Gram(s) IV Push once  dextrose 50% Injectable 12.5 Gram(s) IV Push once  dextrose 50% Injectable 25 Gram(s) IV Push once  finasteride 5 milliGRAM(s) Oral daily  gabapentin 100 milliGRAM(s) Oral daily  gabapentin 300 milliGRAM(s) Oral at bedtime  glucagon  Injectable 1 milliGRAM(s) IntraMuscular once  heparin   Injectable 5000 Unit(s) SubCutaneous every 8 hours  insulin glargine Injectable (LANTUS) 20 Unit(s) SubCutaneous at bedtime  insulin lispro (ADMELOG) corrective regimen sliding scale   SubCutaneous three times a day before meals  insulin lispro Injectable (ADMELOG) 7 Unit(s) SubCutaneous before breakfast  insulin lispro Injectable (ADMELOG) 7 Unit(s) SubCutaneous before lunch  insulin lispro Injectable (ADMELOG) 7 Unit(s) SubCutaneous before dinner  tamsulosin 0.4 milliGRAM(s) Oral at bedtime    Home:  acetaminophen 325 mg oral tablet: 2 tab(s) orally every 6 hours, As needed, Moderate Pain (4 - 6)  Basaglar KwikPen: 20 unit(s) subcutaneous once a day (at bedtime)  calcitriol 0.25 mcg oral capsule: 1 cap(s) orally once a day  dutasteride 0.5 mg oral capsule: 1 cap(s) orally once a day  Farxiga 10 mg oral tablet: 1 tab(s) orally once a day  gabapentin 100 mg oral capsule: 1 cap(s) orally once a day in AM  gabapentin 300 mg oral capsule: 1 cap(s) orally once a day (at bedtime)  metOLazone 5 mg oral tablet: orally 3 times a week M-W-F  Metoprolol Succinate ER 25 mg oral tablet, extended release: 1 tab(s) orally once a day  Ozempic (1 mg dose) 4 mg/3 mL subcutaneous solution: 1 milligram(s) subcutaneous once a week on Thursday  spironolactone 25 mg oral tablet: 1 tab(s) orally once a day    Vitals:  T(C): 36.5, Max: 37.1 (11-04-21 @ 04:29)  T(F): 97.7, Max: 98.7 (11-04-21 @ 04:29)  HR: 65 (45 - 92)  BP: 118/59 (118/59 - 141/70)  RR: 18 (17 - 20)  SpO2: 98% (96% - 98%)    Physical Exam:  General: Awake, alert, NAD, sitting up in bed, on RA  HEENT: Head NC/AT  Heart: RRR; S1/S2; no rubs apprec  Lungs: Good air entry bilaterally  Abdomen: Soft, nontender, nondistended, BS+  Extremities: No edema, clubbing, cyanosis in upper or lower extremities, dry turgor on lower ext  Neuro: AAOx3, NFD    Labs:  CBC (11-03 @ 15:00)                        Hgb: 14.2   WBC: 9.57  )-----------------( Plts: 233                              Hct: 46.8     Chem (11-03 @ 15:00)  Na: 140  |     Cl: 89     |  BUN: 94  -----------------------------------------< Gluc: 114    K: 3.4   |    HCO3: 33  |  Cr: 3.2    Ca 9.9 (11-03 @ 15:00)  Mg 2.8 (11-03 @ 15:00)    LFTs (11-03 @ 15:00)  TPro 7.9  /  Alb 4.9  TBili 1.1  /  DBili     AST 10  /  ALT 8   /  AlkPhos 102    Cardiac Markers (11-03 @ 15:00)  Troponin I X  Troponin T 0.08  CK X  CKMB X  CKMB Units X  Myoglobin X  Lactate X  ESR X        PT/INR (11-03 @ 15:00)  PT: 12.80; INR: 1.11   PTT: 33.2

## 2021-11-04 NOTE — CONSULT NOTE ADULT - ASSESSMENT
86M with VPS nonprogrammable for NPH p/w worsening gait     PLAN   - Please obtain MRI from [facility] which was done last month with Dr. Parker, want to compare studies   - NM Shuntogram   - Care per neurology / medical team   - Discussed case with Dr. Deal

## 2021-11-04 NOTE — CONSULT NOTE ADULT - SUBJECTIVE AND OBJECTIVE BOX
Date of Admission: 11/3/21    HISTORY OF PRESENT ILLNESS:     86 yr old male with hx of HTN, DM, AFib no AC 2/2 falls, HFpEF, JOHANNY presented to ED today 2/2 dizziness on/off since . States he was feeling dizzy yesterday and fell x2 and again felt dizzy today when he  went to urgent care where he was lowered to ground to prevent from falling. Son states pt saw his cardiologist Dr Huerta who changed up his meds last week and states this may be contributing to dad feeling dizzy. Son stated pts torsemide was increased and farxiga was added. Currently, pt states he feels fine and is asymptomatic. Pt denied loc, fevers, chills, cough or sputum production, nausea or vomiting, diarrhea or other associated symptoms. (2021 20:20)      PAST MEDICAL & SURGICAL HISTORY:    Afib  DM (diabetes mellitus)  CKD (chronic kidney disease)  Hydrocephalus  Hypertension  History of prostate surgery    FAMILY HISTORY:    Mother:  at 72 of HF  Father:  at 68 of a brain aneurism    SOCIAL HISTORY:      Former smoker, denies alcohol or drug use     Allergies    No Known Allergies    Intolerances      REVIEW OF SYSTEMS:    CONSTITUTIONAL: No fever, weight loss, or fatigue  CARDIOLOGY: denies chest pain, shortness of breath or syncopal episodes.   RESPIRATORY: denies shortness of breath, wheezing.   NEUROLOGICAL: no weakness, no focal deficits to report.  ENDOCRINOLOGICAL: no recent change in diabetic medications.   GI: no BRBPR, no N,V, diarrhea.    PSYCHIATRY: normal mood and affect  HEENT: no nasal discharge, no ecchymosis  SKIN: no ecchymosis, no breakdown  MUSCULOSKELETAL: Full range of motion x4.     PHYSICAL EXAM:  General Appearance: well appearing, normal for age and gender. 	  Neck: normal JVP, no bruit.   Eyes: Extra Ocular muscles intact.   Cardiovascular: regular rate and rhythm S1 S2, No JVD, No murmurs, No edema  Respiratory: Lungs clear to auscultation	  Psychiatry: Alert and oriented x 3, Mood & affect appropriate  Gastrointestinal:  Soft, Non-tender  Skin/Integumentary : No rashes, No ecchymoses, No cyanosis	  Neurologic: Non-focal  Musculoskeletal/ extremities: Normal range of motion, No clubbing, cyanosis or edema  Vascular: Peripheral pulses palpable 2+ bilaterally    CARDIAC MARKERS:    Serum Pro-Brain Natriuretic Peptide: 3334 pg/mL (11.03.21 @ 15:00)      PREVIOUS DIAGNOSTIC TESTING:      TTE 21     Summary:   1. Normal global left ventricular systolic function.   2. LV Ejection Fraction by Lemon's Method with a biplane EF of 54 %.   3. Mild left ventricular hypertrophy.   4. Mildly increased LV wall thickness.   5. Normal left ventricular internal cavity size.   6. Moderately enlarged left atrium.   7. Mild mitral annular calcification.   8. Mild thickening and calcification of the anterior and posterior mitral valve leaflets.   9. Mild to moderate mitral valve regurgitation.  10. Mild-moderate tricuspid regurgitation.  11. Mild aortic regurgitation.  12. Sclerotic aortic valve with normal opening.  13. Estimated pulmonary artery systolic pressure is 54.2 mmHg assuming a right atrial pressure of 5 mmHg, which is consistent with moderate pulmonary hypertension.  14. LA volume Index is 51.8 ml/m² ml/m2.  15. There is moderate aortic root calcification.        Home Medications:  acetaminophen 325 mg oral tablet: 2 tab(s) orally every 6 hours, As needed, Moderate Pain (4 - 6) (2021 11:54)  Basaglar KwikPen: 20 unit(s) subcutaneous once a day (at bedtime) (2021 22:10)  calcitriol 0.25 mcg oral capsule: 1 cap(s) orally once a day (2021 15:07)  dutasteride 0.5 mg oral capsule: 1 cap(s) orally once a day (2021 15:07)  Farxiga 10 mg oral tablet: 1 tab(s) orally once a day (2021 21:56)  gabapentin 100 mg oral capsule: 1 cap(s) orally once a day in AM (2021 21:49)  gabapentin 300 mg oral capsule: 1 cap(s) orally once a day (at bedtime) (2021 21:49)  metOLazone 5 mg oral tablet: orally 3 times a week -- (2021 22:06)  Metoprolol Succinate ER 25 mg oral tablet, extended release: 1 tab(s) orally once a day (2021 21:52)  Ozempic (1 mg dose) 4 mg/3 mL subcutaneous solution: 1 milligram(s) subcutaneous once a week on Thursday (2021 21:50)  spironolactone 25 mg oral tablet: 1 tab(s) orally once a day (2021 22:00)    MEDICATIONS  (STANDING):  atorvastatin 20 milliGRAM(s) Oral at bedtime  calcium acetate 667 milliGRAM(s) Oral three times a day with meals  dextrose 40% Gel 15 Gram(s) Oral once  dextrose 5%. 1000 milliLiter(s) (50 mL/Hr) IV Continuous <Continuous>  dextrose 5%. 1000 milliLiter(s) (100 mL/Hr) IV Continuous <Continuous>  dextrose 50% Injectable 25 Gram(s) IV Push once  dextrose 50% Injectable 12.5 Gram(s) IV Push once  dextrose 50% Injectable 25 Gram(s) IV Push once  finasteride 5 milliGRAM(s) Oral daily  gabapentin 100 milliGRAM(s) Oral daily  gabapentin 300 milliGRAM(s) Oral at bedtime  glucagon  Injectable 1 milliGRAM(s) IntraMuscular once  heparin   Injectable 5000 Unit(s) SubCutaneous every 8 hours  insulin glargine Injectable (LANTUS) 20 Unit(s) SubCutaneous at bedtime  insulin lispro (ADMELOG) corrective regimen sliding scale   SubCutaneous three times a day before meals  insulin lispro Injectable (ADMELOG) 7 Unit(s) SubCutaneous before breakfast  insulin lispro Injectable (ADMELOG) 7 Unit(s) SubCutaneous before lunch  insulin lispro Injectable (ADMELOG) 7 Unit(s) SubCutaneous before dinner  tamsulosin 0.4 milliGRAM(s) Oral at bedtime    MEDICATIONS  (PRN):         Date of Admission: 11/3/21    HISTORY OF PRESENT ILLNESS:     86 yr old male with hx of HTN, DM, AFib no AC 2/2 falls, HFpEF, JOHANNY presented to ED today 2/2 dizziness on/off since . States he was feeling dizzy yesterday and fell x2 and again felt dizzy today when he  went to urgent care where he was lowered to ground to prevent from falling. Son states that recent change in meds may be contributing to dad feeling dizzy. Son stated pts torsemide was increased and farxiga was added. Currently, pt states he feels fine and is asymptomatic. Pt denied loc, fevers, chills, cough or sputum production, nausea or vomiting, diarrhea or other associated symptoms. (2021 20:20)      PAST MEDICAL & SURGICAL HISTORY:    Afib  DM (diabetes mellitus)  CKD (chronic kidney disease)  Hydrocephalus  Hypertension  History of prostate surgery    FAMILY HISTORY:    Mother:  at 72 of HF  Father:  at 68 of a brain aneurism    SOCIAL HISTORY:      Former smoker, denies alcohol or drug use     Allergies    No Known Allergies    Intolerances      REVIEW OF SYSTEMS:    CONSTITUTIONAL: No fever, weight loss, or fatigue  CARDIOLOGY: denies chest pain, shortness of breath or syncopal episodes.   RESPIRATORY: denies shortness of breath, wheezing.   NEUROLOGICAL: no weakness, no focal deficits to report.  ENDOCRINOLOGICAL: no recent change in diabetic medications.   GI: no BRBPR, no N,V, diarrhea.    PSYCHIATRY: normal mood and affect  HEENT: no nasal discharge, no ecchymosis  SKIN: no ecchymosis, no breakdown  MUSCULOSKELETAL: Full range of motion x4.     PHYSICAL EXAM:  General Appearance: well appearing, normal for age and gender. 	  Neck: normal JVP, no bruit.   Eyes: Extra Ocular muscles intact.   Cardiovascular: regular rate and rhythm S1 S2, No JVD, No murmurs, No edema  Respiratory: Lungs clear to auscultation	  Psychiatry: Alert and oriented x 3, Mood & affect appropriate  Gastrointestinal:  Soft, Non-tender  Skin/Integumentary : No rashes, No ecchymoses, No cyanosis	  Neurologic: Non-focal  Musculoskeletal/ extremities: Normal range of motion, No clubbing, cyanosis or edema  Vascular: Peripheral pulses palpable 2+ bilaterally    CARDIAC MARKERS:    Serum Pro-Brain Natriuretic Peptide: 3334 pg/mL (21 @ 15:00)      PREVIOUS DIAGNOSTIC TESTING:      TTE 21     Summary:   1. Normal global left ventricular systolic function.   2. LV Ejection Fraction by Lemon's Method with a biplane EF of 54 %.   3. Mild left ventricular hypertrophy.   4. Mildly increased LV wall thickness.   5. Normal left ventricular internal cavity size.   6. Moderately enlarged left atrium.   7. Mild mitral annular calcification.   8. Mild thickening and calcification of the anterior and posterior mitral valve leaflets.   9. Mild to moderate mitral valve regurgitation.  10. Mild-moderate tricuspid regurgitation.  11. Mild aortic regurgitation.  12. Sclerotic aortic valve with normal opening.  13. Estimated pulmonary artery systolic pressure is 54.2 mmHg assuming a right atrial pressure of 5 mmHg, which is consistent with moderate pulmonary hypertension.  14. LA volume Index is 51.8 ml/m² ml/m2.  15. There is moderate aortic root calcification.        Home Medications:  acetaminophen 325 mg oral tablet: 2 tab(s) orally every 6 hours, As needed, Moderate Pain (4 - 6) (2021 11:54)  Basaglar KwikPen: 20 unit(s) subcutaneous once a day (at bedtime) (2021 22:10)  calcitriol 0.25 mcg oral capsule: 1 cap(s) orally once a day (2021 15:07)  dutasteride 0.5 mg oral capsule: 1 cap(s) orally once a day (2021 15:07)  Farxiga 10 mg oral tablet: 1 tab(s) orally once a day (2021 21:56)  gabapentin 100 mg oral capsule: 1 cap(s) orally once a day in AM (2021 21:49)  gabapentin 300 mg oral capsule: 1 cap(s) orally once a day (at bedtime) (2021 21:49)  metOLazone 5 mg oral tablet: orally 3 times a week -W- (2021 22:06)  Metoprolol Succinate ER 25 mg oral tablet, extended release: 1 tab(s) orally once a day (2021 21:52)  Ozempic (1 mg dose) 4 mg/3 mL subcutaneous solution: 1 milligram(s) subcutaneous once a week on Thursday (2021 21:50)  spironolactone 25 mg oral tablet: 1 tab(s) orally once a day (2021 22:00)    MEDICATIONS  (STANDING):  atorvastatin 20 milliGRAM(s) Oral at bedtime  calcium acetate 667 milliGRAM(s) Oral three times a day with meals  dextrose 40% Gel 15 Gram(s) Oral once  dextrose 5%. 1000 milliLiter(s) (50 mL/Hr) IV Continuous <Continuous>  dextrose 5%. 1000 milliLiter(s) (100 mL/Hr) IV Continuous <Continuous>  dextrose 50% Injectable 25 Gram(s) IV Push once  dextrose 50% Injectable 12.5 Gram(s) IV Push once  dextrose 50% Injectable 25 Gram(s) IV Push once  finasteride 5 milliGRAM(s) Oral daily  gabapentin 100 milliGRAM(s) Oral daily  gabapentin 300 milliGRAM(s) Oral at bedtime  glucagon  Injectable 1 milliGRAM(s) IntraMuscular once  heparin   Injectable 5000 Unit(s) SubCutaneous every 8 hours  insulin glargine Injectable (LANTUS) 20 Unit(s) SubCutaneous at bedtime  insulin lispro (ADMELOG) corrective regimen sliding scale   SubCutaneous three times a day before meals  insulin lispro Injectable (ADMELOG) 7 Unit(s) SubCutaneous before breakfast  insulin lispro Injectable (ADMELOG) 7 Unit(s) SubCutaneous before lunch  insulin lispro Injectable (ADMELOG) 7 Unit(s) SubCutaneous before dinner  tamsulosin 0.4 milliGRAM(s) Oral at bedtime    MEDICATIONS  (PRN):

## 2021-11-04 NOTE — CONSULT NOTE ADULT - ASSESSMENT
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

## 2021-11-04 NOTE — CONSULT NOTE ADULT - SUBJECTIVE AND OBJECTIVE BOX
NEPHROLOGY CONSULTATION NOTE    86 yr old male with hx of HTN, DM, AFib no AC 2/2 falls, HFpEF, JOHANNY presented to ED today 2/2 dizziness on/off since 11/1. States he was feeling dizzy yesterday and fell x2 and again felt dizzy today when he  went to urgent care where he was lowered to ground to prevent from falling. Son states pt saw his cardiologist Dr Huerta who changed up his meds last week and states this may be contributing to dad feeling dizzy. Son stated pts torsemide was increased and farxiga was added. Currently, pt states he feels fine and is asymptomatic. Pt denied loc, fevers, chills, cough or sputum production, nausea or vomiting, diarrhea or other associated symptoms.      PAST MEDICAL & SURGICAL HISTORY:  Hypertension  Hydrocephalus  CKD (chronic kidney disease)  DM (diabetes mellitus)  Afib  History of prostate surgery    Allergies:  No Known Allergies    Home Medications Reviewed    SOCIAL HISTORY:  Denies ETOH,Smoking,   FAMILY HISTORY:  Family history of diabetes mellitus (Mother, Sibling)      REVIEW OF SYSTEMS  All other review of systems is negative unless indicated above.    PHYSICAL EXAM:  NAD  [place]  moist mm  no ln  decreased BS b/l  distant HS, irregular   soft + BS  1+ edema    Hospital Medications:   MEDICATIONS  (STANDING):  atorvastatin 20 milliGRAM(s) Oral at bedtime  calcium acetate 667 milliGRAM(s) Oral three times a day with meals  dextrose 40% Gel 15 Gram(s) Oral once  dextrose 5%. 1000 milliLiter(s) (50 mL/Hr) IV Continuous <Continuous>  dextrose 5%. 1000 milliLiter(s) (100 mL/Hr) IV Continuous <Continuous>  dextrose 50% Injectable 25 Gram(s) IV Push once  dextrose 50% Injectable 12.5 Gram(s) IV Push once  dextrose 50% Injectable 25 Gram(s) IV Push once  finasteride 5 milliGRAM(s) Oral daily  gabapentin 100 milliGRAM(s) Oral daily  gabapentin 300 milliGRAM(s) Oral at bedtime  glucagon  Injectable 1 milliGRAM(s) IntraMuscular once  heparin   Injectable 5000 Unit(s) SubCutaneous every 8 hours  insulin glargine Injectable (LANTUS) 20 Unit(s) SubCutaneous at bedtime  insulin lispro (ADMELOG) corrective regimen sliding scale   SubCutaneous three times a day before meals  insulin lispro Injectable (ADMELOG) 7 Unit(s) SubCutaneous before breakfast  insulin lispro Injectable (ADMELOG) 7 Unit(s) SubCutaneous before lunch  insulin lispro Injectable (ADMELOG) 7 Unit(s) SubCutaneous before dinner  tamsulosin 0.4 milliGRAM(s) Oral at bedtime        VITALS:  T(F): 96.6 (11-04-21 @ 16:08), Max: 98.7 (11-04-21 @ 04:29)  HR: 89 (11-04-21 @ 16:08)  BP: 129/67 (11-04-21 @ 16:08)  RR: 26 (11-04-21 @ 16:08)  SpO2: 97% (11-04-21 @ 16:08)  Wt(kg): --        LABS:  11-03    140  |  89<L>  |  94<HH>  ----------------------------<  114<H>  3.4<L>   |  33<H>  |  3.2<H>    Ca    9.9      03 Nov 2021 15:00  Mg     2.8     11-03    TPro  7.9  /  Alb  4.9  /  TBili  1.1  /  DBili      /  AST  10  /  ALT  8   /  AlkPhos  102  11-03                          14.2   9.57  )-----------( 233      ( 03 Nov 2021 15:00 )             46.8       Urine Studies:        RADIOLOGY & ADDITIONAL STUDIES:

## 2021-11-04 NOTE — CONSULT NOTE ADULT - ASSESSMENT
CHF/Near syncope/ TOBIAS on CKD    Patient is euvolemic on exam  Replace K 3.4 today  Get BMP daily   Maintain potassium >4.0, Mg >2.1  Strict intake and output  Daily weight   plan discussed with daughter Marguerite over the phone     CHF/Near syncope/ TOBIAS on CKD    Near syncope likely due to overdiuresis - now that patient is being complaint with diet current dose of diuretics is too much for him  Patient is euvolemic on exam  Hold diuretics for now   Replace K 3.4 today  Get BMP daily   Maintain potassium >4.0, Mg >2.1  Strict intake and output  Daily weight   Plan discussed with daughter Marguerite over the phone

## 2021-11-04 NOTE — CONSULT NOTE ADULT - SUBJECTIVE AND OBJECTIVE BOX
NEUROSURGERY CONSULT  VINCENZO EDMONDSON   11-04-21 @ 07:05    HPI:   86 yr old male with hx of HTN, DM, AFib no AC 2/2 falls, HFpEF, JOHANNY presented to ED today 2/2 dizziness on/off since 11/1. States he was feeling dizzy yesterday and fell x2 and again felt dizzy today when he  went to urgent care where he was lowered to ground to prevent from falling. Son states pt saw his cardiologist Dr Huerta who changed up his meds last week and states this may be contributing to dad feeling dizzy. Son stated pts torsemide was increased and farxiga was added. Currently, pt states he feels fine and is asymptomatic. Pt denied loc, fevers, chills, cough or sputum production, nausea or vomiting, diarrhea or other associated symptoms.    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 Patient states that for the past 2 weeks has been having worsening gait, and had CTH with increased ventricles. Patient denies any headaches, N/V.     RADIOLOGY:   < from: CT Head No Cont (11.03.21 @ 17:33) >  FINDINGS:  Redemonstration of right frontal ventriculoperitoneal shunt catheter with tip terminating within the right lateral ventricle. The ventricles are slightly more dilated on the current examination. There is similar gliosis involving the right frontal lobe along the ventriculostomy catheter tract.  Similar-appearing bilateral hemispheric subdural collections measuring approximately 6 mm in thickness bilaterally.  There is periventricular and scattered subcortical white matter hypodensity. There is a focal hypodensity involving the left lentiform nucleus.  The visualized intraorbital compartments and mastoid complexes appear free of acute disease. There is an air-fluid level in the right maxillary sinus.    IMPRESSION:  Since prior CT of 2/15/2019 there is increased ventricular dilation with frontal ventriculoperitoneal shunt catheter in place.  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  Chronic microvascular type changes as well as a chronic left basal ganglia lacunar infarct.    MEDS:  atorvastatin 20 milliGRAM(s) Oral at bedtime  calcium acetate 667 milliGRAM(s) Oral three times a day with meals  dextrose 40% Gel 15 Gram(s) Oral once  dextrose 5%. 1000 milliLiter(s) IV Continuous <Continuous>  dextrose 5%. 1000 milliLiter(s) IV Continuous <Continuous>  dextrose 50% Injectable 25 Gram(s) IV Push once  dextrose 50% Injectable 12.5 Gram(s) IV Push once  dextrose 50% Injectable 25 Gram(s) IV Push once  finasteride 5 milliGRAM(s) Oral daily  gabapentin 100 milliGRAM(s) Oral daily  gabapentin 300 milliGRAM(s) Oral at bedtime  glucagon  Injectable 1 milliGRAM(s) IntraMuscular once  heparin   Injectable 5000 Unit(s) SubCutaneous every 8 hours  insulin glargine Injectable (LANTUS) 20 Unit(s) SubCutaneous at bedtime  insulin lispro (ADMELOG) corrective regimen sliding scale   SubCutaneous three times a day before meals  insulin lispro Injectable (ADMELOG) 7 Unit(s) SubCutaneous before breakfast  insulin lispro Injectable (ADMELOG) 7 Unit(s) SubCutaneous before lunch  insulin lispro Injectable (ADMELOG) 7 Unit(s) SubCutaneous before dinner  sodium chloride 0.9%. 1000 milliLiter(s) IV Continuous <Continuous>  tamsulosin 0.4 milliGRAM(s) Oral at bedtime      PHYSICAL EXAM:  Vital Signs Last 24 Hrs  T(C): 36.7 (04 Nov 2021 05:29), Max: 37.1 (04 Nov 2021 04:29)  T(F): 98 (04 Nov 2021 05:29), Max: 98.7 (04 Nov 2021 04:29)  HR: 78 (04 Nov 2021 05:29) (45 - 92)  BP: 141/70 (04 Nov 2021 05:29) (121/75 - 141/70)  BP(mean): --  RR: 18 (04 Nov 2021 05:29) (17 - 20)  SpO2: 96% (04 Nov 2021 05:29) (96% - 98%)    AAOx3  EOMI, PERRL  HUSTON x4 with good strength   sensation intact  No drift   Face symemtrical     LABS:                        14.2   9.57  )-----------( 233      ( 03 Nov 2021 15:00 )             46.8     11-03    140  |  89<L>  |  94<HH>  ----------------------------<  114<H>  3.4<L>   |  33<H>  |  3.2<H>    Ca    9.9      03 Nov 2021 15:00  Mg     2.8     11-03    TPro  7.9  /  Alb  4.9  /  TBili  1.1  /  DBili  x   /  AST  10  /  ALT  8   /  AlkPhos  102  11-03    PT/INR - ( 03 Nov 2021 15:00 )   PT: 12.80 sec;   INR: 1.11 ratio         PTT - ( 03 Nov 2021 15:00 )  PTT:33.2 sec           NEUROSURGERY CONSULT  VINCENZO EDMONDSON   11-04-21 @ 07:05    HPI:   86 yr old male with hx of HTN, DM, AFib no AC 2/2 falls, HFpEF, JOHANNY presented to ED today 2/2 dizziness on/off since 11/1. States he was feeling dizzy yesterday and fell x2 and again felt dizzy today when he  went to urgent care where he was lowered to ground to prevent from falling. Son states pt saw his cardiologist Dr Huerta who changed up his meds last week and states this may be contributing to dad feeling dizzy. Son stated pts torsemide was increased and farxiga was added. Currently, pt states he feels fine and is asymptomatic. Pt denied loc, fevers, chills, cough or sputum production, nausea or vomiting, diarrhea or other associated symptoms.    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 Patient states that for the past 2 weeks has been having worsening gait, and had CTH with increased ventricles. Patient denies any headaches, N/V.     RADIOLOGY:   < from: CT Head No Cont (11.03.21 @ 17:33) >  FINDINGS:  Redemonstration of right frontal ventriculoperitoneal shunt catheter with tip terminating within the right lateral ventricle. The ventricles are slightly more dilated on the current examination. There is similar gliosis involving the right frontal lobe along the ventriculostomy catheter tract.  Similar-appearing bilateral hemispheric subdural collections measuring approximately 6 mm in thickness bilaterally.  There is periventricular and scattered subcortical white matter hypodensity. There is a focal hypodensity involving the left lentiform nucleus.  The visualized intraorbital compartments and mastoid complexes appear free of acute disease. There is an air-fluid level in the right maxillary sinus.    IMPRESSION:  Since prior CT of 2/15/2019 there is increased ventricular dilation with frontal ventriculoperitoneal shunt catheter in place.  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  Chronic microvascular type changes as well as a chronic left basal ganglia lacunar infarct.    MEDS:  atorvastatin 20 milliGRAM(s) Oral at bedtime  calcium acetate 667 milliGRAM(s) Oral three times a day with meals  dextrose 40% Gel 15 Gram(s) Oral once  dextrose 5%. 1000 milliLiter(s) IV Continuous <Continuous>  dextrose 5%. 1000 milliLiter(s) IV Continuous <Continuous>  dextrose 50% Injectable 25 Gram(s) IV Push once  dextrose 50% Injectable 12.5 Gram(s) IV Push once  dextrose 50% Injectable 25 Gram(s) IV Push once  finasteride 5 milliGRAM(s) Oral daily  gabapentin 100 milliGRAM(s) Oral daily  gabapentin 300 milliGRAM(s) Oral at bedtime  glucagon  Injectable 1 milliGRAM(s) IntraMuscular once  heparin   Injectable 5000 Unit(s) SubCutaneous every 8 hours  insulin glargine Injectable (LANTUS) 20 Unit(s) SubCutaneous at bedtime  insulin lispro (ADMELOG) corrective regimen sliding scale   SubCutaneous three times a day before meals  insulin lispro Injectable (ADMELOG) 7 Unit(s) SubCutaneous before breakfast  insulin lispro Injectable (ADMELOG) 7 Unit(s) SubCutaneous before lunch  insulin lispro Injectable (ADMELOG) 7 Unit(s) SubCutaneous before dinner  sodium chloride 0.9%. 1000 milliLiter(s) IV Continuous <Continuous>  tamsulosin 0.4 milliGRAM(s) Oral at bedtime      PHYSICAL EXAM:  Vital Signs Last 24 Hrs  T(C): 36.7 (04 Nov 2021 05:29), Max: 37.1 (04 Nov 2021 04:29)  T(F): 98 (04 Nov 2021 05:29), Max: 98.7 (04 Nov 2021 04:29)  HR: 78 (04 Nov 2021 05:29) (45 - 92)  BP: 141/70 (04 Nov 2021 05:29) (121/75 - 141/70)  BP(mean): --  RR: 18 (04 Nov 2021 05:29) (17 - 20)  SpO2: 96% (04 Nov 2021 05:29) (96% - 98%)    AAOx3  EOMI, PERRL  HUSTON x4 with good strength   sensation intact  No drift   Face symemtrical   Shunt pumps and refills     LABS:                        14.2   9.57  )-----------( 233      ( 03 Nov 2021 15:00 )             46.8     11-03    140  |  89<L>  |  94<HH>  ----------------------------<  114<H>  3.4<L>   |  33<H>  |  3.2<H>    Ca    9.9      03 Nov 2021 15:00  Mg     2.8     11-03    TPro  7.9  /  Alb  4.9  /  TBili  1.1  /  DBili  x   /  AST  10  /  ALT  8   /  AlkPhos  102  11-03    PT/INR - ( 03 Nov 2021 15:00 )   PT: 12.80 sec;   INR: 1.11 ratio         PTT - ( 03 Nov 2021 15:00 )  PTT:33.2 sec

## 2021-11-04 NOTE — CHART NOTE - NSCHARTNOTEFT_GEN_A_CORE
Patient VPS tapped under sterile conditions for shuntogram. 1 cc CSF withdrawn, 1 cc contrast injected into shunt reservoir. Patient tolerated procedure well with no complications.

## 2021-11-04 NOTE — PROGRESS NOTE ADULT - ASSESSMENT
87 yo M with PMHx of HTN, DM, AFib not on AC due to falls, HFpEF, JOHANNY, NPH admitted for recurrent near syncope.    #Near Syncope  #Frequent falls, worsening gait, dizziness  #Hx of NPH s/p  shunt placement at [facility] 1 month ago  - Pt admitted to [facility] initially, transferred to [place] for admission  - CT Head revealed inc ventricular dilation with frontal  shunt catheter in place  - NeuroSx following  85 yo M with PMHx of HTN, DM, AFib not on AC due to falls, HFpEF, JOHANNY, NPH admitted for recurrent near syncope.    #Near Syncope  #Frequent falls, worsening gait, dizziness  #Hx of NPH s/p  shunt placement at [facility] 1 month ago  #Hx of HFrEF  - Pt admitted to [facility] initially, transferred to [place] for admission  - CT Head revealed inc ventricular dilation with frontal  shunt catheter in place  - NeuroSx following, s/p shuntogram, f/u further recommendations  - EKG on admission - AFib, bradycardia, now sinus rhythm in 60-70s 87 yo M with PMHx of HTN, DM, AFib not on AC due to falls, HFpEF, JOHANNY, NPH admitted for recurrent near syncope.    #Near Syncope  #Frequent falls, worsening gait, dizziness  #Hx of NPH s/p  shunt placement at [facility] 1 month ago  #Hx of HFrEF  - Pt admitted to [facility] initially, transferred to [place] for admission  - CT Head revealed inc ventricular dilation with frontal  shunt catheter in place  - NeuroSx following, s/p shuntogram, f/u further recommendations  - EKG on admission - AFib, bradycardia, now sinus rhythm in 60-70s  - Troponin 0.08 on admission, elevated at baseline due to CKD, active cardiac ischemia unlikely, will repeat Troponin  - BNP ~ 3K, baseline ~7K, pt appears dry on exam, will hold diuresis  - HF team eval (sees Dr. Huerta as outpt and recently had increase in diuretic medication)    #TOBIAS on CKD 3  - Cr 3.2, baseline ~ 2.5 - 2.8  - Nephro c/s pending (Dr. Merchant)    #HTN  -     #DM  - A1c pending  - C/w Lantus 20 U, Lispro 7 U    #JOHANNY on CPAP    #BPH  - C/w Finasteride, Tamsulosin    DVT ppx: HSQ  Diet: DASH, CC  Activity: AAT  Full Code  DIspo: Acute 87 yo M with PMHx of HTN, DM, AFib not on AC due to falls, HFpEF, JOHANNY, NPH admitted for recurrent near syncope.    #Near Syncope  #Frequent falls, worsening gait, dizziness  #Hx of NPH s/p  shunt placement at [facility] 1 month ago  #Hx of HFrEF  - Pt admitted to [facility] initially, transferred to [place] for admission  - CT Head revealed inc ventricular dilation with frontal  shunt catheter in place  - NeuroSx following, s/p shuntogram, f/u further recommendations  - EKG on admission - AFib, bradycardia, now sinus rhythm in 60-70s  - Troponin 0.08 on admission, elevated at baseline due to CKD, active cardiac ischemia unlikely, will repeat Troponin  - BNP ~ 3K, baseline ~7K, pt appears dry on exam, will hold diuresis  - HF team eval (sees Dr. Huerta as outpt and recently had increase in diuretic medication)    #TOBIAS on CKD 3  - Cr 3.2, baseline ~ 2.5 - 2.8  - Nephro c/s pending (Dr. Merchant)    #HTN  - Home Metoprolol on hold as pt had bradycardia on admission  - BP well controlled off medications    #DM  - A1c pending  - C/w Lantus 20 U, Lispro 7 U    #JOHANNY on CPAP    #BPH  - C/w Finasteride, Tamsulosin    DVT ppx: HSQ  Diet: DASH, CC  Activity: AAT  Full Code  DIspo: Acute 87 yo M with PMHx of HTN, DM, AFib not on AC due to falls, HFpEF, JOHANNY, NPH admitted for recurrent near syncope.    #Near Syncope  #Frequent falls, worsening gait, dizziness  #Hx of NPH s/p  shunt placement at [facility] 1 month ago  #Hx of HFrEF  - Pt admitted to [facility] initially, transferred to [place] for admission  - CT Head revealed inc ventricular dilation with frontal  shunt catheter in place  - NeuroSx following, s/p shuntogram, f/u further recommendations  - Called [facility] to obtain MRI report from September, state they will fax report  - EKG on admission - AFib, bradycardia, now sinus rhythm in 60-70s  - Troponin 0.08 on admission, elevated at baseline due to CKD, active cardiac ischemia unlikely, will repeat Troponin  - BNP ~ 3K, baseline ~7K, pt appears dry on exam, will hold diuresis  - HF team eval (sees Dr. Huerta as outpt and recently had increase in diuretic medication)    #TOBIAS on CKD 3  - Cr 3.2, baseline ~ 2.5 - 2.8  - Nephro c/s pending (Dr. Merchant)    #HTN  - Home Metoprolol on hold as pt had bradycardia on admission  - BP well controlled off medications    #DM  - A1c pending  - C/w Lantus 20 U, Lispro 7 U    #JOHANNY on CPAP    #BPH  - C/w Finasteride, Tamsulosin    DVT ppx: HSQ  Diet: DASH, CC  Activity: AAT  Full Code  DIspo: Acute 87 yo M with PMHx of HTN, DM, AFib not on AC due to falls, HFpEF, JOHANNY, NPH admitted for recurrent near syncope.    #Near Syncope  #Frequent falls, worsening gait, dizziness  #Hx of NPH s/p  shunt placement at [facility] 1 month ago  #Hx of HFrEF  - Pt admitted to [facility] initially, transferred to [place] for admission  - CT Head revealed inc ventricular dilation with frontal  shunt catheter in place  - NeuroSx following, s/p shuntogram, f/u further recommendations  - Called [facility] to obtain MRI report from September, state they will fax report  - EKG on admission - AFib, bradycardia, now sinus rhythm in 60-70s  - Troponin 0.08 on admission, elevated at baseline due to CKD, active cardiac ischemia unlikely, will repeat Troponin  - BNP ~ 3K, baseline ~7K, pt appears dry on exam, will hold diuresis  - HF team eval (sees Dr. Huerta as outpt and recently had increase in diuretic medication)  - check orthostats  - continue cardiac telemonitoring, pt was  jeanette outpt, follow up    #TOBIAS on CKD 3  - Cr 3.2, baseline ~ 2.5 - 2.8  - Nephro c/s pending (Dr. Merchant)    #HTN  - Home Metoprolol on hold as pt had bradycardia on admission  - BP well controlled off medications    #DM  - A1c pending  - C/w Lantus 20 U, Lispro 7 U    #JOHANNY on CPAP    #BPH  - C/w Finasteride, Tamsulosin    DVT ppx: HSQ  Diet: DASH, CC  Activity: AAT  Full Code  DIspo: Acute 85 yo M with PMHx of HTN, DM, AFib not on AC due to falls, HFpEF, JOHANNY, NPH admitted for recurrent near syncope.    #Near Syncope  #Frequent falls, worsening gait, dizziness  #Hx of NPH s/p  shunt placement at [facility] 1 month ago  #Hx of chronic HFrEF  - Pt admitted to [facility] initially, transferred to [place] for admission  - CT Head revealed inc ventricular dilation with frontal  shunt catheter in place  - NeuroSx following, s/p shuntogram, f/u further recommendations  - Called [facility] to obtain MRI report from September, state they will fax report  - EKG on admission - AFib, bradycardia, now sinus rhythm in 60-70s  - Troponin 0.08 on admission, elevated at baseline due to CKD, active cardiac ischemia unlikely, will repeat Troponin  - BNP ~ 3K, baseline ~7K, pt appears dry on exam, will hold diuresis  - HF team eval (sees Dr. Huerta as outpt and recently had increase in diuretic medication)  - check orthostats  - continue cardiac telemonitoring, pt was  jeanette outpt, follow up    #TOBIAS on CKD 3  - Cr 3.2, baseline ~ 2.5 - 2.8  - Nephro c/s pending (Dr. Merchant)    #HTN  - Home Metoprolol on hold as pt had bradycardia on admission  - BP well controlled off medications    #DM  - A1c pending  - C/w Lantus 20 U, Lispro 7 U    #JOHANNY on CPAP    #BPH  - C/w Finasteride, Tamsulosin    DVT ppx: HSQ  Diet: DASH, CC  Activity: AAT  Full Code  DIspo: Acute

## 2021-11-05 NOTE — PROGRESS NOTE ADULT - SUBJECTIVE AND OBJECTIVE BOX
Date of Office Visit: 2020  Encounter Provider: Dr. Steve Muhammad  Place of Service: [facility] CARDIOLOGY [place]  Patient Name: Alen Ratliff  :1938  Serenity Wren MD    Chief Complaint   Patient presents with   • Heart Murmur  Shortness of breath  Bradycardia     consult   • Stroke   • Hypertension   • Hyperlipidemia     History of Present Illness:    I am pleased to see Mr. Rao in my office today as a new consultation.    As you know, patient is 82-year-old white gentleman whose past medical history is significant for hypertension, hyperlipidemia, diabetes mellitus, history of cardiac murmur, who is referred to me for symptom of shortness of breath and bradycardia.    Patient recently moved to this area from [place].  Patient was seen in PCP office and was noted to have bradycardic and also shortness of breath.  Patient complain of occasional chest pain.  Patient has no correlation with exertion.  Patient complain of occasional dizzy spells but no syncope or presyncope.  No blackout.  No orthopnea PND no leg edema noted.    Patient does not have previous history of CAD or PCI or myocardial infarction.  Patient does not smoke or abuse alcohol.  Patient has a history of cardiac murmur in the past    EKG showed sinus bradycardia with heart rate of 53 bpm.  First-degree AV block noted.  Leftward axis present.    At this stage, patient is noted to have bradycardia and I suspect underlying sick sinus syndrome.  There is no negative chronotropic agent on board to lower his heart rate.  Blood pressure is well controlled.  However I will proceed with Holter monitor to assess the degree of bradycardia.  I would also proceed with echocardiogram to assess cardiac murmur.  Stress test would be done.  I will see the patient in 6 weeks        Past Medical History:   Diagnosis Date   • Allergic rhinitis    • Diabetes (CMS/[facility])    • GERD (gastroesophageal reflux disease)    • Heart murmur    •  VINCENZO EDMONDSON 86y Male  MRN#: [id number]   CODE STATUS: full code    Hospital Day: 2d    Pt is currently admitted with the primary diagnosis of     SUBJECTIVE  Overnight/Interval Events:                                              ----------------------------------------------------------  OBJECTIVE  PAST MEDICAL & SURGICAL HISTORY  Afib    DM (diabetes mellitus)    CKD (chronic kidney disease)    Hydrocephalus    Hypertension    History of prostate surgery                                              -----------------------------------------------------------  ALLERGIES:  No Known Allergies                                            ------------------------------------------------------------    HOME MEDICATIONS  Home Medications:  acetaminophen 325 mg oral tablet: 2 tab(s) orally every 6 hours, As needed, Moderate Pain (4 - 6) (12 Jul 2021 11:54)  Basaglar KwikPen: 20 unit(s) subcutaneous once a day (at bedtime) (03 Nov 2021 22:10)  calcitriol 0.25 mcg oral capsule: 1 cap(s) orally once a day (30 Jun 2021 15:07)  dutasteride 0.5 mg oral capsule: 1 cap(s) orally once a day (30 Jun 2021 15:07)  Farxiga 10 mg oral tablet: 1 tab(s) orally once a day (03 Nov 2021 21:56)  gabapentin 100 mg oral capsule: 1 cap(s) orally once a day in AM (03 Nov 2021 21:49)  gabapentin 300 mg oral capsule: 1 cap(s) orally once a day (at bedtime) (03 Nov 2021 21:49)  metOLazone 5 mg oral tablet: orally 3 times a week M-W-F (03 Nov 2021 22:06)  Metoprolol Succinate ER 25 mg oral tablet, extended release: 1 tab(s) orally once a day (03 Nov 2021 21:52)  Ozempic (1 mg dose) 4 mg/3 mL subcutaneous solution: 1 milligram(s) subcutaneous once a week on Thursday (03 Nov 2021 21:50)  spironolactone 25 mg oral tablet: 1 tab(s) orally once a day (03 Nov 2021 22:00)                           MEDICATIONS:  STANDING MEDICATIONS  atorvastatin 20 milliGRAM(s) Oral at bedtime  calcium acetate 667 milliGRAM(s) Oral three times a day with meals  dextrose 40% Gel 15 Gram(s) Oral once  dextrose 5%. 1000 milliLiter(s) IV Continuous <Continuous>  dextrose 5%. 1000 milliLiter(s) IV Continuous <Continuous>  dextrose 50% Injectable 25 Gram(s) IV Push once  dextrose 50% Injectable 12.5 Gram(s) IV Push once  dextrose 50% Injectable 25 Gram(s) IV Push once  finasteride 5 milliGRAM(s) Oral daily  gabapentin 100 milliGRAM(s) Oral daily  gabapentin 300 milliGRAM(s) Oral at bedtime  glucagon  Injectable 1 milliGRAM(s) IntraMuscular once  guaifenesin/dextromethorphan Oral Liquid 10 milliLiter(s) Oral every 6 hours  heparin   Injectable 5000 Unit(s) SubCutaneous every 8 hours  insulin glargine Injectable (LANTUS) 20 Unit(s) SubCutaneous at bedtime  insulin lispro (ADMELOG) corrective regimen sliding scale   SubCutaneous three times a day before meals  insulin lispro Injectable (ADMELOG) 7 Unit(s) SubCutaneous before breakfast  insulin lispro Injectable (ADMELOG) 7 Unit(s) SubCutaneous before lunch  insulin lispro Injectable (ADMELOG) 7 Unit(s) SubCutaneous before dinner  tamsulosin 0.4 milliGRAM(s) Oral at bedtime    PRN MEDICATIONS                                            ------------------------------------------------------------  VITAL SIGNS: Last 24 Hours  T(C): 36.1 (04 Nov 2021 22:09), Max: 36.6 (04 Nov 2021 19:20)  T(F): 97 (04 Nov 2021 22:09), Max: 97.8 (04 Nov 2021 19:20)  HR: 71 (04 Nov 2021 22:09) (65 - 89)  BP: 123/61 (04 Nov 2021 22:09) (118/59 - 129/67)  BP(mean): --  RR: 18 (04 Nov 2021 22:09) (18 - 26)  SpO2: 97% (04 Nov 2021 22:09) (97% - 98%)      11-04-21 @ 07:01  -  11-05-21 @ 07:00  --------------------------------------------------------  IN: 0 mL / OUT: 350 mL / NET: -350 mL                                             --------------------------------------------------------------  LABS:                        14.1   8.74  )-----------( 213      ( 05 Nov 2021 05:51 )             46.2     11-04    140  |  91<L>  |  105<HH>  ----------------------------<  230<H>  3.7   |  22  |  3.1<H>    Ca    9.8      04 Nov 2021 20:42  Mg     2.9     11-04    TPro  7.4  /  Alb  4.4  /  TBili  1.4<H>  /  DBili  x   /  AST  12  /  ALT  7   /  AlkPhos  104  11-04    PT/INR - ( 03 Nov 2021 15:00 )   PT: 12.80 sec;   INR: 1.11 ratio         PTT - ( 03 Nov 2021 15:00 )  PTT:33.2 sec      Troponin T, Serum: 0.06 ng/mL *HH* (11-04-21 @ 20:42)  Troponin T, Serum: 0.06 ng/mL ** (11-04-21 @ 17:35)          CARDIAC MARKERS ( 04 Nov 2021 20:42 )  x     / 0.06 ng/mL / x     / x     / 3.2 ng/mL  CARDIAC MARKERS ( 04 Nov 2021 17:35 )  x     / 0.06 ng/mL / x     / x     / x      CARDIAC MARKERS ( 03 Nov 2021 15:00 )  x     / 0.08 ng/mL / x     / x     / x                                                  -------------------------------------------------------------  RADIOLOGY:                                            --------------------------------------------------------------    PHYSICAL EXAM:  General:   HEENT:  LUNGS:  HEART:  ABDOMEN:  EXT:  NEURO:  SKIN:                                           --------------------------------------------------------------    ASSESSMENT & PLAN                                    # DVT prophylaxis   # GI prophylaxis   # Diet   # Activity Score (AM-PAC)  # Code status   # Disposition      Hyperlipidemia    • Hypertension    • Hypothyroidism    • Prostate cancer (CMS/HCC)    • Stroke (CMS/HCC)          Past Surgical History:   Procedure Laterality Date   • BACK SURGERY  1994   • COLONOSCOPY  08/25/2015   • PROSTATECTOMY  2001    due to cancer           Current Outpatient Medications:   •  amLODIPine (NORVASC) 10 MG tablet, Take 1 tablet by mouth Daily., Disp: 90 tablet, Rfl: 1  •  atorvastatin (LIPITOR) 40 MG tablet, Take 40 mg by mouth Every Night., Disp: , Rfl:   •  clopidogrel (PLAVIX) 75 MG tablet, Take 1 tablet by mouth Daily., Disp: 90 tablet, Rfl: 1  •  fluticasone (FLONASE) 50 MCG/ACT nasal spray, 2 sprays into the nostril(s) as directed by provider Daily., Disp: , Rfl:   •  glucose blood test strip, 1 each by Other route Daily. Use as instructed, Disp: , Rfl:   •  JARDIANCE 10 MG tablet, Take 10 mg by mouth Daily., Disp: 90 tablet, Rfl: 1  •  levothyroxine (SYNTHROID, LEVOTHROID) 75 MCG tablet, Take 1 tablet by mouth Daily., Disp: 90 tablet, Rfl: 1  •  losartan (COZAAR) 50 MG tablet, Take 1 tablet by mouth Daily., Disp: 90 tablet, Rfl: 1  •  pantoprazole (PROTONIX) 40 MG EC tablet, Take 1 tablet by mouth Daily., Disp: 90 tablet, Rfl: 1  •  SITagliptin (JANUVIA) 50 MG tablet, Take 1 tablet by mouth Daily., Disp: 30 tablet, Rfl: 3      Social History     Socioeconomic History   • Marital status:      Spouse name: Not on file   • Number of children: Not on file   • Years of education: Not on file   • Highest education level: Not on file   Tobacco Use   • Smoking status: Never Smoker   • Smokeless tobacco: Never Used   Substance and Sexual Activity   • Alcohol use: Not Currently   • Drug use: Never   • Sexual activity: Defer         Review of Systems   Constitution: Negative for chills and fever.   HENT: Negative for ear discharge and nosebleeds.    Eyes: Negative for discharge and redness.   Cardiovascular: Negative for chest pain, orthopnea, palpitations, paroxysmal nocturnal dyspnea and  "syncope.   Respiratory: Positive for shortness of breath. Negative for cough and wheezing.    Endocrine: Negative for heat intolerance.   Skin: Negative for rash.   Musculoskeletal: Positive for arthritis and joint pain. Negative for myalgias.   Gastrointestinal: Negative for abdominal pain, melena, nausea and vomiting.   Genitourinary: Negative for dysuria and hematuria.   Neurological: Positive for dizziness. Negative for light-headedness, numbness and tremors.   Psychiatric/Behavioral: Negative for depression. The patient is not nervous/anxious.        Procedures      ECG 12 Lead    Date/Time: 9/16/2020 1:28 PM  Performed by: Steve Muhammad MD  Authorized by: Steve Muhammad MD   Previous ECG: no previous ECG available  Rhythm: sinus rhythm  Conduction: left anterior fascicular block and non-specific intraventricular conduction delay  Other findings: non-specific ST-T wave changes    Clinical impression: abnormal EKG            ECG 12 Lead    (Results Pending)           Objective:    /64   Pulse 53   Ht 174 cm (68.5\")   Wt 93.4 kg (206 lb)   BMI 30.86 kg/m²         Constitutional:       Appearance: Well-developed.   Eyes:      General: No scleral icterus.        Right eye: No discharge.   HENT:      Head: Normocephalic and atraumatic.   Neck:      Thyroid: No thyromegaly.      Lymphadenopathy: No cervical adenopathy.   Pulmonary:      Effort: Pulmonary effort is normal. No respiratory distress.      Breath sounds: Normal breath sounds. No wheezing. No rales.   Cardiovascular:      Normal rate. Regular rhythm.      No gallop.   Abdominal:      Tenderness: There is no abdominal tenderness.   Skin:     Findings: No erythema or rash.   Neurological:      Mental Status: Alert and oriented to person, place, and time.             Assessment:       Diagnosis Plan   1. Heart murmur  Holter Monitor - 24 Hour    Adult Transthoracic Echo Complete W/ Cont if Necessary Per Protocol    Stress Test With Myocardial " Perfusion One Day   2. Cerebrovascular accident (CVA), unspecified mechanism (CMS/HCC)  Holter Monitor - 24 Hour    Adult Transthoracic Echo Complete W/ Cont if Necessary Per Protocol    Stress Test With Myocardial Perfusion One Day   3. Essential hypertension  ECG 12 Lead    Holter Monitor - 24 Hour    Adult Transthoracic Echo Complete W/ Cont if Necessary Per Protocol    Stress Test With Myocardial Perfusion One Day   4. Mixed hyperlipidemia  Adult Transthoracic Echo Complete W/ Cont if Necessary Per Protocol    Stress Test With Myocardial Perfusion One Day   5. Chest pain, unspecified type   Stress Test With Myocardial Perfusion One Day            Plan:       I would recommend to proceed with Holter monitor, echocardiogram and stress test.  Blood pressure monitoring is recommended.  I will see the patient in 6 weeks   VINCENZO EDMONDSON 86y Male  MRN#: [id number]   CODE STATUS: full code    Hospital Day: 2d    Pt is currently admitted with the primary diagnosis of     SUBJECTIVE  Overnight/Interval Events:                                              ----------------------------------------------------------  OBJECTIVE  PAST MEDICAL & SURGICAL HISTORY  Afib    DM (diabetes mellitus)    CKD (chronic kidney disease)    Hydrocephalus    Hypertension    History of prostate surgery                                              -----------------------------------------------------------  ALLERGIES:  No Known Allergies                                            ------------------------------------------------------------    HOME MEDICATIONS  Home Medications:  acetaminophen 325 mg oral tablet: 2 tab(s) orally every 6 hours, As needed, Moderate Pain (4 - 6) (12 Jul 2021 11:54)  Basaglar KwikPen: 20 unit(s) subcutaneous once a day (at bedtime) (03 Nov 2021 22:10)  calcitriol 0.25 mcg oral capsule: 1 cap(s) orally once a day (30 Jun 2021 15:07)  dutasteride 0.5 mg oral capsule: 1 cap(s) orally once a day (30 Jun 2021 15:07)  Farxiga 10 mg oral tablet: 1 tab(s) orally once a day (03 Nov 2021 21:56)  gabapentin 100 mg oral capsule: 1 cap(s) orally once a day in AM (03 Nov 2021 21:49)  gabapentin 300 mg oral capsule: 1 cap(s) orally once a day (at bedtime) (03 Nov 2021 21:49)  metOLazone 5 mg oral tablet: orally 3 times a week M-W-F (03 Nov 2021 22:06)  Metoprolol Succinate ER 25 mg oral tablet, extended release: 1 tab(s) orally once a day (03 Nov 2021 21:52)  Ozempic (1 mg dose) 4 mg/3 mL subcutaneous solution: 1 milligram(s) subcutaneous once a week on Thursday (03 Nov 2021 21:50)  spironolactone 25 mg oral tablet: 1 tab(s) orally once a day (03 Nov 2021 22:00)                           MEDICATIONS:  STANDING MEDICATIONS  atorvastatin 20 milliGRAM(s) Oral at bedtime  calcium acetate 667 milliGRAM(s) Oral three times a day with meals  dextrose 40% Gel 15 Gram(s) Oral once  dextrose 5%. 1000 milliLiter(s) IV Continuous <Continuous>  dextrose 5%. 1000 milliLiter(s) IV Continuous <Continuous>  dextrose 50% Injectable 25 Gram(s) IV Push once  dextrose 50% Injectable 12.5 Gram(s) IV Push once  dextrose 50% Injectable 25 Gram(s) IV Push once  finasteride 5 milliGRAM(s) Oral daily  gabapentin 100 milliGRAM(s) Oral daily  gabapentin 300 milliGRAM(s) Oral at bedtime  glucagon  Injectable 1 milliGRAM(s) IntraMuscular once  guaifenesin/dextromethorphan Oral Liquid 10 milliLiter(s) Oral every 6 hours  heparin   Injectable 5000 Unit(s) SubCutaneous every 8 hours  insulin glargine Injectable (LANTUS) 20 Unit(s) SubCutaneous at bedtime  insulin lispro (ADMELOG) corrective regimen sliding scale   SubCutaneous three times a day before meals  insulin lispro Injectable (ADMELOG) 7 Unit(s) SubCutaneous before breakfast  insulin lispro Injectable (ADMELOG) 7 Unit(s) SubCutaneous before lunch  insulin lispro Injectable (ADMELOG) 7 Unit(s) SubCutaneous before dinner  tamsulosin 0.4 milliGRAM(s) Oral at bedtime    PRN MEDICATIONS                                            ------------------------------------------------------------  VITAL SIGNS: Last 24 Hours  T(C): 36.1 (04 Nov 2021 22:09), Max: 36.6 (04 Nov 2021 19:20)  T(F): 97 (04 Nov 2021 22:09), Max: 97.8 (04 Nov 2021 19:20)  HR: 71 (04 Nov 2021 22:09) (65 - 89)  BP: 123/61 (04 Nov 2021 22:09) (118/59 - 129/67)  BP(mean): --  RR: 18 (04 Nov 2021 22:09) (18 - 26)  SpO2: 97% (04 Nov 2021 22:09) (97% - 98%)      11-04-21 @ 07:01  -  11-05-21 @ 07:00  --------------------------------------------------------  IN: 0 mL / OUT: 350 mL / NET: -350 mL                                             --------------------------------------------------------------  LABS:                        14.1   8.74  )-----------( 213      ( 05 Nov 2021 05:51 )             46.2     11-04    140  |  91<L>  |  105<HH>  ----------------------------<  230<H>  3.7   |  22  |  3.1<H>    Ca    9.8      04 Nov 2021 20:42  Mg     2.9     11-04    TPro  7.4  /  Alb  4.4  /  TBili  1.4<H>  /  DBili  x   /  AST  12  /  ALT  7   /  AlkPhos  104  11-04    PT/INR - ( 03 Nov 2021 15:00 )   PT: 12.80 sec;   INR: 1.11 ratio         PTT - ( 03 Nov 2021 15:00 )  PTT:33.2 sec      Troponin T, Serum: 0.06 ng/mL *HH* (11-04-21 @ 20:42)  Troponin T, Serum: 0.06 ng/mL ** (11-04-21 @ 17:35)          CARDIAC MARKERS ( 04 Nov 2021 20:42 )  x     / 0.06 ng/mL / x     / x     / 3.2 ng/mL  CARDIAC MARKERS ( 04 Nov 2021 17:35 )  x     / 0.06 ng/mL / x     / x     / x      CARDIAC MARKERS ( 03 Nov 2021 15:00 )  x     / 0.08 ng/mL / x     / x     / x                                                  -------------------------------------------------------------  RADIOLOGY:                                            --------------------------------------------------------------    PHYSICAL EXAM:  General:   HEENT:  LUNGS:  HEART:  ABDOMEN:  EXT:  NEURO:  SKIN:                                           --------------------------------------------------------------    ASSESSMENT & PLAN    85 yo male with PMHx of HTN, DM, AFib not on AC due to falls, HFpEF, JOHANNY, NPH admitted for recurrent near syncope.    Near Syncope  Frequent falls, worsening gait, dizziness      Hx of NPH s/p  shunt placement at [facility] 1 month ago  - CT Head revealed inc ventricular dilation with frontal  shunt catheter in place  - NeuroSx following, s/p shuntogram 11/4, f/u further recommendation      Hx of chronic HFrEF    - CT Head revealed inc ventricular dilation with frontal  shunt catheter in place  - NeuroSx following, s/p shuntogram, f/u further recommendations  - Called [facility]iology to obtain MRI report from September, state they will fax report  - EKG on admission - AFib, bradycardia, now sinus rhythm in 60-70s  - Troponin 0.08 on admission, elevated at baseline due to CKD, active cardiac ischemia unlikely, will repeat Troponin  - BNP ~ 3K, baseline ~7K, pt appears dry on exam, will hold diuresis  - HF team eval (sees Dr. Huerta as outpt and recently had increase in diuretic medication)  - check orthostats  - continue cardiac telemonitoring, pt was  jeanette outpt, follow up    TOBIAS on CKD 3  - Cr 3.2, baseline ~ 2.5 - 2.8  - Nephro c/s pending (Dr. Merchant)    HTN  - Home Metoprolol on hold as pt had bradycardia on admission  - BP well controlled off medications    DM  - A1c pending  - C/w Lantus 20 U, Lispro 7 U    JOHANNY on CPAP    BPH  - C/w Finasteride, Tamsulosin    DVT ppx: HSQ  Diet: DASH, CC  Activity: AAT  Full Code  DIspo: Acute VINCENZO EDMONDSON 86y Male  MRN#: [id number]   CODE STATUS: full code    Hospital Day: 2d    Pt is currently admitted with the primary diagnosis of near syncope    SUBJECTIVE  Overnight/Interval Events: Patient had episodes of ventricular tachycardia in AM. He denies chest pain or shortness of breath.                                               ----------------------------------------------------------  OBJECTIVE  PAST MEDICAL & SURGICAL HISTORY  Afib    DM (diabetes mellitus)    CKD (chronic kidney disease)    Hydrocephalus    Hypertension    History of prostate surgery                                              -----------------------------------------------------------  ALLERGIES:  No Known Allergies                                            ------------------------------------------------------------    HOME MEDICATIONS  Home Medications:  acetaminophen 325 mg oral tablet: 2 tab(s) orally every 6 hours, As needed, Moderate Pain (4 - 6) (12 Jul 2021 11:54)  Basaglar KwikPen: 20 unit(s) subcutaneous once a day (at bedtime) (03 Nov 2021 22:10)  calcitriol 0.25 mcg oral capsule: 1 cap(s) orally once a day (30 Jun 2021 15:07)  dutasteride 0.5 mg oral capsule: 1 cap(s) orally once a day (30 Jun 2021 15:07)  Farxiga 10 mg oral tablet: 1 tab(s) orally once a day (03 Nov 2021 21:56)  gabapentin 100 mg oral capsule: 1 cap(s) orally once a day in AM (03 Nov 2021 21:49)  gabapentin 300 mg oral capsule: 1 cap(s) orally once a day (at bedtime) (03 Nov 2021 21:49)  metOLazone 5 mg oral tablet: orally 3 times a week M-W-F (03 Nov 2021 22:06)  Metoprolol Succinate ER 25 mg oral tablet, extended release: 1 tab(s) orally once a day (03 Nov 2021 21:52)  Ozempic (1 mg dose) 4 mg/3 mL subcutaneous solution: 1 milligram(s) subcutaneous once a week on Thursday (03 Nov 2021 21:50)  spironolactone 25 mg oral tablet: 1 tab(s) orally once a day (03 Nov 2021 22:00)                           MEDICATIONS:  STANDING MEDICATIONS  atorvastatin 20 milliGRAM(s) Oral at bedtime  calcium acetate 667 milliGRAM(s) Oral three times a day with meals  dextrose 40% Gel 15 Gram(s) Oral once  dextrose 5%. 1000 milliLiter(s) IV Continuous <Continuous>  dextrose 5%. 1000 milliLiter(s) IV Continuous <Continuous>  dextrose 50% Injectable 25 Gram(s) IV Push once  dextrose 50% Injectable 12.5 Gram(s) IV Push once  dextrose 50% Injectable 25 Gram(s) IV Push once  finasteride 5 milliGRAM(s) Oral daily  gabapentin 100 milliGRAM(s) Oral daily  gabapentin 300 milliGRAM(s) Oral at bedtime  glucagon  Injectable 1 milliGRAM(s) IntraMuscular once  guaifenesin/dextromethorphan Oral Liquid 10 milliLiter(s) Oral every 6 hours  heparin   Injectable 5000 Unit(s) SubCutaneous every 8 hours  insulin glargine Injectable (LANTUS) 20 Unit(s) SubCutaneous at bedtime  insulin lispro (ADMELOG) corrective regimen sliding scale   SubCutaneous three times a day before meals  insulin lispro Injectable (ADMELOG) 7 Unit(s) SubCutaneous before breakfast  insulin lispro Injectable (ADMELOG) 7 Unit(s) SubCutaneous before lunch  insulin lispro Injectable (ADMELOG) 7 Unit(s) SubCutaneous before dinner  tamsulosin 0.4 milliGRAM(s) Oral at bedtime    PRN MEDICATIONS                                            ------------------------------------------------------------  VITAL SIGNS: Last 24 Hours  T(C): 36.1 (04 Nov 2021 22:09), Max: 36.6 (04 Nov 2021 19:20)  T(F): 97 (04 Nov 2021 22:09), Max: 97.8 (04 Nov 2021 19:20)  HR: 71 (04 Nov 2021 22:09) (65 - 89)  BP: 123/61 (04 Nov 2021 22:09) (118/59 - 129/67)  BP(mean): --  RR: 18 (04 Nov 2021 22:09) (18 - 26)  SpO2: 97% (04 Nov 2021 22:09) (97% - 98%)      11-04-21 @ 07:01  -  11-05-21 @ 07:00  --------------------------------------------------------  IN: 0 mL / OUT: 350 mL / NET: -350 mL                                             --------------------------------------------------------------  LABS:                        14.1   8.74  )-----------( 213      ( 05 Nov 2021 05:51 )             46.2     11-04    140  |  91<L>  |  105<HH>  ----------------------------<  230<H>  3.7   |  22  |  3.1<H>    Ca    9.8      04 Nov 2021 20:42  Mg     2.9     11-04    TPro  7.4  /  Alb  4.4  /  TBili  1.4<H>  /  DBili  x   /  AST  12  /  ALT  7   /  AlkPhos  104  11-04    PT/INR - ( 03 Nov 2021 15:00 )   PT: 12.80 sec;   INR: 1.11 ratio         PTT - ( 03 Nov 2021 15:00 )  PTT:33.2 sec      Troponin T, Serum: 0.06 ng/mL *HH* (11-04-21 @ 20:42)  Troponin T, Serum: 0.06 ng/mL ** (11-04-21 @ 17:35)          CARDIAC MARKERS ( 04 Nov 2021 20:42 )  x     / 0.06 ng/mL / x     / x     / 3.2 ng/mL  CARDIAC MARKERS ( 04 Nov 2021 17:35 )  x     / 0.06 ng/mL / x     / x     / x      CARDIAC MARKERS ( 03 Nov 2021 15:00 )  x     / 0.08 ng/mL / x     / x     / x                                                  -------------------------------------------------------------  RADIOLOGY:    NM CSF flow eval 11/4:    Abnormal radionuclide ventriculoperitoneal shuntogram.    Radiolabeled cerebrospinal fluid is visualized in the shunt tube at the level of the xiphoid.    No visualization of radiolabeled cerebrospinal fluid in the shunt tube at the level of the upper and lower abdomen and no visualization of radiolabeled cerebrospinal fluid free within the peritoneal cavity. Therefore no clear demonstration of shunt patency beyond the level of the xiphoid process.    CT head 11/3:  Since prior CT of 2/15/2019 there is increased ventricular dilation with frontal ventriculoperitoneal shunt catheter in place.    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    Chronic microvascular type changes as well as a chronic left basal ganglia lacunar infarct.                                          --------------------------------------------------------------    PHYSICAL EXAM:  General: no acute distress  HEENT: normocephalic, atraumatic  Lungs: CTAB, on room air, no wheeze, rhonchi, crackles  Heart: regular rate and rhythm, normal S1 and S2  Abdomen: soft, nontender, nondistended  Neuro: AAOx3, CN2-12 grossly intact                                         --------------------------------------------------------------    ASSESSMENT & PLAN    85 yo male with PMHx of HTN, DM, AFib not on AC due to falls, HFpEF, JOHANNY, NPH admitted for recurrent near syncope.    ventricular tachycardia, frequent PVC  -patient was bradycardic on admission so beta blocker was inti ally held  -K 3.6 today; s/p KCL; keep K>4; mag 3.1  -lopressor 12.5mg PO BID  -f/u nuclear stress test  -f/u cardio consult for ishemic workup  -per EP, patient will likely need ICD or ablation  -c/w telemetry monitoring    Near Syncope  Frequent falls, worsening gait, dizziness      Hx of NPH s/p  shunt placement at [facility] 1 month ago  - CT Head revealed inc ventricular dilation with frontal  shunt catheter in place  - NeuroSx following, s/p shuntogram 11/4    Hx of chronic HFrEF  - Troponin 0.08 on admission, elevated at baseline due to CKD, active cardiac ischemia unlikely, will repeat Troponin  - BNP ~ 3K, baseline ~7K, pt appears dry on exam, will hold diuresis  - hold diuretics  - strict Is and Os; daily weights    TOBIAS on CKD 3  - Cr 2.9, baseline ~ 2.5 - 2.8  - per nephro Dr. Merchant, hold torsemide, metolazone aldactone, farxiga  - c/w phoslo with meals    HTN  - Home Metoprolol on hold as pt had bradycardia on admission  - BP well controlled off medications    DM  - A1c 5.7  - C/w Lantus 20 U, Lispro 7 U    JOHANNY on CPAP    BPH  - C/w Finasteride, Tamsulosin    DVT ppx: HSQ  Diet: DASH, CC  Activity: AAT  Full Code  DIspo: Acute VINCENZO EDMONDSON 86y Male  MRN#: [id number]   CODE STATUS: full code    Hospital Day: 2d    Pt is currently admitted with the primary diagnosis of near syncope    SUBJECTIVE  Overnight/Interval Events: Patient had episodes of ventricular tachycardia in AM. He denies chest pain or shortness of breath.     Attending note: Pt seen and examined at bedside. No cp or sob                                            ----------------------------------------------------------  OBJECTIVE  PAST MEDICAL & SURGICAL HISTORY  Afib    DM (diabetes mellitus)    CKD (chronic kidney disease)    Hydrocephalus    Hypertension    History of prostate surgery                                              -----------------------------------------------------------  ALLERGIES:  No Known Allergies                                            ------------------------------------------------------------    HOME MEDICATIONS  Home Medications:  acetaminophen 325 mg oral tablet: 2 tab(s) orally every 6 hours, As needed, Moderate Pain (4 - 6) (12 Jul 2021 11:54)  Basaglar KwikPen: 20 unit(s) subcutaneous once a day (at bedtime) (03 Nov 2021 22:10)  calcitriol 0.25 mcg oral capsule: 1 cap(s) orally once a day (30 Jun 2021 15:07)  dutasteride 0.5 mg oral capsule: 1 cap(s) orally once a day (30 Jun 2021 15:07)  Farxiga 10 mg oral tablet: 1 tab(s) orally once a day (03 Nov 2021 21:56)  gabapentin 100 mg oral capsule: 1 cap(s) orally once a day in AM (03 Nov 2021 21:49)  gabapentin 300 mg oral capsule: 1 cap(s) orally once a day (at bedtime) (03 Nov 2021 21:49)  metOLazone 5 mg oral tablet: orally 3 times a week M-W-F (03 Nov 2021 22:06)  Metoprolol Succinate ER 25 mg oral tablet, extended release: 1 tab(s) orally once a day (03 Nov 2021 21:52)  Ozempic (1 mg dose) 4 mg/3 mL subcutaneous solution: 1 milligram(s) subcutaneous once a week on Thursday (03 Nov 2021 21:50)  spironolactone 25 mg oral tablet: 1 tab(s) orally once a day (03 Nov 2021 22:00)                           MEDICATIONS:  STANDING MEDICATIONS  atorvastatin 20 milliGRAM(s) Oral at bedtime  calcium acetate 667 milliGRAM(s) Oral three times a day with meals  dextrose 40% Gel 15 Gram(s) Oral once  dextrose 5%. 1000 milliLiter(s) IV Continuous <Continuous>  dextrose 5%. 1000 milliLiter(s) IV Continuous <Continuous>  dextrose 50% Injectable 25 Gram(s) IV Push once  dextrose 50% Injectable 12.5 Gram(s) IV Push once  dextrose 50% Injectable 25 Gram(s) IV Push once  finasteride 5 milliGRAM(s) Oral daily  gabapentin 100 milliGRAM(s) Oral daily  gabapentin 300 milliGRAM(s) Oral at bedtime  glucagon  Injectable 1 milliGRAM(s) IntraMuscular once  guaifenesin/dextromethorphan Oral Liquid 10 milliLiter(s) Oral every 6 hours  heparin   Injectable 5000 Unit(s) SubCutaneous every 8 hours  insulin glargine Injectable (LANTUS) 20 Unit(s) SubCutaneous at bedtime  insulin lispro (ADMELOG) corrective regimen sliding scale   SubCutaneous three times a day before meals  insulin lispro Injectable (ADMELOG) 7 Unit(s) SubCutaneous before breakfast  insulin lispro Injectable (ADMELOG) 7 Unit(s) SubCutaneous before lunch  insulin lispro Injectable (ADMELOG) 7 Unit(s) SubCutaneous before dinner  tamsulosin 0.4 milliGRAM(s) Oral at bedtime    PRN MEDICATIONS                                            ------------------------------------------------------------  VITAL SIGNS: Last 24 Hours  T(C): 36.1 (04 Nov 2021 22:09), Max: 36.6 (04 Nov 2021 19:20)  T(F): 97 (04 Nov 2021 22:09), Max: 97.8 (04 Nov 2021 19:20)  HR: 71 (04 Nov 2021 22:09) (65 - 89)  BP: 123/61 (04 Nov 2021 22:09) (118/59 - 129/67)  BP(mean): --  RR: 18 (04 Nov 2021 22:09) (18 - 26)  SpO2: 97% (04 Nov 2021 22:09) (97% - 98%)      11-04-21 @ 07:01  -  11-05-21 @ 07:00  --------------------------------------------------------  IN: 0 mL / OUT: 350 mL / NET: -350 mL                                             --------------------------------------------------------------  LABS:                        14.1   8.74  )-----------( 213      ( 05 Nov 2021 05:51 )             46.2     11-04    140  |  91<L>  |  105<HH>  ----------------------------<  230<H>  3.7   |  22  |  3.1<H>    Ca    9.8      04 Nov 2021 20:42  Mg     2.9     11-04    TPro  7.4  /  Alb  4.4  /  TBili  1.4<H>  /  DBili  x   /  AST  12  /  ALT  7   /  AlkPhos  104  11-04    PT/INR - ( 03 Nov 2021 15:00 )   PT: 12.80 sec;   INR: 1.11 ratio         PTT - ( 03 Nov 2021 15:00 )  PTT:33.2 sec      Troponin T, Serum: 0.06 ng/mL *HH* (11-04-21 @ 20:42)  Troponin T, Serum: 0.06 ng/mL *HH* (11-04-21 @ 17:35)          CARDIAC MARKERS ( 04 Nov 2021 20:42 )  x     / 0.06 ng/mL / x     / x     / 3.2 ng/mL  CARDIAC MARKERS ( 04 Nov 2021 17:35 )  x     / 0.06 ng/mL / x     / x     / x      CARDIAC MARKERS ( 03 Nov 2021 15:00 )  x     / 0.08 ng/mL / x     / x     / x                                                  -------------------------------------------------------------  RADIOLOGY:    NM CSF flow eval 11/4:    Abnormal radionuclide ventriculoperitoneal shuntogram.    Radiolabeled cerebrospinal fluid is visualized in the shunt tube at the level of the xiphoid.    No visualization of radiolabeled cerebrospinal fluid in the shunt tube at the level of the upper and lower abdomen and no visualization of radiolabeled cerebrospinal fluid free within the peritoneal cavity. Therefore no clear demonstration of shunt patency beyond the level of the xiphoid process.    CT head 11/3:  Since prior CT of 2/15/2019 there is increased ventricular dilation with frontal ventriculoperitoneal shunt catheter in place.    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    Chronic microvascular type changes as well as a chronic left basal ganglia lacunar infarct.                                          --------------------------------------------------------------    PHYSICAL EXAM:  General: no acute distress  HEENT: normocephalic, atraumatic  Lungs: CTAB, on room air, no wheeze, rhonchi, crackles  Heart: regular rate and rhythm, normal S1 and S2  Abdomen: soft, nontender, nondistended  Neuro: AAOx3, CN2-12 grossly intact                                         --------------------------------------------------------------    ASSESSMENT & PLAN    85 yo male with PMHx of HTN, DM, AFib not on AC due to falls, HFpEF, JOHANNY, NPH admitted for recurrent near syncope.    ventricular tachycardia, frequent PVC  Near Syncope multiple etiologies possible 2/2 diuretic use, arrythmia vs malfunctioning shunt.   Frequent falls, worsening gait, dizziness  -patient was bradycardic on admission so beta blocker was inti ally held  -K 3.6 today; s/p KCL; keep K>4; mag 3.1  -lopressor 12.5mg PO BID  -f/u nuclear stress test  -f/u cardio consult for ishemic workup  -per EP, patient will likely need ICD or ablation  -c/w telemetry monitoring      Hx of NPH s/p  shunt placement at [facility] 1 month ago  - CT Head revealed inc ventricular dilation with frontal  shunt catheter in place  - NeuroSx following, s/p shuntogram 11/4- surgery likley outpt with pt nsx    Hx of chronic HFrEF  - Troponin 0.08 on admission, elevated at baseline due to CKD, active cardiac ischemia unlikely, will repeat Troponin  - BNP ~ 3K, baseline ~7K, pt appears dry on exam, will hold diuresis  - hold diuretics  - strict Is and Os; daily weights    TOBIAS on CKD 3  - Cr 2.9, baseline ~ 2.5 - 2.8  - per nephro Dr. Merchant, hold torsemide, metolazone aldactone, farxiga  - c/w phoslo with meals    HTN  - Home Metoprolol on hold as pt had bradycardia on admission  - BP well controlled off medications    DM  - A1c 5.7  - C/w Lantus 20 U, Lispro 7 U    JOHANNY on CPAP    BPH  - C/w Finasteride, Tamsulosin    DVT ppx: HSQ  Diet: DASH, CC  Activity: AAT  Full Code  DIspo: Acute

## 2021-11-05 NOTE — CONSULT NOTE ADULT - ASSESSMENT
Near Syncope  Afib  NSVT    - Appreciate HF and EPS recommendations  - continue current Rx, agree with holding torsemide for now, will re-instate once HF agreeable  - nuclear stress test for ischemic workup. if positive, would need to be renally optimized prior to cardiac catheterization as patient is high risk for PERCY and further progression of CKD.   - continue BB and lipitor  - patient should be on aspirin 81 mg daily given elevated cardiac biomarkers and history of afib not on A/C given frequent falls  - will follow

## 2021-11-05 NOTE — PROGRESS NOTE ADULT - SUBJECTIVE AND OBJECTIVE BOX
Date of Admission: 11/3/21    HISTORY OF PRESENT ILLNESS:     86 yr old male with hx of HTN, DM, AFib no AC 2/2 falls, HFpEF, JOHANNY presented to ED today 2/2 dizziness on/off since . States he was feeling dizzy yesterday and fell x2 and again felt dizzy today when he  went to urgent care where he was lowered to ground to prevent from falling. Son states that recent change in meds may be contributing to dad feeling dizzy. Son stated pts torsemide was increased and farxiga was added. Currently, pt states he feels fine and is asymptomatic. Pt denied loc, fevers, chills, cough or sputum production, nausea or vomiting, diarrhea or other associated symptoms. (2021 20:20)      PAST MEDICAL & SURGICAL HISTORY:    Afib  DM (diabetes mellitus)  CKD (chronic kidney disease)  Hydrocephalus  Hypertension  History of prostate surgery    FAMILY HISTORY:    Mother:  at 72 of HF  Father:  at 68 of a brain aneurism    SOCIAL HISTORY:      Former smoker, denies alcohol or drug use     Allergies    No Known Allergies    Intolerances      REVIEW OF SYSTEMS:    CONSTITUTIONAL: No fever, weight loss, or fatigue  CARDIOLOGY: denies chest pain, shortness of breath or syncopal episodes.   RESPIRATORY: denies shortness of breath, wheezing.   NEUROLOGICAL: no weakness, no focal deficits to report.  ENDOCRINOLOGICAL: no recent change in diabetic medications.   GI: no BRBPR, no N,V, diarrhea.    PSYCHIATRY: normal mood and affect  HEENT: no nasal discharge, no ecchymosis  SKIN: no ecchymosis, no breakdown  MUSCULOSKELETAL: Full range of motion x4.     PHYSICAL EXAM:  General Appearance: well appearing, normal for age and gender. 	  Neck: normal JVP, no bruit.   Eyes: Extra Ocular muscles intact.   Cardiovascular: regular rate and rhythm S1 S2, No JVD, No murmurs, No edema  Respiratory: Lungs clear to auscultation	  Psychiatry: Alert and oriented x 3, Mood & affect appropriate  Gastrointestinal:  Soft, Non-tender  Skin/Integumentary : No rashes, No ecchymoses, No cyanosis	  Neurologic: Non-focal  Musculoskeletal/ extremities: Normal range of motion, No clubbing, cyanosis or edema  Vascular: Peripheral pulses palpable 2+ bilaterally    CARDIAC MARKERS:    Serum Pro-Brain Natriuretic Peptide: 3334 pg/mL (21 @ 15:00)      PREVIOUS DIAGNOSTIC TESTING:      TTE 21     Summary:   1. Normal global left ventricular systolic function.   2. LV Ejection Fraction by Lemon's Method with a biplane EF of 54 %.   3. Mild left ventricular hypertrophy.   4. Mildly increased LV wall thickness.   5. Normal left ventricular internal cavity size.   6. Moderately enlarged left atrium.   7. Mild mitral annular calcification.   8. Mild thickening and calcification of the anterior and posterior mitral valve leaflets.   9. Mild to moderate mitral valve regurgitation.  10. Mild-moderate tricuspid regurgitation.  11. Mild aortic regurgitation.  12. Sclerotic aortic valve with normal opening.  13. Estimated pulmonary artery systolic pressure is 54.2 mmHg assuming a right atrial pressure of 5 mmHg, which is consistent with moderate pulmonary hypertension.  14. LA volume Index is 51.8 ml/m² ml/m2.  15. There is moderate aortic root calcification.        Home Medications:  acetaminophen 325 mg oral tablet: 2 tab(s) orally every 6 hours, As needed, Moderate Pain (4 - 6) (2021 11:54)  Basaglar KwikPen: 20 unit(s) subcutaneous once a day (at bedtime) (2021 22:10)  calcitriol 0.25 mcg oral capsule: 1 cap(s) orally once a day (2021 15:07)  dutasteride 0.5 mg oral capsule: 1 cap(s) orally once a day (2021 15:07)  Farxiga 10 mg oral tablet: 1 tab(s) orally once a day (2021 21:56)  gabapentin 100 mg oral capsule: 1 cap(s) orally once a day in AM (2021 21:49)  gabapentin 300 mg oral capsule: 1 cap(s) orally once a day (at bedtime) (2021 21:49)  metOLazone 5 mg oral tablet: orally 3 times a week -W- (2021 22:06)  Metoprolol Succinate ER 25 mg oral tablet, extended release: 1 tab(s) orally once a day (2021 21:52)  Ozempic (1 mg dose) 4 mg/3 mL subcutaneous solution: 1 milligram(s) subcutaneous once a week on Thursday (2021 21:50)  spironolactone 25 mg oral tablet: 1 tab(s) orally once a day (2021 22:00)    MEDICATIONS  (STANDING):  atorvastatin 20 milliGRAM(s) Oral at bedtime  calcium acetate 667 milliGRAM(s) Oral three times a day with meals  dextrose 40% Gel 15 Gram(s) Oral once  dextrose 5%. 1000 milliLiter(s) (50 mL/Hr) IV Continuous <Continuous>  dextrose 5%. 1000 milliLiter(s) (100 mL/Hr) IV Continuous <Continuous>  dextrose 50% Injectable 25 Gram(s) IV Push once  dextrose 50% Injectable 12.5 Gram(s) IV Push once  dextrose 50% Injectable 25 Gram(s) IV Push once  finasteride 5 milliGRAM(s) Oral daily  gabapentin 100 milliGRAM(s) Oral daily  gabapentin 300 milliGRAM(s) Oral at bedtime  glucagon  Injectable 1 milliGRAM(s) IntraMuscular once  heparin   Injectable 5000 Unit(s) SubCutaneous every 8 hours  insulin glargine Injectable (LANTUS) 20 Unit(s) SubCutaneous at bedtime  insulin lispro (ADMELOG) corrective regimen sliding scale   SubCutaneous three times a day before meals  insulin lispro Injectable (ADMELOG) 7 Unit(s) SubCutaneous before breakfast  insulin lispro Injectable (ADMELOG) 7 Unit(s) SubCutaneous before lunch  insulin lispro Injectable (ADMELOG) 7 Unit(s) SubCutaneous before dinner  tamsulosin 0.4 milliGRAM(s) Oral at bedtime    MEDICATIONS  (PRN):         Date of Admission: 11/3/21    HISTORY OF PRESENT ILLNESS:     86 yr old male with hx of HTN, DM, AFib no AC 2/2 falls, HFpEF, JOHANNY presented to ED today 2/2 dizziness on/off since . States he was feeling dizzy yesterday and fell x2 and again felt dizzy today when he  went to urgent care where he was lowered to ground to prevent from falling. Son states that recent change in meds may be contributing to dad feeling dizzy. Son stated pts torsemide was increased and farxiga was added. Currently, pt states he feels fine and is asymptomatic. Pt denied loc, fevers, chills, cough or sputum production, nausea or vomiting, diarrhea or other associated symptoms. (2021 20:20)      PAST MEDICAL & SURGICAL HISTORY:    Afib  DM (diabetes mellitus)  CKD (chronic kidney disease)  Hydrocephalus  Hypertension  History of prostate surgery    FAMILY HISTORY:    Mother:  at 72 of HF  Father:  at 68 of a brain aneurism    SOCIAL HISTORY:      Former smoker, denies alcohol or drug use     Allergies    No Known Allergies    Intolerances      PHYSICAL EXAM:  General Appearance: well appearing, normal for age and gender. 	  Neck: normal JVP, no bruit.   Eyes: Extra Ocular muscles intact.   Cardiovascular: regular rate and rhythm S1 S2, No JVD, No murmurs, No edema  Respiratory: Lungs clear to auscultation	  Psychiatry: Alert and oriented x 3, Mood & affect appropriate  Gastrointestinal:  Soft, Non-tender  Skin/Integumentary : No rashes, No ecchymoses, No cyanosis	  Neurologic: Non-focal  Musculoskeletal/ extremities: Normal range of motion, No clubbing, cyanosis or edema  Vascular: Peripheral pulses palpable 2+ bilaterally    CARDIAC MARKERS:    Serum Pro-Brain Natriuretic Peptide: 3334 pg/mL (21 @ 15:00)      PREVIOUS DIAGNOSTIC TESTING:      TTE 21     Summary:   1. Normal global left ventricular systolic function.   2. LV Ejection Fraction by Lemon's Method with a biplane EF of 54 %.   3. Mild left ventricular hypertrophy.   4. Mildly increased LV wall thickness.   5. Normal left ventricular internal cavity size.   6. Moderately enlarged left atrium.   7. Mild mitral annular calcification.   8. Mild thickening and calcification of the anterior and posterior mitral valve leaflets.   9. Mild to moderate mitral valve regurgitation.  10. Mild-moderate tricuspid regurgitation.  11. Mild aortic regurgitation.  12. Sclerotic aortic valve with normal opening.  13. Estimated pulmonary artery systolic pressure is 54.2 mmHg assuming a right atrial pressure of 5 mmHg, which is consistent with moderate pulmonary hypertension.  14. LA volume Index is 51.8 ml/m² ml/m2.  15. There is moderate aortic root calcification.        Home Medications:  acetaminophen 325 mg oral tablet: 2 tab(s) orally every 6 hours, As needed, Moderate Pain (4 - 6) (2021 11:54)  Basaglar KwikPen: 20 unit(s) subcutaneous once a day (at bedtime) (2021 22:10)  calcitriol 0.25 mcg oral capsule: 1 cap(s) orally once a day (2021 15:07)  dutasteride 0.5 mg oral capsule: 1 cap(s) orally once a day (2021 15:07)  Farxiga 10 mg oral tablet: 1 tab(s) orally once a day (2021 21:56)  gabapentin 100 mg oral capsule: 1 cap(s) orally once a day in AM (2021 21:49)  gabapentin 300 mg oral capsule: 1 cap(s) orally once a day (at bedtime) (2021 21:49)  metOLazone 5 mg oral tablet: orally 3 times a week -- (2021 22:06)  Metoprolol Succinate ER 25 mg oral tablet, extended release: 1 tab(s) orally once a day (2021 21:52)  Ozempic (1 mg dose) 4 mg/3 mL subcutaneous solution: 1 milligram(s) subcutaneous once a week on Thursday (2021 21:50)  spironolactone 25 mg oral tablet: 1 tab(s) orally once a day (2021 22:00)    MEDICATIONS  (STANDING):  atorvastatin 20 milliGRAM(s) Oral at bedtime  calcium acetate 667 milliGRAM(s) Oral three times a day with meals  dextrose 40% Gel 15 Gram(s) Oral once  dextrose 5%. 1000 milliLiter(s) (50 mL/Hr) IV Continuous <Continuous>  dextrose 5%. 1000 milliLiter(s) (100 mL/Hr) IV Continuous <Continuous>  dextrose 50% Injectable 25 Gram(s) IV Push once  dextrose 50% Injectable 12.5 Gram(s) IV Push once  dextrose 50% Injectable 25 Gram(s) IV Push once  finasteride 5 milliGRAM(s) Oral daily  gabapentin 100 milliGRAM(s) Oral daily  gabapentin 300 milliGRAM(s) Oral at bedtime  glucagon  Injectable 1 milliGRAM(s) IntraMuscular once  heparin   Injectable 5000 Unit(s) SubCutaneous every 8 hours  insulin glargine Injectable (LANTUS) 20 Unit(s) SubCutaneous at bedtime  insulin lispro (ADMELOG) corrective regimen sliding scale   SubCutaneous three times a day before meals  insulin lispro Injectable (ADMELOG) 7 Unit(s) SubCutaneous before breakfast  insulin lispro Injectable (ADMELOG) 7 Unit(s) SubCutaneous before lunch  insulin lispro Injectable (ADMELOG) 7 Unit(s) SubCutaneous before dinner  tamsulosin 0.4 milliGRAM(s) Oral at bedtime    MEDICATIONS  (PRN):

## 2021-11-05 NOTE — CONSULT NOTE ADULT - SUBJECTIVE AND OBJECTIVE BOX
Date of Admission: 11/3/21    CHIEF COMPLAINT: near syncope    HISTORY OF PRESENT ILLNESS: 86yMale with PMH below presented to the hospital for above CC, Had recent hospitalization in July for CHF and has been managed closely by Dr. Harris and Dr. Huerta. He feels much better since being in the hospital and his outpatient Rx has been adjusted. He has been compliant with his outpatient medications and diuretics. During last hospitalization, patient had slow afib, SVT, and non sustained VT. This admission, patient continues to have NSVT, afib and is tachy jeanette. heart failure and EP are following the patient.     PAST MEDICAL & SURGICAL HISTORY:  Afib    DM (diabetes mellitus)    CKD (chronic kidney disease)    Hydrocephalus    Hypertension    History of prostate surgery        FAMILY HISTORY:  [ ] no pertinent family history of premature cardiovascular disease in first degree relatives.  Mother:   Father:   Siblings:     SOCIAL HISTORY:    [ ] Non-smoker  [ ] Smoker  [ ] Alcohol    Allergies    No Known Allergies    Intolerances    	    REVIEW OF SYSTEMS:  CONSTITUTIONAL: No fever, weight loss, or fatigue  CARDIOLOGY: see HPI  RESPIRATORY: see HPI   NEUROLOGICAL: NO weakness, no focal deficits to report.  ENDOCRINOLOGICAL: no recent change in diabetic medications.   GI: no BRBPR, no N,V,diarrhea.    PSYCHIATRY: normal mood and affect  HEENT: no nasal discharge, no ecchymosis  SKIN: no ecchymosis, no breakdown  MUSCULOSKELETAL: Full range of motion x4.     PHYSICAL EXAM:  T(C): 36 (11-05-21 @ 08:00), Max: 36.6 (11-04-21 @ 19:20)  HR: 86 (11-05-21 @ 08:00) (71 - 89)  BP: 105/62 (11-05-21 @ 08:00) (105/62 - 129/67)  RR: 18 (11-05-21 @ 08:00) (18 - 26)  SpO2: 97% (11-05-21 @ 08:00) (97% - 98%)  Wt(kg): --  I&O's Summary    04 Nov 2021 07:01  -  05 Nov 2021 07:00  --------------------------------------------------------  IN: 0 mL / OUT: 350 mL / NET: -350 mL    05 Nov 2021 07:01  -  05 Nov 2021 14:39  --------------------------------------------------------  IN: 0 mL / OUT: 200 mL / NET: -200 mL        General Appearance: well appearing, normal for age and gender. 	  Neck: normal JVP, no bruit.   Eyes: No xanthomalasia, Extra Ocular muscles intact.   Cardiovascular: irregular rate and rhythm S1 S2, No JVD, No murmurs, No edema  Respiratory: Lungs clear to auscultation	  Psychiatry: Alert and oriented x 3, Mood & affect appropriate  Gastrointestinal:  Soft, Non-tender  Skin/Integumen: No rashes, No ecchymoses, No cyanosis	  Neurologic: Non-focal  Musculoskeletal/ extremities: Normal range of motion, No clubbing, cyanosis or edema  Vascular: Peripheral pulses palpable 2+ bilaterally    LABS:	 	                          14.1   8.74  )-----------( 213      ( 05 Nov 2021 05:51 )             46.2     11-05    142  |  93<L>  |  110<HH>  ----------------------------<  100<H>  3.6   |  26  |  2.9<H>    Ca    10.2<H>      05 Nov 2021 05:51  Phos  6.1     11-05  Mg     3.1     11-05    TPro  7.2  /  Alb  4.2  /  TBili  1.1  /  DBili  x   /  AST  9   /  ALT  7   /  AlkPhos  102  11-05    CARDIAC MARKERS ( 05 Nov 2021 05:51 )  x     / 0.08 ng/mL / x     / x     / x      CARDIAC MARKERS ( 04 Nov 2021 20:42 )  x     / 0.06 ng/mL / x     / x     / 3.2 ng/mL  CARDIAC MARKERS ( 04 Nov 2021 17:35 )  x     / 0.06 ng/mL / x     / x     / x      CARDIAC MARKERS ( 03 Nov 2021 15:00 )  x     / 0.08 ng/mL / x     / x     / x          PT/INR - ( 03 Nov 2021 15:00 )   PT: 12.80 sec;   INR: 1.11 ratio         PTT - ( 03 Nov 2021 15:00 )  PTT:33.2 sec    CARDIAC MARKERS:            TELEMETRY EVENTS: 	    ECG:  < from: 12 Lead ECG (11.05.21 @ 01:06) >  Diagnosis Line Atrial fibrillation  ST & T wave abnormality, consider anterior ischemia  Abnormal ECG    < end of copied text >   	  RADIOLOGY:   OTHER: 	    PREVIOUS DIAGNOSTIC TESTING:    [x ] Echocardiogram: < from: TTE Echo Complete w/o Contrast w/ Doppler (07.02.21 @ 14:44) >   1. Normal global left ventricular systolic function.   2. LVEjection Fraction by Lemon's Method with a biplane EF of 54 %.   3. Mild left ventricular hypertrophy.   4. Mildly increased LV wall thickness.   5. Normal left ventricular internal cavity size.   6. Moderately enlarged left atrium.   7. Mild mitral annular calcification.   8. Mild thickening and calcification of the anterior and posterior mitral valve leaflets.   9. Mild to moderate mitral valve regurgitation.  10. Mild-moderate tricuspid regurgitation.  11. Mild aortic regurgitation.  12. Sclerotic aortic valve with normal opening.  13. Estimated pulmonary artery systolic pressure is 54.2 mmHg assuming a right atrial pressure of 5 mmHg, which is consistent with moderate pulmonary hypertension.  14. LA volume Index is 51.8 ml/m² ml/m2.  15. There is moderate aortic root calcification.    < end of copied text >    [ ]  Catheterization:  [x ] Stress Test: < from: NM Nuclear Stress Pharmacologic Multiple (12.06.18 @ 14:16) >  1. NORMAL ADENOSINE / REST MYOCARDIAL PERFUSION TOMOGRAPHY, WITH NO   EVIDENCE FOR ISCHEMIA DURING ADENOSINE INFUSION.   2. BORDERLINE NORMAL RESTING LEFT VENTRICULAR WALL MOTION AND WALL   THICKENING.  3. LEFT VENTRICULAR EJECTION FRACTION OF   49 % WHICH IS JUST BELOW THE   LOWER LIMIT OF NORMAL OF 50%. WALL MOTION ANALYSIS SUGGESTS EJECTION   FRACTION OF 55%. ECHOCARDIOGRAPHIC ESTIMATED EJECTION FRACTION WAS 54% ON   12/4/2018.     < end of copied text >    	    Home Medications:  acetaminophen 325 mg oral tablet: 2 tab(s) orally every 6 hours, As needed, Moderate Pain (4 - 6) (12 Jul 2021 11:54)  Basaglar KwikPen: 20 unit(s) subcutaneous once a day (at bedtime) (03 Nov 2021 22:10)  calcitriol 0.25 mcg oral capsule: 1 cap(s) orally once a day (30 Jun 2021 15:07)  dutasteride 0.5 mg oral capsule: 1 cap(s) orally once a day (30 Jun 2021 15:07)  Farxiga 10 mg oral tablet: 1 tab(s) orally once a day (03 Nov 2021 21:56)  gabapentin 100 mg oral capsule: 1 cap(s) orally once a day in AM (03 Nov 2021 21:49)  gabapentin 300 mg oral capsule: 1 cap(s) orally once a day (at bedtime) (03 Nov 2021 21:49)  metOLazone 5 mg oral tablet: orally 3 times a week M-W-F (03 Nov 2021 22:06)  Metoprolol Succinate ER 25 mg oral tablet, extended release: 1 tab(s) orally once a day (03 Nov 2021 21:52)  Ozempic (1 mg dose) 4 mg/3 mL subcutaneous solution: 1 milligram(s) subcutaneous once a week on Thursday (03 Nov 2021 21:50)  spironolactone 25 mg oral tablet: 1 tab(s) orally once a day (03 Nov 2021 22:00)    MEDICATIONS  (STANDING):  aDENosine Injectable (ADENOSCAN) 60 milliGRAM(s) IV Bolus once  atorvastatin 20 milliGRAM(s) Oral at bedtime  calcium acetate 667 milliGRAM(s) Oral three times a day with meals  dextrose 40% Gel 15 Gram(s) Oral once  dextrose 5%. 1000 milliLiter(s) (50 mL/Hr) IV Continuous <Continuous>  dextrose 5%. 1000 milliLiter(s) (100 mL/Hr) IV Continuous <Continuous>  dextrose 50% Injectable 25 Gram(s) IV Push once  dextrose 50% Injectable 12.5 Gram(s) IV Push once  dextrose 50% Injectable 25 Gram(s) IV Push once  finasteride 5 milliGRAM(s) Oral daily  gabapentin 100 milliGRAM(s) Oral daily  gabapentin 300 milliGRAM(s) Oral at bedtime  glucagon  Injectable 1 milliGRAM(s) IntraMuscular once  guaifenesin/dextromethorphan Oral Liquid 10 milliLiter(s) Oral every 6 hours  heparin   Injectable 5000 Unit(s) SubCutaneous every 8 hours  insulin glargine Injectable (LANTUS) 20 Unit(s) SubCutaneous at bedtime  insulin lispro (ADMELOG) corrective regimen sliding scale   SubCutaneous three times a day before meals  insulin lispro Injectable (ADMELOG) 7 Unit(s) SubCutaneous before breakfast  insulin lispro Injectable (ADMELOG) 7 Unit(s) SubCutaneous before lunch  insulin lispro Injectable (ADMELOG) 7 Unit(s) SubCutaneous before dinner  metoprolol tartrate 12.5 milliGRAM(s) Oral two times a day  tamsulosin 0.4 milliGRAM(s) Oral at bedtime    MEDICATIONS  (PRN):

## 2021-11-05 NOTE — CONSULT NOTE ADULT - SUBJECTIVE AND OBJECTIVE BOX
Patient is a 86y old  Male who presents with a chief complaint of near syncope (2021 07:44)        HPI:  86 yr old male with hx of HTN, DM, AFib no AC 2/2 falls, HFpEF, JOHANNY presented to ED today 2/2 dizziness on/off since . States he was feeling dizzy yesterday and fell x2 and again felt dizzy today when he  went to urgent care where he was lowered to ground to prevent from falling. Son states pt saw his cardiologist Dr Huerta who changed up his meds last week and states this may be contributing to dad feeling dizzy. Son stated pts torsemide was increased and farxiga was added. Currently, pt states he feels fine and is asymptomatic. Pt denied loc, fevers, chills, cough or sputum production, nausea or vomiting, diarrhea or other associated symptoms. (2021 20:20)      Electrophysiology:  86y Male    REVIEW OF SYSTEMS    [ ] A ten-point review of systems was otherwise negative except as noted.  [ ] Due to altered mental status/intubation, subjective information were not able to be obtained from the patient. History was obtained, to the extent possible, from review of the chart and collateral sources of information.      PAST MEDICAL & SURGICAL HISTORY:  Afib    DM (diabetes mellitus)    CKD (chronic kidney disease)    Hydrocephalus    Hypertension    History of prostate surgery        Home Medications:  acetaminophen 325 mg oral tablet: 2 tab(s) orally every 6 hours, As needed, Moderate Pain (4 - 6) (2021 11:54)  Basaglar KwikPen: 20 unit(s) subcutaneous once a day (at bedtime) (2021 22:10)  calcitriol 0.25 mcg oral capsule: 1 cap(s) orally once a day (2021 15:07)  dutasteride 0.5 mg oral capsule: 1 cap(s) orally once a day (2021 15:07)  Farxiga 10 mg oral tablet: 1 tab(s) orally once a day (2021 21:56)  gabapentin 100 mg oral capsule: 1 cap(s) orally once a day in AM (2021 21:49)  gabapentin 300 mg oral capsule: 1 cap(s) orally once a day (at bedtime) (2021 21:49)  metOLazone 5 mg oral tablet: orally 3 times a week -W- (2021 22:06)  Metoprolol Succinate ER 25 mg oral tablet, extended release: 1 tab(s) orally once a day (2021 21:52)  Ozempic (1 mg dose) 4 mg/3 mL subcutaneous solution: 1 milligram(s) subcutaneous once a week on Thursday (2021 21:50)  spironolactone 25 mg oral tablet: 1 tab(s) orally once a day (2021 22:00)      Allergies:  No Known Allergies      FAMILY HISTORY:  Family history of diabetes mellitus (Mother, Sibling)        SOCIAL HISTORY:    CIGARETTES:  ALCOHOL:        PREVIOUS DIAGNOSTIC TESTING:      ECHO  FINDINGS:    STRESS  FINDINGS:    CATHETERIZATION  FINDINGS:    ELECTROPHYSIOLOGY STUDY  FINDINGS:    CAROTID ULTRASOUND:  FINDINGS    VENOUS DUPLEX SCAN:  FINDINGS:    CHEST CT PULMONARY ANGIO with IV Contrast:  FINDINGS:      MEDICATIONS  (STANDING):  atorvastatin 20 milliGRAM(s) Oral at bedtime  calcium acetate 667 milliGRAM(s) Oral three times a day with meals  dextrose 40% Gel 15 Gram(s) Oral once  dextrose 5%. 1000 milliLiter(s) (50 mL/Hr) IV Continuous <Continuous>  dextrose 5%. 1000 milliLiter(s) (100 mL/Hr) IV Continuous <Continuous>  dextrose 50% Injectable 25 Gram(s) IV Push once  dextrose 50% Injectable 12.5 Gram(s) IV Push once  dextrose 50% Injectable 25 Gram(s) IV Push once  finasteride 5 milliGRAM(s) Oral daily  gabapentin 100 milliGRAM(s) Oral daily  gabapentin 300 milliGRAM(s) Oral at bedtime  glucagon  Injectable 1 milliGRAM(s) IntraMuscular once  guaifenesin/dextromethorphan Oral Liquid 10 milliLiter(s) Oral every 6 hours  heparin   Injectable 5000 Unit(s) SubCutaneous every 8 hours  insulin glargine Injectable (LANTUS) 20 Unit(s) SubCutaneous at bedtime  insulin lispro (ADMELOG) corrective regimen sliding scale   SubCutaneous three times a day before meals  insulin lispro Injectable (ADMELOG) 7 Unit(s) SubCutaneous before breakfast  insulin lispro Injectable (ADMELOG) 7 Unit(s) SubCutaneous before lunch  insulin lispro Injectable (ADMELOG) 7 Unit(s) SubCutaneous before dinner  potassium chloride    Tablet ER 20 milliEquivalent(s) Oral once  tamsulosin 0.4 milliGRAM(s) Oral at bedtime    MEDICATIONS  (PRN):      Vital Signs Last 24 Hrs  T(C): 36 (2021 08:00), Max: 36.6 (2021 19:20)  T(F): 96.8 (2021 08:00), Max: 97.8 (2021 19:20)  HR: 86 (2021 08:00) (71 - 89)  BP: 105/62 (2021 08:00) (105/62 - 129/67)  BP(mean): 77 (2021 08:00) (77 - 77)  RR: 18 (2021 08:00) (18 - 26)  SpO2: 97% (2021 08:00) (97% - 98%)    PHYSICAL EXAM:    GENERAL: In no apparent distress, well nourished, and hydrated.  HEAD:  Atraumatic, Normocephalic  EYES: EOMI, PERRLA, conjunctiva and sclera clear  NECK: Supple and normal thyroid.  No JVD or carotid bruit.  Carotid pulse is 2+ bilaterally.  HEART: Regular rate and rhythm; No murmurs, rubs, or gallops.  PULMONARY: Clear to auscultation and perfusion.  No rales, wheezing, or rhonchi bilaterally.  ABDOMEN: Soft, Nontender, Nondistended; Bowel sounds present  EXTREMITIES:  2+ Peripheral Pulses, No clubbing, cyanosis, or edema  NEUROLOGICAL: Grossly nonfocal    I&O's Detail    2021 07:01  -  2021 07:00  --------------------------------------------------------  IN:  Total IN: 0 mL    OUT:    Voided (mL): 350 mL  Total OUT: 350 mL    Total NET: -350 mL      2021 07:01  -  2021 10:55  --------------------------------------------------------  IN:  Total IN: 0 mL    OUT:    Voided (mL): 200 mL  Total OUT: 200 mL    Total NET: -200 mL        Daily     Daily     INTERPRETATION OF TELEMETRY:    EC Lead ECG:   Ventricular Rate 81 BPM    Atrial Rate 63 BPM    QRS Duration 112 ms    Q-T Interval 362 ms    QTC Calculation(Bazett) 420 ms    R Axis 6 degrees    T Axis -34 degrees    Diagnosis Line Atrial fibrillation  ST & T wave abnormality, consider anterior ischemia  Abnormal ECG    Confirmed by LEIDA COLON MD ([id number]) on 2021 6:23:48 AM (21 @ 01:06)        LABS:                        14.1   8.74  )-----------( 213      ( 2021 05:51 )             46.2     11    142  |  93<L>  |  110<HH>  ----------------------------<  100<H>  3.6   |  26  |  2.9<H>    Ca    10.2<H>      2021 05:51  Phos  6.1     11-  Mg     3.1     -    TPro  7.2  /  Alb  4.2  /  TBili  1.1  /  DBili  x   /  AST  9   /  ALT  7   /  AlkPhos  102  11-05    CARDIAC MARKERS ( 2021 05:51 )  x     / 0.08 ng/mL / x     / x     / x      CARDIAC MARKERS ( 2021 20:42 )  x     / 0.06 ng/mL / x     / x     / 3.2 ng/mL  CARDIAC MARKERS ( 2021 17:35 )  x     / 0.06 ng/mL / x     / x     / x      CARDIAC MARKERS ( 2021 15:00 )  x     / 0.08 ng/mL / x     / x     / x          PT/INR - ( 2021 15:00 )   PT: 12.80 sec;   INR: 1.11 ratio         PTT - ( 2021 15:00 )  PTT:33.2 sec    BNPSerum Pro-Brain Natriuretic Peptide: 3334 pg/mL ( @ 15:00)    Thyroid Stimulating Hormone, Serum: 0.45 uIU/mL [0.27 - 4.20] (21 @ 17:35)      COVID-19 PCR: NotDetec (21 @ 15:35)        RADIOLOGY & ADDITIONAL STUDIES:       Patient is a 86y old  Male who presents with a chief complaint of near syncope (2021 07:44)        HPI:  86 yr old male with hx of HTN, DM, AFib no AC 2/2 falls, HFpEF, JOHANNY presented to ED today 2/2 dizziness on/off since . States he was feeling dizzy yesterday and fell x2 and again felt dizzy today when he  went to urgent care where he was lowered to ground to prevent from falling. Son states pt saw his cardiologist Dr Huerta who changed up his meds last week and states this may be contributing to dad feeling dizzy. Son stated pts torsemide was increased and farxiga was added. Currently, pt states he feels fine and is asymptomatic. Pt denied loc, fevers, chills, cough or sputum production, nausea or vomiting, diarrhea or other associated symptoms. (2021 20:20)      Electrophysiology:  86y Male with h/o HTN, DM, chronic AF not on AC due to falls, hydrocephalus, s/p shunt, COPD, JOHANNY, on bipap at home, pulmonary HTN, HFpEF, prior admissions with acute on chronic HF exacerbations, multiple arhythmias during visit , SVT, AF, frequent PVC, bigeminy NSVT), did not follow up as out patient with EP. Patient now admitted adter episodes of dizziness and presyncope, likely from diuresis.   During admittion patient was noted to have multiple runs of sustained VT, asymptomatic (per son), frequent PVC, bigeminy, bradycardia due to PVCs.  Patient did not have any cardiac work up since last admission    REVIEW OF SYSTEMS    [x ] A ten-point review of systems was otherwise negative except as noted.  [ ] Due to altered mental status/intubation, subjective information were not able to be obtained from the patient. History was obtained, to the extent possible, from review of the chart and collateral sources of information.      PAST MEDICAL & SURGICAL HISTORY:  Afib    DM (diabetes mellitus)    CKD (chronic kidney disease)    Hydrocephalus    Hypertension    History of prostate surgery        Home Medications:  acetaminophen 325 mg oral tablet: 2 tab(s) orally every 6 hours, As needed, Moderate Pain (4 - 6) (2021 11:54)  Basaglar KwikPen: 20 unit(s) subcutaneous once a day (at bedtime) (2021 22:10)  calcitriol 0.25 mcg oral capsule: 1 cap(s) orally once a day (2021 15:07)  dutasteride 0.5 mg oral capsule: 1 cap(s) orally once a day (2021 15:07)  Farxiga 10 mg oral tablet: 1 tab(s) orally once a day (2021 21:56)  gabapentin 100 mg oral capsule: 1 cap(s) orally once a day in AM (2021 21:49)  gabapentin 300 mg oral capsule: 1 cap(s) orally once a day (at bedtime) (2021 21:49)  metOLazone 5 mg oral tablet: orally 3 times a week -- (2021 22:06)  Metoprolol Succinate ER 25 mg oral tablet, extended release: 1 tab(s) orally once a day (2021 21:52)  Ozempic (1 mg dose) 4 mg/3 mL subcutaneous solution: 1 milligram(s) subcutaneous once a week on Thursday (2021 21:50)  spironolactone 25 mg oral tablet: 1 tab(s) orally once a day (2021 22:00)      Allergies:  No Known Allergies      FAMILY HISTORY:  Family history of diabetes mellitus (Mother, Sibling)        SOCIAL HISTORY: denies tobacco / ETOH / illicit drug use     CIGARETTES:  ALCOHOL:        PREVIOUS DIAGNOSTIC TESTING:      ECHO  FINDINGS:  < from: TTE Echo Complete w/o Contrast w/ Doppler (21 @ 14:44) >  Summary:   1. Normal global left ventricular systolic function.   2. LVEjection Fraction by Lemon's Method with a biplane EF of 54 %.   3. Mild left ventricular hypertrophy.   4. Mildly increased LV wall thickness.   5. Normal left ventricular internal cavity size.   6. Moderately enlarged left atrium.   7. Mild mitral annular calcification.   8. Mild thickening and calcification of the anterior and posterior mitral valve leaflets.   9. Mild to moderate mitral valve regurgitation.  10. Mild-moderate tricuspid regurgitation.  11. Mild aortic regurgitation.  12. Sclerotic aortic valve with normal opening.  13. Estimated pulmonary artery systolic pressure is 54.2 mmHg assuming a right atrial pressure of 5 mmHg, which is consistent with moderate pulmonary hypertension.  14. LA volume Index is 51.8 ml/m² ml/m2.  15. There is moderate aortic root calcification.    < end of copied text >    < from: Transthoracic Echocardiogram (19 @ 13:35) >  Summary:   1. Normal left ventricular internal cavity size.   2. Mildly enlarged left atrium.   3. Normal right atrial size.   4. There is no evidence of pericardial effusion.   5. Thickening and calcification of the anterior and posterior mitral   valve leaflets.   6. Moderate tricuspid regurgitation.   7. Pulmonic valve regurgitation.    < end of copied text >      STRESS  FINDINGS:  < from: NM Nuclear Stress Pharmacologic Multiple (18 @ 14:16) >  Impression:   1. NORMAL ADENOSINE / REST MYOCARDIAL PERFUSION TOMOGRAPHY, WITH NO   EVIDENCE FOR ISCHEMIA DURING ADENOSINE INFUSION.   2. BORDERLINE NORMAL RESTING LEFT VENTRICULAR WALL MOTION AND WALL   THICKENING.  3. LEFT VENTRICULAR EJECTION FRACTION OF   49 % WHICH IS JUST BELOW THE   LOWER LIMIT OF NORMAL OF 50%. WALL MOTION ANALYSIS SUGGESTS EJECTION   FRACTION OF 55%. ECHOCARDIOGRAPHIC ESTIMATED EJECTION FRACTION WAS 54% ON   2018.     < end of copied text >    LE DUPLEX SCAN:  FINDINGS:  < from: VA Duplex Lower Extrem Arterial, Bilat (21 @ 14:06) >  Impression:    Normal arterial flow in the bilateral lower extremities.    < end of copied text >    VENOUS DUPLEX SCAN:  FINDINGS:    < from: VA Duplex Lower Ext Vein Scan, Bilat (21 @ 14:33) >    No evidence of deep venous thrombosis in either lower extremity. Neither peroneal vein is visualized. Bilateral edema.    < end of copied text >      MEDICATIONS  (STANDING):  atorvastatin 20 milliGRAM(s) Oral at bedtime  calcium acetate 667 milliGRAM(s) Oral three times a day with meals  dextrose 40% Gel 15 Gram(s) Oral once  dextrose 5%. 1000 milliLiter(s) (50 mL/Hr) IV Continuous <Continuous>  dextrose 5%. 1000 milliLiter(s) (100 mL/Hr) IV Continuous <Continuous>  dextrose 50% Injectable 25 Gram(s) IV Push once  dextrose 50% Injectable 12.5 Gram(s) IV Push once  dextrose 50% Injectable 25 Gram(s) IV Push once  finasteride 5 milliGRAM(s) Oral daily  gabapentin 100 milliGRAM(s) Oral daily  gabapentin 300 milliGRAM(s) Oral at bedtime  glucagon  Injectable 1 milliGRAM(s) IntraMuscular once  guaifenesin/dextromethorphan Oral Liquid 10 milliLiter(s) Oral every 6 hours  heparin   Injectable 5000 Unit(s) SubCutaneous every 8 hours  insulin glargine Injectable (LANTUS) 20 Unit(s) SubCutaneous at bedtime  insulin lispro (ADMELOG) corrective regimen sliding scale   SubCutaneous three times a day before meals  insulin lispro Injectable (ADMELOG) 7 Unit(s) SubCutaneous before breakfast  insulin lispro Injectable (ADMELOG) 7 Unit(s) SubCutaneous before lunch  insulin lispro Injectable (ADMELOG) 7 Unit(s) SubCutaneous before dinner  potassium chloride    Tablet ER 20 milliEquivalent(s) Oral once  tamsulosin 0.4 milliGRAM(s) Oral at bedtime    MEDICATIONS  (PRN):      Vital Signs Last 24 Hrs  T(C): 36 (2021 08:00), Max: 36.6 (2021 19:20)  T(F): 96.8 (2021 08:00), Max: 97.8 (2021 19:20)  HR: 86 (2021 08:00) (71 - 89)  BP: 105/62 (2021 08:00) (105/62 - 129/67)  BP(mean): 77 (2021 08:00) (77 - 77)  RR: 18 (2021 08:00) (18 - 26)  SpO2: 97% (2021 08:00) (97% - 98%)    PHYSICAL EXAM:    GENERAL: In no apparent distress, well nourished, and hydrated.  HEAD:  Atraumatic, Normocephalic  EYES: EOMI, PERRLA, conjunctiva and sclera clear  NECK: Supple and normal thyroid.  No JVD or carotid bruit.  Carotid pulse is 2+ bilaterally.  HEART: Regular rate and rhythm; No murmurs, rubs, or gallops.  PULMONARY: Clear to auscultation and perfusion.  No rales, wheezing, or rhonchi bilaterally.  ABDOMEN: Soft, Nontender, Nondistended; Bowel sounds present  EXTREMITIES:  2+ Peripheral Pulses, No clubbing, cyanosis, or edema  NEUROLOGICAL: Grossly nonfocal    I&O's Detail    2021 07:01  -  2021 07:00  --------------------------------------------------------  IN:  Total IN: 0 mL    OUT:    Voided (mL): 350 mL  Total OUT: 350 mL    Total NET: -350 mL      2021 07:01  -  2021 10:55  --------------------------------------------------------  IN:  Total IN: 0 mL    OUT:    Voided (mL): 200 mL  Total OUT: 200 mL    Total NET: -200 mL        Daily     Daily     INTERPRETATION OF TELEMETRY:    EC Lead ECG:   Ventricular Rate 81 BPM    Atrial Rate 63 BPM    QRS Duration 112 ms    Q-T Interval 362 ms    QTC Calculation(Bazett) 420 ms    R Axis 6 degrees    T Axis -34 degrees    Diagnosis Line Atrial fibrillation  ST & T wave abnormality, consider anterior ischemia  Abnormal ECG    Confirmed by LEIDA COLON MD ([id number]) on 2021 6:23:48 AM (21 @ 01:06)        LABS:                        14.1   8.74  )-----------( 213      ( 2021 05:51 )             46.2         142  |  93<L>  |  110<HH>  ----------------------------<  100<H>  3.6   |  26  |  2.9<H>    Ca    10.2<H>      2021 05:51  Phos  6.1     -  Mg     3.1         TPro  7.2  /  Alb  4.2  /  TBili  1.1  /  DBili  x   /  AST  9   /  ALT  7   /  AlkPhos  102  1105    CARDIAC MARKERS ( 2021 05:51 )  x     / 0.08 ng/mL / x     / x     / x      CARDIAC MARKERS ( 2021 20:42 )  x     / 0.06 ng/mL / x     / x     / 3.2 ng/mL  CARDIAC MARKERS ( 2021 17:35 )  x     / 0.06 ng/mL / x     / x     / x      CARDIAC MARKERS ( 2021 15:00 )  x     / 0.08 ng/mL / x     / x     / x          PT/INR - ( 2021 15:00 )   PT: 12.80 sec;   INR: 1.11 ratio         PTT - ( 2021 15:00 )  PTT:33.2 sec    BNPSerum Pro-Brain Natriuretic Peptide: 3334 pg/mL ( @ 15:00)    Thyroid Stimulating Hormone, Serum: 0.45 uIU/mL [0.27 - 4.20] (21 @ 17:35)      COVID-19 PCR: NotDetec (21 @ 15:35)        RADIOLOGY & ADDITIONAL STUDIES:

## 2021-11-05 NOTE — PROGRESS NOTE ADULT - ASSESSMENT
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

## 2021-11-05 NOTE — PROGRESS NOTE ADULT - SUBJECTIVE AND OBJECTIVE BOX
NEPHROLOGY FOLLOW UP NOTE    pt seen and examined  bp's better  + cough  + void      PAST MEDICAL & SURGICAL HISTORY:  Hypertension  Hydrocephalus  CKD (chronic kidney disease)  DM (diabetes mellitus)  Afib  History of prostate surgery    Allergies:  No Known Allergies    Home Medications Reviewed    SOCIAL HISTORY:  Denies ETOH,Smoking,   FAMILY HISTORY:  Family history of diabetes mellitus (Mother, Sibling)      REVIEW OF SYSTEMS  All other review of systems is negative unless indicated above.    advance care planning and directives reviewed     PHYSICAL EXAM:  NAD  [place]  moist mm  no ln  decreased BS b/l  distant HS, irregular   soft + BS  1+ edema    Hospital Medications:   MEDICATIONS  (STANDING):  aDENosine Injectable (ADENOSCAN) 60 milliGRAM(s) IV Bolus once  atorvastatin 20 milliGRAM(s) Oral at bedtime  calcium acetate 667 milliGRAM(s) Oral three times a day with meals  dextrose 40% Gel 15 Gram(s) Oral once  dextrose 5%. 1000 milliLiter(s) (50 mL/Hr) IV Continuous <Continuous>  dextrose 5%. 1000 milliLiter(s) (100 mL/Hr) IV Continuous <Continuous>  dextrose 50% Injectable 25 Gram(s) IV Push once  dextrose 50% Injectable 12.5 Gram(s) IV Push once  dextrose 50% Injectable 25 Gram(s) IV Push once  finasteride 5 milliGRAM(s) Oral daily  gabapentin 100 milliGRAM(s) Oral daily  gabapentin 300 milliGRAM(s) Oral at bedtime  glucagon  Injectable 1 milliGRAM(s) IntraMuscular once  guaifenesin/dextromethorphan Oral Liquid 10 milliLiter(s) Oral every 6 hours  heparin   Injectable 5000 Unit(s) SubCutaneous every 8 hours  insulin glargine Injectable (LANTUS) 20 Unit(s) SubCutaneous at bedtime  insulin lispro (ADMELOG) corrective regimen sliding scale   SubCutaneous three times a day before meals  insulin lispro Injectable (ADMELOG) 7 Unit(s) SubCutaneous before breakfast  insulin lispro Injectable (ADMELOG) 7 Unit(s) SubCutaneous before lunch  insulin lispro Injectable (ADMELOG) 7 Unit(s) SubCutaneous before dinner  metoprolol tartrate 12.5 milliGRAM(s) Oral two times a day  tamsulosin 0.4 milliGRAM(s) Oral at bedtime        VITALS:  T(F): 96.8 (11-05-21 @ 08:00), Max: 97.8 (11-04-21 @ 19:20)  HR: 86 (11-05-21 @ 08:00)  BP: 105/62 (11-05-21 @ 08:00)  RR: 18 (11-05-21 @ 08:00)  SpO2: 97% (11-05-21 @ 08:00)  Wt(kg): --    11-04 @ 07:01  -  11-05 @ 07:00  --------------------------------------------------------  IN: 0 mL / OUT: 350 mL / NET: -350 mL    11-05 @ 07:01  -  11-05 @ 14:51  --------------------------------------------------------  IN: 0 mL / OUT: 200 mL / NET: -200 mL          LABS:  11-05    142  |  93<L>  |  110<HH>  ----------------------------<  100<H>  3.6   |  26  |  2.9<H>    Ca    10.2<H>      05 Nov 2021 05:51  Phos  6.1     11-05  Mg     3.1     11-05    TPro  7.2  /  Alb  4.2  /  TBili  1.1  /  DBili      /  AST  9   /  ALT  7   /  AlkPhos  102  11-05                          14.1   8.74  )-----------( 213      ( 05 Nov 2021 05:51 )             46.2       Urine Studies:        RADIOLOGY & ADDITIONAL STUDIES:

## 2021-11-05 NOTE — PROGRESS NOTE ADULT - ASSESSMENT
CHF/Near syncope/ TOBIAS on CKD    Near syncope likely due to overdiuresis - now that patient is being complaint with diet current dose of diuretics is too much for him  Patient is euvolemic on exam  Continue to hold diuretics for now   Replace K 3.4 today  Get BMP daily   Maintain potassium >4.0, Mg >2.1  Strict intake and output  Daily weight   Nuclear Stress test   Plan discussed with family member at bedside    **ATTENDING ATTESTATION PENDING** CHF/Near syncope/ TOBIAS on CKD/ VT     Patient remains euvolemic on exam  Continue to hold diuretics for now - start torsemide 10 mg daily on 11/07  Get BMP daily   Maintain potassium >4.0, Mg >2.1  Strict intake and output  Daily weight  Episodes of VT on tele -Get nuclear Stress test   EP evaluation for VT  Plan discussed with primary team and family member at bedside

## 2021-11-05 NOTE — CONSULT NOTE ADULT - ASSESSMENT
Cards: Dr García  HF: Dr Huerta    86y Male with h/o HTN, DM, chronic AF not on AC due to falls, hydrocephalus, s/p shunt, COPD, JOHANNY, on bipap at home, pulmonary HTN, HFpEF, PVC, NSVT during prior admissions  Admitted with pre-syncopal episode and dizziness, likely dur to overdiuresis, cannot exclude arrhythmia  now with frequent PVC, bigeminy and sustained VT on tele    VT, NSVT, frequent PVC  AF  chronic HFpEF  TOBIAS on CKD      con't tele  start Metoprolol and uptitrate as BP permits  Maintain electrolytes K>4.0 Mg >2.0  repeat echocardiogram  Cardiology eval for ischemic work up  likely to require ICD prior to discharge pending ischemic work up finding  follow up as out patient to discuss ablation if medical therapy only is not sufficient

## 2021-11-05 NOTE — PROGRESS NOTE ADULT - TIME BILLING
#Progress Note Handoff  Pending (specify):  cardiac telemonitoring, NST, NSX  Family discussion: house staff updated pt family  Disposition: ed3/tele
#Progress Note Handoff  Pending (specify):  follow up nephro, nsx, HF, orthostats, shuntogram  Family discussion: house staff updated pt family  Disposition: cardiac telemonitoring, if not bradic overnight likley DC cardiac telemonitoring in am

## 2021-11-05 NOTE — CONSULT NOTE ADULT - ATTENDING COMMENTS
Tele reviewed and shows sustained VT (~30 sec) preceded by PVC   Increase BB as BP allows  Check 2D Echocardiogram  Cardiology eval for ischemic work up  likely to require ICD prior to discharge pending ischemic work up finding  May require ablation    Will cont to follow

## 2021-11-06 NOTE — PROGRESS NOTE ADULT - ATTENDING COMMENTS
Patient seen and examined independently. Agree with resident note/ history / physical exam and plan of care with following exceptions/additions/updates. Case discussed with patient/pt decision maker, house-staff and nursing. My notes supersedes the resident's notes in case of discrepancies.    # NPH sp shunt 1 m ago,  shunt malfunction, needs revision of the distal tubing , but this is not urgnet , fu neurosurgery, Dr Deal is going to meet with family in am, in view of cardiac disease, cardiac workup has to be done first.   # CAD, positive stress test, poss cath next wk  # TOBIAS on ckd , stable , nephro followup   per nephro note from 11/5, resume torsemide 40mg po qd tomorrow  cont phoslo with meals / renal diet  # afib rate controlled, fu cardio       #Progress Note Handoff  Pending (specify):  Consults__cardio followup, neurosurgery followup   Family discussion: dw pt, his wife and per his request his son in law at the bedside.  Disposition: Home when stable

## 2021-11-06 NOTE — PROGRESS NOTE ADULT - SUBJECTIVE AND OBJECTIVE BOX
VINCENZO EDMONDSON 86y Male  MRN#: [id number]   CODE STATUS: full code     Hospital Day: 3d    Pt is currently admitted with the primary diagnosis of ventricular tachycardia    SUBJECTIVE  Overnight/Interval Events: Overnight patient had episode of ventricular tachycardia which resolved on its own.                                              ----------------------------------------------------------  OBJECTIVE  PAST MEDICAL & SURGICAL HISTORY  Afib    DM (diabetes mellitus)    CKD (chronic kidney disease)    Hydrocephalus    Hypertension    History of prostate surgery                                              -----------------------------------------------------------  ALLERGIES:  No Known Allergies                                            ------------------------------------------------------------    HOME MEDICATIONS  Home Medications:  acetaminophen 325 mg oral tablet: 2 tab(s) orally every 6 hours, As needed, Moderate Pain (4 - 6) (12 Jul 2021 11:54)  Basaglar KwikPen: 20 unit(s) subcutaneous once a day (at bedtime) (03 Nov 2021 22:10)  calcitriol 0.25 mcg oral capsule: 1 cap(s) orally once a day (30 Jun 2021 15:07)  dutasteride 0.5 mg oral capsule: 1 cap(s) orally once a day (30 Jun 2021 15:07)  Farxiga 10 mg oral tablet: 1 tab(s) orally once a day (03 Nov 2021 21:56)  gabapentin 100 mg oral capsule: 1 cap(s) orally once a day in AM (03 Nov 2021 21:49)  gabapentin 300 mg oral capsule: 1 cap(s) orally once a day (at bedtime) (03 Nov 2021 21:49)  metOLazone 5 mg oral tablet: orally 3 times a week M-W-F (03 Nov 2021 22:06)  Metoprolol Succinate ER 25 mg oral tablet, extended release: 1 tab(s) orally once a day (03 Nov 2021 21:52)  Ozempic (1 mg dose) 4 mg/3 mL subcutaneous solution: 1 milligram(s) subcutaneous once a week on Thursday (03 Nov 2021 21:50)  spironolactone 25 mg oral tablet: 1 tab(s) orally once a day (03 Nov 2021 22:00)                           MEDICATIONS:  STANDING MEDICATIONS  aspirin enteric coated 81 milliGRAM(s) Oral daily  atorvastatin 20 milliGRAM(s) Oral at bedtime  calcium acetate 667 milliGRAM(s) Oral three times a day with meals  dextrose 40% Gel 15 Gram(s) Oral once  dextrose 5%. 1000 milliLiter(s) IV Continuous <Continuous>  dextrose 5%. 1000 milliLiter(s) IV Continuous <Continuous>  dextrose 50% Injectable 12.5 Gram(s) IV Push once  dextrose 50% Injectable 25 Gram(s) IV Push once  dextrose 50% Injectable 25 Gram(s) IV Push once  finasteride 5 milliGRAM(s) Oral daily  gabapentin 100 milliGRAM(s) Oral daily  gabapentin 300 milliGRAM(s) Oral at bedtime  glucagon  Injectable 1 milliGRAM(s) IntraMuscular once  heparin   Injectable 5000 Unit(s) SubCutaneous every 8 hours  insulin glargine Injectable (LANTUS) 20 Unit(s) SubCutaneous at bedtime  insulin lispro (ADMELOG) corrective regimen sliding scale   SubCutaneous three times a day before meals  insulin lispro Injectable (ADMELOG) 7 Unit(s) SubCutaneous before breakfast  insulin lispro Injectable (ADMELOG) 7 Unit(s) SubCutaneous before lunch  insulin lispro Injectable (ADMELOG) 7 Unit(s) SubCutaneous before dinner  metoprolol tartrate 12.5 milliGRAM(s) Oral two times a day  potassium chloride  20 mEq/100 mL IVPB 20 milliEquivalent(s) IV Intermittent every 2 hours  tamsulosin 0.4 milliGRAM(s) Oral at bedtime    PRN MEDICATIONS                                            ------------------------------------------------------------  VITAL SIGNS: Last 24 Hours  T(C): 36.3 (06 Nov 2021 04:00), Max: 36.3 (06 Nov 2021 04:00)  T(F): 97.3 (06 Nov 2021 04:00), Max: 97.3 (06 Nov 2021 04:00)  HR: 88 (06 Nov 2021 04:00) (82 - 88)  BP: 141/64 (06 Nov 2021 04:00) (105/62 - 141/64)  BP(mean): 89 (05 Nov 2021 17:00) (77 - 89)  RR: 17 (06 Nov 2021 04:00) (17 - 18)  SpO2: 97% (06 Nov 2021 04:00) (97% - 98%)      11-05-21 @ 07:01  -  11-06-21 @ 07:00  --------------------------------------------------------  IN: 360 mL / OUT: 700 mL / NET: -340 mL                                             --------------------------------------------------------------  LABS:                        14.0   10.33 )-----------( 231      ( 06 Nov 2021 04:30 )             46.4     11-06    143  |  95<L>  |  108<HH>  ----------------------------<  173<H>  3.7   |  30  |  2.8<H>    Ca    10.2<H>      06 Nov 2021 00:11  Phos  6.1     11-05  Mg     3.0     11-06    TPro  7.5  /  Alb  4.6  /  TBili  1.0  /  DBili  x   /  AST  10  /  ALT  7   /  AlkPhos  108  11-06                  CARDIAC MARKERS ( 05 Nov 2021 05:51 )  x     / 0.08 ng/mL / x     / x     / x      CARDIAC MARKERS ( 04 Nov 2021 20:42 )  x     / 0.06 ng/mL / x     / x     / 3.2 ng/mL  CARDIAC MARKERS ( 04 Nov 2021 17:35 )  x     / 0.06 ng/mL / x     / x     / x                                                  -------------------------------------------------------------    RADIOLOGY:    NM CSF flow eval 11/4:    Abnormal radionuclide ventriculoperitoneal shuntogram.    Radiolabeled cerebrospinal fluid is visualized in the shunt tube at the level of the xiphoid.    No visualization of radiolabeled cerebrospinal fluid in the shunt tube at the level of the upper and lower abdomen and no visualization of radiolabeled cerebrospinal fluid free within the peritoneal cavity. Therefore no clear demonstration of shunt patency beyond the level of the xiphoid process.    CT head 11/3:  Since prior CT of 2/15/2019 there is increased ventricular dilation with frontal ventriculoperitoneal shunt catheter in place.    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    Chronic microvascular type changes as well as a chronic left basal ganglia lacunar infarct.                                          --------------------------------------------------------------    PHYSICAL EXAM:  General: no acute distress  HEENT: normocephalic, atraumatic  Lungs: CTAB, on room air, no wheeze, rhonchi, crackles  Heart: regular rate and rhythm, normal S1 and S2  Abdomen: soft, nontender, nondistended  Neuro: AAOx3, CN2-12 grossly intact                                         --------------------------------------------------------------    ASSESSMENT & PLAN    87 yo male with PMHx of HTN, DM, AFib not on AC due to falls, HFpEF, JOHANNY, NPH admitted for recurrent near syncope.    ventricular tachycardia, frequent PVC  Near Syncope multiple etiologies possible 2/2 diuretic use, arrythmia vs malfunctioning shunt.   Frequent falls, worsening gait, dizziness  -patient was bradycardic on admission so beta blocker was inti ally held  -K 3.6 today; s/p KCL; keep K>4; mag 3.1  -lopressor 12.5mg PO BID  -f/u nuclear stress test  -f/u cardio consult for ishemic workup  -per EP, patient will likely need ICD or ablation  -c/w telemetry monitoring      Hx of NPH s/p  shunt placement at [facility] 1 month ago  - CT Head revealed inc ventricular dilation with frontal  shunt catheter in place  - NeuroSx following, s/p shuntogram 11/4- surgery likley outpt with pt nsx    Hx of chronic HFrEF  - Troponin 0.08 on admission, elevated at baseline due to CKD, active cardiac ischemia unlikely, will repeat Troponin  - BNP ~ 3K, baseline ~7K, pt appears dry on exam, will hold diuresis  - hold diuretics  - strict Is and Os; daily weights    TOBIAS on CKD 3  - Cr 2.9, baseline ~ 2.5 - 2.8  - per nephro Dr. Merchant, hold torsemide, metolazone aldactone, farxiga  - c/w phoslo with meals    HTN  - Home Metoprolol on hold as pt had bradycardia on admission  - BP well controlled off medications    DM  - A1c 5.7  - C/w Lantus 20 U, Lispro 7 U    JOHANNY on CPAP    BPH  - C/w Finasteride, Tamsulosin    DVT ppx: HSQ  Diet: DASH, CC  Activity: AAT  Full Code  DIspo: Acute     VINCENZO EDMONDSON 86y Male  MRN#: [id number]   CODE STATUS: full code     Hospital Day: 3d    Pt is currently admitted with the primary diagnosis of ventricular tachycardia    SUBJECTIVE  Overnight/Interval Events: Overnight patient had episode of ventricular tachycardia which resolved on its own. Spoke with family at bedside to give medical update.                                              ----------------------------------------------------------  OBJECTIVE  PAST MEDICAL & SURGICAL HISTORY  Afib    DM (diabetes mellitus)    CKD (chronic kidney disease)    Hydrocephalus    Hypertension    History of prostate surgery                                              -----------------------------------------------------------  ALLERGIES:  No Known Allergies                                            ------------------------------------------------------------    HOME MEDICATIONS  Home Medications:  acetaminophen 325 mg oral tablet: 2 tab(s) orally every 6 hours, As needed, Moderate Pain (4 - 6) (12 Jul 2021 11:54)  Basaglar KwikPen: 20 unit(s) subcutaneous once a day (at bedtime) (03 Nov 2021 22:10)  calcitriol 0.25 mcg oral capsule: 1 cap(s) orally once a day (30 Jun 2021 15:07)  dutasteride 0.5 mg oral capsule: 1 cap(s) orally once a day (30 Jun 2021 15:07)  Farxiga 10 mg oral tablet: 1 tab(s) orally once a day (03 Nov 2021 21:56)  gabapentin 100 mg oral capsule: 1 cap(s) orally once a day in AM (03 Nov 2021 21:49)  gabapentin 300 mg oral capsule: 1 cap(s) orally once a day (at bedtime) (03 Nov 2021 21:49)  metOLazone 5 mg oral tablet: orally 3 times a week M-W-F (03 Nov 2021 22:06)  Metoprolol Succinate ER 25 mg oral tablet, extended release: 1 tab(s) orally once a day (03 Nov 2021 21:52)  Ozempic (1 mg dose) 4 mg/3 mL subcutaneous solution: 1 milligram(s) subcutaneous once a week on Thursday (03 Nov 2021 21:50)  spironolactone 25 mg oral tablet: 1 tab(s) orally once a day (03 Nov 2021 22:00)                           MEDICATIONS:  STANDING MEDICATIONS  aspirin enteric coated 81 milliGRAM(s) Oral daily  atorvastatin 20 milliGRAM(s) Oral at bedtime  calcium acetate 667 milliGRAM(s) Oral three times a day with meals  dextrose 40% Gel 15 Gram(s) Oral once  dextrose 5%. 1000 milliLiter(s) IV Continuous <Continuous>  dextrose 5%. 1000 milliLiter(s) IV Continuous <Continuous>  dextrose 50% Injectable 12.5 Gram(s) IV Push once  dextrose 50% Injectable 25 Gram(s) IV Push once  dextrose 50% Injectable 25 Gram(s) IV Push once  finasteride 5 milliGRAM(s) Oral daily  gabapentin 100 milliGRAM(s) Oral daily  gabapentin 300 milliGRAM(s) Oral at bedtime  glucagon  Injectable 1 milliGRAM(s) IntraMuscular once  heparin   Injectable 5000 Unit(s) SubCutaneous every 8 hours  insulin glargine Injectable (LANTUS) 20 Unit(s) SubCutaneous at bedtime  insulin lispro (ADMELOG) corrective regimen sliding scale   SubCutaneous three times a day before meals  insulin lispro Injectable (ADMELOG) 7 Unit(s) SubCutaneous before breakfast  insulin lispro Injectable (ADMELOG) 7 Unit(s) SubCutaneous before lunch  insulin lispro Injectable (ADMELOG) 7 Unit(s) SubCutaneous before dinner  metoprolol tartrate 12.5 milliGRAM(s) Oral two times a day  potassium chloride  20 mEq/100 mL IVPB 20 milliEquivalent(s) IV Intermittent every 2 hours  tamsulosin 0.4 milliGRAM(s) Oral at bedtime    PRN MEDICATIONS                                            ------------------------------------------------------------  VITAL SIGNS: Last 24 Hours  T(C): 36.3 (06 Nov 2021 04:00), Max: 36.3 (06 Nov 2021 04:00)  T(F): 97.3 (06 Nov 2021 04:00), Max: 97.3 (06 Nov 2021 04:00)  HR: 88 (06 Nov 2021 04:00) (82 - 88)  BP: 141/64 (06 Nov 2021 04:00) (105/62 - 141/64)  BP(mean): 89 (05 Nov 2021 17:00) (77 - 89)  RR: 17 (06 Nov 2021 04:00) (17 - 18)  SpO2: 97% (06 Nov 2021 04:00) (97% - 98%)      11-05-21 @ 07:01  -  11-06-21 @ 07:00  --------------------------------------------------------  IN: 360 mL / OUT: 700 mL / NET: -340 mL                                             --------------------------------------------------------------  LABS:                        14.0   10.33 )-----------( 231      ( 06 Nov 2021 04:30 )             46.4     11-06    143  |  95<L>  |  108<HH>  ----------------------------<  173<H>  3.7   |  30  |  2.8<H>    Ca    10.2<H>      06 Nov 2021 00:11  Phos  6.1     11-05  Mg     3.0     11-06    TPro  7.5  /  Alb  4.6  /  TBili  1.0  /  DBili  x   /  AST  10  /  ALT  7   /  AlkPhos  108  11-06                  CARDIAC MARKERS ( 05 Nov 2021 05:51 )  x     / 0.08 ng/mL / x     / x     / x      CARDIAC MARKERS ( 04 Nov 2021 20:42 )  x     / 0.06 ng/mL / x     / x     / 3.2 ng/mL  CARDIAC MARKERS ( 04 Nov 2021 17:35 )  x     / 0.06 ng/mL / x     / x     / x                                                  -------------------------------------------------------------    RADIOLOGY:    NM CSF flow eval 11/4:    Abnormal radionuclide ventriculoperitoneal shuntogram.    Radiolabeled cerebrospinal fluid is visualized in the shunt tube at the level of the xiphoid.    No visualization of radiolabeled cerebrospinal fluid in the shunt tube at the level of the upper and lower abdomen and no visualization of radiolabeled cerebrospinal fluid free within the peritoneal cavity. Therefore no clear demonstration of shunt patency beyond the level of the xiphoid process.    CT head 11/3:  Since prior CT of 2/15/2019 there is increased ventricular dilation with frontal ventriculoperitoneal shunt catheter in place.    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    Chronic microvascular type changes as well as a chronic left basal ganglia lacunar infarct.                                          --------------------------------------------------------------    PHYSICAL EXAM:  General: no acute distress  HEENT: normocephalic, atraumatic  Lungs: CTAB, on room air, no wheeze, rhonchi, crackles  Heart: regular rate and rhythm, normal S1 and S2  Abdomen: soft, nontender, nondistended  Neuro: AAOx3, CN2-12 grossly intact                                         --------------------------------------------------------------    ASSESSMENT & PLAN    87 yo male with PMHx of HTN, DM, AFib not on AC due to falls, HFpEF, JOHANNY, NPH admitted for recurrent near syncope.    ventricular tachycardia, frequent PVC  Near Syncope multiple etiologies possible 2/2 diuretic use, arrythmia vs malfunctioning shunt.   Frequent falls, worsening gait, dizziness  -K 3.9 today; keep K>4; mag 3.1  -lopressor 12.5mg PO BID  -nuclear stress test: abnormal w/moderate perfusion defect consistent w/ischemia; f/u cardio regarding catheterization  -per EP, patient will likely need ICD or ablation  -c/w telemetry monitoring      Hx of NPH s/p  shunt placement at [facility] 1 month ago  - CT Head revealed inc ventricular dilation with frontal  shunt catheter in place  - NeuroSx following, s/p shuntogram 11/4- surgery likley outpt with pt nsx    Hx of chronic HFrEF  - Troponin 0.08 on admission, elevated at baseline due to CKD, active cardiac ischemia unlikely, will repeat Troponin  - BNP ~ 3K, baseline ~7K, pt appears dry on exam, will hold diuresis  - hold diuretics  - strict Is and Os; daily weights    TOBIAS on CKD 3  - Cr 3.1, baseline ~ 2.5 - 2.8  - per nephro Dr. Merchant, hold torsemide, metolazone aldactone, farxiga  - c/w phoslo with meals    HTN  - BP well controlled off medications    DM  - A1c 5.7  - C/w Lantus 20 U, Lispro 7 U    JOHANNY on CPAP    BPH  - C/w Finasteride, Tamsulosin    DVT ppx: HSQ  Diet: DASH, CC  Activity: AAT  Full Code  DIspo: Acute

## 2021-11-07 NOTE — PROGRESS NOTE ADULT - ATTENDING COMMENTS
- [facility] with cardiology  - ICD for secondary prevention- VT, likely on Tuesday  - D/w patient and family at bedside

## 2021-11-07 NOTE — PROGRESS NOTE ADULT - SUBJECTIVE AND OBJECTIVE BOX
INTERVAL HPI/OVERNIGHT EVENTS:  No acute events overnight.   Patient denies fever, chills, dizziness, syncope, chest pain, palpitations, SOB, cough.    MEDICATIONS  (STANDING):  aspirin enteric coated 81 milliGRAM(s) Oral daily  atorvastatin 20 milliGRAM(s) Oral at bedtime  calcium acetate 667 milliGRAM(s) Oral three times a day with meals  finasteride 5 milliGRAM(s) Oral daily  gabapentin 100 milliGRAM(s) Oral daily  gabapentin 300 milliGRAM(s) Oral at bedtime  glucagon  Injectable 1 milliGRAM(s) IntraMuscular once  heparin   Injectable 5000 Unit(s) SubCutaneous every 8 hours  insulin glargine Injectable (LANTUS) 20 Unit(s) SubCutaneous at bedtime  insulin lispro (ADMELOG) corrective regimen sliding scale   SubCutaneous three times a day before meals  insulin lispro Injectable (ADMELOG) 7 Unit(s) SubCutaneous before breakfast  insulin lispro Injectable (ADMELOG) 7 Unit(s) SubCutaneous before lunch  insulin lispro Injectable (ADMELOG) 7 Unit(s) SubCutaneous before dinner  metoprolol tartrate 12.5 milliGRAM(s) Oral two times a day  tamsulosin 0.4 milliGRAM(s) Oral at bedtime  torsemide 40 milliGRAM(s) Oral daily    MEDICATIONS  (PRN):  acetaminophen     Tablet .. 650 milliGRAM(s) Oral every 6 hours PRN Moderate Pain (4 - 6)  metoprolol tartrate Injectable 5 milliGRAM(s) IV Push once PRN sustained tachy >120      Allergies  No Known Allergies      REVIEW OF SYSTEMS    [x] A ten-point review of systems was otherwise negative except as noted.  [ ] Due to altered mental status/intubation, subjective information were not able to be obtained from the patient. History was obtained, to the extent possible, from review of the chart and collateral sources of information.      Vital Signs Last 24 Hrs  T(C): 35.9 (07 Nov 2021 08:22), Max: 36.2 (06 Nov 2021 16:00)  T(F): 96.6 (07 Nov 2021 08:22), Max: 97.2 (06 Nov 2021 16:00)  HR: 59 (07 Nov 2021 08:22) (59 - 98)  BP: 110/55 (07 Nov 2021 08:22) (100/55 - 139/63)  BP(mean): 77 (07 Nov 2021 08:22) (77 - 91)  RR: 18 (07 Nov 2021 08:22) (18 - 19)  SpO2: 98% (07 Nov 2021 05:00) (95% - 99%)    11-06-21 @ 08:01  -  11-07-21 @ 07:00  --------------------------------------------------------  IN: 240 mL / OUT: 500 mL / NET: -260 mL        Physical Exam  GENERAL: Elderly male, In no apparent distress, well nourished, and hydrated.  HEART: Regular rate and rhythm; No murmurs, rubs, or gallops.  PULMONARY: Clear to auscultation and perfusion.  No rales, wheezing, or rhonchi bilaterally.  ABDOMEN: Soft, Nontender, Nondistended; Bowel sounds present  EXTREMITIES:  2+ Peripheral Pulses, No clubbing, cyanosis, or edema  NEUROLOGICAL: Grossly nonfocal    LABS:                        14.0   10.33 )-----------( 231      ( 06 Nov 2021 04:30 )             46.4     11-06    139  |  94<L>  |  106<HH>  ----------------------------<  133<H>  3.9   |  23  |  3.1<H>    Ca    9.7      06 Nov 2021 04:30  Mg     2.9     11-06    TPro  7.0  /  Alb  4.2  /  TBili  1.1  /  DBili  x   /  AST  9   /  ALT  6   /  AlkPhos  99  11-06 11-06-21 @ 08:01  -  11-07-21 @ 07:00  --------------------------------------------------------  IN: 240 mL / OUT: 500 mL / NET: -260 mL  11-06-21 @ 08:01  -  11-07-21 @ 07:00  --------------------------------------------------------  IN: 240 mL / OUT: 500 mL / NET: -260 m      RADIOLOGY:  < from: TTE Echo Complete w/o Contrast w/ Doppler (07.02.21 @ 14:44) >  Summary:   1. Normal global left ventricular systolic function.   2. LVEjection Fraction by Lemon's Method with a biplane EF of 54 %.   3. Mild left ventricular hypertrophy.   4. Mildly increased LV wall thickness.   5. Normal left ventricular internal cavity size.   6. Moderately enlarged left atrium.   7. Mild mitral annular calcification.   8. Mild thickening and calcification of the anterior and posterior mitral valve leaflets.   9. Mild to moderate mitral valve regurgitation.  10. Mild-moderate tricuspid regurgitation.  11. Mild aortic regurgitation.  12. Sclerotic aortic valve with normal opening.  13. Estimated pulmonary artery systolic pressure is 54.2 mmHg assuming a right atrial pressure of 5 mmHg, which is consistent with moderate pulmonary hypertension.  14. LA volume Index is 51.8 ml/m² ml/m2.  15. There is moderate aortic root calcification.    PHYSICIAN INTERPRETATION:  Left Ventricle: The left ventricular internal cavity size is normal. Left ventricular wall thickness is mildly increased. There is mild left ventricular hypertrophy. Global LV systolic function was normal.  Right Ventricle: Normal right ventricular size and function.  Left Atrium: Moderately enlarged left atrium. LA volume Index is 51.8 ml/m² ml/m2.  Pericardium: There is no evidence of pericardial effusion.  Mitral Valve: Mild thickening and calcification of the anterior and posterior mitral valve leaflets. Mitral leaflet mobility is normal. There is mild mitral annular calcification. Mild to moderate mitral valve regurgitation is seen.  Tricuspid Valve: Structurally normal tricuspid valve, with normal leaflet excursion. Mild-moderate tricuspid regurgitation is visualized. Estimated pulmonary artery systolic pressure is 54.2 mmHg assuming a right atrial pressure of 5 mmHg, which is consistent with moderate pulmonary hypertension.  Aortic Valve: The aortic valve is trileaflet. Sclerotic aortic valve with normal opening. Mild aortic valve regurgitation is seen.  Pulmonic Valve: Structurally normal pulmonic valve, with normal leaflet excursion. Trace pulmonic valve regurgitation.  Aorta: Aortic root measured at Sinus of Valsalva is normal. There is moderate aortic root calcification.  Pulmonary Artery: The main pulmonary artery is normal in size.  Venous: The inferior vena cava was normal sized, with respiratory size variation greater than 50%.    < end of copied text >    < from: NM Nuclear Stress Pharmacologic Multiple (11.05.21 @ 16:57) >  IMPRESSION:    1. MODERATE SIZE REVERSIBLE DEFECT INVOLVING INFERIOR AND INFEROLATERAL WALLS OF THE LEFT VENTRICLE EXTENDING TO THE APEX CONSISTENT WITH ISCHEMIA.    2. NORMAL RESTING LEFT VENTRICULAR WALL MOTION AND WALL THICKENING.    3. LEFT VENTRICULAR EJECTION FRACTION OF  50 % WHICH BORDERLINE LOW.    < end of copied text >

## 2021-11-07 NOTE — PROGRESS NOTE ADULT - ASSESSMENT
EP: Dr. Bhatt  Cards: Dr. García / Dr. Huerta    86y Male with h/o HTN, DM, chronic AF not on AC due to falls, hydrocephalus, s/p shunt, COPD, JOHANNY, on bipap, pulmonary HTN, HFpEF, freq PVC, NSVT during prior admissions. Admitted with pre-syncopal episode and dizziness, likely dur to overdiuresis. Now with frequent PVC, bigeminy and sustained VT on tele.     Impression:  Sustained VT, NSVT, frequent PVC  TOBIAS on CKD  Hx chronic AF  Hx chronic HFpEF; HTN  Hx DM  Hx hydrocephalus s/p  shunt  Hx COPD, JOHANNY (BiPAP)    Plan:  - Fu cardiology regarding cath  - ICD pending findings of cath  - Please resend COVID  - Cont lopressor  - Cont current management  - Appreciate renal recs  - Please notify EPS once cardiac cath is done, [id number]

## 2021-11-07 NOTE — PROGRESS NOTE ADULT - SUBJECTIVE AND OBJECTIVE BOX
patient seen and examined and discussed with son and with covering resident.  patient denies SOB but did have trouble sleeping last night.  usually uses CPAP at home but did not have CPAP in hospital last night.  Vital Signs Last 24 Hrs  T(C): 35.9 (07 Nov 2021 08:22), Max: 36.2 (06 Nov 2021 16:00)  T(F): 96.6 (07 Nov 2021 08:22), Max: 97.2 (06 Nov 2021 16:00)  HR: 59 (07 Nov 2021 08:22) (59 - 98)  BP: 110/55 (07 Nov 2021 08:22) (100/55 - 139/63)  BP(mean): 77 (07 Nov 2021 08:22) (77 - 91)  RR: 18 (07 Nov 2021 08:22) (18 - 19)  SpO2: 98% (07 Nov 2021 05:00) (95% - 99%)       conj pink, no jaundice  neck pos. JVD at 45 deg. supple.  lungs bilaterally increased expiratory phase, bilateral basilar crackles  heart irregular rhythm.  heart rate 72. no murmur or gallop heard  abd. bs pos. soft nontender  extremities LLE has surgical dressing - no drainage or bleeding noted on it  bilateral trace-1+ edema                   14.0   10.33 )-----------( 231      ( 06 Nov 2021 04:30 )             46.4   11-06    139  |  94<L>  |  106<HH>  ----------------------------<  133<H>  3.9   |  23  |  3.1<H>    Ca    9.7      06 Nov 2021 04:30  Mg     2.9     11-06    TPro  7.0  /  Alb  4.2  /  TBili  1.1  /  DBili  x   /  AST  9   /  ALT  6   /  AlkPhos  99  11-06

## 2021-11-07 NOTE — PROGRESS NOTE ADULT - ASSESSMENT
CKD with acute component thought to be sec. to increased diuresis vs. CHF itself  CHF with preserved EF - patient today clinically fluid overloaded.  ?related to cor pulmonale since patient has not had CPAP machine for 3-4 nights  CAD - pos. stress test  arrhythmias - multiple episodes v. tach noted on telemetry last 24 hours - ?related to ischemic episodes      Plan:  to use CPAP at night  resume torsemide 40 mg po daily  monitor volume status with daily weights  cardiology to evaluate for possible cardiac cath  if patient gets cath he should have minimum contrast/isosmolar  if fluid overloaded will not be able to give IVF - would give furosemide 40 mg IV on call to cath lab to maintain urine flow

## 2021-11-07 NOTE — PROGRESS NOTE ADULT - SUBJECTIVE AND OBJECTIVE BOX
pt seen and examined.     no new complaint, except that he wants to go home    ROS no cp, no sob    T(F): 96.6 (11-07-21 @ 08:22), Max: 97.2 (11-06-21 @ 16:00)  HR: 59 (11-07-21 @ 08:22) (59 - 98)  BP: 110/55 (11-07-21 @ 08:22) (100/55 - 139/63)  RR: 18 (11-07-21 @ 08:22) (18 - 19)  SpO2: 98% (11-07-21 @ 05:00) (95% - 99%)    Physical exam:   constitutional NAD, AAOX3, Respiratory  lungs CTA, CVS heart RRR, GI: abdomen Soft NT, ND, BS+, skin: intact  neuro exam Motor, sensory and CN normal, no deficit , has gait disturbance. generalized weakness    MEDICATIONS  (STANDING):  aspirin enteric coated 81 milliGRAM(s) Oral daily  atorvastatin 20 milliGRAM(s) Oral at bedtime  calcium acetate 667 milliGRAM(s) Oral three times a day with meals  dextrose 40% Gel 15 Gram(s) Oral once  dextrose 5%. 1000 milliLiter(s) (50 mL/Hr) IV Continuous <Continuous>  dextrose 5%. 1000 milliLiter(s) (100 mL/Hr) IV Continuous <Continuous>  dextrose 50% Injectable 25 Gram(s) IV Push once  dextrose 50% Injectable 12.5 Gram(s) IV Push once  dextrose 50% Injectable 25 Gram(s) IV Push once  finasteride 5 milliGRAM(s) Oral daily  gabapentin 100 milliGRAM(s) Oral daily  gabapentin 300 milliGRAM(s) Oral at bedtime  glucagon  Injectable 1 milliGRAM(s) IntraMuscular once  heparin   Injectable 5000 Unit(s) SubCutaneous every 8 hours  insulin glargine Injectable (LANTUS) 20 Unit(s) SubCutaneous at bedtime  insulin lispro (ADMELOG) corrective regimen sliding scale   SubCutaneous three times a day before meals  insulin lispro Injectable (ADMELOG) 7 Unit(s) SubCutaneous before breakfast  insulin lispro Injectable (ADMELOG) 7 Unit(s) SubCutaneous before lunch  insulin lispro Injectable (ADMELOG) 7 Unit(s) SubCutaneous before dinner  metoprolol tartrate 12.5 milliGRAM(s) Oral two times a day  tamsulosin 0.4 milliGRAM(s) Oral at bedtime    MEDICATIONS  (PRN):  acetaminophen     Tablet .. 650 milliGRAM(s) Oral every 6 hours PRN Moderate Pain (4 - 6)  metoprolol tartrate Injectable 5 milliGRAM(s) IV Push once PRN sustained tachy >120                          14.0   10.33 )-----------( 231      ( 06 Nov 2021 04:30 )             46.4   11-06    139  |  94<L>  |  106<HH>  ----------------------------<  133<H>  3.9   |  23  |  3.1<H>    Ca    9.7      06 Nov 2021 04:30  Mg     2.9     11-06    TPro  7.0  /  Alb  4.2  /  TBili  1.1  /  DBili  x   /  AST  9   /  ALT  6   /  AlkPhos  99  11-06    < from: CT Head No Cont (11.03.21 @ 17:33) >  Since prior CT of 2/15/2019 there is increased ventricular dilation with frontal ventriculoperitoneal shunt catheter in place.    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    Chronic microvascular type changes as well as a chronic left basal ganglia lacunar infarct.    < end of copied text >    < from: NM Nuclear Stress Pharmacologic Multiple (11.05.21 @ 16:57) >  1. MODERATE SIZE REVERSIBLE DEFECT INVOLVING INFERIOR AND INFEROLATERAL WALLS OF THE LEFT VENTRICLE EXTENDING TO THE APEX CONSISTENT WITH ISCHEMIA.    2. NORMAL RESTING LEFT VENTRICULAR WALL MOTION AND WALL THICKENING.    3. LEFT VENTRICULAR EJECTION FRACTION OF  50 % WHICH BORDERLINE LOW.    < end of copied text >    < from: TTE Echo Complete w/o Contrast w/ Doppler (07.02.21 @ 14:44) >   1. Normal global left ventricular systolic function.   2. LVEjection Fraction by Lemon's Method with a biplane EF of 54 %.   3. Mild left ventricular hypertrophy.   4. Mildly increased LV wall thickness.   5. Normal left ventricular internal cavity size.   6. Moderately enlarged left atrium.   7. Mild mitral annular calcification.   8. Mild thickening and calcification of the anterior and posterior mitral valve leaflets.   9. Mild to moderate mitral valve regurgitation.  10. Mild-moderate tricuspid regurgitation.  11. Mild aortic regurgitation.  12. Sclerotic aortic valve with normal opening.  13. Estimated pulmonary artery systolic pressure is 54.2 mmHg assuming a right atrial pressure of 5 mmHg, which is consistent with moderate pulmonary hypertension.  14. LA volume Index is 51.8 ml/m² ml/m2.  15. There is moderate aortic root calcification.    < end of copied text >      a/p  # CAD, positive stress test, may need cath but pt has significant renal insufficiency , awiting cardio and nephro to discuss the plans with pt and his family   # malfunctioning  shunt , will need intervention but in view of ischemic cardiomyopathy, it will not be done until cleared by cardio   # CKD stage 4-5, stable , fu nephro   # Afib, rate controlled, fu cardio   # DM controlled, avoid hypoglycemia, keep Fingerstick between 100-180  POCT Blood Glucose.: 100 mg/dL (11-07-21 @ 08:01)  POCT Blood Glucose.: 112 mg/dL (11-06-21 @ 11:23)  A1C with Estimated Average Glucose Result: 5.7: 11/4/21  # HTN cont meds   # HFpEF cont meds     #Progress Note Handoff  Pending (specify):  Consults__cardio, EP, neurosurgery team , PT   Family discussion: raven pt   Disposition: Home___

## 2021-11-08 NOTE — PROGRESS NOTE ADULT - SUBJECTIVE AND OBJECTIVE BOX
INTERVAL HPI/OVERNIGHT EVENTS:  Underwent cardiac cath today, found significant LAD lesion, intra-op CODED required shock x 2 for monomorphic VT. Started on lido gtt and upgraded to CCU. No intervention on lesion.       MEDICATIONS  (STANDING):  aMIOdarone Infusion 1 mG/Min (33.3 mL/Hr) IV Continuous <Continuous>  aMIOdarone Infusion 0.5 mG/Min (16.7 mL/Hr) IV Continuous <Continuous>  aMIOdarone IVPB 150 milliGRAM(s) IV Intermittent once  aspirin enteric coated 81 milliGRAM(s) Oral daily  atorvastatin 20 milliGRAM(s) Oral at bedtime  calcium acetate 667 milliGRAM(s) Oral three times a day with meals  dextrose 40% Gel 15 Gram(s) Oral once  dextrose 5%. 1000 milliLiter(s) (50 mL/Hr) IV Continuous <Continuous>  dextrose 5%. 1000 milliLiter(s) (100 mL/Hr) IV Continuous <Continuous>  dextrose 50% Injectable 25 Gram(s) IV Push once  dextrose 50% Injectable 12.5 Gram(s) IV Push once  dextrose 50% Injectable 25 Gram(s) IV Push once  finasteride 5 milliGRAM(s) Oral daily  gabapentin 100 milliGRAM(s) Oral daily  gabapentin 300 milliGRAM(s) Oral at bedtime  glucagon  Injectable 1 milliGRAM(s) IntraMuscular once  heparin   Injectable 5000 Unit(s) SubCutaneous every 8 hours  insulin glargine Injectable (LANTUS) 20 Unit(s) SubCutaneous at bedtime  insulin lispro (ADMELOG) corrective regimen sliding scale   SubCutaneous three times a day before meals  insulin lispro Injectable (ADMELOG) 7 Unit(s) SubCutaneous before breakfast  insulin lispro Injectable (ADMELOG) 7 Unit(s) SubCutaneous before lunch  insulin lispro Injectable (ADMELOG) 7 Unit(s) SubCutaneous before dinner  metoprolol tartrate 12.5 milliGRAM(s) Oral two times a day  potassium chloride    Tablet ER 20 milliEquivalent(s) Oral once  potassium chloride    Tablet ER 20 milliEquivalent(s) Oral once  sodium chloride 0.9%. 1000 milliLiter(s) (50 mL/Hr) IV Continuous <Continuous>  tamsulosin 0.4 milliGRAM(s) Oral at bedtime  torsemide 40 milliGRAM(s) Oral daily    MEDICATIONS  (PRN):  acetaminophen     Tablet .. 650 milliGRAM(s) Oral every 6 hours PRN Moderate Pain (4 - 6)  metoprolol tartrate Injectable 5 milliGRAM(s) IV Push once PRN sustained tachy >120      Allergies    No Known Allergies    Intolerances        REVIEW OF SYSTEMS    [x] A ten-point review of systems was otherwise negative except as noted.  [ ] Due to altered mental status/intubation, subjective information were not able to be obtained from the patient. History was obtained, to the extent possible, from review of the chart and collateral sources of information.      Vital Signs Last 24 Hrs  T(C): 36.3 (08 Nov 2021 08:00), Max: 36.4 (08 Nov 2021 00:00)  T(F): 97.4 (08 Nov 2021 08:00), Max: 97.6 (08 Nov 2021 00:00)  HR: 77 (08 Nov 2021 11:58) (65 - 82)  BP: 135/69 (08 Nov 2021 11:58) (105/54 - 135/69)  BP(mean): 82 (07 Nov 2021 20:00) (82 - 82)  RR: 18 (08 Nov 2021 11:58) (17 - 18)  SpO2: 98% (08 Nov 2021 11:58) (93% - 100%)    11-07-21 @ 07:01  -  11-08-21 @ 07:00  --------------------------------------------------------  IN: 0 mL / OUT: 900 mL / NET: -900 mL        Physical Exam  GENERAL: Elderly male, In no apparent distress, well nourished, and hydrated.  HEART: Regular rate and rhythm; No murmurs, rubs, or gallops.  PULMONARY: Clear to auscultation and perfusion.  No rales, wheezing, or rhonchi bilaterally.  ABDOMEN: Soft, Nontender, Nondistended; Bowel sounds present  EXTREMITIES:  2+ Peripheral Pulses, No clubbing, cyanosis, or edema  NEUROLOGICAL: Grossly nonfocal      LABS:                        13.9   9.18  )-----------( 205      ( 08 Nov 2021 09:02 )             45.4     11-08    142  |  94<L>  |  104<HH>  ----------------------------<  91  3.4<L>   |  25  |  2.7<H>    Ca    9.9      08 Nov 2021 09:02  Mg     2.8     11-08    TPro  7.0  /  Alb  4.2  /  TBili  0.8  /  DBili  x   /  AST  8   /  ALT  <5  /  AlkPhos  95  11-08 11-07-21 @ 07:01  -  11-08-21 @ 07:00  --------------------------------------------------------  IN: 0 mL / OUT: 900 mL / NET: -900 mL        11-07-21 @ 07:01  -  11-08-21 @ 07:00  --------------------------------------------------------  IN: 0 mL / OUT: 900 mL / NET: -900 mL        RADIOLOGY:  FINDINGS:     Coronary Dominance: Left      LM: No disease    LAD:        Prox: 90% stenosis       Mid: moderate to severe disease       Dist: Moderate to severe disease  Diag: mild disease    LCx: Mild diffuse disease  OM: Mild diffuse disease       RCA: small, non-dominant; minor luminal irregularities       LVEDP: Normal    EF: 54% on echo        ESTIMATED BLOOD LOSS: < 10 mL        CONDITION:   [] Good   [X] Fair   [] Critical      SPECIMEN REMOVED: N/A     POST-OP DIAGNOSIS:      [X] 1 Vessel Coronary Artery Disease   [X] During procedure, patient went into sustained ventricular tachycardia requiring 2X DCCV       PLAN OF CARE:     [X] Admit to CCU    [X] Medications: Continue aspirin 8mg daily, statin and beta-blockers    [X] EP follow-up: Recommended amiodarone drip    [X] IV Fluids: NS at 50cc/hr for 5 hours     [X] Will need AICD (to be implanted on 11/9/2021), F/U with EP    [X] Will plan for PCI to LAD on 11/10/2021.    < from: TTE Echo Complete w/o Contrast w/ Doppler (07.02.21 @ 14:44) >  Summary:   1. Normal global left ventricular systolic function.   2. LVEjection Fraction by Lemon's Method with a biplane EF of 54 %.   3. Mild left ventricular hypertrophy.   4. Mildly increased LV wall thickness.   5. Normal left ventricular internal cavity size.   6. Moderately enlarged left atrium.   7. Mild mitral annular calcification.   8. Mild thickening and calcification of the anterior and posterior mitral valve leaflets.   9. Mild to moderate mitral valve regurgitation.  10. Mild-moderate tricuspid regurgitation.  11. Mild aortic regurgitation.  12. Sclerotic aortic valve with normal opening.  13. Estimated pulmonary artery systolic pressure is 54.2 mmHg assuming a right atrial pressure of 5 mmHg, which is consistent with moderate pulmonary hypertension.  14. LA volume Index is 51.8 ml/m² ml/m2.  15. There is moderate aortic root calcification.    PHYSICIAN INTERPRETATION:  Left Ventricle: The left ventricular internal cavity size is normal. Left ventricular wall thickness is mildly increased. There is mild left ventricular hypertrophy. Global LV systolic function was normal.  Right Ventricle: Normal right ventricular size and function.  Left Atrium: Moderately enlarged left atrium. LA volume Index is 51.8 ml/m² ml/m2.  Pericardium: There is no evidence of pericardial effusion.  Mitral Valve: Mild thickening and calcification of the anterior and posterior mitral valve leaflets. Mitral leaflet mobility is normal. There is mild mitral annular calcification. Mild to moderate mitral valve regurgitation is seen.  Tricuspid Valve: Structurallynormal tricuspid valve, with normal leaflet excursion. Mild-moderate tricuspid regurgitation is visualized. Estimated pulmonary artery systolic pressure is 54.2 mmHg assuming a right atrial pressure of 5 mmHg, which is consistent with moderate pulmonary hypertension.  Aortic Valve: The aortic valve is trileaflet. Sclerotic aortic valve with normal opening. Mild aortic valve regurgitation is seen.  Pulmonic Valve: Structurally normal pulmonic valve, with normal leaflet excursion. Trace pulmonic valve regurgitation.  Aorta: Aortic root measured at Sinus of Valsalva is normal. There is moderate aortic root calcification.  Pulmonary Artery: The main pulmonary artery is normal in size.  Venous: The inferior vena cava was normal sized, with respiratory size variation greater than 50%.    < end of copied text >

## 2021-11-08 NOTE — PROGRESS NOTE ADULT - ASSESSMENT
VT   Syncope   CAD with significant pLAD obstruction     Plan:   IV amiodarone   CCU monitoring   Echo   AICD placement   PCI on Wednesday

## 2021-11-08 NOTE — PROGRESS NOTE ADULT - SUBJECTIVE AND OBJECTIVE BOX
NEPHROLOGY FOLLOW UP NOTE    pt seen and examined prior to cardiac cath  events noted / chart reviewed   no new complaints        PAST MEDICAL & SURGICAL HISTORY:  Hypertension  Hydrocephalus  CKD (chronic kidney disease)  DM (diabetes mellitus)  Afib  History of prostate surgery    Allergies:  No Known Allergies    Home Medications Reviewed    SOCIAL HISTORY:  Denies ETOH,Smoking,   FAMILY HISTORY:  Family history of diabetes mellitus (Mother, Sibling)      REVIEW OF SYSTEMS  All other review of systems is negative unless indicated above.    advance care planning and directives reviewed     PHYSICAL EXAM:  NAD  [place]  moist mm  no ln  decreased BS b/l  distant HS, irregular   soft + BS  1+ edema               Hospital Medications:   MEDICATIONS  (STANDING):  aMIOdarone Infusion 1 mG/Min (33.3 mL/Hr) IV Continuous <Continuous>  aMIOdarone Infusion 0.5 mG/Min (16.7 mL/Hr) IV Continuous <Continuous>  aMIOdarone IVPB 150 milliGRAM(s) IV Intermittent once  aspirin enteric coated 81 milliGRAM(s) Oral daily  atorvastatin 20 milliGRAM(s) Oral at bedtime  calcium acetate 667 milliGRAM(s) Oral three times a day with meals  dextrose 40% Gel 15 Gram(s) Oral once  dextrose 5%. 1000 milliLiter(s) (50 mL/Hr) IV Continuous <Continuous>  dextrose 5%. 1000 milliLiter(s) (100 mL/Hr) IV Continuous <Continuous>  dextrose 50% Injectable 25 Gram(s) IV Push once  dextrose 50% Injectable 12.5 Gram(s) IV Push once  dextrose 50% Injectable 25 Gram(s) IV Push once  finasteride 5 milliGRAM(s) Oral daily  gabapentin 100 milliGRAM(s) Oral daily  gabapentin 300 milliGRAM(s) Oral at bedtime  glucagon  Injectable 1 milliGRAM(s) IntraMuscular once  heparin   Injectable 5000 Unit(s) SubCutaneous every 8 hours  insulin glargine Injectable (LANTUS) 20 Unit(s) SubCutaneous at bedtime  insulin lispro (ADMELOG) corrective regimen sliding scale   SubCutaneous three times a day before meals  insulin lispro Injectable (ADMELOG) 7 Unit(s) SubCutaneous before breakfast  insulin lispro Injectable (ADMELOG) 7 Unit(s) SubCutaneous before lunch  insulin lispro Injectable (ADMELOG) 7 Unit(s) SubCutaneous before dinner  metoprolol tartrate 12.5 milliGRAM(s) Oral two times a day  sodium chloride 0.9%. 1000 milliLiter(s) (50 mL/Hr) IV Continuous <Continuous>  tamsulosin 0.4 milliGRAM(s) Oral at bedtime  torsemide 40 milliGRAM(s) Oral daily        VITALS:  T(F): 97.4 (11-08-21 @ 08:00), Max: 97.6 (11-08-21 @ 00:00)  HR: 77 (11-08-21 @ 11:58)  BP: 135/69 (11-08-21 @ 11:58)  RR: 18 (11-08-21 @ 11:58)  SpO2: 98% (11-08-21 @ 11:58)  Wt(kg): --    11-06 @ 08:01  -  11-07 @ 07:00  --------------------------------------------------------  IN: 240 mL / OUT: 500 mL / NET: -260 mL    11-07 @ 07:01  -  11-08 @ 07:00  --------------------------------------------------------  IN: 0 mL / OUT: 900 mL / NET: -900 mL          LABS:  11-08    142  |  94<L>  |  104<HH>  ----------------------------<  91  3.4<L>   |  25  |  2.7<H>    Ca    9.9      08 Nov 2021 09:02  Mg     2.8     11-08    TPro  7.0  /  Alb  4.2  /  TBili  0.8  /  DBili      /  AST  8   /  ALT  <5  /  AlkPhos  95  11-08                          13.9   9.18  )-----------( 205      ( 08 Nov 2021 09:02 )             45.4       Urine Studies:        RADIOLOGY & ADDITIONAL STUDIES:

## 2021-11-08 NOTE — PROGRESS NOTE ADULT - ATTENDING COMMENTS
Sustained VT  single vessel CAD    - CCU  - Amio loading. DC lidocaine.  - Continue Lopressor  - ICD for secondary prevention-likely suhail  - EKG, LFTs

## 2021-11-08 NOTE — PROGRESS NOTE ADULT - SUBJECTIVE AND OBJECTIVE BOX
SUBJ: feeling better. frustrated by disease state but overall better. Discussed case and plan with son at bedside.       MEDICATIONS  (STANDING):  aspirin enteric coated 81 milliGRAM(s) Oral daily  atorvastatin 20 milliGRAM(s) Oral at bedtime  calcium acetate 667 milliGRAM(s) Oral three times a day with meals  dextrose 40% Gel 15 Gram(s) Oral once  dextrose 5%. 1000 milliLiter(s) (50 mL/Hr) IV Continuous <Continuous>  dextrose 5%. 1000 milliLiter(s) (100 mL/Hr) IV Continuous <Continuous>  dextrose 50% Injectable 25 Gram(s) IV Push once  dextrose 50% Injectable 12.5 Gram(s) IV Push once  dextrose 50% Injectable 25 Gram(s) IV Push once  finasteride 5 milliGRAM(s) Oral daily  gabapentin 100 milliGRAM(s) Oral daily  gabapentin 300 milliGRAM(s) Oral at bedtime  glucagon  Injectable 1 milliGRAM(s) IntraMuscular once  heparin   Injectable 5000 Unit(s) SubCutaneous every 8 hours  insulin glargine Injectable (LANTUS) 20 Unit(s) SubCutaneous at bedtime  insulin lispro (ADMELOG) corrective regimen sliding scale   SubCutaneous three times a day before meals  insulin lispro Injectable (ADMELOG) 7 Unit(s) SubCutaneous before breakfast  insulin lispro Injectable (ADMELOG) 7 Unit(s) SubCutaneous before lunch  insulin lispro Injectable (ADMELOG) 7 Unit(s) SubCutaneous before dinner  metoprolol tartrate 12.5 milliGRAM(s) Oral two times a day  tamsulosin 0.4 milliGRAM(s) Oral at bedtime  torsemide 40 milliGRAM(s) Oral daily    MEDICATIONS  (PRN):  acetaminophen     Tablet .. 650 milliGRAM(s) Oral every 6 hours PRN Moderate Pain (4 - 6)  metoprolol tartrate Injectable 5 milliGRAM(s) IV Push once PRN sustained tachy >120            Vital Signs Last 24 Hrs  T(C): 36.3 (08 Nov 2021 08:00), Max: 36.4 (08 Nov 2021 00:00)  T(F): 97.4 (08 Nov 2021 08:00), Max: 97.6 (08 Nov 2021 00:00)  HR: 77 (08 Nov 2021 11:58) (65 - 82)  BP: 135/69 (08 Nov 2021 11:58) (105/54 - 135/69)  BP(mean): 82 (07 Nov 2021 20:00) (82 - 82)  RR: 18 (08 Nov 2021 11:58) (17 - 18)  SpO2: 98% (08 Nov 2021 11:58) (93% - 100%)     REVIEW OF SYSTEMS:  CONSTITUTIONAL: No fever, weight loss, or fatigue  CARDIOLOGY: PAtient denies chest pain, shortness of breath or syncopal episodes.   RESPIRATORY: mild SOb +cough, chronic  NEUROLOGICAL: NO weakness, no focal deficits to report.  ENDOCRINOLOGICAL: no recent change in diabetic medications.   GI: no BRBPR, no N,V,diarrhea.    PSYCHIATRY: normal mood and affect  HEENT: no nasal discharge, no ecchymosis  SKIN: no ecchymosis, no breakdown  MUSCULOSKELETAL: Full range of motion x4.        PHYSICAL EXAM:  · CONSTITUTIONAL:	Well-developed, well nourished    BMI-  ·RESPIRATORY:   airway patent; breath sounds equal; good air movement; respirations non-labored; clear to auscultation bilaterally; no chest wall tenderness; no intercostal retractions; no rales,rhonchi or wheeze  · CARDIOVASCULAR	regular rate and rhythm  no rub  no murmur  normal PMI  · EXTREMITIES: No cyanosis, clubbing or edema  · VASCULAR: 	Equal and normal pulses (carotid, femoral, dorsalis pedis)  	  TELEMETRY: few funs of NSVT. baseline EKG has afib with PVCs    ECG: < from: 12 Lead ECG (11.06.21 @ 00:07) >  Diagnosis Line Atrial fibril;lation with PVC's  Nonspecific ST and T wave abnormality  Abnormal ECG    < end of copied text >      TTE: < from: TTE Echo Complete w/o Contrast w/ Doppler (07.02.21 @ 14:44) >   1. Normal global left ventricular systolic function.   2. LVEjection Fraction by Lemon's Method with a biplane EF of 54 %.   3. Mild left ventricular hypertrophy.   4. Mildly increased LV wall thickness.   5. Normal left ventricular internal cavity size.   6. Moderately enlarged left atrium.   7. Mild mitral annular calcification.   8. Mild thickening and calcification of the anterior and posterior mitral valve leaflets.   9. Mild to moderate mitral valve regurgitation.  10. Mild-moderate tricuspid regurgitation.  11. Mild aortic regurgitation.  12. Sclerotic aortic valve with normal opening.  13. Estimated pulmonary artery systolic pressure is 54.2 mmHg assuming a right atrial pressure of 5 mmHg, which is consistent with moderate pulmonary hypertension.  14. LA volume Index is 51.8 ml/m² ml/m2.  15. There is moderate aortic root calcification.    < end of copied text >      LABS:                        13.9   9.18  )-----------( 205      ( 08 Nov 2021 09:02 )             45.4     11-08    142  |  94<L>  |  104<HH>  ----------------------------<  91  3.4<L>   |  25  |  2.7<H>    Ca    9.9      08 Nov 2021 09:02  Mg     2.8     11-08    TPro  7.0  /  Alb  4.2  /  TBili  0.8  /  DBili  x   /  AST  8   /  ALT  <5  /  AlkPhos  95  11-08            I&O's Summary    07 Nov 2021 07:01  -  08 Nov 2021 07:00  --------------------------------------------------------  IN: 0 mL / OUT: 900 mL / NET: -900 mL      BNP  RADIOLOGY & ADDITIONAL STUDIES:    IMPRESSION AND PLAN:

## 2021-11-08 NOTE — PROGRESS NOTE ADULT - ASSESSMENT
Near Syncope  Afib  NSVT    - Appreciate HF and EPS recommendations  - nuclear stress shows inferior wall defect  - continue current Rx, agree with holding torsemide for now, will re-instate once HF agreeable  - discussed with patient, family, renal, EPS, and housestaff regarding risks/ benefits of cardiac catheterization, including risk of permanent renal dysfunction  - patient and family are agreeable that if the need for intervention exists, will perform that in a staged session in order to minimize renal complications  - continue BB and lipitor  - patient should be on aspirin 81 mg daily given elevated cardiac biomarkers and history of afib not on A/C given frequent falls  - will cath today once COVID PCR has been returned.

## 2021-11-08 NOTE — PROGRESS NOTE ADULT - ATTENDING COMMENTS
Patient seen and examined independently. Agree with resident note/ history / physical exam and plan of care with following exceptions/additions/updates. Case discussed with patient/pt decision maker, house-staff and nursing. My notes supersedes the resident's notes in case of discrepancies.    pt is feeling ok  no new symptoms     a/p  # CAD, positive stress test, may need cath but pt has significant renal insufficiency , awaiting cardio and nephro to discuss the plans with pt and his family   # malfunctioning  shunt , will need intervention but in view of ischemic cardiomyopathy, it will not be done until cleared by cardio   # CKD stage 4-5, stable , fu nephro   # Afib, rate controlled, fu cardio   # DM controlled, avoid hypoglycemia, keep Fingerstick between 100-180  POCT Blood Glucose.: 100 mg/dL (11-07-21 @ 08:01)  POCT Blood Glucose.: 112 mg/dL (11-06-21 @ 11:23)  A1C with Estimated Average Glucose Result: 5.7: 11/4/21  # HTN cont meds   # HFpEF cont meds     #Progress Note Handoff  Pending (specify):  Consults__cardio, EP, neurosurgery team , PT   Family discussion: dw pt   Disposition: Home___      #Progress Note Handoff  Pending (specify):  Consults_________, Tests________, Test Results_______, Other_________  Family discussion:  Disposition: Home___/SNF___/Other________/Unknown at this time________ Patient seen and examined independently. Agree with resident note/ history / physical exam and plan of care with following exceptions/additions/updates. Case discussed with patient/pt decision maker, house-staff and nursing. My notes supersedes the resident's notes in case of discrepancies.    pt is feeling ok  no new symptoms     a/p  # CAD, positive stress test, may need cath but pt has significant renal insufficiency , awaiting cardio and nephro to discuss the plans with pt and his family   # malfunctioning  shunt , will need intervention but in view of ischemic cardiomyopathy, it will not be done until cleared by cardio   # CKD stage 4-5, stable , fu nephro   # Afib, rate controlled, fu cardio   # DM controlled, avoid hypoglycemia, keep Fingerstick between 100-180  POCT Blood Glucose.: 100 mg/dL (11-07-21 @ 08:01)  POCT Blood Glucose.: 112 mg/dL (11-06-21 @ 11:23)  A1C with Estimated Average Glucose Result: 5.7: 11/4/21  # HTN cont meds   # HFpEF cont meds     #Progress Note Handoff  Pending (specify):  Consults__cardio, EP, neurosurgery team , PT   Family discussion: dw pt   Disposition: Home___

## 2021-11-08 NOTE — PROGRESS NOTE ADULT - ASSESSMENT
85 yo male with PMHx of HTN, DM, AFib not on AC due to falls, HFpEF, JOHANNY, NPH admitted for recurrent near syncope.    #NSVT frequent PVC  #Near Syncope multiple likely 2/2 to arrythmia vs dysfunctioning  shunt  #Frequent falls, worsening gait, dizziness  Multiple episodes of NSVT noted on tele monitoring   F/U lytes closely and will keep  K>4; mag 3.1 (still waiting for today's CMP to result)  nuclear stress test as above: abnormal w/moderate perfusion defect consistent w/ischemia;  Spoke to Dr. Anderson he will do a cardiac cath today if the Covid results before 1 pm if not then he will be scheduled for tomorrow early morning.  EP will evaluate the patient after catheterization for possible ICD or ablation   C/W lopressor 12.5mg PO BID      #Hx of NPH s/p  shunt placement at [facility] 1 month ago  CT head showed: Since prior CT of 2/15/2019 there is increased ventricular dilation with frontal ventriculoperitoneal shunt catheter in place.  indicates shunt dysfunction  NM CSF FLOW SHUNT EVAL IMG showed: no clear demonstration of shunt patency beyond the level of the xiphoid process.  Will F/U with neuro surg after cardiac cath      #Hx of chronic HFrEF  does not appear to be in acute exacerbation  strict Is and Os; daily weights  C/W torsemide 40 Q daily (oral)    TOBIAS on CKD 3  Cr 2.7, baseline ~ 2.5 - 2.8  nephro following   F/U Lytes correct as needed  C/W phoslo W meals     #HTN  BP well controlled     #DM  A1c 5.7  C/w basal bolus insulin adjust PRN  keep -180    #JOHANNY  CPAP    #BPH  C/w Finasteride, and Tamsulosin    DVT ppx: HSQ  Diet: DASH, CC  Activity: AAT  Full Code  DIspo: Acute  Pending cardiac cath, neurosurgery eval, EP eval

## 2021-11-08 NOTE — PROGRESS NOTE ADULT - ASSESSMENT
EP: Dr. Bhatt  Cards: Dr. García / Dr. Huerta    86y Male with h/o HTN, DM, chronic AF not on AC due to falls, hydrocephalus, s/p shunt, COPD, JOHANNY, on bipap, pulmonary HTN, HFpEF, freq PVC, NSVT during prior admissions. Admitted with pre-syncopal episode and dizziness, likely dur to overdiuresis. Now with frequent PVC, bigeminy and sustained VT on tele. 11/8 s/p cardiac cath with pLAD 90% stenosis, intraop developed sustained monomorphic VT requiring shock x 2, lido gtt started.     Impression:  Sustained VT, NSVT, frequent PVC  1vCAD, pLAD 90%  TOBIAS on CKD  Hx chronic AF  Hx chronic HFpEF; HTN  Hx DM  Hx hydrocephalus s/p  shunt  Hx COPD, JOHANNY (BiPAP)  COVID neg 11/7    Plan:  - Pt will require ICD, plan for tomorrow  - NPO after midnight  - Agree with lido gtt  - COnt amio  - Cont lopressor  - EKG to assess QTc  - Cont current management  - Appreciate renal recs  - Plan for PCI on Wed   - Please hold SQ heparin  - Maintain k>4 and mag >2

## 2021-11-08 NOTE — PROGRESS NOTE ADULT - SUBJECTIVE AND OBJECTIVE BOX
SUBJECTIVE:    Patient is a 86y old Male who presents with a chief complaint of near syncope (07 Nov 2021 14:20)    Currently admitted to medicine with the primary diagnosis of TOBIAS (acute kidney injury)       Today is hospital day 5d. This morning he is resting comfortably in bed and reports no new issues or overnight events.         PAST MEDICAL & SURGICAL HISTORY  Afib    DM (diabetes mellitus)    CKD (chronic kidney disease)    Hydrocephalus    Hypertension    History of prostate surgery        ALLERGIES:  No Known Allergies    MEDICATIONS:  STANDING MEDICATIONS  aspirin enteric coated 81 milliGRAM(s) Oral daily  atorvastatin 20 milliGRAM(s) Oral at bedtime  calcium acetate 667 milliGRAM(s) Oral three times a day with meals  dextrose 40% Gel 15 Gram(s) Oral once  dextrose 5%. 1000 milliLiter(s) IV Continuous <Continuous>  dextrose 5%. 1000 milliLiter(s) IV Continuous <Continuous>  dextrose 50% Injectable 25 Gram(s) IV Push once  dextrose 50% Injectable 12.5 Gram(s) IV Push once  dextrose 50% Injectable 25 Gram(s) IV Push once  finasteride 5 milliGRAM(s) Oral daily  gabapentin 100 milliGRAM(s) Oral daily  gabapentin 300 milliGRAM(s) Oral at bedtime  glucagon  Injectable 1 milliGRAM(s) IntraMuscular once  heparin   Injectable 5000 Unit(s) SubCutaneous every 8 hours  insulin glargine Injectable (LANTUS) 20 Unit(s) SubCutaneous at bedtime  insulin lispro (ADMELOG) corrective regimen sliding scale   SubCutaneous three times a day before meals  insulin lispro Injectable (ADMELOG) 7 Unit(s) SubCutaneous before breakfast  insulin lispro Injectable (ADMELOG) 7 Unit(s) SubCutaneous before lunch  insulin lispro Injectable (ADMELOG) 7 Unit(s) SubCutaneous before dinner  metoprolol tartrate 12.5 milliGRAM(s) Oral two times a day  tamsulosin 0.4 milliGRAM(s) Oral at bedtime  torsemide 40 milliGRAM(s) Oral daily    PRN MEDICATIONS  acetaminophen     Tablet .. 650 milliGRAM(s) Oral every 6 hours PRN  metoprolol tartrate Injectable 5 milliGRAM(s) IV Push once PRN    VITALS:   T(F): 97.4  HR: 80  BP: 105/54  RR: 18  SpO2: 99%    LABS:                        13.9   9.18  )-----------( 205      ( 08 Nov 2021 09:02 )             45.4     11-07    142  |  93<L>  |  103<HH>  ----------------------------<  57<L>  3.5   |  29  |  2.8<H>    Ca    10.1      07 Nov 2021 19:18  Mg     3.0     11-07    TPro  7.4  /  Alb  4.3  /  TBili  0.9  /  DBili  x   /  AST  8   /  ALT  6   /  AlkPhos  101  11-07      Radiology:    EXAM:  NM NUCLEAR STRESS MULTI PHARM            PROCEDURE DATE:  11/05/2021            INTERPRETATION:  ADENOSINE AND REST MYOCARDIAL PERFUSION SPECT TOMOGRAPHY, WALL MOTION ANALYSIS, LVEF CALCULATION    Reason: Abnormal EKG.    3. Millicuries 201 Thallium was injected intravenously at rest, and SPECT myocardial perfusion images were acquired over a 180 degree arc.  Then, adenosine infusion was begun by Cardiology, and at the end of infusion, 25.3 millicuries 99m technetium sestamibi was injected intravenously, and SPECTmyocardial perfusion images were acquired in the same manner as the resting study.    On viewing a cinematic display of the planar images, there is no significant patient motion.    On viewing the tomographic images in colorand black and white, there is a moderate size reversible defect involving the inferior and inferolateral walls of the left ventricle extending to the apex consistent with ischemia. Findings are best visualized on short axis views 5-10, vertical long axis views 30-33 and horizontal long axis views 30-32. Findings are corroborated by bullseye polar map and SDS score of 3.    The adenosine study was acquired as a gated study.  On viewing a cinematic display of the frames, there is normal resting left ventricular wall motion and wall thickening.    Analysis of the ventriculogram generates a calculated left ventricular ejection fraction of 50 % which is borderline low.    IMPRESSION:    1. MODERATE SIZE REVERSIBLE DEFECT INVOLVING INFERIOR AND INFEROLATERAL WALLS OF THE LEFT VENTRICLE EXTENDING TO THE APEX CONSISTENT WITH ISCHEMIA.    2. NORMAL RESTING LEFT VENTRICULAR WALL MOTION AND WALL THICKENING.    3. LEFT VENTRICULAR EJECTION FRACTION OF  50 % WHICH BORDERLINE LOW.    --- End of Report ---              ELLIOT LANDAU MD; Attending Radiologist  This document has been electronically signed. Nov 5 2021  5:28PM        PHYSICAL EXAM:  GEN: No acute distress  PULM/CHEST:   CVS: Regular rate and rhythm, S1-S2, no murmurs  ABD: Soft, non-tender, non-distended, +BS  EXT: No edema  NEURO: AAOx3    Aguirre Catheter:        SUBJECTIVE:    Patient is a 86y old Male who presents with a chief complaint of near syncope (07 Nov 2021 14:20)    Currently admitted to medicine with the primary diagnosis of TOBIAS (acute kidney injury)       Today is hospital day 5d. This morning he is resting comfortably in bed and reports no new issues or overnight events.         PAST MEDICAL & SURGICAL HISTORY  Afib    DM (diabetes mellitus)    CKD (chronic kidney disease)    Hydrocephalus    Hypertension    History of prostate surgery        ALLERGIES:  No Known Allergies    MEDICATIONS:  STANDING MEDICATIONS  aspirin enteric coated 81 milliGRAM(s) Oral daily  atorvastatin 20 milliGRAM(s) Oral at bedtime  calcium acetate 667 milliGRAM(s) Oral three times a day with meals  dextrose 40% Gel 15 Gram(s) Oral once  dextrose 5%. 1000 milliLiter(s) IV Continuous <Continuous>  dextrose 5%. 1000 milliLiter(s) IV Continuous <Continuous>  dextrose 50% Injectable 25 Gram(s) IV Push once  dextrose 50% Injectable 12.5 Gram(s) IV Push once  dextrose 50% Injectable 25 Gram(s) IV Push once  finasteride 5 milliGRAM(s) Oral daily  gabapentin 100 milliGRAM(s) Oral daily  gabapentin 300 milliGRAM(s) Oral at bedtime  glucagon  Injectable 1 milliGRAM(s) IntraMuscular once  heparin   Injectable 5000 Unit(s) SubCutaneous every 8 hours  insulin glargine Injectable (LANTUS) 20 Unit(s) SubCutaneous at bedtime  insulin lispro (ADMELOG) corrective regimen sliding scale   SubCutaneous three times a day before meals  insulin lispro Injectable (ADMELOG) 7 Unit(s) SubCutaneous before breakfast  insulin lispro Injectable (ADMELOG) 7 Unit(s) SubCutaneous before lunch  insulin lispro Injectable (ADMELOG) 7 Unit(s) SubCutaneous before dinner  metoprolol tartrate 12.5 milliGRAM(s) Oral two times a day  tamsulosin 0.4 milliGRAM(s) Oral at bedtime  torsemide 40 milliGRAM(s) Oral daily    PRN MEDICATIONS  acetaminophen     Tablet .. 650 milliGRAM(s) Oral every 6 hours PRN  metoprolol tartrate Injectable 5 milliGRAM(s) IV Push once PRN    VITALS:   T(F): 97.4  HR: 80  BP: 105/54  RR: 18  SpO2: 99%    LABS:                        13.9   9.18  )-----------( 205      ( 08 Nov 2021 09:02 )             45.4     11-07    142  |  93<L>  |  103<HH>  ----------------------------<  57<L>  3.5   |  29  |  2.8<H>    Ca    10.1      07 Nov 2021 19:18  Mg     3.0     11-07    TPro  7.4  /  Alb  4.3  /  TBili  0.9  /  DBili  x   /  AST  8   /  ALT  6   /  AlkPhos  101  11-07      Radiology:    EXAM:  NM NUCLEAR STRESS MULTI PHARM            PROCEDURE DATE:  11/05/2021            INTERPRETATION:  ADENOSINE AND REST MYOCARDIAL PERFUSION SPECT TOMOGRAPHY, WALL MOTION ANALYSIS, LVEF CALCULATION    Reason: Abnormal EKG.    3. Millicuries 201 Thallium was injected intravenously at rest, and SPECT myocardial perfusion images were acquired over a 180 degree arc.  Then, adenosine infusion was begun by Cardiology, and at the end of infusion, 25.3 millicuries 99m technetium sestamibi was injected intravenously, and SPECTmyocardial perfusion images were acquired in the same manner as the resting study.    On viewing a cinematic display of the planar images, there is no significant patient motion.    On viewing the tomographic images in colorand black and white, there is a moderate size reversible defect involving the inferior and inferolateral walls of the left ventricle extending to the apex consistent with ischemia. Findings are best visualized on short axis views 5-10, vertical long axis views 30-33 and horizontal long axis views 30-32. Findings are corroborated by bullseye polar map and SDS score of 3.    The adenosine study was acquired as a gated study.  On viewing a cinematic display of the frames, there is normal resting left ventricular wall motion and wall thickening.    Analysis of the ventriculogram generates a calculated left ventricular ejection fraction of 50 % which is borderline low.    IMPRESSION:    1. MODERATE SIZE REVERSIBLE DEFECT INVOLVING INFERIOR AND INFEROLATERAL WALLS OF THE LEFT VENTRICLE EXTENDING TO THE APEX CONSISTENT WITH ISCHEMIA.    2. NORMAL RESTING LEFT VENTRICULAR WALL MOTION AND WALL THICKENING.    3. LEFT VENTRICULAR EJECTION FRACTION OF  50 % WHICH BORDERLINE LOW.    --- End of Report ---              ELLIOT LANDAU MD; Attending Radiologist  This document has been electronically signed. Nov 5 2021  5:28PM        PHYSICAL EXAM:  GEN: No acute distress  PULM/CHEST: B/L decreased breath sounds   CVS: Regular rate and rhythm, S1-S2, no murmurs  ABD: Soft, non-tender, distended, +BS  EXT: mild peripheral edema noted  NEURO: AAOx3, no gross neurological deficits noted on examination, pt was able to follow all commands.           SUBJECTIVE:    Patient is a 86y old Male who presents with a chief complaint of near syncope (07 Nov 2021 14:20)    Currently admitted to medicine with the primary diagnosis of TOBIAS (acute kidney injury)    Today is hospital day 5d. This morning he is resting comfortably in bed and reports no new issues or overnight events.     ROS no cp, no sob     PAST MEDICAL & SURGICAL HISTORY  Afib    DM (diabetes mellitus)    CKD (chronic kidney disease)    Hydrocephalus    Hypertension    History of prostate surgery        ALLERGIES:  No Known Allergies    MEDICATIONS:  STANDING MEDICATIONS  aspirin enteric coated 81 milliGRAM(s) Oral daily  atorvastatin 20 milliGRAM(s) Oral at bedtime  calcium acetate 667 milliGRAM(s) Oral three times a day with meals  dextrose 40% Gel 15 Gram(s) Oral once  dextrose 5%. 1000 milliLiter(s) IV Continuous <Continuous>  dextrose 5%. 1000 milliLiter(s) IV Continuous <Continuous>  dextrose 50% Injectable 25 Gram(s) IV Push once  dextrose 50% Injectable 12.5 Gram(s) IV Push once  dextrose 50% Injectable 25 Gram(s) IV Push once  finasteride 5 milliGRAM(s) Oral daily  gabapentin 100 milliGRAM(s) Oral daily  gabapentin 300 milliGRAM(s) Oral at bedtime  glucagon  Injectable 1 milliGRAM(s) IntraMuscular once  heparin   Injectable 5000 Unit(s) SubCutaneous every 8 hours  insulin glargine Injectable (LANTUS) 20 Unit(s) SubCutaneous at bedtime  insulin lispro (ADMELOG) corrective regimen sliding scale   SubCutaneous three times a day before meals  insulin lispro Injectable (ADMELOG) 7 Unit(s) SubCutaneous before breakfast  insulin lispro Injectable (ADMELOG) 7 Unit(s) SubCutaneous before lunch  insulin lispro Injectable (ADMELOG) 7 Unit(s) SubCutaneous before dinner  metoprolol tartrate 12.5 milliGRAM(s) Oral two times a day  tamsulosin 0.4 milliGRAM(s) Oral at bedtime  torsemide 40 milliGRAM(s) Oral daily    PRN MEDICATIONS  acetaminophen     Tablet .. 650 milliGRAM(s) Oral every 6 hours PRN  metoprolol tartrate Injectable 5 milliGRAM(s) IV Push once PRN    VITALS:   T(F): 97.4  HR: 80  BP: 105/54  RR: 18  SpO2: 99%    LABS:                        13.9   9.18  )-----------( 205      ( 08 Nov 2021 09:02 )             45.4     11-07    142  |  93<L>  |  103<HH>  ----------------------------<  57<L>  3.5   |  29  |  2.8<H>    Ca    10.1      07 Nov 2021 19:18  Mg     3.0     11-07    TPro  7.4  /  Alb  4.3  /  TBili  0.9  /  DBili  x   /  AST  8   /  ALT  6   /  AlkPhos  101  11-07      Radiology:    EXAM:  NM NUCLEAR STRESS MULTI PHARM            PROCEDURE DATE:  11/05/2021            INTERPRETATION:  ADENOSINE AND REST MYOCARDIAL PERFUSION SPECT TOMOGRAPHY, WALL MOTION ANALYSIS, LVEF CALCULATION    Reason: Abnormal EKG.    3. Millicuries 201 Thallium was injected intravenously at rest, and SPECT myocardial perfusion images were acquired over a 180 degree arc.  Then, adenosine infusion was begun by Cardiology, and at the end of infusion, 25.3 millicuries 99m technetium sestamibi was injected intravenously, and SPECTmyocardial perfusion images were acquired in the same manner as the resting study.    On viewing a cinematic display of the planar images, there is no significant patient motion.    On viewing the tomographic images in colorand black and white, there is a moderate size reversible defect involving the inferior and inferolateral walls of the left ventricle extending to the apex consistent with ischemia. Findings are best visualized on short axis views 5-10, vertical long axis views 30-33 and horizontal long axis views 30-32. Findings are corroborated by bullseye polar map and SDS score of 3.    The adenosine study was acquired as a gated study.  On viewing a cinematic display of the frames, there is normal resting left ventricular wall motion and wall thickening.    Analysis of the ventriculogram generates a calculated left ventricular ejection fraction of 50 % which is borderline low.    IMPRESSION:    1. MODERATE SIZE REVERSIBLE DEFECT INVOLVING INFERIOR AND INFEROLATERAL WALLS OF THE LEFT VENTRICLE EXTENDING TO THE APEX CONSISTENT WITH ISCHEMIA.    2. NORMAL RESTING LEFT VENTRICULAR WALL MOTION AND WALL THICKENING.    3. LEFT VENTRICULAR EJECTION FRACTION OF  50 % WHICH BORDERLINE LOW.    --- End of Report ---              ELLIOT LANDAU MD; Attending Radiologist  This document has been electronically signed. Nov 5 2021  5:28PM        PHYSICAL EXAM:  GEN: No acute distress  PULM/CHEST: B/L decreased breath sounds   CVS: Regular rate and rhythm, S1-S2, no murmurs  ABD: Soft, non-tender, distended, +BS  EXT: mild peripheral edema noted  NEURO: AAOx3, no gross neurological deficits noted on examination, pt was able to follow all commands.

## 2021-11-08 NOTE — PROGRESS NOTE ADULT - SUBJECTIVE AND OBJECTIVE BOX
HPI:  86 yr old male with hx of HTN, DM, AFib no AC 2/2 falls, HFpEF, JOHANNY presented to ED today 2/2 dizziness on/off since 11/1. States he was feeling dizzy yesterday and fell x2 and again felt dizzy today when he  went to urgent care where he was lowered to ground to prevent from falling. Son states pt saw his cardiologist Dr Huerta who changed up his meds last week and states this may be contributing to dad feeling dizzy. Son stated pts torsemide was increased and farxiga was added. Currently, pt states he feels fine and is asymptomatic. Pt denied loc, fevers, chills, cough or sputum production, nausea or vomiting, diarrhea or other associated symptoms. (03 Nov 2021 20:20)      INTERVAL HISTORY:    PAST MEDICAL & SURGICAL HISTORY  Afib    DM (diabetes mellitus)    CKD (chronic kidney disease)    Hydrocephalus    Hypertension    History of prostate surgery        ALLERGIES:  No Known Allergies      MEDICATIONS:  MEDICATIONS  (STANDING):  aMIOdarone Infusion 1 mG/Min (33.3 mL/Hr) IV Continuous <Continuous>  aMIOdarone Infusion 0.5 mG/Min (16.7 mL/Hr) IV Continuous <Continuous>  aspirin enteric coated 81 milliGRAM(s) Oral daily  atorvastatin 20 milliGRAM(s) Oral at bedtime  calcium acetate 667 milliGRAM(s) Oral three times a day with meals  dextrose 40% Gel 15 Gram(s) Oral once  dextrose 5%. 1000 milliLiter(s) (50 mL/Hr) IV Continuous <Continuous>  dextrose 5%. 1000 milliLiter(s) (100 mL/Hr) IV Continuous <Continuous>  dextrose 50% Injectable 25 Gram(s) IV Push once  dextrose 50% Injectable 12.5 Gram(s) IV Push once  dextrose 50% Injectable 25 Gram(s) IV Push once  finasteride 5 milliGRAM(s) Oral daily  gabapentin 100 milliGRAM(s) Oral daily  gabapentin 300 milliGRAM(s) Oral at bedtime  glucagon  Injectable 1 milliGRAM(s) IntraMuscular once  heparin   Injectable 5000 Unit(s) SubCutaneous every 8 hours  insulin glargine Injectable (LANTUS) 20 Unit(s) SubCutaneous at bedtime  insulin lispro (ADMELOG) corrective regimen sliding scale   SubCutaneous three times a day before meals  insulin lispro Injectable (ADMELOG) 7 Unit(s) SubCutaneous before breakfast  insulin lispro Injectable (ADMELOG) 7 Unit(s) SubCutaneous before lunch  insulin lispro Injectable (ADMELOG) 7 Unit(s) SubCutaneous before dinner  metoprolol tartrate 12.5 milliGRAM(s) Oral two times a day  sodium chloride 0.9%. 1000 milliLiter(s) (50 mL/Hr) IV Continuous <Continuous>  tamsulosin 0.4 milliGRAM(s) Oral at bedtime  torsemide 40 milliGRAM(s) Oral daily    MEDICATIONS  (PRN):  acetaminophen     Tablet .. 650 milliGRAM(s) Oral every 6 hours PRN Moderate Pain (4 - 6)  metoprolol tartrate Injectable 5 milliGRAM(s) IV Push once PRN sustained tachy >120      HOME MEDICATIONS:  Home Medications:  acetaminophen 325 mg oral tablet: 2 tab(s) orally every 6 hours, As needed, Moderate Pain (4 - 6) (12 Jul 2021 11:54)  Basaglar KwikPen: 20 unit(s) subcutaneous once a day (at bedtime) (03 Nov 2021 22:10)  calcitriol 0.25 mcg oral capsule: 1 cap(s) orally once a day (30 Jun 2021 15:07)  dutasteride 0.5 mg oral capsule: 1 cap(s) orally once a day (30 Jun 2021 15:07)  Farxiga 10 mg oral tablet: 1 tab(s) orally once a day (03 Nov 2021 21:56)  gabapentin 100 mg oral capsule: 1 cap(s) orally once a day in AM (03 Nov 2021 21:49)  gabapentin 300 mg oral capsule: 1 cap(s) orally once a day (at bedtime) (03 Nov 2021 21:49)  metOLazone 5 mg oral tablet: orally 3 times a week M-W-F (03 Nov 2021 22:06)  Metoprolol Succinate ER 25 mg oral tablet, extended release: 1 tab(s) orally once a day (03 Nov 2021 21:52)  Ozempic (1 mg dose) 4 mg/3 mL subcutaneous solution: 1 milligram(s) subcutaneous once a week on Thursday (03 Nov 2021 21:50)  spironolactone 25 mg oral tablet: 1 tab(s) orally once a day (03 Nov 2021 22:00)        OBJECTIVE:  ICU Vital Signs Last 24 Hrs  T(C): 36.3 (08 Nov 2021 08:00), Max: 36.4 (08 Nov 2021 00:00)  T(F): 97.4 (08 Nov 2021 08:00), Max: 97.6 (08 Nov 2021 00:00)  HR: 77 (08 Nov 2021 11:58) (77 - 82)  BP: 135/69 (08 Nov 2021 11:58) (105/54 - 135/69)  BP(mean): --  ABP: --  ABP(mean): --  RR: 18 (08 Nov 2021 11:58) (17 - 18)  SpO2: 98% (08 Nov 2021 11:58) (93% - 99%)      I&O's Summary    07 Nov 2021 07:01  -  08 Nov 2021 07:00  --------------------------------------------------------  IN: 0 mL / OUT: 900 mL / NET: -900 mL      Daily     Daily     PHYSICAL EXAM:  NEURO: patient is awake , alert and oriented  GEN: Not in acute distress  NECK: no thyroid enlargement, no JVD  LUNGS: Clear to auscultation bilaterally   CARDIOVASCULAR: S1/S2 present, RRR , no murmurs or rubs, no carotid bruits,  + PP bilaterally  ABD: Soft, non-tender, non-distended, +BS  EXT: No KEISHA  SKIN: Intact  ACCESS Site:    LABS:                        13.9   9.18  )-----------( 205      ( 08 Nov 2021 09:02 )             45.4     11-08    142  |  94<L>  |  104<HH>  ----------------------------<  91  3.4<L>   |  25  |  2.7<H>    Ca    9.9      08 Nov 2021 09:02  Mg     2.8     11-08    TPro  7.0  /  Alb  4.2  /  TBili  0.8  /  DBili  x   /  AST  8   /  ALT  <5  /  AlkPhos  95  11-08              Troponin trend:            RADIOLOGY:  -CXR:  -TTE:  -STRESS TEST:  -CATHETERIZATION:    ECG:    TELEMETRY EVENTS:

## 2021-11-08 NOTE — CHART NOTE - NSCHARTNOTEFT_GEN_A_CORE
PRE-OP DIAGNOSIS:  VT; abnormal stress test      PROCEDURE:     [X] Coronary Angiogram     [X] LHC     [] LVG     [] RHC     [] Intervention (see below)         PHYSICIAN:  Dr. Anderson    FELLOW: Dr. Li       PROCEDURE DESCRIPTION:     Consent:      [X] Patient     [] Family Member     []  Used        Anesthesia:     [] General     [X] Sedation     [] Local        Access & Closure:     [X] Fr Radial Artery --> transitioned to right femoral artery access due to severe tortuosity --> D-STAT     [X] Fr Femoral Artery --> Perclose     [] Fr Femoral Vein     [] Fr Brachial Vein       IV Contrast: 15mL        Intervention: None      Implants: None       FINDINGS:     Coronary Dominance: Left      LM: No disease    LAD:        Prox: 90% stenosis       Mid: moderate to severe disease       Dist: Moderate to severe disease  Diag: mild disease    LCx: Mild diffuse disease  OM: Mild diffuse disease       RCA: small, non-dominant; minor luminal irregularities       LVEDP: Normal    EF: 54% on echo        ESTIMATED BLOOD LOSS: < 10 mL        CONDITION:     [] Good     [X] Fair     [] Critical        SPECIMEN REMOVED: N/A       POST-OP DIAGNOSIS:      [X] 1 Vessel Coronary Artery Disease   [X] During procedure, patient went into sustained ventricular tachycardia requiring 2X DCCV       PLAN OF CARE:     [X] Admit to CCU    [X] Medications: Continue aspirin 8mg daily, statin and beta-blockers    [X] EP follow-up: Recommended amiodarone drip    [X] IV Fluids: NS at 50cc/hr for 5 hours     [X] Will need AICD (to be implanted on 11/9/2021), F/U with EP    [X] Will plan for PCI to LAD on 11/10/2021

## 2021-11-08 NOTE — PROGRESS NOTE ADULT - ASSESSMENT
TOBIAS    CKD stage 4   hypokalemia  hyperphosphatemia  dizziness / falls  chronic HFpEF / Afib  VT  JOHANNY on CPAP    HTN   NPH s/p  shunt with no shunt patency on shuntogram  DM 2    dm neuropathy    plan:    KCl 20 po x 1  cardiac cath today  pt / family aware of risk of contrast injury with potential of need for dialysis   NS @ 50cc/hr x 5 hours  cont phoslo with meals / renal diet  d/w cardiology and son

## 2021-11-09 NOTE — PROGRESS NOTE ADULT - ATTENDING COMMENTS
VT   Syncope   CAD with significant pLAD obstruction     Plan:   IV amiodarone   CCU monitoring   Echo   AICD placement today   PCI on Wednesday

## 2021-11-09 NOTE — PROGRESS NOTE ADULT - ASSESSMENT
TOBIAS    CKD stage 4   hyperphosphatemia  hypercalcemia  dizziness / falls  chronic HFpEF / Afib  VT  JOHANNY on CPAP    HTN   NPH s/p  shunt with no shunt patency on shuntogram  DM 2    dm neuropathy    plan:    may need to lower calcium acetate if Ca++ remains elevated  cont torsemide 40 qd  very gentle hydration with PCI tomorrow  trend renal function and lytes  CCU care  d/w team

## 2021-11-09 NOTE — PROGRESS NOTE ADULT - SUBJECTIVE AND OBJECTIVE BOX
NEPHROLOGY FOLLOW UP NOTE    pt in ccu  s/p pci with vt yesterday  today had AICD placed      PAST MEDICAL & SURGICAL HISTORY:  Hypertension  Hydrocephalus  CKD (chronic kidney disease)  DM (diabetes mellitus)  Afib  History of prostate surgery    Allergies:  No Known Allergies    Home Medications Reviewed    SOCIAL HISTORY:  Denies ETOH,Smoking,   FAMILY HISTORY:  Family history of diabetes mellitus (Mother, Sibling)      REVIEW OF SYSTEMS  All other review of systems is negative unless indicated above.      PHYSICAL EXAM:  NAD  Ramona  moist mm  no ln  decreased BS b/l  distant HS, irregular   soft + BS  1+ edema               Hospital Medications:   MEDICATIONS  (STANDING):  aMIOdarone Infusion 1 mG/Min (33.3 mL/Hr) IV Continuous <Continuous>  aMIOdarone Infusion 0.5 mG/Min (16.7 mL/Hr) IV Continuous <Continuous>  aspirin enteric coated 81 milliGRAM(s) Oral daily  atorvastatin 20 milliGRAM(s) Oral at bedtime  calcium acetate 667 milliGRAM(s) Oral three times a day with meals  chlorhexidine 4% Liquid 1 Application(s) Topical daily  dextrose 40% Gel 15 Gram(s) Oral once  dextrose 5%. 1000 milliLiter(s) (50 mL/Hr) IV Continuous <Continuous>  dextrose 5%. 1000 milliLiter(s) (100 mL/Hr) IV Continuous <Continuous>  dextrose 50% Injectable 25 Gram(s) IV Push once  dextrose 50% Injectable 12.5 Gram(s) IV Push once  dextrose 50% Injectable 25 Gram(s) IV Push once  finasteride 5 milliGRAM(s) Oral daily  gabapentin 100 milliGRAM(s) Oral daily  gabapentin 300 milliGRAM(s) Oral at bedtime  glucagon  Injectable 1 milliGRAM(s) IntraMuscular once  heparin   Injectable 5000 Unit(s) SubCutaneous every 8 hours  insulin glargine Injectable (LANTUS) 20 Unit(s) SubCutaneous at bedtime  insulin lispro (ADMELOG) corrective regimen sliding scale   SubCutaneous three times a day before meals  insulin lispro Injectable (ADMELOG) 7 Unit(s) SubCutaneous before breakfast  insulin lispro Injectable (ADMELOG) 7 Unit(s) SubCutaneous before lunch  insulin lispro Injectable (ADMELOG) 7 Unit(s) SubCutaneous before dinner  metoprolol tartrate 12.5 milliGRAM(s) Oral two times a day  sodium chloride 0.9%. 1000 milliLiter(s) (50 mL/Hr) IV Continuous <Continuous>  tamsulosin 0.4 milliGRAM(s) Oral at bedtime  torsemide 40 milliGRAM(s) Oral daily        VITALS:  T(F): 97.5 (11-09-21 @ 16:00), Max: 98.8 (11-09-21 @ 06:00)  HR: 65 (11-09-21 @ 18:00)  BP: 150/70 (11-09-21 @ 18:00)  RR: 24 (11-09-21 @ 18:00)  SpO2: 98% (11-09-21 @ 18:00)  Wt(kg): --    11-07 @ 07:01  -  11-08 @ 07:00  --------------------------------------------------------  IN: 0 mL / OUT: 900 mL / NET: -900 mL    11-08 @ 07:01  -  11-09 @ 07:00  --------------------------------------------------------  IN: 294 mL / OUT: 0 mL / NET: 294 mL    11-09 @ 07:01  -  11-09 @ 18:27  --------------------------------------------------------  IN: 743.6 mL / OUT: 0 mL / NET: 743.6 mL      Height (cm): 180.3 (11-09 @ 10:31)  Weight (kg): 97.2 (11-09 @ 10:31)  BMI (kg/m2): 29.9 (11-09 @ 10:31)  BSA (m2): 2.17 (11-09 @ 10:31)    LABS:  11-09    143  |  95<L>  |  106<HH>  ----------------------------<  119<H>  3.9   |  24  |  2.8<H>    Ca    11.3<H>      09 Nov 2021 04:40  Mg     2.7     11-09    TPro  7.1  /  Alb  4.0  /  TBili  0.9  /  DBili      /  AST  8   /  ALT  <5  /  AlkPhos  95  11-09                          13.6   9.56  )-----------( 213      ( 09 Nov 2021 04:40 )             44.0       Urine Studies:        RADIOLOGY & ADDITIONAL STUDIES:

## 2021-11-09 NOTE — PROGRESS NOTE ADULT - ASSESSMENT
Coronary artery Disease  afib  sustained VT    - keep NPO after midnight for cardiac catheterization with PCI tomorrow  - continue amiodarone for VT  - please load with 300 mg of po plavix tonight  - EP follow up appreciated  - please get patient out of bed and into chair today  - rehab consultation  - continue Beta blocker.

## 2021-11-09 NOTE — PRE-ANESTHESIA EVALUATION ADULT - NSANTHINDVINFOSD_GEN_ALL_CORE
[facility] Emergency Department  [address] [address]  Phone:  [phone]  Fax:  [phone]                                    Elza Greer   MRN: [id number]    Department:  [facility] Emergency Department   Date of Visit:  7/15/2019           After Visit Summary Signature Page    I have received my discharge instructions, and my questions have been answered. I have discussed any challenges I see with this plan with the nurse or doctor.    ..........................................................................................................................................  Patient/Patient Representative Signature      ..........................................................................................................................................  Patient Representative Print Name and Relationship to Patient    ..................................................               ................................................  Date                                   Time    ..........................................................................................................................................  Reviewed by Signature/Title    ...................................................              ..............................................  Date                                               Time          22EPIC Rev 08/18       
patient

## 2021-11-09 NOTE — CHART NOTE - NSCHARTNOTEFT_GEN_A_CORE
ELECTROPHYSIOLOGY PROCEDURE: Dual Chamber Defibrillator Implantation  IMPRESSION:  Successful dual chamber defibrillator implant (Medtronic, Non-dependent).    PLAN:   - Overnight telemetry/CCU monitoring  - CXR and Interrogation in am  - Continue current meds including oral anticoagulation.    : Oscar Santiago M.D.  INDICATION FOR PROCEDURE: Sustained Monomorphic VT    CONSENT:   The risks, benefits, indications and alternatives to the procedure were explained in detail to the patient and available family members prior to the procedure. The patient and available family members expressed a good understanding of the procedure and risks. All questions asked were answered and consent was obtained.     SEDATION:   The patient was transported to the Electrophysiology Laboratory in a non-sedated state and was placed supine on the fluoroscopy table. Sedation was provided by anesthesia team. The patient underwent continuous blood pressure, heart rate and O2 saturation monitoring throughout the procedure with supplemental oxygen utilized as needed.     DETAILS OF PROCEDURE:  Informed consent was obtained, and the patient was brought to the EP lab in a fasting state. The patient  was prepped and draped in the usual strict sterile fashion.     After the antibiotic was completely infused, 60 cc lidocaine was infiltrated into the area just medial to the left deltopectoral groove. Using a #10 scalpel, an incision was made. The incision was extended to the prepectoral fascia using blunt dissection. Then left extra-thoracic axillary vein was accessed twice under fluoroscopy and venogram (10 cc). Guidewires were placed into the vein. Then the sheaths were placed over the guidewire.     Then the ventricular lead was advanced into the RV. The RV lead was fixated to the right ventricular low septum. Appropriate sensing and thresholds were obtained. No diaphragmatic pacing occurred at 10 V and 1.5 ms.    Then the atrial lead was advanced into the RA. The RA lead was fixated to the right atrial anterolateral wall. Appropriate sensing were obtained. No diaphragmatic pacing occurred at 10 V and 1.5 ms. Threshold could not be obtained as patient was in atrial fibrillation. Multiple repositionings were needed (RAA, RA septum) due to suboptimal sensing.    The sheaths were removed. The leads were then sutured to the pectoralis muscle using Ethibond. Leads measurements were then rechecked.    Then the left prepectoral pocket was fashioned. The leads were attached to the generator. The system was placed in the pocket and connected to the lead. The generator was sutured to the pocket. The generator and leads system were visualized under fluoroscopy. Appropriate redundancy/slack in the leads were noted. The pins of the leads were beyond the set screws.    Hemostasis was reverified. The pocket was then closed in two layers. Dermabond and a sterile dressing were placed.     The patient was transferred to the pre-post room in stable condition.    EBL: 5 cc    IMPRESSION:  Successful dual chamber defibrillator implant (Medtronic, Non-dependent).

## 2021-11-09 NOTE — PROGRESS NOTE ADULT - SUBJECTIVE AND OBJECTIVE BOX
SUBJ: feels well. s/p ICD today. s/p diagnostic cath yesterday.       MEDICATIONS  (STANDING):  aMIOdarone Infusion 1 mG/Min (33.3 mL/Hr) IV Continuous <Continuous>  aMIOdarone Infusion 0.5 mG/Min (16.7 mL/Hr) IV Continuous <Continuous>  aspirin enteric coated 81 milliGRAM(s) Oral daily  atorvastatin 20 milliGRAM(s) Oral at bedtime  calcium acetate 667 milliGRAM(s) Oral three times a day with meals  chlorhexidine 4% Liquid 1 Application(s) Topical daily  dextrose 40% Gel 15 Gram(s) Oral once  dextrose 5%. 1000 milliLiter(s) (50 mL/Hr) IV Continuous <Continuous>  dextrose 5%. 1000 milliLiter(s) (100 mL/Hr) IV Continuous <Continuous>  dextrose 50% Injectable 25 Gram(s) IV Push once  dextrose 50% Injectable 12.5 Gram(s) IV Push once  dextrose 50% Injectable 25 Gram(s) IV Push once  finasteride 5 milliGRAM(s) Oral daily  gabapentin 100 milliGRAM(s) Oral daily  gabapentin 300 milliGRAM(s) Oral at bedtime  glucagon  Injectable 1 milliGRAM(s) IntraMuscular once  heparin   Injectable 5000 Unit(s) SubCutaneous every 8 hours  insulin glargine Injectable (LANTUS) 20 Unit(s) SubCutaneous at bedtime  insulin lispro (ADMELOG) corrective regimen sliding scale   SubCutaneous three times a day before meals  insulin lispro Injectable (ADMELOG) 7 Unit(s) SubCutaneous before breakfast  insulin lispro Injectable (ADMELOG) 7 Unit(s) SubCutaneous before lunch  insulin lispro Injectable (ADMELOG) 7 Unit(s) SubCutaneous before dinner  metoprolol tartrate 12.5 milliGRAM(s) Oral two times a day  sodium chloride 0.9%. 1000 milliLiter(s) (50 mL/Hr) IV Continuous <Continuous>  tamsulosin 0.4 milliGRAM(s) Oral at bedtime  torsemide 40 milliGRAM(s) Oral daily    MEDICATIONS  (PRN):  acetaminophen     Tablet .. 650 milliGRAM(s) Oral every 6 hours PRN Moderate Pain (4 - 6)  metoprolol tartrate Injectable 5 milliGRAM(s) IV Push once PRN sustained tachy >120            Vital Signs Last 24 Hrs  T(C): 36.8 (09 Nov 2021 08:00), Max: 37.1 (09 Nov 2021 06:00)  T(F): 98.2 (09 Nov 2021 08:00), Max: 98.8 (09 Nov 2021 06:00)  HR: 65 (09 Nov 2021 09:00) (58 - 65)  BP: 115/58 (09 Nov 2021 09:00) (108/51 - 136/59)  BP(mean): 84 (09 Nov 2021 09:00) (74 - 93)  RR: 22 (09 Nov 2021 09:00) (15 - 29)  SpO2: 97% (09 Nov 2021 09:00) (93% - 100%)     REVIEW OF SYSTEMS:  CONSTITUTIONAL: No fever, weight loss, or fatigue  CARDIOLOGY: PAtient denies chest pain, shortness of breath or syncopal episodes.   RESPIRATORY: denies shortness of breath, wheezeing.   NEUROLOGICAL: NO weakness, no focal deficits to report.  ENDOCRINOLOGICAL: no recent change in diabetic medications.   GI: no BRBPR, no N,V,diarrhea.    PSYCHIATRY: normal mood and affect  HEENT: no nasal discharge, no ecchymosis  SKIN: no ecchymosis, no breakdown  MUSCULOSKELETAL: Full range of motion x4.        PHYSICAL EXAM:  · CONSTITUTIONAL:	Well-developed, well nourished    BMI-  ·RESPIRATORY:   airway patent; breath sounds equal; good air movement; respirations non-labored; clear to auscultation bilaterally; no chest wall tenderness; no intercostal retractions; no rales,rhonchi or wheeze  · CARDIOVASCULAR	irregular rate and rhythm  no rub  no murmur  normal PMI  · EXTREMITIES: No cyanosis, clubbing or edema  · VASCULAR: 	Equal and normal pulses (carotid, femoral, dorsalis pedis)  	  TELEMETRY: frequent PVCs    ECG: < from: 12 Lead ECG (11.06.21 @ 00:07) >    Diagnosis Line Atrial fibril;lation with PVC's  Nonspecific ST and T wave abnormality  Abnormal ECG    < end of copied text >      TTE: < from: TTE Echo Complete w/o Contrast w/ Doppler (07.02.21 @ 14:44) >   1. Normal global left ventricular systolic function.   2. LVEjection Fraction by Lemon's Method with a biplane EF of 54 %.   3. Mild left ventricular hypertrophy.   4. Mildly increased LV wall thickness.   5. Normal left ventricular internal cavity size.   6. Moderately enlarged left atrium.   7. Mild mitral annular calcification.   8. Mild thickening and calcification of the anterior and posterior mitral valve leaflets.   9. Mild to moderate mitral valve regurgitation.  10. Mild-moderate tricuspid regurgitation.  11. Mild aortic regurgitation.  12. Sclerotic aortic valve with normal opening.  13. Estimated pulmonary artery systolic pressure is 54.2 mmHg assuming a right atrial pressure of 5 mmHg, which is consistent with moderate pulmonary hypertension.  14. LA volume Index is 51.8 ml/m² ml/m2.  15. There is moderate aortic root calcification.    < end of copied text >      LABS:                        13.6   9.56  )-----------( 213      ( 09 Nov 2021 04:40 )             44.0     11-09    143  |  95<L>  |  106<HH>  ----------------------------<  119<H>  3.9   |  24  |  2.8<H>    Ca    11.3<H>      09 Nov 2021 04:40  Mg     2.7     11-09    TPro  7.1  /  Alb  4.0  /  TBili  0.9  /  DBili  x   /  AST  8   /  ALT  <5  /  AlkPhos  95  11-09            I&O's Summary    08 Nov 2021 07:01  -  09 Nov 2021 07:00  --------------------------------------------------------  IN: 294 mL / OUT: 0 mL / NET: 294 mL      BNP  RADIOLOGY & ADDITIONAL STUDIES:    IMPRESSION AND PLAN:

## 2021-11-09 NOTE — PROGRESS NOTE ADULT - ASSESSMENT
· Assessment	  VT   Syncope   CAD with significant pLAD obstruction     Plan:   IV amiodarone   CCU monitoring   Echo   AICD placement today  PCI on Wednesday    · Assessment  CAD prox LAD dx  Monomorphic VT    CARDIOVASCULAR  - s/p diagnostic cath yesterday with proxLAD severe dx.  - Plan for cath tomorrow.  - Given monomorphic VT episodes, patient planned for AICD placement today.  - C/w ASA, metoprolol, amiodarone and torsemide.    PULMONARY  - No acute issues.    GASTROINTESTINAL  - Tolerating diet    RENAL  - Closely monitor Cr and electrolytes.    Neurology  - Avoid sedations    INFECTIOUS DISEASE  - Remains afebrile    Endocrine  - C/w insulin regimen for DM.    DISPO: CCU  CODE STATUS

## 2021-11-09 NOTE — PROGRESS NOTE ADULT - SUBJECTIVE AND OBJECTIVE BOX
HPI:  86 yr old male with hx of HTN, DM, AFib no AC 2/2 falls, HFpEF, JOHANNY presented to ED today 2/2 dizziness on/off since 11/1. States he was feeling dizzy yesterday and fell x2 and again felt dizzy today when he  went to urgent care where he was lowered to ground to prevent from falling. Son states pt saw his cardiologist Dr Huerta who changed up his meds last week and states this may be contributing to dad feeling dizzy. Son stated pts torsemide was increased and farxiga was added. Currently, pt states he feels fine and is asymptomatic. Pt denied loc, fevers, chills, cough or sputum production, nausea or vomiting, diarrhea or other associated symptoms. (03 Nov 2021 20:20)      INTERVAL HISTORY:    PAST MEDICAL & SURGICAL HISTORY  Afib    DM (diabetes mellitus)    CKD (chronic kidney disease)    Hydrocephalus    Hypertension    History of prostate surgery        ALLERGIES:  No Known Allergies      MEDICATIONS:  MEDICATIONS  (STANDING):  aMIOdarone Infusion 1 mG/Min (33.3 mL/Hr) IV Continuous <Continuous>  aMIOdarone Infusion 0.5 mG/Min (16.7 mL/Hr) IV Continuous <Continuous>  aspirin enteric coated 81 milliGRAM(s) Oral daily  atorvastatin 20 milliGRAM(s) Oral at bedtime  calcium acetate 667 milliGRAM(s) Oral three times a day with meals  chlorhexidine 4% Liquid 1 Application(s) Topical daily  dextrose 40% Gel 15 Gram(s) Oral once  dextrose 5%. 1000 milliLiter(s) (50 mL/Hr) IV Continuous <Continuous>  dextrose 5%. 1000 milliLiter(s) (100 mL/Hr) IV Continuous <Continuous>  dextrose 50% Injectable 25 Gram(s) IV Push once  dextrose 50% Injectable 12.5 Gram(s) IV Push once  dextrose 50% Injectable 25 Gram(s) IV Push once  finasteride 5 milliGRAM(s) Oral daily  gabapentin 100 milliGRAM(s) Oral daily  gabapentin 300 milliGRAM(s) Oral at bedtime  glucagon  Injectable 1 milliGRAM(s) IntraMuscular once  heparin   Injectable 5000 Unit(s) SubCutaneous every 8 hours  insulin glargine Injectable (LANTUS) 20 Unit(s) SubCutaneous at bedtime  insulin lispro (ADMELOG) corrective regimen sliding scale   SubCutaneous three times a day before meals  insulin lispro Injectable (ADMELOG) 7 Unit(s) SubCutaneous before breakfast  insulin lispro Injectable (ADMELOG) 7 Unit(s) SubCutaneous before lunch  insulin lispro Injectable (ADMELOG) 7 Unit(s) SubCutaneous before dinner  metoprolol tartrate 12.5 milliGRAM(s) Oral two times a day  sodium chloride 0.9%. 1000 milliLiter(s) (50 mL/Hr) IV Continuous <Continuous>  tamsulosin 0.4 milliGRAM(s) Oral at bedtime  torsemide 40 milliGRAM(s) Oral daily    MEDICATIONS  (PRN):  acetaminophen     Tablet .. 650 milliGRAM(s) Oral every 6 hours PRN Moderate Pain (4 - 6)  metoprolol tartrate Injectable 5 milliGRAM(s) IV Push once PRN sustained tachy >120      HOME MEDICATIONS:  Home Medications:  acetaminophen 325 mg oral tablet: 2 tab(s) orally every 6 hours, As needed, Moderate Pain (4 - 6) (12 Jul 2021 11:54)  Basaglar KwikPen: 20 unit(s) subcutaneous once a day (at bedtime) (03 Nov 2021 22:10)  calcitriol 0.25 mcg oral capsule: 1 cap(s) orally once a day (30 Jun 2021 15:07)  dutasteride 0.5 mg oral capsule: 1 cap(s) orally once a day (30 Jun 2021 15:07)  Farxiga 10 mg oral tablet: 1 tab(s) orally once a day (03 Nov 2021 21:56)  gabapentin 100 mg oral capsule: 1 cap(s) orally once a day in AM (03 Nov 2021 21:49)  gabapentin 300 mg oral capsule: 1 cap(s) orally once a day (at bedtime) (03 Nov 2021 21:49)  metOLazone 5 mg oral tablet: orally 3 times a week M-W-F (03 Nov 2021 22:06)  Metoprolol Succinate ER 25 mg oral tablet, extended release: 1 tab(s) orally once a day (03 Nov 2021 21:52)  Ozempic (1 mg dose) 4 mg/3 mL subcutaneous solution: 1 milligram(s) subcutaneous once a week on Thursday (03 Nov 2021 21:50)  spironolactone 25 mg oral tablet: 1 tab(s) orally once a day (03 Nov 2021 22:00)        OBJECTIVE:  ICU Vital Signs Last 24 Hrs  T(C): 36.8 (09 Nov 2021 08:00), Max: 37.1 (09 Nov 2021 06:00)  T(F): 98.2 (09 Nov 2021 08:00), Max: 98.8 (09 Nov 2021 06:00)  HR: 65 (09 Nov 2021 09:00) (58 - 77)  BP: 115/58 (09 Nov 2021 09:00) (108/51 - 136/59)  BP(mean): 84 (09 Nov 2021 09:00) (74 - 93)  ABP: --  ABP(mean): --  RR: 22 (09 Nov 2021 09:00) (15 - 29)  SpO2: 97% (09 Nov 2021 09:00) (93% - 100%)      Adult Advanced Hemodynamics Last 24 Hrs  CVP(mm Hg): --  CVP(cm H2O): --  CO: --  CI: --  PA: --  PA(mean): --  PCWP: --  SVR: --  SVRI: --  PVR: --  PVRI: --  I&O's Summary    08 Nov 2021 07:01  -  09 Nov 2021 07:00  --------------------------------------------------------  IN: 294 mL / OUT: 0 mL / NET: 294 mL      Daily Height in cm: 180.34 (09 Nov 2021 10:31)    Daily     PHYSICAL EXAM:  NEURO: patient is awake , alert and oriented  GEN: Not in acute distress  NECK: no thyroid enlargement, no JVD  LUNGS: Clear to auscultation bilaterally   CARDIOVASCULAR: S1/S2 present, RRR , no murmurs or rubs, no carotid bruits,  + PP bilaterally  ABD: Soft, non-tender, non-distended, +BS  EXT: No KEISHA  SKIN: Intact  ACCESS Site:    LABS:                        13.6   9.56  )-----------( 213      ( 09 Nov 2021 04:40 )             44.0     11-09    143  |  95<L>  |  106<HH>  ----------------------------<  119<H>  3.9   |  24  |  2.8<H>    Ca    11.3<H>      09 Nov 2021 04:40  Mg     2.7     11-09    TPro  7.1  /  Alb  4.0  /  TBili  0.9  /  DBili  x   /  AST  8   /  ALT  <5  /  AlkPhos  95  11-09              Troponin trend:            RADIOLOGY:  -CXR: 11/8 No focal parenchymal opacities, pleural effusions, or pneumothorax.  -TTE:  -STRESS TEST:  -CATHETERIZATION:    ECG:    TELEMETRY EVENTS:  nonsustained VT overnight       INTERVAL HISTORY:  No acute events overnight. Patient denied active chest pain, sob or palpitations in the am. Frequent PVCs on tele.    PAST MEDICAL & SURGICAL HISTORY  Afib    DM (diabetes mellitus)    CKD (chronic kidney disease)    Hydrocephalus    Hypertension    History of prostate surgery        ALLERGIES:  No Known Allergies      MEDICATIONS:  MEDICATIONS  (STANDING):  aMIOdarone Infusion 1 mG/Min (33.3 mL/Hr) IV Continuous <Continuous>  aMIOdarone Infusion 0.5 mG/Min (16.7 mL/Hr) IV Continuous <Continuous>  aspirin enteric coated 81 milliGRAM(s) Oral daily  atorvastatin 20 milliGRAM(s) Oral at bedtime  calcium acetate 667 milliGRAM(s) Oral three times a day with meals  chlorhexidine 4% Liquid 1 Application(s) Topical daily  dextrose 40% Gel 15 Gram(s) Oral once  dextrose 5%. 1000 milliLiter(s) (50 mL/Hr) IV Continuous <Continuous>  dextrose 5%. 1000 milliLiter(s) (100 mL/Hr) IV Continuous <Continuous>  dextrose 50% Injectable 25 Gram(s) IV Push once  dextrose 50% Injectable 12.5 Gram(s) IV Push once  dextrose 50% Injectable 25 Gram(s) IV Push once  finasteride 5 milliGRAM(s) Oral daily  gabapentin 100 milliGRAM(s) Oral daily  gabapentin 300 milliGRAM(s) Oral at bedtime  glucagon  Injectable 1 milliGRAM(s) IntraMuscular once  heparin   Injectable 5000 Unit(s) SubCutaneous every 8 hours  insulin glargine Injectable (LANTUS) 20 Unit(s) SubCutaneous at bedtime  insulin lispro (ADMELOG) corrective regimen sliding scale   SubCutaneous three times a day before meals  insulin lispro Injectable (ADMELOG) 7 Unit(s) SubCutaneous before breakfast  insulin lispro Injectable (ADMELOG) 7 Unit(s) SubCutaneous before lunch  insulin lispro Injectable (ADMELOG) 7 Unit(s) SubCutaneous before dinner  metoprolol tartrate 12.5 milliGRAM(s) Oral two times a day  sodium chloride 0.9%. 1000 milliLiter(s) (50 mL/Hr) IV Continuous <Continuous>  tamsulosin 0.4 milliGRAM(s) Oral at bedtime  torsemide 40 milliGRAM(s) Oral daily    MEDICATIONS  (PRN):  acetaminophen     Tablet .. 650 milliGRAM(s) Oral every 6 hours PRN Moderate Pain (4 - 6)  metoprolol tartrate Injectable 5 milliGRAM(s) IV Push once PRN sustained tachy >120      HOME MEDICATIONS:  Home Medications:  acetaminophen 325 mg oral tablet: 2 tab(s) orally every 6 hours, As needed, Moderate Pain (4 - 6) (12 Jul 2021 11:54)  Basaglar KwikPen: 20 unit(s) subcutaneous once a day (at bedtime) (03 Nov 2021 22:10)  calcitriol 0.25 mcg oral capsule: 1 cap(s) orally once a day (30 Jun 2021 15:07)  dutasteride 0.5 mg oral capsule: 1 cap(s) orally once a day (30 Jun 2021 15:07)  Farxiga 10 mg oral tablet: 1 tab(s) orally once a day (03 Nov 2021 21:56)  gabapentin 100 mg oral capsule: 1 cap(s) orally once a day in AM (03 Nov 2021 21:49)  gabapentin 300 mg oral capsule: 1 cap(s) orally once a day (at bedtime) (03 Nov 2021 21:49)  metOLazone 5 mg oral tablet: orally 3 times a week M-W-F (03 Nov 2021 22:06)  Metoprolol Succinate ER 25 mg oral tablet, extended release: 1 tab(s) orally once a day (03 Nov 2021 21:52)  Ozempic (1 mg dose) 4 mg/3 mL subcutaneous solution: 1 milligram(s) subcutaneous once a week on Thursday (03 Nov 2021 21:50)  spironolactone 25 mg oral tablet: 1 tab(s) orally once a day (03 Nov 2021 22:00)        OBJECTIVE:  ICU Vital Signs Last 24 Hrs  T(C): 36.8 (09 Nov 2021 08:00), Max: 37.1 (09 Nov 2021 06:00)  T(F): 98.2 (09 Nov 2021 08:00), Max: 98.8 (09 Nov 2021 06:00)  HR: 65 (09 Nov 2021 09:00) (58 - 77)  BP: 115/58 (09 Nov 2021 09:00) (108/51 - 136/59)  BP(mean): 84 (09 Nov 2021 09:00) (74 - 93)  ABP: --  ABP(mean): --  RR: 22 (09 Nov 2021 09:00) (15 - 29)  SpO2: 97% (09 Nov 2021 09:00) (93% - 100%)      Adult Advanced Hemodynamics Last 24 Hrs  CVP(mm Hg): --  CVP(cm H2O): --  CO: --  CI: --  PA: --  PA(mean): --  PCWP: --  SVR: --  SVRI: --  PVR: --  PVRI: --  I&O's Summary    08 Nov 2021 07:01  -  09 Nov 2021 07:00  --------------------------------------------------------  IN: 294 mL / OUT: 0 mL / NET: 294 mL      Daily Height in cm: 180.34 (09 Nov 2021 10:31)    Daily     PHYSICAL EXAM:  NEURO: patient is awake , alert and oriented  GEN: Not in acute distress  NECK: no thyroid enlargement, no JVD  LUNGS: Clear to auscultation bilaterally   CARDIOVASCULAR: S1/S2 present, RRR , no murmurs or rubs, no carotid bruits,  + PP bilaterally  ABD: Soft, non-tender, non-distended, +BS  EXT: No KEISHA    LABS:                        13.6   9.56  )-----------( 213      ( 09 Nov 2021 04:40 )             44.0     11-09    143  |  95<L>  |  106<HH>  ----------------------------<  119<H>  3.9   |  24  |  2.8<H>    Ca    11.3<H>      09 Nov 2021 04:40  Mg     2.7     11-09    TPro  7.1  /  Alb  4.0  /  TBili  0.9  /  DBili  x   /  AST  8   /  ALT  <5  /  AlkPhos  95  11-09              Troponin trend:            RADIOLOGY:  -CXR: 11/8 No focal parenchymal opacities, pleural effusions, or pneumothorax.  -TTE:  -STRESS TEST:  -CATHETERIZATION:    ECG:    TELEMETRY EVENTS:  PVCs and nonsustained VT overnight       INTERVAL HISTORY:  No acute events overnight. Patient denied active chest pain, sob or palpitations in the am. Frequent PVCs on tele.    PAST MEDICAL & SURGICAL HISTORY  Afib    DM (diabetes mellitus)    CKD (chronic kidney disease)    Hydrocephalus    Hypertension    History of prostate surgery        ALLERGIES:  No Known Allergies      MEDICATIONS:  MEDICATIONS  (STANDING):  aMIOdarone Infusion 1 mG/Min (33.3 mL/Hr) IV Continuous <Continuous>  aMIOdarone Infusion 0.5 mG/Min (16.7 mL/Hr) IV Continuous <Continuous>  aspirin enteric coated 81 milliGRAM(s) Oral daily  atorvastatin 20 milliGRAM(s) Oral at bedtime  calcium acetate 667 milliGRAM(s) Oral three times a day with meals  chlorhexidine 4% Liquid 1 Application(s) Topical daily  dextrose 40% Gel 15 Gram(s) Oral once  dextrose 5%. 1000 milliLiter(s) (50 mL/Hr) IV Continuous <Continuous>  dextrose 5%. 1000 milliLiter(s) (100 mL/Hr) IV Continuous <Continuous>  dextrose 50% Injectable 25 Gram(s) IV Push once  dextrose 50% Injectable 12.5 Gram(s) IV Push once  dextrose 50% Injectable 25 Gram(s) IV Push once  finasteride 5 milliGRAM(s) Oral daily  gabapentin 100 milliGRAM(s) Oral daily  gabapentin 300 milliGRAM(s) Oral at bedtime  glucagon  Injectable 1 milliGRAM(s) IntraMuscular once  heparin   Injectable 5000 Unit(s) SubCutaneous every 8 hours  insulin glargine Injectable (LANTUS) 20 Unit(s) SubCutaneous at bedtime  insulin lispro (ADMELOG) corrective regimen sliding scale   SubCutaneous three times a day before meals  insulin lispro Injectable (ADMELOG) 7 Unit(s) SubCutaneous before breakfast  insulin lispro Injectable (ADMELOG) 7 Unit(s) SubCutaneous before lunch  insulin lispro Injectable (ADMELOG) 7 Unit(s) SubCutaneous before dinner  metoprolol tartrate 12.5 milliGRAM(s) Oral two times a day  sodium chloride 0.9%. 1000 milliLiter(s) (50 mL/Hr) IV Continuous <Continuous>  tamsulosin 0.4 milliGRAM(s) Oral at bedtime  torsemide 40 milliGRAM(s) Oral daily    MEDICATIONS  (PRN):  acetaminophen     Tablet .. 650 milliGRAM(s) Oral every 6 hours PRN Moderate Pain (4 - 6)  metoprolol tartrate Injectable 5 milliGRAM(s) IV Push once PRN sustained tachy >120      HOME MEDICATIONS:  Home Medications:  acetaminophen 325 mg oral tablet: 2 tab(s) orally every 6 hours, As needed, Moderate Pain (4 - 6) (12 Jul 2021 11:54)  Basaglar KwikPen: 20 unit(s) subcutaneous once a day (at bedtime) (03 Nov 2021 22:10)  calcitriol 0.25 mcg oral capsule: 1 cap(s) orally once a day (30 Jun 2021 15:07)  dutasteride 0.5 mg oral capsule: 1 cap(s) orally once a day (30 Jun 2021 15:07)  Farxiga 10 mg oral tablet: 1 tab(s) orally once a day (03 Nov 2021 21:56)  gabapentin 100 mg oral capsule: 1 cap(s) orally once a day in AM (03 Nov 2021 21:49)  gabapentin 300 mg oral capsule: 1 cap(s) orally once a day (at bedtime) (03 Nov 2021 21:49)  metOLazone 5 mg oral tablet: orally 3 times a week M-W-F (03 Nov 2021 22:06)  Metoprolol Succinate ER 25 mg oral tablet, extended release: 1 tab(s) orally once a day (03 Nov 2021 21:52)  Ozempic (1 mg dose) 4 mg/3 mL subcutaneous solution: 1 milligram(s) subcutaneous once a week on Thursday (03 Nov 2021 21:50)  spironolactone 25 mg oral tablet: 1 tab(s) orally once a day (03 Nov 2021 22:00)        OBJECTIVE:  ICU Vital Signs Last 24 Hrs  T(C): 36.8 (09 Nov 2021 08:00), Max: 37.1 (09 Nov 2021 06:00)  T(F): 98.2 (09 Nov 2021 08:00), Max: 98.8 (09 Nov 2021 06:00)  HR: 65 (09 Nov 2021 09:00) (58 - 77)  BP: 115/58 (09 Nov 2021 09:00) (108/51 - 136/59)  BP(mean): 84 (09 Nov 2021 09:00) (74 - 93)  ABP: --  ABP(mean): --  RR: 22 (09 Nov 2021 09:00) (15 - 29)  SpO2: 97% (09 Nov 2021 09:00) (93% - 100%)      I&O's Summary    08 Nov 2021 07:01  -  09 Nov 2021 07:00  --------------------------------------------------------  IN: 294 mL / OUT: 0 mL / NET: 294 mL      Daily Height in cm: 180.34 (09 Nov 2021 10:31)    Daily     PHYSICAL EXAM:  NEURO: patient is awake , alert and oriented  GEN: Not in acute distress  NECK: no thyroid enlargement, no JVD  LUNGS: Clear to auscultation bilaterally   CARDIOVASCULAR: S1/S2 present, RRR , no murmurs or rubs, no carotid bruits,  + PP bilaterally  ABD: Soft, non-tender, non-distended, +BS  EXT: No KEISHA    LABS:                        13.6   9.56  )-----------( 213      ( 09 Nov 2021 04:40 )             44.0     11-09    143  |  95<L>  |  106<HH>  ----------------------------<  119<H>  3.9   |  24  |  2.8<H>    Ca    11.3<H>      09 Nov 2021 04:40  Mg     2.7     11-09    TPro  7.1  /  Alb  4.0  /  TBili  0.9  /  DBili  x   /  AST  8   /  ALT  <5  /  AlkPhos  95  11-09              Troponin trend:            RADIOLOGY:  -CXR: 11/8 No focal parenchymal opacities, pleural effusions, or pneumothorax.  -TTE:  -STRESS TEST:  -CATHETERIZATION:    ECG:    TELEMETRY EVENTS:  PVCs and nonsustained VT overnight

## 2021-11-10 NOTE — PROGRESS NOTE ADULT - ASSESSMENT
Assessment  CAD prox LAD dx  Monomorphic VT    CARDIOVASCULAR  - s/p diagnostic cath yesterday with proxLAD severe dx.  - Plan for cath tomorrow.  - Given monomorphic VT episodes, patient planned for AICD placement today.  - C/w ASA, metoprolol, amiodarone and torsemide.    PULMONARY  - No acute issues.    GASTROINTESTINAL  - Tolerating diet    RENAL  - Closely monitor Cr and electrolytes.    Neurology  - Avoid sedations    INFECTIOUS DISEASE  - Remains afebrile    Endocrine  - C/w insulin regimen for DM.     Assessment  CAD prox LAD dx  Monomorphic VT    CARDIOVASCULAR  - s/p diagnostic cath with proxLAD severe dx. Will proceed with planned PCI today.  - Given monomorphic VT episodes, patient s/p AICD.  - C/w ASA, metoprolol, amiodarone and torsemide.  - Continue monitoring on tele.    PULMONARY  - No acute issues.    GASTROINTESTINAL  - Tolerating diet    RENAL  - Closely monitor Cr and electrolytes.    Neurology  - Avoid sedations    INFECTIOUS DISEASE  - Remains afebrile    Endocrine  - C/w insulin regimen for DM.     (0) swallows foods/liquids without difficulty

## 2021-11-10 NOTE — CHART NOTE - NSCHARTNOTEFT_GEN_A_CORE
PRE-OP DIAGNOSIS:    - VT  - prox LAD 90% stenosis on cath from 11/8/21 - Planned PCI procedure today    PROCEDURE:   [x] Coronary Angiogram   [x] LHC   [] RHC   [x] Intervention (see below)        PHYSICIAN: Dr. Anderson  INTERVENTIONAL FELLOW: Dr. De La Rosa   CARDIOLOGY FELLOW: Dr. Kemp      PROCEDURE DESCRIPTION:     Consent:  [x] Patient   [] Family Member   []  Used     Anesthesia:   [] General   [x] Sedation   [x] Local     Access & Closure:   [] 6Fr right Radial Artery   [x] 6Fr right Femoral Artery -> Perclose  [] 6Fr right Femoral Vein   [] 6Fr right Brachial Vein       IV Contrast:     40   mL ([place])        Intervention: PCI to prox LAD (AUC 7 for revascularization) with IVUS    Implants: WINSOME to prox LAD     FINDINGS:     LM: Mild disease  LAD: 90% stenosis prox segment  Diag: Mild disease  LCx: Mild disease  OM: Mild disease    ESTIMATED BLOOD LOSS: < 10 mL      CONDITION:   [x] Good   [] Fair   [] Critical     SPECIMEN REMOVED: N/A     POST-OP DIAGNOSIS:    [] Normal Coronary Angiogram   [] Minor luminal irregularities  [] Mild Non-obstructive Coronary Artery Disease (< 50% stenosis)   [x] Significant 1-Vessel Coronary Artery Disease s/p - successful PCI to prox LAD    PLAN OF CARE:   [] D/C Home Today   [x] Return to In-patient bed   [] Admit for observation   [] Return for Staged Procedure   [] CT Surgery Consult   [x] Medications: Continue aspirin and plavix, atorvastatin, metoprolol and amiodarone   [x] IV Fluids: 75cc/hr for 4 hours

## 2021-11-10 NOTE — PROGRESS NOTE ADULT - SUBJECTIVE AND OBJECTIVE BOX
HPI:  86 yr old male with hx of HTN, DM, AFib no AC 2/2 falls, HFpEF, JOHANNY presented to ED today 2/2 dizziness on/off since . States he was feeling dizzy yesterday and fell x2 and again felt dizzy today when he  went to urgent care where he was lowered to ground to prevent from falling. Son states pt saw his cardiologist Dr Huerta who changed up his meds last week and states this may be contributing to dad feeling dizzy. Son stated pts torsemide was increased and farxiga was added. Currently, pt states he feels fine and is asymptomatic. Pt denied loc, fevers, chills, cough or sputum production, nausea or vomiting, diarrhea or other associated symptoms. (2021 20:20)    Underwent diagnostic cardiac cath, found significant LAD lesion, intra-op CODED required shock x 2 for monomorphic VT. Started on lido gtt and upgraded to CCU. No intervention on lesion.    AICD placed    INTERVAL HISTORY:  Episodes of VT overnight. Pt w/o complaints in AM  Cath planned today     PAST MEDICAL & SURGICAL HISTORY  Afib    DM (diabetes mellitus)    CKD (chronic kidney disease)    Hydrocephalus    Hypertension    History of prostate surgery        ALLERGIES:  No Known Allergies      MEDICATIONS:  MEDICATIONS  (STANDING):  aMIOdarone    Tablet 200 milliGRAM(s) Oral two times a day  aMIOdarone Infusion 1 mG/Min (33.3 mL/Hr) IV Continuous <Continuous>  aMIOdarone Infusion 0.5 mG/Min (16.7 mL/Hr) IV Continuous <Continuous>  aspirin enteric coated 81 milliGRAM(s) Oral daily  atorvastatin 20 milliGRAM(s) Oral at bedtime  calcium acetate 667 milliGRAM(s) Oral three times a day with meals  chlorhexidine 4% Liquid 1 Application(s) Topical daily  dextrose 40% Gel 15 Gram(s) Oral once  dextrose 5%. 1000 milliLiter(s) (50 mL/Hr) IV Continuous <Continuous>  dextrose 5%. 1000 milliLiter(s) (100 mL/Hr) IV Continuous <Continuous>  dextrose 50% Injectable 25 Gram(s) IV Push once  dextrose 50% Injectable 12.5 Gram(s) IV Push once  dextrose 50% Injectable 25 Gram(s) IV Push once  finasteride 5 milliGRAM(s) Oral daily  gabapentin 100 milliGRAM(s) Oral daily  gabapentin 300 milliGRAM(s) Oral at bedtime  glucagon  Injectable 1 milliGRAM(s) IntraMuscular once  heparin   Injectable 5000 Unit(s) SubCutaneous every 8 hours  insulin glargine Injectable (LANTUS) 20 Unit(s) SubCutaneous at bedtime  insulin lispro (ADMELOG) corrective regimen sliding scale   SubCutaneous three times a day before meals  insulin lispro Injectable (ADMELOG) 7 Unit(s) SubCutaneous before breakfast  insulin lispro Injectable (ADMELOG) 7 Unit(s) SubCutaneous before lunch  insulin lispro Injectable (ADMELOG) 7 Unit(s) SubCutaneous before dinner  metoprolol tartrate 12.5 milliGRAM(s) Oral two times a day  sodium chloride 0.9% Bolus 90 milliLiter(s) IV Bolus once  tamsulosin 0.4 milliGRAM(s) Oral at bedtime  torsemide 40 milliGRAM(s) Oral daily    MEDICATIONS  (PRN):  acetaminophen     Tablet .. 650 milliGRAM(s) Oral every 6 hours PRN Moderate Pain (4 - 6)  guaifenesin/dextromethorphan Oral Liquid 10 milliLiter(s) Oral every 4 hours PRN cough and/or congestion  metoprolol tartrate Injectable 5 milliGRAM(s) IV Push once PRN sustained tachy >120      HOME MEDICATIONS:  Home Medications:  acetaminophen 325 mg oral tablet: 2 tab(s) orally every 6 hours, As needed, Moderate Pain (4 - 6) (2021 11:54)  Basaglar KwikPen: 20 unit(s) subcutaneous once a day (at bedtime) (2021 22:10)  calcitriol 0.25 mcg oral capsule: 1 cap(s) orally once a day (2021 15:07)  dutasteride 0.5 mg oral capsule: 1 cap(s) orally once a day (2021 15:07)  Farxiga 10 mg oral tablet: 1 tab(s) orally once a day (2021 21:56)  gabapentin 100 mg oral capsule: 1 cap(s) orally once a day in AM (2021 21:49)  gabapentin 300 mg oral capsule: 1 cap(s) orally once a day (at bedtime) (2021 21:49)  metOLazone 5 mg oral tablet: orally 3 times a week -W-F (2021 22:06)  Metoprolol Succinate ER 25 mg oral tablet, extended release: 1 tab(s) orally once a day (2021 21:52)  Ozempic (1 mg dose) 4 mg/3 mL subcutaneous solution: 1 milligram(s) subcutaneous once a week on Thursday (2021 21:50)  spironolactone 25 mg oral tablet: 1 tab(s) orally once a day (2021 22:00)        OBJECTIVE:  ICU Vital Signs Last 24 Hrs  T(C): 36.6 (2021 20:00), Max: 36.8 (2021 08:00)  T(F): 97.8 (2021 20:00), Max: 98.2 (2021 08:00)  HR: 69 (10 Nov 2021 06:00) (59 - 76)  BP: 127/75 (10 Nov 2021 06:00) (107/87 - 181/84)  BP(mean): 94 (10 Nov 2021 06:00) (81 - 121)  ABP: --  ABP(mean): --  RR: 24 (10 Nov 2021 06:00) (14 - 29)  SpO2: 96% (10 Nov 2021 06:00) (94% - 100%)      Adult Advanced Hemodynamics Last 24 Hrs  CVP(mm Hg): --  CVP(cm H2O): --  CO: --  CI: --  PA: --  PA(mean): --  PCWP: --  SVR: --  SVRI: --  PVR: --  PVRI: --  I&O's Summary    2021 07:01  -  2021 07:00  --------------------------------------------------------  IN: 294 mL / OUT: 0 mL / NET: 294 mL    2021 07:01  -  10 Nov 2021 06:45  --------------------------------------------------------  IN: 743.6 mL / OUT: 0 mL / NET: 743.6 mL      Daily Height in cm: 180.34 (2021 10:31)    Daily Weight in k (10 Nov 2021 06:00)    PHYSICAL EXAM:  NEURO: patient is awake , alert and oriented  GEN: Not in acute distress  NECK: no thyroid enlargement, no JVD  LUNGS: Clear to auscultation bilaterally   CARDIOVASCULAR: S1/S2 present, RRR , no murmurs or rubs, no carotid bruits,  + PP bilaterally  ABD: Soft, non-tender, non-distended, +BS  EXT: No KEISHA  SKIN: Intact  ACCESS Site:    LABS:                        13.6   9.56  )-----------( 213      ( 2021 04:40 )             44.0         143  |  95<L>  |  106<HH>  ----------------------------<  119<H>  3.9   |  24  |  2.8<H>    Ca    11.3<H>      2021 04:40  Mg     2.7         TPro  7.1  /  Alb  4.0  /  TBili  0.9  /  DBili  x   /  AST  8   /  ALT  <5  /  AlkPhos  95                Troponin trend:        RADIOLOGY:  -CXR:  -TTE:  -STRESS TEST:  -CATHETERIZATION:    ECG:    TELEMETRY EVENTS: episodes of VT overnight       HPI:  86 yr old male with hx of HTN, DM, AFib no AC 2/2 falls, HFpEF, JOHANNY presented to ED today 2/2 dizziness on/off since . States he was feeling dizzy yesterday and fell x2 and again felt dizzy today when he  went to urgent care where he was lowered to ground to prevent from falling. Son states pt saw his cardiologist Dr Huerta who changed up his meds last week and states this may be contributing to dad feeling dizzy. Son stated pts torsemide was increased and farxiga was added. Currently, pt states he feels fine and is asymptomatic. Pt denied loc, fevers, chills, cough or sputum production, nausea or vomiting, diarrhea or other associated symptoms. (2021 20:20)    Underwent diagnostic cardiac cath, found significant LAD lesion, intra-op CODED required shock x 2 for monomorphic VT. Started on lido gtt and upgraded to CCU. No intervention on lesion.    AICD placed    INTERVAL HISTORY:  Episodes of VT overnight. Pt w/o complaints in AM  Cath planned today     PAST MEDICAL & SURGICAL HISTORY  Afib    DM (diabetes mellitus)    CKD (chronic kidney disease)    Hydrocephalus    Hypertension    History of prostate surgery        ALLERGIES:  No Known Allergies      MEDICATIONS:  MEDICATIONS  (STANDING):  aMIOdarone    Tablet 200 milliGRAM(s) Oral two times a day  aMIOdarone Infusion 1 mG/Min (33.3 mL/Hr) IV Continuous <Continuous>  aMIOdarone Infusion 0.5 mG/Min (16.7 mL/Hr) IV Continuous <Continuous>  aspirin enteric coated 81 milliGRAM(s) Oral daily  atorvastatin 20 milliGRAM(s) Oral at bedtime  calcium acetate 667 milliGRAM(s) Oral three times a day with meals  chlorhexidine 4% Liquid 1 Application(s) Topical daily  dextrose 40% Gel 15 Gram(s) Oral once  dextrose 5%. 1000 milliLiter(s) (50 mL/Hr) IV Continuous <Continuous>  dextrose 5%. 1000 milliLiter(s) (100 mL/Hr) IV Continuous <Continuous>  dextrose 50% Injectable 25 Gram(s) IV Push once  dextrose 50% Injectable 12.5 Gram(s) IV Push once  dextrose 50% Injectable 25 Gram(s) IV Push once  finasteride 5 milliGRAM(s) Oral daily  gabapentin 100 milliGRAM(s) Oral daily  gabapentin 300 milliGRAM(s) Oral at bedtime  glucagon  Injectable 1 milliGRAM(s) IntraMuscular once  heparin   Injectable 5000 Unit(s) SubCutaneous every 8 hours  insulin glargine Injectable (LANTUS) 20 Unit(s) SubCutaneous at bedtime  insulin lispro (ADMELOG) corrective regimen sliding scale   SubCutaneous three times a day before meals  insulin lispro Injectable (ADMELOG) 7 Unit(s) SubCutaneous before breakfast  insulin lispro Injectable (ADMELOG) 7 Unit(s) SubCutaneous before lunch  insulin lispro Injectable (ADMELOG) 7 Unit(s) SubCutaneous before dinner  metoprolol tartrate 12.5 milliGRAM(s) Oral two times a day  sodium chloride 0.9% Bolus 90 milliLiter(s) IV Bolus once  tamsulosin 0.4 milliGRAM(s) Oral at bedtime  torsemide 40 milliGRAM(s) Oral daily    MEDICATIONS  (PRN):  acetaminophen     Tablet .. 650 milliGRAM(s) Oral every 6 hours PRN Moderate Pain (4 - 6)  guaifenesin/dextromethorphan Oral Liquid 10 milliLiter(s) Oral every 4 hours PRN cough and/or congestion  metoprolol tartrate Injectable 5 milliGRAM(s) IV Push once PRN sustained tachy >120      HOME MEDICATIONS:  Home Medications:  acetaminophen 325 mg oral tablet: 2 tab(s) orally every 6 hours, As needed, Moderate Pain (4 - 6) (2021 11:54)  Basaglar KwikPen: 20 unit(s) subcutaneous once a day (at bedtime) (2021 22:10)  calcitriol 0.25 mcg oral capsule: 1 cap(s) orally once a day (2021 15:07)  dutasteride 0.5 mg oral capsule: 1 cap(s) orally once a day (2021 15:07)  Farxiga 10 mg oral tablet: 1 tab(s) orally once a day (2021 21:56)  gabapentin 100 mg oral capsule: 1 cap(s) orally once a day in AM (2021 21:49)  gabapentin 300 mg oral capsule: 1 cap(s) orally once a day (at bedtime) (2021 21:49)  metOLazone 5 mg oral tablet: orally 3 times a week -W-F (2021 22:06)  Metoprolol Succinate ER 25 mg oral tablet, extended release: 1 tab(s) orally once a day (2021 21:52)  Ozempic (1 mg dose) 4 mg/3 mL subcutaneous solution: 1 milligram(s) subcutaneous once a week on Thursday (2021 21:50)  spironolactone 25 mg oral tablet: 1 tab(s) orally once a day (2021 22:00)        OBJECTIVE:  ICU Vital Signs Last 24 Hrs  T(C): 36.6 (2021 20:00), Max: 36.8 (2021 08:00)  T(F): 97.8 (2021 20:00), Max: 98.2 (2021 08:00)  HR: 69 (10 Nov 2021 06:00) (59 - 76)  BP: 127/75 (10 Nov 2021 06:00) (107/87 - 181/84)  BP(mean): 94 (10 Nov 2021 06:00) (81 - 121)  ABP: --  ABP(mean): --  RR: 24 (10 Nov 2021 06:00) (14 - 29)  SpO2: 96% (10 Nov 2021 06:00) (94% - 100%)      Adult Advanced Hemodynamics Last 24 Hrs  CVP(mm Hg): --  CVP(cm H2O): --  CO: --  CI: --  PA: --  PA(mean): --  PCWP: --  SVR: --  SVRI: --  PVR: --  PVRI: --  I&O's Summary    2021 07:01  -  2021 07:00  --------------------------------------------------------  IN: 294 mL / OUT: 0 mL / NET: 294 mL    2021 07:01  -  10 Nov 2021 06:45  --------------------------------------------------------  IN: 743.6 mL / OUT: 0 mL / NET: 743.6 mL      Daily Height in cm: 180.34 (2021 10:31)    Daily Weight in k (10 Nov 2021 06:00)    PHYSICAL EXAM:  NEURO: patient is awake , alert and oriented  GEN: Not in acute distress  NECK: no thyroid enlargement, no JVD  LUNGS: Clear to auscultation bilaterally   CARDIOVASCULAR: S1/S2 present, RRR , no murmurs or rubs, no carotid bruits,  + PP bilaterally  ABD: Soft, non-tender, non-distended, +BS  EXT: No KEISHA  SKIN: Intact  ACCESS Site:    LABS:                        13.6   9.56  )-----------( 213      ( 2021 04:40 )             44.0         143  |  95<L>  |  106<HH>  ----------------------------<  119<H>  3.9   |  24  |  2.8<H>    Ca    11.3<H>      2021 04:40  Mg     2.7         TPro  7.1  /  Alb  4.0  /  TBili  0.9  /  DBili  x   /  AST  8   /  ALT  <5  /  AlkPhos  95                Troponin trend:        RADIOLOGY:  -CXR: 11/10 new RT basilar opacity   -TTE:  -STRESS TEST:  -CATHETERIZATION:    ECG:    TELEMETRY EVENTS: episodes of VT overnight

## 2021-11-10 NOTE — PROGRESS NOTE ADULT - SUBJECTIVE AND OBJECTIVE BOX
INTERVAL HPI/OVERNIGHT EVENTS:    Patietn s/p Medtronic DC ICD implant  No events over night. Pt without complaints  On tele: NSVT x 5 beats/PVCs    MEDICATIONS  (STANDING):  aMIOdarone    Tablet 200 milliGRAM(s) Oral two times a day  aMIOdarone Infusion 1 mG/Min (33.3 mL/Hr) IV Continuous <Continuous>  aMIOdarone Infusion 0.5 mG/Min (16.7 mL/Hr) IV Continuous <Continuous>  aspirin enteric coated 81 milliGRAM(s) Oral daily  atorvastatin 20 milliGRAM(s) Oral at bedtime  calcium acetate 667 milliGRAM(s) Oral three times a day with meals  chlorhexidine 4% Liquid 1 Application(s) Topical daily  dextrose 40% Gel 15 Gram(s) Oral once  dextrose 5%. 1000 milliLiter(s) (50 mL/Hr) IV Continuous <Continuous>  dextrose 5%. 1000 milliLiter(s) (100 mL/Hr) IV Continuous <Continuous>  dextrose 50% Injectable 25 Gram(s) IV Push once  dextrose 50% Injectable 12.5 Gram(s) IV Push once  dextrose 50% Injectable 25 Gram(s) IV Push once  finasteride 5 milliGRAM(s) Oral daily  gabapentin 100 milliGRAM(s) Oral daily  gabapentin 300 milliGRAM(s) Oral at bedtime  glucagon  Injectable 1 milliGRAM(s) IntraMuscular once  heparin   Injectable 5000 Unit(s) SubCutaneous every 8 hours  insulin glargine Injectable (LANTUS) 20 Unit(s) SubCutaneous at bedtime  insulin lispro (ADMELOG) corrective regimen sliding scale   SubCutaneous three times a day before meals  insulin lispro Injectable (ADMELOG) 7 Unit(s) SubCutaneous before breakfast  insulin lispro Injectable (ADMELOG) 7 Unit(s) SubCutaneous before lunch  insulin lispro Injectable (ADMELOG) 7 Unit(s) SubCutaneous before dinner  metoprolol tartrate 12.5 milliGRAM(s) Oral two times a day  sodium chloride 0.9%. 1000 milliLiter(s) (60 mL/Hr) IV Continuous <Continuous>  tamsulosin 0.4 milliGRAM(s) Oral at bedtime  torsemide 40 milliGRAM(s) Oral daily    MEDICATIONS  (PRN):  acetaminophen     Tablet .. 650 milliGRAM(s) Oral every 6 hours PRN Moderate Pain (4 - 6)  guaifenesin/dextromethorphan Oral Liquid 10 milliLiter(s) Oral every 4 hours PRN cough and/or congestion  metoprolol tartrate Injectable 5 milliGRAM(s) IV Push once PRN sustained tachy >120    Allergies    No Known Allergies    Intolerances    Vital Signs Last 24 Hrs  T(C): 36.7 (10 Nov 2021 08:00), Max: 36.7 (10 Nov 2021 08:00)  T(F): 98.1 (10 Nov 2021 08:00), Max: 98.1 (10 Nov 2021 08:00)  HR: 77 (10 Nov 2021 11:00) (64 - 77)  BP: 119/67 (10 Nov 2021 11:00) (107/87 - 181/84)  BP(mean): 87 (10 Nov 2021 11:00) (81 - 121)  RR: 17 (10 Nov 2021 11:00) (14 - 24)  SpO2: 95% (10 Nov 2021 11:00) (94% - 99%)    Wound healing well; No hematoma; no bleeding    RADIOLOGY & ADDITIONAL TESTS:  < from: Xray Chest 1 View- PORTABLE-Routine (Xray Chest 1 View- PORTABLE-Routine in AM.) (11.10.21 @ 06:32) >  Impression:    New right basilar opacity.

## 2021-11-10 NOTE — PROGRESS NOTE ADULT - SUBJECTIVE AND OBJECTIVE BOX
NEPHROLOGY FOLLOW UP NOTE    pt in ccu  npo for PCI today  s/p AICD  no dyspnea  + void      PAST MEDICAL & SURGICAL HISTORY:  Hypertension  Hydrocephalus  CKD (chronic kidney disease)  DM (diabetes mellitus)  Afib  History of prostate surgery    Allergies:  No Known Allergies    Home Medications Reviewed    SOCIAL HISTORY:  Denies ETOH,Smoking,   FAMILY HISTORY:  Family history of diabetes mellitus (Mother, Sibling)      REVIEW OF SYSTEMS  All other review of systems is negative unless indicated above.      PHYSICAL EXAM:  NAD  [place]  moist mm  no ln  decreased BS b/l  distant HS, irregular   soft + BS  1+ edema               Hospital Medications:   MEDICATIONS  (STANDING):  aMIOdarone    Tablet 200 milliGRAM(s) Oral two times a day  aMIOdarone Infusion 1 mG/Min (33.3 mL/Hr) IV Continuous <Continuous>  aMIOdarone Infusion 0.5 mG/Min (16.7 mL/Hr) IV Continuous <Continuous>  aspirin enteric coated 81 milliGRAM(s) Oral daily  atorvastatin 20 milliGRAM(s) Oral at bedtime  calcium acetate 667 milliGRAM(s) Oral three times a day with meals  chlorhexidine 4% Liquid 1 Application(s) Topical daily  dextrose 40% Gel 15 Gram(s) Oral once  dextrose 5%. 1000 milliLiter(s) (50 mL/Hr) IV Continuous <Continuous>  dextrose 5%. 1000 milliLiter(s) (100 mL/Hr) IV Continuous <Continuous>  dextrose 50% Injectable 25 Gram(s) IV Push once  dextrose 50% Injectable 12.5 Gram(s) IV Push once  dextrose 50% Injectable 25 Gram(s) IV Push once  finasteride 5 milliGRAM(s) Oral daily  gabapentin 100 milliGRAM(s) Oral daily  gabapentin 300 milliGRAM(s) Oral at bedtime  glucagon  Injectable 1 milliGRAM(s) IntraMuscular once  heparin   Injectable 5000 Unit(s) SubCutaneous every 8 hours  insulin glargine Injectable (LANTUS) 20 Unit(s) SubCutaneous at bedtime  insulin lispro (ADMELOG) corrective regimen sliding scale   SubCutaneous three times a day before meals  insulin lispro Injectable (ADMELOG) 7 Unit(s) SubCutaneous before breakfast  insulin lispro Injectable (ADMELOG) 7 Unit(s) SubCutaneous before lunch  insulin lispro Injectable (ADMELOG) 7 Unit(s) SubCutaneous before dinner  metoprolol tartrate 12.5 milliGRAM(s) Oral two times a day  sodium chloride 0.9%. 1000 milliLiter(s) (60 mL/Hr) IV Continuous <Continuous>  tamsulosin 0.4 milliGRAM(s) Oral at bedtime  torsemide 40 milliGRAM(s) Oral daily        VITALS:  T(F): 98.1 (11-10-21 @ 08:00), Max: 98.1 (11-10-21 @ 08:00)  HR: 72 (11-10-21 @ 10:00)  BP: 122/71 (11-10-21 @ 10:00)  RR: 20 (11-10-21 @ 10:00)  SpO2: 96% (11-10-21 @ 10:00)  Wt(kg): --    11-08 @ 07:01  -  11-09 @ 07:00  --------------------------------------------------------  IN: 294 mL / OUT: 0 mL / NET: 294 mL    11-09 @ 07:01  -  11-10 @ 07:00  --------------------------------------------------------  IN: 743.6 mL / OUT: 0 mL / NET: 743.6 mL          LABS:  11-10    137  |  92<L>  |  106<HH>  ----------------------------<  121<H>  3.8   |  23  |  2.9<H>    Ca    10.4<H>      10 Nov 2021 04:30  Mg     2.6     11-10    TPro  6.9  /  Alb  4.1  /  TBili  1.1  /  DBili      /  AST  9   /  ALT  5   /  AlkPhos  96  11-10                          14.2   10.54 )-----------( 216      ( 10 Nov 2021 04:30 )             45.7       Urine Studies:        RADIOLOGY & ADDITIONAL STUDIES:

## 2021-11-10 NOTE — PROGRESS NOTE ADULT - ASSESSMENT
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

## 2021-11-10 NOTE — CHART NOTE - NSCHARTNOTEFT_GEN_A_CORE
Pre cath note:    indication: VT; abnormal stress test; CAD   [ ] STEMI                [ ] NSTEMI                 [ ] Acute coronary syndrome                     [ ]Unstable Angina   [ ] high risk  [ ] intermediate risk  [ ] low risk                     [ ] Stable Angina     non-invasive testing:                          Date:                     result: [ ] high risk  [ ] intermediate risk  [ ] low risk    Anti- Anginal medications:                    [ ] not used                       [X] used                   [ ] not used but strong indication not to use    Ejection Fraction                   [ ] <29            [ ] 30-39%   [ ] 40-49%     [X]>50%    CHF                   [X] active (within last 14 days on meds   [ ] Chronic (on meds but no exacerbation)    COPD                   [ ] mild (on chronic bronchodilators)  [ ] moderate (on chronic steroid therapy)      [ ] severe (indication for home O2 or PACO2 >50)    Other risk factors:                       [ ] Previous MI                     [ ] CVA/ stroke                    [ ] carotid stent/ CEA                    [ ] PVD/PAD- (arterial aneurysm, non-palpable pulses, tortuous vessel with inability to insert catheter, infra-renal dissection, renal or subclavian artery stenosis)                    [X] diabetic                    [ ] previous CABG                    [X] Renal Failure                           13.6   9.56  )-----------( 213      ( 09 Nov 2021 04:40 )             44.0     11-09    143  |  95<L>  |  106<HH>  ----------------------------<  119<H>  3.9   |  24  |  2.8<H>    Ca    11.3<H>      09 Nov 2021 04:40  Mg     2.7     11-09    TPro  7.1  /  Alb  4.0  /  TBili  0.9  /  DBili  x   /  AST  8   /  ALT  <5  /  AlkPhos  95  11-09                         -Patient Schedule for LHC/PCI today  -Keep NPO after midnight  -Hold Anticoagulation prior to PCI: hold heparin for DVT prophylaxis prior to cardiac cath  -IV Fluids: NS (1cc/kg) 1 hour prior to cardiac cath

## 2021-11-10 NOTE — PROGRESS NOTE ADULT - ASSESSMENT
A/P   Patient s/p Medtronic DC ICD implant    - CXR shows new right basilar opacity -treatment per primary team.  Provide pt with incentive spirometry  - Device interrogation done.  No events.  Device functioning properly  Restrictions:  - Pt cannot lift left arm above shoulder level x 1 month  - Pt cannot lift >5 pounds with left arm x 1 month  - Pt can shower in 1 week  - No baths/swimming x 1 month  - outer gauze bandage can come off in 1 week.  Steristrips will fall off on their own.  - Pt is going for cardiac cath today  - Follow up with Dr. Santiago on 12/8 at 2 pm

## 2021-11-10 NOTE — PROGRESS NOTE ADULT - ATTENDING COMMENTS
VT s/p ICD   Syncope   CAD with significant pLAD obstruction s/p PCI   Episode of VT during cath s/p cardioversion     Plan:   PO Amio   Sp IV lido in ath cath lab   CCU monitoring   Continue DAPT BB statin VT s/p ICD   Syncope   CAD with significant pLAD obstruction s/p PCI   Episode of VT during cath s/p cardioversion   CKD     Plan:   PO Amio   Sp IV lido in ath cath lab   CCU monitoring   Continue DAPT BB statin  Nephrology follow up

## 2021-11-11 NOTE — PROGRESS NOTE ADULT - SUBJECTIVE AND OBJECTIVE BOX
HPI:  86 yr old male with hx of HTN, DM, AFib no AC 2/2 falls, HFpEF, JOHANNY presented to ED today 2/2 dizziness on/off since . States he was feeling dizzy yesterday and fell x2 and again felt dizzy today when he  went to urgent care where he was lowered to ground to prevent from falling. Son states pt saw his cardiologist Dr Huerta who changed up his meds last week and states this may be contributing to dad feeling dizzy. Son stated pts torsemide was increased and farxiga was added. Currently, pt states he feels fine and is asymptomatic. Pt denied loc, fevers, chills, cough or sputum production, nausea or vomiting, diarrhea or other associated symptoms. (2021 20:20)      INTERVAL HISTORY:      PAST MEDICAL & SURGICAL HISTORY  Afib    DM (diabetes mellitus)    CKD (chronic kidney disease)    Hydrocephalus    Hypertension    History of prostate surgery        ALLERGIES:  No Known Allergies      MEDICATIONS:  MEDICATIONS  (STANDING):  aMIOdarone    Tablet 200 milliGRAM(s) Oral two times a day  aspirin enteric coated 81 milliGRAM(s) Oral daily  atorvastatin 20 milliGRAM(s) Oral at bedtime  calcium acetate 667 milliGRAM(s) Oral three times a day with meals  chlorhexidine 4% Liquid 1 Application(s) Topical daily  clopidogrel Tablet 75 milliGRAM(s) Oral daily  dextrose 40% Gel 15 Gram(s) Oral once  dextrose 5%. 1000 milliLiter(s) (50 mL/Hr) IV Continuous <Continuous>  dextrose 5%. 1000 milliLiter(s) (100 mL/Hr) IV Continuous <Continuous>  dextrose 50% Injectable 25 Gram(s) IV Push once  dextrose 50% Injectable 12.5 Gram(s) IV Push once  dextrose 50% Injectable 25 Gram(s) IV Push once  finasteride 5 milliGRAM(s) Oral daily  gabapentin 100 milliGRAM(s) Oral daily  gabapentin 300 milliGRAM(s) Oral at bedtime  glucagon  Injectable 1 milliGRAM(s) IntraMuscular once  heparin   Injectable 5000 Unit(s) SubCutaneous every 8 hours  insulin glargine Injectable (LANTUS) 20 Unit(s) SubCutaneous at bedtime  insulin lispro (ADMELOG) corrective regimen sliding scale   SubCutaneous three times a day before meals  insulin lispro Injectable (ADMELOG) 7 Unit(s) SubCutaneous before breakfast  insulin lispro Injectable (ADMELOG) 7 Unit(s) SubCutaneous before lunch  insulin lispro Injectable (ADMELOG) 7 Unit(s) SubCutaneous before dinner  metoprolol tartrate 25 milliGRAM(s) Oral two times a day  sodium chloride 0.9%. 1000 milliLiter(s) (75 mL/Hr) IV Continuous <Continuous>  tamsulosin 0.4 milliGRAM(s) Oral at bedtime  torsemide 40 milliGRAM(s) Oral daily    MEDICATIONS  (PRN):  acetaminophen     Tablet .. 650 milliGRAM(s) Oral every 6 hours PRN Moderate Pain (4 - 6)  guaifenesin/dextromethorphan Oral Liquid 10 milliLiter(s) Oral every 4 hours PRN cough and/or congestion  metoprolol tartrate Injectable 5 milliGRAM(s) IV Push once PRN sustained tachy >120      HOME MEDICATIONS:  Home Medications:  acetaminophen 325 mg oral tablet: 2 tab(s) orally every 6 hours, As needed, Moderate Pain (4 - 6) (2021 11:54)  Basaglar KwikPen: 20 unit(s) subcutaneous once a day (at bedtime) (2021 22:10)  calcitriol 0.25 mcg oral capsule: 1 cap(s) orally once a day (2021 15:07)  dutasteride 0.5 mg oral capsule: 1 cap(s) orally once a day (2021 15:07)  Farxiga 10 mg oral tablet: 1 tab(s) orally once a day (2021 21:56)  gabapentin 100 mg oral capsule: 1 cap(s) orally once a day in AM (2021 21:49)  gabapentin 300 mg oral capsule: 1 cap(s) orally once a day (at bedtime) (2021 21:49)  metOLazone 5 mg oral tablet: orally 3 times a week -W- (2021 22:06)  Metoprolol Succinate ER 25 mg oral tablet, extended release: 1 tab(s) orally once a day (2021 21:52)  Ozempic (1 mg dose) 4 mg/3 mL subcutaneous solution: 1 milligram(s) subcutaneous once a week on Thursday (2021 21:50)  spironolactone 25 mg oral tablet: 1 tab(s) orally once a day (2021 22:00)        OBJECTIVE:  ICU Vital Signs Last 24 Hrs  T(C): 36.7 (2021 05:00), Max: 37.1 (10 Nov 2021 21:00)  T(F): 98.1 (2021 05:00), Max: 98.7 (10 Nov 2021 21:00)  HR: 70 (2021 06:00) (69 - 85)  BP: 123/68 (2021 06:00) (95/59 - 146/73)  BP(mean): 91 (2021 06:00) (70 - 94)  ABP: --  ABP(mean): --  RR: 19 (2021 06:00) (10 - 25)  SpO2: 98% (:00) (90% - 99%)      Adult Advanced Hemodynamics Last 24 Hrs  CVP(mm Hg): --  CVP(cm H2O): --  CO: --  CI: --  PA: --  PA(mean): --  PCWP: --  SVR: --  SVRI: --  PVR: --  PVRI: --  I&O's Summary    10 Nov 2021 07:01  -  2021 07:00  --------------------------------------------------------  IN: 690 mL / OUT: 0 mL / NET: 690 mL      Daily     Daily Weight in k.7 (2021 05:00)    PHYSICAL EXAM:  NEURO: patient is awake , alert and oriented  GEN: Not in acute distress  NECK: no thyroid enlargement, no JVD  LUNGS: Clear to auscultation bilaterally   CARDIOVASCULAR: S1/S2 present, RRR , no murmurs or rubs, no carotid bruits,  + PP bilaterally  ABD: Soft, non-tender, non-distended, +BS  EXT: No KEISHA  SKIN: Intact  ACCESS Site:    LABS:                        13.4   9.47  )-----------( 210      ( 2021 05:15 )             42.0         137  |  94<L>  |  109<HH>  ----------------------------<  121<H>  4.5   |  18  |  3.0<H>    Ca    9.7      2021 05:15  Mg     2.5         TPro  6.7  /  Alb  3.7  /  TBili  0.6  /  DBili  x   /  AST  20  /  ALT  <5  /  AlkPhos  94                Troponin trend:            RADIOLOGY:  -CXR:  RT basilar opacity improved from 11/10  -TTE:  -STRESS TEST:  -CATHETERIZATION:    ECG:    TELEMETRY EVENTS:  SVts overnight (sp AICD)    HPI:  86 yr old male with hx of HTN, DM, AFib no AC 2/2 falls, HFpEF, JOHANNY presented to ED today 2/2 dizziness on/off since . States he was feeling dizzy yesterday and fell x2 and again felt dizzy today when he  went to urgent care where he was lowered to ground to prevent from falling. Son states pt saw his cardiologist Dr Huerta who changed up his meds last week and states this may be contributing to dad feeling dizzy. Son stated pts torsemide was increased and farxiga was added. Currently, pt states he feels fine and is asymptomatic. Pt denied loc, fevers, chills, cough or sputum production, nausea or vomiting, diarrhea or other associated symptoms. (2021 20:20)      INTERVAL HISTORY:  SVT overnight      PAST MEDICAL & SURGICAL HISTORY  Afib    DM (diabetes mellitus)    CKD (chronic kidney disease)    Hydrocephalus    Hypertension    History of prostate surgery        ALLERGIES:  No Known Allergies      MEDICATIONS:  MEDICATIONS  (STANDING):  aMIOdarone    Tablet 200 milliGRAM(s) Oral two times a day  aspirin enteric coated 81 milliGRAM(s) Oral daily  atorvastatin 20 milliGRAM(s) Oral at bedtime  calcium acetate 667 milliGRAM(s) Oral three times a day with meals  chlorhexidine 4% Liquid 1 Application(s) Topical daily  clopidogrel Tablet 75 milliGRAM(s) Oral daily  dextrose 40% Gel 15 Gram(s) Oral once  dextrose 5%. 1000 milliLiter(s) (50 mL/Hr) IV Continuous <Continuous>  dextrose 5%. 1000 milliLiter(s) (100 mL/Hr) IV Continuous <Continuous>  dextrose 50% Injectable 25 Gram(s) IV Push once  dextrose 50% Injectable 12.5 Gram(s) IV Push once  dextrose 50% Injectable 25 Gram(s) IV Push once  finasteride 5 milliGRAM(s) Oral daily  gabapentin 100 milliGRAM(s) Oral daily  gabapentin 300 milliGRAM(s) Oral at bedtime  glucagon  Injectable 1 milliGRAM(s) IntraMuscular once  heparin   Injectable 5000 Unit(s) SubCutaneous every 8 hours  insulin glargine Injectable (LANTUS) 20 Unit(s) SubCutaneous at bedtime  insulin lispro (ADMELOG) corrective regimen sliding scale   SubCutaneous three times a day before meals  insulin lispro Injectable (ADMELOG) 7 Unit(s) SubCutaneous before breakfast  insulin lispro Injectable (ADMELOG) 7 Unit(s) SubCutaneous before lunch  insulin lispro Injectable (ADMELOG) 7 Unit(s) SubCutaneous before dinner  metoprolol tartrate 25 milliGRAM(s) Oral two times a day  sodium chloride 0.9%. 1000 milliLiter(s) (75 mL/Hr) IV Continuous <Continuous>  tamsulosin 0.4 milliGRAM(s) Oral at bedtime  torsemide 40 milliGRAM(s) Oral daily    MEDICATIONS  (PRN):  acetaminophen     Tablet .. 650 milliGRAM(s) Oral every 6 hours PRN Moderate Pain (4 - 6)  guaifenesin/dextromethorphan Oral Liquid 10 milliLiter(s) Oral every 4 hours PRN cough and/or congestion  metoprolol tartrate Injectable 5 milliGRAM(s) IV Push once PRN sustained tachy >120      HOME MEDICATIONS:  Home Medications:  acetaminophen 325 mg oral tablet: 2 tab(s) orally every 6 hours, As needed, Moderate Pain (4 - 6) (2021 11:54)  Basaglar KwikPen: 20 unit(s) subcutaneous once a day (at bedtime) (2021 22:10)  calcitriol 0.25 mcg oral capsule: 1 cap(s) orally once a day (2021 15:07)  dutasteride 0.5 mg oral capsule: 1 cap(s) orally once a day (2021 15:07)  Farxiga 10 mg oral tablet: 1 tab(s) orally once a day (2021 21:56)  gabapentin 100 mg oral capsule: 1 cap(s) orally once a day in AM (2021 21:49)  gabapentin 300 mg oral capsule: 1 cap(s) orally once a day (at bedtime) (2021 21:49)  metOLazone 5 mg oral tablet: orally 3 times a week -W- (2021 22:06)  Metoprolol Succinate ER 25 mg oral tablet, extended release: 1 tab(s) orally once a day (2021 21:52)  Ozempic (1 mg dose) 4 mg/3 mL subcutaneous solution: 1 milligram(s) subcutaneous once a week on Thursday (2021 21:50)  spironolactone 25 mg oral tablet: 1 tab(s) orally once a day (2021 22:00)        OBJECTIVE:  ICU Vital Signs Last 24 Hrs  T(C): 36.7 (2021 05:00), Max: 37.1 (10 Nov 2021 21:00)  T(F): 98.1 (2021 05:00), Max: 98.7 (10 Nov 2021 21:00)  HR: 70 (2021 06:00) (69 - 85)  BP: 123/68 (2021 06:00) (95/59 - 146/73)  BP(mean): 91 (2021 06:00) (70 - 94)  ABP: --  ABP(mean): --  RR: 19 (2021 06:00) (10 - 25)  SpO2: 98% (2021 06:00) (90% - 99%)      Adult Advanced Hemodynamics Last 24 Hrs  CVP(mm Hg): --  CVP(cm H2O): --  CO: --  CI: --  PA: --  PA(mean): --  PCWP: --  SVR: --  SVRI: --  PVR: --  PVRI: --  I&O's Summary    10 Nov 2021 07:01  -  2021 07:00  --------------------------------------------------------  IN: 690 mL / OUT: 0 mL / NET: 690 mL      Daily     Daily Weight in k.7 (2021 05:00)    PHYSICAL EXAM:  NEURO: patient is awake , alert and oriented  GEN: Not in acute distress  NECK: no thyroid enlargement, no JVD  LUNGS: Clear to auscultation bilaterally   CARDIOVASCULAR: S1/S2 present, RRR , no murmurs or rubs, no carotid bruits,  + PP bilaterally  ABD: Soft, non-tender, non-distended, +BS  EXT: No KEISHA  SKIN: Intact  ACCESS Site:    LABS:                        13.4   9.47  )-----------( 210      ( 2021 05:15 )             42.0         137  |  94<L>  |  109<HH>  ----------------------------<  121<H>  4.5   |  18  |  3.0<H>    Ca    9.7      2021 05:15  Mg     2.5         TPro  6.7  /  Alb  3.7  /  TBili  0.6  /  DBili  x   /  AST  20  /  ALT  <5  /  AlkPhos  94                Troponin trend:            RADIOLOGY:  -CXR:  RT basilar opacity improved from 11/10  -TTE:  -STRESS TEST:  -CATHETERIZATION:    ECG:    TELEMETRY EVENTS:  SVts overnight (sp AICD)    HPI:  86 yr old male with hx of HTN, DM, AFib no AC 2/2 falls, HFpEF, JOHANNY presented to ED today 2/2 dizziness on/off since . States he was feeling dizzy yesterday and fell x2 and again felt dizzy today when he  went to urgent care where he was lowered to ground to prevent from falling. Son states pt saw his cardiologist Dr Huerta who changed up his meds last week and states this may be contributing to dad feeling dizzy. Son stated pts torsemide was increased and farxiga was added. Currently, pt states he feels fine and is asymptomatic. Pt denied loc, fevers, chills, cough or sputum production, nausea or vomiting, diarrhea or other associated symptoms. (2021 20:20)      INTERVAL HISTORY:  SVT overnight      PAST MEDICAL & SURGICAL HISTORY  Afib    DM (diabetes mellitus)    CKD (chronic kidney disease)    Hydrocephalus    Hypertension    History of prostate surgery        ALLERGIES:  No Known Allergies      MEDICATIONS:  MEDICATIONS  (STANDING):  aMIOdarone    Tablet 200 milliGRAM(s) Oral two times a day  aspirin enteric coated 81 milliGRAM(s) Oral daily  atorvastatin 20 milliGRAM(s) Oral at bedtime  calcium acetate 667 milliGRAM(s) Oral three times a day with meals  chlorhexidine 4% Liquid 1 Application(s) Topical daily  clopidogrel Tablet 75 milliGRAM(s) Oral daily  dextrose 40% Gel 15 Gram(s) Oral once  dextrose 5%. 1000 milliLiter(s) (50 mL/Hr) IV Continuous <Continuous>  dextrose 5%. 1000 milliLiter(s) (100 mL/Hr) IV Continuous <Continuous>  dextrose 50% Injectable 25 Gram(s) IV Push once  dextrose 50% Injectable 12.5 Gram(s) IV Push once  dextrose 50% Injectable 25 Gram(s) IV Push once  finasteride 5 milliGRAM(s) Oral daily  gabapentin 100 milliGRAM(s) Oral daily  gabapentin 300 milliGRAM(s) Oral at bedtime  glucagon  Injectable 1 milliGRAM(s) IntraMuscular once  heparin   Injectable 5000 Unit(s) SubCutaneous every 8 hours  insulin glargine Injectable (LANTUS) 20 Unit(s) SubCutaneous at bedtime  insulin lispro (ADMELOG) corrective regimen sliding scale   SubCutaneous three times a day before meals  insulin lispro Injectable (ADMELOG) 7 Unit(s) SubCutaneous before breakfast  insulin lispro Injectable (ADMELOG) 7 Unit(s) SubCutaneous before lunch  insulin lispro Injectable (ADMELOG) 7 Unit(s) SubCutaneous before dinner  metoprolol tartrate 25 milliGRAM(s) Oral two times a day  sodium chloride 0.9%. 1000 milliLiter(s) (75 mL/Hr) IV Continuous <Continuous>  tamsulosin 0.4 milliGRAM(s) Oral at bedtime  torsemide 40 milliGRAM(s) Oral daily    MEDICATIONS  (PRN):  acetaminophen     Tablet .. 650 milliGRAM(s) Oral every 6 hours PRN Moderate Pain (4 - 6)  guaifenesin/dextromethorphan Oral Liquid 10 milliLiter(s) Oral every 4 hours PRN cough and/or congestion  metoprolol tartrate Injectable 5 milliGRAM(s) IV Push once PRN sustained tachy >120      HOME MEDICATIONS:  Home Medications:  acetaminophen 325 mg oral tablet: 2 tab(s) orally every 6 hours, As needed, Moderate Pain (4 - 6) (2021 11:54)  Basaglar KwikPen: 20 unit(s) subcutaneous once a day (at bedtime) (2021 22:10)  calcitriol 0.25 mcg oral capsule: 1 cap(s) orally once a day (2021 15:07)  dutasteride 0.5 mg oral capsule: 1 cap(s) orally once a day (2021 15:07)  Farxiga 10 mg oral tablet: 1 tab(s) orally once a day (2021 21:56)  gabapentin 100 mg oral capsule: 1 cap(s) orally once a day in AM (2021 21:49)  gabapentin 300 mg oral capsule: 1 cap(s) orally once a day (at bedtime) (2021 21:49)  metOLazone 5 mg oral tablet: orally 3 times a week -W- (2021 22:06)  Metoprolol Succinate ER 25 mg oral tablet, extended release: 1 tab(s) orally once a day (2021 21:52)  Ozempic (1 mg dose) 4 mg/3 mL subcutaneous solution: 1 milligram(s) subcutaneous once a week on Thursday (2021 21:50)  spironolactone 25 mg oral tablet: 1 tab(s) orally once a day (2021 22:00)        OBJECTIVE:  ICU Vital Signs Last 24 Hrs  T(C): 36.7 (2021 05:00), Max: 37.1 (10 Nov 2021 21:00)  T(F): 98.1 (2021 05:00), Max: 98.7 (10 Nov 2021 21:00)  HR: 70 (2021 06:00) (69 - 85)  BP: 123/68 (2021 06:00) (95/59 - 146/73)  BP(mean): 91 (2021 06:00) (70 - 94)  ABP: --  ABP(mean): --  RR: 19 (2021 06:00) (10 - 25)  SpO2: 98% (2021 06:00) (90% - 99%)      Adult Advanced Hemodynamics Last 24 Hrs  CVP(mm Hg): --  CVP(cm H2O): --  CO: --  CI: --  PA: --  PA(mean): --  PCWP: --  SVR: --  SVRI: --  PVR: --  PVRI: --  I&O's Summary    10 Nov 2021 07:01  -  2021 07:00  --------------------------------------------------------  IN: 690 mL / OUT: 0 mL / NET: 690 mL      Daily     Daily Weight in k.7 (2021 05:00)    PHYSICAL EXAM:  NEURO: patient is awake , alert and oriented  GEN: Not in acute distress  NECK: no thyroid enlargement, no JVD  LUNGS: Clear to auscultation bilaterally   CARDIOVASCULAR: S1/S2 present, RRR , no murmurs or rubs, no carotid bruits,  + PP bilaterally  ABD: Soft, non-tender, non-distended, +BS  EXT: No KEISHA  SKIN: +LT groin echymossis, indurated, hematoma? nontender   ACCESS Site:    LABS:                        13.4   9.47  )-----------( 210      ( 2021 05:15 )             42.0     11-11    137  |  94<L>  |  109<HH>  ----------------------------<  121<H>  4.5   |  18  |  3.0<H>    Ca    9.7      2021 05:15  Mg     2.5         TPro  6.7  /  Alb  3.7  /  TBili  0.6  /  DBili  x   /  AST  20  /  ALT  <5  /  AlkPhos  94                Troponin trend:            RADIOLOGY:  -CXR:  RT basilar opacity improved from 11/10  -TTE:  -STRESS TEST:  -CATHETERIZATION:    ECG:    TELEMETRY EVENTS:  SVts overnight (sp AICD)    HPI:  86 yr old male with hx of HTN, DM, AFib no AC 2/2 falls, HFpEF, JOHANNY presented to ED today 2/2 dizziness on/off since . States he was feeling dizzy yesterday and fell x2 and again felt dizzy today when he  went to urgent care where he was lowered to ground to prevent from falling. Son states pt saw his cardiologist Dr Huerta who changed up his meds last week and states this may be contributing to dad feeling dizzy. Son stated pts torsemide was increased and farxiga was added. Currently, pt states he feels fine and is asymptomatic. Pt denied loc, fevers, chills, cough or sputum production, nausea or vomiting, diarrhea or other associated symptoms. (2021 20:20)      INTERVAL HISTORY:  SVT overnight  New LT groin hematoma- f/u and PT when improv      PAST MEDICAL & SURGICAL HISTORY  Afib    DM (diabetes mellitus)    CKD (chronic kidney disease)    Hydrocephalus    Hypertension    History of prostate surgery        ALLERGIES:  No Known Allergies      MEDICATIONS:  MEDICATIONS  (STANDING):  aMIOdarone    Tablet 200 milliGRAM(s) Oral two times a day  aspirin enteric coated 81 milliGRAM(s) Oral daily  atorvastatin 20 milliGRAM(s) Oral at bedtime  calcium acetate 667 milliGRAM(s) Oral three times a day with meals  chlorhexidine 4% Liquid 1 Application(s) Topical daily  clopidogrel Tablet 75 milliGRAM(s) Oral daily  dextrose 40% Gel 15 Gram(s) Oral once  dextrose 5%. 1000 milliLiter(s) (50 mL/Hr) IV Continuous <Continuous>  dextrose 5%. 1000 milliLiter(s) (100 mL/Hr) IV Continuous <Continuous>  dextrose 50% Injectable 25 Gram(s) IV Push once  dextrose 50% Injectable 12.5 Gram(s) IV Push once  dextrose 50% Injectable 25 Gram(s) IV Push once  finasteride 5 milliGRAM(s) Oral daily  gabapentin 100 milliGRAM(s) Oral daily  gabapentin 300 milliGRAM(s) Oral at bedtime  glucagon  Injectable 1 milliGRAM(s) IntraMuscular once  heparin   Injectable 5000 Unit(s) SubCutaneous every 8 hours  insulin glargine Injectable (LANTUS) 20 Unit(s) SubCutaneous at bedtime  insulin lispro (ADMELOG) corrective regimen sliding scale   SubCutaneous three times a day before meals  insulin lispro Injectable (ADMELOG) 7 Unit(s) SubCutaneous before breakfast  insulin lispro Injectable (ADMELOG) 7 Unit(s) SubCutaneous before lunch  insulin lispro Injectable (ADMELOG) 7 Unit(s) SubCutaneous before dinner  metoprolol tartrate 25 milliGRAM(s) Oral two times a day  sodium chloride 0.9%. 1000 milliLiter(s) (75 mL/Hr) IV Continuous <Continuous>  tamsulosin 0.4 milliGRAM(s) Oral at bedtime  torsemide 40 milliGRAM(s) Oral daily    MEDICATIONS  (PRN):  acetaminophen     Tablet .. 650 milliGRAM(s) Oral every 6 hours PRN Moderate Pain (4 - 6)  guaifenesin/dextromethorphan Oral Liquid 10 milliLiter(s) Oral every 4 hours PRN cough and/or congestion  metoprolol tartrate Injectable 5 milliGRAM(s) IV Push once PRN sustained tachy >120      HOME MEDICATIONS:  Home Medications:  acetaminophen 325 mg oral tablet: 2 tab(s) orally every 6 hours, As needed, Moderate Pain (4 - 6) (2021 11:54)  Basaglar KwikPen: 20 unit(s) subcutaneous once a day (at bedtime) (2021 22:10)  calcitriol 0.25 mcg oral capsule: 1 cap(s) orally once a day (2021 15:07)  dutasteride 0.5 mg oral capsule: 1 cap(s) orally once a day (2021 15:07)  Farxiga 10 mg oral tablet: 1 tab(s) orally once a day (2021 21:56)  gabapentin 100 mg oral capsule: 1 cap(s) orally once a day in AM (2021 21:49)  gabapentin 300 mg oral capsule: 1 cap(s) orally once a day (at bedtime) (2021 21:49)  metOLazone 5 mg oral tablet: orally 3 times a week -- (2021 22:06)  Metoprolol Succinate ER 25 mg oral tablet, extended release: 1 tab(s) orally once a day (2021 21:52)  Ozempic (1 mg dose) 4 mg/3 mL subcutaneous solution: 1 milligram(s) subcutaneous once a week on Thursday (2021 21:50)  spironolactone 25 mg oral tablet: 1 tab(s) orally once a day (2021 22:00)        OBJECTIVE:  ICU Vital Signs Last 24 Hrs  T(C): 36.7 (2021 05:00), Max: 37.1 (10 Nov 2021 21:00)  T(F): 98.1 (2021 05:00), Max: 98.7 (10 Nov 2021 21:00)  HR: 70 (2021 06:00) (69 - 85)  BP: 123/68 (2021 06:00) (95/59 - 146/73)  BP(mean): 91 (2021 06:00) (70 - 94)  ABP: --  ABP(mean): --  RR: 19 (2021 06:00) (10 - 25)  SpO2: 98% (2021 06:00) (90% - 99%)      Adult Advanced Hemodynamics Last 24 Hrs  CVP(mm Hg): --  CVP(cm H2O): --  CO: --  CI: --  PA: --  PA(mean): --  PCWP: --  SVR: --  SVRI: --  PVR: --  PVRI: --  I&O's Summary    10 Nov 2021 07:01  -  2021 07:00  --------------------------------------------------------  IN: 690 mL / OUT: 0 mL / NET: 690 mL      Daily     Daily Weight in k.7 (2021 05:00)    PHYSICAL EXAM:  NEURO: patient is awake , alert and oriented  GEN: Not in acute distress  NECK: no thyroid enlargement, no JVD  LUNGS: Clear to auscultation bilaterally   CARDIOVASCULAR: S1/S2 present, RRR , no murmurs or rubs, no carotid bruits,  + PP bilaterally  ABD: Soft, non-tender, non-distended, +BS  EXT: No KEISHA  SKIN: +LT groin ecchymosis indurated, hematoma? nontender   ACCESS Site:    LABS:                        13.4   9.47  )-----------( 210      ( 2021 05:15 )             42.0         137  |  94<L>  |  109<HH>  ----------------------------<  121<H>  4.5   |  18  |  3.0<H>    Ca    9.7      2021 05:15  Mg     2.5         TPro  6.7  /  Alb  3.7  /  TBili  0.6  /  DBili  x   /  AST  20  /  ALT  <5  /  AlkPhos  94                Troponin trend:            RADIOLOGY:  -CXR:  RT basilar opacity improved from 11/10  -TTE:  -STRESS TEST:  -CATHETERIZATION:    ECG:    TELEMETRY EVENTS:  SVts overnight (sp AICD)    INTERVAL HISTORY:  NSVT overnight  New LT groin hematoma noted in the am.      PAST MEDICAL & SURGICAL HISTORY  Afib    DM (diabetes mellitus)    CKD (chronic kidney disease)    Hydrocephalus    Hypertension    History of prostate surgery        ALLERGIES:  No Known Allergies      MEDICATIONS:  MEDICATIONS  (STANDING):  aMIOdarone    Tablet 200 milliGRAM(s) Oral two times a day  aspirin enteric coated 81 milliGRAM(s) Oral daily  atorvastatin 20 milliGRAM(s) Oral at bedtime  calcium acetate 667 milliGRAM(s) Oral three times a day with meals  chlorhexidine 4% Liquid 1 Application(s) Topical daily  clopidogrel Tablet 75 milliGRAM(s) Oral daily  dextrose 40% Gel 15 Gram(s) Oral once  dextrose 5%. 1000 milliLiter(s) (50 mL/Hr) IV Continuous <Continuous>  dextrose 5%. 1000 milliLiter(s) (100 mL/Hr) IV Continuous <Continuous>  dextrose 50% Injectable 25 Gram(s) IV Push once  dextrose 50% Injectable 12.5 Gram(s) IV Push once  dextrose 50% Injectable 25 Gram(s) IV Push once  finasteride 5 milliGRAM(s) Oral daily  gabapentin 100 milliGRAM(s) Oral daily  gabapentin 300 milliGRAM(s) Oral at bedtime  glucagon  Injectable 1 milliGRAM(s) IntraMuscular once  heparin   Injectable 5000 Unit(s) SubCutaneous every 8 hours  insulin glargine Injectable (LANTUS) 20 Unit(s) SubCutaneous at bedtime  insulin lispro (ADMELOG) corrective regimen sliding scale   SubCutaneous three times a day before meals  insulin lispro Injectable (ADMELOG) 7 Unit(s) SubCutaneous before breakfast  insulin lispro Injectable (ADMELOG) 7 Unit(s) SubCutaneous before lunch  insulin lispro Injectable (ADMELOG) 7 Unit(s) SubCutaneous before dinner  metoprolol tartrate 25 milliGRAM(s) Oral two times a day  sodium chloride 0.9%. 1000 milliLiter(s) (75 mL/Hr) IV Continuous <Continuous>  tamsulosin 0.4 milliGRAM(s) Oral at bedtime  torsemide 40 milliGRAM(s) Oral daily    MEDICATIONS  (PRN):  acetaminophen     Tablet .. 650 milliGRAM(s) Oral every 6 hours PRN Moderate Pain (4 - 6)  guaifenesin/dextromethorphan Oral Liquid 10 milliLiter(s) Oral every 4 hours PRN cough and/or congestion  metoprolol tartrate Injectable 5 milliGRAM(s) IV Push once PRN sustained tachy >120      HOME MEDICATIONS:  Home Medications:  acetaminophen 325 mg oral tablet: 2 tab(s) orally every 6 hours, As needed, Moderate Pain (4 - 6) (2021 11:54)  Basaglar KwikPen: 20 unit(s) subcutaneous once a day (at bedtime) (2021 22:10)  calcitriol 0.25 mcg oral capsule: 1 cap(s) orally once a day (2021 15:07)  dutasteride 0.5 mg oral capsule: 1 cap(s) orally once a day (2021 15:07)  Farxiga 10 mg oral tablet: 1 tab(s) orally once a day (2021 21:56)  gabapentin 100 mg oral capsule: 1 cap(s) orally once a day in AM (2021 21:49)  gabapentin 300 mg oral capsule: 1 cap(s) orally once a day (at bedtime) (2021 21:49)  metOLazone 5 mg oral tablet: orally 3 times a week -- (2021 22:06)  Metoprolol Succinate ER 25 mg oral tablet, extended release: 1 tab(s) orally once a day (2021 21:52)  Ozempic (1 mg dose) 4 mg/3 mL subcutaneous solution: 1 milligram(s) subcutaneous once a week on Thursday (2021 21:50)  spironolactone 25 mg oral tablet: 1 tab(s) orally once a day (2021 22:00)        OBJECTIVE:  ICU Vital Signs Last 24 Hrs  T(C): 36.7 (2021 05:00), Max: 37.1 (10 Nov 2021 21:00)  T(F): 98.1 (2021 05:00), Max: 98.7 (10 Nov 2021 21:00)  HR: 70 (2021 06:00) (69 - 85)  BP: 123/68 (2021 06:00) (95/59 - 146/73)  BP(mean): 91 (2021 06:00) (70 - 94)  ABP: --  ABP(mean): --  RR: 19 (2021 06:00) (10 - 25)  SpO2: 98% (2021 06:00) (90% - 99%)      Adult Advanced Hemodynamics Last 24 Hrs  CVP(mm Hg): --  CVP(cm H2O): --  CO: --  CI: --  PA: --  PA(mean): --  PCWP: --  SVR: --  SVRI: --  PVR: --  PVRI: --  I&O's Summary    10 Nov 2021 07:01  -  2021 07:00  --------------------------------------------------------  IN: 690 mL / OUT: 0 mL / NET: 690 mL      Daily     Daily Weight in k.7 (2021 05:00)    PHYSICAL EXAM:  NEURO: patient is awake , alert and oriented  GEN: Not in acute distress  NECK: no thyroid enlargement, no JVD  LUNGS: Clear to auscultation bilaterally   CARDIOVASCULAR: S1/S2 present, RRR , no murmurs or rubs, no carotid bruits,  + PP bilaterally  ABD: Soft, non-tender, non-distended, +BS  EXT: No KEISHA  SKIN: +LT groin ecchymosis indurated, hematoma? nontender   ACCESS Site:    LABS:                        13.4   9.47  )-----------( 210      ( 2021 05:15 )             42.0         137  |  94<L>  |  109<HH>  ----------------------------<  121<H>  4.5   |  18  |  3.0<H>    Ca    9.7      2021 05:15  Mg     2.5         TPro  6.7  /  Alb  3.7  /  TBili  0.6  /  DBili  x   /  AST  20  /  ALT  <5  /  AlkPhos  94                Troponin trend:            RADIOLOGY:  -CXR:  RT basilar opacity improved from 11/10  -TTE:  -STRESS TEST:  -CATHETERIZATION:    ECG:    TELEMETRY EVENTS:  NSVts overnight (sp AICD)

## 2021-11-11 NOTE — PROGRESS NOTE ADULT - SUBJECTIVE AND OBJECTIVE BOX
NEPHROLOGY FOLLOW UP NOTE    pt in ccu  s/p PCI  s/p AICD  no dyspnea / no cp  bp's fair  + void      PAST MEDICAL & SURGICAL HISTORY:  Hypertension  Hydrocephalus  CKD (chronic kidney disease)  DM (diabetes mellitus)  Afib  History of prostate surgery    Allergies:  No Known Allergies    Home Medications Reviewed    SOCIAL HISTORY:  Denies ETOH,Smoking,   FAMILY HISTORY:  Family history of diabetes mellitus (Mother, Sibling)      REVIEW OF SYSTEMS  All other review of systems is negative unless indicated above.      PHYSICAL EXAM:  NAD  [place]  moist mm  no ln  + aicd  decreased BS b/l  distant HS, irregular   soft + BS  1+ edema    Hospital Medications:   MEDICATIONS  (STANDING):  aMIOdarone    Tablet 200 milliGRAM(s) Oral two times a day  aspirin enteric coated 81 milliGRAM(s) Oral daily  atorvastatin 20 milliGRAM(s) Oral at bedtime  calcium acetate 667 milliGRAM(s) Oral three times a day with meals  chlorhexidine 4% Liquid 1 Application(s) Topical daily  clopidogrel Tablet 75 milliGRAM(s) Oral daily  dextrose 40% Gel 15 Gram(s) Oral once  dextrose 5%. 1000 milliLiter(s) (50 mL/Hr) IV Continuous <Continuous>  dextrose 5%. 1000 milliLiter(s) (100 mL/Hr) IV Continuous <Continuous>  dextrose 50% Injectable 25 Gram(s) IV Push once  dextrose 50% Injectable 12.5 Gram(s) IV Push once  dextrose 50% Injectable 25 Gram(s) IV Push once  finasteride 5 milliGRAM(s) Oral daily  gabapentin 100 milliGRAM(s) Oral daily  gabapentin 300 milliGRAM(s) Oral at bedtime  glucagon  Injectable 1 milliGRAM(s) IntraMuscular once  insulin glargine Injectable (LANTUS) 20 Unit(s) SubCutaneous at bedtime  insulin lispro (ADMELOG) corrective regimen sliding scale   SubCutaneous three times a day before meals  insulin lispro Injectable (ADMELOG) 7 Unit(s) SubCutaneous before breakfast  insulin lispro Injectable (ADMELOG) 7 Unit(s) SubCutaneous before lunch  insulin lispro Injectable (ADMELOG) 7 Unit(s) SubCutaneous before dinner  metoprolol tartrate 25 milliGRAM(s) Oral two times a day  sodium chloride 0.9%. 1000 milliLiter(s) (75 mL/Hr) IV Continuous <Continuous>  tamsulosin 0.4 milliGRAM(s) Oral at bedtime  torsemide 40 milliGRAM(s) Oral daily        VITALS:  T(F): 97.8 (11-11-21 @ 16:00), Max: 98.7 (11-10-21 @ 21:00)  HR: 64 (11-11-21 @ 16:00)  BP: 126/61 (11-11-21 @ 16:00)  RR: 28 (11-11-21 @ 16:00)  SpO2: 98% (11-11-21 @ 16:00)  Wt(kg): --    11-09 @ 07:01  -  11-10 @ 07:00  --------------------------------------------------------  IN: 743.6 mL / OUT: 0 mL / NET: 743.6 mL    11-10 @ 07:01  -  11-11 @ 07:00  --------------------------------------------------------  IN: 690 mL / OUT: 0 mL / NET: 690 mL    11-11 @ 07:01  -  11-11 @ 16:39  --------------------------------------------------------  IN: 700 mL / OUT: 500 mL / NET: 200 mL          LABS:  11-11    137  |  94<L>  |  109<HH>  ----------------------------<  121<H>  4.5   |  18  |  3.0<H>    Ca    9.7      11 Nov 2021 05:15  Mg     2.5     11-11    TPro  6.7  /  Alb  3.7  /  TBili  0.6  /  DBili      /  AST  20  /  ALT  <5  /  AlkPhos  94  11-11                          13.4   9.47  )-----------( 210      ( 11 Nov 2021 05:15 )             42.0       Urine Studies:        RADIOLOGY & ADDITIONAL STUDIES:

## 2021-11-11 NOTE — PROGRESS NOTE ADULT - ATTENDING COMMENTS
VT s/p ICD   Syncope   CAD with significant pLAD obstruction s/p PCI   Episode of VT during cath s/p cardioversion   CKD     Plan:   PO Amio   Continue DAPT BB statin  Nephrology follow up.   transfer to telemetry

## 2021-11-11 NOTE — PROGRESS NOTE ADULT - ASSESSMENT
TOBIAS    CKD stage 4   s/p PCI to LAD  hyperphosphatemia  dizziness / falls  chronic HFpEF / Afib  VT  JOHANNY on CPAP    HTN   NPH s/p  shunt with no shunt patency on shuntogram  DM 2    dm neuropathy    plan:    CCU care  cont torsemide 40mg po qd  trend renal function and electrolytes  renal diet  cont phoslo with meals  flomax / monitor void / strict UOP monitoring  cardio f/u

## 2021-11-11 NOTE — PROGRESS NOTE ADULT - SUBJECTIVE AND OBJECTIVE BOX
Cardiology Follow up    VINCENZO EDMONDSON   86y Male  PAST MEDICAL & SURGICAL HISTORY:  Afib    DM (diabetes mellitus)    CKD (chronic kidney disease)    Hydrocephalus    Hypertension    History of prostate surgery         HPI:  86 yr old male with hx of HTN, DM, AFib no AC 2/2 falls, HFpEF, JOHANNY presented to ED today 2/2 dizziness on/off since 11/1. States he was feeling dizzy yesterday and fell x2 and again felt dizzy today when he  went to urgent care where he was lowered to ground to prevent from falling. Son states pt saw his cardiologist Dr Huerta who changed up his meds last week and states this may be contributing to dad feeling dizzy. Son stated pts torsemide was increased and farxiga was added. Currently, pt states he feels fine and is asymptomatic. Pt denied loc, fevers, chills, cough or sputum production, nausea or vomiting, diarrhea or other associated symptoms. (03 Nov 2021 20:20)    Allergies    No Known Allergies    Intolerances    Patient seen and examined at bedside. No acute events overnight.  Patient without complaints. Denies CP, SOB, palpitations, or dizziness  No events on telemetry overnight    Vital Signs Last 24 Hrs  T(C): 36.6 (11 Nov 2021 12:00), Max: 37.1 (10 Nov 2021 21:00)  T(F): 97.9 (11 Nov 2021 12:00), Max: 98.7 (10 Nov 2021 21:00)  HR: 69 (11 Nov 2021 14:00) (64 - 85)  BP: 112/59 (11 Nov 2021 14:00) (95/59 - 146/73)  BP(mean): 80 (11 Nov 2021 14:00) (70 - 94)  RR: 18 (11 Nov 2021 14:00) (10 - 32)  SpO2: 96% (11 Nov 2021 14:00) (90% - 99%)    MEDICATIONS  (STANDING):  aMIOdarone    Tablet 200 milliGRAM(s) Oral two times a day  aspirin enteric coated 81 milliGRAM(s) Oral daily  atorvastatin 20 milliGRAM(s) Oral at bedtime  calcium acetate 667 milliGRAM(s) Oral three times a day with meals  chlorhexidine 4% Liquid 1 Application(s) Topical daily  clopidogrel Tablet 75 milliGRAM(s) Oral daily  dextrose 40% Gel 15 Gram(s) Oral once  dextrose 5%. 1000 milliLiter(s) (50 mL/Hr) IV Continuous <Continuous>  dextrose 5%. 1000 milliLiter(s) (100 mL/Hr) IV Continuous <Continuous>  dextrose 50% Injectable 25 Gram(s) IV Push once  dextrose 50% Injectable 12.5 Gram(s) IV Push once  dextrose 50% Injectable 25 Gram(s) IV Push once  finasteride 5 milliGRAM(s) Oral daily  gabapentin 100 milliGRAM(s) Oral daily  gabapentin 300 milliGRAM(s) Oral at bedtime  glucagon  Injectable 1 milliGRAM(s) IntraMuscular once  insulin glargine Injectable (LANTUS) 20 Unit(s) SubCutaneous at bedtime  insulin lispro (ADMELOG) corrective regimen sliding scale   SubCutaneous three times a day before meals  insulin lispro Injectable (ADMELOG) 7 Unit(s) SubCutaneous before breakfast  insulin lispro Injectable (ADMELOG) 7 Unit(s) SubCutaneous before lunch  insulin lispro Injectable (ADMELOG) 7 Unit(s) SubCutaneous before dinner  metoprolol tartrate 25 milliGRAM(s) Oral two times a day  sodium chloride 0.9%. 1000 milliLiter(s) (75 mL/Hr) IV Continuous <Continuous>  tamsulosin 0.4 milliGRAM(s) Oral at bedtime  torsemide 40 milliGRAM(s) Oral daily    MEDICATIONS  (PRN):  acetaminophen     Tablet .. 650 milliGRAM(s) Oral every 6 hours PRN Moderate Pain (4 - 6)  guaifenesin/dextromethorphan Oral Liquid 10 milliLiter(s) Oral every 4 hours PRN cough and/or congestion  metoprolol tartrate Injectable 5 milliGRAM(s) IV Push once PRN sustained tachy >120      REVIEW OF SYSTEMS:          All negative except as mentioned in HPI    PHYSICAL EXAM:           CONSTITUTIONAL: Well-developed; well-nourished; in no acute distress  	SKIN: warm, dry  	HEAD: Normocephalic; atraumatic  	EYES: PERRL.  	ENT: No nasal discharge, airway clear, mucous membranes moist  	NECK: Supple; non tender.  	CARD: +S1, +S2, no murmurs, gallops, or rubs. Regular rate and rhythm    	RESP: No wheezes, rales or rhonchi. CTA B/L  	ABD: soft ntnd, + BS x 4 quadrants, Left upper chest D/C/I  	EXT: moves all extremities,  no clubbing, cyanosis or edema  	NEURO: Alert and oriented x3, no focal deficits          PSYCH: Cooperative, appropriate          VASCULAR:  + Rad / + PTs / +  DPs          EXTREMITY:             Left Groin: pressure dressing removed, access site swollen,+ hematoma, localized area of ecchymosis noted, no pain, + pulses, no sign of infection, no numbness  	              ECG:   < from: 12 Lead ECG (11.11.21 @ 05:34) >    Ventricular Rate 74 BPM    Atrial Rate 70 BPM    QRS Duration 126 ms    Q-T Interval 434 ms    QTC Calculation(Bazett) 481 ms    R Axis -9 degrees    T Axis 201 degrees    Diagnosis Line Atrial fibrillation  Non-specific intra-ventricular conduction block  T wave abnormality, consider anterolateral ischemia  Abnormal ECG    Confirmed by LEIDA COLON MD ([id number]) on 11/11/2021 8:00:41 AM                                                                                       2D ECHO:  < from: TTE Echo Complete w/o Contrast w/ Doppler (07.02.21 @ 14:44) >  Summary:   1. Normal global left ventricular systolic function.   2. LVEjection Fraction by Lemon's Method with a biplane EF of 54 %.   3. Mild left ventricular hypertrophy.   4. Mildly increased LV wall thickness.   5. Normal left ventricular internal cavity size.   6. Moderately enlarged left atrium.   7. Mild mitral annular calcification.   8. Mild thickening and calcification of the anterior and posterior mitral valve leaflets.   9. Mild to moderate mitral valve regurgitation.  10. Mild-moderate tricuspid regurgitation.  11. Mild aortic regurgitation.  12. Sclerotic aortic valve with normal opening.  13. Estimated pulmonary artery systolic pressure is 54.2 mmHg assuming a right atrial pressure of 5 mmHg, which is consistent with moderate pulmonary hypertension.  14. LA volume Index is 51.8 ml/m² ml/m2.  15. There is moderate aortic root calcification.    LABS:                        13.4   9.47  )-----------( 210      ( 11 Nov 2021 05:15 )             42.0     11-11    137  |  94<L>  |  109<HH>  ----------------------------<  121<H>  4.5   |  18  |  3.0<H>    Ca    9.7      11 Nov 2021 05:15  Mg     2.5     11-11    TPro  6.7  /  Alb  3.7  /  TBili  0.6  /  DBili  x   /  AST  20  /  ALT  <5  /  AlkPhos  94  11-11    Magnesium, Serum: 2.5 mg/dL *H* [1.8 - 2.4] (11-11-21 @ 05:15)  LIVER FUNCTIONS - ( 11 Nov 2021 05:15 )  Alb: 3.7 g/dL / Pro: 6.7 g/dL / ALK PHOS: 94 U/L / ALT: <5 U/L / AST: 20 U/L / GGT: x             A/P:  I discussed the case with Cardiologist Dr. Anderson and recommend the following:    S/P PCI:   Significant 1-Vessel Coronary Artery Disease s/p - successful PCI to prox LAD                      Care as per CCU team                    Monitor CBC and left groin access site, pulses, temperature                    No ACEi/ARB due to elevated Cr, monitor Cr                   Take Plavix 75 mg PO Daily and Aspirin 81 mg PO Daily FOR ONE MONTH, THEN STOP ASPIRIN AND CONTINUE PLAVIX  	     Continue DAPT ( Aspirin 81 mg daily and Plavix 75 mg daily ), B-Blocker, Amiodarone, Torsemide, Statin Therapy                   Patient given 30 day supply of ( Aspirin 81 mg daily and Plavix 75 mg daily ) to take at home                   Patient agreeing to take DAPT for at least one year or as directed by cardiologist                    Pt given instructions on importance of taking antiplatelet medication or risk acute stent thrombosis/death                   Post cath instructions, access site care and activity restrictions reviewed with patient                     Discussed with patient to return to hospital if experience chest pain, shortness breath, dizziness and site bleeding                   Aggressive risk factor modification, diet counseling, smoking cessation discussed with patient                       Monitor in CCU

## 2021-11-11 NOTE — CONSULT NOTE ADULT - ASSESSMENT
IMPRESSION: Rehab of gait dysfunction / s/p AICD / cardiac cath    PRECAUTIONS: [   ] Cardiac  [   ] Respiratory  [   ] Seizures [   ] Contact Isolation  [   ] Droplet Isolation  [   ] Other    Weight Bearing Status:     RECOMMENDATION:    Out of Bed to Chair     DVT/Decubiti Prophylaxis    REHAB PLAN:     [  x  ] Bedside P/T 3-5 times a week   [    ]   Bedside O/T  2-3 times a week             [    ] Speech Therapy               [    ]  No Rehab Therapy Indicated   Conditioning/ROM                                    ADL  Bed Mobility                                               Conditioning/ROM  Transfers                                                     Bed Mobility  Sitting /Standing Balance                         Transfers                                        Gait Training                                               Sitting/Standing Balance  Stair Training [   ]Applicable                    Home equipment Eval                                                                        Splinting  [   ] Only      GOALS:   ADL   [    ]   Independent                    Transfers  [ x   ] Independent                          Ambulation  [  x  ] Independent     [  x   ] With device                            [    ]  CG                                                         [    ]  CG                                                                  [    ] CG                            [    ] Min A                                                   [    ] Min A                                                              [    ] Min  A          DISCHARGE PLAN:   [    ]  Good candidate for Intensive Rehabilitation/Hospital based                                             Will tolerate 3hrs Intensive Rehab Daily                                       [  x   ]  Short Term Rehab in Skilled Nursing Facility                            vs           [   x  ]  Home with Outpatient or VN services                                         [     ]  Possible Candidate for Intensive Hospital based Rehab

## 2021-11-11 NOTE — CHART NOTE - NSCHARTNOTEFT_GEN_A_CORE
CCU DOWNGRADE NOTE:    86y Male transferred to floor from ICU    Patient is a 86y old Male who presents with a chief complaint of near syncope (11 Nov 2021 15:27)    The patient is currently admitted for the primary diagnosis of TOBIAS (acute kidney injury)      The patient was admitted to the unit for 4 Days.    The patient NEVER intubated, and was never on pressors    Indwelling vascular catheters:    Urinary Catheter:     Disposition: 3c [facility]    Code Status:     ICU COURSE OF EVENTS:  -------------------------------------------------------------------------------------------   s/p diagnostic cath with significant proxLAD severe dx. S/p LAD PCI yesterday and tolerated procedure well.  - Given monomorphic VT episodes, patient s/p AICD.  -Stable and on PO rx  -------------------------------------------------------------------------------------------    Current workup in progress:  PLEASE CONTINUE TO MONITOR LT GROIN HEMATOMA to ensure not expanding, nontender.     As per CARDIO:  S/P PCI:   Significant 1-Vessel Coronary Artery Disease s/p - successful PCI to prox LAD                             Monitor CBC and left groin access site, pulses, temperature                    No ACEi/ARB due to elevated Cr, monitor Cr                     Take Plavix 75 mg PO Daily and Aspirin 81 mg PO Daily FOR ONE MONTH, THEN STOP ASPIRIN AND CONTINUE PLAVIX  	     Continue DAPT ( Aspirin 81 mg daily and Plavix 75 mg daily ), B-Blocker, Amiodarone, Torsemide, Statin Therapy                   Patient given 30 day supply of ( Aspirin 81 mg daily and Plavix 75 mg daily ) to take at home                   Patient agreeing to take DAPT for at least one year or as directed by cardiologist                    Pt given instructions on importance of taking antiplatelet medication or risk acute stent thrombosis/death                   Post cath instructions, access site care and activity restrictions reviewed with patient                     Discussed with patient to return to hospital if experience chest pain, shortness breath, dizziness and site bleeding                   Aggressive risk factor modification, diet counseling, smoking cessation discussed with patient          SIGN OUT AT 11-11-21 @ 16:27 GIVEN TO: CCU DOWNGRADE NOTE:    86y Male transferred to floor from ICU    Patient is a 86y old Male who presents with a chief complaint of near syncope (11 Nov 2021 15:27)    The patient is currently admitted for the primary diagnosis of TOBIAS (acute kidney injury)      The patient was admitted to the unit for 4 Days.    The patient never intubated, and was never on pressors    Indwelling vascular catheters:    Urinary Catheter:     Disposition: 3c [facility] [employer]    Code Status:     ICU COURSE OF EVENTS:  -------------------------------------------------------------------------------------------   s/p diagnostic cath 11/8 with significant proxLAD severe dx.   s/p AICD 11/9 - Given monomorphic VT episodes  S/p LAD PCI 11/10 and tolerated procedure well.    -Stable and on PO rx  -------------------------------------------------------------------------------------------    Current workup in progress:  PLEASE CONTINUE TO MONITOR LT GROIN HEMATOMA to ensure not expanding, nontender.     As per CARDIO:  S/P PCI:   Significant 1-Vessel Coronary Artery Disease s/p - successful PCI to prox LAD                            Monitor CBC and left groin access site, pulses, temperature                    No ACEi/ARB due to elevated Cr, monitor Cr                     Take Plavix 75 mg PO Daily and Aspirin 81 mg PO Daily FOR ONE MONTH, THEN STOP ASPIRIN AND CONTINUE PLAVIX  	     Continue DAPT ( Aspirin 81 mg daily and Plavix 75 mg daily ), B-Blocker, Amiodarone, Torsemide, Statin Therapy                   Patient given 30 day supply of ( Aspirin 81 mg daily and Plavix 75 mg daily ) to take at home                   Patient agreeing to take DAPT for at least one year or as directed by cardiologist                    Pt given instructions on importance of taking antiplatelet medication or risk acute stent thrombosis/death                   Post cath instructions, access site care and activity restrictions reviewed with patient                     Discussed with patient to return to hospital if experience chest pain, shortness breath, dizziness and site bleeding                   Aggressive risk factor modification, diet counseling, smoking cessation discussed with patient        NEPHRO:  cont torsemide 40mg po qd  trend renal function and electrolytes  renal diet  cont phoslo with meals  flomax / monitor void / strict UOP monitoring      SIGN OUT AT 11-11-21 @ 16:27 GIVEN TO: CCU DOWNGRADE NOTE:    86y Male transferred to floor from ICU    Patient is a 86y old Male who presents with a chief complaint of near syncope (11 Nov 2021 15:27)    The patient is currently admitted for the primary diagnosis of TOBIAS (acute kidney injury)      The patient was admitted to the unit for 4 Days.    The patient never intubated, and was never on pressors    Indwelling vascular catheters:    Urinary Catheter:     Disposition: 3c [facility]    Code Status:     ICU COURSE OF EVENTS:  -------------------------------------------------------------------------------------------   s/p diagnostic cath 11/8 with significant proxLAD severe dx.   s/p AICD 11/9 - Given monomorphic VT episodes  S/p LAD PCI 11/10 and tolerated procedure well.    -Stable and on PO rx  -------------------------------------------------------------------------------------------    Current workup in progress:  PLEASE CONTINUE TO MONITOR LT GROIN HEMATOMA to ensure not expanding, nontender.     As per CARDIO:  S/P PCI:   Significant 1-Vessel Coronary Artery Disease s/p - successful PCI to prox LAD                            Monitor CBC and left groin access site, pulses, temperature                    No ACEi/ARB due to elevated Cr, monitor Cr                     Take Plavix 75 mg PO Daily and Aspirin 81 mg PO Daily FOR ONE MONTH, THEN STOP ASPIRIN AND CONTINUE PLAVIX  	     Continue DAPT ( Aspirin 81 mg daily and Plavix 75 mg daily ), B-Blocker, Amiodarone, Torsemide, Statin Therapy                   Patient given 30 day supply of ( Aspirin 81 mg daily and Plavix 75 mg daily ) to take at home                   Patient agreeing to take DAPT for at least one year or as directed by cardiologist                    Pt given instructions on importance of taking antiplatelet medication or risk acute stent thrombosis/death                   Post cath instructions, access site care and activity restrictions reviewed with patient                     Discussed with patient to return to hospital if experience chest pain, shortness breath, dizziness and site bleeding                   Aggressive risk factor modification, diet counseling, smoking cessation discussed with patient        NEPHRO:  cont torsemide 40mg po qd  trend renal function and electrolytes  renal diet  cont phoslo with meals  flomax / monitor void / strict UOP monitoring      SIGN OUT AT 11-11-21 @ 21:21 GIVEN TO: Dr. Burns

## 2021-11-11 NOTE — CHART NOTE - NSCHARTNOTEFT_GEN_A_CORE
Electrophysiology PA Note  s/p ICD  pod2  s/p cath yesterday with PCI    ICD site c/d/i, no hematoma, not tender    follow up as scheduled

## 2021-11-11 NOTE — PROGRESS NOTE ADULT - ASSESSMENT
· Assessment      Assessment  CAD prox LAD dx  Monomorphic VT    CARDIOVASCULAR  - s/p diagnostic cath with proxLAD severe dx. Will proceed with planned PCI today.  - Given monomorphic VT episodes, patient s/p AICD.  - C/w ASA, metoprolol, amiodarone and torsemide.  - Continue monitoring on tele.    PULMONARY  - No acute issues.    GASTROINTESTINAL  - Tolerating diet    RENAL  - Closely monitor Cr and electrolytes.    Neurology  - Avoid sedations    INFECTIOUS DISEASE  - Remains afebrile    Endocrine  - C/w insulin regimen for DM.   · Assessment      Assessment  CAD prox LAD dx  Monomorphic VT    CARDIOVASCULAR  - s/p diagnostic cath with proxLAD severe dx. S/p LAD PCI yesterday and tolerated procedure well.  - Given monomorphic VT episodes, patient s/p AICD.  - C/w ASA, metoprolol, amiodarone and torsemide.  - Continue monitoring on tele.  - Will closely monitor hematoma. Trend CBC.    PULMONARY  - No acute issues.    GASTROINTESTINAL  - Tolerating diet    RENAL  - Closely monitor Cr and electrolytes.    Neurology  - Avoid sedations    INFECTIOUS DISEASE  - Remains afebrile    Endocrine  - C/w insulin regimen for DM.    Disposition CCU  PPX: Hold dose of DVT ppx today

## 2021-11-11 NOTE — CONSULT NOTE ADULT - SUBJECTIVE AND OBJECTIVE BOX
HPI:  86 yr old male with hx of HTN, DM, AFib no AC 2/2 falls, HFpEF, JOHANNY presented to ED today 2/2 dizziness on/off since 11/1. States he was feeling dizzy yesterday and fell x2 and again felt dizzy today when he  went to urgent care where he was lowered to ground to prevent from falling. Son states pt saw his cardiologist Dr Huerta who changed up his meds last week and states this may be contributing to dad feeling dizzy. Son stated pts torsemide was increased and farxiga was added. Currently, pt states he feels fine and is asymptomatic. Pt denied loc, fevers, chills, cough or sputum production, nausea or vomiting, diarrhea or other associated symptoms.  - s/p diagnostic cath with proxLAD severe dx. Will proceed with planned PCI today.  - Given monomorphic VT episodes, patient s/p AICD.    PAST MEDICAL & SURGICAL HISTORY:  Afib    DM (diabetes mellitus)    CKD (chronic kidney disease)    Hydrocephalus    Hypertension    History of prostate surgery        Hospital Course:    TODAY'S SUBJECTIVE & REVIEW OF SYMPTOMS:     Constitutional WNL   Cardio WNL   Resp WNL   GI WNL  Heme WNL  Endo WNL  Skin WNL  MSK Weakness  Neuro WNL  Cognitive WNL  Psych WNL      MEDICATIONS  (STANDING):  aMIOdarone    Tablet 200 milliGRAM(s) Oral two times a day  aspirin enteric coated 81 milliGRAM(s) Oral daily  atorvastatin 20 milliGRAM(s) Oral at bedtime  calcium acetate 667 milliGRAM(s) Oral three times a day with meals  chlorhexidine 4% Liquid 1 Application(s) Topical daily  clopidogrel Tablet 75 milliGRAM(s) Oral daily  dextrose 40% Gel 15 Gram(s) Oral once  dextrose 5%. 1000 milliLiter(s) (50 mL/Hr) IV Continuous <Continuous>  dextrose 5%. 1000 milliLiter(s) (100 mL/Hr) IV Continuous <Continuous>  dextrose 50% Injectable 25 Gram(s) IV Push once  dextrose 50% Injectable 12.5 Gram(s) IV Push once  dextrose 50% Injectable 25 Gram(s) IV Push once  finasteride 5 milliGRAM(s) Oral daily  gabapentin 100 milliGRAM(s) Oral daily  gabapentin 300 milliGRAM(s) Oral at bedtime  glucagon  Injectable 1 milliGRAM(s) IntraMuscular once  heparin   Injectable 5000 Unit(s) SubCutaneous every 8 hours  insulin glargine Injectable (LANTUS) 20 Unit(s) SubCutaneous at bedtime  insulin lispro (ADMELOG) corrective regimen sliding scale   SubCutaneous three times a day before meals  insulin lispro Injectable (ADMELOG) 7 Unit(s) SubCutaneous before breakfast  insulin lispro Injectable (ADMELOG) 7 Unit(s) SubCutaneous before dinner  insulin lispro Injectable (ADMELOG) 7 Unit(s) SubCutaneous before lunch  metoprolol tartrate 25 milliGRAM(s) Oral two times a day  sodium chloride 0.9%. 1000 milliLiter(s) (75 mL/Hr) IV Continuous <Continuous>  tamsulosin 0.4 milliGRAM(s) Oral at bedtime  torsemide 40 milliGRAM(s) Oral daily    MEDICATIONS  (PRN):  acetaminophen     Tablet .. 650 milliGRAM(s) Oral every 6 hours PRN Moderate Pain (4 - 6)  guaifenesin/dextromethorphan Oral Liquid 10 milliLiter(s) Oral every 4 hours PRN cough and/or congestion  metoprolol tartrate Injectable 5 milliGRAM(s) IV Push once PRN sustained tachy >120      FAMILY HISTORY:  Family history of diabetes mellitus (Mother, Sibling)        Allergies    No Known Allergies    Intolerances        SOCIAL HISTORY:    [  ] Etoh  [  ] Smoking  [  ] Substance abuse     Home Environment:  [   ] Home Alone  [ x  ] Lives with Family  [   ] Home Health Aid    Dwelling:  [   ] Apartment  [ x  ] Private House  [   ] Adult Home  [   ] Skilled Nursing Facility      [   ] Short Term  [   ] Long Term  [ x  ] Stairs       Elevator [   ]    FUNCTIONAL STATUS PTA: (Check all that apply)  Ambulation: [   x ]Independent    [   ] Dependent     [   ] Non-Ambulatory  Assistive Device: [  x ] SA Cane  [   ]  Q Cane  [ x  ] Walker  [   ]  Wheelchair  ADL : [ x  ] Independent  [    ]  Dependent       Vital Signs Last 24 Hrs  T(C): 36.9 (11 Nov 2021 08:00), Max: 37.1 (10 Nov 2021 21:00)  T(F): 98.4 (11 Nov 2021 08:00), Max: 98.7 (10 Nov 2021 21:00)  HR: 66 (11 Nov 2021 09:00) (64 - 85)  BP: 131/58 (11 Nov 2021 09:00) (95/59 - 146/73)  BP(mean): 83 (11 Nov 2021 09:00) (70 - 94)  RR: 19 (11 Nov 2021 09:00) (10 - 32)  SpO2: 98% (11 Nov 2021 09:00) (90% - 99%)      PHYSICAL EXAM: Awake & Alert  GENERAL: NAD  HEAD:  Normocephalic  CHEST/LUNG: Clear   HEART: S1S2+  ABDOMEN: Soft, Nontender  EXTREMITIES:  no calf tenderness    NERVOUS SYSTEM:  Cranial Nerves 2-12 intact [   ] Abnormal  [   ]  ROM: WFL all extremities [  x ]  Abnormal [   ]  Motor Strength: WFL all extremities  [   ]  Abnormal [  x ]4-5/5 all ext  Sensation: intact to light touch [ x  ] Abnormal [   ]    FUNCTIONAL STATUS:  Bed Mobility: Independent [   ]  Supervision [   ]  Needs Assistance [  x ]  N/A [   ]  Transfers: Independent [   ]  Supervision [   ]  Needs Assistance [ x  ]  N/A [   ]   Ambulation: Independent [   ]  Supervision [   ]  Needs Assistance [   ]  N/A [   ]  ADL: Independent [   ] Requires Assistance [   ] N/A [   ]      LABS:                        13.4   9.47  )-----------( 210      ( 11 Nov 2021 05:15 )             42.0     11-11    137  |  94<L>  |  109<HH>  ----------------------------<  121<H>  4.5   |  18  |  3.0<H>    Ca    9.7      11 Nov 2021 05:15  Mg     2.5     11-11    TPro  6.7  /  Alb  3.7  /  TBili  0.6  /  DBili  x   /  AST  20  /  ALT  <5  /  AlkPhos  94  11-11          RADIOLOGY & ADDITIONAL STUDIES:

## 2021-11-12 NOTE — PROGRESS NOTE ADULT - ASSESSMENT
CHF/Near syncope/ TOBIAS on CKD/ VT     Patient remains euvolemic on exam  Get BMP daily   Maintain potassium >4.0, Mg >2.1  Strict intake and output  Daily weight  Nuclear Stress test completed  EP evaluation for episodes of VT on tele  Plan discussed with primary team and family member at bedside       **ATTENDING ATTESTATION PENDING** CHF/Near syncope/ TOBIAS on CKD/ VT     Patient remains euvolemic on exam  Continue torsemide   Get BMP daily   Maintain potassium >4.0, Mg >2.1  Strict intake and output  Daily weight  Nuclear Stress test completed  EP evaluation for episodes of VT on tele      **ATTENDING ATTESTATION PENDING** CHF/Near syncope/ TOBIAS on CKD/ VT / CAD s/p PCI to LAD    Patient remains euvolemic on exam   C/w torsemide 20 mg daily   Continue metoprolol 25 mg BID   Get BMP daily   Maintain potassium >4.0, Mg >2.1  Strict intake and output  Continue DAPT and statin   EP follow up for ICD  Daily weight

## 2021-11-12 NOTE — PROGRESS NOTE ADULT - SUBJECTIVE AND OBJECTIVE BOX
Date of Admission: 11/3/21    Interval History:  - Patient   -      HISTORY OF PRESENT ILLNESS:   86 yr old male with hx of HTN, DM, AFib no AC 2/2 falls, HFpEF, JOHANNY presented to ED today 2/2 dizziness on/off since . States he was feeling dizzy yesterday and fell x2 and again felt dizzy today when he  went to urgent care where he was lowered to ground to prevent from falling. Son states that recent change in meds may be contributing to dad feeling dizzy. Son stated pts torsemide was increased and farxiga was added. Currently, pt states he feels fine and is asymptomatic. Pt denied loc, fevers, chills, cough or sputum production, nausea or vomiting, diarrhea or other associated symptoms. (2021 20:20)      PAST MEDICAL & SURGICAL HISTORY:    Afib  DM (diabetes mellitus)  CKD (chronic kidney disease)  Hydrocephalus  Hypertension  History of prostate surgery    FAMILY HISTORY:    Mother:  at 72 of HF  Father:  at 68 of a brain aneurism    SOCIAL HISTORY:      Former smoker, denies alcohol or drug use     Allergies    No Known Allergies    Intolerances      PHYSICAL EXAM:  General Appearance: well appearing, normal for age and gender. 	  Neck: normal JVP, no bruit.   Eyes: Extra Ocular muscles intact.   Cardiovascular: regular rate and rhythm S1 S2, No JVD, No murmurs, No edema  Respiratory: Lungs clear to auscultation	  Psychiatry: Alert and oriented x 3, Mood & affect appropriate  Gastrointestinal:  Soft, Non-tender  Skin/Integumentary : No rashes, No ecchymoses, No cyanosis	  Neurologic: Non-focal  Musculoskeletal/ extremities: Normal range of motion, No clubbing, cyanosis or edema  Vascular: Peripheral pulses palpable 2+ bilaterally    CARDIAC MARKERS:    Serum Pro-Brain Natriuretic Peptide: 3334 pg/mL (21 @ 15:00)      PREVIOUS DIAGNOSTIC TESTING:      TTE 21     Summary:   1. Normal global left ventricular systolic function.   2. LV Ejection Fraction by Lemon's Method with a biplane EF of 54 %.   3. Mild left ventricular hypertrophy.   4. Mildly increased LV wall thickness.   5. Normal left ventricular internal cavity size.   6. Moderately enlarged left atrium.   7. Mild mitral annular calcification.   8. Mild thickening and calcification of the anterior and posterior mitral valve leaflets.   9. Mild to moderate mitral valve regurgitation.  10. Mild-moderate tricuspid regurgitation.  11. Mild aortic regurgitation.  12. Sclerotic aortic valve with normal opening.  13. Estimated pulmonary artery systolic pressure is 54.2 mmHg assuming a right atrial pressure of 5 mmHg, which is consistent with moderate pulmonary hypertension.  14. LA volume Index is 51.8 ml/m² ml/m2.  15. There is moderate aortic root calcification.        Home Medications:  acetaminophen 325 mg oral tablet: 2 tab(s) orally every 6 hours, As needed, Moderate Pain (4 - 6) (2021 11:54)  Basaglar KwikPen: 20 unit(s) subcutaneous once a day (at bedtime) (2021 22:10)  calcitriol 0.25 mcg oral capsule: 1 cap(s) orally once a day (2021 15:07)  dutasteride 0.5 mg oral capsule: 1 cap(s) orally once a day (2021 15:07)  Farxiga 10 mg oral tablet: 1 tab(s) orally once a day (2021 21:56)  gabapentin 100 mg oral capsule: 1 cap(s) orally once a day in AM (2021 21:49)  gabapentin 300 mg oral capsule: 1 cap(s) orally once a day (at bedtime) (2021 21:49)  metOLazone 5 mg oral tablet: orally 3 times a week -- (2021 22:06)  Metoprolol Succinate ER 25 mg oral tablet, extended release: 1 tab(s) orally once a day (2021 21:52)  Ozempic (1 mg dose) 4 mg/3 mL subcutaneous solution: 1 milligram(s) subcutaneous once a week on Thursday (2021 21:50)  spironolactone 25 mg oral tablet: 1 tab(s) orally once a day (2021 22:00)    MEDICATIONS  (STANDING):  atorvastatin 20 milliGRAM(s) Oral at bedtime  calcium acetate 667 milliGRAM(s) Oral three times a day with meals  dextrose 40% Gel 15 Gram(s) Oral once  dextrose 5%. 1000 milliLiter(s) (50 mL/Hr) IV Continuous <Continuous>  dextrose 5%. 1000 milliLiter(s) (100 mL/Hr) IV Continuous <Continuous>  dextrose 50% Injectable 25 Gram(s) IV Push once  dextrose 50% Injectable 12.5 Gram(s) IV Push once  dextrose 50% Injectable 25 Gram(s) IV Push once  finasteride 5 milliGRAM(s) Oral daily  gabapentin 100 milliGRAM(s) Oral daily  gabapentin 300 milliGRAM(s) Oral at bedtime  glucagon  Injectable 1 milliGRAM(s) IntraMuscular once  heparin   Injectable 5000 Unit(s) SubCutaneous every 8 hours  insulin glargine Injectable (LANTUS) 20 Unit(s) SubCutaneous at bedtime  insulin lispro (ADMELOG) corrective regimen sliding scale   SubCutaneous three times a day before meals  insulin lispro Injectable (ADMELOG) 7 Unit(s) SubCutaneous before breakfast  insulin lispro Injectable (ADMELOG) 7 Unit(s) SubCutaneous before lunch  insulin lispro Injectable (ADMELOG) 7 Unit(s) SubCutaneous before dinner  tamsulosin 0.4 milliGRAM(s) Oral at bedtime           Date of Admission: 11/3/21    Interval History:  - Patient assessed in chair on RA, NAD  - Stated he is feeling "great" and that his breathing has improved. Endorses a dry cough.      HISTORY OF PRESENT ILLNESS:   86 yr old male with hx of HTN, DM, AFib no AC 2/2 falls, HFpEF, JOHANNY presented to ED today 2/2 dizziness on/off since . States he was feeling dizzy yesterday and fell x2 and again felt dizzy today when he  went to urgent care where he was lowered to ground to prevent from falling. Son states that recent change in meds may be contributing to dad feeling dizzy. Son stated pts torsemide was increased and farxiga was added. Currently, pt states he feels fine and is asymptomatic. Pt denied loc, fevers, chills, cough or sputum production, nausea or vomiting, diarrhea or other associated symptoms. (2021 20:20)      PAST MEDICAL & SURGICAL HISTORY:    Afib  DM (diabetes mellitus)  CKD (chronic kidney disease)  Hydrocephalus  Hypertension  History of prostate surgery    FAMILY HISTORY:    Mother:  at 72 of HF  Father:  at 68 of a brain aneurism    SOCIAL HISTORY:      Former smoker, denies alcohol or drug use     Allergies    No Known Allergies    Intolerances      PHYSICAL EXAM:  General Appearance: well appearing, normal for age and gender. 	  Neck: normal JVP, no bruit.   Eyes: Extra Ocular muscles intact.   Cardiovascular: regular rate and rhythm S1 S2, No JVD, No murmurs, No edema  Respiratory: Lungs clear to auscultation	  Psychiatry: Alert and oriented x 3, Mood & affect appropriate  Gastrointestinal:  Soft, Non-tender  Skin/Integumentary : No rashes, No ecchymoses, No cyanosis	  Neurologic: Non-focal  Musculoskeletal/ extremities: Normal range of motion, No clubbing, cyanosis or edema  Vascular: Peripheral pulses palpable 2+ bilaterally    CARDIAC MARKERS:    Serum Pro-Brain Natriuretic Peptide: 3334 pg/mL (21 @ 15:00)      PREVIOUS DIAGNOSTIC TESTING:      TTE 21     Summary:   1. Normal global left ventricular systolic function.   2. LV Ejection Fraction by Lemon's Method with a biplane EF of 54 %.   3. Mild left ventricular hypertrophy.   4. Mildly increased LV wall thickness.   5. Normal left ventricular internal cavity size.   6. Moderately enlarged left atrium.   7. Mild mitral annular calcification.   8. Mild thickening and calcification of the anterior and posterior mitral valve leaflets.   9. Mild to moderate mitral valve regurgitation.  10. Mild-moderate tricuspid regurgitation.  11. Mild aortic regurgitation.  12. Sclerotic aortic valve with normal opening.  13. Estimated pulmonary artery systolic pressure is 54.2 mmHg assuming a right atrial pressure of 5 mmHg, which is consistent with moderate pulmonary hypertension.  14. LA volume Index is 51.8 ml/m² ml/m2.  15. There is moderate aortic root calcification.        Home Medications:  acetaminophen 325 mg oral tablet: 2 tab(s) orally every 6 hours, As needed, Moderate Pain (4 - 6) (2021 11:54)  Basaglar KwikPen: 20 unit(s) subcutaneous once a day (at bedtime) (2021 22:10)  calcitriol 0.25 mcg oral capsule: 1 cap(s) orally once a day (2021 15:07)  dutasteride 0.5 mg oral capsule: 1 cap(s) orally once a day (2021 15:07)  Farxiga 10 mg oral tablet: 1 tab(s) orally once a day (2021 21:56)  gabapentin 100 mg oral capsule: 1 cap(s) orally once a day in AM (2021 21:49)  gabapentin 300 mg oral capsule: 1 cap(s) orally once a day (at bedtime) (2021 21:49)  metOLazone 5 mg oral tablet: orally 3 times a week -W- (2021 22:06)  Metoprolol Succinate ER 25 mg oral tablet, extended release: 1 tab(s) orally once a day (2021 21:52)  Ozempic (1 mg dose) 4 mg/3 mL subcutaneous solution: 1 milligram(s) subcutaneous once a week on Thursday (2021 21:50)  spironolactone 25 mg oral tablet: 1 tab(s) orally once a day (2021 22:00)    MEDICATIONS  (STANDING):  atorvastatin 20 milliGRAM(s) Oral at bedtime  calcium acetate 667 milliGRAM(s) Oral three times a day with meals  dextrose 40% Gel 15 Gram(s) Oral once  dextrose 5%. 1000 milliLiter(s) (50 mL/Hr) IV Continuous <Continuous>  dextrose 5%. 1000 milliLiter(s) (100 mL/Hr) IV Continuous <Continuous>  dextrose 50% Injectable 25 Gram(s) IV Push once  dextrose 50% Injectable 12.5 Gram(s) IV Push once  dextrose 50% Injectable 25 Gram(s) IV Push once  finasteride 5 milliGRAM(s) Oral daily  gabapentin 100 milliGRAM(s) Oral daily  gabapentin 300 milliGRAM(s) Oral at bedtime  glucagon  Injectable 1 milliGRAM(s) IntraMuscular once  heparin   Injectable 5000 Unit(s) SubCutaneous every 8 hours  insulin glargine Injectable (LANTUS) 20 Unit(s) SubCutaneous at bedtime  insulin lispro (ADMELOG) corrective regimen sliding scale   SubCutaneous three times a day before meals  insulin lispro Injectable (ADMELOG) 7 Unit(s) SubCutaneous before breakfast  insulin lispro Injectable (ADMELOG) 7 Unit(s) SubCutaneous before lunch  insulin lispro Injectable (ADMELOG) 7 Unit(s) SubCutaneous before dinner  tamsulosin 0.4 milliGRAM(s) Oral at bedtime

## 2021-11-12 NOTE — PHYSICAL THERAPY INITIAL EVALUATION ADULT - ADDITIONAL COMMENTS
Patient lives with family in house with no steps to enter. Claims to be independent in ADL's and ambulates independently using RW. Patient sleeps in recliner.

## 2021-11-12 NOTE — PROGRESS NOTE ADULT - SUBJECTIVE AND OBJECTIVE BOX
VINCENZO EDMONDSON 86y Male  MRN#: [id number]   CODE STATUS:________    Hospital Day: 9d    Pt is currently admitted with the primary diagnosis of near syncope     SUBJECTIVE  Overnight events   -pt had 7 episodes of 3-7 beats of ventricular tachycardia. Recent AICD in place, no reported shocks, pt asymptomatic  Subjective complaints   -Pt feels well this AM, seen walking with walker in AM.                                            ----------------------------------------------------------  OBJECTIVE  PAST MEDICAL & SURGICAL HISTORY  Afib    DM (diabetes mellitus)    CKD (chronic kidney disease)    Hydrocephalus    Hypertension    History of prostate surgery                                              -----------------------------------------------------------  ALLERGIES:  No Known Allergies                                            ------------------------------------------------------------    HOME MEDICATIONS  Home Medications:  acetaminophen 325 mg oral tablet: 2 tab(s) orally every 6 hours, As needed, Moderate Pain (4 - 6) (12 Jul 2021 11:54)  Basaglar KwikPen: 20 unit(s) subcutaneous once a day (at bedtime) (03 Nov 2021 22:10)  calcitriol 0.25 mcg oral capsule: 1 cap(s) orally once a day (30 Jun 2021 15:07)  dutasteride 0.5 mg oral capsule: 1 cap(s) orally once a day (30 Jun 2021 15:07)  Farxiga 10 mg oral tablet: 1 tab(s) orally once a day (03 Nov 2021 21:56)  gabapentin 100 mg oral capsule: 1 cap(s) orally once a day in AM (03 Nov 2021 21:49)  gabapentin 300 mg oral capsule: 1 cap(s) orally once a day (at bedtime) (03 Nov 2021 21:49)  metOLazone 5 mg oral tablet: orally 3 times a week M-W-F (03 Nov 2021 22:06)  Metoprolol Succinate ER 25 mg oral tablet, extended release: 1 tab(s) orally once a day (03 Nov 2021 21:52)  Ozempic (1 mg dose) 4 mg/3 mL subcutaneous solution: 1 milligram(s) subcutaneous once a week on Thursday (03 Nov 2021 21:50)  spironolactone 25 mg oral tablet: 1 tab(s) orally once a day (03 Nov 2021 22:00)                           MEDICATIONS:  STANDING MEDICATIONS  aMIOdarone    Tablet 200 milliGRAM(s) Oral two times a day  aspirin enteric coated 81 milliGRAM(s) Oral daily  atorvastatin 20 milliGRAM(s) Oral at bedtime  calcium acetate 667 milliGRAM(s) Oral three times a day with meals  chlorhexidine 4% Liquid 1 Application(s) Topical daily  clopidogrel Tablet 75 milliGRAM(s) Oral daily  dextrose 40% Gel 15 Gram(s) Oral once  dextrose 5%. 1000 milliLiter(s) IV Continuous <Continuous>  dextrose 5%. 1000 milliLiter(s) IV Continuous <Continuous>  dextrose 50% Injectable 25 Gram(s) IV Push once  dextrose 50% Injectable 12.5 Gram(s) IV Push once  dextrose 50% Injectable 25 Gram(s) IV Push once  finasteride 5 milliGRAM(s) Oral daily  gabapentin 100 milliGRAM(s) Oral daily  gabapentin 300 milliGRAM(s) Oral at bedtime  glucagon  Injectable 1 milliGRAM(s) IntraMuscular once  heparin   Injectable 5000 Unit(s) SubCutaneous every 8 hours  insulin glargine Injectable (LANTUS) 20 Unit(s) SubCutaneous at bedtime  insulin lispro (ADMELOG) corrective regimen sliding scale   SubCutaneous three times a day before meals  insulin lispro Injectable (ADMELOG) 7 Unit(s) SubCutaneous before breakfast  insulin lispro Injectable (ADMELOG) 7 Unit(s) SubCutaneous before lunch  insulin lispro Injectable (ADMELOG) 7 Unit(s) SubCutaneous before dinner  metoprolol tartrate 25 milliGRAM(s) Oral two times a day  potassium chloride   Powder 40 milliEquivalent(s) Oral once  potassium chloride   Powder 40 milliEquivalent(s) Oral once  tamsulosin 0.4 milliGRAM(s) Oral at bedtime  torsemide 40 milliGRAM(s) Oral daily    PRN MEDICATIONS  acetaminophen     Tablet .. 650 milliGRAM(s) Oral every 6 hours PRN  guaifenesin/dextromethorphan Oral Liquid 10 milliLiter(s) Oral every 4 hours PRN  metoprolol tartrate Injectable 5 milliGRAM(s) IV Push once PRN                                            ------------------------------------------------------------  VITAL SIGNS: Last 24 Hours  T(C): 36.7 (12 Nov 2021 05:39), Max: 36.7 (12 Nov 2021 05:39)  T(F): 98 (12 Nov 2021 05:39), Max: 98 (12 Nov 2021 05:39)  HR: 66 (12 Nov 2021 05:39) (62 - 75)  BP: 102/52 (12 Nov 2021 05:39) (102/52 - 141/71)  BP(mean): 78 (11 Nov 2021 19:00) (77 - 98)  RR: 18 (12 Nov 2021 05:39) (13 - 28)  SpO2: 97% (11 Nov 2021 21:26) (91% - 98%)      11-11-21 @ 07:01  -  11-12-21 @ 07:00  --------------------------------------------------------  IN: 1160 mL / OUT: 950 mL / NET: 210 mL                                             --------------------------------------------------------------  LABS:                        12.2   11.09 )-----------( 194      ( 12 Nov 2021 07:30 )             38.8     11-12    140  |  93<L>  |  121<HH>  ----------------------------<  110<H>  3.2<L>   |  25  |  3.1<H>    Ca    9.8      12 Nov 2021 07:30  Mg     2.4     11-12    TPro  6.3  /  Alb  3.8  /  TBili  0.5  /  DBili  x   /  AST  11  /  ALT  <5  /  AlkPhos  90  11-12                                                              -------------------------------------------------------------  RADIOLOGY:  CXR 11/12/21  Support devices: Left-sided permanent pacemaker.    Cardiac/mediastinum/hilum: Cardiomegaly, unchanged.    Lung parenchyma/Pleura: Bibasilar opacities, unchanged. No pneumothorax is seen.    Skeleton/soft tissues: Stable.    Impression:    Cardiomegaly, unchanged.    Bibasilar opacities, unchanged.    Right-sided permanent pacemaker.                                          --------------------------------------------------------------    PHYSICAL EXAM:  GENERAL: AAOx3. Well-nourished, laying in bed appearing in no acute distress  HEENT: No FNDs, atraumatic, normocephalic, moist mucus membranes  LUNGS: Clear to auscultation bilaterally  HEART: S1/S2. No heaves or thrills  ABD: Soft, non-tender, non-distended. Bowel sounds present in all quadrants.  EXT/NEURO: 5/5 strength in all extremity joints. Sensation and ROM grossly intact.  SKIN: L groin hematoma, different stages of healing                                           --------------------------------------------------------------     VINCENZO EDMONDSON 86y Male  MRN#: [id number]   CODE STATUS:________    Hospital Day: 9d    Pt is currently admitted with the primary diagnosis of near syncope     SUBJECTIVE  Overnight events   -pt had 7 episodes of 3-7 beats of ventricular tachycardia. Recent AICD in place, no reported shocks, pt asymptomatic  Subjective complaints   -Pt feels well this AM, seen walking with walker in AM.                                            ----------------------------------------------------------  OBJECTIVE  PAST MEDICAL & SURGICAL HISTORY  Chronic Afib    DM (diabetes mellitus)    CKD (chronic kidney disease)    Hydrocephalus    Hypertension    History of prostate surgery                                              -----------------------------------------------------------  ALLERGIES:  No Known Allergies                                            ------------------------------------------------------------    HOME MEDICATIONS  Home Medications:  acetaminophen 325 mg oral tablet: 2 tab(s) orally every 6 hours, As needed, Moderate Pain (4 - 6) (12 Jul 2021 11:54)  Basaglar KwikPen: 20 unit(s) subcutaneous once a day (at bedtime) (03 Nov 2021 22:10)  calcitriol 0.25 mcg oral capsule: 1 cap(s) orally once a day (30 Jun 2021 15:07)  dutasteride 0.5 mg oral capsule: 1 cap(s) orally once a day (30 Jun 2021 15:07)  Farxiga 10 mg oral tablet: 1 tab(s) orally once a day (03 Nov 2021 21:56)  gabapentin 100 mg oral capsule: 1 cap(s) orally once a day in AM (03 Nov 2021 21:49)  gabapentin 300 mg oral capsule: 1 cap(s) orally once a day (at bedtime) (03 Nov 2021 21:49)  metOLazone 5 mg oral tablet: orally 3 times a week M-W-F (03 Nov 2021 22:06)  Metoprolol Succinate ER 25 mg oral tablet, extended release: 1 tab(s) orally once a day (03 Nov 2021 21:52)  Ozempic (1 mg dose) 4 mg/3 mL subcutaneous solution: 1 milligram(s) subcutaneous once a week on Thursday (03 Nov 2021 21:50)  spironolactone 25 mg oral tablet: 1 tab(s) orally once a day (03 Nov 2021 22:00)                           MEDICATIONS:  STANDING MEDICATIONS  aMIOdarone    Tablet 200 milliGRAM(s) Oral two times a day  aspirin enteric coated 81 milliGRAM(s) Oral daily  atorvastatin 20 milliGRAM(s) Oral at bedtime  calcium acetate 667 milliGRAM(s) Oral three times a day with meals  chlorhexidine 4% Liquid 1 Application(s) Topical daily  clopidogrel Tablet 75 milliGRAM(s) Oral daily  dextrose 40% Gel 15 Gram(s) Oral once  dextrose 5%. 1000 milliLiter(s) IV Continuous <Continuous>  dextrose 5%. 1000 milliLiter(s) IV Continuous <Continuous>  dextrose 50% Injectable 25 Gram(s) IV Push once  dextrose 50% Injectable 12.5 Gram(s) IV Push once  dextrose 50% Injectable 25 Gram(s) IV Push once  finasteride 5 milliGRAM(s) Oral daily  gabapentin 100 milliGRAM(s) Oral daily  gabapentin 300 milliGRAM(s) Oral at bedtime  glucagon  Injectable 1 milliGRAM(s) IntraMuscular once  heparin   Injectable 5000 Unit(s) SubCutaneous every 8 hours  insulin glargine Injectable (LANTUS) 20 Unit(s) SubCutaneous at bedtime  insulin lispro (ADMELOG) corrective regimen sliding scale   SubCutaneous three times a day before meals  insulin lispro Injectable (ADMELOG) 7 Unit(s) SubCutaneous before breakfast  insulin lispro Injectable (ADMELOG) 7 Unit(s) SubCutaneous before lunch  insulin lispro Injectable (ADMELOG) 7 Unit(s) SubCutaneous before dinner  metoprolol tartrate 25 milliGRAM(s) Oral two times a day  potassium chloride   Powder 40 milliEquivalent(s) Oral once  potassium chloride   Powder 40 milliEquivalent(s) Oral once  tamsulosin 0.4 milliGRAM(s) Oral at bedtime  torsemide 40 milliGRAM(s) Oral daily    PRN MEDICATIONS  acetaminophen     Tablet .. 650 milliGRAM(s) Oral every 6 hours PRN  guaifenesin/dextromethorphan Oral Liquid 10 milliLiter(s) Oral every 4 hours PRN  metoprolol tartrate Injectable 5 milliGRAM(s) IV Push once PRN                                            ------------------------------------------------------------  VITAL SIGNS: Last 24 Hours  T(C): 36.7 (12 Nov 2021 05:39), Max: 36.7 (12 Nov 2021 05:39)  T(F): 98 (12 Nov 2021 05:39), Max: 98 (12 Nov 2021 05:39)  HR: 66 (12 Nov 2021 05:39) (62 - 75)  BP: 102/52 (12 Nov 2021 05:39) (102/52 - 141/71)  BP(mean): 78 (11 Nov 2021 19:00) (77 - 98)  RR: 18 (12 Nov 2021 05:39) (13 - 28)  SpO2: 97% (11 Nov 2021 21:26) (91% - 98%)      11-11-21 @ 07:01  -  11-12-21 @ 07:00  --------------------------------------------------------  IN: 1160 mL / OUT: 950 mL / NET: 210 mL                                             --------------------------------------------------------------  LABS:                        12.2   11.09 )-----------( 194      ( 12 Nov 2021 07:30 )             38.8     11-12    140  |  93<L>  |  121<HH>  ----------------------------<  110<H>  3.2<L>   |  25  |  3.1<H>    Ca    9.8      12 Nov 2021 07:30  Mg     2.4     11-12    TPro  6.3  /  Alb  3.8  /  TBili  0.5  /  DBili  x   /  AST  11  /  ALT  <5  /  AlkPhos  90  11-12                                                              -------------------------------------------------------------  RADIOLOGY:  CXR 11/12/21  Support devices: Left-sided permanent pacemaker.    Cardiac/mediastinum/hilum: Cardiomegaly, unchanged.    Lung parenchyma/Pleura: Bibasilar opacities, unchanged. No pneumothorax is seen.    Skeleton/soft tissues: Stable.    Impression:    Cardiomegaly, unchanged.    Bibasilar opacities, unchanged.    Right-sided permanent pacemaker.                                          --------------------------------------------------------------    PHYSICAL EXAM:  GENERAL: AAOx3. Well-nourished, laying in bed appearing in no acute distress  HEENT: No FNDs, atraumatic, normocephalic, moist mucus membranes  LUNGS: Clear to auscultation bilaterally  HEART: S1/S2. No heaves or thrills  ABD: Soft, non-tender, non-distended. Bowel sounds present in all quadrants.  EXT/NEURO: 5/5 strength in all extremity joints. Sensation and ROM grossly intact.  SKIN: L groin hematoma, different stages of healing                                           --------------------------------------------------------------

## 2021-11-12 NOTE — PROGRESS NOTE ADULT - ASSESSMENT
TOBIAS    CKD stage 4   s/p PCI to LAD  hyperphosphatemia  dizziness / falls  chronic HFpEF / Afib  VT  JOHANNY on CPAP    HTN   NPH s/p  shunt with no shunt patency on shuntogram  DM 2    dm neuropathy    plan:    lower torsemide 20mg po qd  KCl po x 1  trend renal function and electrolytes  renal diet  cont phoslo with meals  flomax / monitor void / strict UOP monitoring  cardio f/u  full code

## 2021-11-12 NOTE — PROGRESS NOTE ADULT - ATTENDING COMMENTS
as above    seen at bedside with cardiac NP-hematoma examined in detail---stable    PT noted    dispo-home with safe ambulation

## 2021-11-12 NOTE — PROGRESS NOTE ADULT - SUBJECTIVE AND OBJECTIVE BOX
Cardiology Follow up    VINCENZO EDMONDSON   86y Male  PAST MEDICAL & SURGICAL HISTORY:  Afib    DM (diabetes mellitus)    CKD (chronic kidney disease)    Hydrocephalus    Hypertension    History of prostate surgery         HPI:  86 yr old male with hx of HTN, DM, AFib no AC 2/2 falls, HFpEF, JOHANNY presented to ED today 2/2 dizziness on/off since 11/1. States he was feeling dizzy yesterday and fell x2 and again felt dizzy today when he  went to urgent care where he was lowered to ground to prevent from falling. Son states pt saw his cardiologist Dr Huerta who changed up his meds last week and states this may be contributing to dad feeling dizzy. Son stated pts torsemide was increased and farxiga was added. Currently, pt states he feels fine and is asymptomatic. Pt denied loc, fevers, chills, cough or sputum production, nausea or vomiting, diarrhea or other associated symptoms. (03 Nov 2021 20:20)    Allergies    No Known Allergies    Intolerances    Patient seen and examined at bedside. No acute events overnight.  Patient without complaints. Pt ambulated without issues/symptoms  Denies CP, SOB, palpitations, or dizziness  No events on telemetry overnight    Vital Signs Last 24 Hrs  T(C): 36.7 (12 Nov 2021 05:39), Max: 36.7 (12 Nov 2021 05:39)  T(F): 98 (12 Nov 2021 05:39), Max: 98 (12 Nov 2021 05:39)  HR: 66 (12 Nov 2021 05:39) (62 - 75)  BP: 102/52 (12 Nov 2021 05:39) (102/52 - 141/71)  BP(mean): 78 (11 Nov 2021 19:00) (77 - 98)  RR: 18 (12 Nov 2021 05:39) (13 - 28)  SpO2: 97% (11 Nov 2021 21:26) (91% - 98%)    MEDICATIONS  (STANDING):  aMIOdarone    Tablet 200 milliGRAM(s) Oral two times a day  aspirin enteric coated 81 milliGRAM(s) Oral daily  atorvastatin 20 milliGRAM(s) Oral at bedtime  calcium acetate 667 milliGRAM(s) Oral three times a day with meals  chlorhexidine 4% Liquid 1 Application(s) Topical daily  clopidogrel Tablet 75 milliGRAM(s) Oral daily  dextrose 40% Gel 15 Gram(s) Oral once  dextrose 5%. 1000 milliLiter(s) (50 mL/Hr) IV Continuous <Continuous>  dextrose 5%. 1000 milliLiter(s) (100 mL/Hr) IV Continuous <Continuous>  dextrose 50% Injectable 25 Gram(s) IV Push once  dextrose 50% Injectable 12.5 Gram(s) IV Push once  dextrose 50% Injectable 25 Gram(s) IV Push once  finasteride 5 milliGRAM(s) Oral daily  gabapentin 100 milliGRAM(s) Oral daily  gabapentin 300 milliGRAM(s) Oral at bedtime  glucagon  Injectable 1 milliGRAM(s) IntraMuscular once  heparin   Injectable 5000 Unit(s) SubCutaneous every 8 hours  insulin glargine Injectable (LANTUS) 20 Unit(s) SubCutaneous at bedtime  insulin lispro (ADMELOG) corrective regimen sliding scale   SubCutaneous three times a day before meals  insulin lispro Injectable (ADMELOG) 7 Unit(s) SubCutaneous before breakfast  insulin lispro Injectable (ADMELOG) 7 Unit(s) SubCutaneous before lunch  insulin lispro Injectable (ADMELOG) 7 Unit(s) SubCutaneous before dinner  metoprolol tartrate 25 milliGRAM(s) Oral two times a day  potassium chloride   Powder 40 milliEquivalent(s) Oral once  tamsulosin 0.4 milliGRAM(s) Oral at bedtime  torsemide 40 milliGRAM(s) Oral daily    MEDICATIONS  (PRN):  acetaminophen     Tablet .. 650 milliGRAM(s) Oral every 6 hours PRN Moderate Pain (4 - 6)  guaifenesin/dextromethorphan Oral Liquid 10 milliLiter(s) Oral every 4 hours PRN cough and/or congestion  metoprolol tartrate Injectable 5 milliGRAM(s) IV Push once PRN sustained tachy >120      REVIEW OF SYSTEMS:          All negative except as mentioned in HPI    PHYSICAL EXAM:           CONSTITUTIONAL: Well-developed; well-nourished; in no acute distress  	SKIN: warm, dry  	HEAD: Normocephalic; atraumatic  	EYES: PERRL.  	ENT: No nasal discharge, airway clear, mucous membranes moist  	NECK: Supple; non tender.  	CARD: +S1, +S2, no murmurs, gallops, or rubs. Regular rate and rhythm    	RESP: No wheezes, rales or rhonchi. CTA B/L  	ABD: soft ntnd, + BS x 4 quadrants, Left upper chest D/C/I s/p AICD 11/9/21  	EXT: moves all extremities,  no clubbing, cyanosis or edema  	NEURO: Alert and oriented x3, no focal deficits          PSYCH: Cooperative, appropriate          VASCULAR:  + Rad / + PTs / +  DPs          EXTREMITY:             Left Groin: access site less swollen today, site is softer, large area of ecchymosis noted, no pain, + pulses, no sign of infection, no numbness            ECG:   < from: 12 Lead ECG (11.11.21 @ 05:34) >    Ventricular Rate 74 BPM    Atrial Rate 70 BPM    QRS Duration 126 ms    Q-T Interval 434 ms    QTC Calculation(Bazett) 481 ms    R Axis -9 degrees    T Axis 201 degrees    Diagnosis Line Atrial fibrillation  Non-specific intra-ventricular conduction block  T wave abnormality, consider anterolateral ischemia  Abnormal ECG    Confirmed by LEIDA COLON MD ([id number]) on 11/11/2021 8:00:41 AM                                                                                             2D ECHO:  < from: TTE Echo Complete w/o Contrast w/ Doppler (07.02.21 @ 14:44) >  Summary:   1. Normal global left ventricular systolic function.   2. LVEjection Fraction by Lemon's Method with a biplane EF of 54 %.   3. Mild left ventricular hypertrophy.   4. Mildly increased LV wall thickness.   5. Normal left ventricular internal cavity size.   6. Moderately enlarged left atrium.   7. Mild mitral annular calcification.   8. Mild thickening and calcification of the anterior and posterior mitral valve leaflets.   9. Mild to moderate mitral valve regurgitation.  10. Mild-moderate tricuspid regurgitation.  11. Mild aortic regurgitation.  12. Sclerotic aortic valve with normal opening.  13. Estimated pulmonary artery systolic pressure is 54.2 mmHg assuming a right atrial pressure of 5 mmHg, which is consistent with moderate pulmonary hypertension.  14. LA volume Index is 51.8 ml/m² ml/m2.  15. There is moderate aortic root calcification.    LABS:                        12.2   11.09 )-----------( 194      ( 12 Nov 2021 07:30 )             38.8     11-12    140  |  93<L>  |  121<HH>  ----------------------------<  110<H>  3.2<L>   |  25  |  3.1<H>    Ca    9.8      12 Nov 2021 07:30  Mg     2.4     11-12    TPro  6.3  /  Alb  3.8  /  TBili  0.5  /  DBili  x   /  AST  11  /  ALT  <5  /  AlkPhos  90  11-12    Magnesium, Serum: 2.4 mg/dL [1.8 - 2.4] (11-12-21 @ 07:30)  LIVER FUNCTIONS - ( 12 Nov 2021 07:30 )  Alb: 3.8 g/dL / Pro: 6.3 g/dL / ALK PHOS: 90 U/L / ALT: <5 U/L / AST: 11 U/L / GGT: x           A/P:  I discussed the case with Cardiologist Dr. Anderson and recommend the following:    S/P PCI:   Significant 1-Vessel Coronary Artery Disease s/p - successful PCI to prox LAD                      Ambulate patient with assistance and walker                   Keep K = 4, Mg = 2                   Monitor CBC and left groin access site, pulses, temperature                    No ACEi/ARB due to elevated Cr, monitor Cr                   Take Plavix 75 mg PO Daily and Aspirin 81 mg PO Daily FOR ONE MONTH, THEN STOP ASPIRIN AND CONTINUE PLAVIX  	     Continue DAPT ( Aspirin 81 mg daily and Plavix 75 mg daily ), B-Blocker, Amiodarone, Torsemide, Statin Therapy                   Patient given 30 day supply of ( Aspirin 81 mg daily and Plavix 75 mg daily ) to take at home                   Patient agreeing to take DAPT for at least one year or as directed by cardiologist                    Pt given instructions on importance of taking antiplatelet medication or risk acute stent thrombosis/death                   Post cath instructions, access site care and activity restrictions reviewed with patient                     Discussed with patient to return to hospital if experience chest pain, shortness breath, dizziness and site bleeding                   Aggressive risk factor modification, diet counseling, smoking cessation discussed with patient                    PT / Rehab                     Follow up with Cardiologist Dr. Anderson  Patient advised to call and make appointment in 2 weeks as OP                                      Cardiology Follow up    VINCENZO EDMONDSON   86y Male  PAST MEDICAL & SURGICAL HISTORY:  Afib    DM (diabetes mellitus)    CKD (chronic kidney disease)    Hydrocephalus    Hypertension    History of prostate surgery         HPI:  86 yr old male with hx of HTN, DM, AFib no AC 2/2 falls, HFpEF, JOHANNY presented to ED today 2/2 dizziness on/off since 11/1. States he was feeling dizzy yesterday and fell x2 and again felt dizzy today when he  went to urgent care where he was lowered to ground to prevent from falling. Son states pt saw his cardiologist Dr Huerta who changed up his meds last week and states this may be contributing to dad feeling dizzy. Son stated pts torsemide was increased and farxiga was added. Currently, pt states he feels fine and is asymptomatic. Pt denied loc, fevers, chills, cough or sputum production, nausea or vomiting, diarrhea or other associated symptoms. (03 Nov 2021 20:20)    Allergies    No Known Allergies    Intolerances    Patient seen and examined at bedside. No acute events overnight.  Patient without complaints. Pt ambulated OOB to chair with assistance   Denies CP, SOB, palpitations, or dizziness  NSVT 9/7/3 beats and PVC's events noted on telemetry overnight    Vital Signs Last 24 Hrs  T(C): 36.7 (12 Nov 2021 05:39), Max: 36.7 (12 Nov 2021 05:39)  T(F): 98 (12 Nov 2021 05:39), Max: 98 (12 Nov 2021 05:39)  HR: 66 (12 Nov 2021 05:39) (62 - 75)  BP: 102/52 (12 Nov 2021 05:39) (102/52 - 141/71)  BP(mean): 78 (11 Nov 2021 19:00) (77 - 98)  RR: 18 (12 Nov 2021 05:39) (13 - 28)  SpO2: 97% (11 Nov 2021 21:26) (91% - 98%)    MEDICATIONS  (STANDING):  aMIOdarone    Tablet 200 milliGRAM(s) Oral two times a day  aspirin enteric coated 81 milliGRAM(s) Oral daily  atorvastatin 20 milliGRAM(s) Oral at bedtime  calcium acetate 667 milliGRAM(s) Oral three times a day with meals  chlorhexidine 4% Liquid 1 Application(s) Topical daily  clopidogrel Tablet 75 milliGRAM(s) Oral daily  dextrose 40% Gel 15 Gram(s) Oral once  dextrose 5%. 1000 milliLiter(s) (50 mL/Hr) IV Continuous <Continuous>  dextrose 5%. 1000 milliLiter(s) (100 mL/Hr) IV Continuous <Continuous>  dextrose 50% Injectable 25 Gram(s) IV Push once  dextrose 50% Injectable 12.5 Gram(s) IV Push once  dextrose 50% Injectable 25 Gram(s) IV Push once  finasteride 5 milliGRAM(s) Oral daily  gabapentin 100 milliGRAM(s) Oral daily  gabapentin 300 milliGRAM(s) Oral at bedtime  glucagon  Injectable 1 milliGRAM(s) IntraMuscular once  heparin   Injectable 5000 Unit(s) SubCutaneous every 8 hours  insulin glargine Injectable (LANTUS) 20 Unit(s) SubCutaneous at bedtime  insulin lispro (ADMELOG) corrective regimen sliding scale   SubCutaneous three times a day before meals  insulin lispro Injectable (ADMELOG) 7 Unit(s) SubCutaneous before breakfast  insulin lispro Injectable (ADMELOG) 7 Unit(s) SubCutaneous before lunch  insulin lispro Injectable (ADMELOG) 7 Unit(s) SubCutaneous before dinner  metoprolol tartrate 25 milliGRAM(s) Oral two times a day  potassium chloride   Powder 40 milliEquivalent(s) Oral once  tamsulosin 0.4 milliGRAM(s) Oral at bedtime  torsemide 40 milliGRAM(s) Oral daily    MEDICATIONS  (PRN):  acetaminophen     Tablet .. 650 milliGRAM(s) Oral every 6 hours PRN Moderate Pain (4 - 6)  guaifenesin/dextromethorphan Oral Liquid 10 milliLiter(s) Oral every 4 hours PRN cough and/or congestion  metoprolol tartrate Injectable 5 milliGRAM(s) IV Push once PRN sustained tachy >120      REVIEW OF SYSTEMS:          All negative except as mentioned in HPI    PHYSICAL EXAM:           CONSTITUTIONAL: Well-developed; well-nourished; in no acute distress  	SKIN: warm, dry  	HEAD: Normocephalic; atraumatic  	EYES: PERRL.  	ENT: No nasal discharge, airway clear, mucous membranes moist  	NECK: Supple; non tender.  	CARD: +S1, +S2, no murmurs, gallops, or rubs. Regular rate and rhythm    	RESP: No wheezes, rales or rhonchi. CTA B/L  	ABD: soft ntnd, + BS x 4 quadrants, Left upper chest D/C/I s/p AICD 11/9/21  	EXT: moves all extremities,  no clubbing, cyanosis or edema  	NEURO: Alert and oriented x3, no focal deficits          PSYCH: Cooperative, appropriate          VASCULAR:  + Rad / + PTs / +  DPs          EXTREMITY:             Left Groin: access site less swollen, softer today, large area of ecchymosis noted, no pain, + pulses, no sign of infection, no numbness            ECG:   < from: 12 Lead ECG (11.11.21 @ 05:34) >    Ventricular Rate 74 BPM    Atrial Rate 70 BPM    QRS Duration 126 ms    Q-T Interval 434 ms    QTC Calculation(Bazett) 481 ms    R Axis -9 degrees    T Axis 201 degrees    Diagnosis Line Atrial fibrillation  Non-specific intra-ventricular conduction block  T wave abnormality, consider anterolateral ischemia  Abnormal ECG    Confirmed by LEIDA COLON MD ([id number]) on 11/11/2021 8:00:41 AM                                                                                             2D ECHO:  < from: TTE Echo Complete w/o Contrast w/ Doppler (07.02.21 @ 14:44) >  Summary:   1. Normal global left ventricular systolic function.   2. LVEjection Fraction by Lemon's Method with a biplane EF of 54 %.   3. Mild left ventricular hypertrophy.   4. Mildly increased LV wall thickness.   5. Normal left ventricular internal cavity size.   6. Moderately enlarged left atrium.   7. Mild mitral annular calcification.   8. Mild thickening and calcification of the anterior and posterior mitral valve leaflets.   9. Mild to moderate mitral valve regurgitation.  10. Mild-moderate tricuspid regurgitation.  11. Mild aortic regurgitation.  12. Sclerotic aortic valve with normal opening.  13. Estimated pulmonary artery systolic pressure is 54.2 mmHg assuming a right atrial pressure of 5 mmHg, which is consistent with moderate pulmonary hypertension.  14. LA volume Index is 51.8 ml/m² ml/m2.  15. There is moderate aortic root calcification.    LABS:                        12.2   11.09 )-----------( 194      ( 12 Nov 2021 07:30 )             38.8     11-12    140  |  93<L>  |  121<HH>  ----------------------------<  110<H>  3.2<L>   |  25  |  3.1<H>    Ca    9.8      12 Nov 2021 07:30  Mg     2.4     11-12    TPro  6.3  /  Alb  3.8  /  TBili  0.5  /  DBili  x   /  AST  11  /  ALT  <5  /  AlkPhos  90  11-12    Magnesium, Serum: 2.4 mg/dL [1.8 - 2.4] (11-12-21 @ 07:30)  LIVER FUNCTIONS - ( 12 Nov 2021 07:30 )  Alb: 3.8 g/dL / Pro: 6.3 g/dL / ALK PHOS: 90 U/L / ALT: <5 U/L / AST: 11 U/L / GGT: x           A/P:  I discussed the case with Cardiologist Dr. Anderson and recommend the following:    S/P PCI:   Significant 1-Vessel Coronary Artery Disease s/p - successful PCI to prox LAD                      Ambulate patient with assistance and walker                   Keep K = 4, Mg = 2                   No ACEi/ARB due to elevated Cr, monitor Cr                   Monitor CBC and left groin access site, pulses, temperature                    Take Plavix 75 mg PO Daily and Aspirin 81 mg PO Daily FOR ONE MONTH, THEN STOP ASPIRIN AND CONTINUE PLAVIX  	     Continue DAPT ( Aspirin 81 mg daily and Plavix 75 mg daily ), B-Blocker, Amiodarone, Torsemide, Statin Therapy                   Patient given 30 day supply of ( Aspirin 81 mg daily and Plavix 75 mg daily ) to take at home                   Patient agreeing to take DAPT for at least one year or as directed by cardiologist                    Pt given instructions on importance of taking antiplatelet medication or risk acute stent thrombosis/death                   Post cath instructions, access site care and activity restrictions reviewed with patient                     Discussed with patient to return to hospital if experience chest pain, shortness breath, dizziness and site bleeding                   Aggressive risk factor modification, diet counseling, smoking cessation discussed with patient                    PT / Rehab                     Follow up with Cardiologist Dr. Anderson in 2 weeks as OP. Patient advised to call and make appointment.

## 2021-11-12 NOTE — PHYSICAL THERAPY INITIAL EVALUATION ADULT - TRANSFER SAFETY CONCERNS NOTED: SIT/STAND, REHAB EVAL
decreased balance during turns/decreased sequencing ability/decreased step length/stepping too close to front of assistive device

## 2021-11-12 NOTE — PROGRESS NOTE ADULT - ASSESSMENT
EP: Dr. Bhatt  Cards: Dr. García / Dr. Huerta    86y Male with h/o HTN, DM, chronic AF not on AC due to falls, hydrocephalus, s/p shunt, COPD, JOHANNY, on bipap, pulmonary HTN, HFpEF, freq PVC, NSVT during prior admissions. Admitted with pre-syncopal episode and dizziness, likely dur to overdiuresis. Now with frequent PVC, bigeminy and sustained VT on tele. 11/8 s/p cardiac cath with pLAD 90% stenosis, intraop developed sustained monomorphic VT requiring shock x 2, lido gtt started. POD #3 ICD.     Impression:  Sustained VT, NSVT, frequent PVC   S/p DC ICD on 11/9 ([employer])  1vCAD, pLAD 90% ; S/p LAD PCI   TOBIAS on CKD  Hx chronic AF  Hx chronic HFpEF; HTN  Hx DM  Hx hydrocephalus s/p  shunt  Hx COPD, JOHANNY (BiPAP)    Plan:  - Cont amio 200 mg Po Q 12 until seen in our office  - Increase lopressor as BP tolerates  - Please fu as outpatient on Tuesday for wound check  - If patient is still here Monday, please call EPS to assess wound, [phone]

## 2021-11-12 NOTE — PHYSICAL THERAPY INITIAL EVALUATION ADULT - PERTINENT HX OF CURRENT PROBLEM, REHAB EVAL
86 yr old male with hx of HTN, DM, AFib no AC 2/2 falls, HFpEF, JOHANNY presented to ED today 2/2 dizziness on/off since 11/1. States he was feeling dizzy yesterday and fell x2 and again felt dizzy today when he  went to urgent care where he was lowered to ground to prevent from falling.

## 2021-11-12 NOTE — PROGRESS NOTE ADULT - ASSESSMENT
ASSESSMENT & PLAN  #Syncope likely due to overdiuresis  -hx of cushing syndrome,  shunt evaluated by nNSG and working correctly  -elevated BNP on admission, recent increase in torsemide with addition of farxiga  -HF: euvolemic, torsemide (increased to 40mg PO during CCU management)  -multiple runs of v. tach since hospital admission, DCCV 11/8 during PCI, still continues  -s/p AICD 11/9/21 per EP  -s/p cath 11/10 with LAD stent  -Card: c/w ASA/plasix (d/c ASA after 1mo), BB, amiodarone, torsemide    #TOBIAS on CKD 4  -Cr 3.2 on admission (baseline 2 from 7/2021), GFR 17 (from 31 in 7/2021)  -likely due to dehydrations in context of diuresis, poor PO intake  -Cr stable, but elevated at ~3  -c/w torsemide 40mg    #Chronic hypokalemia  -repleting as necessary                                                      # DVT prophylaxis   -on hold for hematoma    # GI prophylaxis   -none    # Diet   -renal restriction    # Activity Score (AM-PAC)  -IAT    # Code status   -full code

## 2021-11-12 NOTE — PROGRESS NOTE ADULT - SUBJECTIVE AND OBJECTIVE BOX
NEPHROLOGY FOLLOW UP NOTE    transferred to tele  [facility] historian  s/p PCI  s/p AICD  no dyspnea / no cp  bp's fair  + void      PAST MEDICAL & SURGICAL HISTORY:  Hypertension  Hydrocephalus  CKD (chronic kidney disease)  DM (diabetes mellitus)  Afib  History of prostate surgery    Allergies:  No Known Allergies    Home Medications Reviewed    SOCIAL HISTORY:  Denies ETOH,Smoking,   FAMILY HISTORY:  Family history of diabetes mellitus (Mother, Sibling)      REVIEW OF SYSTEMS  All other review of systems is negative unless indicated above.      PHYSICAL EXAM:  NAD  [place]  moist mm  no ln  + aicd  decreased BS b/l  distant HS, irregular   soft + BS  1+ edema      advance care planning and directives reviewed     Hospital Medications:   MEDICATIONS  (STANDING):  aMIOdarone    Tablet 200 milliGRAM(s) Oral two times a day  aspirin enteric coated 81 milliGRAM(s) Oral daily  atorvastatin 20 milliGRAM(s) Oral at bedtime  calcium acetate 667 milliGRAM(s) Oral three times a day with meals  chlorhexidine 4% Liquid 1 Application(s) Topical daily  clopidogrel Tablet 75 milliGRAM(s) Oral daily  dextrose 40% Gel 15 Gram(s) Oral once  dextrose 5%. 1000 milliLiter(s) (50 mL/Hr) IV Continuous <Continuous>  dextrose 5%. 1000 milliLiter(s) (100 mL/Hr) IV Continuous <Continuous>  dextrose 50% Injectable 25 Gram(s) IV Push once  dextrose 50% Injectable 12.5 Gram(s) IV Push once  dextrose 50% Injectable 25 Gram(s) IV Push once  finasteride 5 milliGRAM(s) Oral daily  gabapentin 100 milliGRAM(s) Oral daily  gabapentin 300 milliGRAM(s) Oral at bedtime  glucagon  Injectable 1 milliGRAM(s) IntraMuscular once  heparin   Injectable 5000 Unit(s) SubCutaneous every 8 hours  insulin glargine Injectable (LANTUS) 20 Unit(s) SubCutaneous at bedtime  insulin lispro (ADMELOG) corrective regimen sliding scale   SubCutaneous three times a day before meals  insulin lispro Injectable (ADMELOG) 7 Unit(s) SubCutaneous before breakfast  insulin lispro Injectable (ADMELOG) 7 Unit(s) SubCutaneous before lunch  insulin lispro Injectable (ADMELOG) 7 Unit(s) SubCutaneous before dinner  metoprolol tartrate 25 milliGRAM(s) Oral two times a day  tamsulosin 0.4 milliGRAM(s) Oral at bedtime  torsemide 40 milliGRAM(s) Oral daily        VITALS:  T(F): 97.6 (11-12-21 @ 13:30), Max: 98 (11-12-21 @ 05:39)  HR: 88 (11-12-21 @ 13:30)  BP: 126/67 (11-12-21 @ 13:30)  RR: 19 (11-12-21 @ 13:30)  SpO2: 97% (11-11-21 @ 21:26)  Wt(kg): --    11-10 @ 07:01  -  11-11 @ 07:00  --------------------------------------------------------  IN: 690 mL / OUT: 0 mL / NET: 690 mL    11-11 @ 07:01  -  11-12 @ 07:00  --------------------------------------------------------  IN: 1160 mL / OUT: 950 mL / NET: 210 mL    11-12 @ 07:01  -  11-12 @ 15:05  --------------------------------------------------------  IN: 820 mL / OUT: 100 mL / NET: 720 mL          LABS:  11-12    140  |  93<L>  |  121<HH>  ----------------------------<  110<H>  3.2<L>   |  25  |  3.1<H>    Ca    9.8      12 Nov 2021 07:30  Mg     2.4     11-12    TPro  6.3  /  Alb  3.8  /  TBili  0.5  /  DBili      /  AST  11  /  ALT  <5  /  AlkPhos  90  11-12                          12.2   11.09 )-----------( 194      ( 12 Nov 2021 07:30 )             38.8       Urine Studies:        RADIOLOGY & ADDITIONAL STUDIES:

## 2021-11-12 NOTE — PROGRESS NOTE ADULT - ATTENDING COMMENTS
Continues to have PVCs/NSVT    - Continue Amiodarone 200 mg PO q12h  - Continue Lopressor. Increase to 50 mg PO q12. Aware of SBP in 100-110s.  - OP f-up this Tuesday.

## 2021-11-12 NOTE — PROGRESS NOTE ADULT - SUBJECTIVE AND OBJECTIVE BOX
INTERVAL HPI/OVERNIGHT EVENTS:  POD #3 ICd. No acute events overnight. Currently V paced, in AF with PVCs  Patient denies fever, chills, dizziness, syncope, chest pain, palpitations, SOB.    MEDICATIONS  (STANDING):  aMIOdarone    Tablet 200 milliGRAM(s) Oral two times a day  aspirin enteric coated 81 milliGRAM(s) Oral daily  atorvastatin 20 milliGRAM(s) Oral at bedtime  calcium acetate 667 milliGRAM(s) Oral three times a day with meals  chlorhexidine 4% Liquid 1 Application(s) Topical daily  clopidogrel Tablet 75 milliGRAM(s) Oral daily  finasteride 5 milliGRAM(s) Oral daily  gabapentin 100 milliGRAM(s) Oral daily  gabapentin 300 milliGRAM(s) Oral at bedtime  glucagon  Injectable 1 milliGRAM(s) IntraMuscular once  heparin   Injectable 5000 Unit(s) SubCutaneous every 8 hours  insulin glargine Injectable (LANTUS) 20 Unit(s) SubCutaneous at bedtime  insulin lispro (ADMELOG) corrective regimen sliding scale   SubCutaneous three times a day before meals  insulin lispro Injectable (ADMELOG) 7 Unit(s) SubCutaneous before breakfast  insulin lispro Injectable (ADMELOG) 7 Unit(s) SubCutaneous before lunch  insulin lispro Injectable (ADMELOG) 7 Unit(s) SubCutaneous before dinner  metoprolol tartrate 25 milliGRAM(s) Oral two times a day  tamsulosin 0.4 milliGRAM(s) Oral at bedtime  torsemide 20 milliGRAM(s) Oral daily    MEDICATIONS  (PRN):  acetaminophen     Tablet .. 650 milliGRAM(s) Oral every 6 hours PRN Moderate Pain (4 - 6)  guaifenesin/dextromethorphan Oral Liquid 10 milliLiter(s) Oral every 4 hours PRN cough and/or congestion  metoprolol tartrate Injectable 5 milliGRAM(s) IV Push once PRN sustained tachy >120      Allergies    No Known Allergies    Intolerances        REVIEW OF SYSTEMS    [x] A ten-point review of systems was otherwise negative except as noted.  [ ] Due to altered mental status/intubation, subjective information were not able to be obtained from the patient. History was obtained, to the extent possible, from review of the chart and collateral sources of information.      Vital Signs Last 24 Hrs  T(C): 36.4 (12 Nov 2021 13:30), Max: 36.7 (12 Nov 2021 05:39)  T(F): 97.6 (12 Nov 2021 13:30), Max: 98 (12 Nov 2021 05:39)  HR: 88 (12 Nov 2021 13:30) (62 - 88)  BP: 126/67 (12 Nov 2021 13:30) (102/52 - 141/71)  BP(mean): 78 (11 Nov 2021 19:00) (78 - 98)  RR: 19 (12 Nov 2021 13:30) (13 - 28)  SpO2: 97% (11 Nov 2021 21:26) (91% - 98%)    11-11-21 @ 07:01  -  11-12-21 @ 07:00  --------------------------------------------------------  IN: 1160 mL / OUT: 950 mL / NET: 210 mL    11-12-21 @ 07:01  -  11-12-21 @ 15:15  --------------------------------------------------------  IN: 820 mL / OUT: 100 mL / NET: 720 mL        Physical Exam  GENERAL: Elderly male,  In no apparent distress, well nourished, and hydrated.  HEART: Irregular rate and rhythm; No murmurs, rubs, or gallops.  PULMONARY: Clear to auscultation and perfusion.  No rales, wheezing, or rhonchi bilaterally.  ABDOMEN: Soft, Nontender, Nondistended; Bowel sounds present  EXTREMITIES:  2+ Peripheral Pulses, No clubbing, cyanosis, or edema  NEUROLOGICAL: AO x3    LABS:                        12.2   11.09 )-----------( 194      ( 12 Nov 2021 07:30 )             38.8     11-12    140  |  93<L>  |  121<HH>  ----------------------------<  110<H>  3.2<L>   |  25  |  3.1<H>    Ca    9.8      12 Nov 2021 07:30  Mg     2.4     11-12    TPro  6.3  /  Alb  3.8  /  TBili  0.5  /  DBili  x   /  AST  11  /  ALT  <5  /  AlkPhos  90  11-12 11-11-21 @ 07:01  -  11-12-21 @ 07:00  --------------------------------------------------------  IN: 1160 mL / OUT: 950 mL / NET: 210 mL    11-12-21 @ 07:01  -  11-12-21 @ 15:15  --------------------------------------------------------  IN: 820 mL / OUT: 100 mL / NET: 720 mL        11-11-21 @ 07:01  -  11-12-21 @ 07:00  --------------------------------------------------------  IN: 1160 mL / OUT: 950 mL / NET: 210 mL    11-12-21 @ 07:01  -  11-12-21 @ 15:15  --------------------------------------------------------  IN: 820 mL / OUT: 100 mL / NET: 720 mL        RADIOLOGY:  < from: TTE Echo Complete w/o Contrast w/ Doppler (07.02.21 @ 14:44) >  Summary:   1. Normal global left ventricular systolic function.   2. LVEjection Fraction by Lemon's Method with a biplane EF of 54 %.   3. Mild left ventricular hypertrophy.   4. Mildly increased LV wall thickness.   5. Normal left ventricular internal cavity size.   6. Moderately enlarged left atrium.   7. Mild mitral annular calcification.   8. Mild thickening and calcification of the anterior and posterior mitral valve leaflets.   9. Mild to moderate mitral valve regurgitation.  10. Mild-moderate tricuspid regurgitation.  11. Mild aortic regurgitation.  12. Sclerotic aortic valve with normal opening.  13. Estimated pulmonary artery systolic pressure is 54.2 mmHg assuming a right atrial pressure of 5 mmHg, which is consistent with moderate pulmonary hypertension.  14. LA volume Index is 51.8 ml/m² ml/m2.  15. There is moderate aortic root calcification.    PHYSICIAN INTERPRETATION:  Left Ventricle: The left ventricular internal cavity size is normal. Left ventricular wall thickness is mildly increased. There is mild left ventricular hypertrophy. Global LV systolic function was normal.  Right Ventricle: Normal right ventricular size and function.  Left Atrium: Moderately enlarged left atrium. LA volume Index is 51.8 ml/m² ml/m2.  Pericardium: There is no evidence of pericardial effusion.  Mitral Valve: Mild thickening and calcification of the anterior and posterior mitral valve leaflets. Mitral leaflet mobility is normal. There is mild mitral annular calcification. Mild to moderate mitral valve regurgitation is seen.  Tricuspid Valve: Structurallynormal tricuspid valve, with normal leaflet excursion. Mild-moderate tricuspid regurgitation is visualized. Estimated pulmonary artery systolic pressure is 54.2 mmHg assuming a right atrial pressure of 5 mmHg, which is consistent with moderate pulmonary hypertension.  Aortic Valve: The aortic valve is trileaflet. Sclerotic aortic valve with normal opening. Mild aortic valve regurgitation is seen.  Pulmonic Valve: Structurally normal pulmonic valve, with normal leaflet excursion. Trace pulmonic valve regurgitation.  Aorta: Aortic root measured at Sinus of Valsalva is normal. There is moderate aortic root calcification.  Pulmonary Artery: The main pulmonary artery is normal in size.  Venous: The inferior vena cava was normal sized, with respiratory size variation greater than 50%.    < end of copied text >

## 2021-11-13 NOTE — PROGRESS NOTE ADULT - SUBJECTIVE AND OBJECTIVE BOX
VINCENZO EDMONDSON 86y Male  MRN#: [id number]   CODE STATUS:________    Hospital Day: 10d    Pt is currently admitted with the primary diagnosis of near syncope     SUBJECTIVE  Overnight events   -multiple episodes of Vtach overnight, aware, AICD in place, will increase BB  Subjective complaints   -Pt feels well, hematoma expanded overnight, tense, pt has no associated symptoms, no pain in the area                                            ----------------------------------------------------------  OBJECTIVE  PAST MEDICAL & SURGICAL HISTORY  Afib    DM (diabetes mellitus)    CKD (chronic kidney disease)    Hydrocephalus    Hypertension    History of prostate surgery                                              -----------------------------------------------------------  ALLERGIES:  No Known Allergies                                            ------------------------------------------------------------    HOME MEDICATIONS  Home Medications:  acetaminophen 325 mg oral tablet: 2 tab(s) orally every 6 hours, As needed, Moderate Pain (4 - 6) (12 Jul 2021 11:54)  Basaglar KwikPen: 20 unit(s) subcutaneous once a day (at bedtime) (03 Nov 2021 22:10)  calcitriol 0.25 mcg oral capsule: 1 cap(s) orally once a day (30 Jun 2021 15:07)  dutasteride 0.5 mg oral capsule: 1 cap(s) orally once a day (30 Jun 2021 15:07)  Farxiga 10 mg oral tablet: 1 tab(s) orally once a day (03 Nov 2021 21:56)  gabapentin 100 mg oral capsule: 1 cap(s) orally once a day in AM (03 Nov 2021 21:49)  gabapentin 300 mg oral capsule: 1 cap(s) orally once a day (at bedtime) (03 Nov 2021 21:49)  metOLazone 5 mg oral tablet: orally 3 times a week M-W-F (03 Nov 2021 22:06)  Metoprolol Succinate ER 25 mg oral tablet, extended release: 1 tab(s) orally once a day (03 Nov 2021 21:52)  Ozempic (1 mg dose) 4 mg/3 mL subcutaneous solution: 1 milligram(s) subcutaneous once a week on Thursday (03 Nov 2021 21:50)  spironolactone 25 mg oral tablet: 1 tab(s) orally once a day (03 Nov 2021 22:00)                           MEDICATIONS:  STANDING MEDICATIONS  aMIOdarone    Tablet 200 milliGRAM(s) Oral two times a day  aspirin enteric coated 81 milliGRAM(s) Oral daily  atorvastatin 20 milliGRAM(s) Oral at bedtime  calcium acetate 667 milliGRAM(s) Oral three times a day with meals  chlorhexidine 4% Liquid 1 Application(s) Topical daily  clopidogrel Tablet 75 milliGRAM(s) Oral daily  dextrose 40% Gel 15 Gram(s) Oral once  dextrose 5%. 1000 milliLiter(s) IV Continuous <Continuous>  dextrose 5%. 1000 milliLiter(s) IV Continuous <Continuous>  dextrose 50% Injectable 25 Gram(s) IV Push once  dextrose 50% Injectable 12.5 Gram(s) IV Push once  dextrose 50% Injectable 25 Gram(s) IV Push once  finasteride 5 milliGRAM(s) Oral daily  gabapentin 300 milliGRAM(s) Oral at bedtime  gabapentin 100 milliGRAM(s) Oral daily  glucagon  Injectable 1 milliGRAM(s) IntraMuscular once  insulin glargine Injectable (LANTUS) 20 Unit(s) SubCutaneous at bedtime  insulin lispro (ADMELOG) corrective regimen sliding scale   SubCutaneous three times a day before meals  insulin lispro Injectable (ADMELOG) 7 Unit(s) SubCutaneous before breakfast  insulin lispro Injectable (ADMELOG) 7 Unit(s) SubCutaneous before lunch  insulin lispro Injectable (ADMELOG) 7 Unit(s) SubCutaneous before dinner  metoprolol tartrate 50 milliGRAM(s) Oral two times a day  potassium chloride   Powder 40 milliEquivalent(s) Oral once  tamsulosin 0.4 milliGRAM(s) Oral at bedtime  torsemide 20 milliGRAM(s) Oral daily    PRN MEDICATIONS  acetaminophen     Tablet .. 650 milliGRAM(s) Oral every 6 hours PRN  guaifenesin/dextromethorphan Oral Liquid 10 milliLiter(s) Oral every 4 hours PRN  metoprolol tartrate Injectable 5 milliGRAM(s) IV Push once PRN                                            ------------------------------------------------------------  VITAL SIGNS: Last 24 Hours  T(C): 36.2 (13 Nov 2021 06:15), Max: 36.4 (12 Nov 2021 13:30)  T(F): 97.1 (13 Nov 2021 06:15), Max: 97.6 (12 Nov 2021 13:30)  HR: 61 (13 Nov 2021 06:15) (60 - 88)  BP: 98/57 (13 Nov 2021 06:15) (98/57 - 126/67)  BP(mean): --  RR: 18 (13 Nov 2021 06:15) (18 - 19)  SpO2: --      11-12-21 @ 07:01  -  11-13-21 @ 07:00  --------------------------------------------------------  IN: 820 mL / OUT: 100 mL / NET: 720 mL    11-13-21 @ 07:01 - 11-13-21 @ 10:10  --------------------------------------------------------  IN: 330 mL / OUT: 150 mL / NET: 180 mL                                             --------------------------------------------------------------  LABS:                        12.0   10.69 )-----------( 192      ( 13 Nov 2021 06:00 )             38.9     11-13    141  |  97<L>  |  122<HH>  ----------------------------<  126<H>  3.6   |  24  |  3.3<H>    Ca    9.6      13 Nov 2021 06:00  Mg     2.4     11-13    TPro  6.3  /  Alb  3.8  /  TBili  0.6  /  DBili  x   /  AST  9   /  ALT  <5  /  AlkPhos  88  11-13                                              --------------------------------------------------------------    PHYSICAL EXAM:  GENERAL: AAOx3. Well-nourished, laying in bed appearing in no acute distress  HEENT: No FNDs, atraumatic, normocephalic, moist mucus membranes  LUNGS: Clear to auscultation bilaterally  HEART: S1/S2. No heaves or thrills  ABD: Soft, non-tender, non-distended. Bowel sounds present in all quadrants.  EXT/NEURO: 5/5 strength in all extremity joints. Sensation and ROM grossly intact.  SKIN: L groin hematoma expanded past marked borders by 1inch around, tense, non tender, different stages of healing                                             --------------------------------------------------------------    ASSESSMENT & PLAN  #Syncope likely due to overdiuresis  -hx of cushing syndrome,  shunt evaluated by [facility] and working correctly  -elevated BNP on admission, recent increase in torsemide with addition of farxiga  -HF: euvolemic, torsemide (increased to 40mg PO during CCU management)  -multiple runs of v. tach since hospital admission, DCCV 11/8 during PCI, still continues  -s/p AICD 11/9/21 per EP  -s/p cath 11/10 with LAD stent  -Card: c/w ASA/plasix (d/c ASA after 1mo), BB, amiodarone, torsemide  -continued Vtach, increasing metoprolol tart to 50mg BID    #Femoral access hematoma  -pt continues to have hematoma with blood degradation over L groin  -held DVT ppx    #TOBIAS on CKD 4  -Cr 3.2 on admission (baseline 2 from 7/2021), GFR 17 (from 31 in 7/2021)  -likely due to dehydrations in context of diuresis, poor PO intake  -Cr stable, but elevated at ~3  -c/w torsemide 40mg    #Chronic hypokalemia  -repleting as necessary                                                      # DVT prophylaxis   -on hold for hematoma    # GI prophylaxis   -none    # Diet   -renal restriction    # Activity Score (AM-PAC)  -IAT    # Code status   -full code   VINCENZO EDMONDSON 86y Male  MRN#: [id number]   CODE STATUS:________    Hospital Day: 10d    Pt is currently admitted with the primary diagnosis of near syncope     SUBJECTIVE  Overnight events   -multiple episodes of Vtach overnight, aware, AICD in place, will increase BB  Subjective complaints   -Pt feels well, hematoma expanded overnight, tense, pt has no associated symptoms, no pain in the area                                            ----------------------------------------------------------  OBJECTIVE  PAST MEDICAL & SURGICAL HISTORY  Afib    DM (diabetes mellitus)    CKD (chronic kidney disease)    Hydrocephalus    Hypertension    History of prostate surgery                                              -----------------------------------------------------------  ALLERGIES:  No Known Allergies                                            ------------------------------------------------------------    HOME MEDICATIONS  Home Medications:  acetaminophen 325 mg oral tablet: 2 tab(s) orally every 6 hours, As needed, Moderate Pain (4 - 6) (12 Jul 2021 11:54)  Basaglar KwikPen: 20 unit(s) subcutaneous once a day (at bedtime) (03 Nov 2021 22:10)  calcitriol 0.25 mcg oral capsule: 1 cap(s) orally once a day (30 Jun 2021 15:07)  dutasteride 0.5 mg oral capsule: 1 cap(s) orally once a day (30 Jun 2021 15:07)  Farxiga 10 mg oral tablet: 1 tab(s) orally once a day (03 Nov 2021 21:56)  gabapentin 100 mg oral capsule: 1 cap(s) orally once a day in AM (03 Nov 2021 21:49)  gabapentin 300 mg oral capsule: 1 cap(s) orally once a day (at bedtime) (03 Nov 2021 21:49)  metOLazone 5 mg oral tablet: orally 3 times a week M-W-F (03 Nov 2021 22:06)  Metoprolol Succinate ER 25 mg oral tablet, extended release: 1 tab(s) orally once a day (03 Nov 2021 21:52)  Ozempic (1 mg dose) 4 mg/3 mL subcutaneous solution: 1 milligram(s) subcutaneous once a week on Thursday (03 Nov 2021 21:50)  spironolactone 25 mg oral tablet: 1 tab(s) orally once a day (03 Nov 2021 22:00)                           MEDICATIONS:  STANDING MEDICATIONS  aMIOdarone    Tablet 200 milliGRAM(s) Oral two times a day  aspirin enteric coated 81 milliGRAM(s) Oral daily  atorvastatin 20 milliGRAM(s) Oral at bedtime  calcium acetate 667 milliGRAM(s) Oral three times a day with meals  chlorhexidine 4% Liquid 1 Application(s) Topical daily  clopidogrel Tablet 75 milliGRAM(s) Oral daily  dextrose 40% Gel 15 Gram(s) Oral once  dextrose 5%. 1000 milliLiter(s) IV Continuous <Continuous>  dextrose 5%. 1000 milliLiter(s) IV Continuous <Continuous>  dextrose 50% Injectable 25 Gram(s) IV Push once  dextrose 50% Injectable 12.5 Gram(s) IV Push once  dextrose 50% Injectable 25 Gram(s) IV Push once  finasteride 5 milliGRAM(s) Oral daily  gabapentin 300 milliGRAM(s) Oral at bedtime  gabapentin 100 milliGRAM(s) Oral daily  glucagon  Injectable 1 milliGRAM(s) IntraMuscular once  insulin glargine Injectable (LANTUS) 20 Unit(s) SubCutaneous at bedtime  insulin lispro (ADMELOG) corrective regimen sliding scale   SubCutaneous three times a day before meals  insulin lispro Injectable (ADMELOG) 7 Unit(s) SubCutaneous before breakfast  insulin lispro Injectable (ADMELOG) 7 Unit(s) SubCutaneous before lunch  insulin lispro Injectable (ADMELOG) 7 Unit(s) SubCutaneous before dinner  metoprolol tartrate 50 milliGRAM(s) Oral two times a day  potassium chloride   Powder 40 milliEquivalent(s) Oral once  tamsulosin 0.4 milliGRAM(s) Oral at bedtime  torsemide 20 milliGRAM(s) Oral daily    PRN MEDICATIONS  acetaminophen     Tablet .. 650 milliGRAM(s) Oral every 6 hours PRN  guaifenesin/dextromethorphan Oral Liquid 10 milliLiter(s) Oral every 4 hours PRN  metoprolol tartrate Injectable 5 milliGRAM(s) IV Push once PRN                                            ------------------------------------------------------------  VITAL SIGNS: Last 24 Hours  T(C): 36.2 (13 Nov 2021 06:15), Max: 36.4 (12 Nov 2021 13:30)  T(F): 97.1 (13 Nov 2021 06:15), Max: 97.6 (12 Nov 2021 13:30)  HR: 61 (13 Nov 2021 06:15) (60 - 88)  BP: 98/57 (13 Nov 2021 06:15) (98/57 - 126/67)  BP(mean): --  RR: 18 (13 Nov 2021 06:15) (18 - 19)  SpO2: --      11-12-21 @ 07:01  -  11-13-21 @ 07:00  --------------------------------------------------------  IN: 820 mL / OUT: 100 mL / NET: 720 mL    11-13-21 @ 07:01 - 11-13-21 @ 10:10  --------------------------------------------------------  IN: 330 mL / OUT: 150 mL / NET: 180 mL                                             --------------------------------------------------------------  LABS:                        12.0   10.69 )-----------( 192      ( 13 Nov 2021 06:00 )             38.9     11-13    141  |  97<L>  |  122<HH>  ----------------------------<  126<H>  3.6   |  24  |  3.3<H>    Ca    9.6      13 Nov 2021 06:00  Mg     2.4     11-13    TPro  6.3  /  Alb  3.8  /  TBili  0.6  /  DBili  x   /  AST  9   /  ALT  <5  /  AlkPhos  88  11-13                                              --------------------------------------------------------------    PHYSICAL EXAM:  GENERAL: AAOx3. Well-nourished, laying in bed appearing in no acute distress  HEENT: No FNDs, atraumatic, normocephalic, moist mucus membranes  LUNGS: Clear to auscultation bilaterally  HEART: S1/S2. No heaves or thrills  ABD: Soft, non-tender, non-distended. Bowel sounds present in all quadrants.  EXT/NEURO: 5/5 strength in all extremity joints. Sensation and ROM grossly intact.  SKIN: L groin hematoma expanded past marked borders by 1inch around, tense, non tender, different stages of healing                                             --------------------------------------------------------------

## 2021-11-13 NOTE — PROGRESS NOTE ADULT - ASSESSMENT
TOBIAS    CKD stage 4   s/p PCI to LAD  hyperphosphatemia  dizziness / falls  chronic HFpEF / Afib  VT  JOHANNY on CPAP    HTN   NPH s/p  shunt with no shunt patency on shuntogram  DM 2    dm neuropathy    plan:      on lower dose  torsemide 20mg po qd  trend renal function and electrolytes  renal diet  cont phoslo with meals  flomax / monitor void / strict UOP monitoring  cardio f/u    d/p family and HS

## 2021-11-13 NOTE — PROGRESS NOTE ADULT - SUBJECTIVE AND OBJECTIVE BOX
[facility] FOLLOW UP NOTE  --------------------------------------------------------------------------------  Chief Complaint/24 hour events/subjective:    pt seen and examined, feel beter    PAST HISTORY  --------------------------------------------------------------------------------  No significant changes to PMH, PSH, FHx, SHx, unless otherwise noted    ALLERGIES & MEDICATIONS  --------------------------------------------------------------------------------  Allergies    No Known Allergies    Intolerances      Standing Inpatient Medications  aMIOdarone    Tablet 200 milliGRAM(s) Oral two times a day  aspirin enteric coated 81 milliGRAM(s) Oral daily  atorvastatin 20 milliGRAM(s) Oral at bedtime  calcium acetate 667 milliGRAM(s) Oral three times a day with meals  chlorhexidine 4% Liquid 1 Application(s) Topical daily  clopidogrel Tablet 75 milliGRAM(s) Oral daily  dextrose 40% Gel 15 Gram(s) Oral once  dextrose 5%. 1000 milliLiter(s) IV Continuous <Continuous>  dextrose 5%. 1000 milliLiter(s) IV Continuous <Continuous>  dextrose 50% Injectable 25 Gram(s) IV Push once  dextrose 50% Injectable 12.5 Gram(s) IV Push once  dextrose 50% Injectable 25 Gram(s) IV Push once  finasteride 5 milliGRAM(s) Oral daily  gabapentin 100 milliGRAM(s) Oral daily  gabapentin 300 milliGRAM(s) Oral at bedtime  glucagon  Injectable 1 milliGRAM(s) IntraMuscular once  insulin glargine Injectable (LANTUS) 20 Unit(s) SubCutaneous at bedtime  insulin lispro (ADMELOG) corrective regimen sliding scale   SubCutaneous three times a day before meals  insulin lispro Injectable (ADMELOG) 7 Unit(s) SubCutaneous before breakfast  insulin lispro Injectable (ADMELOG) 7 Unit(s) SubCutaneous before lunch  insulin lispro Injectable (ADMELOG) 7 Unit(s) SubCutaneous before dinner  metoprolol tartrate 50 milliGRAM(s) Oral two times a day  tamsulosin 0.4 milliGRAM(s) Oral at bedtime  torsemide 20 milliGRAM(s) Oral daily    PRN Inpatient Medications  acetaminophen     Tablet .. 650 milliGRAM(s) Oral every 6 hours PRN  guaifenesin/dextromethorphan Oral Liquid 10 milliLiter(s) Oral every 4 hours PRN  metoprolol tartrate Injectable 5 milliGRAM(s) IV Push once PRN      REVIEW OF SYSTEMS  --------------------------------------------------------------------------------    All other systems were reviewed and are negative, except as noted.    VITALS/PHYSICAL EXAM  --------------------------------------------------------------------------------  T(C): 35.9 (11-13-21 @ 13:13), Max: 36.3 (11-12-21 @ 20:26)  HR: 62 (11-13-21 @ 13:13) (60 - 71)  BP: 116/61 (11-13-21 @ 13:13) (98/57 - 118/65)  RR: 20 (11-13-21 @ 13:13) (18 - 20)  SpO2: --  Wt(kg): --        11-12-21 @ 07:01  -  11-13-21 @ 07:00  --------------------------------------------------------  IN: 820 mL / OUT: 100 mL / NET: 720 mL    11-13-21 @ 07:01  -  11-13-21 @ 13:47  --------------------------------------------------------  IN: 330 mL / OUT: 150 mL / NET: 180 mL      Physical Exam:    	Gen: no distress   	Pulm: CTA B/L  	CV: S1S2; no rub  	Abd: +BS, soft, nontender/nondistended  	LE:   edema      LABS/STUDIES  --------------------------------------------------------------------------------              12.0   10.69 >-----------<  192      [11-13-21 @ 06:00]              38.9     141  |  97  |  122  ----------------------------<  126      [11-13-21 @ 06:00]  3.6   |  24  |  3.3        Ca     9.6     [11-13-21 @ 06:00]      Mg     2.4     [11-13-21 @ 06:00]    TPro  6.3  /  Alb  3.8  /  TBili  0.6  /  DBili  x   /  AST  9   /  ALT  <5  /  AlkPhos  88  [11-13-21 @ 06:00]          Creatinine Trend:  SCr 3.3 [11-13 @ 06:00]  SCr 3.1 [11-12 @ 07:30]  SCr 3.2 [11-11 @ 16:26]  SCr 3.0 [11-11 @ 05:15]  SCr 2.9 [11-10 @ 04:30]        Iron 48, TIBC 214, %sat 22      [07-06-21 @ 07:30]  Ferritin 265      [07-06-21 @ 07:30]  Vitamin D (25OH) 57      [07-03-21 @ 06:57]  HbA1c 11.5      [12-04-18 @ 07:33]  TSH 0.45      [11-04-21 @ 17:35]

## 2021-11-13 NOTE — PROGRESS NOTE ADULT - ASSESSMENT
ASSESSMENT & PLAN  #Syncope likely due to overdiuresis  -hx of cushing syndrome,  shunt evaluated by nNSG and working correctly  -elevated BNP on admission, recent increase in torsemide with addition of farxiga  -HF: euvolemic, torsemide (increased to 40mg PO during CCU management)  -multiple runs of v. tach since hospital admission, DCCV 11/8 during PCI, still continues  -s/p AICD 11/9/21 per EP  -s/p cath 11/10 with LAD stent  -Card: c/w ASA/plasix (d/c ASA after 1mo), BB, amiodarone, torsemide  -11/13 continued Vtach, increasing metoprolol tart to 50mg BID    #Femoral access hematoma  -pt continues to have hematoma with blood degradation over L groin from cath 11/10  -held DVT ppx, restarted 11/12 with expansion on 11/13  -pt vitals stable, will hold DVT ppx until hematoma resolves    #TOBIAS on CKD 4  -Cr 3.2 on admission (baseline 2 from 7/2021), GFR 17 (from 31 in 7/2021)  -likely due to dehydrations in context of diuresis, poor PO intake  -Cr stable, but elevated at ~3  -c/w torsemide 20mg    #Chronic hypokalemia  -repleting as necessary                                                      # DVT prophylaxis   -on hold for hematoma    # GI prophylaxis   -none    # Diet   -renal restriction    # Activity Score (AM-PAC)  -IAT    # Code status   -full code

## 2021-11-13 NOTE — PROGRESS NOTE ADULT - SUBJECTIVE AND OBJECTIVE BOX
Cardiology Follow up s/p PCI pLAD    VINCENZO EDMONDSON   86y Male  PAST MEDICAL & SURGICAL HISTORY:  Afib    DM (diabetes mellitus)    CKD (chronic kidney disease)    Hydrocephalus    Hypertension    History of prostate surgery         HPI:  86 yr old male with hx of HTN, DM, AFib no AC 2/2 falls, HFpEF, JOHANNY presented to ED today 2/2 dizziness on/off since 11/1. States he was feeling dizzy yesterday and fell x2 and again felt dizzy today when he  went to urgent care where he was lowered to ground to prevent from falling. Son states pt saw his cardiologist Dr Huerta who changed up his meds last week and states this may be contributing to dad feeling dizzy. Son stated pts torsemide was increased and farxiga was added. Currently, pt states he feels fine and is asymptomatic. Pt denied loc, fevers, chills, cough or sputum production, nausea or vomiting, diarrhea or other associated symptoms. (03 Nov 2021 20:20)    Allergies    No Known Allergies    Intolerances      Patient without complaints, hemodynamically stable, asking when he can go home   Denies CP, SOB, palpitations, or dizziness  NSVT on telemetry overnight    Vital Signs Last 24 Hrs  T(C): 36.2 (13 Nov 2021 06:15), Max: 36.4 (12 Nov 2021 13:30)  T(F): 97.1 (13 Nov 2021 06:15), Max: 97.6 (12 Nov 2021 13:30)  HR: 61 (13 Nov 2021 06:15) (60 - 88)  BP: 98/57 (13 Nov 2021 06:15) (98/57 - 126/67)  BP(mean): --  RR: 18 (13 Nov 2021 06:15) (18 - 19)  SpO2: --    MEDICATIONS  (STANDING):  aMIOdarone    Tablet 200 milliGRAM(s) Oral two times a day  aspirin enteric coated 81 milliGRAM(s) Oral daily  atorvastatin 20 milliGRAM(s) Oral at bedtime  calcium acetate 667 milliGRAM(s) Oral three times a day with meals  chlorhexidine 4% Liquid 1 Application(s) Topical daily  clopidogrel Tablet 75 milliGRAM(s) Oral daily  dextrose 40% Gel 15 Gram(s) Oral once  dextrose 5%. 1000 milliLiter(s) (50 mL/Hr) IV Continuous <Continuous>  dextrose 5%. 1000 milliLiter(s) (100 mL/Hr) IV Continuous <Continuous>  dextrose 50% Injectable 25 Gram(s) IV Push once  dextrose 50% Injectable 12.5 Gram(s) IV Push once  dextrose 50% Injectable 25 Gram(s) IV Push once  finasteride 5 milliGRAM(s) Oral daily  gabapentin 300 milliGRAM(s) Oral at bedtime  gabapentin 100 milliGRAM(s) Oral daily  glucagon  Injectable 1 milliGRAM(s) IntraMuscular once  insulin glargine Injectable (LANTUS) 20 Unit(s) SubCutaneous at bedtime  insulin lispro (ADMELOG) corrective regimen sliding scale   SubCutaneous three times a day before meals  insulin lispro Injectable (ADMELOG) 7 Unit(s) SubCutaneous before breakfast  insulin lispro Injectable (ADMELOG) 7 Unit(s) SubCutaneous before lunch  insulin lispro Injectable (ADMELOG) 7 Unit(s) SubCutaneous before dinner  metoprolol tartrate 50 milliGRAM(s) Oral two times a day  potassium chloride   Powder 40 milliEquivalent(s) Oral once  tamsulosin 0.4 milliGRAM(s) Oral at bedtime  torsemide 20 milliGRAM(s) Oral daily    MEDICATIONS  (PRN):  acetaminophen     Tablet .. 650 milliGRAM(s) Oral every 6 hours PRN Moderate Pain (4 - 6)  guaifenesin/dextromethorphan Oral Liquid 10 milliLiter(s) Oral every 4 hours PRN cough and/or congestion  metoprolol tartrate Injectable 5 milliGRAM(s) IV Push once PRN sustained tachy >120      REVIEW OF SYSTEMS:          CONSTITUTIONAL: No weakness, fevers or chills          EYES/ENT: No visual changes;  No vertigo or throat pain           NECK: No pain or stiffness          RESPIRATORY: No cough, wheezing, hemoptysis          CARDIOVASCULAR: no pain, no DAN, no palpitations           GASTROINTESTINAL: No abdominal or epigastric pain. No nausea, vomiting, or hematemesis;           GENITOURINARY: No dysuria, frequency or hematuria          NEUROLOGICAL: No numbness or weakness          SKIN: No itching, rashes    PHYSICAL EXAM:           CONSTITUTIONAL: Well-developed; well-nourished; in no acute distress  	SKIN: warm, dry  	HEAD: Normocephalic; atraumatic  	EYES: PERRL.  	ENT: No nasal discharge, airway clear, mucous membranes moist  	NECK: Supple; non tender.  	CARD: +S1, +S2, no murmurs, gallops, or rubs. (Regular) rate and rhythm    	RESP: No wheezes, rales or rhonchi. CTA B/L  	ABD: soft ntnd, + BS x 4 quadrants  	EXT: moves all extremities,  no clubbing, cyanosis or edema, L ant wall site C/D/I s/p ICD 11/9  	NEURO: Alert and oriented x3, no focal deficits          PSYCH: Cooperative, appropriate          VASCULAR:  + Rad / + PTs / + DPs          EXTREMITY:             Left Groin:  Dressing removed, access site resolving  hematoma, + diffuse ecchymosis, + pulses,  no pain, no numbness, no signs and symptoms of infection              ECG: P                                                                                                                2D ECHO:    EXAM:  ECHO TTE WO CON COMP W DOPP        PROCEDURE DATE:  07/02/2021      INTERPRETATION:   [place] [address]                Phone: [phone].   TRANSTHORACIC ECHOCARDIOGRAM REPORT        Patient Name:   VINCENZO EDMONDSON Accession #: [id number]  Medical Rec #:  [id number]          Height:      72.0 in 182.9 cm  YOB: 1935         Weight:      270.1 lb 122.50 kg  Patient Age:    86 years          BSA:         2.42 m²  Patient Gender: M                 BP:          129/60 mmHg      Date of Exam:        7/2/2021 2:44:00 PM  Referring Physician: [id number] ED UNASSIGNED  Sonographer:         Jo Juan  Reading Physician:   Irvin Bernard M.D.    Procedure:   2D Echo/Doppler/Color Doppler Complete.  Indications: I42.9 - Cardiomyopathy, unspecified  Diagnosis:   Cardiomyopathy, unspecified - I42.9        Summary:   1. Normal global left ventricular systolic function.   2. LVEjection Fraction by Lemon's Method with a biplane EF of 54 %.   3. Mild left ventricular hypertrophy.   4. Mildly increased LV wall thickness.   5. Normal left ventricular internal cavity size.   6. Moderately enlarged left atrium.   7. Mild mitral annular calcification.   8. Mild thickening and calcification of the anterior and posterior mitral valve leaflets.   9. Mild to moderate mitral valve regurgitation.  10. Mild-moderate tricuspid regurgitation.  11. Mild aortic regurgitation.  12. Sclerotic aortic valve with normal opening.  13. Estimated pulmonary artery systolic pressure is 54.2 mmHg assuming a right atrial pressure of 5 mmHg, which is consistent with moderate pulmonary hypertension.  14. LA volume Index is 51.8 ml/m² ml/m2.  15. There is moderate aortic root calcification.        LABS:                        12.0   10.69 )-----------( 192      ( 13 Nov 2021 06:00 )             38.9     11-13    141  |  97<L>  |  122<HH>  ----------------------------<  126<H>  3.6   |  24  |  3.3<H>    Ca    9.6      13 Nov 2021 06:00  Mg     2.4     11-13    TPro  6.3  /  Alb  3.8  /  TBili  0.6  /  DBili  x   /  AST  9   /  ALT  <5  /  AlkPhos  88  11-13        Magnesium, Serum: 2.4 mg/dL [1.8 - 2.4] (11-13-21 @ 06:00)  LIVER FUNCTIONS - ( 13 Nov 2021 06:00 )  Alb: 3.8 g/dL / Pro: 6.3 g/dL / ALK PHOS: 88 U/L / ALT: <5 U/L / AST: 9 U/L / GGT: x             A/P:  I discussed the case with Cardiologist Dr. Anderson and recommend the following:    S/P PCI on 11/10  Significant 1-Vessel Coronary Artery Disease s/p - successful PCI to prox LAD                      Take Plavix 75 mg PO Daily and Aspirin 81 mg PO Daily FOR ONE MONTH, THEN STOP ASPIRIN AND CONTINUE PLAVIX d/t high risk of bleeding on DAPT  	     Continue B-Blocker, Amiodarone, Torsemide, Statin Therapy                          optimize BB as BP allows                      DVT/GI prophylaxis                         No ACEi/ARB due to elevated Cr, monitor Cr                   monitor access site/pulses                   Patient given 30 day supply of ( Aspirin 81 mg daily and Plavix 75 mg daily ) to take at home                   Ambulate patient with assistance and walker                   Keep K = 4, Mg = 2                   Patient agreeing to take DAPT as directed by cardiologist                    Pt given instructions on importance of taking antiplatelet medication or risk acute stent thrombosis/death                   Post cath instructions, access site care and activity restrictions reviewed with patient                     Discussed with patient to return to hospital if experience chest pain, shortness breath, dizziness and site bleeding                   Aggressive risk factor modification, diet counseling, smoking cessation discussed with patient                    PT / Rehab                     Pt stable for discharge from interventional cardiology perspective                   Follow up with Cardiologist Dr. Anderson in 2 weeks as OP. Patient advised to call and make appointment.                                      Discharge instructions as follows:                   - Continue medical regimen as prescribed to prevent chest pain                   - Continue dual anti-platelet therapy, beta blocker, statin                   - If you are diabetic and taking medication containing Metformin, do not take them for 48 hours after the procedure                   - Instructed to call 911 if chest pain, shortness of breath or bleeding from access site                   - No heavy lifting >10lbs x 1 week                   - No driving x 24 hours                   - No baths, swimming pools x 1 week, may shower                   - Low sodium low fat low cholesterol diet                   - Follow-up with Cardiologist in 1-2 weeks after discharge

## 2021-11-13 NOTE — PROGRESS NOTE ADULT - ATTENDING COMMENTS
REVIEW OF SYSTEMS:  CONSTITUTIONAL:  No weakness, fevers, chills, night sweats, weight loss  EYES/ENT: No visual changes. No vertigo or dysphagia  NECK: No neck pain or stiffness  RESPIRATORY: No cough, wheezing, hemoptysis. No shortness of breath  CARDIOVASCULAR: No chest pain or palpitations. No lower extremity edema  GASTROINTESTINAL: No abdominal pain. No nausea, vomiting, diarrhea, or hematemesis  GENITOURINARY: No dysuria or hematuria   NEUROLOGICAL: No focal numbness or weakness  SKIN: No rashes or itching  HEMATOLOGIC: No easy bruising or prolonged bleeding.      PHYSICAL EXAM:  GENERAL: NAD, well-developed, Non-toxic, stated age   HEAD:  Atraumatic, Normocephalic  EYES: EOMI, Sclera White   NECK: Supple, No JVD  CHEST/LUNG: Clear to auscultation bilaterally; No wheezing, rhonchi, or crackles  HEART: Regular rate and rhythm; s1, s2, No murmurs, rubs, or gallops  ABDOMEN: Soft, Nontender, Nondistended; Bowel sounds present, No rebound or guarding noted   EXTREMITIES:  left upper thigh soft hematoma, mild tenderness on palpation   PSYCH: AAOx3, pleasant, cooperative, not anxious  NEUROLOGY: 5/5 strength in all extremities, no downward drift. Sensation grossly intact.   SKIN: No rashes or lesions      ASSESSMENT AND PLAN:      #Syncope likely due to overdiuresis  -hx of cushing syndrome,  shunt evaluated by nNSG and working correctly  -elevated BNP on admission, recent increase in torsemide with addition of farxiga  -multiple runs of v. tach since hospital admission, DCCV 11/8 during PCI, still continues  -s/p AICD 11/9/21 per EP  -s/p cath 11/10 with LAD stent  -Card: c/w ASA/plasix (d/c ASA after 1mo), BB, amiodarone, torsemide 20 mg daily   -11/13 continued Vtach, increasing metoprolol tart to 50mg BID    #Femoral access hematoma: stable   -pt continues to have hematoma with blood degradation over L groin from cath 11/10  -pt vitals stable, will hold DVT ppx until hematoma resolves    #TOBIAS on CKD 4  -Cr 3.2 on admission (baseline 2 from 7/2021), GFR 17 (from 31 in 7/2021)  -likely due to dehydrations in context of diuresis, poor PO intake  -Cr stable, but elevated at ~3  -c/w torsemide 20mg  - nephrology is following     #Chronic hypokalemia -repleting as necessary                                                      # DVT prophylaxis -on hold for hematoma  # GI prophylaxis -none  # Diet -renal restriction  # Activity Score (AM-PAC)-IAT  # Code status -full code    Discussed with house staff

## 2021-11-14 NOTE — DISCHARGE NOTE PROVIDER - PROVIDER TOKENS
PROVIDER:[TOKEN:[[id number]],FOLLOWUP:[1 week]] PROVIDER:[TOKEN:[[id number]],FOLLOWUP:[1 week]],PROVIDER:[TOKEN:[[id number]]],PROVIDER:[TOKEN:[[id number]]],PROVIDER:[TOKEN:[[id number]]] PROVIDER:[TOKEN:[[id number]],FOLLOWUP:[1 week]],PROVIDER:[TOKEN:[[id number]]],PROVIDER:[TOKEN:[[id number]]],PROVIDER:[TOKEN:[[id number]]],PROVIDER:[TOKEN:[[id number]],ESTABLISHEDPATIENT:[T]]

## 2021-11-14 NOTE — DISCHARGE NOTE PROVIDER - NSDCMRMEDTOKEN_GEN_ALL_CORE_FT
acetaminophen 325 mg oral tablet: 2 tab(s) orally every 6 hours, As needed, Moderate Pain (4 - 6)  Basaglar KwikPen: 20 unit(s) subcutaneous once a day (at bedtime)  calcitriol 0.25 mcg oral capsule: 1 cap(s) orally once a day  calcium acetate 667 mg oral tablet: 1 tab(s) orally 3 times a day (with meals)   collagenase 250 units/g topical ointment: 1 application topically once a day  Crestor 5 mg oral tablet: 1 tab(s) orally once a day (at bedtime)  dutasteride 0.5 mg oral capsule: 1 cap(s) orally once a day  Farxiga 10 mg oral tablet: 1 tab(s) orally once a day  Flomax 0.4 mg oral capsule: 1 cap(s) orally once a day  gabapentin 100 mg oral capsule: 1 cap(s) orally once a day in AM  gabapentin 300 mg oral capsule: 1 cap(s) orally once a day (at bedtime)  metOLazone 5 mg oral tablet: orally 3 times a week M-W-F  Metoprolol Succinate ER 25 mg oral tablet, extended release: 1 tab(s) orally once a day  Ozempic (1 mg dose) 4 mg/3 mL subcutaneous solution: 1 milligram(s) subcutaneous once a week on Thursday  spironolactone 25 mg oral tablet: 1 tab(s) orally once a day  torsemide 20 mg oral tablet: 1 tab(s) orally once a day    acetaminophen 325 mg oral tablet: 2 tab(s) orally every 6 hours, As needed, Moderate Pain (4 - 6)  Basaglar KwikPen: 20 unit(s) subcutaneous once a day (at bedtime)  calcitriol 0.25 mcg oral capsule: 1 cap(s) orally once a day  calcium acetate 667 mg oral tablet: 1 tab(s) orally 3 times a day (with meals)   Crestor 5 mg oral tablet: 1 tab(s) orally once a day (at bedtime)  dutasteride 0.5 mg oral capsule: 1 cap(s) orally once a day  Farxiga 10 mg oral tablet: 1 tab(s) orally once a day  Flomax 0.4 mg oral capsule: 1 cap(s) orally once a day  gabapentin 100 mg oral capsule: 1 cap(s) orally once a day in AM  gabapentin 300 mg oral capsule: 1 cap(s) orally once a day (at bedtime)  metOLazone 5 mg oral tablet: orally 3 times a week M-W-F  Metoprolol Succinate ER 25 mg oral tablet, extended release: 1 tab(s) orally once a day  Ozempic (1 mg dose) 4 mg/3 mL subcutaneous solution: 1 milligram(s) subcutaneous once a week on Thursday  spironolactone 25 mg oral tablet: 1 tab(s) orally once a day  torsemide 20 mg oral tablet: 1 tab(s) orally once a day    acetaminophen 325 mg oral tablet: 2 tab(s) orally every 6 hours, As needed, Moderate Pain (4 - 6)  Basaglar KwikPen: 20 unit(s) subcutaneous once a day (at bedtime)  calcitriol 0.25 mcg oral capsule: 1 cap(s) orally once a day  calcium acetate 667 mg oral tablet: 1 tab(s) orally 3 times a day (with meals)   Crestor 5 mg oral tablet: 1 tab(s) orally once a day (at bedtime)  dutasteride 0.5 mg oral capsule: 1 cap(s) orally once a day  Farxiga 10 mg oral tablet: 1 tab(s) orally once a day  Flomax 0.4 mg oral capsule: 1 cap(s) orally once a day  Ozempic (1 mg dose) 4 mg/3 mL subcutaneous solution: 1 milligram(s) subcutaneous once a week on Thursday

## 2021-11-14 NOTE — DISCHARGE NOTE PROVIDER - NSDCCPCAREPLAN_GEN_ALL_CORE_FT
PRINCIPAL DISCHARGE DIAGNOSIS  Diagnosis: Dehydration  Assessment and Plan of Treatment: you presented for dizziness and presyncope. you recently had an increase in your diuretics. your blood pressure was on the lower side. labs showed elevated creatinine -> most likely your dehydration was the cause of your syncope. you received fluids with improvement of your symptoms. during your stay you had episodes of sustained ventricular achycardias -> electrophysiology were consulted -> an implanted cardiac defibrillator was placed. cardiology were also consulted -> left hear catheterisation was done with a stent placement to one of your coronary arteries. you should follow up with your PCP, cardiologist and electrophysiologist.      SECONDARY DISCHARGE DIAGNOSES  Diagnosis: Unsteady gait  Assessment and Plan of Treatment: you presented for dizziness and presyncope. you recently had an increase in your diuretics. your blood pressure was on the lower side. labs showed elevated creatinine -> most likely your dehydration was the cause of your syncope. you received fluids with improvement of your symptoms. physical therapy was consulted and recommended a short term rehab in a skilled nursing facility.    Diagnosis: Ventricular tachycardia  Assessment and Plan of Treatment: you had episodes of sustained ventricular tachycardia during your admission electrophysiology were consulted -> an implanted cardiac defibrillator was placed. 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 cardiology were also consulted -> left hear catheterisation was done with a stent placement to one of your coronary arteries (LAD)    Diagnosis: Hypotension, postural  Assessment and Plan of Treatment: you had an episode of hypotension during physical therapy. it was symptomatic associated with dizziness -> small bolus was done and diuretics were on hold -> heart failure team was in agreement and you should follow up as outpatient with them    Diagnosis: Urinary incontinence, overflow  Assessment and Plan of Treatment: bladder scan was done and it showed a big amount of urine -> straiht catheterisation was attempted but dailed -> urology were consulted -> you have urethral stricture that needed dilated and an enlarged prostate -> barragan was placed and you will be discharged withit. you should follow up with urology in 1 week    Diagnosis: Femoral artery hematoma complicating cardiac catheterization  Assessment and Plan of Treatment: after cardiac cath you had a femoral artery hematoma. prophylactic anticoagulation was withheld -> hemoglobin and hematoma were stable.     PRINCIPAL DISCHARGE DIAGNOSIS  Diagnosis: Dehydration  Assessment and Plan of Treatment: you presented for dizziness and presyncope. you recently had an increase in your diuretics. your blood pressure was on the lower side. labs showed elevated creatinine -> most likely your dehydration was the cause of your syncope. you received fluids with improvement of your symptoms. during your stay you had episodes of sustained ventricular achycardias -> electrophysiology were consulted -> an implanted cardiac defibrillator was placed. cardiology were also consulted -> left hear catheterisation was done with a stent placement to one of your coronary arteries. you should follow up with your PCP, cardiologist and electrophysiologist.      SECONDARY DISCHARGE DIAGNOSES  Diagnosis: Unsteady gait  Assessment and Plan of Treatment: you presented for dizziness and presyncope. you recently had an increase in your diuretics. your blood pressure was on the lower side. labs showed elevated creatinine -> most likely your dehydration was the cause of your syncope. you received fluids with improvement of your symptoms. physical therapy was consulted and recommended a short term rehab in a skilled nursing facility.    Diagnosis: Ventricular tachycardia  Assessment and Plan of Treatment: you had episodes of sustained ventricular tachycardia during your admission electrophysiology were consulted -> an implanted cardiac defibrillator was placed. 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 cardiology were also consulted -> left hear catheterisation was done with a stent placement to one of your coronary arteries (LAD)    Diagnosis: Hypotension, postural  Assessment and Plan of Treatment: you had an episode of hypotension during physical therapy. it was symptomatic associated with dizziness -> small bolus was done and diuretics were on hold -> heart failure team was in agreement and you should follow up as outpatient with them. you will be discharged on torsemide 10 mg daily. if you gain weight more then 2 lbs in one day-> take an additional pill of torsemide    Diagnosis: Urinary incontinence, overflow  Assessment and Plan of Treatment: bladder scan was done and it showed a big amount of urine -> straiht catheterisation was attempted but dailed -> urology were consulted -> you have urethral stricture that needed dilated and an enlarged prostate -> barragan was placed and you will be discharged withit. you should follow up with urology in 1 week    Diagnosis: Femoral artery hematoma complicating cardiac catheterization  Assessment and Plan of Treatment: after cardiac cath you had a femoral artery hematoma. prophylactic anticoagulation was withheld -> hemoglobin and hematoma were stable.     PRINCIPAL DISCHARGE DIAGNOSIS  Diagnosis: Dehydration  Assessment and Plan of Treatment: you presented for dizziness and presyncope. you recently had an increase in your diuretics. your blood pressure was on the lower side. labs showed elevated creatinine -> most likely your dehydration was the cause of your syncope. you received fluids with improvement of your symptoms. during your stay you had episodes of sustained ventricular achycardias -> electrophysiology were consulted -> an implanted cardiac defibrillator was placed. cardiology were also consulted -> left hear catheterisation was done with a stent placement to one of your coronary arteries. you should follow up with your PCP, cardiologist and electrophysiologist.      SECONDARY DISCHARGE DIAGNOSES  Diagnosis: Unsteady gait  Assessment and Plan of Treatment: you presented for dizziness and presyncope. you recently had an increase in your diuretics. your blood pressure was on the lower side. labs showed elevated creatinine -> most likely your dehydration was the cause of your syncope. you received fluids with improvement of your symptoms. physical therapy was consulted and recommended a short term rehab in a skilled nursing facility.    Diagnosis: Ventricular tachycardia  Assessment and Plan of Treatment: you had episodes of sustained ventricular tachycardia during your admission electrophysiology were consulted -> an implanted cardiac defibrillator was placed. cardiology were also consulted -> left heart catheterisation was done with a stent placement to one of your coronary arteries. you were started on amiodarone 200 mg twice daily that you will continue until you are seen by electrophysiologist (12/9 with Dr. 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 cardiology were also consulted -> left heart catheterisation was done with a stent placement to one of your coronary arteries (LAD) -> you were started on aspirin and plavix which you will continue as outpatient. call 911 if chest pain, shortness of breath or bleeding from access site. No heavy lifting >10lbs x 1 week.  No baths, swimming pools x 1 week, may shower. Low sodium low fat low cholesterol diet. Follow-up with Cardiologist in 1-2 weeks after discharge      Diagnosis: Hypotension, postural  Assessment and Plan of Treatment: you had an episode of hypotension during physical therapy. it was symptomatic associated with dizziness -> small bolus was done and diuretics were on hold -> heart failure team was in agreement and you should follow up as outpatient with them. you will be discharged on torsemide 10 mg daily. if you gain weight more then 2 lbs in one day-> take an additional pill of torsemide    Diagnosis: Urinary incontinence, overflow  Assessment and Plan of Treatment: bladder scan was done and it showed a big amount of urine -> straiht catheterisation was attempted but dailed -> urology were consulted -> you have urethral stricture that needed dilated and an enlarged prostate -> barragan was placed and you will be discharged withit. you should follow up with urology in 1 week    Diagnosis: Femoral artery hematoma complicating cardiac catheterization  Assessment and Plan of Treatment: after cardiac cath you had a femoral artery hematoma. prophylactic anticoagulation was withheld -> hemoglobin and hematoma were stable.

## 2021-11-14 NOTE — PROGRESS NOTE ADULT - ASSESSMENT
# CAD  # V Tach  # Syncope likely due to overdiuresis  - hx of cushing syndrome,  shunt evaluated by [facility] and working correctly  - elevated BNP on admission, recent increase in torsemide with addition of farxiga  - multiple runs of v. tach since hospital admission, DCCV 11/8 during PCI, still continues  - s/p AICD 11/9/21 per EP  - s/p cath 11/10 with LAD stent  - Card: c/w ASA/plasix (d/c ASA after 1mo), BB, amiodarone, torsemide 20 mg daily     #Femoral access hematoma: stable     #TOBIAS on CKD 4  -Cr 3.2 on admission (baseline 2 from 7/2021), GFR 17 (from 31 in 7/2021)  -likely due to dehydrations in context of diuresis, poor PO intake  -c/w torsemide 20mg  - nephrology is following, may have to hold torsemide if renal function continue to worsen     #Chronic hypokalemia -repleting as necessary                                                      DVT prophylaxis -on hold for hematoma  GI prophylaxis -none  Diet -renal restriction  Activity Score (AM-PAC)-IAT  Code status -full code  pending placement STR   # CAD  # V Tach  # Syncope    - elevated BNP on admission, recent increase in torsemide with addition of farxiga  - multiple runs of v. tach since hospital admission, DCCV 11/8 during PCI  - s/p AICD 11/9/21 per EP  - s/p cath 11/10 with LAD stent  - Card: c/w ASA/plasix (d/c ASA after 1mo), BB, amiodarone, torsemide 20 mg daily     #Femoral access hematoma: stable     #TOBIAS on CKD 4  -Cr 3.2 on admission (baseline 2 from 7/2021), GFR 17 (from 31 in 7/2021)  -likely due to dehydrations in context of diuresis, poor PO intake  -c/w torsemide 20mg  - nephrology is following, may have to hold torsemide if renal function continue to worsen     #Chronic hypokalemia -repleting as necessary                                                      DVT prophylaxis -on hold for hematoma  GI prophylaxis -none  Diet -renal restriction  Activity Score (AM-PAC)-IAT  Code status -full code  pending placement STR

## 2021-11-14 NOTE — PROGRESS NOTE ADULT - SUBJECTIVE AND OBJECTIVE BOX
SUBJECTIVE:    Patient is a 86y old Male who presents with a chief complaint of near syncope (14 Nov 2021 13:13)  Currently admitted to medicine with the primary diagnosis of TOBIAS (acute kidney injury  Today is hospital day 11d. This morning he is resting comfortably in bed and reports no new issues or overnight events.     PAST MEDICAL & SURGICAL HISTORY  Afib    DM (diabetes mellitus)    CKD (chronic kidney disease)    Hydrocephalus    Hypertension    History of prostate surgery      SOCIAL HISTORY:  Negative for smoking/alcohol/drug use.     ALLERGIES:  No Known Allergies    MEDICATIONS:  STANDING MEDICATIONS  aMIOdarone    Tablet 200 milliGRAM(s) Oral two times a day  aspirin enteric coated 81 milliGRAM(s) Oral daily  atorvastatin 20 milliGRAM(s) Oral at bedtime  calcium acetate 667 milliGRAM(s) Oral three times a day with meals  chlorhexidine 4% Liquid 1 Application(s) Topical daily  clopidogrel Tablet 75 milliGRAM(s) Oral daily  dextrose 40% Gel 15 Gram(s) Oral once  dextrose 5%. 1000 milliLiter(s) IV Continuous <Continuous>  dextrose 5%. 1000 milliLiter(s) IV Continuous <Continuous>  dextrose 50% Injectable 25 Gram(s) IV Push once  dextrose 50% Injectable 12.5 Gram(s) IV Push once  dextrose 50% Injectable 25 Gram(s) IV Push once  finasteride 5 milliGRAM(s) Oral daily  gabapentin 100 milliGRAM(s) Oral daily  gabapentin 300 milliGRAM(s) Oral at bedtime  glucagon  Injectable 1 milliGRAM(s) IntraMuscular once  insulin glargine Injectable (LANTUS) 20 Unit(s) SubCutaneous at bedtime  insulin lispro (ADMELOG) corrective regimen sliding scale   SubCutaneous three times a day before meals  insulin lispro Injectable (ADMELOG) 7 Unit(s) SubCutaneous before dinner  insulin lispro Injectable (ADMELOG) 7 Unit(s) SubCutaneous before breakfast  insulin lispro Injectable (ADMELOG) 7 Unit(s) SubCutaneous before lunch  metoprolol tartrate 50 milliGRAM(s) Oral two times a day  tamsulosin 0.4 milliGRAM(s) Oral at bedtime  torsemide 20 milliGRAM(s) Oral daily    PRN MEDICATIONS  acetaminophen     Tablet .. 650 milliGRAM(s) Oral every 6 hours PRN  guaifenesin/dextromethorphan Oral Liquid 10 milliLiter(s) Oral every 4 hours PRN  metoprolol tartrate Injectable 5 milliGRAM(s) IV Push once PRN    VITALS:   T(F): 98.6  HR: 64  BP: 88/53  RR: 20  SpO2: --    LABS:                        12.0   10.80 )-----------( 198      ( 14 Nov 2021 07:30 )             39.2     11-14    140  |  97<L>  |  126<HH>  ----------------------------<  138<H>  4.1   |  20  |  3.4<H>    Ca    9.7      14 Nov 2021 07:30  Mg     2.4     11-14    TPro  6.5  /  Alb  3.9  /  TBili  0.7  /  DBili  x   /  AST  10  /  ALT  <5  /  AlkPhos  97  11-14        PHYSICAL EXAM:  GEN: No acute distress  LUNGS: Clear to auscultation bilaterally   HEART: S1/S2 present. RRR.   ABD: Soft, non-tender, non-distended. Bowel sounds present  EXT: left upper thigh ecchymosis   NEURO: AAOX3

## 2021-11-14 NOTE — DISCHARGE NOTE PROVIDER - NPI NUMBER (FOR SYSADMIN USE ONLY) :
[[id number]] [[id number]],[[id number]] [[id number]]

## 2021-11-14 NOTE — PROGRESS NOTE ADULT - ASSESSMENT
TOBIAS    CKD stage 4   s/p PCI to LAD  hyperphosphatemia  dizziness / falls  chronic HFpEF / Afib  VT  JOHANNY on CPAP    HTN   NPH s/p  shunt with no shunt patency on shuntogram  DM 2    dm neuropathy    plan:      on torsemide 20mg po qd  hold it if RF continues to deteriorate   trend renal function and electrolytes  renal diet  cont phoslo with meals  flomax / monitor void / strict UOP monitoring  cardio f/u

## 2021-11-14 NOTE — DISCHARGE NOTE PROVIDER - HOSPITAL COURSE
. .` .`86 yr old male with hx of HTN, DM, AFib no AC 2/2 falls, HFpEF, JOHANNY presented to ED today 2/2 dizziness on/off since 11/1. States he was feeling dizzy yesterday and fell x2 and again felt dizzy today when he  went to urgent care where he was lowered to ground to prevent from falling. Son states pt saw his cardiologist Dr Huerta who changed up his meds last week and states this may be contributing to dad feeling dizzy. Son stated pts torsemide was increased and farxiga was added. Currently, pt states he feels fine and is asymptomatic. Pt denied loc, fevers, chills, cough or sputum production, nausea or vomiting, diarrhea or other associated symptoms. his SBP borderline, CXR clear, labs showed TOBIAS -> received gentle hydration with improvement. on tele he had episodes of sustained VT -> EP cs -> AICD. cardio cs -> ischemic workup -> cardiac cath done -> stent to LAD. heart failure cs -> patient was put back on lower dose of diuretics -> became hypotensive -> bolus of fluids + diuretics on hold. patient/s TOBIAS did not improve -> found to have overflow incontinence -> straight cath failed -> urology cs -> found to have urethral stricture (on rica of his BPH) -> dilation -> barragan insertion ->mild improvement in creat -> dc on barragan and outpatient f/u with urology   .`86 yr old male with hx of HTN, DM, AFib no AC 2/2 falls, HFpEF, JOHANNY presented to ED today 2/2 dizziness on/off since 11/1. States he was feeling dizzy yesterday and fell x2 and again felt dizzy today when he  went to urgent care where he was lowered to ground to prevent from falling. Son states pt saw his cardiologist Dr Huerta who changed up his meds last week and states this may be contributing to dad feeling dizzy. Son stated pts torsemide was increased and farxiga was added. Currently, pt states he feels fine and is asymptomatic. Pt denied loc, fevers, chills, cough or sputum production, nausea or vomiting, diarrhea or other associated symptoms. his SBP borderline, CXR clear, labs showed TOBIAS -> received gentle hydration with improvement. on tele he had episodes of sustained VT -> EP cs -> AICD. cardio cs -> ischemic workup -> cardiac cath done -> stent to LAD. heart failure cs -> patient was put back on lower dose of diuretics -> became hypotensive -> bolus of fluids + diuretics on hold. patient/s TOBIAS did not improve -> found to have overflow incontinence -> straight cath failed -> urology cs -> found to have urethral stricture (on rica of his BPH) -> dilation -> barragan insertion ->mild improvement in creat -> dc on barragan and outpatient f/u with urology .`86 yr old male with hx of HTN, DM, AFib no AC 2/2 falls, HFpEF, JOHANNY presented to ED today 2/2 dizziness on/off since 11/1. States he was feeling dizzy day prior to discharge and fell x2 and again felt dizzy today when he  went to urgent care where he was lowered to ground to prevent from falling. Son states pt saw his cardiologist Dr Huerta who changed up his meds last week and states this may be contributing to dad feeling dizzy. Son stated pts torsemide was increased and farxiga was added. Currently, pt states he feels fine and is asymptomatic. Pt denied loc, fevers, chills, cough or sputum production, nausea or vomiting, diarrhea or other associated symptoms. his SBP borderline, CXR clear, labs showed TOBIAS -> received gentle hydration with improvement. on tele he had episodes of sustained VT -> EP cs -> AICD. cardio cs -> ischemic workup -> cardiac cath done -> stent to LAD. heart failure cs -> patient was put back on lower dose of diuretics -> became hypotensive -> bolus of fluids + diuretics on hold. patient/s TOBIAS did not improve -> found to have overflow incontinence -> straight cath failed -> urology cs -> found to have urethral stricture (on rica of his BPH) -> dilation -> barragan insertion ->mild improvement in creat -> dc on barragan and outpatient f/u with urology

## 2021-11-14 NOTE — DISCHARGE NOTE PROVIDER - NSDCFUSCHEDAPPT_GEN_ALL_CORE_FT
VINCENZO EDMONDSON ; 12/08/2021 ; [facility] [address] [facility] VINCENZO Funes ; 12/28/2021 ; [facility] [address] VINCENZO EDMONDSON ; 12/09/2021 ; [facility] [address] [facility] VINCENZO Funes ; 12/28/2021 ; [facility] [address]

## 2021-11-14 NOTE — PROGRESS NOTE ADULT - SUBJECTIVE AND OBJECTIVE BOX
[facility] FOLLOW UP NOTE  --------------------------------------------------------------------------------  Chief Complaint/24 hour events/subjective:    pt seen and examined, spike fever    PAST HISTORY  --------------------------------------------------------------------------------  No significant changes to PMH, PSH, FHx, SHx, unless otherwise noted    ALLERGIES & MEDICATIONS  --------------------------------------------------------------------------------  Allergies    No Known Allergies    Intolerances      Standing Inpatient Medications  aMIOdarone    Tablet 200 milliGRAM(s) Oral two times a day  aspirin enteric coated 81 milliGRAM(s) Oral daily  atorvastatin 20 milliGRAM(s) Oral at bedtime  calcium acetate 667 milliGRAM(s) Oral three times a day with meals  chlorhexidine 4% Liquid 1 Application(s) Topical daily  clopidogrel Tablet 75 milliGRAM(s) Oral daily  dextrose 40% Gel 15 Gram(s) Oral once  dextrose 5%. 1000 milliLiter(s) IV Continuous <Continuous>  dextrose 5%. 1000 milliLiter(s) IV Continuous <Continuous>  dextrose 50% Injectable 25 Gram(s) IV Push once  dextrose 50% Injectable 12.5 Gram(s) IV Push once  dextrose 50% Injectable 25 Gram(s) IV Push once  finasteride 5 milliGRAM(s) Oral daily  gabapentin 100 milliGRAM(s) Oral daily  gabapentin 300 milliGRAM(s) Oral at bedtime  glucagon  Injectable 1 milliGRAM(s) IntraMuscular once  insulin glargine Injectable (LANTUS) 20 Unit(s) SubCutaneous at bedtime  insulin lispro (ADMELOG) corrective regimen sliding scale   SubCutaneous three times a day before meals  insulin lispro Injectable (ADMELOG) 7 Unit(s) SubCutaneous before dinner  insulin lispro Injectable (ADMELOG) 7 Unit(s) SubCutaneous before breakfast  insulin lispro Injectable (ADMELOG) 7 Unit(s) SubCutaneous before lunch  metoprolol tartrate 50 milliGRAM(s) Oral two times a day  tamsulosin 0.4 milliGRAM(s) Oral at bedtime  torsemide 20 milliGRAM(s) Oral daily    PRN Inpatient Medications  acetaminophen     Tablet .. 650 milliGRAM(s) Oral every 6 hours PRN  guaifenesin/dextromethorphan Oral Liquid 10 milliLiter(s) Oral every 4 hours PRN  metoprolol tartrate Injectable 5 milliGRAM(s) IV Push once PRN      REVIEW OF SYSTEMS  --------------------------------------------------------------------------------    All other systems were reviewed and are negative, except as noted.    VITALS/PHYSICAL EXAM  --------------------------------------------------------------------------------  T(C): 36.2 (11-14-21 @ 05:13), Max: 36.2 (11-13-21 @ 20:05)  HR: 74 (11-14-21 @ 05:54) (60 - 75)  BP: 104/54 (11-14-21 @ 05:54) (85/55 - 104/54)  RR: 18 (11-14-21 @ 05:13) (18 - 18)  SpO2: --  Wt(kg): --        11-13-21 @ 07:01  -  11-14-21 @ 07:00  --------------------------------------------------------  IN: 450 mL / OUT: 450 mL / NET: 0 mL    11-14-21 @ 07:01  -  11-14-21 @ 13:14  --------------------------------------------------------  IN: 330 mL / OUT: 350 mL / NET: -20 mL      Physical Exam:    	Gen: no distress   	Pulm: CTA B/L  	CV: S1S2; no rub  	Abd: +BS, soft, nontender/nondistended  	LE:  edema      LABS/STUDIES  --------------------------------------------------------------------------------              12.0   10.80 >-----------<  198      [11-14-21 @ 07:30]              39.2     140  |  97  |  126  ----------------------------<  138      [11-14-21 @ 07:30]  4.1   |  20  |  3.4        Ca     9.7     [11-14-21 @ 07:30]      Mg     2.4     [11-14-21 @ 07:30]    TPro  6.5  /  Alb  3.9  /  TBili  0.7  /  DBili  x   /  AST  10  /  ALT  <5  /  AlkPhos  97  [11-14-21 @ 07:30]          Creatinine Trend:  SCr 3.4 [11-14 @ 07:30]  SCr 3.3 [11-13 @ 06:00]  SCr 3.1 [11-12 @ 07:30]  SCr 3.2 [11-11 @ 16:26]  SCr 3.0 [11-11 @ 05:15]        Iron 48, TIBC 214, %sat 22      [07-06-21 @ 07:30]  Ferritin 265      [07-06-21 @ 07:30]  Vitamin D (25OH) 57      [07-03-21 @ 06:57]  HbA1c 11.5      [12-04-18 @ 07:33]  TSH 0.45      [11-04-21 @ 17:35]

## 2021-11-14 NOTE — DISCHARGE NOTE PROVIDER - CARE PROVIDERS DIRECT ADDRESSES
,DirectAddress_Unknown ,DirectAddress_Unknown,DirectAddress_Unknown,[email or web address] ,DirectAddress_Unknown,DirectAddress_Unknown,[email or web address]

## 2021-11-14 NOTE — DISCHARGE NOTE PROVIDER - CARE PROVIDER_API CALL
Damion Anedrson)  Cardiovascular Disease; Internal Medicine  [facility], [address] [place] [address]  Phone: [phone]  Fax: [phone]  Follow Up Time: 1 week   Damion Anderson)  Cardiovascular Disease; Internal Medicine  [facility], [address] [place] [address]  Phone: [phone]  Fax: [phone]  Follow Up Time: 1 week    Oscar Santiago)  Cardiac Electrophysiology; Cardiology; Internal Medicine  [address] [place] [address]  Phone: [phone]  Fax: [phone]  Follow Up Time:     Suzy Araiza)  Urology  [address]  [place] [address]  Phone: [phone]  Fax: [phone]  Follow Up Time:     Alena Escobar; MD)  Adv Heart Fail Trnsplnt Cardio; Cardiovascular Disease; Internal Medicine  [address] [place] [address], [address] [place] [address]  Phone: [phone]  Fax: [phone]  Follow Up Time:    Damion Anderson)  Cardiovascular Disease; Internal Medicine  [facility], [address] [place] [address]  Phone: [phone]  Fax: [phone]  Follow Up Time: 1 week    Oscar Santiago)  Cardiac Electrophysiology; Cardiology; Internal Medicine  [address] [place] [address]  Phone: [phone]  Fax: [phone]  Follow Up Time:     Suzy Araiza)  Urology  [address] [place] [address]  Phone: [phone]  Fax: [phone]  Follow Up Time:     Alena Escobar; MD)  Adv Heart Fail Trnsplnt Cardio; Cardiovascular Disease; Internal Medicine  [address] [place] [address], [address] [place] [address]  Phone: [phone]  Fax: [phone]  Follow Up Time:     Doug Foy)  Geriatric Medicine; Internal Medicine  [address] [place] [address]  Phone: [phone]  Fax: [phone]  Established Patient  Follow Up Time:

## 2021-11-15 NOTE — PROGRESS NOTE ADULT - ASSESSMENT
EP: Dr. Bhatt  Cards: Dr. García / Dr. Huerta    86y Male with h/o HTN, DM, chronic AF not on AC due to falls, hydrocephalus, s/p shunt, COPD, JOHANNY, on bipap, pulmonary HTN, HFpEF, freq PVC, NSVT during prior admissions. Admitted with pre-syncopal episode and dizziness, likely d/t overdiuresis. Now with frequent PVC, bigeminy and sustained VT on tele. 11/8 s/p cardiac cath with pLAD 90% stenosis, intraop developed sustained monomorphic VT requiring shock x 2, lido gtt started. S/p Dc ICD (Medtronic) on11/9/21.     Impression:  Sustained VT, NSVT, frequent PVC   S/p DC ICD on 11/9 (Medtronic) ; incision healing well  1vCAD, pLAD 90% ; S/p LAD PCI   TOBIAS on CKD  Hx chronic AF  Hx chronic HFpEF; HTN  Hx DM  Hx hydrocephalus s/p  shunt  Hx COPD, JOHANNY (BiPAP)    Plan:  - Cont amio 200 mg PO Q 12 until seen in our office  - Increase lopressor as BP tolerates to suppress PVCs  - Fu on 12/9 with Dr. Ross  - Recall EPS if needed, [id number]

## 2021-11-15 NOTE — CONSULT NOTE ADULT - ASSESSMENT
85 y/o m with recurrent urethral stricture.    A) Urethral stricture  catheter induced hypospadias  BPH  TOBIAS  Syncope     P) Using a clamp the meatal stricture was broken. A 16 fr coude catheter  was then slowly advanced into the bladder. The balloon was inflated with 10 cc  of water.  The catheter drained 800 cc of clear yellow urine.  d/w attending

## 2021-11-15 NOTE — CONSULT NOTE ADULT - CONSULT REASON
Heart Failure
near syncope, arrhythmia, recent change in HF medications
ckd
difficult Aguirre
jeanette and NSVT
near syncope / bradycardia
VPS for NPH

## 2021-11-15 NOTE — CONSULT NOTE ADULT - CONSULT REQUESTED DATE/TIME
04-Nov-2021 18:13
05-Nov-2021 10:54
11-Nov-2021 11:28
15-Nov-2021 13:56
04-Nov-2021 07:05
05-Nov-2021 14:38
04-Nov-2021 17:34

## 2021-11-15 NOTE — PROGRESS NOTE ADULT - SUBJECTIVE AND OBJECTIVE BOX
INTERVAL HPI/OVERNIGHT EVENTS:  No acute events overnight. Surgical dressing removed today. Currently V paced, in AF. Frequent PVCs and short runs NSVT.  Patient denies fever, chills, dizziness, syncope, chest pain, palpitations, SOB.    MEDICATIONS  (STANDING):  aMIOdarone    Tablet 200 milliGRAM(s) Oral two times a day  aspirin enteric coated 81 milliGRAM(s) Oral daily  atorvastatin 20 milliGRAM(s) Oral at bedtime  calcium acetate 667 milliGRAM(s) Oral three times a day with meals  chlorhexidine 4% Liquid 1 Application(s) Topical daily  clopidogrel Tablet 75 milliGRAM(s) Oral daily  finasteride 5 milliGRAM(s) Oral daily  gabapentin 100 milliGRAM(s) Oral daily  gabapentin 300 milliGRAM(s) Oral at bedtime  glucagon  Injectable 1 milliGRAM(s) IntraMuscular once  heparin   Injectable 5000 Unit(s) SubCutaneous every 8 hours  insulin glargine Injectable (LANTUS) 20 Unit(s) SubCutaneous at bedtime  insulin lispro (ADMELOG) corrective regimen sliding scale   SubCutaneous three times a day before meals  insulin lispro Injectable (ADMELOG) 7 Unit(s) SubCutaneous before breakfast  insulin lispro Injectable (ADMELOG) 7 Unit(s) SubCutaneous before lunch  insulin lispro Injectable (ADMELOG) 7 Unit(s) SubCutaneous before dinner  metoprolol tartrate 25 milliGRAM(s) Oral two times a day  tamsulosin 0.4 milliGRAM(s) Oral at bedtime  torsemide 20 milliGRAM(s) Oral daily    MEDICATIONS  (PRN):  acetaminophen     Tablet .. 650 milliGRAM(s) Oral every 6 hours PRN Moderate Pain (4 - 6)  guaifenesin/dextromethorphan Oral Liquid 10 milliLiter(s) Oral every 4 hours PRN cough and/or congestion  metoprolol tartrate Injectable 5 milliGRAM(s) IV Push once PRN sustained tachy >120      Allergies    No Known Allergies    Intolerances        REVIEW OF SYSTEMS    [x] A ten-point review of systems was otherwise negative except as noted.  [ ] Due to altered mental status/intubation, subjective information were not able to be obtained from the patient. History was obtained, to the extent possible, from review of the chart and collateral sources of information.      Vital Signs Last 24 Hrs  T(C): 36.4 (12 Nov 2021 13:30), Max: 36.7 (12 Nov 2021 05:39)  T(F): 97.6 (12 Nov 2021 13:30), Max: 98 (12 Nov 2021 05:39)  HR: 88 (12 Nov 2021 13:30) (62 - 88)  BP: 126/67 (12 Nov 2021 13:30) (102/52 - 141/71)  BP(mean): 78 (11 Nov 2021 19:00) (78 - 98)  RR: 19 (12 Nov 2021 13:30) (13 - 28)  SpO2: 97% (11 Nov 2021 21:26) (91% - 98%)    11-11-21 @ 07:01  -  11-12-21 @ 07:00  --------------------------------------------------------  IN: 1160 mL / OUT: 950 mL / NET: 210 mL    11-12-21 @ 07:01  -  11-12-21 @ 15:15  --------------------------------------------------------  IN: 820 mL / OUT: 100 mL / NET: 720 mL      Physical Exam  GENERAL: Elderly male,  In no apparent distress, well nourished, and hydrated.  HEART: Irregular rate and rhythm; No murmurs, rubs, or gallops.  PULMONARY: Diminished  ABDOMEN: Soft, Nontender, Nondistended; Bowel sounds present  EXTREMITIES:  2+ Peripheral Pulses, No clubbing, cyanosis, or edema  NEUROLOGICAL: AO x3    LABS:                        12.2   11.09 )-----------( 194      ( 12 Nov 2021 07:30 )             38.8     11-12    140  |  93<L>  |  121<HH>  ----------------------------<  110<H>  3.2<L>   |  25  |  3.1<H>    Ca    9.8      12 Nov 2021 07:30  Mg     2.4     11-12    TPro  6.3  /  Alb  3.8  /  TBili  0.5  /  DBili  x   /  AST  11  /  ALT  <5  /  AlkPhos  90  11-12 11-11-21 @ 07:01  -  11-12-21 @ 07:00  --------------------------------------------------------  IN: 1160 mL / OUT: 950 mL / NET: 210 mL    11-12-21 @ 07:01  -  11-12-21 @ 15:15  --------------------------------------------------------  IN: 820 mL / OUT: 100 mL / NET: 720 mL        11-11-21 @ 07:01  -  11-12-21 @ 07:00  --------------------------------------------------------  IN: 1160 mL / OUT: 950 mL / NET: 210 mL    11-12-21 @ 07:01  -  11-12-21 @ 15:15  --------------------------------------------------------  IN: 820 mL / OUT: 100 mL / NET: 720 mL        RADIOLOGY:  < from: TTE Echo Complete w/o Contrast w/ Doppler (07.02.21 @ 14:44) >  Summary:   1. Normal global left ventricular systolic function.   2. LVEjection Fraction by Lemon's Method with a biplane EF of 54 %.   3. Mild left ventricular hypertrophy.   4. Mildly increased LV wall thickness.   5. Normal left ventricular internal cavity size.   6. Moderately enlarged left atrium.   7. Mild mitral annular calcification.   8. Mild thickening and calcification of the anterior and posterior mitral valve leaflets.   9. Mild to moderate mitral valve regurgitation.  10. Mild-moderate tricuspid regurgitation.  11. Mild aortic regurgitation.  12. Sclerotic aortic valve with normal opening.  13. Estimated pulmonary artery systolic pressure is 54.2 mmHg assuming a right atrial pressure of 5 mmHg, which is consistent with moderate pulmonary hypertension.  14. LA volume Index is 51.8 ml/m² ml/m2.  15. There is moderate aortic root calcification.    PHYSICIAN INTERPRETATION:  Left Ventricle: The left ventricular internal cavity size is normal. Left ventricular wall thickness is mildly increased. There is mild left ventricular hypertrophy. Global LV systolic function was normal.  Right Ventricle: Normal right ventricular size and function.  Left Atrium: Moderately enlarged left atrium. LA volume Index is 51.8 ml/m² ml/m2.  Pericardium: There is no evidence of pericardial effusion.  Mitral Valve: Mild thickening and calcification of the anterior and posterior mitral valve leaflets. Mitral leaflet mobility is normal. There is mild mitral annular calcification. Mild to moderate mitral valve regurgitation is seen.  Tricuspid Valve: Structurallynormal tricuspid valve, with normal leaflet excursion. Mild-moderate tricuspid regurgitation is visualized. Estimated pulmonary artery systolic pressure is 54.2 mmHg assuming a right atrial pressure of 5 mmHg, which is consistent with moderate pulmonary hypertension.  Aortic Valve: The aortic valve is trileaflet. Sclerotic aortic valve with normal opening. Mild aortic valve regurgitation is seen.  Pulmonic Valve: Structurally normal pulmonic valve, with normal leaflet excursion. Trace pulmonic valve regurgitation.  Aorta: Aortic root measured at Sinus of Valsalva is normal. There is moderate aortic root calcification.  Pulmonary Artery: The main pulmonary artery is normal in size.  Venous: The inferior vena cava was normal sized, with respiratory size variation greater than 50%.    < end of copied text >

## 2021-11-15 NOTE — PROGRESS NOTE ADULT - ASSESSMENT
86 yr old male with hx of HTN, DM, AFib no AC 2/2 falls, HFpEF, JOHANNY presented to ED today 2/2 dizziness on/off     #Syncope likely due to overdiuresis  -hx of cushing syndrome,  shunt evaluated by nNSG and working correctly  -elevated BNP on admission, recent increase in torsemide with addition of farxiga  -HF: euvolemic, torsemide (increased to 40mg PO during CCU management)  -multiple runs of v. tach since hospital admission, DCCV 11/8 during PCI, still continues  -s/p AICD 11/9/21 per EP  -s/p cath 11/10 with LAD stent  -Card: c/w ASA/plasix (d/c ASA after 1mo), BB, amiodarone, torsemide  -11/13 continued Vtach, increasing metoprolol tart to 50mg BID    #overflow inconinence  - bladder scan showed 600 mL -> straight cath not successful  - urology consulted -> barragan placed (11/15)    #Femoral access hematoma  -pt continues to have hematoma with blood degradation over L groin from cath 11/10  -held DVT ppx, restarted 11/12 with expansion on 11/13  -pt vitals stable, will hold DVT ppx until hematoma resolves    #TOBIAS on CKD 4  -Cr 3.2 on admission (baseline 2 from 7/2021), GFR 17 (from 31 in 7/2021)  -likely due to dehydrations in context of diuresis, poor PO intake  -Cr stable, but elevated at ~3  -c/w torsemide 20mg  - nephro cs -> son prefers to hold off on HD    #Chronic hypokalemia  -repleting as necessary                                                      # DVT prophylaxis   -on hold for hematoma    # GI prophylaxis   -none    # Diet   -renal restriction    # Activity Score (AM-PAC)  -IAT    # Code status   -full code   86 yr old male with hx of HTN, DM, AFib no AC 2/2 falls, HFpEF, JOHANNY presented to ED today 2/2 dizziness on/off     #Syncope likely due to overdiuresis  -hx of cushing syndrome,  shunt evaluated by nNSG and working correctly  -elevated BNP on admission, recent increase in torsemide with addition of farxiga  -HF: euvolemic, torsemide (increased to 40mg PO during CCU management)  -multiple runs of v. tach since hospital admission, DCCV 11/8 during PCI, still continues  -s/p AICD 11/9/21 per EP  -s/p cath 11/10 with LAD stent  -Card: c/w ASA/plasix (d/c ASA after 1mo), BB, amiodarone, torsemide  -11/13 continued Vtach, increasing metoprolol tart to 50mg BID    #overflow inconinence  - bladder scan showed 600 mL -> straight cath not successful  - urology consulted -> barragan placed (11/15) -> 800 cc of urine    #Femoral access hematoma  -pt continues to have hematoma with blood degradation over L groin from cath 11/10  -held DVT ppx, restarted 11/12 with expansion on 11/13  -pt vitals stable, will hold DVT ppx until hematoma resolves    #TOBIAS on CKD 4  -Cr 3.2 on admission (baseline 2 from 7/2021), GFR 17 (from 31 in 7/2021)  -likely due to dehydrations in context of diuresis, poor PO intake  -Cr stable, but elevated at ~3  -c/w torsemide 20mg  - nephro cs -> son prefers to hold off on HD/ renal diet    #Chronic hypokalemia  -repleting as necessary                                                      # DVT prophylaxis   -on hold for hematoma    # GI prophylaxis   -none    # Diet   -renal restriction    # Activity Score (AM-PAC)  -IAT    # Code status   -full code   86 yr old male with hx of HTN, DM, AFib no AC 2/2 falls, HFpEF, JOHANNY presented to ED today 2/2 dizziness on/off     #Syncope likely due to overdiuresis  -hx of cushing syndrome,  shunt evaluated by nNSG and working correctly  -elevated BNP on admission, recent increase in torsemide with addition of farxiga  -HF: euvolemic, torsemide (increased to 40mg PO during CCU management)  -multiple runs of v. tach since hospital admission, DCCV 11/8 during PCI, still continues  -s/p AICD 11/9/21 per EP  -s/p cath 11/10 with LAD stent  -Card: c/w ASA/plasix (d/c ASA after 1mo), BB, amiodarone, torsemide  -11/13 continued Vtach, increasing metoprolol tart to 50mg BID    #overflow incontinence  - bladder scan showed 600 mL -> straight cath not successful  - urology consulted -> barragan placed (11/15) -> 800 cc of urine    #Femoral access hematoma  -pt continues to have hematoma with blood degradation over L groin from cath 11/10  -held DVT ppx, restarted 11/12 with expansion on 11/13  -pt vitals stable, will hold DVT ppx until hematoma resolves  - H/H stable    #TOBIAS on CKD 4  -Cr 3.2 on admission (baseline 2 from 7/2021), GFR 17 (from 31 in 7/2021)  -likely due to dehydration in context of diuresis, poor PO intake  -Cr stable, but elevated at ~3  -c/w torsemide 20mg  - nephro cs -> son prefers to hold off on HD/ renal diet    #Chronic hypokalemia  -repleting as necessary                                                      # DVT prophylaxis   -on hold for hematoma    # GI prophylaxis   -none    # Diet   -renal restriction    # Activity Score (AM-PAC)  -IAT    # Code status   -full code

## 2021-11-15 NOTE — PROGRESS NOTE ADULT - ATTENDING COMMENTS
Pt seen and examined independently today after barragan catheter placement. Case discussed with son and with resident. He feels better and denies SOB, chest pain, N/V, abdominal pain.  barragan placed and 800cc urine drained    PE VS reviewed  general - NAD  chest - CTA b/l  CVS - IRR  abdomen - soft, NT, ND  extremities - no edema  left groin with ecchymoses, no active bleeding  left chest wall - AICD site without erythema or discharge  neuro - awake, alert, cooperative    labs 11/15 reviewed  Telemetry reviewed by me     Syncope    CAD s/p stent to LAD - on DAPT, metoprolol, statin    Vtach s/p AICD - on amiodarone and metoprolol - EP f/u appreciated - outpt f/u - monitor electrolytes    left groin hematoma - monitor site and H/H - DVT prophylaxis on hold for now    Chronic HFpEF - on torsemide - daily wts - heart failure following    TOBIAS over CKD 4 - case discussed with renal today - avoid overdiuresis - barragan placed by  and monitor I's and O's  BMP in AM        PROGRESS NOTE HANDOFF    Pending: improvement in renal function, labs in AM    Family discussion: with son Arthur today    Disposition: SNF

## 2021-11-15 NOTE — PROGRESS NOTE ADULT - SUBJECTIVE AND OBJECTIVE BOX
Date of Admission: 11/3/21    Interval History:  - Patient assessed in chair on RA, NAD  - Stated he is feeling "great" and that his breathing has improved. Endorses a dry cough.      HISTORY OF PRESENT ILLNESS:   86 yr old male with hx of HTN, DM, AFib no AC 2/2 falls, HFpEF, JOHANNY presented to ED today 2/2 dizziness on/off since . States he was feeling dizzy yesterday and fell x2 and again felt dizzy today when he  went to urgent care where he was lowered to ground to prevent from falling. Son states that recent change in meds may be contributing to dad feeling dizzy. Son stated pts torsemide was increased and farxiga was added. Currently, pt states he feels fine and is asymptomatic. Pt denied loc, fevers, chills, cough or sputum production, nausea or vomiting, diarrhea or other associated symptoms. (2021 20:20)      PAST MEDICAL & SURGICAL HISTORY:    Afib  DM (diabetes mellitus)  CKD (chronic kidney disease)  Hydrocephalus  Hypertension  History of prostate surgery    FAMILY HISTORY:    Mother:  at 72 of HF  Father:  at 68 of a brain aneurism    SOCIAL HISTORY:      Former smoker, denies alcohol or drug use     Allergies    No Known Allergies    Intolerances      PHYSICAL EXAM:  General Appearance: well appearing, normal for age and gender. 	  Neck: normal JVP, no bruit.   Eyes: Extra Ocular muscles intact.   Cardiovascular: regular rate and rhythm S1 S2, No JVD, No murmurs, No edema  Respiratory: Lungs clear to auscultation	  Psychiatry: Alert and oriented x 3, Mood & affect appropriate  Gastrointestinal:  Soft, Non-tender  Skin/Integumentary : No rashes, No ecchymoses, No cyanosis	  Neurologic: Non-focal  Musculoskeletal/ extremities: Normal range of motion, No clubbing, cyanosis or edema  Vascular: Peripheral pulses palpable 2+ bilaterally    CARDIAC MARKERS:    Serum Pro-Brain Natriuretic Peptide: 3334 pg/mL (21 @ 15:00)      PREVIOUS DIAGNOSTIC TESTING:      TTE 21     Summary:   1. Normal global left ventricular systolic function.   2. LV Ejection Fraction by Lemon's Method with a biplane EF of 54 %.   3. Mild left ventricular hypertrophy.   4. Mildly increased LV wall thickness.   5. Normal left ventricular internal cavity size.   6. Moderately enlarged left atrium.   7. Mild mitral annular calcification.   8. Mild thickening and calcification of the anterior and posterior mitral valve leaflets.   9. Mild to moderate mitral valve regurgitation.  10. Mild-moderate tricuspid regurgitation.  11. Mild aortic regurgitation.  12. Sclerotic aortic valve with normal opening.  13. Estimated pulmonary artery systolic pressure is 54.2 mmHg assuming a right atrial pressure of 5 mmHg, which is consistent with moderate pulmonary hypertension.  14. LA volume Index is 51.8 ml/m² ml/m2.  15. There is moderate aortic root calcification.        Home Medications:  acetaminophen 325 mg oral tablet: 2 tab(s) orally every 6 hours, As needed, Moderate Pain (4 - 6) (2021 11:54)  Basaglar KwikPen: 20 unit(s) subcutaneous once a day (at bedtime) (2021 22:10)  calcitriol 0.25 mcg oral capsule: 1 cap(s) orally once a day (2021 15:07)  dutasteride 0.5 mg oral capsule: 1 cap(s) orally once a day (2021 15:07)  Farxiga 10 mg oral tablet: 1 tab(s) orally once a day (2021 21:56)  gabapentin 100 mg oral capsule: 1 cap(s) orally once a day in AM (2021 21:49)  gabapentin 300 mg oral capsule: 1 cap(s) orally once a day (at bedtime) (2021 21:49)  metOLazone 5 mg oral tablet: orally 3 times a week -W- (2021 22:06)  Metoprolol Succinate ER 25 mg oral tablet, extended release: 1 tab(s) orally once a day (2021 21:52)  Ozempic (1 mg dose) 4 mg/3 mL subcutaneous solution: 1 milligram(s) subcutaneous once a week on Thursday (2021 21:50)  spironolactone 25 mg oral tablet: 1 tab(s) orally once a day (2021 22:00)    MEDICATIONS  (STANDING):  atorvastatin 20 milliGRAM(s) Oral at bedtime  calcium acetate 667 milliGRAM(s) Oral three times a day with meals  dextrose 40% Gel 15 Gram(s) Oral once  dextrose 5%. 1000 milliLiter(s) (50 mL/Hr) IV Continuous <Continuous>  dextrose 5%. 1000 milliLiter(s) (100 mL/Hr) IV Continuous <Continuous>  dextrose 50% Injectable 25 Gram(s) IV Push once  dextrose 50% Injectable 12.5 Gram(s) IV Push once  dextrose 50% Injectable 25 Gram(s) IV Push once  finasteride 5 milliGRAM(s) Oral daily  gabapentin 100 milliGRAM(s) Oral daily  gabapentin 300 milliGRAM(s) Oral at bedtime  glucagon  Injectable 1 milliGRAM(s) IntraMuscular once  heparin   Injectable 5000 Unit(s) SubCutaneous every 8 hours  insulin glargine Injectable (LANTUS) 20 Unit(s) SubCutaneous at bedtime  insulin lispro (ADMELOG) corrective regimen sliding scale   SubCutaneous three times a day before meals  insulin lispro Injectable (ADMELOG) 7 Unit(s) SubCutaneous before breakfast  insulin lispro Injectable (ADMELOG) 7 Unit(s) SubCutaneous before lunch  insulin lispro Injectable (ADMELOG) 7 Unit(s) SubCutaneous before dinner  tamsulosin 0.4 milliGRAM(s) Oral at bedtime

## 2021-11-15 NOTE — PROGRESS NOTE ADULT - ASSESSMENT
CHF/Near syncope/ TOBIAS on CKD/ VT / CAD s/p PCI to LAD    Patient remains euvolemic on exam   C/w torsemide 20 mg daily   Continue metoprolol 25 mg BID   Get BMP daily   Maintain potassium >4.0, Mg >2.1  Strict intake and output  Continue DAPT and statin   EP follow up for ICD  Daily weight    **ATTENDING ATTESTATION PENDING**

## 2021-11-15 NOTE — PROGRESS NOTE ADULT - SUBJECTIVE AND OBJECTIVE BOX
VINCENZO EDMONDSON 86y Male  MRN#: [id number]     Hospital Day: 12d    Pt is currently admitted with the primary diagnosis of near syncope    SUBJECTIVE    No Overnight events     No Subjective complaints     patient seen and examined    Present Today:   - Aguirre:  No                                          ----------------------------------------------------------  OBJECTIVE  PAST MEDICAL & SURGICAL HISTORY  Afib    DM (diabetes mellitus)    CKD (chronic kidney disease)    Hydrocephalus    Hypertension    History of prostate surgery                                              -----------------------------------------------------------  ALLERGIES:  No Known Allergies                                            ------------------------------------------------------------    HOME MEDICATIONS  Home Medications:  acetaminophen 325 mg oral tablet: 2 tab(s) orally every 6 hours, As needed, Moderate Pain (4 - 6) (12 Jul 2021 11:54)  Basaglar KwikPen: 20 unit(s) subcutaneous once a day (at bedtime) (03 Nov 2021 22:10)  calcitriol 0.25 mcg oral capsule: 1 cap(s) orally once a day (30 Jun 2021 15:07)  dutasteride 0.5 mg oral capsule: 1 cap(s) orally once a day (30 Jun 2021 15:07)  Farxiga 10 mg oral tablet: 1 tab(s) orally once a day (03 Nov 2021 21:56)  gabapentin 100 mg oral capsule: 1 cap(s) orally once a day in AM (03 Nov 2021 21:49)  gabapentin 300 mg oral capsule: 1 cap(s) orally once a day (at bedtime) (03 Nov 2021 21:49)  metOLazone 5 mg oral tablet: orally 3 times a week M-W-F (03 Nov 2021 22:06)  Metoprolol Succinate ER 25 mg oral tablet, extended release: 1 tab(s) orally once a day (03 Nov 2021 21:52)  Ozempic (1 mg dose) 4 mg/3 mL subcutaneous solution: 1 milligram(s) subcutaneous once a week on Thursday (03 Nov 2021 21:50)  spironolactone 25 mg oral tablet: 1 tab(s) orally once a day (03 Nov 2021 22:00)                           MEDICATIONS:  STANDING MEDICATIONS  aMIOdarone    Tablet 200 milliGRAM(s) Oral two times a day  aspirin enteric coated 81 milliGRAM(s) Oral daily  atorvastatin 20 milliGRAM(s) Oral at bedtime  calcium acetate 667 milliGRAM(s) Oral three times a day with meals  chlorhexidine 4% Liquid 1 Application(s) Topical daily  clopidogrel Tablet 75 milliGRAM(s) Oral daily  dextrose 40% Gel 15 Gram(s) Oral once  dextrose 5%. 1000 milliLiter(s) IV Continuous <Continuous>  dextrose 5%. 1000 milliLiter(s) IV Continuous <Continuous>  dextrose 50% Injectable 25 Gram(s) IV Push once  dextrose 50% Injectable 12.5 Gram(s) IV Push once  dextrose 50% Injectable 25 Gram(s) IV Push once  finasteride 5 milliGRAM(s) Oral daily  glucagon  Injectable 1 milliGRAM(s) IntraMuscular once  insulin glargine Injectable (LANTUS) 20 Unit(s) SubCutaneous at bedtime  insulin lispro (ADMELOG) corrective regimen sliding scale   SubCutaneous three times a day before meals  insulin lispro Injectable (ADMELOG) 7 Unit(s) SubCutaneous before breakfast  insulin lispro Injectable (ADMELOG) 7 Unit(s) SubCutaneous before lunch  insulin lispro Injectable (ADMELOG) 7 Unit(s) SubCutaneous before dinner  metoprolol tartrate 50 milliGRAM(s) Oral two times a day  tamsulosin 0.4 milliGRAM(s) Oral at bedtime  torsemide 20 milliGRAM(s) Oral daily    PRN MEDICATIONS  acetaminophen     Tablet .. 650 milliGRAM(s) Oral every 6 hours PRN  guaifenesin/dextromethorphan Oral Liquid 10 milliLiter(s) Oral every 4 hours PRN                                            ------------------------------------------------------------  VITAL SIGNS: Last 24 Hours  T(C): 36.4 (15 Nov 2021 05:32), Max: 36.4 (15 Nov 2021 05:32)  T(F): 97.6 (15 Nov 2021 05:32), Max: 97.6 (15 Nov 2021 05:32)  HR: 65 (15 Nov 2021 05:32) (60 - 65)  BP: 110/57 (15 Nov 2021 05:32) (99/55 - 110/57)  BP(mean): --  RR: 20 (15 Nov 2021 05:32) (20 - 20)  SpO2: --      11-14-21 @ 07:01  -  11-15-21 @ 07:00  --------------------------------------------------------  IN: 450 mL / OUT: 350 mL / NET: 100 mL    11-15-21 @ 07:01  -  11-15-21 @ 14:49  --------------------------------------------------------  IN: 0 mL / OUT: 250 mL / NET: -250 mL                                             --------------------------------------------------------------  LABS:                        11.5   10.93 )-----------( 201      ( 15 Nov 2021 07:28 )             37.3     11-15    141  |  100  |  125<HH>  ----------------------------<  115<H>  4.2   |  24  |  3.1<H>    Ca    9.4      15 Nov 2021 07:28  Mg     2.5     11-15    TPro  6.0  /  Alb  3.6  /  TBili  0.6  /  DBili  x   /  AST  10  /  ALT  <5  /  AlkPhos  88  11-15                                                              -------------------------------------------------------------  RADIOLOGY:                                            --------------------------------------------------------------    PHYSICAL EXAM:    GENERAL: AAOx3. Well-nourished, laying in bed appearing in no acute distress  LUNGS: Clear to auscultation bilaterally  HEART: S1/S2. No heaves or thrills  ABD: Soft, non-tender, non-distended. Bowel sounds present in all quadrants.  EXT/NEURO: 5/5 strength in all extremity joints. Sensation and ROM grossly intact.                                          VINCENZO EDMONDSON 86y Male  MRN#: [id number]     Hospital Day: 12d    Pt is currently admitted with the primary diagnosis of near syncope    SUBJECTIVE    No Overnight events     No Subjective complaints     patient seen and examined    Present Today:   - Tiomthy:  Yes, indication: obstruction                                          ----------------------------------------------------------  OBJECTIVE  PAST MEDICAL & SURGICAL HISTORY  Afib    DM (diabetes mellitus)    CKD (chronic kidney disease)    Hydrocephalus    Hypertension    History of prostate surgery                                              -----------------------------------------------------------  ALLERGIES:  No Known Allergies                                            ------------------------------------------------------------    HOME MEDICATIONS  Home Medications:  acetaminophen 325 mg oral tablet: 2 tab(s) orally every 6 hours, As needed, Moderate Pain (4 - 6) (12 Jul 2021 11:54)  Basaglar KwikPen: 20 unit(s) subcutaneous once a day (at bedtime) (03 Nov 2021 22:10)  calcitriol 0.25 mcg oral capsule: 1 cap(s) orally once a day (30 Jun 2021 15:07)  dutasteride 0.5 mg oral capsule: 1 cap(s) orally once a day (30 Jun 2021 15:07)  Farxiga 10 mg oral tablet: 1 tab(s) orally once a day (03 Nov 2021 21:56)  gabapentin 100 mg oral capsule: 1 cap(s) orally once a day in AM (03 Nov 2021 21:49)  gabapentin 300 mg oral capsule: 1 cap(s) orally once a day (at bedtime) (03 Nov 2021 21:49)  metOLazone 5 mg oral tablet: orally 3 times a week M-W-F (03 Nov 2021 22:06)  Metoprolol Succinate ER 25 mg oral tablet, extended release: 1 tab(s) orally once a day (03 Nov 2021 21:52)  Ozempic (1 mg dose) 4 mg/3 mL subcutaneous solution: 1 milligram(s) subcutaneous once a week on Thursday (03 Nov 2021 21:50)  spironolactone 25 mg oral tablet: 1 tab(s) orally once a day (03 Nov 2021 22:00)                           MEDICATIONS:  STANDING MEDICATIONS  aMIOdarone    Tablet 200 milliGRAM(s) Oral two times a day  aspirin enteric coated 81 milliGRAM(s) Oral daily  atorvastatin 20 milliGRAM(s) Oral at bedtime  calcium acetate 667 milliGRAM(s) Oral three times a day with meals  chlorhexidine 4% Liquid 1 Application(s) Topical daily  clopidogrel Tablet 75 milliGRAM(s) Oral daily  dextrose 40% Gel 15 Gram(s) Oral once  dextrose 5%. 1000 milliLiter(s) IV Continuous <Continuous>  dextrose 5%. 1000 milliLiter(s) IV Continuous <Continuous>  dextrose 50% Injectable 25 Gram(s) IV Push once  dextrose 50% Injectable 12.5 Gram(s) IV Push once  dextrose 50% Injectable 25 Gram(s) IV Push once  finasteride 5 milliGRAM(s) Oral daily  glucagon  Injectable 1 milliGRAM(s) IntraMuscular once  insulin glargine Injectable (LANTUS) 20 Unit(s) SubCutaneous at bedtime  insulin lispro (ADMELOG) corrective regimen sliding scale   SubCutaneous three times a day before meals  insulin lispro Injectable (ADMELOG) 7 Unit(s) SubCutaneous before breakfast  insulin lispro Injectable (ADMELOG) 7 Unit(s) SubCutaneous before lunch  insulin lispro Injectable (ADMELOG) 7 Unit(s) SubCutaneous before dinner  metoprolol tartrate 50 milliGRAM(s) Oral two times a day  tamsulosin 0.4 milliGRAM(s) Oral at bedtime  torsemide 20 milliGRAM(s) Oral daily    PRN MEDICATIONS  acetaminophen     Tablet .. 650 milliGRAM(s) Oral every 6 hours PRN  guaifenesin/dextromethorphan Oral Liquid 10 milliLiter(s) Oral every 4 hours PRN                                            ------------------------------------------------------------  VITAL SIGNS: Last 24 Hours  T(C): 36.4 (15 Nov 2021 05:32), Max: 36.4 (15 Nov 2021 05:32)  T(F): 97.6 (15 Nov 2021 05:32), Max: 97.6 (15 Nov 2021 05:32)  HR: 65 (15 Nov 2021 05:32) (60 - 65)  BP: 110/57 (15 Nov 2021 05:32) (99/55 - 110/57)  BP(mean): --  RR: 20 (15 Nov 2021 05:32) (20 - 20)  SpO2: --      11-14-21 @ 07:01  -  11-15-21 @ 07:00  --------------------------------------------------------  IN: 450 mL / OUT: 350 mL / NET: 100 mL    11-15-21 @ 07:01  -  11-15-21 @ 14:49  --------------------------------------------------------  IN: 0 mL / OUT: 250 mL / NET: -250 mL                                             --------------------------------------------------------------  LABS:                        11.5   10.93 )-----------( 201      ( 15 Nov 2021 07:28 )             37.3     11-15    141  |  100  |  125<HH>  ----------------------------<  115<H>  4.2   |  24  |  3.1<H>    Ca    9.4      15 Nov 2021 07:28  Mg     2.5     11-15    TPro  6.0  /  Alb  3.6  /  TBili  0.6  /  DBili  x   /  AST  10  /  ALT  <5  /  AlkPhos  88  11-15                                                              -------------------------------------------------------------  RADIOLOGY:                                            --------------------------------------------------------------    PHYSICAL EXAM:    GENERAL: AAOx3. Well-nourished, laying in bed appearing in no acute distress  LUNGS: Clear to auscultation bilaterally  HEART: S1/S2. No heaves or thrills  ABD: Soft, non-tender, non-distended. Bowel sounds present in all quadrants.  EXT/NEURO: 5/5 strength in all extremity joints. Sensation and ROM grossly intact.

## 2021-11-15 NOTE — CONSULT NOTE ADULT - SUBJECTIVE AND OBJECTIVE BOX
Patient is a 86y old  Male who presents with a chief complaint of near syncope (15 Nov 2021 10:44)      HPI:  86 yr old male with hx of HTN, DM, AFib no AC 2/2 falls, HFpEF, JOHANNY presented to ED today 2/2 dizziness on/off since . States he was feeling dizzy yesterday and fell x2 and again felt dizzy today when he  went to urgent care where he was lowered to ground to prevent from falling. Son states pt saw his cardiologist Dr Huerta who changed up his meds last week and states this may be contributing to dad feeling dizzy. Son stated pts torsemide was increased and farxiga was added. Currently, pt states he feels fine and is asymptomatic. Pt denied loc, fevers, chills, cough or sputum production, nausea or vomiting, diarrhea or other associated symptoms. (2021 20:20)    Urology: Pt well known to urology, hx of recurrent urethral stricture, BPH. s/p prior dilation.  Pt with urethral stricture now in overflow incontinence, Pt unable to control himself.  Attempt to pass a Aguirre was unsuccessful do to meatal stricture.      PAST MEDICAL & SURGICAL HISTORY:  Afib    DM (diabetes mellitus)    CKD (chronic kidney disease)    Hydrocephalus    Hypertension    History of prostate surgery        REVIEW OF SYSTEMS:    [ x ] a 10 point review of systems was negative except where noted    MEDICATIONS  (STANDING):  aMIOdarone    Tablet 200 milliGRAM(s) Oral two times a day  aspirin enteric coated 81 milliGRAM(s) Oral daily  atorvastatin 20 milliGRAM(s) Oral at bedtime  calcium acetate 667 milliGRAM(s) Oral three times a day with meals  chlorhexidine 4% Liquid 1 Application(s) Topical daily  clopidogrel Tablet 75 milliGRAM(s) Oral daily  dextrose 40% Gel 15 Gram(s) Oral once  dextrose 5%. 1000 milliLiter(s) (50 mL/Hr) IV Continuous <Continuous>  dextrose 5%. 1000 milliLiter(s) (100 mL/Hr) IV Continuous <Continuous>  dextrose 50% Injectable 25 Gram(s) IV Push once  dextrose 50% Injectable 12.5 Gram(s) IV Push once  dextrose 50% Injectable 25 Gram(s) IV Push once  finasteride 5 milliGRAM(s) Oral daily  glucagon  Injectable 1 milliGRAM(s) IntraMuscular once  insulin glargine Injectable (LANTUS) 20 Unit(s) SubCutaneous at bedtime  insulin lispro (ADMELOG) corrective regimen sliding scale   SubCutaneous three times a day before meals  insulin lispro Injectable (ADMELOG) 7 Unit(s) SubCutaneous before breakfast  insulin lispro Injectable (ADMELOG) 7 Unit(s) SubCutaneous before lunch  insulin lispro Injectable (ADMELOG) 7 Unit(s) SubCutaneous before dinner  metoprolol tartrate 50 milliGRAM(s) Oral two times a day  tamsulosin 0.4 milliGRAM(s) Oral at bedtime  torsemide 20 milliGRAM(s) Oral daily    MEDICATIONS  (PRN):  acetaminophen     Tablet .. 650 milliGRAM(s) Oral every 6 hours PRN Moderate Pain (4 - 6)  guaifenesin/dextromethorphan Oral Liquid 10 milliLiter(s) Oral every 4 hours PRN cough and/or congestion      Allergies    No Known Allergies    SOCIAL HISTORY: No illicit drug use    FAMILY HISTORY:  Family history of diabetes mellitus (Mother, Sibling)        Vital Signs Last 24 Hrs  T(C): 36.4 (15 Nov 2021 05:32), Max: 36.4 (15 Nov 2021 05:32)  T(F): 97.6 (15 Nov 2021 05:32), Max: 97.6 (15 Nov 2021 05:32)  HR: 65 (15 Nov 2021 05:32) (60 - 65)  BP: 110/57 (15 Nov 2021 05:32) (99/55 - 110/57)  RR: 20 (15 Nov 2021 05:32) (20 - 20)       Daily Weight in k.1 (15 Nov 2021 05:32)    PHYSICAL EXAM:    Constitutional: NAD, well-developed, c/o leaking all the time  HEENT: NC/AT  Neck: no pain, FROM  Back: No CVA tenderness  Respiratory: No accessory respiratory muscle use  Abd: Soft, NT/ND  no organomegally  no hernia  : uncircumcised, meatal opening with mild catheter induced hypospadias  testicles x 2 non tender, no masses, large right groin ecchymosis from cardiac cath.  Extremities: no edema  Neurological: A/O x 3  Psychiatric: Normal mood, normal affect  Skin: No rashes    I&O's Summary    2021 07:01  -  15 Nov 2021 07:00  --------------------------------------------------------  IN: 450 mL / OUT: 350 mL / NET: 100 mL    15 Nov 2021 07:01  -  15 Nov 2021 13:58  --------------------------------------------------------  IN: 0 mL / OUT: 250 mL / NET: -250 mL        LABS:                        11.5   10.93 )-----------( 201      ( 15 Nov 2021 07:28 )             37.3     11-15    141  |  100  |  125<HH>  ----------------------------<  115<H>  4.2   |  24  |  3.1<H>    Ca    9.4      15 Nov 2021 07:28  Mg     2.5     11-15    TPro  6.0  /  Alb  3.6  /  TBili  0.6  /  DBili  x   /  AST  10  /  ALT  <5  /  AlkPhos  88  11-15

## 2021-11-15 NOTE — PROGRESS NOTE ADULT - SUBJECTIVE AND OBJECTIVE BOX
NEPHROLOGY FOLLOW UP NOTE    pt seen and examined  tolerating PO fairly well  no dyspnea, + chronic cough  bladder scan with 650cc        PAST MEDICAL & SURGICAL HISTORY:  Hypertension  Hydrocephalus  CKD (chronic kidney disease)  DM (diabetes mellitus)  Afib  History of prostate surgery    Allergies:  No Known Allergies    Home Medications Reviewed    SOCIAL HISTORY:  Denies ETOH,Smoking,   FAMILY HISTORY:  Family history of diabetes mellitus (Mother, Sibling)      REVIEW OF SYSTEMS  All other review of systems is negative unless indicated above.      PHYSICAL EXAM:  NAD  [place]  moist mm  no ln  + aicd  decreased BS b/l  distant HS, irregular   soft + BS  1+ edema  + asterixis      Hospital Medications:   MEDICATIONS  (STANDING):  aMIOdarone    Tablet 200 milliGRAM(s) Oral two times a day  aspirin enteric coated 81 milliGRAM(s) Oral daily  atorvastatin 20 milliGRAM(s) Oral at bedtime  calcium acetate 667 milliGRAM(s) Oral three times a day with meals  chlorhexidine 4% Liquid 1 Application(s) Topical daily  clopidogrel Tablet 75 milliGRAM(s) Oral daily  dextrose 40% Gel 15 Gram(s) Oral once  dextrose 5%. 1000 milliLiter(s) (50 mL/Hr) IV Continuous <Continuous>  dextrose 5%. 1000 milliLiter(s) (100 mL/Hr) IV Continuous <Continuous>  dextrose 50% Injectable 25 Gram(s) IV Push once  dextrose 50% Injectable 12.5 Gram(s) IV Push once  dextrose 50% Injectable 25 Gram(s) IV Push once  finasteride 5 milliGRAM(s) Oral daily  glucagon  Injectable 1 milliGRAM(s) IntraMuscular once  insulin glargine Injectable (LANTUS) 20 Unit(s) SubCutaneous at bedtime  insulin lispro (ADMELOG) corrective regimen sliding scale   SubCutaneous three times a day before meals  insulin lispro Injectable (ADMELOG) 7 Unit(s) SubCutaneous before breakfast  insulin lispro Injectable (ADMELOG) 7 Unit(s) SubCutaneous before lunch  insulin lispro Injectable (ADMELOG) 7 Unit(s) SubCutaneous before dinner  metoprolol tartrate 50 milliGRAM(s) Oral two times a day  tamsulosin 0.4 milliGRAM(s) Oral at bedtime  torsemide 20 milliGRAM(s) Oral daily        VITALS:  T(F): 97.6 (11-15-21 @ 05:32), Max: 98.6 (11-14-21 @ 13:41)  HR: 65 (11-15-21 @ 05:32)  BP: 110/57 (11-15-21 @ 05:32)  RR: 20 (11-15-21 @ 05:32)  SpO2: --  Wt(kg): --    11-13 @ 07:01  -  11-14 @ 07:00  --------------------------------------------------------  IN: 450 mL / OUT: 450 mL / NET: 0 mL    11-14 @ 07:01  -  11-15 @ 07:00  --------------------------------------------------------  IN: 450 mL / OUT: 350 mL / NET: 100 mL    11-15 @ 07:01  -  11-15 @ 10:45  --------------------------------------------------------  IN: 0 mL / OUT: 250 mL / NET: -250 mL          LABS:  11-15    141  |  100  |  125<HH>  ----------------------------<  115<H>  4.2   |  24  |  3.1<H>    Ca    9.4      15 Nov 2021 07:28  Mg     2.5     11-15    TPro  6.0  /  Alb  3.6  /  TBili  0.6  /  DBili      /  AST  10  /  ALT  <5  /  AlkPhos  88  11-15                          11.5   10.93 )-----------( 201      ( 15 Nov 2021 07:28 )             37.3       Urine Studies:        RADIOLOGY & ADDITIONAL STUDIES:

## 2021-11-15 NOTE — PROGRESS NOTE ADULT - ATTENDING COMMENTS
Continues to have PVCs/short NSVT    - Continue Amiodarone  - Increase lopressor to 50 mg qam and 25 mg qpm  - Ok to DC home  - OP f-up

## 2021-11-15 NOTE — PROGRESS NOTE ADULT - ASSESSMENT
TOBIAS    CKD stage 4-5  s/p PCI to LAD / CAD  hyperphosphatemia  dizziness / falls  chronic HFpEF / Afib / VT s/p AICD  JOHANNY on CPAP    HTN   NPH s/p  shunt with no shunt patency on shuntogram  DM 2    dm neuropathy    plan:    cont torsemide 20mg po qd  dc gabapentin  d/w son at bedside re renal replacement options.  Pt declined HD in past when cognition was better.  Risks and benefits of HD start in elderly, debilitated patient with severe cardiac disease, including sustained VT and dementia discussed.  Son at this time wants to hold off on HD  trend renal function and electrolytes  renal diet /  phoslo with meals  flomax / monitor void / PRN bladder scan and PVR check, avoid barragan if possible / cont flomax  d/w team and Son

## 2021-11-16 NOTE — PROGRESS NOTE ADULT - ATTENDING COMMENTS
I personally saw and examined the patient, reviewed the chart and available data. I discussed the situation with the patient and pa staff on evening rounds. I also reviewed and/or amended the note as necessary.    patient seen on the day in question. Note not reviewed and cosigned until now due to scheduling issues

## 2021-11-16 NOTE — PROGRESS NOTE ADULT - SUBJECTIVE AND OBJECTIVE BOX
NEPHROLOGY FOLLOW UP NOTE    pt seen and examined  barragan placed  BP's low  no sob        PAST MEDICAL & SURGICAL HISTORY:  Hypertension  Hydrocephalus  CKD (chronic kidney disease)  DM (diabetes mellitus)  Afib  History of prostate surgery    Allergies:  No Known Allergies    Home Medications Reviewed    SOCIAL HISTORY:  Denies ETOH,Smoking,   FAMILY HISTORY:  Family history of diabetes mellitus (Mother, Sibling)      REVIEW OF SYSTEMS  All other review of systems is negative unless indicated above.      PHYSICAL EXAM:  NAD  [place]  moist mm  no ln  + aicd  decreased BS b/l  distant HS, irregular   soft + BS  trace edema    Hospital Medications:   MEDICATIONS  (STANDING):  aMIOdarone    Tablet 200 milliGRAM(s) Oral two times a day  aspirin enteric coated 81 milliGRAM(s) Oral daily  atorvastatin 20 milliGRAM(s) Oral at bedtime  calcium acetate 667 milliGRAM(s) Oral three times a day with meals  chlorhexidine 4% Liquid 1 Application(s) Topical daily  chlorhexidine 4% Liquid 1 Application(s) Topical daily  clopidogrel Tablet 75 milliGRAM(s) Oral daily  dextrose 40% Gel 15 Gram(s) Oral once  dextrose 5%. 1000 milliLiter(s) (50 mL/Hr) IV Continuous <Continuous>  dextrose 5%. 1000 milliLiter(s) (100 mL/Hr) IV Continuous <Continuous>  dextrose 50% Injectable 25 Gram(s) IV Push once  dextrose 50% Injectable 12.5 Gram(s) IV Push once  dextrose 50% Injectable 25 Gram(s) IV Push once  finasteride 5 milliGRAM(s) Oral daily  glucagon  Injectable 1 milliGRAM(s) IntraMuscular once  insulin glargine Injectable (LANTUS) 20 Unit(s) SubCutaneous at bedtime  insulin lispro (ADMELOG) corrective regimen sliding scale   SubCutaneous three times a day before meals  insulin lispro Injectable (ADMELOG) 7 Unit(s) SubCutaneous before breakfast  insulin lispro Injectable (ADMELOG) 7 Unit(s) SubCutaneous before lunch  insulin lispro Injectable (ADMELOG) 7 Unit(s) SubCutaneous before dinner  metoprolol tartrate 50 milliGRAM(s) Oral two times a day  sodium chloride 0.45%. 500 milliLiter(s) (100 mL/Hr) IV Continuous <Continuous>  tamsulosin 0.4 milliGRAM(s) Oral at bedtime        VITALS:  T(F): 96.9 (16- @ 13:25), Max: 96.9 (11-16-21 @ 13:25)  HR: 60 (21 @ 13:25)  BP: 109/58 (21 @ 13:25)  RR: 18 (21 @ 13:25)  SpO2: 97% (11-15-21 @ 20:33)  Wt(kg): --     @ 07:01  -  11-15 @ 07:00  --------------------------------------------------------  IN: 450 mL / OUT: 350 mL / NET: 100 mL    11-15 @ 07:01  -   @ 07:00  --------------------------------------------------------  IN: 760 mL / OUT: 2100 mL / NET: -1340 mL     @ 07:01  -   @ 14:47  --------------------------------------------------------  IN: 460 mL / OUT: 1200 mL / NET: -740 mL          LABS:      143  |  101  |  118<HH>  ----------------------------<  119<H>  3.5   |  24  |  3.2<H>    Ca    9.5      2021 04:30  Mg     2.4         TPro  6.0  /  Alb  3.7  /  TBili  0.6  /  DBili      /  AST  9   /  ALT  <5  /  AlkPhos  87                            11.0   9.86  )-----------( 206      ( 2021 04:30 )             36.1       Urine Studies:  Urinalysis Basic - ( 15 Nov 2021 09:23 )    Color: Light Yellow / Appearance: Clear / S.014 / pH:   Gluc:  / Ketone: Negative  / Bili: Negative / Urobili: <2 mg/dL   Blood:  / Protein: Negative / Nitrite: Negative   Leuk Esterase: Negative / RBC:  / WBC    Sq Epi:  / Non Sq Epi:  / Bacteria:       Chloride, Random Urine: 30 (11-15 @ 09:23)  Creatinine, Random Urine: 51 mg/dL (11-15 @ 09:23)  Sodium, Random Urine: 27.0 mmoL/L (11-15 @ 09:23)  Osmolality, Random Urine: 375 mos/kg (11-15 @ 09:23)      RADIOLOGY & ADDITIONAL STUDIES:

## 2021-11-16 NOTE — PROGRESS NOTE ADULT - ATTENDING SUPERVISION STATEMENT
ACP
Resident
ACP
Resident
Resident/Fellow

## 2021-11-16 NOTE — PROGRESS NOTE ADULT - ATTENDING COMMENTS
Pt seen and examined independently and case discussed with resident and heart failure team today.  He c/o dizziness when he sat in the chair this morning and SBP dropped to the 80's per RN  denies pain, SOB    PE VS /58 P60 R16 afebrile  general - weak appearing, NAD  HEENT - MMM  chest - CTA b/l  CVS - IRR  abdomen - soft, NT, ND  extremities - no edema  left groin - ecchymoses, stable  neuro - [place], awake, alert, cooperative    labs 11/16 reviewed by me  Telemetry reviewed by me- multiple PVCs and runs of NSVT    Dizziness and hypotension today  may be due to postobstructive diuresis - 2100 cc since barragan placed yesterday  500cc fluid bolus given with improvement  monitor I's and O's  encourage PO intake  hold torsemide today and re-evaluate in AM per heart failure    Syncope    CAD s/p stent to LAD - on DAPT, metoprolol, statin    Vtach s/p AICD - on amiodarone and metoprolol   EP f/u  monitor electrolytes  keep K>4 and Mg>2  unable to titrate up metoprolol due to hypotension and HR 60 now    left groin hematoma - monitor site and H/H - DVT prophylaxis on hold for now - resume if remains stable    Chronic HFpEF - hold torsemide for now - collapsed IVC on exam and pt was hypotension - s/p fluid bolus  daily wts  heart failure following    TOBIAS over CKD 4 - Cr relatively stable from yesterday  avoid hypotension  diuretic on hold  barragan placed by SHAYLEE 11/15 and monitor I's and O's  BMP in AM  renal f/u    Urinary retention - pt will be discharged with barragan and f/u with SHAYLEE as outpt          PROGRESS NOTE HANDOFF    Pending: improvement in BP and symptoms and renal function, labs in AM  renal and heart failure f/u    Disposition: SNF

## 2021-11-16 NOTE — PROGRESS NOTE ADULT - ASSESSMENT
TOBIAS    CKD stage 4-5  s/p PCI to LAD / CAD  hyperphosphatemia  dizziness / falls  chronic HFpEF / Afib / VT s/p AICD  JOHANNY on CPAP    HTN   NPH s/p  shunt with no shunt patency on shuntogram  DM 2    dm neuropathy    plan:    agree with holding diuretics and short run of gentle hydration  cont barragan  off gabapentin  renal diet /  phoslo with meals  f/u labs  physical therapy

## 2021-11-16 NOTE — PROGRESS NOTE ADULT - SUBJECTIVE AND OBJECTIVE BOX
Pt seen and examined without complaints.    Vital Signs Last 24 Hrs  T(C): 35.6 (16 Nov 2021 20:28), Max: 36.1 (16 Nov 2021 13:25)  T(F): 96 (16 Nov 2021 20:28), Max: 96.9 (16 Nov 2021 13:25)  HR: 62 (16 Nov 2021 20:28) (60 - 78)  BP: 91/53 (16 Nov 2021 20:28) (86/51 - 111/54)  BP(mean): --  RR: 18 (16 Nov 2021 13:25) (18 - 18)          Physical Exam  Gen: NAD  Pulm: No respiratory distress, no subcostal retractions  CV: RRR, no JVD  Abd: Soft, NT, ND  Back: No  CVA tenderness   : The scrotum is normal w/ b/l descended testes, the penis is uncircumcised, a 16 F latex barragan catheter is in place, meatus is keyholed from prior barragan placement.  Urine is clear yellow                          11.0   9.86  )-----------( 206      ( 16 Nov 2021 04:30 )             36.1       11-16    143  |  101  |  118<HH>  ----------------------------<  119<H>  3.5   |  24  |  3.2<H>    Ca    9.5      16 Nov 2021 04:30  Mg     2.4     11-16    TPro  6.0  /  Alb  3.7  /  TBili  0.6  /  DBili  x   /  AST  9   /  ALT  <5  /  AlkPhos  87  11-16      A/P: 86y Male s/p difficult barragan catheter secondary to meatal and urethral strictures  Maintain good barragan catheter care  Pt should f/u with his Urologist Dr Matthews as outpt  Pt seen and examined by Dr Araiza, progress note will be updated in am.     Pt seen and examined without complaints.    Vital Signs Last 24 Hrs  T(C): 35.6 (16 Nov 2021 20:28), Max: 36.1 (16 Nov 2021 13:25)  T(F): 96 (16 Nov 2021 20:28), Max: 96.9 (16 Nov 2021 13:25)  HR: 62 (16 Nov 2021 20:28) (60 - 78)  BP: 91/53 (16 Nov 2021 20:28) (86/51 - 111/54)  BP(mean): --  RR: 18 (16 Nov 2021 13:25) (18 - 18)          Physical Exam  Gen: NAD  Pulm: No respiratory distress, no subcostal retractions  CV: RRR, no JVD  Abd: Soft, NT, ND  Back: No  CVA tenderness   : The scrotum is normal w/ b/l descended testes, the penis is uncircumcised, a 16 F latex barragan catheter is in place, meatus is keyholed from prior barragan placement.  Urine is clear yellow                          11.0   9.86  )-----------( 206      ( 16 Nov 2021 04:30 )             36.1       11-16    143  |  101  |  118<HH>  ----------------------------<  119<H>  3.5   |  24  |  3.2<H>    Ca    9.5      16 Nov 2021 04:30  Mg     2.4     11-16    TPro  6.0  /  Alb  3.7  /  TBili  0.6  /  DBili  x   /  AST  9   /  ALT  <5  /  AlkPhos  87  11-16      A/P: 86y Male s/p difficult barragan catheter secondary to meatal and urethral strictures  Maintain good barragan catheter care  Pt should f/u with his Urologist Dr Matthews as outpt  Pt seen and examined by Dr Araiza, progress note will be updated in am.

## 2021-11-16 NOTE — PROGRESS NOTE ADULT - ASSESSMENT
86 yr old male with hx of HTN, DM, AFib no AC 2/2 falls, HFpEF, JOHANNY presented to ED today 2/2 dizziness on/off     #Syncope likely due to overdiuresis  -hx of cushing syndrome,  shunt evaluated by nNSG and working correctly  -elevated BNP on admission, recent increase in torsemide with addition of farxiga  -HF: euvolemic, torsemide (increased to 40mg PO during CCU management) -> now torsemide 20 daily -> on hold (11/16)  -multiple runs of v. tach since hospital admission, DCCV 11/8 during PCI, still continues  -s/p AICD 11/9/21 per EP  -s/p cath 11/10 with LAD stent  -Card: c/w ASA/plasix (d/c ASA after 1mo), BB, amiodarone, torsemide  -11/13 continued Vtach, increasing metoprolol tart to 50mg BID  - patient had an episode of hypotension (11/16) -> HF team recommended a bolus (500 ml NSS) and to hold diuretics (IVC collapsible)    #overflow incontinence  - bladder scan showed 600 mL -> straight cath not successful  - urology consulted -> barragan placed (11/15) -> 800 cc of urine  - outpatient urology f/u (will be dc with barragan)    #NSVTs on tele  - AICD  - EP -> increase BB as tolerated and outpatient follow up      #Femoral access hematoma  -pt continues to have hematoma with blood degradation over L groin from cath 11/10  -held DVT ppx, restarted 11/12 with expansion on 11/13  -pt vitals stable, will hold DVT ppx until hematoma resolves  - H/H stable    #TOBIAS on CKD 4  -Cr 3.2 on admission (baseline 2 from 7/2021), GFR 17 (from 31 in 7/2021)  -likely due to dehydration in context of diuresis, poor PO intake  -Cr stable, but elevated at ~3  -c/w torsemide 20mg  - nephro cs -> son prefers to hold off on HD/ renal diet    #Chronic hypokalemia  -repleting as necessary                                                      # DVT prophylaxis   -on hold for hematoma    # GI prophylaxis   -none    # Diet   -renal restriction    # Activity Score (AM-PAC)  -IAT    # Code status   -full code

## 2021-11-16 NOTE — PROGRESS NOTE ADULT - ASSESSMENT
CHF/Near syncope/ TOBIAS on CKD/ VT / CAD s/p PCI to LAD    Patient is euvolemic on exam   Give 500 ml 0.45 Sodium chloride run over 5 hours  Hold diuretics for today, will reassess tomorrow  Continue metoprolol 25 mg BID   Get BMP daily   Maintain potassium >4.0, Mg >2.1  Strict intake and output  Continue DAPT and statin   EP follow up for ICD  Plan discussed with primary team  Daily weight     CHF/Near syncope/ TOBIAS on CKD/ VT / CAD s/p PCI to LAD    Patient is euvolemic on exam   Give 500 ml 0.45% Sodium chloride- run over 5 hours  Hold diuretics for today, will reassess tomorrow  Continue metoprolol 25 mg BID   Get BMP daily   Maintain potassium >4.0, Mg >2.1  Strict intake and output  Continue DAPT and statin   EP follow up for ICD  Plan discussed with primary team  Daily weight

## 2021-11-16 NOTE — PROGRESS NOTE ADULT - SUBJECTIVE AND OBJECTIVE BOX
Date of Admission: 11/3/21    Interval History:    - Patient resting in a recliner, hypotensive, c/o LH/dizziness  - Patient is euvolemic on exam    HISTORY OF PRESENT ILLNESS:   86 yr old male with hx of HTN, DM, AFib no AC 2/2 falls, HFpEF, JOHANNY presented to ED today 2/2 dizziness on/off since . States he was feeling dizzy yesterday and fell x2 and again felt dizzy today when he  went to urgent care where he was lowered to ground to prevent from falling. Son states that recent change in meds may be contributing to dad feeling dizzy. Son stated pts torsemide was increased and farxiga was added. Currently, pt states he feels fine and is asymptomatic. Pt denied loc, fevers, chills, cough or sputum production, nausea or vomiting, diarrhea or other associated symptoms. (2021 20:20)      PAST MEDICAL & SURGICAL HISTORY:    Afib  DM (diabetes mellitus)  CKD (chronic kidney disease)  Hydrocephalus  Hypertension  History of prostate surgery    FAMILY HISTORY:    Mother:  at 72 of HF  Father:  at 68 of a brain aneurism    SOCIAL HISTORY:      Former smoker, denies alcohol or drug use     Allergies    No Known Allergies    Intolerances      PHYSICAL EXAM:  General Appearance: well appearing, normal for age and gender. 	  Neck: normal JVP, no bruit.   Eyes: Extra Ocular muscles intact.   Cardiovascular: regular rate and rhythm S1 S2, No JVD, No murmurs, No edema  Respiratory: Lungs clear to auscultation	  Psychiatry: Alert and oriented x 3, Mood & affect appropriate  Gastrointestinal:  Soft, Non-tender  Skin/Integumentary : No rashes, No ecchymoses, No cyanosis	  Neurologic: Non-focal  Musculoskeletal/ extremities: Normal range of motion, No clubbing, cyanosis or edema  Vascular: Peripheral pulses palpable 2+ bilaterally    CARDIAC MARKERS:    Serum Pro-Brain Natriuretic Peptide: 3334 pg/mL (21 @ 15:00)      PREVIOUS DIAGNOSTIC TESTING:      TTE 21     Summary:   1. Normal global left ventricular systolic function.   2. LV Ejection Fraction by Lemon's Method with a biplane EF of 54 %.   3. Mild left ventricular hypertrophy.   4. Mildly increased LV wall thickness.   5. Normal left ventricular internal cavity size.   6. Moderately enlarged left atrium.   7. Mild mitral annular calcification.   8. Mild thickening and calcification of the anterior and posterior mitral valve leaflets.   9. Mild to moderate mitral valve regurgitation.  10. Mild-moderate tricuspid regurgitation.  11. Mild aortic regurgitation.  12. Sclerotic aortic valve with normal opening.  13. Estimated pulmonary artery systolic pressure is 54.2 mmHg assuming a right atrial pressure of 5 mmHg, which is consistent with moderate pulmonary hypertension.  14. LA volume Index is 51.8 ml/m² ml/m2.  15. There is moderate aortic root calcification.        Home Medications:  acetaminophen 325 mg oral tablet: 2 tab(s) orally every 6 hours, As needed, Moderate Pain (4 - 6) (2021 11:54)  Basaglar KwikPen: 20 unit(s) subcutaneous once a day (at bedtime) (2021 22:10)  calcitriol 0.25 mcg oral capsule: 1 cap(s) orally once a day (2021 15:07)  dutasteride 0.5 mg oral capsule: 1 cap(s) orally once a day (2021 15:07)  Farxiga 10 mg oral tablet: 1 tab(s) orally once a day (2021 21:56)  gabapentin 100 mg oral capsule: 1 cap(s) orally once a day in AM (2021 21:49)  gabapentin 300 mg oral capsule: 1 cap(s) orally once a day (at bedtime) (2021 21:49)  metOLazone 5 mg oral tablet: orally 3 times a week M-W- (2021 22:06)  Metoprolol Succinate ER 25 mg oral tablet, extended release: 1 tab(s) orally once a day (2021 21:52)  Ozempic (1 mg dose) 4 mg/3 mL subcutaneous solution: 1 milligram(s) subcutaneous once a week on Thursday (2021 21:50)  spironolactone 25 mg oral tablet: 1 tab(s) orally once a day (2021 22:00)    MEDICATIONS  (STANDING):  atorvastatin 20 milliGRAM(s) Oral at bedtime  calcium acetate 667 milliGRAM(s) Oral three times a day with meals  dextrose 40% Gel 15 Gram(s) Oral once  dextrose 5%. 1000 milliLiter(s) (50 mL/Hr) IV Continuous <Continuous>  dextrose 5%. 1000 milliLiter(s) (100 mL/Hr) IV Continuous <Continuous>  dextrose 50% Injectable 25 Gram(s) IV Push once  dextrose 50% Injectable 12.5 Gram(s) IV Push once  dextrose 50% Injectable 25 Gram(s) IV Push once  finasteride 5 milliGRAM(s) Oral daily  gabapentin 100 milliGRAM(s) Oral daily  gabapentin 300 milliGRAM(s) Oral at bedtime  glucagon  Injectable 1 milliGRAM(s) IntraMuscular once  heparin   Injectable 5000 Unit(s) SubCutaneous every 8 hours  insulin glargine Injectable (LANTUS) 20 Unit(s) SubCutaneous at bedtime  insulin lispro (ADMELOG) corrective regimen sliding scale   SubCutaneous three times a day before meals  insulin lispro Injectable (ADMELOG) 7 Unit(s) SubCutaneous before breakfast  insulin lispro Injectable (ADMELOG) 7 Unit(s) SubCutaneous before lunch  insulin lispro Injectable (ADMELOG) 7 Unit(s) SubCutaneous before dinner  tamsulosin 0.4 milliGRAM(s) Oral at bedtime

## 2021-11-16 NOTE — PROGRESS NOTE ADULT - SUBJECTIVE AND OBJECTIVE BOX
VINCENZO EDMONDSON 86y Male  MRN#: [id number]     Hospital Day: 13d    Pt is currently admitted with the primary diagnosis of near syncope    SUBJECTIVE    No Overnight events     No Subjective complaints     patient seen and examined    Present Today:   - Timothy:  Yes, indication: obstruction                                            ----------------------------------------------------------  OBJECTIVE  PAST MEDICAL & SURGICAL HISTORY  Afib    DM (diabetes mellitus)    CKD (chronic kidney disease)    Hydrocephalus    Hypertension    History of prostate surgery                                              -----------------------------------------------------------  ALLERGIES:  No Known Allergies                                            ------------------------------------------------------------    HOME MEDICATIONS  Home Medications:  acetaminophen 325 mg oral tablet: 2 tab(s) orally every 6 hours, As needed, Moderate Pain (4 - 6) (2021 11:54)  Basaglar KwikPen: 20 unit(s) subcutaneous once a day (at bedtime) (2021 22:10)  calcitriol 0.25 mcg oral capsule: 1 cap(s) orally once a day (2021 15:07)  dutasteride 0.5 mg oral capsule: 1 cap(s) orally once a day (2021 15:07)  Farxiga 10 mg oral tablet: 1 tab(s) orally once a day (2021 21:56)  gabapentin 100 mg oral capsule: 1 cap(s) orally once a day in AM (2021 21:49)  gabapentin 300 mg oral capsule: 1 cap(s) orally once a day (at bedtime) (2021 21:49)  metOLazone 5 mg oral tablet: orally 3 times a week  (2021 22:06)  Metoprolol Succinate ER 25 mg oral tablet, extended release: 1 tab(s) orally once a day (2021 21:52)  Ozempic (1 mg dose) 4 mg/3 mL subcutaneous solution: 1 milligram(s) subcutaneous once a week on Thursday (2021 21:50)  spironolactone 25 mg oral tablet: 1 tab(s) orally once a day (2021 22:00)                           MEDICATIONS:  STANDING MEDICATIONS  aMIOdarone    Tablet 200 milliGRAM(s) Oral two times a day  aspirin enteric coated 81 milliGRAM(s) Oral daily  atorvastatin 20 milliGRAM(s) Oral at bedtime  calcium acetate 667 milliGRAM(s) Oral three times a day with meals  chlorhexidine 4% Liquid 1 Application(s) Topical daily  chlorhexidine 4% Liquid 1 Application(s) Topical daily  clopidogrel Tablet 75 milliGRAM(s) Oral daily  dextrose 40% Gel 15 Gram(s) Oral once  dextrose 5%. 1000 milliLiter(s) IV Continuous <Continuous>  dextrose 5%. 1000 milliLiter(s) IV Continuous <Continuous>  dextrose 50% Injectable 25 Gram(s) IV Push once  dextrose 50% Injectable 12.5 Gram(s) IV Push once  dextrose 50% Injectable 25 Gram(s) IV Push once  finasteride 5 milliGRAM(s) Oral daily  glucagon  Injectable 1 milliGRAM(s) IntraMuscular once  insulin glargine Injectable (LANTUS) 20 Unit(s) SubCutaneous at bedtime  insulin lispro (ADMELOG) corrective regimen sliding scale   SubCutaneous three times a day before meals  insulin lispro Injectable (ADMELOG) 7 Unit(s) SubCutaneous before breakfast  insulin lispro Injectable (ADMELOG) 7 Unit(s) SubCutaneous before lunch  insulin lispro Injectable (ADMELOG) 7 Unit(s) SubCutaneous before dinner  metoprolol tartrate 50 milliGRAM(s) Oral two times a day  sodium chloride 0.9% Bolus 500 milliLiter(s) IV Bolus once  tamsulosin 0.4 milliGRAM(s) Oral at bedtime    PRN MEDICATIONS  acetaminophen     Tablet .. 650 milliGRAM(s) Oral every 6 hours PRN  guaifenesin/dextromethorphan Oral Liquid 10 milliLiter(s) Oral every 4 hours PRN                                            ------------------------------------------------------------  VITAL SIGNS: Last 24 Hours  T(C): 35.9 (2021 05:15), Max: 35.9 (2021 05:15)  T(F): 96.6 (2021 05:15), Max: 96.6 (2021 05:15)  HR: 63 (2021 10:17) (63 - 78)  BP: 86/51 (2021 10:17) (86/51 - 107/56)  BP(mean): --  RR: 20 (15 Nov 2021 20:33) (20 - 20)  SpO2: 97% (15 Nov 2021 20:33) (95% - 97%)      11-15-21 @ 07:01  -  21 @ 07:00  --------------------------------------------------------  IN: 760 mL / OUT: 2100 mL / NET: -1340 mL    21 @ 07:01  -  21 @ 11:13  --------------------------------------------------------  IN: 220 mL / OUT: 0 mL / NET: 220 mL                                             --------------------------------------------------------------  LABS:                        11.0   9.86  )-----------( 206      ( 2021 04:30 )             36.1         143  |  101  |  118<HH>  ----------------------------<  119<H>  3.5   |  24  |  3.2<H>    Ca    9.5      2021 04:30  Mg     2.4     -    TPro  6.0  /  Alb  3.7  /  TBili  0.6  /  DBili  x   /  AST  9   /  ALT  <5  /  AlkPhos  87        Urinalysis Basic - ( 15 Nov 2021 09:23 )    Color: Light Yellow / Appearance: Clear / S.014 / pH: x  Gluc: x / Ketone: Negative  / Bili: Negative / Urobili: <2 mg/dL   Blood: x / Protein: Negative / Nitrite: Negative   Leuk Esterase: Negative / RBC: x / WBC x   Sq Epi: x / Non Sq Epi: x / Bacteria: x                                                            -------------------------------------------------------------  RADIOLOGY:                                            --------------------------------------------------------------    PHYSICAL EXAM:    GENERAL: AAOx3. Well-nourished, laying in bed appearing in no acute distress  LUNGS: Clear to auscultation bilaterally  HEART: S1/S2. No heaves or thrills  ABD: Soft, non-tender, non-distended. Bowel sounds present in all quadrants.  EXT/NEURO: 5/5 strength in all extremity joints. Sensation and ROM grossly intact. no edema bilaterally

## 2021-11-17 NOTE — PROGRESS NOTE ADULT - SUBJECTIVE AND OBJECTIVE BOX
CHIEF COMPLAINT:    Patient is a 86y old  Male who presents with a chief complaint of near syncope     INTERVAL HPI/OVERNIGHT EVENTS:    Patient seen and examined at bedside. No acute overnight events occurred.    ROS: Denies SOB, chest pain. All other systems are negative.    Vital Signs:    T(F): 95.1 (21 @ 15:03), Max: 97.5 (21 @ 04:41)  HR: 60 (21 @ 15:03) (60 - 65)  BP: 111/55 (21 @ 15:03) (91/53 - 115/58)  RR: 18 (21 @ 15:03) (18 - 18)  SpO2: 96% (21 @ 04:41) (96% - 96%)  I&O's Summary    2021 07:01  -  2021 07:00  --------------------------------------------------------  IN: 1160 mL / OUT: 2900 mL / NET: -1740 mL    2021 07:01  -  2021 16:43  --------------------------------------------------------  IN: 240 mL / OUT: 0 mL / NET: 240 mL      Daily     Daily Weight in k.2 (2021 04:41)  CAPILLARY BLOOD GLUCOSE      POCT Blood Glucose.: 132 mg/dL (2021 16:19)  POCT Blood Glucose.: 171 mg/dL (2021 12:03)  POCT Blood Glucose.: 106 mg/dL (2021 08:31)  POCT Blood Glucose.: 94 mg/dL (2021 21:13)  POCT Blood Glucose.: 118 mg/dL (2021 17:05)      PHYSICAL EXAM:  GENERAL:  NAD  SKIN: No rashes or lesions  HEENT: Atraumatic. Normocephalic. Anicteric  NECK:  No JVD.   PULMONARY: Clear to ausculation bilaterally. No wheezing. No rales  CVS: Normal S1, S2. Regular rate and rhythm. No murmurs.  ABDOMEN/GI: Soft, Nontender, Nondistended; Bowel sounds are present  EXTREMITIES:  No edema B/L LE. stasis dermatitis  NEUROLOGIC:  No motor deficit.  PSYCH: Alert & oriented x 3, normal affect    Consultant(s) Notes Reviewed:  [x ] YES  [ ] NO      LABS:                        11.4   8.59  )-----------( 220      ( 2021 07:53 )             37.4         146  |  104  |  108<HH>  ----------------------------<  110<H>  4.1   |  24  |  2.8<H>    Ca    9.3      2021 07:53  Mg     2.4         TPro  6.0  /  Alb  3.5  /  TBili  0.6  /  DBili  x   /  AST  11  /  ALT  <5  /  AlkPhos  83        RADIOLOGY & ADDITIONAL TESTS:  Imaging or report Personally Reviewed:  [ ] YES  [ ] NO    Telemetry reviewed independently - PVCs    Medications:  Standing  aMIOdarone    Tablet 200 milliGRAM(s) Oral two times a day  aspirin enteric coated 81 milliGRAM(s) Oral daily  atorvastatin 20 milliGRAM(s) Oral at bedtime  calcium acetate 667 milliGRAM(s) Oral three times a day with meals  chlorhexidine 4% Liquid 1 Application(s) Topical daily  chlorhexidine 4% Liquid 1 Application(s) Topical daily  clopidogrel Tablet 75 milliGRAM(s) Oral daily  dextrose 40% Gel 15 Gram(s) Oral once  dextrose 5%. 1000 milliLiter(s) IV Continuous <Continuous>  dextrose 5%. 1000 milliLiter(s) IV Continuous <Continuous>  dextrose 50% Injectable 25 Gram(s) IV Push once  dextrose 50% Injectable 12.5 Gram(s) IV Push once  dextrose 50% Injectable 25 Gram(s) IV Push once  finasteride 5 milliGRAM(s) Oral daily  glucagon  Injectable 1 milliGRAM(s) IntraMuscular once  insulin glargine Injectable (LANTUS) 20 Unit(s) SubCutaneous at bedtime  insulin lispro (ADMELOG) corrective regimen sliding scale   SubCutaneous three times a day before meals  insulin lispro Injectable (ADMELOG) 7 Unit(s) SubCutaneous before dinner  insulin lispro Injectable (ADMELOG) 7 Unit(s) SubCutaneous before breakfast  insulin lispro Injectable (ADMELOG) 7 Unit(s) SubCutaneous before lunch  metoprolol tartrate 50 milliGRAM(s) Oral two times a day  sodium chloride 0.45%. 500 milliLiter(s) IV Continuous <Continuous>  tamsulosin 0.4 milliGRAM(s) Oral at bedtime    PRN Meds  acetaminophen     Tablet .. 650 milliGRAM(s) Oral every 6 hours PRN  guaifenesin/dextromethorphan Oral Liquid 10 milliLiter(s) Oral every 4 hours PRN      Case discussed with resident  Care discussed with pt

## 2021-11-17 NOTE — PROGRESS NOTE ADULT - SUBJECTIVE AND OBJECTIVE BOX
NEPHROLOGY FOLLOW UP NOTE    pt seen and examined  barragan draining  still quite weak  poor historian  cr better  off diuretics          PAST MEDICAL & SURGICAL HISTORY:  Hypertension  Hydrocephalus  CKD (chronic kidney disease)  DM (diabetes mellitus)  Afib  History of prostate surgery    Allergies:  No Known Allergies    Home Medications Reviewed    SOCIAL HISTORY:  Denies ETOH,Smoking,   FAMILY HISTORY:  Family history of diabetes mellitus (Mother, Sibling)      REVIEW OF SYSTEMS  All other review of systems is negative unless indicated above.      PHYSICAL EXAM:  NAD  [place]  moist mm  no ln  + aicd  decreased BS b/l  distant HS, irregular   soft + BS  trace edema    Hospital Medications:   MEDICATIONS  (STANDING):  aMIOdarone    Tablet 200 milliGRAM(s) Oral two times a day  aspirin enteric coated 81 milliGRAM(s) Oral daily  atorvastatin 20 milliGRAM(s) Oral at bedtime  calcium acetate 667 milliGRAM(s) Oral three times a day with meals  chlorhexidine 4% Liquid 1 Application(s) Topical daily  chlorhexidine 4% Liquid 1 Application(s) Topical daily  clopidogrel Tablet 75 milliGRAM(s) Oral daily  dextrose 40% Gel 15 Gram(s) Oral once  dextrose 5%. 1000 milliLiter(s) (50 mL/Hr) IV Continuous <Continuous>  dextrose 5%. 1000 milliLiter(s) (100 mL/Hr) IV Continuous <Continuous>  dextrose 50% Injectable 25 Gram(s) IV Push once  dextrose 50% Injectable 12.5 Gram(s) IV Push once  dextrose 50% Injectable 25 Gram(s) IV Push once  finasteride 5 milliGRAM(s) Oral daily  glucagon  Injectable 1 milliGRAM(s) IntraMuscular once  insulin glargine Injectable (LANTUS) 20 Unit(s) SubCutaneous at bedtime  insulin lispro (ADMELOG) corrective regimen sliding scale   SubCutaneous three times a day before meals  insulin lispro Injectable (ADMELOG) 7 Unit(s) SubCutaneous before dinner  insulin lispro Injectable (ADMELOG) 7 Unit(s) SubCutaneous before breakfast  insulin lispro Injectable (ADMELOG) 7 Unit(s) SubCutaneous before lunch  metoprolol tartrate 50 milliGRAM(s) Oral two times a day  sodium chloride 0.45%. 500 milliLiter(s) (100 mL/Hr) IV Continuous <Continuous>  tamsulosin 0.4 milliGRAM(s) Oral at bedtime        VITALS:  T(F): 97.5 (21 @ 04:41), Max: 97.5 (21 @ 04:41)  HR: 65 (21 @ 04:41)  BP: 115/58 (21 @ 04:41)  RR: 18 (21 @ 04:41)  SpO2: 96% (21 @ 04:41)  Wt(kg): --    11-15 @ 07:01  -   @ 07:00  --------------------------------------------------------  IN: 760 mL / OUT: 2100 mL / NET: -1340 mL     @ 07:01  -   @ 07:00  --------------------------------------------------------  IN: 1160 mL / OUT: 2900 mL / NET: -1740 mL          LABS:      146  |  104  |  108<HH>  ----------------------------<  110<H>  4.1   |  24  |  2.8<H>    Ca    9.3      2021 07:53  Mg     2.4         TPro  6.0  /  Alb  3.5  /  TBili  0.6  /  DBili      /  AST  11  /  ALT  <5  /  AlkPhos  83                            11.4   8.59  )-----------( 220      ( 2021 07:53 )             37.4       Urine Studies:  Urinalysis Basic - ( 15 Nov 2021 09:23 )    Color: Light Yellow / Appearance: Clear / S.014 / pH:   Gluc:  / Ketone: Negative  / Bili: Negative / Urobili: <2 mg/dL   Blood:  / Protein: Negative / Nitrite: Negative   Leuk Esterase: Negative / RBC:  / WBC    Sq Epi:  / Non Sq Epi:  / Bacteria:       Chloride, Random Urine: 30 (11-15 @ 09:23)  Creatinine, Random Urine: 51 mg/dL (11-15 @ :23)  Sodium, Random Urine: 27.0 mmoL/L (11-15 @ 09:23)  Osmolality, Random Urine: 375 mos/kg (11-15 @ 09:23)      RADIOLOGY & ADDITIONAL STUDIES:

## 2021-11-17 NOTE — PROGRESS NOTE ADULT - ASSESSMENT
TOBIAS    CKD stage 4-5  s/p PCI to LAD / CAD  hyperphosphatemia  dizziness / falls  chronic HFpEF / Afib / VT s/p AICD  JOHANNY on CPAP    HTN   NPH s/p  shunt    DM 2    dm neuropathy    plan:    can resume torsemide 20mg po qd upon discharge  cont barragan as outpatient per   keep off gabapentin  renal diet /  phoslo with meals  physical therapy  d/w resident and cardiology  very high risk for intercurrent illness, with advanced cardiorenal disease, debility, dementia and now chronic barragan with possibility of future infection

## 2021-11-17 NOTE — PROGRESS NOTE ADULT - SUBJECTIVE AND OBJECTIVE BOX
VINCENZO EDMONDSON 86y Male  MRN#: [id number]     Hospital Day: 14d    Pt is currently admitted with the primary diagnosis of near syncope    SUBJECTIVE    No Overnight events     No Subjective complaints     patient seen and examined    Present Today:   - Timothy:  Yes, indication: obstruction                                              ----------------------------------------------------------  OBJECTIVE  PAST MEDICAL & SURGICAL HISTORY  Afib    DM (diabetes mellitus)    CKD (chronic kidney disease)    Hydrocephalus    Hypertension    History of prostate surgery                                              -----------------------------------------------------------  ALLERGIES:  No Known Allergies                                            ------------------------------------------------------------    HOME MEDICATIONS  Home Medications:  acetaminophen 325 mg oral tablet: 2 tab(s) orally every 6 hours, As needed, Moderate Pain (4 - 6) (12 Jul 2021 11:54)  Basaglar KwikPen: 20 unit(s) subcutaneous once a day (at bedtime) (03 Nov 2021 22:10)  calcitriol 0.25 mcg oral capsule: 1 cap(s) orally once a day (30 Jun 2021 15:07)  dutasteride 0.5 mg oral capsule: 1 cap(s) orally once a day (30 Jun 2021 15:07)  Farxiga 10 mg oral tablet: 1 tab(s) orally once a day (03 Nov 2021 21:56)  gabapentin 100 mg oral capsule: 1 cap(s) orally once a day in AM (03 Nov 2021 21:49)  gabapentin 300 mg oral capsule: 1 cap(s) orally once a day (at bedtime) (03 Nov 2021 21:49)  metOLazone 5 mg oral tablet: orally 3 times a week M-W-F (03 Nov 2021 22:06)  Metoprolol Succinate ER 25 mg oral tablet, extended release: 1 tab(s) orally once a day (03 Nov 2021 21:52)  Ozempic (1 mg dose) 4 mg/3 mL subcutaneous solution: 1 milligram(s) subcutaneous once a week on Thursday (03 Nov 2021 21:50)  spironolactone 25 mg oral tablet: 1 tab(s) orally once a day (03 Nov 2021 22:00)                           MEDICATIONS:  STANDING MEDICATIONS  aMIOdarone    Tablet 200 milliGRAM(s) Oral two times a day  aspirin enteric coated 81 milliGRAM(s) Oral daily  atorvastatin 20 milliGRAM(s) Oral at bedtime  calcium acetate 667 milliGRAM(s) Oral three times a day with meals  chlorhexidine 4% Liquid 1 Application(s) Topical daily  chlorhexidine 4% Liquid 1 Application(s) Topical daily  clopidogrel Tablet 75 milliGRAM(s) Oral daily  dextrose 40% Gel 15 Gram(s) Oral once  dextrose 5%. 1000 milliLiter(s) IV Continuous <Continuous>  dextrose 5%. 1000 milliLiter(s) IV Continuous <Continuous>  dextrose 50% Injectable 25 Gram(s) IV Push once  dextrose 50% Injectable 12.5 Gram(s) IV Push once  dextrose 50% Injectable 25 Gram(s) IV Push once  finasteride 5 milliGRAM(s) Oral daily  glucagon  Injectable 1 milliGRAM(s) IntraMuscular once  insulin glargine Injectable (LANTUS) 20 Unit(s) SubCutaneous at bedtime  insulin lispro (ADMELOG) corrective regimen sliding scale   SubCutaneous three times a day before meals  insulin lispro Injectable (ADMELOG) 7 Unit(s) SubCutaneous before dinner  insulin lispro Injectable (ADMELOG) 7 Unit(s) SubCutaneous before breakfast  insulin lispro Injectable (ADMELOG) 7 Unit(s) SubCutaneous before lunch  metoprolol tartrate 50 milliGRAM(s) Oral two times a day  sodium chloride 0.45%. 500 milliLiter(s) IV Continuous <Continuous>  tamsulosin 0.4 milliGRAM(s) Oral at bedtime    PRN MEDICATIONS  acetaminophen     Tablet .. 650 milliGRAM(s) Oral every 6 hours PRN  guaifenesin/dextromethorphan Oral Liquid 10 milliLiter(s) Oral every 4 hours PRN                                            ------------------------------------------------------------  VITAL SIGNS: Last 24 Hours  T(C): 36.4 (17 Nov 2021 04:41), Max: 36.4 (17 Nov 2021 04:41)  T(F): 97.5 (17 Nov 2021 04:41), Max: 97.5 (17 Nov 2021 04:41)  HR: 65 (17 Nov 2021 04:41) (60 - 65)  BP: 115/58 (17 Nov 2021 04:41) (91/53 - 115/58)  BP(mean): --  RR: 18 (17 Nov 2021 04:41) (18 - 18)  SpO2: 96% (17 Nov 2021 04:41) (96% - 96%)      11-16-21 @ 07:01  -  11-17-21 @ 07:00  --------------------------------------------------------  IN: 1160 mL / OUT: 2900 mL / NET: -1740 mL                                             --------------------------------------------------------------  LABS:                        11.4   8.59  )-----------( 220      ( 17 Nov 2021 07:53 )             37.4     11-17    146  |  104  |  108<HH>  ----------------------------<  110<H>  4.1   |  24  |  2.8<H>    Ca    9.3      17 Nov 2021 07:53  Mg     2.4     11-17    TPro  6.0  /  Alb  3.5  /  TBili  0.6  /  DBili  x   /  AST  11  /  ALT  <5  /  AlkPhos  83  11-17                                                              -------------------------------------------------------------  RADIOLOGY:                                            --------------------------------------------------------------    PHYSICAL EXAM:    GENERAL: AAOx3. Well-nourished, laying in bed appearing in no acute distress  LUNGS: Clear to auscultation bilaterally  HEART: S1/S2. No heaves or thrills  ABD: Soft, non-tender, non-distended. Bowel sounds present in all quadrants.  EXT/NEURO: 5/5 strength in all extremity joints. Sensation and ROM grossly intact. no edema bilaterally

## 2021-11-18 NOTE — PROGRESS NOTE ADULT - SUBJECTIVE AND OBJECTIVE BOX
CHIEF COMPLAINT:    Patient is a 86y old  Male who presents with a chief complaint of near syncope (2021 14:15)      INTERVAL HPI/OVERNIGHT EVENTS:    Patient seen and examined at bedside. No acute overnight events occurred.    ROS: All other systems are negative.    Vital Signs:    T(F): 96 (21 @ 14:07), Max: 97.4 (21 @ 20:22)  HR: 60 (21 @ 14:07) (60 - 64)  BP: 116/59 (21 @ 14:07) (107/55 - 119/59)  RR: 20 (21 @ 14:07) (19 - 20)  SpO2: 99% (21 @ 14:07) (99% - 99%)  I&O's Summary    2021 07:01  -  2021 07:00  --------------------------------------------------------  IN: 360 mL / OUT: 1400 mL / NET: -1040 mL    2021 07:01  -  2021 16:05  --------------------------------------------------------  IN: 431 mL / OUT: 700 mL / NET: -269 mL      Daily     Daily Weight in k (2021 04:52)  CAPILLARY BLOOD GLUCOSE      POCT Blood Glucose.: 156 mg/dL (2021 11:49)  POCT Blood Glucose.: 122 mg/dL (2021 07:54)  POCT Blood Glucose.: 89 mg/dL (2021 21:23)  POCT Blood Glucose.: 132 mg/dL (2021 16:19)      PHYSICAL EXAM:  GENERAL:  NAD  SKIN: No rashes or lesions  HEENT: Atraumatic. Normocephalic. Anicteric  NECK:  No JVD.   PULMONARY: Clear to ausculation bilaterally. No wheezing. No rales  CVS: Normal S1, S2. Regular rate and rhythm. No murmurs.  ABDOMEN/GI: Soft, Nontender, Nondistended; Bowel sounds are present  EXTREMITIES:  No edema B/L LE.  NEUROLOGIC:  No motor deficit.  PSYCH: Alert & oriented x 3, normal affect    Consultant(s) Notes Reviewed:  [x ] YES  [ ] NO  Care Discussed with Consultants/Other Providers [ x] YES  [ ] NO    LABS:                        12.5   13.63 )-----------( 289      ( 2021 12:49 )             41.2     11    145  |  104  |  90<HH>  ----------------------------<  166<H>  4.2   |  24  |  2.3<H>    Ca    10.0      2021 12:49  Mg     2.5         TPro  6.0  /  Alb  3.5  /  TBili  0.6  /  DBili  x   /  AST  11  /  ALT  <5  /  AlkPhos  83                RADIOLOGY & ADDITIONAL TESTS:  Imaging or report Personally Reviewed:  [ ] YES  [ ] NO    EKG reviewed independently    Medications:  Standing  aMIOdarone    Tablet 200 milliGRAM(s) Oral two times a day  aspirin enteric coated 81 milliGRAM(s) Oral daily  atorvastatin 20 milliGRAM(s) Oral at bedtime  calcium acetate 667 milliGRAM(s) Oral three times a day with meals  chlorhexidine 4% Liquid 1 Application(s) Topical daily  chlorhexidine 4% Liquid 1 Application(s) Topical daily  clopidogrel Tablet 75 milliGRAM(s) Oral daily  dextrose 40% Gel 15 Gram(s) Oral once  dextrose 5%. 1000 milliLiter(s) IV Continuous <Continuous>  dextrose 5%. 1000 milliLiter(s) IV Continuous <Continuous>  dextrose 50% Injectable 25 Gram(s) IV Push once  dextrose 50% Injectable 12.5 Gram(s) IV Push once  dextrose 50% Injectable 25 Gram(s) IV Push once  finasteride 5 milliGRAM(s) Oral daily  glucagon  Injectable 1 milliGRAM(s) IntraMuscular once  insulin glargine Injectable (LANTUS) 20 Unit(s) SubCutaneous at bedtime  insulin lispro (ADMELOG) corrective regimen sliding scale   SubCutaneous three times a day before meals  insulin lispro Injectable (ADMELOG) 7 Unit(s) SubCutaneous before breakfast  insulin lispro Injectable (ADMELOG) 7 Unit(s) SubCutaneous before lunch  insulin lispro Injectable (ADMELOG) 7 Unit(s) SubCutaneous before dinner  melatonin 5 milliGRAM(s) Oral at bedtime  metoprolol tartrate 50 milliGRAM(s) Oral two times a day  sodium chloride 0.45%. 500 milliLiter(s) IV Continuous <Continuous>  tamsulosin 0.4 milliGRAM(s) Oral at bedtime    PRN Meds  acetaminophen     Tablet .. 650 milliGRAM(s) Oral every 6 hours PRN  guaifenesin/dextromethorphan Oral Liquid 10 milliLiter(s) Oral every 4 hours PRN      Case discussed with resident  Care discussed with pt         CHIEF COMPLAINT:    Patient is a 86y old  Male who presents with a chief complaint of near syncope (2021 14:15)      INTERVAL HPI/OVERNIGHT EVENTS:    Patient seen and examined at bedside. No acute overnight events occurred.    ROS: Denies dizziness. All other systems are negative.    Vital Signs:    T(F): 96 (21 @ 14:07), Max: 97.4 (21 @ 20:22)  HR: 60 (21 @ 14:07) (60 - 64)  BP: 116/59 (21 @ 14:07) (107/55 - 119/59)  RR: 20 (21 @ 14:07) (19 - 20)  SpO2: 99% (21 @ 14:07) (99% - 99%)  I&O's Summary    2021 07:01  -  2021 07:00  --------------------------------------------------------  IN: 360 mL / OUT: 1400 mL / NET: -1040 mL    2021 07:01  -  2021 16:05  --------------------------------------------------------  IN: 431 mL / OUT: 700 mL / NET: -269 mL      Daily     Daily Weight in k (2021 04:52)  CAPILLARY BLOOD GLUCOSE      POCT Blood Glucose.: 156 mg/dL (2021 11:49)  POCT Blood Glucose.: 122 mg/dL (2021 07:54)  POCT Blood Glucose.: 89 mg/dL (2021 21:23)  POCT Blood Glucose.: 132 mg/dL (2021 16:19)      PHYSICAL EXAM:  GENERAL:  NAD  SKIN: No rashes or lesions  HEENT: Atraumatic. Normocephalic. Anicteric  NECK:  No JVD.   PULMONARY: Clear to ausculation bilaterally. No wheezing. No rales  CVS: Normal S1, S2. Regular rate and rhythm. No murmurs.  ABDOMEN/GI: Soft, Nontender, Nondistended; Bowel sounds are present  EXTREMITIES:  No edema B/L LE.  NEUROLOGIC:  No motor deficit.  PSYCH: Alert & oriented x 3, normal affect    Consultant(s) Notes Reviewed:  [x ] YES  [ ] NO  Care Discussed with Consultants/Other Providers [ x] YES  [ ] NO    LABS:                        12.5   13.63 )-----------( 289      ( 2021 12:49 )             41.2     11-    145  |  104  |  90<HH>  ----------------------------<  166<H>  4.2   |  24  |  2.3<H>    Ca    10.0      2021 12:49  Mg     2.5         TPro  6.0  /  Alb  3.5  /  TBili  0.6  /  DBili  x   /  AST  11  /  ALT  <5  /  AlkPhos  83                RADIOLOGY & ADDITIONAL TESTS:  Imaging or report Personally Reviewed:  [ ] YES  [ ] NO    EKG reviewed independently - no events on telemetry    Medications:  Standing  aMIOdarone    Tablet 200 milliGRAM(s) Oral two times a day  aspirin enteric coated 81 milliGRAM(s) Oral daily  atorvastatin 20 milliGRAM(s) Oral at bedtime  calcium acetate 667 milliGRAM(s) Oral three times a day with meals  chlorhexidine 4% Liquid 1 Application(s) Topical daily  chlorhexidine 4% Liquid 1 Application(s) Topical daily  clopidogrel Tablet 75 milliGRAM(s) Oral daily  dextrose 40% Gel 15 Gram(s) Oral once  dextrose 5%. 1000 milliLiter(s) IV Continuous <Continuous>  dextrose 5%. 1000 milliLiter(s) IV Continuous <Continuous>  dextrose 50% Injectable 25 Gram(s) IV Push once  dextrose 50% Injectable 12.5 Gram(s) IV Push once  dextrose 50% Injectable 25 Gram(s) IV Push once  finasteride 5 milliGRAM(s) Oral daily  glucagon  Injectable 1 milliGRAM(s) IntraMuscular once  insulin glargine Injectable (LANTUS) 20 Unit(s) SubCutaneous at bedtime  insulin lispro (ADMELOG) corrective regimen sliding scale   SubCutaneous three times a day before meals  insulin lispro Injectable (ADMELOG) 7 Unit(s) SubCutaneous before breakfast  insulin lispro Injectable (ADMELOG) 7 Unit(s) SubCutaneous before lunch  insulin lispro Injectable (ADMELOG) 7 Unit(s) SubCutaneous before dinner  melatonin 5 milliGRAM(s) Oral at bedtime  metoprolol tartrate 50 milliGRAM(s) Oral two times a day  sodium chloride 0.45%. 500 milliLiter(s) IV Continuous <Continuous>  tamsulosin 0.4 milliGRAM(s) Oral at bedtime    PRN Meds  acetaminophen     Tablet .. 650 milliGRAM(s) Oral every 6 hours PRN  guaifenesin/dextromethorphan Oral Liquid 10 milliLiter(s) Oral every 4 hours PRN      Case discussed with resident  Care discussed with pt

## 2021-11-18 NOTE — PROGRESS NOTE ADULT - ASSESSMENT
85 yo M PMHx HTN, DM II, chronic AFib not on anticoagulation due to falls,  shung chronic HFpEF, JOHANNY presented to ED for evaluation of intermittent dizziness/syncope. Course was further complicated by TOBIAS due to urinary retention and NSVT    Syncope/dizziness  - likely due to orthostatic hypotension due to overdiuresis  - had recent change in diuresis  resolved    Leukocytosis  - unclear etiology  - no obvious source of infection  - monitor overnight    Chronic HFrEF  - on Farxiga at home  - c/w toresmide--had dose adjustment in CCU from 20 mg to 40 mg now back down to 20 mg then held due to TOBIAS    NSVT  -multiple runs of v. tach since hospital admission, DCCV 11/8 during PCI  -s/p AICD 11/9/21 per EP  -s/p cath 11/10 with LAD WINSOME stent and balloon angioplasty  - c/w ASA/plasix (d/c ASA after 1mo),metoprolol, amiodarone, torsemide      Overflow incontinence  - bladder scan showed 600 mL -> straight cath not successful  - urology consulted -> barragan placed (11/15) -> 800 cc of urine  - will be discharged home with barragan  - monitor of post obstruction diuresis      Femoral access hematoma  -pt continues to have hematoma with blood degradation over L groin from cath 11/10  -held DVT ppx, restarted 11/12 with expansion on 11/13  -pt vitals stable, will hold DVT ppx until hematoma resolves      TOBIAS on CKD IV  - c/w torsemide  - likely prerenal due to dehydration and decreased PO intake    DVT on hold due to hematoma    #Progress Note Handoff:  Pending (specify): Monitor leukocytosis, placement  Family discussion: discussed placement, leukocytosis  Disposition: Home___/SNF_x__/Other________/Unknown at this time________

## 2021-11-18 NOTE — PROGRESS NOTE ADULT - SUBJECTIVE AND OBJECTIVE BOX
Date of Admission: 11/3/21    Interval History:    - Patient resting in the bed, in NAD  - Patient is euvolemic on exam    HISTORY OF PRESENT ILLNESS:   86 yr old male with hx of HTN, DM, AFib no AC 2/2 falls, HFpEF, JOHANNY presented to ED today 2/2 dizziness on/off since . States he was feeling dizzy yesterday and fell x2 and again felt dizzy today when he  went to urgent care where he was lowered to ground to prevent from falling. Son states that recent change in meds may be contributing to dad feeling dizzy. Son stated pts torsemide was increased and farxiga was added. Currently, pt states he feels fine and is asymptomatic. Pt denied loc, fevers, chills, cough or sputum production, nausea or vomiting, diarrhea or other associated symptoms. (2021 20:20)      PAST MEDICAL & SURGICAL HISTORY:    Afib  DM (diabetes mellitus)  CKD (chronic kidney disease)  Hydrocephalus  Hypertension  History of prostate surgery    FAMILY HISTORY:    Mother:  at 72 of HF  Father:  at 68 of a brain aneurism    SOCIAL HISTORY:      Former smoker, denies alcohol or drug use     Allergies    No Known Allergies    Intolerances      PHYSICAL EXAM:  General Appearance: well appearing, normal for age and gender. 	  Neck: normal JVP, no bruit.   Eyes: Extra Ocular muscles intact.   Cardiovascular: regular rate and rhythm S1 S2, No JVD, No murmurs, No edema  Respiratory: Lungs clear to auscultation	  Psychiatry: Alert and oriented x 3, Mood & affect appropriate  Gastrointestinal:  Soft, Non-tender  Skin/Integumentary : No rashes, No ecchymoses, No cyanosis	  Neurologic: Non-focal  Musculoskeletal/ extremities: Normal range of motion, No clubbing, cyanosis or edema  Vascular: Peripheral pulses palpable 2+ bilaterally    CARDIAC MARKERS:    Serum Pro-Brain Natriuretic Peptide: 3334 pg/mL (21 @ 15:00)      PREVIOUS DIAGNOSTIC TESTING:      TTE 21     Summary:   1. Normal global left ventricular systolic function.   2. LV Ejection Fraction by Lemon's Method with a biplane EF of 54 %.   3. Mild left ventricular hypertrophy.   4. Mildly increased LV wall thickness.   5. Normal left ventricular internal cavity size.   6. Moderately enlarged left atrium.   7. Mild mitral annular calcification.   8. Mild thickening and calcification of the anterior and posterior mitral valve leaflets.   9. Mild to moderate mitral valve regurgitation.  10. Mild-moderate tricuspid regurgitation.  11. Mild aortic regurgitation.  12. Sclerotic aortic valve with normal opening.  13. Estimated pulmonary artery systolic pressure is 54.2 mmHg assuming a right atrial pressure of 5 mmHg, which is consistent with moderate pulmonary hypertension.  14. LA volume Index is 51.8 ml/m² ml/m2.  15. There is moderate aortic root calcification.        Home Medications:  acetaminophen 325 mg oral tablet: 2 tab(s) orally every 6 hours, As needed, Moderate Pain (4 - 6) (2021 11:54)  Basaglar KwikPen: 20 unit(s) subcutaneous once a day (at bedtime) (2021 22:10)  calcitriol 0.25 mcg oral capsule: 1 cap(s) orally once a day (2021 15:07)  dutasteride 0.5 mg oral capsule: 1 cap(s) orally once a day (2021 15:07)  Farxiga 10 mg oral tablet: 1 tab(s) orally once a day (2021 21:56)  gabapentin 100 mg oral capsule: 1 cap(s) orally once a day in AM (2021 21:49)  gabapentin 300 mg oral capsule: 1 cap(s) orally once a day (at bedtime) (2021 21:49)  metOLazone 5 mg oral tablet: orally 3 times a week -W- (2021 22:06)  Metoprolol Succinate ER 25 mg oral tablet, extended release: 1 tab(s) orally once a day (2021 21:52)  Ozempic (1 mg dose) 4 mg/3 mL subcutaneous solution: 1 milligram(s) subcutaneous once a week on Thursday (2021 21:50)  spironolactone 25 mg oral tablet: 1 tab(s) orally once a day (2021 22:00)    MEDICATIONS  (STANDING):  atorvastatin 20 milliGRAM(s) Oral at bedtime  calcium acetate 667 milliGRAM(s) Oral three times a day with meals  dextrose 40% Gel 15 Gram(s) Oral once  dextrose 5%. 1000 milliLiter(s) (50 mL/Hr) IV Continuous <Continuous>  dextrose 5%. 1000 milliLiter(s) (100 mL/Hr) IV Continuous <Continuous>  dextrose 50% Injectable 25 Gram(s) IV Push once  dextrose 50% Injectable 12.5 Gram(s) IV Push once  dextrose 50% Injectable 25 Gram(s) IV Push once  finasteride 5 milliGRAM(s) Oral daily  gabapentin 100 milliGRAM(s) Oral daily  gabapentin 300 milliGRAM(s) Oral at bedtime  glucagon  Injectable 1 milliGRAM(s) IntraMuscular once  heparin   Injectable 5000 Unit(s) SubCutaneous every 8 hours  insulin glargine Injectable (LANTUS) 20 Unit(s) SubCutaneous at bedtime  insulin lispro (ADMELOG) corrective regimen sliding scale   SubCutaneous three times a day before meals  insulin lispro Injectable (ADMELOG) 7 Unit(s) SubCutaneous before breakfast  insulin lispro Injectable (ADMELOG) 7 Unit(s) SubCutaneous before lunch  insulin lispro Injectable (ADMELOG) 7 Unit(s) SubCutaneous before dinner  tamsulosin 0.4 milliGRAM(s) Oral at bedtime

## 2021-11-18 NOTE — PROGRESS NOTE ADULT - ASSESSMENT
CHF/Near syncope/ TOBIAS on CKD/ VT / CAD s/p PCI to LAD    Patient is euvolemic on exam   Resume Torsemide 10 mg daily, take an extra tablet if a weight gain of 2 lbs or more in one day.   Continue metoprolol 25 mg BID   Get BMP daily   Maintain potassium >4.0, Mg >2.1  Strict intake and output  Continue DAPT and statin   EP follow up for ICD  Plan discussed with primary team  Daily weight     CHF/Near syncope/ TOBIAS on CKD/ VT / CAD s/p PCI to LAD    Patient is euvolemic on exam   Resume Torsemide 10 mg daily, take an extra tablet if a weight gain of 2 lbs or more in one day.   Patient can be d/c home from Heart Failure stand point will follow up as outpatient   Continue metoprolol 25 mg BID   Get BMP daily   Maintain potassium >4.0, Mg >2.1  Strict intake and output  Continue DAPT and statin   EP follow up for ICD  Plan discussed with primary team  Daily weight     CHF/Near syncope/ TOBIAS on CKD/ VT / CAD s/p PCI to LAD    Patient is euvolemic on exam   Resume Torsemide 10 mg daily, take an extra tablet if a weight gain of 2 lbs or more in one day.   Patient can be d/c from Heart Failure stand point will follow up as outpatient   He might benefit from rehab  BB paco'ed - HR 60s  Get BMP daily   Maintain potassium >4.0, Mg >2.1  Strict intake and output  Continue DAPT and statin   EP follow up for ICD

## 2021-11-18 NOTE — PROGRESS NOTE ADULT - ASSESSMENT
TOBIAS    CKD stage 4-5  s/p PCI to LAD / CAD  hyperphosphatemia  dizziness / falls  chronic HFpEF / Afib / VT s/p AICD  JOHANNY on CPAP    HTN   NPH s/p  shunt    DM 2    dm neuropathy    plan:      cont yung as outpatient per   keep off gabapentin  renal diet /  phoslo with meals  can resume torsemide 20mg po qd upon discharge  physical therapy  dc planning to STR

## 2021-11-18 NOTE — PROGRESS NOTE ADULT - SUBJECTIVE AND OBJECTIVE BOX
NEPHROLOGY FOLLOW UP NOTE    bp's fair  cr better  good uop  barragan in place  on RA  off diuretics          PAST MEDICAL & SURGICAL HISTORY:  Hypertension  Hydrocephalus  CKD (chronic kidney disease)  DM (diabetes mellitus)  Afib  History of prostate surgery    Allergies:  No Known Allergies    Home Medications Reviewed    SOCIAL HISTORY:  Denies ETOH,Smoking,   FAMILY HISTORY:  Family history of diabetes mellitus (Mother, Sibling)      REVIEW OF SYSTEMS  All other review of systems is negative unless indicated above.      PHYSICAL EXAM:  NAD  [place]  moist mm  no ln  + aicd  decreased BS b/l  distant HS, irregular   soft + BS  trace edema    Hospital Medications:   MEDICATIONS  (STANDING):  aMIOdarone    Tablet 200 milliGRAM(s) Oral two times a day  aspirin enteric coated 81 milliGRAM(s) Oral daily  atorvastatin 20 milliGRAM(s) Oral at bedtime  calcium acetate 667 milliGRAM(s) Oral three times a day with meals  chlorhexidine 4% Liquid 1 Application(s) Topical daily  chlorhexidine 4% Liquid 1 Application(s) Topical daily  clopidogrel Tablet 75 milliGRAM(s) Oral daily  dextrose 40% Gel 15 Gram(s) Oral once  dextrose 5%. 1000 milliLiter(s) (50 mL/Hr) IV Continuous <Continuous>  dextrose 5%. 1000 milliLiter(s) (100 mL/Hr) IV Continuous <Continuous>  dextrose 50% Injectable 25 Gram(s) IV Push once  dextrose 50% Injectable 12.5 Gram(s) IV Push once  dextrose 50% Injectable 25 Gram(s) IV Push once  finasteride 5 milliGRAM(s) Oral daily  glucagon  Injectable 1 milliGRAM(s) IntraMuscular once  insulin glargine Injectable (LANTUS) 20 Unit(s) SubCutaneous at bedtime  insulin lispro (ADMELOG) corrective regimen sliding scale   SubCutaneous three times a day before meals  insulin lispro Injectable (ADMELOG) 7 Unit(s) SubCutaneous before breakfast  insulin lispro Injectable (ADMELOG) 7 Unit(s) SubCutaneous before lunch  insulin lispro Injectable (ADMELOG) 7 Unit(s) SubCutaneous before dinner  melatonin 5 milliGRAM(s) Oral at bedtime  metoprolol tartrate 50 milliGRAM(s) Oral two times a day  sodium chloride 0.45%. 500 milliLiter(s) (100 mL/Hr) IV Continuous <Continuous>  tamsulosin 0.4 milliGRAM(s) Oral at bedtime        VITALS:  T(F): 96 (11-18-21 @ 14:07), Max: 97.4 (21 @ 20:22)  HR: 60 (21 @ 14:07)  BP: 116/59 (21 @ 14:07)  RR: 20 (21 @ 14:07)  SpO2: 99% (21 @ 14:07)  Wt(kg): --     @ 07:01  -   @ 07:00  --------------------------------------------------------  IN: 1160 mL / OUT: 2900 mL / NET: -1740 mL     @ 07:01  -   @ 07:00  --------------------------------------------------------  IN: 360 mL / OUT: 1400 mL / NET: -1040 mL     @ 07:01  -   @ 16:46  --------------------------------------------------------  IN: 431 mL / OUT: 700 mL / NET: -269 mL          LABS:      145  |  104  |  90<HH>  ----------------------------<  166<H>  4.2   |  24  |  2.3<H>    Ca    10.0      2021 12:49  Mg     2.5         TPro  6.0  /  Alb  3.5  /  TBili  0.6  /  DBili      /  AST  11  /  ALT  <5  /  AlkPhos  83                            12.5   13.63 )-----------( 289      ( 2021 12:49 )             41.2       Urine Studies:  Urinalysis Basic - ( 15 Nov 2021 09:23 )    Color: Light Yellow / Appearance: Clear / S.014 / pH:   Gluc:  / Ketone: Negative  / Bili: Negative / Urobili: <2 mg/dL   Blood:  / Protein: Negative / Nitrite: Negative   Leuk Esterase: Negative / RBC:  / WBC    Sq Epi:  / Non Sq Epi:  / Bacteria:       Chloride, Random Urine: 30 (11-15 @ 09:23)  Creatinine, Random Urine: 51 mg/dL (11-15 @ 09:23)  Sodium, Random Urine: 27.0 mmoL/L (11-15 @ 09:23)  Osmolality, Random Urine: 375 mos/kg (11-15 @ 09:23)      RADIOLOGY & ADDITIONAL STUDIES:

## 2021-11-19 NOTE — PROGRESS NOTE ADULT - REASON FOR ADMISSION
near syncope

## 2021-11-19 NOTE — PROGRESS NOTE ADULT - ASSESSMENT
85 yo M PMHx HTN, DM II, chronic AFib not on anticoagulation due to falls,  shung chronic HFpEF, JOHANNY presented to ED for evaluation of intermittent dizziness/syncope. Course was further complicated by TOBIAS due to urinary retention and NSVT    Syncope/dizziness  - likely due to orthostatic hypotension due to overdiuresis  - had recent change in diuresis  resolved    Leukocytosis  - unclear etiology  - no obvious source of infection  - resolved    Chronic HFrEF  - on Farxiga at home  - c/w toresmide--had dose adjustment in CCU from 20 mg to 40 mg now back down to 20 mg then held due to TOBIAS    NSVT  -multiple runs of v. tach since hospital admission, DCCV 11/8 during PCI  -s/p AICD 11/9/21 per EP  -s/p cath 11/10 with LAD WINSOME stent and balloon angioplasty  - c/w ASA/plasix (d/c ASA after 1mo),metoprolol, amiodarone, torsemide      Overflow incontinence  - bladder scan showed 600 mL -> straight cath not successful  - urology consulted -> barragan placed (11/15) -> 800 cc of urine  - will be discharged home with barragan  - monitor of post obstruction diuresis      Femoral access hematoma  -pt continues to have hematoma with blood degradation over L groin from cath 11/10  -held DVT ppx, restarted 11/12 with expansion on 11/13  -pt vitals stable, will hold DVT ppx until hematoma resolves      TOBIAS on CKD IV  - c/w torsemide  - likely prerenal due to dehydration and decreased PO intake    DVT on hold due to hematoma    #Progress Note Handoff:  Pending (specify): negative covid test  Family discussion: discussed placement,   Disposition: Home___/SNF_x__/Other________/Unknown at this time________

## 2021-11-19 NOTE — PROGRESS NOTE ADULT - SUBJECTIVE AND OBJECTIVE BOX
CHIEF COMPLAINT:    Patient is a 86y old  Male who presents with a chief complaint of near syncope     INTERVAL HPI/OVERNIGHT EVENTS:    Patient seen and examined at bedside. No acute overnight events occurred.    ROS: Denies sob, chest pain. All other systems are negative.    Vital Signs:    T(F): 96 (21 @ 14:48), Max: 97.7 (21 @ 20:06)  HR: 60 (21 @ 14:48) (48 - 68)  BP: 115/56 (21 @ 14:48) (112/54 - 126/59)  RR: 18 (21 @ 14:48) (18 - 18)  SpO2: 99% (21 @ 19:38) (99% - 99%)  I&O's Summary    2021 07:01  -  2021 07:00  --------------------------------------------------------  IN: 601 mL / OUT: 1600 mL / NET: -999 mL    2021 07:01  -  2021 16:31  --------------------------------------------------------  IN: 200 mL / OUT: 475 mL / NET: -275 mL      Daily     Daily Weight in k.7 (2021 04:48)  CAPILLARY BLOOD GLUCOSE      POCT Blood Glucose.: 112 mg/dL (2021 11:32)  POCT Blood Glucose.: 134 mg/dL (2021 07:47)  POCT Blood Glucose.: 117 mg/dL (2021 21:17)  POCT Blood Glucose.: 178 mg/dL (2021 16:47)      PHYSICAL EXAM:  GENERAL:  NAD  SKIN: No rashes or lesions  HEENT: Atraumatic. Normocephalic. Anicteric  NECK:  No JVD.   PULMONARY: Clear to ausculation bilaterally. No wheezing. No rales  CVS: Normal S1, S2. Regular rate and rhythm. No murmurs.  ABDOMEN/GI: Soft, Nontender, Nondistended; Bowel sounds are present  EXTREMITIES:  No edema B/L LE.  NEUROLOGIC:  No motor deficit.  PSYCH: Alert & oriented x 3, normal affect      LABS:                        12.1   10.43 )-----------( 246      ( 2021 07:16 )             40.2         149<H>  |  108  |  75<HH>  ----------------------------<  118<H>  4.4   |  24  |  2.1<H>    Ca    9.9      2021 07:16  Mg     2.6         TPro  6.1  /  Alb  3.6  /  TBili  0.7  /  DBili  x   /  AST  10  /  ALT  5   /  AlkPhos  92          RADIOLOGY & ADDITIONAL TESTS:      Medications:  Standing  aMIOdarone    Tablet 200 milliGRAM(s) Oral daily  aspirin enteric coated 81 milliGRAM(s) Oral daily  atorvastatin 20 milliGRAM(s) Oral at bedtime  calcium acetate 667 milliGRAM(s) Oral three times a day with meals  chlorhexidine 4% Liquid 1 Application(s) Topical daily  chlorhexidine 4% Liquid 1 Application(s) Topical daily  clopidogrel Tablet 75 milliGRAM(s) Oral daily  dextrose 40% Gel 15 Gram(s) Oral once  dextrose 5%. 1000 milliLiter(s) IV Continuous <Continuous>  dextrose 5%. 1000 milliLiter(s) IV Continuous <Continuous>  dextrose 50% Injectable 25 Gram(s) IV Push once  dextrose 50% Injectable 12.5 Gram(s) IV Push once  dextrose 50% Injectable 25 Gram(s) IV Push once  finasteride 5 milliGRAM(s) Oral daily  glucagon  Injectable 1 milliGRAM(s) IntraMuscular once  insulin glargine Injectable (LANTUS) 20 Unit(s) SubCutaneous at bedtime  insulin lispro (ADMELOG) corrective regimen sliding scale   SubCutaneous three times a day before meals  insulin lispro Injectable (ADMELOG) 7 Unit(s) SubCutaneous before dinner  insulin lispro Injectable (ADMELOG) 7 Unit(s) SubCutaneous before breakfast  insulin lispro Injectable (ADMELOG) 7 Unit(s) SubCutaneous before lunch  melatonin 5 milliGRAM(s) Oral at bedtime  metoprolol tartrate 50 milliGRAM(s) Oral two times a day  tamsulosin 0.4 milliGRAM(s) Oral at bedtime    PRN Meds  acetaminophen     Tablet .. 650 milliGRAM(s) Oral every 6 hours PRN  guaifenesin/dextromethorphan Oral Liquid 10 milliLiter(s) Oral every 4 hours PRN      Case discussed with resident  Care discussed with pt

## 2021-11-19 NOTE — PROGRESS NOTE ADULT - SUBJECTIVE AND OBJECTIVE BOX
NEPHROLOGY FOLLOW UP NOTE    pt seen and examined  barragan draining  cr better  no dyspnea  poor historian          PAST MEDICAL & SURGICAL HISTORY:  Hypertension  Hydrocephalus  CKD (chronic kidney disease)  DM (diabetes mellitus)  Afib  History of prostate surgery    Allergies:  No Known Allergies    Home Medications Reviewed    SOCIAL HISTORY:  Denies ETOH,Smoking,   FAMILY HISTORY:  Family history of diabetes mellitus (Mother, Sibling)      REVIEW OF SYSTEMS  All other review of systems is negative unless indicated above.      PHYSICAL EXAM:  NAD  [place]  moist mm  no ln  + aicd  decreased BS b/l  distant HS, irregular   soft + BS  trace edema    Hospital Medications:   MEDICATIONS  (STANDING):  aMIOdarone    Tablet 200 milliGRAM(s) Oral daily  aspirin enteric coated 81 milliGRAM(s) Oral daily  atorvastatin 20 milliGRAM(s) Oral at bedtime  calcium acetate 667 milliGRAM(s) Oral three times a day with meals  chlorhexidine 4% Liquid 1 Application(s) Topical daily  chlorhexidine 4% Liquid 1 Application(s) Topical daily  clopidogrel Tablet 75 milliGRAM(s) Oral daily  dextrose 40% Gel 15 Gram(s) Oral once  dextrose 5%. 1000 milliLiter(s) (50 mL/Hr) IV Continuous <Continuous>  dextrose 5%. 1000 milliLiter(s) (100 mL/Hr) IV Continuous <Continuous>  dextrose 50% Injectable 25 Gram(s) IV Push once  dextrose 50% Injectable 12.5 Gram(s) IV Push once  dextrose 50% Injectable 25 Gram(s) IV Push once  finasteride 5 milliGRAM(s) Oral daily  glucagon  Injectable 1 milliGRAM(s) IntraMuscular once  insulin glargine Injectable (LANTUS) 20 Unit(s) SubCutaneous at bedtime  insulin lispro (ADMELOG) corrective regimen sliding scale   SubCutaneous three times a day before meals  insulin lispro Injectable (ADMELOG) 7 Unit(s) SubCutaneous before dinner  insulin lispro Injectable (ADMELOG) 7 Unit(s) SubCutaneous before breakfast  insulin lispro Injectable (ADMELOG) 7 Unit(s) SubCutaneous before lunch  melatonin 5 milliGRAM(s) Oral at bedtime  metoprolol tartrate 50 milliGRAM(s) Oral two times a day  tamsulosin 0.4 milliGRAM(s) Oral at bedtime        VITALS:  T(F): 96.8 (21 @ 04:48), Max: 97.7 (21 @ 20:06)  HR: 48 (21 @ 04:48)  BP: 126/59 (21 @ 04:48)  RR: 18 (21 @ 04:48)  SpO2: 99% (21 @ 19:38)  Wt(kg): --     @ 07:01  -   @ 07:00  --------------------------------------------------------  IN: 360 mL / OUT: 1400 mL / NET: -1040 mL     @ 07:01  -   @ 07:00  --------------------------------------------------------  IN: 601 mL / OUT: 1600 mL / NET: -999 mL     @ 07:01  -   @ 13:47  --------------------------------------------------------  IN: 200 mL / OUT: 0 mL / NET: 200 mL          LABS:      149<H>  |  108  |  75<HH>  ----------------------------<  118<H>  4.4   |  24  |  2.1<H>    Ca    9.9      2021 07:16  Mg     2.6         TPro  6.1  /  Alb  3.6  /  TBili  0.7  /  DBili      /  AST  10  /  ALT  5   /  AlkPhos  92                            12.1   10.43 )-----------( 246      ( 2021 07:16 )             40.2       Urine Studies:  Urinalysis Basic - ( 15 Nov 2021 09:23 )    Color: Light Yellow / Appearance: Clear / S.014 / pH:   Gluc:  / Ketone: Negative  / Bili: Negative / Urobili: <2 mg/dL   Blood:  / Protein: Negative / Nitrite: Negative   Leuk Esterase: Negative / RBC:  / WBC    Sq Epi:  / Non Sq Epi:  / Bacteria:       Chloride, Random Urine: 30 (11-15 @ 09:23)  Creatinine, Random Urine: 51 mg/dL (11-15 @ 09:23)  Sodium, Random Urine: 27.0 mmoL/L (11-15 @ 09:23)  Osmolality, Random Urine: 375 mos/kg (11-15 @ 09:23)      RADIOLOGY & ADDITIONAL STUDIES:

## 2021-11-19 NOTE — PROGRESS NOTE ADULT - PROVIDER SPECIALTY LIST ADULT
CCU
Electrophysiology
Heart Failure
Intervent Cardiology
Nephrology
Nephrology
Heart Failure
Heart Failure
Hospitalist
Hospitalist
Internal Medicine
Intervent Cardiology
Nephrology
Nephrology
Urology
CCU
Cardiology
Electrophysiology
Electrophysiology
Internal Medicine
Intervent Cardiology
Intervent Cardiology
Nephrology
Electrophysiology
Electrophysiology
Heart Failure
Heart Failure
Internal Medicine
Nephrology

## 2021-11-19 NOTE — PROGRESS NOTE ADULT - ASSESSMENT
TOBIAS    CKD stage 4-5  s/p PCI to LAD / CAD  hyperphosphatemia  dizziness / falls  chronic HFpEF / Afib / VT s/p AICD  JOHANNY on CPAP    HTN   NPH s/p  shunt    DM 2    dm neuropathy    plan:      cont barragan as outpatient per   keep off gabapentin  renal diet /  phoslo with meals  agree with Dr. Huerta diuretics  physical therapy  dc planning to STR  outpt renal f/u

## 2021-11-19 NOTE — DISCHARGE NOTE NURSING/CASE MANAGEMENT/SOCIAL WORK - PATIENT PORTAL LINK FT
You can access the FollowMyHealth Patient Portal offered by [facility] by registering at the following website: [email or web address]. By joining [facility]’s FollowMyHealth portal, you will also be able to view your health information using other applications (apps) compatible with our system.

## 2021-11-20 NOTE — CHART NOTE - NSCHARTNOTEFT_GEN_A_CORE
Conditional discharge order placed yesterday as pt was pending COVID-19 results prior to discharge. Transportation arrived early this morning and pt left prior me being able to exam him.

## 2021-12-09 PROBLEM — I47.2 PAROXYSMAL VENTRICULAR TACHYCARDIA: Status: ACTIVE | Noted: 2021-01-01

## 2021-12-24 NOTE — HISTORY OF PRESENT ILLNESS
[de-identified] : I had a pleasure of seeing [Mr. EDMONDSON for follow-up consultation for ventricular tachycardia. He is accompanied by his son. \par \par Mr. EDMONDSON is a 86 year-year old male with history of HFpEF, PVC induced PMVT s/p DC-ICD (MDT, 21, non-dep), persistent atrial fibrillation, CKD, HTN, pVC, COPD, JOHANNY is here for f-up.\par \par Denies chest pain, shortness of breath, palpitation, dizziness or LOC except noted above.\par \par EKG (12/9): AF-@64\par TTE (07/21): EF 54%, Mod LAE\par Cardio: Dr. Harris\par HF: Dr. Huerta\par

## 2021-12-24 NOTE — ASSESSMENT
[FreeTextEntry1] : ## PMVT s/p DC-ICD (MDT, 21, Dep)\par ## Persistent AF\par ## PVC\par \par - ICD interrogation shows normally functioning SC-ICD. Battery life ok. Optivol below threshold. No new events. \par - Continue with amiodarone for PVC suppression and metoprolol\par - No OAC for now- due to recurrent falls in past. We will discuss watchman next visit\par - Remote Monitoring\par - Return in 1 month

## 2021-12-24 NOTE — PROCEDURE
[Atrial Fibrillation] : atrial fibrillation [ICD] : Implantable cardioverter-defibrillator [DDD] : DDD [Longevity: ___ months] : The estimated remaining battery life is [unfilled] months [Lead Imp:  ___ohms] : lead impedance was [unfilled] ohms [Sensing Amplitude ___mv] : sensing amplitude was [unfilled] mv [___V @] : [unfilled] V [___ ms] : [unfilled] ms [Asense-Vsense ___ %] : Asense-Vsense [unfilled]% [Asense-Vpace ___ %] : Asense-Vpace [unfilled]% [Apace-Vsense ___ %] : Apace-Vsense [unfilled]% [Apace-Vpace ___ %] : Apace-Vpace [unfilled]% [No] : not [None] : none [de-identified] : ANITA [de-identified] : [id number] [de-identified] : [id number] [de-identified] : 11/09/21 [de-identified] : SUSANA [de-identified] : 60 [de-identified] : Normal device function.\par AF burden 100%\par 3 VT episodes treated with ATP on 12/09/21, multiple other episodes of NSVT (avg. v-rate 167-188 bpm). detailed exam

## 2022-01-01 ENCOUNTER — APPOINTMENT (OUTPATIENT)
Dept: CARDIOLOGY | Facility: CLINIC | Age: 87
End: 2022-01-01

## 2022-01-01 ENCOUNTER — INPATIENT (INPATIENT)
Facility: HOSPITAL | Age: 87
LOS: 2 days | End: 2022-02-03
Attending: INTERNAL MEDICINE | Admitting: INTERNAL MEDICINE
Payer: MEDICARE

## 2022-01-01 VITALS
SYSTOLIC BLOOD PRESSURE: 116 MMHG | DIASTOLIC BLOOD PRESSURE: 58 MMHG | TEMPERATURE: 96 F | RESPIRATION RATE: 16 BRPM | HEART RATE: 78 BPM | OXYGEN SATURATION: 90 %

## 2022-01-01 VITALS
WEIGHT: 216.93 LBS | SYSTOLIC BLOOD PRESSURE: 145 MMHG | OXYGEN SATURATION: 97 % | HEIGHT: 71 IN | HEART RATE: 70 BPM | DIASTOLIC BLOOD PRESSURE: 67 MMHG | RESPIRATION RATE: 26 BRPM | TEMPERATURE: 98 F

## 2022-01-01 DIAGNOSIS — Z98.890 OTHER SPECIFIED POSTPROCEDURAL STATES: Chronic | ICD-10-CM

## 2022-01-01 LAB
A1C WITH ESTIMATED AVERAGE GLUCOSE RESULT: 6.3 % — HIGH (ref 4–5.6)
ALBUMIN SERPL ELPH-MCNC: 3 G/DL — LOW (ref 3.5–5.2)
ALBUMIN SERPL ELPH-MCNC: 3.1 G/DL — LOW (ref 3.5–5.2)
ALBUMIN SERPL ELPH-MCNC: 3.2 G/DL — LOW (ref 3.5–5.2)
ALBUMIN SERPL ELPH-MCNC: 3.3 G/DL — LOW (ref 3.5–5.2)
ALBUMIN SERPL ELPH-MCNC: 3.6 G/DL — SIGNIFICANT CHANGE UP (ref 3.5–5.2)
ALP SERPL-CCNC: 75 U/L — SIGNIFICANT CHANGE UP (ref 30–115)
ALP SERPL-CCNC: 78 U/L — SIGNIFICANT CHANGE UP (ref 30–115)
ALP SERPL-CCNC: 79 U/L — SIGNIFICANT CHANGE UP (ref 30–115)
ALP SERPL-CCNC: 79 U/L — SIGNIFICANT CHANGE UP (ref 30–115)
ALP SERPL-CCNC: 85 U/L — SIGNIFICANT CHANGE UP (ref 30–115)
ALT FLD-CCNC: 11 U/L — SIGNIFICANT CHANGE UP (ref 0–41)
ALT FLD-CCNC: 8 U/L — SIGNIFICANT CHANGE UP (ref 0–41)
ANION GAP SERPL CALC-SCNC: 13 MMOL/L — SIGNIFICANT CHANGE UP (ref 7–14)
ANION GAP SERPL CALC-SCNC: 14 MMOL/L — SIGNIFICANT CHANGE UP (ref 7–14)
ANION GAP SERPL CALC-SCNC: 15 MMOL/L — HIGH (ref 7–14)
ANION GAP SERPL CALC-SCNC: 23 MMOL/L — HIGH (ref 7–14)
APTT BLD: 31.6 SEC — SIGNIFICANT CHANGE UP (ref 27–39.2)
AST SERPL-CCNC: 10 U/L — SIGNIFICANT CHANGE UP (ref 0–41)
AST SERPL-CCNC: 10 U/L — SIGNIFICANT CHANGE UP (ref 0–41)
AST SERPL-CCNC: 12 U/L — SIGNIFICANT CHANGE UP (ref 0–41)
AST SERPL-CCNC: 17 U/L — SIGNIFICANT CHANGE UP (ref 0–41)
AST SERPL-CCNC: 9 U/L — SIGNIFICANT CHANGE UP (ref 0–41)
BASE EXCESS BLDA CALC-SCNC: -0.4 MMOL/L — SIGNIFICANT CHANGE UP (ref -2–3)
BASE EXCESS BLDA CALC-SCNC: -5.4 MMOL/L — LOW (ref -2–3)
BASE EXCESS BLDV CALC-SCNC: 2.3 MMOL/L — SIGNIFICANT CHANGE UP (ref -2–3)
BASE EXCESS BLDV CALC-SCNC: 3.7 MMOL/L — HIGH (ref -2–3)
BASOPHILS # BLD AUTO: 0.02 K/UL — SIGNIFICANT CHANGE UP (ref 0–0.2)
BASOPHILS # BLD AUTO: 0.02 K/UL — SIGNIFICANT CHANGE UP (ref 0–0.2)
BASOPHILS # BLD AUTO: 0.03 K/UL — SIGNIFICANT CHANGE UP (ref 0–0.2)
BASOPHILS # BLD AUTO: 0.03 K/UL — SIGNIFICANT CHANGE UP (ref 0–0.2)
BASOPHILS NFR BLD AUTO: 0.2 % — SIGNIFICANT CHANGE UP (ref 0–1)
BASOPHILS NFR BLD AUTO: 0.2 % — SIGNIFICANT CHANGE UP (ref 0–1)
BASOPHILS NFR BLD AUTO: 0.3 % — SIGNIFICANT CHANGE UP (ref 0–1)
BASOPHILS NFR BLD AUTO: 0.3 % — SIGNIFICANT CHANGE UP (ref 0–1)
BILIRUB SERPL-MCNC: 0.8 MG/DL — SIGNIFICANT CHANGE UP (ref 0.2–1.2)
BILIRUB SERPL-MCNC: 1.1 MG/DL — SIGNIFICANT CHANGE UP (ref 0.2–1.2)
BILIRUB SERPL-MCNC: 1.3 MG/DL — HIGH (ref 0.2–1.2)
BILIRUB SERPL-MCNC: 1.5 MG/DL — HIGH (ref 0.2–1.2)
BILIRUB SERPL-MCNC: 2.2 MG/DL — HIGH (ref 0.2–1.2)
BUN SERPL-MCNC: 47 MG/DL — HIGH (ref 10–20)
BUN SERPL-MCNC: 48 MG/DL — HIGH (ref 10–20)
BUN SERPL-MCNC: 48 MG/DL — HIGH (ref 10–20)
BUN SERPL-MCNC: 49 MG/DL — HIGH (ref 10–20)
BUN SERPL-MCNC: 51 MG/DL — HIGH (ref 10–20)
BUN SERPL-MCNC: 54 MG/DL — HIGH (ref 10–20)
CA-I SERPL-SCNC: 1.18 MMOL/L — SIGNIFICANT CHANGE UP (ref 1.15–1.33)
CA-I SERPL-SCNC: 1.19 MMOL/L — SIGNIFICANT CHANGE UP (ref 1.15–1.33)
CALCIUM SERPL-MCNC: 8.4 MG/DL — LOW (ref 8.5–10.1)
CALCIUM SERPL-MCNC: 8.4 MG/DL — LOW (ref 8.5–10.1)
CALCIUM SERPL-MCNC: 8.5 MG/DL — SIGNIFICANT CHANGE UP (ref 8.5–10.1)
CALCIUM SERPL-MCNC: 8.6 MG/DL — SIGNIFICANT CHANGE UP (ref 8.5–10.1)
CALCIUM SERPL-MCNC: 8.6 MG/DL — SIGNIFICANT CHANGE UP (ref 8.5–10.1)
CALCIUM SERPL-MCNC: 8.8 MG/DL — SIGNIFICANT CHANGE UP (ref 8.5–10.1)
CHLORIDE SERPL-SCNC: 101 MMOL/L — SIGNIFICANT CHANGE UP (ref 98–110)
CHLORIDE SERPL-SCNC: 103 MMOL/L — SIGNIFICANT CHANGE UP (ref 98–110)
CHLORIDE SERPL-SCNC: 104 MMOL/L — SIGNIFICANT CHANGE UP (ref 98–110)
CHLORIDE SERPL-SCNC: 109 MMOL/L — SIGNIFICANT CHANGE UP (ref 98–110)
CHLORIDE SERPL-SCNC: 110 MMOL/L — SIGNIFICANT CHANGE UP (ref 98–110)
CHLORIDE SERPL-SCNC: 111 MMOL/L — HIGH (ref 98–110)
CK MB CFR SERPL CALC: 5.5 NG/ML — SIGNIFICANT CHANGE UP (ref 0.6–6.3)
CK SERPL-CCNC: 38 U/L — SIGNIFICANT CHANGE UP (ref 0–225)
CO2 SERPL-SCNC: 16 MMOL/L — LOW (ref 17–32)
CO2 SERPL-SCNC: 25 MMOL/L — SIGNIFICANT CHANGE UP (ref 17–32)
CO2 SERPL-SCNC: 26 MMOL/L — SIGNIFICANT CHANGE UP (ref 17–32)
CO2 SERPL-SCNC: 27 MMOL/L — SIGNIFICANT CHANGE UP (ref 17–32)
CO2 SERPL-SCNC: 28 MMOL/L — SIGNIFICANT CHANGE UP (ref 17–32)
CO2 SERPL-SCNC: 28 MMOL/L — SIGNIFICANT CHANGE UP (ref 17–32)
CREAT SERPL-MCNC: 2.5 MG/DL — HIGH (ref 0.7–1.5)
CREAT SERPL-MCNC: 2.5 MG/DL — HIGH (ref 0.7–1.5)
CREAT SERPL-MCNC: 2.6 MG/DL — HIGH (ref 0.7–1.5)
CREAT SERPL-MCNC: 2.7 MG/DL — HIGH (ref 0.7–1.5)
CREAT SERPL-MCNC: 2.7 MG/DL — HIGH (ref 0.7–1.5)
CREAT SERPL-MCNC: 3 MG/DL — HIGH (ref 0.7–1.5)
D DIMER BLD IA.RAPID-MCNC: 956 NG/ML DDU — HIGH (ref 0–230)
EOSINOPHIL # BLD AUTO: 0.03 K/UL — SIGNIFICANT CHANGE UP (ref 0–0.7)
EOSINOPHIL # BLD AUTO: 0.2 K/UL — SIGNIFICANT CHANGE UP (ref 0–0.7)
EOSINOPHIL # BLD AUTO: 0.22 K/UL — SIGNIFICANT CHANGE UP (ref 0–0.7)
EOSINOPHIL # BLD AUTO: 0.32 K/UL — SIGNIFICANT CHANGE UP (ref 0–0.7)
EOSINOPHIL NFR BLD AUTO: 0.2 % — SIGNIFICANT CHANGE UP (ref 0–8)
EOSINOPHIL NFR BLD AUTO: 2 % — SIGNIFICANT CHANGE UP (ref 0–8)
EOSINOPHIL NFR BLD AUTO: 2.3 % — SIGNIFICANT CHANGE UP (ref 0–8)
EOSINOPHIL NFR BLD AUTO: 3.6 % — SIGNIFICANT CHANGE UP (ref 0–8)
ESTIMATED AVERAGE GLUCOSE: 134 MG/DL — HIGH (ref 68–114)
FERRITIN SERPL-MCNC: 180 NG/ML — SIGNIFICANT CHANGE UP (ref 30–400)
FOLATE SERPL-MCNC: 5 NG/ML — SIGNIFICANT CHANGE UP
GAS PNL BLDA: SIGNIFICANT CHANGE UP
GAS PNL BLDV: 138 MMOL/L — SIGNIFICANT CHANGE UP (ref 136–145)
GAS PNL BLDV: 144 MMOL/L — SIGNIFICANT CHANGE UP (ref 136–145)
GAS PNL BLDV: SIGNIFICANT CHANGE UP
GAS PNL BLDV: SIGNIFICANT CHANGE UP
GLUCOSE BLDC GLUCOMTR-MCNC: 138 MG/DL — HIGH (ref 70–99)
GLUCOSE BLDC GLUCOMTR-MCNC: 143 MG/DL — HIGH (ref 70–99)
GLUCOSE BLDC GLUCOMTR-MCNC: 144 MG/DL — HIGH (ref 70–99)
GLUCOSE BLDC GLUCOMTR-MCNC: 147 MG/DL — HIGH (ref 70–99)
GLUCOSE BLDC GLUCOMTR-MCNC: 176 MG/DL — HIGH (ref 70–99)
GLUCOSE BLDC GLUCOMTR-MCNC: 93 MG/DL — SIGNIFICANT CHANGE UP (ref 70–99)
GLUCOSE BLDC GLUCOMTR-MCNC: 96 MG/DL — SIGNIFICANT CHANGE UP (ref 70–99)
GLUCOSE BLDC GLUCOMTR-MCNC: 99 MG/DL — SIGNIFICANT CHANGE UP (ref 70–99)
GLUCOSE SERPL-MCNC: 100 MG/DL — HIGH (ref 70–99)
GLUCOSE SERPL-MCNC: 100 MG/DL — HIGH (ref 70–99)
GLUCOSE SERPL-MCNC: 106 MG/DL — HIGH (ref 70–99)
GLUCOSE SERPL-MCNC: 111 MG/DL — HIGH (ref 70–99)
GLUCOSE SERPL-MCNC: 135 MG/DL — HIGH (ref 70–99)
GLUCOSE SERPL-MCNC: 151 MG/DL — HIGH (ref 70–99)
HCO3 BLDA-SCNC: 25 MMOL/L — SIGNIFICANT CHANGE UP (ref 21–28)
HCO3 BLDA-SCNC: 26 MMOL/L — SIGNIFICANT CHANGE UP (ref 21–28)
HCO3 BLDV-SCNC: 31 MMOL/L — HIGH (ref 22–29)
HCO3 BLDV-SCNC: 31 MMOL/L — HIGH (ref 22–29)
HCT VFR BLD CALC: 43.3 % — SIGNIFICANT CHANGE UP (ref 42–52)
HCT VFR BLD CALC: 43.4 % — SIGNIFICANT CHANGE UP (ref 42–52)
HCT VFR BLD CALC: 44.2 % — SIGNIFICANT CHANGE UP (ref 42–52)
HCT VFR BLD CALC: 45.2 % — SIGNIFICANT CHANGE UP (ref 42–52)
HCT VFR BLDA CALC: 36 % — LOW (ref 39–51)
HCT VFR BLDA CALC: 37 % — LOW (ref 39–51)
HGB BLD CALC-MCNC: 11.9 G/DL — LOW (ref 12.6–17.4)
HGB BLD CALC-MCNC: 12.3 G/DL — LOW (ref 12.6–17.4)
HGB BLD-MCNC: 12.2 G/DL — LOW (ref 14–18)
HGB BLD-MCNC: 12.7 G/DL — LOW (ref 14–18)
HGB BLD-MCNC: 12.7 G/DL — LOW (ref 14–18)
HGB BLD-MCNC: 13.2 G/DL — LOW (ref 14–18)
HOROWITZ INDEX BLDA+IHG-RTO: 100 — SIGNIFICANT CHANGE UP
IMM GRANULOCYTES NFR BLD AUTO: 0.7 % — HIGH (ref 0.1–0.3)
IMM GRANULOCYTES NFR BLD AUTO: 0.7 % — HIGH (ref 0.1–0.3)
IMM GRANULOCYTES NFR BLD AUTO: 0.8 % — HIGH (ref 0.1–0.3)
IMM GRANULOCYTES NFR BLD AUTO: 1.1 % — HIGH (ref 0.1–0.3)
INR BLD: 1.04 RATIO — SIGNIFICANT CHANGE UP (ref 0.65–1.3)
LACTATE BLDV-MCNC: 1.1 MMOL/L — SIGNIFICANT CHANGE UP (ref 0.5–2)
LACTATE BLDV-MCNC: 1.7 MMOL/L — SIGNIFICANT CHANGE UP (ref 0.5–2)
LYMPHOCYTES # BLD AUTO: 0.38 K/UL — LOW (ref 1.2–3.4)
LYMPHOCYTES # BLD AUTO: 0.44 K/UL — LOW (ref 1.2–3.4)
LYMPHOCYTES # BLD AUTO: 0.53 K/UL — LOW (ref 1.2–3.4)
LYMPHOCYTES # BLD AUTO: 0.64 K/UL — LOW (ref 1.2–3.4)
LYMPHOCYTES # BLD AUTO: 3.5 % — LOW (ref 20.5–51.1)
LYMPHOCYTES # BLD AUTO: 3.6 % — LOW (ref 20.5–51.1)
LYMPHOCYTES # BLD AUTO: 6 % — LOW (ref 20.5–51.1)
LYMPHOCYTES # BLD AUTO: 7.3 % — LOW (ref 20.5–51.1)
MAGNESIUM SERPL-MCNC: 2.4 MG/DL — SIGNIFICANT CHANGE UP (ref 1.8–2.4)
MAGNESIUM SERPL-MCNC: 2.5 MG/DL — HIGH (ref 1.8–2.4)
MAGNESIUM SERPL-MCNC: 2.5 MG/DL — HIGH (ref 1.8–2.4)
MAGNESIUM SERPL-MCNC: 2.6 MG/DL — HIGH (ref 1.8–2.4)
MAGNESIUM SERPL-MCNC: 2.6 MG/DL — HIGH (ref 1.8–2.4)
MCHC RBC-ENTMCNC: 24.6 PG — LOW (ref 27–31)
MCHC RBC-ENTMCNC: 24.6 PG — LOW (ref 27–31)
MCHC RBC-ENTMCNC: 24.7 PG — LOW (ref 27–31)
MCHC RBC-ENTMCNC: 24.8 PG — LOW (ref 27–31)
MCHC RBC-ENTMCNC: 28.1 G/DL — LOW (ref 32–37)
MCHC RBC-ENTMCNC: 28.7 G/DL — LOW (ref 32–37)
MCHC RBC-ENTMCNC: 29.2 G/DL — LOW (ref 32–37)
MCHC RBC-ENTMCNC: 29.3 G/DL — LOW (ref 32–37)
MCV RBC AUTO: 83.8 FL — SIGNIFICANT CHANGE UP (ref 80–94)
MCV RBC AUTO: 85 FL — SIGNIFICANT CHANGE UP (ref 80–94)
MCV RBC AUTO: 86 FL — SIGNIFICANT CHANGE UP (ref 80–94)
MCV RBC AUTO: 87.5 FL — SIGNIFICANT CHANGE UP (ref 80–94)
MONOCYTES # BLD AUTO: 0.6 K/UL — SIGNIFICANT CHANGE UP (ref 0.1–0.6)
MONOCYTES # BLD AUTO: 0.6 K/UL — SIGNIFICANT CHANGE UP (ref 0.1–0.6)
MONOCYTES # BLD AUTO: 0.62 K/UL — HIGH (ref 0.1–0.6)
MONOCYTES # BLD AUTO: 0.67 K/UL — HIGH (ref 0.1–0.6)
MONOCYTES NFR BLD AUTO: 5 % — SIGNIFICANT CHANGE UP (ref 1.7–9.3)
MONOCYTES NFR BLD AUTO: 5.5 % — SIGNIFICANT CHANGE UP (ref 1.7–9.3)
MONOCYTES NFR BLD AUTO: 7 % — SIGNIFICANT CHANGE UP (ref 1.7–9.3)
MONOCYTES NFR BLD AUTO: 7.6 % — SIGNIFICANT CHANGE UP (ref 1.7–9.3)
NEUTROPHILS # BLD AUTO: 10.85 K/UL — HIGH (ref 1.4–6.5)
NEUTROPHILS # BLD AUTO: 7.22 K/UL — HIGH (ref 1.4–6.5)
NEUTROPHILS # BLD AUTO: 7.27 K/UL — HIGH (ref 1.4–6.5)
NEUTROPHILS # BLD AUTO: 9.53 K/UL — HIGH (ref 1.4–6.5)
NEUTROPHILS NFR BLD AUTO: 81.8 % — HIGH (ref 42.2–75.2)
NEUTROPHILS NFR BLD AUTO: 82.5 % — HIGH (ref 42.2–75.2)
NEUTROPHILS NFR BLD AUTO: 87.9 % — HIGH (ref 42.2–75.2)
NEUTROPHILS NFR BLD AUTO: 89.9 % — HIGH (ref 42.2–75.2)
NRBC # BLD: 0 /100 WBCS — SIGNIFICANT CHANGE UP (ref 0–0)
NT-PROBNP SERPL-SCNC: HIGH PG/ML (ref 0–300)
PCO2 BLDA: 48 MMHG — SIGNIFICANT CHANGE UP (ref 35–48)
PCO2 BLDA: 71 MMHG — CRITICAL HIGH (ref 35–48)
PCO2 BLDV: 57 MMHG — HIGH (ref 42–55)
PCO2 BLDV: 69 MMHG — HIGH (ref 42–55)
PH BLDA: 7.15 — CRITICAL LOW (ref 7.35–7.45)
PH BLDA: 7.34 — LOW (ref 7.35–7.45)
PH BLDV: 7.26 — LOW (ref 7.32–7.43)
PH BLDV: 7.34 — SIGNIFICANT CHANGE UP (ref 7.32–7.43)
PHOSPHATE SERPL-MCNC: 5.1 MG/DL — HIGH (ref 2.1–4.9)
PHOSPHATE SERPL-MCNC: 5.3 MG/DL — HIGH (ref 2.1–4.9)
PHOSPHATE SERPL-MCNC: 6.3 MG/DL — HIGH (ref 2.1–4.9)
PLATELET # BLD AUTO: 168 K/UL — SIGNIFICANT CHANGE UP (ref 130–400)
PLATELET # BLD AUTO: 193 K/UL — SIGNIFICANT CHANGE UP (ref 130–400)
PLATELET # BLD AUTO: 198 K/UL — SIGNIFICANT CHANGE UP (ref 130–400)
PLATELET # BLD AUTO: 203 K/UL — SIGNIFICANT CHANGE UP (ref 130–400)
PO2 BLDA: 139 MMHG — HIGH (ref 83–108)
PO2 BLDA: 55 MMHG — LOW (ref 83–108)
PO2 BLDV: 23 MMHG — SIGNIFICANT CHANGE UP
PO2 BLDV: 33 MMHG — SIGNIFICANT CHANGE UP
POTASSIUM BLDV-SCNC: 3 MMOL/L — LOW (ref 3.5–5.1)
POTASSIUM BLDV-SCNC: 4 MMOL/L — SIGNIFICANT CHANGE UP (ref 3.5–5.1)
POTASSIUM SERPL-MCNC: 3.1 MMOL/L — LOW (ref 3.5–5)
POTASSIUM SERPL-MCNC: 3.4 MMOL/L — LOW (ref 3.5–5)
POTASSIUM SERPL-MCNC: 3.8 MMOL/L — SIGNIFICANT CHANGE UP (ref 3.5–5)
POTASSIUM SERPL-MCNC: 4 MMOL/L — SIGNIFICANT CHANGE UP (ref 3.5–5)
POTASSIUM SERPL-MCNC: 4.1 MMOL/L — SIGNIFICANT CHANGE UP (ref 3.5–5)
POTASSIUM SERPL-MCNC: 4.4 MMOL/L — SIGNIFICANT CHANGE UP (ref 3.5–5)
POTASSIUM SERPL-SCNC: 3.1 MMOL/L — LOW (ref 3.5–5)
POTASSIUM SERPL-SCNC: 3.4 MMOL/L — LOW (ref 3.5–5)
POTASSIUM SERPL-SCNC: 3.8 MMOL/L — SIGNIFICANT CHANGE UP (ref 3.5–5)
POTASSIUM SERPL-SCNC: 4 MMOL/L — SIGNIFICANT CHANGE UP (ref 3.5–5)
POTASSIUM SERPL-SCNC: 4.1 MMOL/L — SIGNIFICANT CHANGE UP (ref 3.5–5)
POTASSIUM SERPL-SCNC: 4.4 MMOL/L — SIGNIFICANT CHANGE UP (ref 3.5–5)
PROCALCITONIN SERPL-MCNC: 0.23 NG/ML — HIGH (ref 0.02–0.1)
PROT SERPL-MCNC: 5.6 G/DL — LOW (ref 6–8)
PROT SERPL-MCNC: 5.7 G/DL — LOW (ref 6–8)
PROT SERPL-MCNC: 5.7 G/DL — LOW (ref 6–8)
PROT SERPL-MCNC: 5.8 G/DL — LOW (ref 6–8)
PROT SERPL-MCNC: 6 G/DL — SIGNIFICANT CHANGE UP (ref 6–8)
PROTHROM AB SERPL-ACNC: 11.9 SEC — SIGNIFICANT CHANGE UP (ref 9.95–12.87)
RBC # BLD: 4.96 M/UL — SIGNIFICANT CHANGE UP (ref 4.7–6.1)
RBC # BLD: 5.14 M/UL — SIGNIFICANT CHANGE UP (ref 4.7–6.1)
RBC # BLD: 5.17 M/UL — SIGNIFICANT CHANGE UP (ref 4.7–6.1)
RBC # BLD: 5.32 M/UL — SIGNIFICANT CHANGE UP (ref 4.7–6.1)
RBC # FLD: 19.9 % — HIGH (ref 11.5–14.5)
RBC # FLD: 20 % — HIGH (ref 11.5–14.5)
RBC # FLD: 20.1 % — HIGH (ref 11.5–14.5)
RBC # FLD: 20.2 % — HIGH (ref 11.5–14.5)
SAO2 % BLDA: 84.8 % — LOW (ref 94–98)
SAO2 % BLDA: 98.9 % — HIGH (ref 94–98)
SAO2 % BLDV: 34.4 % — SIGNIFICANT CHANGE UP
SAO2 % BLDV: 58.6 % — SIGNIFICANT CHANGE UP
SARS-COV-2 RNA SPEC QL NAA+PROBE: SIGNIFICANT CHANGE UP
SARS-COV-2 RNA SPEC QL NAA+PROBE: SIGNIFICANT CHANGE UP
SODIUM SERPL-SCNC: 142 MMOL/L — SIGNIFICANT CHANGE UP (ref 135–146)
SODIUM SERPL-SCNC: 143 MMOL/L — SIGNIFICANT CHANGE UP (ref 135–146)
SODIUM SERPL-SCNC: 144 MMOL/L — SIGNIFICANT CHANGE UP (ref 135–146)
SODIUM SERPL-SCNC: 148 MMOL/L — HIGH (ref 135–146)
SODIUM SERPL-SCNC: 151 MMOL/L — HIGH (ref 135–146)
SODIUM SERPL-SCNC: 151 MMOL/L — HIGH (ref 135–146)
T4 FREE SERPL-MCNC: 1.6 NG/DL — SIGNIFICANT CHANGE UP (ref 0.9–1.8)
TROPONIN T SERPL-MCNC: 0.06 NG/ML — CRITICAL HIGH
TROPONIN T SERPL-MCNC: 0.07 NG/ML — CRITICAL HIGH
TSH SERPL-MCNC: 0.26 UIU/ML — LOW (ref 0.27–4.2)
VIT B12 SERPL-MCNC: 1007 PG/ML — SIGNIFICANT CHANGE UP (ref 232–1245)
WBC # BLD: 10.85 K/UL — HIGH (ref 4.8–10.8)
WBC # BLD: 12.07 K/UL — HIGH (ref 4.8–10.8)
WBC # BLD: 8.82 K/UL — SIGNIFICANT CHANGE UP (ref 4.8–10.8)
WBC # BLD: 8.82 K/UL — SIGNIFICANT CHANGE UP (ref 4.8–10.8)
WBC # FLD AUTO: 10.85 K/UL — HIGH (ref 4.8–10.8)
WBC # FLD AUTO: 12.07 K/UL — HIGH (ref 4.8–10.8)
WBC # FLD AUTO: 8.82 K/UL — SIGNIFICANT CHANGE UP (ref 4.8–10.8)
WBC # FLD AUTO: 8.82 K/UL — SIGNIFICANT CHANGE UP (ref 4.8–10.8)

## 2022-01-01 PROCEDURE — 76937 US GUIDE VASCULAR ACCESS: CPT | Mod: 26

## 2022-01-01 PROCEDURE — 71045 X-RAY EXAM CHEST 1 VIEW: CPT | Mod: 26

## 2022-01-01 PROCEDURE — 99223 1ST HOSP IP/OBS HIGH 75: CPT

## 2022-01-01 PROCEDURE — 93970 EXTREMITY STUDY: CPT | Mod: 26

## 2022-01-01 PROCEDURE — 99233 SBSQ HOSP IP/OBS HIGH 50: CPT

## 2022-01-01 PROCEDURE — 93010 ELECTROCARDIOGRAM REPORT: CPT

## 2022-01-01 PROCEDURE — 99285 EMERGENCY DEPT VISIT HI MDM: CPT | Mod: 25

## 2022-01-01 PROCEDURE — 76604 US EXAM CHEST: CPT | Mod: 26

## 2022-01-01 PROCEDURE — 99497 ADVNCD CARE PLAN 30 MIN: CPT | Mod: 25

## 2022-01-01 PROCEDURE — 93308 TTE F-UP OR LMTD: CPT | Mod: 26

## 2022-01-01 RX ORDER — DEXTROSE 50 % IN WATER 50 %
25 SYRINGE (ML) INTRAVENOUS ONCE
Refills: 0 | Status: DISCONTINUED | OUTPATIENT
Start: 2022-01-01 | End: 2022-01-01

## 2022-01-01 RX ORDER — LANOLIN ALCOHOL/MO/W.PET/CERES
3 CREAM (GRAM) TOPICAL ONCE
Refills: 0 | Status: COMPLETED | OUTPATIENT
Start: 2022-01-01 | End: 2022-01-01

## 2022-01-01 RX ORDER — CLOPIDOGREL BISULFATE 75 MG/1
75 TABLET, FILM COATED ORAL DAILY
Refills: 0 | Status: DISCONTINUED | OUTPATIENT
Start: 2022-01-01 | End: 2022-01-01

## 2022-01-01 RX ORDER — CALCIUM ACETATE 667 MG
667 TABLET ORAL
Refills: 0 | Status: DISCONTINUED | OUTPATIENT
Start: 2022-01-01 | End: 2022-01-01

## 2022-01-01 RX ORDER — POTASSIUM CHLORIDE 20 MEQ
20 PACKET (EA) ORAL ONCE
Refills: 0 | Status: COMPLETED | OUTPATIENT
Start: 2022-01-01 | End: 2022-01-01

## 2022-01-01 RX ORDER — METOPROLOL TARTRATE 50 MG
1 TABLET ORAL
Qty: 0 | Refills: 0 | DISCHARGE

## 2022-01-01 RX ORDER — AMIODARONE HYDROCHLORIDE 400 MG/1
150 TABLET ORAL ONCE
Refills: 0 | Status: COMPLETED | OUTPATIENT
Start: 2022-01-01 | End: 2022-01-01

## 2022-01-01 RX ORDER — TAMSULOSIN HYDROCHLORIDE 0.4 MG/1
0.4 CAPSULE ORAL AT BEDTIME
Refills: 0 | Status: DISCONTINUED | OUTPATIENT
Start: 2022-01-01 | End: 2022-01-01

## 2022-01-01 RX ORDER — BUMETANIDE 0.25 MG/ML
2 INJECTION INTRAMUSCULAR; INTRAVENOUS ONCE
Refills: 0 | Status: DISCONTINUED | OUTPATIENT
Start: 2022-01-01 | End: 2022-01-01

## 2022-01-01 RX ORDER — SODIUM CHLORIDE 9 MG/ML
1000 INJECTION, SOLUTION INTRAVENOUS
Refills: 0 | Status: DISCONTINUED | OUTPATIENT
Start: 2022-01-01 | End: 2022-01-01

## 2022-01-01 RX ORDER — DEXTROSE 50 % IN WATER 50 %
12.5 SYRINGE (ML) INTRAVENOUS ONCE
Refills: 0 | Status: DISCONTINUED | OUTPATIENT
Start: 2022-01-01 | End: 2022-01-01

## 2022-01-01 RX ORDER — INSULIN LISPRO 100/ML
VIAL (ML) SUBCUTANEOUS
Refills: 0 | Status: DISCONTINUED | OUTPATIENT
Start: 2022-01-01 | End: 2022-01-01

## 2022-01-01 RX ORDER — POTASSIUM CHLORIDE 20 MEQ
40 PACKET (EA) ORAL ONCE
Refills: 0 | Status: COMPLETED | OUTPATIENT
Start: 2022-01-01 | End: 2022-01-01

## 2022-01-01 RX ORDER — AMIODARONE HYDROCHLORIDE 400 MG/1
150 TABLET ORAL ONCE
Refills: 0 | Status: DISCONTINUED | OUTPATIENT
Start: 2022-01-01 | End: 2022-01-01

## 2022-01-01 RX ORDER — BUMETANIDE 0.25 MG/ML
2 INJECTION INTRAMUSCULAR; INTRAVENOUS
Refills: 0 | Status: DISCONTINUED | OUTPATIENT
Start: 2022-01-01 | End: 2022-01-01

## 2022-01-01 RX ORDER — IPRATROPIUM/ALBUTEROL SULFATE 18-103MCG
3 AEROSOL WITH ADAPTER (GRAM) INHALATION EVERY 6 HOURS
Refills: 0 | Status: DISCONTINUED | OUTPATIENT
Start: 2022-01-01 | End: 2022-01-01

## 2022-01-01 RX ORDER — FUROSEMIDE 40 MG
20 TABLET ORAL ONCE
Refills: 0 | Status: COMPLETED | OUTPATIENT
Start: 2022-01-01 | End: 2022-01-01

## 2022-01-01 RX ORDER — CALCITRIOL 0.5 UG/1
0.25 CAPSULE ORAL DAILY
Refills: 0 | Status: DISCONTINUED | OUTPATIENT
Start: 2022-01-01 | End: 2022-01-01

## 2022-01-01 RX ORDER — METOPROLOL TARTRATE 50 MG
50 TABLET ORAL THREE TIMES A DAY
Refills: 0 | Status: DISCONTINUED | OUTPATIENT
Start: 2022-01-01 | End: 2022-01-01

## 2022-01-01 RX ORDER — AMIODARONE HYDROCHLORIDE 400 MG/1
200 TABLET ORAL DAILY
Refills: 0 | Status: DISCONTINUED | OUTPATIENT
Start: 2022-01-01 | End: 2022-01-01

## 2022-01-01 RX ORDER — FINASTERIDE 5 MG/1
5 TABLET, FILM COATED ORAL DAILY
Refills: 0 | Status: DISCONTINUED | OUTPATIENT
Start: 2022-01-01 | End: 2022-01-01

## 2022-01-01 RX ORDER — METOPROLOL TARTRATE 50 MG
50 TABLET ORAL
Refills: 0 | Status: DISCONTINUED | OUTPATIENT
Start: 2022-01-01 | End: 2022-01-01

## 2022-01-01 RX ORDER — AMIODARONE HYDROCHLORIDE 400 MG/1
1 TABLET ORAL
Qty: 0 | Refills: 0 | DISCHARGE

## 2022-01-01 RX ORDER — PANTOPRAZOLE SODIUM 20 MG/1
40 TABLET, DELAYED RELEASE ORAL
Refills: 0 | Status: DISCONTINUED | OUTPATIENT
Start: 2022-01-01 | End: 2022-01-01

## 2022-01-01 RX ORDER — CLOPIDOGREL BISULFATE 75 MG/1
1 TABLET, FILM COATED ORAL
Qty: 0 | Refills: 0 | DISCHARGE

## 2022-01-01 RX ORDER — ATORVASTATIN CALCIUM 80 MG/1
20 TABLET, FILM COATED ORAL AT BEDTIME
Refills: 0 | Status: DISCONTINUED | OUTPATIENT
Start: 2022-01-01 | End: 2022-01-01

## 2022-01-01 RX ORDER — SODIUM CHLORIDE 5 G/100ML
150 INJECTION, SOLUTION INTRAVENOUS
Refills: 0 | Status: DISCONTINUED | OUTPATIENT
Start: 2022-01-01 | End: 2022-01-01

## 2022-01-01 RX ORDER — CHLORHEXIDINE GLUCONATE 213 G/1000ML
1 SOLUTION TOPICAL
Refills: 0 | Status: DISCONTINUED | OUTPATIENT
Start: 2022-01-01 | End: 2022-01-01

## 2022-01-01 RX ORDER — HEPARIN SODIUM 5000 [USP'U]/ML
5000 INJECTION INTRAVENOUS; SUBCUTANEOUS EVERY 8 HOURS
Refills: 0 | Status: DISCONTINUED | OUTPATIENT
Start: 2022-01-01 | End: 2022-01-01

## 2022-01-01 RX ORDER — FUROSEMIDE 40 MG
40 TABLET ORAL ONCE
Refills: 0 | Status: COMPLETED | OUTPATIENT
Start: 2022-01-01 | End: 2022-01-01

## 2022-01-01 RX ORDER — AMIODARONE HYDROCHLORIDE 400 MG/1
0.5 TABLET ORAL
Qty: 900 | Refills: 0 | Status: DISCONTINUED | OUTPATIENT
Start: 2022-01-01 | End: 2022-01-01

## 2022-01-01 RX ORDER — ASPIRIN/CALCIUM CARB/MAGNESIUM 324 MG
81 TABLET ORAL DAILY
Refills: 0 | Status: DISCONTINUED | OUTPATIENT
Start: 2022-01-01 | End: 2022-01-01

## 2022-01-01 RX ORDER — DEXTROSE 50 % IN WATER 50 %
15 SYRINGE (ML) INTRAVENOUS ONCE
Refills: 0 | Status: DISCONTINUED | OUTPATIENT
Start: 2022-01-01 | End: 2022-01-01

## 2022-01-01 RX ORDER — POTASSIUM CHLORIDE 20 MEQ
20 PACKET (EA) ORAL
Refills: 0 | Status: COMPLETED | OUTPATIENT
Start: 2022-01-01 | End: 2022-01-01

## 2022-01-01 RX ORDER — ASPIRIN/CALCIUM CARB/MAGNESIUM 324 MG
1 TABLET ORAL
Qty: 0 | Refills: 0 | DISCHARGE

## 2022-01-01 RX ORDER — FUROSEMIDE 40 MG
60 TABLET ORAL EVERY 12 HOURS
Refills: 0 | Status: DISCONTINUED | OUTPATIENT
Start: 2022-01-01 | End: 2022-01-01

## 2022-01-01 RX ORDER — POTASSIUM CHLORIDE 20 MEQ
40 PACKET (EA) ORAL ONCE
Refills: 0 | Status: DISCONTINUED | OUTPATIENT
Start: 2022-01-01 | End: 2022-01-01

## 2022-01-01 RX ORDER — SODIUM CHLORIDE 5 G/100ML
150 INJECTION, SOLUTION INTRAVENOUS
Refills: 0 | Status: COMPLETED | OUTPATIENT
Start: 2022-01-01 | End: 2022-01-01

## 2022-01-01 RX ORDER — GLUCAGON INJECTION, SOLUTION 0.5 MG/.1ML
1 INJECTION, SOLUTION SUBCUTANEOUS ONCE
Refills: 0 | Status: DISCONTINUED | OUTPATIENT
Start: 2022-01-01 | End: 2022-01-01

## 2022-01-01 RX ORDER — LANOLIN ALCOHOL/MO/W.PET/CERES
3 CREAM (GRAM) TOPICAL AT BEDTIME
Refills: 0 | Status: DISCONTINUED | OUTPATIENT
Start: 2022-01-01 | End: 2022-01-01

## 2022-01-01 RX ORDER — FUROSEMIDE 40 MG
40 TABLET ORAL EVERY 12 HOURS
Refills: 0 | Status: DISCONTINUED | OUTPATIENT
Start: 2022-01-01 | End: 2022-01-01

## 2022-01-01 RX ORDER — AMIODARONE HYDROCHLORIDE 400 MG/1
1 TABLET ORAL
Qty: 900 | Refills: 0 | Status: DISCONTINUED | OUTPATIENT
Start: 2022-01-01 | End: 2022-01-01

## 2022-01-01 RX ORDER — CALCIUM ACETATE 667 MG
1334 TABLET ORAL
Refills: 0 | Status: DISCONTINUED | OUTPATIENT
Start: 2022-01-01 | End: 2022-01-01

## 2022-01-01 RX ADMIN — Medication 40 MILLIGRAM(S): at 01:41

## 2022-01-01 RX ADMIN — AMIODARONE HYDROCHLORIDE 600 MILLIGRAM(S): 400 TABLET ORAL at 02:06

## 2022-01-01 RX ADMIN — Medication 3 MILLIGRAM(S): at 01:41

## 2022-01-01 RX ADMIN — HEPARIN SODIUM 5000 UNIT(S): 5000 INJECTION INTRAVENOUS; SUBCUTANEOUS at 00:00

## 2022-01-01 RX ADMIN — TAMSULOSIN HYDROCHLORIDE 0.4 MILLIGRAM(S): 0.4 CAPSULE ORAL at 00:00

## 2022-01-01 RX ADMIN — Medication 60 MILLIGRAM(S): at 17:47

## 2022-01-01 RX ADMIN — Medication 3 MILLIGRAM(S): at 22:10

## 2022-01-01 RX ADMIN — Medication 667 MILLIGRAM(S): at 08:43

## 2022-01-01 RX ADMIN — Medication 50 MILLIGRAM(S): at 17:48

## 2022-01-01 RX ADMIN — Medication 667 MILLIGRAM(S): at 17:47

## 2022-01-01 RX ADMIN — Medication 50 MILLIGRAM(S): at 21:16

## 2022-01-01 RX ADMIN — Medication 50 MILLIGRAM(S): at 06:17

## 2022-01-01 RX ADMIN — ATORVASTATIN CALCIUM 20 MILLIGRAM(S): 80 TABLET, FILM COATED ORAL at 00:00

## 2022-01-01 RX ADMIN — HEPARIN SODIUM 5000 UNIT(S): 5000 INJECTION INTRAVENOUS; SUBCUTANEOUS at 13:45

## 2022-01-01 RX ADMIN — HEPARIN SODIUM 5000 UNIT(S): 5000 INJECTION INTRAVENOUS; SUBCUTANEOUS at 05:43

## 2022-01-01 RX ADMIN — Medication 40 MILLIGRAM(S): at 06:36

## 2022-01-01 RX ADMIN — Medication 50 MILLIEQUIVALENT(S): at 12:30

## 2022-01-01 RX ADMIN — AMIODARONE HYDROCHLORIDE 200 MILLIGRAM(S): 400 TABLET ORAL at 05:53

## 2022-01-01 RX ADMIN — Medication 20 MILLIGRAM(S): at 06:37

## 2022-01-01 RX ADMIN — CHLORHEXIDINE GLUCONATE 1 APPLICATION(S): 213 SOLUTION TOPICAL at 05:43

## 2022-01-01 RX ADMIN — CALCITRIOL 0.25 MICROGRAM(S): 0.5 CAPSULE ORAL at 11:13

## 2022-01-01 RX ADMIN — Medication 667 MILLIGRAM(S): at 11:16

## 2022-01-01 RX ADMIN — Medication 40 MILLIEQUIVALENT(S): at 10:07

## 2022-01-01 RX ADMIN — SODIUM CHLORIDE 50 MILLILITER(S): 5 INJECTION, SOLUTION INTRAVENOUS at 01:27

## 2022-01-01 RX ADMIN — AMIODARONE HYDROCHLORIDE 200 MILLIGRAM(S): 400 TABLET ORAL at 06:37

## 2022-01-01 RX ADMIN — Medication 50 MILLIGRAM(S): at 09:00

## 2022-01-01 RX ADMIN — Medication 50 MILLIEQUIVALENT(S): at 21:16

## 2022-01-01 RX ADMIN — PANTOPRAZOLE SODIUM 40 MILLIGRAM(S): 20 TABLET, DELAYED RELEASE ORAL at 08:43

## 2022-01-01 RX ADMIN — Medication 2: at 11:34

## 2022-01-01 RX ADMIN — BUMETANIDE 2 MILLIGRAM(S): 0.25 INJECTION INTRAMUSCULAR; INTRAVENOUS at 21:17

## 2022-01-01 RX ADMIN — Medication 50 MILLIEQUIVALENT(S): at 06:12

## 2022-01-01 RX ADMIN — Medication 667 MILLIGRAM(S): at 22:10

## 2022-01-01 RX ADMIN — HEPARIN SODIUM 5000 UNIT(S): 5000 INJECTION INTRAVENOUS; SUBCUTANEOUS at 22:10

## 2022-01-01 RX ADMIN — Medication 50 MILLIGRAM(S): at 06:37

## 2022-01-01 RX ADMIN — Medication 1334 MILLIGRAM(S): at 07:54

## 2022-01-01 RX ADMIN — Medication 50 MILLIEQUIVALENT(S): at 18:07

## 2022-01-01 RX ADMIN — ATORVASTATIN CALCIUM 20 MILLIGRAM(S): 80 TABLET, FILM COATED ORAL at 22:10

## 2022-01-01 RX ADMIN — CLOPIDOGREL BISULFATE 75 MILLIGRAM(S): 75 TABLET, FILM COATED ORAL at 11:11

## 2022-01-01 RX ADMIN — FINASTERIDE 5 MILLIGRAM(S): 5 TABLET, FILM COATED ORAL at 11:12

## 2022-01-01 RX ADMIN — PANTOPRAZOLE SODIUM 40 MILLIGRAM(S): 20 TABLET, DELAYED RELEASE ORAL at 06:36

## 2022-01-01 RX ADMIN — CLOPIDOGREL BISULFATE 75 MILLIGRAM(S): 75 TABLET, FILM COATED ORAL at 11:09

## 2022-01-01 RX ADMIN — BUMETANIDE 2 MILLIGRAM(S): 0.25 INJECTION INTRAMUSCULAR; INTRAVENOUS at 05:43

## 2022-01-01 RX ADMIN — HEPARIN SODIUM 5000 UNIT(S): 5000 INJECTION INTRAVENOUS; SUBCUTANEOUS at 05:53

## 2022-01-01 RX ADMIN — HEPARIN SODIUM 5000 UNIT(S): 5000 INJECTION INTRAVENOUS; SUBCUTANEOUS at 11:17

## 2022-01-01 RX ADMIN — AMIODARONE HYDROCHLORIDE 33.3 MG/MIN: 400 TABLET ORAL at 02:42

## 2022-01-01 RX ADMIN — Medication 50 MILLIEQUIVALENT(S): at 17:00

## 2022-01-01 RX ADMIN — Medication 81 MILLIGRAM(S): at 11:09

## 2022-01-01 RX ADMIN — FINASTERIDE 5 MILLIGRAM(S): 5 TABLET, FILM COATED ORAL at 11:09

## 2022-01-01 RX ADMIN — Medication 81 MILLIGRAM(S): at 11:11

## 2022-01-01 RX ADMIN — Medication 0.5 MILLIGRAM(S): at 14:18

## 2022-01-01 RX ADMIN — BUMETANIDE 2 MILLIGRAM(S): 0.25 INJECTION INTRAMUSCULAR; INTRAVENOUS at 06:34

## 2022-01-01 RX ADMIN — HEPARIN SODIUM 5000 UNIT(S): 5000 INJECTION INTRAVENOUS; SUBCUTANEOUS at 19:34

## 2022-01-01 RX ADMIN — CALCITRIOL 0.25 MICROGRAM(S): 0.5 CAPSULE ORAL at 11:09

## 2022-01-01 RX ADMIN — Medication 0.5 MILLIGRAM(S): at 02:44

## 2022-01-01 RX ADMIN — TAMSULOSIN HYDROCHLORIDE 0.4 MILLIGRAM(S): 0.4 CAPSULE ORAL at 22:10

## 2022-01-31 NOTE — ED ADULT NURSE NOTE - NSIMPLEMENTINTERV_GEN_ALL_ED
Implemented All Fall with Harm Risk Interventions:  [place] to call system. Call bell, personal items and telephone within reach. Instruct patient to call for assistance. Room bathroom lighting operational. Non-slip footwear when patient is off stretcher. Physically safe environment: no spills, clutter or unnecessary equipment. Stretcher in lowest position, wheels locked, appropriate side rails in place. Provide visual cue, wrist band, yellow gown, etc. Monitor gait and stability. Monitor for mental status changes and reorient to person, place, and time. Review medications for side effects contributing to fall risk. Reinforce activity limits and safety measures with patient and family. Provide visual clues: red socks.

## 2022-01-31 NOTE — ED PROVIDER NOTE - NS ED ROS FT
Constitutional:  See HPI.   Eyes:  No visual changes, eye pain or discharge.  ENMT:  No hearing changes, pain, discharge or infections. No neck pain or stiffness.  Cardiac:  No chest pain, or edema. No chest pain with exertion. +SOB  Respiratory:  No cough or respiratory distress. No hemoptysis.   GI:  No nausea, vomiting, diarrhea, abdominal pain.  :  No dysuria, frequency, hematuria  MS:  No joint pain or back pain. +LE Edema  Neuro:  No LOC. No headache or weakness.    Skin:  No skin rash.  Except as in HPI, all other review of systems is negative

## 2022-01-31 NOTE — ED PROVIDER NOTE - CLINICAL SUMMARY MEDICAL DECISION MAKING FREE TEXT BOX
86-year-old male with multiple home medical problems including HTN, DM, atrial fibrillation, JOHANNY brought to the emergency room for evaluation of shortness of breath and low oxygen saturation.  Patient reports symptoms for several days states that for the past for the past 5 days he has not been able to ambulate because "my legs are too weak to support me".  Denies any chest  or abdominal pain, no change in urination, no fever or chills, no headache.  Denies any recent illness or trauma, no known sick contacts.  According to patient's family he did not want to come to the hospital for  days, consented today because he was not feeling any better. Elderly male resting on the stretcher, nontoxic appearing, PERRL, pink conjunctiva, dry oral mucosa, supple neck without meningeal signs, no acute respiratory distress, + mouth breathing, bilateral inspiratory crackles, well-perfused extremities, abdomen soft NT/ND, no midline spine or CVA tenderness to palpation, bilateral lower leg pitting edema, bilateral chronic skin changes of the lower extremities, well-healed scab on the left anterior shin without signs of infection, patient is awake and alert, follows directions appropriately, no gross neuro deficits, but appears globally weak/tired.  Saturation is 96% on RA goes to 100 on supplemental oxygen.  Plan chest x-ray, EKG, labs including BNP and troponin, plan to admit.

## 2022-01-31 NOTE — ED PROVIDER NOTE - OBJECTIVE STATEMENT
86 y M PMHx HTN, DM II, chronic AFib not on anticoagulation due to falls,  shunt chronic HFpEF, JOHANNY presented to ED for shortness of breath w/ associated lethargy x5d. per son, pt has been sob w/ home pulse ox avg high 70s to mid 80s. per son pt has had chronic cough since last admission (nov2021), no hemoptysis, no cp, no abd pain, no changes to urination of defecation. pt has had decreased PO intake due to lethargy. pt vaccinated for covid x2.

## 2022-01-31 NOTE — ED PROCEDURE NOTE - PROCEDURE NAME, MLM
Point of Care Ultrasound Lung
Point of Care Ultrasound Cardiac
Point of Care Ultrasound Vascular Access

## 2022-01-31 NOTE — ED ADULT NURSE NOTE - OBJECTIVE STATEMENT
pt 87 y/o male c/o sob worsening ; pt son states pt was recently dx to go home on oxygen but no oxygen has been available was to be delivered tomorrow but pt has been having more trouble breathing over  the last week;

## 2022-01-31 NOTE — ED PROCEDURE NOTE - ATTENDING CONTRIBUTION TO CARE
I personally supervised the study.  I reviewed the images and interpretation by the ACP/resident and have edited where appropriate.

## 2022-01-31 NOTE — ED PROVIDER NOTE - PHYSICAL EXAMINATION
CONSTITUTIONAL: Well-appearing; well-nourished; in no apparent distress.   HEAD: Normocephalic; atraumatic.   EYES: PERRL; EOM intact. Conjunctiva normal B/L.   ENT: Normal pharynx with no tonsillar hypertrophy. MMM.  NECK: Supple; non-tender; no cervical lymphadenopathy.   CHEST: Normal chest excursion with respiration. +SOB, AICD present in upper R chest, no surrounding erythema or edema.  CARDIOVASCULAR: Normal S1, S2; no murmurs, rubs, or gallops.   RESPIRATORY: Normal chest excursion with respiration; breath sounds clear and equal bilaterally; no wheezes, rhonchi, or rales.  GI/: Normal bowel sounds; non-distended; non-tender.  BACK: No evidence of trauma or deformity. Non-tender to palpation. No CVA tenderness.   EXT: Normal ROM in all four extremities; non-tender to palpation; distal pulses are normal. +leg edema B/L.   SKIN: Normal for age and race; warm; dry; good turgor.  NEURO: A & O x 4; CN 2-12 intact. Grossly unremarkable.

## 2022-02-01 NOTE — PATIENT PROFILE ADULT - FALL HARM RISK - RISK INTERVENTIONS

## 2022-02-01 NOTE — CONSULT NOTE ADULT - SUBJECTIVE AND OBJECTIVE BOX
Date of Admission:    HISTORY OF PRESENT ILLNESS:     Mr. Velez is an 86 year old male patient known to have: HFpEF. TTE 07/02/21 EF 54%, mild-mod MR and TR, mild AR, moderate PHTN,   - JOHANNY not on CPAP  - HTN  - DM II  - CKD (Baseline Cr 2.8-3.2)  - Chronic Atrial Fibrillation and History of Sustained Ventricular Tachycardia during last admission 11/2021 not on Anticoagulation due to falls  - HFpEF. TTE 07/02/21 EF 54%, mild-mod MR and TR, mild AR, moderate PHTN  - History of  Shunt  - Dyslipidemia and CAD s/p LAD stent 11/2021 on DAP  - BPH and Urethral Stricture diagnosed during last admission 11/2021 s/p dilation and s/p discharge on barragan      He was brought to the ED on 01/31 for evaluation of worsening shortness of breath and lethargy.  History goes back to few days PTP when the patient started complaining of worsening SOB on minimal exertion to the extent the patient was having difficulty completing sentences.  This has been associated with some leg swelling, orthopnea, PNDs, and chronic productive cough since 11/2021 in the absence of chest pain, palpitations, light headedness, or diaphoresis.  Per son, patient has been lethargic for the last few days with reduced PO intake and difficulty ambulating due to leg heaviness.      On review of systems, patient denies any recent fever, chills, night sweats, URTI symptoms (cough, rhinorrhea, sore throat), change in bowel movements (diarrhea or constipation), abdominal pain, headache, nausea, or vomiting.   No sick contacts.   No recent travel or exposure to recent travelers.      Radiological Workup  * Chest X Ray 01/31 congested with effusions      PAST MEDICAL & SURGICAL HISTORY:  Afib    DM (diabetes mellitus)    CKD (chronic kidney disease)    Hydrocephalus    Hypertension    History of prostate surgery        FAMILY HISTORY:  [ ] no pertinent family history of premature cardiovascular disease in first degree relatives.  Mother:   Father:   Siblings:     SOCIAL HISTORY:    [ ] Non-smoker  [ ] Smoker  [ ] Alcohol    Allergies    No Known Allergies    Intolerances    	    REVIEW OF SYSTEMS:  CONSTITUTIONAL: No fever, weight loss, or fatigue  CARDIOLOGY: denies chest pain, shortness of breath or syncopal episodes.   RESPIRATORY: denies shortness of breath, wheezing.   NEUROLOGICAL: no weakness, no focal deficits to report.  ENDOCRINOLOGICAL: no recent change in diabetic medications.   GI: no BRBPR, no N,V, diarrhea.    PSYCHIATRY: normal mood and affect  HEENT: no nasal discharge, no ecchymosis  SKIN: no ecchymosis, no breakdown  MUSCULOSKELETAL: Full range of motion x4.     PHYSICAL EXAM:  General Appearance: well appearing, normal for age and gender. 	  Neck: normal JVP, no bruit.   Eyes: Extra Ocular muscles intact.   Cardiovascular: regular rate and rhythm S1 S2, No JVD, No murmurs, No edema  Respiratory: Lungs clear to auscultation	  Psychiatry: Alert and oriented x 3, Mood & affect appropriate  Gastrointestinal:  Soft, Non-tender  Skin/Integumentary : No rashes, No ecchymoses, No cyanosis	  Neurologic: Non-focal  Musculoskeletal/ extremities: Normal range of motion, No clubbing, cyanosis or edema  Vascular: Peripheral pulses palpable 2+ bilaterally    CARDIAC MARKERS:            TELEMETRY EVENTS: 	    ECG:  	  RADIOLOGY:  OTHER: 	    PREVIOUS DIAGNOSTIC TESTING:    [ ] Echocardiogram:  [ ]  Catheterization:  [ ] Stress Test:  	  	    Home Medications:  amiodarone 200 mg oral tablet: 1 tab(s) orally once a day (01 Feb 2022 02:04)  aspirin 81 mg oral delayed release tablet: 1 tab(s) orally once a day (01 Feb 2022 02:04)  Basaglar KwikPen: 20 unit(s) subcutaneous once a day (at bedtime) (03 Nov 2021 22:10)  calcitriol 0.25 mcg oral capsule: 1 cap(s) orally once a day (30 Jun 2021 15:07)  dutasteride 0.5 mg oral capsule: 1 cap(s) orally once a day (30 Jun 2021 15:07)  Farxiga 10 mg oral tablet: 1 tab(s) orally once a day (03 Nov 2021 21:56)  Lopressor 50 mg oral tablet: 1 tab(s) orally 2 times a day (01 Feb 2022 02:04)  Ozempic (1 mg dose) 4 mg/3 mL subcutaneous solution: 1 milligram(s) subcutaneous once a week on Thursday (03 Nov 2021 21:50)  Plavix 75 mg oral tablet: 1 tab(s) orally once a day (01 Feb 2022 02:04)  torsemide 10 mg oral tablet: 1 tab(s) orally once a day (01 Feb 2022 02:04)    MEDICATIONS  (STANDING):  aMIOdarone    Tablet 200 milliGRAM(s) Oral daily  aspirin enteric coated 81 milliGRAM(s) Oral daily  atorvastatin 20 milliGRAM(s) Oral at bedtime  calcitriol   Capsule 0.25 MICROGram(s) Oral daily  calcium acetate 667 milliGRAM(s) Oral four times a day with meals  chlorhexidine 4% Liquid 1 Application(s) Topical <User Schedule>  clopidogrel Tablet 75 milliGRAM(s) Oral daily  dextrose 40% Gel 15 Gram(s) Oral once  dextrose 5%. 1000 milliLiter(s) (50 mL/Hr) IV Continuous <Continuous>  dextrose 5%. 1000 milliLiter(s) (100 mL/Hr) IV Continuous <Continuous>  dextrose 50% Injectable 25 Gram(s) IV Push once  dextrose 50% Injectable 12.5 Gram(s) IV Push once  dextrose 50% Injectable 25 Gram(s) IV Push once  finasteride 5 milliGRAM(s) Oral daily  furosemide   Injectable 60 milliGRAM(s) IV Push every 12 hours  glucagon  Injectable 1 milliGRAM(s) IntraMuscular once  heparin SubCutaneous Injection - Peds 5000 Unit(s) SubCutaneous every 8 hours  insulin lispro (ADMELOG) corrective regimen sliding scale   SubCutaneous three times a day before meals  LORazepam   Injectable 0.5 milliGRAM(s) IV Push once  metoprolol tartrate 50 milliGRAM(s) Oral two times a day  pantoprazole    Tablet 40 milliGRAM(s) Oral before breakfast  tamsulosin 0.4 milliGRAM(s) Oral at bedtime    MEDICATIONS  (PRN):         Date of Admission: 22    HISTORY OF PRESENT ILLNESS:     Mr. Velez is an 86 year old male patient known to have: HFpEF. TTE 21 EF 54%, mild-mod MR and TR, mild AR, moderate PHTN, JOHANNY not on CPAP, HTN, DM II, CKD (Baseline Cr 2.8-3.2), Chronic Atrial Fibrillation and History of Sustained Ventricular Tachycardia during last admission 2021 not on Anticoagulation due to falls. History of  Shunt, Dyslipidemia and CAD s/p LAD stent 2021 on DAP, BPH and Urethral Stricture diagnosed during last admission 2021 s/p dilation. He was brought to the ED on  for evaluation of worsening shortness of breath and lethargy. History goes back to few days PTP when the patient started complaining of worsening SOB on minimal exertion to the extent the patient was having difficulty completing sentences. This has been associated with some leg swelling, orthopnea, PNDs, and chronic productive cough since 2021 in the absence of chest pain, palpitations, light headedness, or diaphoresis. Per son, patient has been lethargic for the last few days with reduced PO intake and difficulty ambulating due to leg heaviness. On review of systems, patient denies any recent fever, chills, night sweats, URTI symptoms (cough, rhinorrhea, sore throat), change in bowel movements (diarrhea or constipation), abdominal pain, headache, nausea, or vomiting. No sick contacts. No recent travel or exposure to recent travelers.      PAST MEDICAL & SURGICAL HISTORY:    Afib  DM (diabetes mellitus)  CKD (chronic kidney disease)  Hydrocephalus  Hypertension  History of prostate surgery      FAMILY HISTORY:    Mother:  at 72 of HF  Father:  at 68 of a brain aneurism    SOCIAL HISTORY:      Former smoker, denies alcohol or drug use       Allergies    No Known Allergies    Intolerances    	    REVIEW OF SYSTEMS:    CONSTITUTIONAL: No fever, weight loss, or fatigue  CARDIOLOGY: + shortness of breath, no syncopal episodes.   RESPIRATORY: + shortness of breath, wheezing.   NEUROLOGICAL: no weakness, no focal deficits to report.  ENDOCRINOLOGICAL: no recent change in diabetic medications.   GI: no BRBPR, no N,V, diarrhea.    PSYCHIATRY: normal mood and affect  HEENT: no nasal discharge, no ecchymosis  SKIN: no ecchymosis, no breakdown  MUSCULOSKELETAL: Full range of motion x4.     PHYSICAL EXAM:  General Appearance: well appearing, normal for age and gender. 	  Neck: normal JVP, no bruit.   Eyes: Extra Ocular muscles intact.   Cardiovascular: regular rate and rhythm S1 S2, JVD + , No murmurs, + BLE edema  Respiratory: RLL diminished	  Psychiatry: Alert and oriented x 3, Mood & affect appropriate  Gastrointestinal:  Soft, Non-tender  Skin/Integumentary : No rashes, No ecchymoses, No cyanosis	  Neurologic: Non-focal  Musculoskeletal/ extremities: Normal range of motion, No clubbing, cyanosis, BLE edema  Vascular: Peripheral pulses palpable 2+ bilaterally    CARDIAC MARKERS:    Serum Pro-Brain Natriuretic Peptide: 26537 pg/mL (22 @ 04:30)      TELEMETRY EVENTS: 	    EC/1/22    Ventricular Rate 76 BPM  Atrial Rate 82 BPM  QRS Duration 120 ms  Q-T Interval 400 ms  QTC Calculation(Bazett) 450 ms  R Axis -72 degrees  T Axis 213 degrees  Diagnosis Line Undetermined rhythm  with intermittent paced ventricular beats  Left axis deviation  Septal infarct , age undetermined  Inferior infarct , age undetermined  Abnormal ECG    Confirmed by DIEGO PHILLIP MD ([id number]) on 2022 9:34:09 AM    PREVIOUS DIAGNOSTIC TESTING:      TTE 21    Summary:   1. Normal global left ventricular systolic function.   2. LVEjection Fraction by Lemon's Method with a biplane EF of 54 %.   3. Mild left ventricular hypertrophy.   4. Mildly increased LV wall thickness.   5. Normal left ventricular internal cavity size.   6. Moderately enlarged left atrium.   7. Mild mitral annular calcification.   8. Mild thickening and calcification of the anterior and posterior mitral valve leaflets.   9. Mild to moderate mitral valve regurgitation.  10. Mild-moderate tricuspid regurgitation.  11. Mild aortic regurgitation.  12. Sclerotic aortic valve with normal opening.  13. Estimated pulmonary artery systolic pressure is 54.2 mmHg assuming a right atrial pressure of 5 mmHg, which is consistent with moderate pulmonary hypertension.  14. LA volume Index is 51.8 ml/m² ml/m2.  15. There is moderate aortic root calcification.      Home Medications:  amiodarone 200 mg oral tablet: 1 tab(s) orally once a day (2022 02:04)  aspirin 81 mg oral delayed release tablet: 1 tab(s) orally once a day (2022 02:04)  Basaglar KwikPen: 20 unit(s) subcutaneous once a day (at bedtime) (2021 22:10)  calcitriol 0.25 mcg oral capsule: 1 cap(s) orally once a day (2021 15:07)  dutasteride 0.5 mg oral capsule: 1 cap(s) orally once a day (2021 15:07)  Farxiga 10 mg oral tablet: 1 tab(s) orally once a day (2021 21:56)  Lopressor 50 mg oral tablet: 1 tab(s) orally 2 times a day (2022 02:04)  Ozempic (1 mg dose) 4 mg/3 mL subcutaneous solution: 1 milligram(s) subcutaneous once a week on Thursday (2021 21:50)  Plavix 75 mg oral tablet: 1 tab(s) orally once a day (2022 02:04)  torsemide 10 mg oral tablet: 1 tab(s) orally once a day (2022 02:04)    MEDICATIONS  (STANDING):  aMIOdarone    Tablet 200 milliGRAM(s) Oral daily  aspirin enteric coated 81 milliGRAM(s) Oral daily  atorvastatin 20 milliGRAM(s) Oral at bedtime  calcitriol   Capsule 0.25 MICROGram(s) Oral daily  calcium acetate 667 milliGRAM(s) Oral four times a day with meals  chlorhexidine 4% Liquid 1 Application(s) Topical <User Schedule>  clopidogrel Tablet 75 milliGRAM(s) Oral daily  dextrose 40% Gel 15 Gram(s) Oral once  dextrose 5%. 1000 milliLiter(s) (50 mL/Hr) IV Continuous <Continuous>  dextrose 5%. 1000 milliLiter(s) (100 mL/Hr) IV Continuous <Continuous>  dextrose 50% Injectable 25 Gram(s) IV Push once  dextrose 50% Injectable 12.5 Gram(s) IV Push once  dextrose 50% Injectable 25 Gram(s) IV Push once  finasteride 5 milliGRAM(s) Oral daily  furosemide   Injectable 60 milliGRAM(s) IV Push every 12 hours  glucagon  Injectable 1 milliGRAM(s) IntraMuscular once  heparin SubCutaneous Injection - Peds 5000 Unit(s) SubCutaneous every 8 hours  insulin lispro (ADMELOG) corrective regimen sliding scale   SubCutaneous three times a day before meals  LORazepam   Injectable 0.5 milliGRAM(s) IV Push once  metoprolol tartrate 50 milliGRAM(s) Oral two times a day  pantoprazole    Tablet 40 milliGRAM(s) Oral before breakfast  tamsulosin 0.4 milliGRAM(s) Oral at bedtime

## 2022-02-01 NOTE — OCCUPATIONAL THERAPY INITIAL EVALUATION ADULT - ADDITIONAL COMMENTS
Pt resides in ranch style home with 1 step to enter. Pt son reports that pt condition has been worsening and that pt was chair bound for ~2 weeks PTA. +HHA for approx 24hrs daily. Pt son lives close by and checks on pt if HHA not present.

## 2022-02-01 NOTE — OCCUPATIONAL THERAPY INITIAL EVALUATION ADULT - GENERAL OBSERVATIONS, REHAB EVAL
OT eval: 10:02- OT eval: 10:02-10:36 Pt received semi reclined in bed with RN and PCA present. Pt pleasant yet with c/o difficulty breathing. +venturi mask 15L O2, pulse ox monitor, tele, IV. Pt son present. Pt agreeable to OT eval.

## 2022-02-01 NOTE — CONSULT NOTE ADULT - ASSESSMENT
86 year old male with a pmh of JOHANNY not on CPAP, HTN, DM II, CKD (Baseline Cr 2.8-3.2) with Cr. 2.5 on this admission -  called for evaluation of patient's TOBIAS and previous hx with difficulty urinating with difficult catheterization 2/2 meatal stricture.     Plan:  - Case discussed with Dr. Matthews, recommendations below:  - Continue to monitor CBC and BMP, patients Cr 2.5 and is baseline   - Patient made aware of the need to intervene if he is retaining urine however patient adamantly refusing barragan placement at this time and states that he is able to urinate without any issues since barragan was removed in the  office one month ago.  - Per Dr. Matthews patient's RBUS w/ PVR from 12/30/21 showed patient adequately emptying with a residual volume of 8cc.    - In ED, patient's bed noted to be saturated with urine, recommend bedside bladder scan with PVR to assess bladder volume  - If patient noted to be retaining >350cc of urine will reassess and again educate patient on the need for barragan placement.  - Plan discussed with resident, will have bedside bladder scan done and update  team with results  - Recommend [place] Condom Catheter to measure I&Os for now  - Start flomax if not contraindicated  - Cont care per primary team   86 year old male with a pmh of JOHANNY not on CPAP, HTN, DM II, CKD (Baseline Cr 2.8-3.2) with Cr. 2.5 on this admission -  called for evaluation of patient's TOBIAS and previous hx with difficulty urinating with difficult catheterization 2/2 meatal stricture.     Plan:  - Case discussed with Dr. Matthews, recommendations below:  - Continue to monitor CBC and BMP, patients Cr 2.5 and is baseline   - Patient made aware of the need to intervene if he is retaining urine however patient adamantly refusing barragan placement at this time and states that he is able to urinate without any issues since barragan was removed in the  office one month ago.  - Per Dr. Matthews patient's RBUS w/ PVR from 12/30/21 showed patient adequately emptying with a residual volume of 8cc.    - In ED, patient's bed noted to be saturated with urine, recommend bedside bladder scan with PVR to assess bladder volume  - If patient noted to be retaining >350cc of urine will reassess and again educate patient on the need for barragan placement.  - Plan discussed with resident, will have bedside bladder scan done and update  team with results  - Recommend [place] Condom Catheter to measure I&Os for now  - Start flomax if not contraindicated  - Cont care per primary team    Agree with Above: See separate attending note.    Roman Matthews MD FACS

## 2022-02-01 NOTE — OCCUPATIONAL THERAPY INITIAL EVALUATION ADULT - TRANSFER TRAINING, PT EVAL
Pt will perform bed<>chair and transfer to toilet with mod assist with use of RW by discharge to work towards returning to [facility]

## 2022-02-01 NOTE — H&P ADULT - HISTORY OF PRESENT ILLNESS
History of Present Illness    Mr. Velez is an 86 year old male patient known to have:  - Baseline: AO3 from home  - JOHANNY not on CPAP  - HTN  - DM II  - CKD (Baseline Cr 2.8-3.2)  - Chronic Atrial Fibrillation and History of Sustained Ventricular Tachycardia during last admission 11/2021 not on Anticoagulation due to falls  - HFpEF. TTE 07/02/21 EF 54%, mild-mod MR and TR, mild AR, moderate PHTN  - History of  Shunt  - Dyslipidemia and CAD s/p LAD stent 11/2021 on DAP  - BPH and Urethral Stricture diagnosed during last admission 11/2021 s/p dilation and s/p discharge on barragan      He was brought to the ED on 01/31 for evaluation of worsening shortness of breath and lethargy.  History goes back to few days PTP when the patient started complaining of worsening SOB on minimal exertion to the extent the patient was having difficulty completing sentences.  This has been associated with some leg swelling, orthopnea, PNDs, and chronic productive cough since 11/2021 in the absence of chest pain, palpitations, light headedness, or diaphoresis.  Per son, patient has been lethargic for the last few days with reduced PO intake and difficulty ambulating due to leg heaviness.      On review of systems, patient denies any recent fever, chills, night sweats, URTI symptoms (cough, rhinorrhea, sore throat), change in bowel movements (diarrhea or constipation), abdominal pain, headache, nausea, or vomiting.   No sick contacts.   No recent travel or exposure to recent travelers.      Upon presentation to the ED, the patient was hemodynamically stable:  Vital Signs in ED   - /67 mmHg  - RR 26 bpm  - HR 70 bpm  - T 36.6  - Blood Gas Profile - Venous (01.31.22 @ 15:44)    pH, Venous: 7.34    pCO2, Venous: 57 mmHg    pO2, Venous: 23 mmHg    HCO3, Venous: 31 mmol/L    Base Excess, Venous: 3.7 mmol/L    Oxygen Saturation, Venous: 34.4 %      Investigations   Laboratory Workup  - CBC:                        12.7   8.82  )-----------( 203      ( 31 Jan 2022 16:12 )             43.3     - Chemistry:  01-31    142  |  101  |  51<H>  ----------------------------<  106<H>  3.4<L>   |  28  |  2.5<H>    Ca    8.6      31 Jan 2022 16:12    TPro  6.0  /  Alb  3.6  /  TBili  0.8  /  DBili  x   /  AST  10  /  ALT  8   /  AlkPhos  79  01-31    - Coagulation Studies:  PT/INR - ( 31 Jan 2022 16:12 )   PT: 11.90 sec;   INR: 1.04 ratio    PTT - ( 31 Jan 2022 16:12 )  PTT:31.6 sec      Microbiological Workup  * COVID negative      Radiological Workup  * Chest X Ray 01/31 congested with effusions      - Patient will received IV Lasix 40mg x1 dose for now  - He was placed on NC 3LPM with SaO2 97%  - He will be admitted for further investigations, management, and monitoring

## 2022-02-01 NOTE — H&P ADULT - ASSESSMENT
Assessment and Plan  Case of an 86 year old male patient with multiple comorbidities listed above who was brought to the ED on 01/31 for evaluation of worsening SOB on minimal exertion associated with leg swelling, orthopnea, and cough, found to have congested CXR, s/p IV diuretics, to be admitted for further monitoring, investigations, and management. Currently hemodynamically stable.      Acute on Chronic HFpEF Exacerbation  S/P AICD 11/2021  * TTE 07/02/21 EF 54%, mild-mod MR and TR, mild AR, moderate PHTN  * Home Torsemide 10mg QD  * Pro BNP not sent  * PE leg swelling and diffuse crackles  * CXR 01/31 diffuse bilateral interstitial opacities and bilateral blunting of costophrenic angles   * S/P IV Lasix 40mg x1 dose in ED    - Monitor in/out  - Daily weight (standing)  - Monitor SaO2 and O2 requirements: on 4LPM NC  - Start IV Lasix 40mg BID. S/P IV Lasix 40mg x1 dose in ED  - Salt restricted diet and Fluid restriction to 1.2L per 24 hours  - Monitor telemetry   - Trend electrolytes and keep K>4 and Mg>2  - Check pro BNP  - Check TTE  - Check Iron Studies      Deconditioning  * Baseline AO3 lives with home  * Lethargy; inability ambulating    - PT/OT  - Check TSH, folate, and B12  -  consult      CKD   * Baseline Cr 2.8-3.1  * ED BUN 51, Cr 2.5  - Trend CMP and P  - Diuresis as above      JOHANNY   * Not on CPAP  * Blood Gas Profile - Venous (01.31.22 @ 15:44)    pH, Venous: 7.34    pCO2, Venous: 57 mmHg    pO2, Venous: 23 mmHg    HCO3, Venous: 31 mmol/L    Base Excess, Venous: 3.7 mmol/L    Oxygen Saturation, Venous: 34.4 %  - Monitor SaO2 and O2 requirements  - Consider BiPAP if desaturates at night or becomes altered if suspect CO2 retention      HTN  * Home meds Lopressor 50mg BID  * ED /67 mmHg   - Monitor BP closely  - Resume Lopressor 50mg BID      DM II  * A1C with Estimated Average Glucose (11.04.21 @ 17:35)    A1C with Estimated Average Glucose Result: 5.7  * Home meds Basaglar 20 units bedtime, Farxiga, Ozempic  - Monitor POCT premeals and at bedtime  - Sliding Scale B for now      Chronic Atrial Fibrillation   History of Sustained Ventricular Tachycardia  S/P AICD 11/2021  * History of Sustained Ventricular Tachycardia During last admission 11/2021   * Not on Anticoagulation due to falls  * Thyroid Stimulating Hormone, Serum (11.04.21 @ 17:35)    Thyroid Stimulating Hormone, Serum: 0.45 uIU/mL  * Home med AMiodarone 200mg QD, Lopressor 50mg BID, no AC  * ECG noted with RBBB    - Monitor HR  - Off AC  - Resume AMiodarone 200mg QD and Lopressor 50mg BID  - Check TSH  - EP for AICD interrogation      Dyslipidemia   CAD s/p LAD stent 11/2021 on DAP  New RBBB on ECG  * Home meds Crestor, Lopressor 50mg BID, Aspirin 81mg QD, and Plavix 75mg QD  * Troponin T, Serum in AM (11.05.21 @ 05:51)    Troponin T, Serum: 0.08: Critical value: ng/m  - Check Troponin and CE  - Check ECG  - Resume Crestor, Aspirin 81mg QD, and Plavix 75mg QD      BPH   Urethral Stricture diagnosed during last admission 11/2021 s/p dilation and s/p discharge on barragan  * Discharged on barragan in 11/2021 to follow with urologist  * Home meds Dutasteride 0.5 and Flomax 0.4mg QD  - Place barragan  - Monitor in/out  - Resume Dutasteride 0.5 and Flomax 0.4mg QD      History of  Shunt  - Outpatient follow up as indicated      Others  - DVT Prophylaxis: off AC as above  - GI Prophylaxis: Pantoprazole 40mg PO QD  - Diet: DASH/ TLC  - Code Status: Full  - Unable to reach son at this time: [phone]      Barriers to learning: NO  Discharge Planning: Patient will be discharged once stable   Plan was communicated with patient and medical team      Ki Deleon MD  PGY - 1 Internal Medicine   [facility]   Assessment and Plan  Case of an 86 year old male patient with multiple comorbidities listed above who was brought to the ED on 01/31 for evaluation of worsening SOB on minimal exertion associated with leg swelling, orthopnea, and cough, found to have congested CXR, s/p IV diuretics, to be admitted for further monitoring, investigations, and management. Currently hemodynamically stable.      Acute on Chronic HFpEF Exacerbation  S/P AICD 11/2021  * TTE 07/02/21 EF 54%, mild-mod MR and TR, mild AR, moderate PHTN  * Home Torsemide 10mg QD  * Pro BNP not sent  * PE leg swelling and diffuse crackles  * CXR 01/31 diffuse bilateral interstitial opacities and bilateral blunting of costophrenic angles   * S/P IV Lasix 40mg x1 dose in ED    - Monitor in/out  - Daily weight (standing)  - Monitor SaO2 and O2 requirements: on 4LPM NC  - Start IV Lasix 40mg BID. S/P IV Lasix 40mg x1 dose in ED  - Salt restricted diet and Fluid restriction to 1.2L per 24 hours  - Monitor telemetry   - Trend electrolytes and keep K>4 and Mg>2  - Check pro BNP  - Check TTE  - Check Iron Studies      Deconditioning  * Baseline AO3 lives with home  * Lethargy; inability ambulating    - PT/OT  - Check TSH, folate, and B12  -  consult      CKD   * Baseline Cr 2.8-3.1  * ED BUN 51, Cr 2.5  - Trend CMP and P  - Diuresis as above      JOHANNY   * Not on CPAP  * Blood Gas Profile - Venous (01.31.22 @ 15:44)    pH, Venous: 7.34    pCO2, Venous: 57 mmHg    pO2, Venous: 23 mmHg    HCO3, Venous: 31 mmol/L    Base Excess, Venous: 3.7 mmol/L    Oxygen Saturation, Venous: 34.4 %  - Monitor SaO2 and O2 requirements  - Consider BiPAP if desaturates at night or becomes altered if suspect CO2 retention      HTN  * Home meds Lopressor 50mg BID  * ED /67 mmHg   - Monitor BP closely  - Resume Lopressor 50mg BID      DM II  * A1C with Estimated Average Glucose (11.04.21 @ 17:35)    A1C with Estimated Average Glucose Result: 5.7  * Home meds Basaglar 20 units bedtime, Farxiga, Ozempic  - Monitor POCT premeals and at bedtime  - Sliding Scale B for now      Chronic Atrial Fibrillation   History of Sustained Ventricular Tachycardia  S/P AICD 11/2021  * History of Sustained Ventricular Tachycardia During last admission 11/2021   * Not on Anticoagulation due to falls  * Thyroid Stimulating Hormone, Serum (11.04.21 @ 17:35)    Thyroid Stimulating Hormone, Serum: 0.45 uIU/mL  * Home med AMiodarone 200mg QD, Lopressor 50mg BID, no AC  * ECG noted with RBBB    - Monitor HR  - Off AC  - Resume AMiodarone 200mg QD and Lopressor 50mg BID  - Check TSH  - EP for AICD interrogation      Dyslipidemia   CAD s/p LAD stent 11/2021 on DAP  New RBBB on ECG  * Home meds Crestor, Lopressor 50mg BID, Aspirin 81mg QD, and Plavix 75mg QD  * Troponin T, Serum in AM (11.05.21 @ 05:51)    Troponin T, Serum: 0.08: Critical value: ng/m  - Check Troponin and CE  - Check ECG  - Resume Crestor, Aspirin 81mg QD, and Plavix 75mg QD      BPH   Urethral Stricture diagnosed during last admission 11/2021 s/p dilation and s/p discharge on barragan  * Discharged on barragan in 11/2021 to follow with urologist  * Home meds Dutasteride 0.5 and Flomax 0.4mg QD  - Urology reconsulted: to place another barragan (required stricture dilation during last admission)  - Monitor in/out  - Resume Dutasteride 0.5 and Flomax 0.4mg QD      History of  Shunt  - Outpatient follow up as indicated      Others  - DVT Prophylaxis: off AC as above  - GI Prophylaxis: Pantoprazole 40mg PO QD  - Diet: DASH/ TLC  - Code Status: Full  - Unable to reach son at this time: [phone]      Barriers to learning: NO  Discharge Planning: Patient will be discharged once stable   Plan was communicated with patient and medical team      Ki Deleon MD  PGY - 1 Internal Medicine   [facility]   DISPLAY PLAN FREE TEXT

## 2022-02-01 NOTE — H&P ADULT - NSHPPHYSICALEXAM_GEN_ALL_CORE
- Physical Exam in ED  * General Appearance: Alert, cooperative, interactive, oriented to time, place, and person, in no acute distress  * Head: Normocephalic, without obvious abnormality, atraumatic  * Eyes: PERRL, conjunctiva/corneas clear, EOM's intact, fundi benign, both eyes  * Neck: Supple, symmetrical, trachea midline, no adenopathy;   * Thyroid:  No enlargement/tenderness/nodules; no carotid bruit or JVD  * Lungs: Good bilateral air entry, audible crackles, no audible wheezes or rhonchi  * Heart: Regular Rate and Rhythm, normal S1 and S2, no audible murmur, rub, or gallop  * Abdomen: Symmetric, non-distended, no scar, soft, non-tender, bowel sounds active all four quadrants, no masses, no organomegaly (no hepatosplenomegaly)  * Extremities: Extremities normal, bilateral chronic skin changes of the lower extremities, well-healed scab on the left anterior shin without signs of infection, no cyanosis, +2 lower extremity pitting edema bilaterally, adequate dorsalis pedis pulses  * Pulses: 2+ and symmetric all extremities  * Skin: Skin color, texture, turgor normal, no rashes or lesions  * Lymph nodes: Cervical, supraclavicular, and axillary nodes normal  * Neurologic: CNII-XII intact, normal strength, sensation and reflexes throughout

## 2022-02-01 NOTE — H&P ADULT - ATTENDING COMMENTS
Acute on Chronic HFpEF Exacerbation  S/P AICD 11/2021  * TTE 07/02/21 EF 54%, mild-mod MR and TR, mild AR, moderate PHTN  * Home Torsemide 10mg QD  * Pro BNP not sent  * PE leg swelling and diffuse crackles  * CXR 01/31 diffuse bilateral interstitial opacities and bilateral blunting of costophrenic angles   * S/P IV Lasix 40mg x1 dose in ED    - Monitor in/out  - Daily weight (standing)  - Monitor SaO2 and O2 requirements: on 4LPM NC  - Start IV Lasix 40mg BID. S/P IV Lasix 40mg x1 dose in ED  - Salt restricted diet and Fluid restriction to 1.2L per 24 hours  - Monitor telemetry   - Trend electrolytes and keep K>4 and Mg>2  - Check pro BNP  - Check TTE  - Check Iron Studies    # urethral stricture s/p dilation  urology following  monitor bladder scan  flomax  monitor I/O Patient seen and examined. Discussed with resident team    # Acute on Chronic HFpEF Exacerbation S/P AICD 11/2021  TTE 07/02/21 EF 54%, mild-mod MR and TR, mild AR, moderate PHTN  repeat TTE  heart failure team consulted; recommendation reviewed  change admission to telemetry unit; order placed  patient received IV lasix evening dose  ordered hypertonic saline and bumex in AM; please continue reorder depending on patient's status  monitor BMP, Mg BID  I/O monitoring    # urethral stricture s/p dilation  patient voiding; no suprapubic fullness;   urology following  monitor bladder scan; monitor I/O, condom catheter  flomax

## 2022-02-01 NOTE — CONSULT NOTE ADULT - ASSESSMENT
CKD stage 4   acute on chronic HFpEF   Afib / CAD / PCI / VT  JOHANNY on CPAP    HTN   NPH s/p  shunt    DM 2    dm neuropathy  hyperphosphatemia    plan:      bumex with hypertonic saline per heart failure team  trend renal function and lytes  renal diet / fluid restriction  cont phoslo and calcitriol  d/w heart failure team  office spoke with daughter today

## 2022-02-01 NOTE — OCCUPATIONAL THERAPY INITIAL EVALUATION ADULT - PERTINENT HX OF CURRENT PROBLEM, REHAB EVAL
Pt was brought to the ED on 01/31 for evaluation of worsening shortness of breath and lethargy. Pt is an 85 yo man who was brought to the ED on 01/31 for evaluation of worsening shortness of breath and lethargy.

## 2022-02-01 NOTE — CONSULT NOTE ADULT - SUBJECTIVE AND OBJECTIVE BOX
NEPHROLOGY CONSULTATION NOTE    86 year old male with multiple comorbidities as below, well known to me, who was brought to the ED for evaluation of worsening SOB with leg swelling, orthopnea, and cough, found to have congested CXR.  Pt with baseline Cr 2-3's.          PAST MEDICAL & SURGICAL HISTORY:  Hypertension  Hydrocephalus  CKD (chronic kidney disease)  DM (diabetes mellitus)  Afib  History of prostate surgery    Allergies:  No Known Allergies    Home Medications Reviewed    SOCIAL HISTORY:  Denies ETOH,Smoking,   FAMILY HISTORY:  Family history of diabetes mellitus (Mother, Sibling)      REVIEW OF SYSTEMS  All other review of systems is negative unless indicated above.    PHYSICAL EXAM:  NAD  awake and alert  hard of hearing  O2 via FM  moist mm  no ln  decreased BS b/l  distant HS, irregular   soft + BS  1+ edema  no [facility] Medications:   MEDICATIONS  (STANDING):  aMIOdarone    Tablet 200 milliGRAM(s) Oral daily  aspirin enteric coated 81 milliGRAM(s) Oral daily  atorvastatin 20 milliGRAM(s) Oral at bedtime  calcitriol   Capsule 0.25 MICROGram(s) Oral daily  calcium acetate 667 milliGRAM(s) Oral four times a day with meals  chlorhexidine 4% Liquid 1 Application(s) Topical <User Schedule>  clopidogrel Tablet 75 milliGRAM(s) Oral daily  dextrose 40% Gel 15 Gram(s) Oral once  dextrose 5%. 1000 milliLiter(s) (50 mL/Hr) IV Continuous <Continuous>  dextrose 5%. 1000 milliLiter(s) (100 mL/Hr) IV Continuous <Continuous>  dextrose 50% Injectable 25 Gram(s) IV Push once  dextrose 50% Injectable 12.5 Gram(s) IV Push once  dextrose 50% Injectable 25 Gram(s) IV Push once  finasteride 5 milliGRAM(s) Oral daily  glucagon  Injectable 1 milliGRAM(s) IntraMuscular once  heparin SubCutaneous Injection - Peds 5000 Unit(s) SubCutaneous every 8 hours  insulin lispro (ADMELOG) corrective regimen sliding scale   SubCutaneous three times a day before meals  metoprolol tartrate 50 milliGRAM(s) Oral two times a day  pantoprazole    Tablet 40 milliGRAM(s) Oral before breakfast  tamsulosin 0.4 milliGRAM(s) Oral at bedtime        VITALS:  T(F): 97.5 (02-01-22 @ 17:56), Max: 99 (02-01-22 @ 08:37)  HR: 75 (02-01-22 @ 17:56)  BP: 143/64 (02-01-22 @ 17:56)  RR: 18 (02-01-22 @ 17:56)  SpO2: 93% (02-01-22 @ 17:56)  Wt(kg): --        LABS:  02-01    144  |  104  |  48<H>  ----------------------------<  100<H>  3.8   |  27  |  2.5<H>    Ca    8.4<L>      01 Feb 2022 04:30  Phos  5.1     02-01  Mg     2.4     02-01    TPro  5.7<L>  /  Alb  3.2<L>  /  TBili  1.3<H>  /  DBili      /  AST  12  /  ALT  8   /  AlkPhos  75  02-01                          13.2   10.85 )-----------( 198      ( 01 Feb 2022 04:30 )             45.2       Urine Studies:        RADIOLOGY & ADDITIONAL STUDIES:

## 2022-02-01 NOTE — OCCUPATIONAL THERAPY INITIAL EVALUATION ADULT - ADL RETRAINING, OT EVAL
Pt will perform UB dressing with mod assist seated; Pt will perform LB dressing seated with max assist by discharge to work towards returning to OF

## 2022-02-01 NOTE — PHYSICAL THERAPY INITIAL EVALUATION ADULT - SPECIFY REASON(S)
pt declined PT at this time due to severe fatigue and anxiety, requested anxiety meds from RN.  PT to follow.

## 2022-02-01 NOTE — CONSULT NOTE ADULT - SUBJECTIVE AND OBJECTIVE BOX
Urology Consult Note: 86 year old male with a pmh of JOHANNY not on CPAP, HTN, DM II, CKD (Baseline Cr 2.8-3.2), Chronic Atrial Fibrillation,  shunt, Sustained Ventricular Tachycardia during last admission 11/2021 not on Anticoagulation due to falls, HFpEF. TTE 07/02/21 EF 54%, mild-mod MR and TR, mild AR, dyslipidemia and CAD s/p LAD stent 11/2021 on DAP, BPH and Urethral Stricture diagnosed during last admission 11/2021 s/p dilation meatal stricture and subsequent discharge with barragan.  consulted for TOBIAS Cr2.5 and patient's history of difficulty urinating. Patient seen and examined at bedside in the ED, patient stating he follows with Dr. Matthews and last seen him a month ago for TOV which he reportedly passed. Patient now here admitted for worsening shortness of breath and lethargy. Patient states that since he had his catheter removed he has been urinating without any issues and endorses a strong stream. Patient also states that for the duration of his catheter placement, he had been experiencing excruciating pain 2/2 to the barragan and does not wish to have a catheter placed due to the possibility of experiencing that same pain. Patient denies fevers, chills, n/v, dysuria, hematuria, abdominal pain and other LUTS.     PAST MEDICAL & SURGICAL HISTORY:  Afib  DM (diabetes mellitus)  CKD (chronic kidney disease)  Hydrocephalus  Hypertension  History of prostate surgery    [ x ] A 10 Point Review of Systems was negative except where noted    MEDICATIONS  (STANDING):  aMIOdarone    Tablet 200 milliGRAM(s) Oral daily  aspirin enteric coated 81 milliGRAM(s) Oral daily  atorvastatin 20 milliGRAM(s) Oral at bedtime  calcitriol   Capsule 0.25 MICROGram(s) Oral daily  calcium acetate 667 milliGRAM(s) Oral four times a day with meals  chlorhexidine 4% Liquid 1 Application(s) Topical <User Schedule>  clopidogrel Tablet 75 milliGRAM(s) Oral daily  dextrose 40% Gel 15 Gram(s) Oral once  dextrose 5%. 1000 milliLiter(s) (50 mL/Hr) IV Continuous <Continuous>  dextrose 5%. 1000 milliLiter(s) (100 mL/Hr) IV Continuous <Continuous>  dextrose 50% Injectable 25 Gram(s) IV Push once  dextrose 50% Injectable 12.5 Gram(s) IV Push once  dextrose 50% Injectable 25 Gram(s) IV Push once  finasteride 5 milliGRAM(s) Oral daily  furosemide   Injectable 60 milliGRAM(s) IV Push every 12 hours  glucagon  Injectable 1 milliGRAM(s) IntraMuscular once  heparin SubCutaneous Injection - Peds 5000 Unit(s) SubCutaneous every 8 hours  insulin lispro (ADMELOG) corrective regimen sliding scale   SubCutaneous three times a day before meals  metoprolol tartrate 50 milliGRAM(s) Oral two times a day  pantoprazole    Tablet 40 milliGRAM(s) Oral before breakfast  tamsulosin 0.4 milliGRAM(s) Oral at bedtime    Allergies: No Known Allergies    SOCIAL HISTORY: No illicit drug use    FAMILY HISTORY:  Family history of diabetes mellitus (Mother, Sibling)    Vital Signs Last 24 Hrs  T(C): 37.2 (01 Feb 2022 08:37), Max: 37.2 (01 Feb 2022 08:37)  T(F): 99 (01 Feb 2022 08:37), Max: 99 (01 Feb 2022 08:37)  HR: 71 (01 Feb 2022 09:58) (70 - 84)  BP: 123/89 (01 Feb 2022 07:42) (123/89 - 145/67)  RR: 19 (01 Feb 2022 09:58) (19 - 26)  SpO2: 97% (01 Feb 2022 09:58) (96% - 100%)    PHYSICAL EXAM:  Constitutional: NAD, well-developed. Bed and sheets noted to be soaked in urine. No blood visualized.  HEENT: NC/AT  Back: No CVA ttp bilaterally  Resp: No accessory respiratory muscle use  Abd: Soft, NT/ND. No suprapubic ttp  Cardio: +S1/S2  : Uncircumcised, foreskin easily retractable and replaceable, testicles x2, symmetric, descended, nonttp. +catheter induced hypospadias. noted.   Neuro: Awake and alert  Psych: Normal mood, normal affect  Skin: Good color, non diaphoretic    LABS:                      12.7   8.82  )-----------( 203      ( 31 Jan 2022 16:12 )             43.3     02-01  144  |  104  |  48<H>  ----------------------------<  100<H>  3.8   |  27  |  2.5<H>    Ca    8.4<L>      01 Feb 2022 04:30  Phos  5.1     02-01  Mg     2.4     02-01    TPro  5.7<L>  /  Alb  3.2<L>  /  TBili  1.3<H>  /  DBili  x   /  AST  12  /  ALT  8   /  AlkPhos  75  02-01  PT/INR - ( 31 Jan 2022 16:12 )   PT: 11.90 sec;   INR: 1.04 ratio    PTT - ( 31 Jan 2022 16:12 )  PTT:31.6 sec    RADIOLOGY & ADDITIONAL STUDIES:  ACC: [id number] EXAM:  XR CHEST PORTABLE URGENT 1V                          PROCEDURE DATE:  01/31/2022      INTERPRETATION:  Clinical History / Reason for exam: Fever, sepsis.    Comparison : Chest radiograph November 18, 2021.    Technique/Positioning: Frontal portable.    Findings:    Support devices: Unchanged    Cardiac/mediastinum/hilum: Cardiomegaly. Bilateral opacities    Lung parenchyma/Pleura: Bilateral opacifications    Skeleton/soft tissues: Unremarkable.    Impression:    Bilateral opacifications, new. Follow-up as needed.    --- End of Report ---    CRIS POTTER MD; Attending Interventional Radiologist  This document has been electronically signed. Feb 1 2022  8:10AM   Urology Consult Note: 86 year old male with a pmh of JOHANNY not on CPAP, HTN, DM II, CKD (Baseline Cr 2.8-3.2), Chronic Atrial Fibrillation,  shunt, Sustained Ventricular Tachycardia during last admission 11/2021 not on Anticoagulation due to falls, HFpEF. TTE 07/02/21 EF 54%, mild-mod MR and TR, mild AR, dyslipidemia and CAD s/p LAD stent 11/2021 on DAP, BPH and Urethral Stricture diagnosed during last admission 11/2021 s/p dilation meatal stricture and subsequent discharge with barragan.  consulted for TOBIAS Cr2.5 and patient's history of difficulty urinating. Patient seen and examined at bedside in the ED, patient stating he follows with Dr. Matthews and last seen him a month ago for TOV which he reportedly passed. Patient now here admitted for worsening shortness of breath and lethargy. Patient states that since he had his catheter removed he has been urinating without any issues and endorses a strong stream. Patient also states that for the duration of his catheter placement, he had been experiencing excruciating pain 2/2 to the barragan and does not wish to have a catheter placed due to the possibility of experiencing that same pain. Patient denies fevers, chills, n/v, dysuria, hematuria, abdominal pain and other LUTS.     PAST MEDICAL & SURGICAL HISTORY:  Afib  DM (diabetes mellitus)  CKD (chronic kidney disease)  Hydrocephalus  Hypertension  History of prostate surgery    [ x ] A 10 Point Review of Systems was negative except where noted    MEDICATIONS  (STANDING):  aMIOdarone    Tablet 200 milliGRAM(s) Oral daily  aspirin enteric coated 81 milliGRAM(s) Oral daily  atorvastatin 20 milliGRAM(s) Oral at bedtime  calcitriol   Capsule 0.25 MICROGram(s) Oral daily  calcium acetate 667 milliGRAM(s) Oral four times a day with meals  chlorhexidine 4% Liquid 1 Application(s) Topical <User Schedule>  clopidogrel Tablet 75 milliGRAM(s) Oral daily  dextrose 40% Gel 15 Gram(s) Oral once  dextrose 5%. 1000 milliLiter(s) (50 mL/Hr) IV Continuous <Continuous>  dextrose 5%. 1000 milliLiter(s) (100 mL/Hr) IV Continuous <Continuous>  dextrose 50% Injectable 25 Gram(s) IV Push once  dextrose 50% Injectable 12.5 Gram(s) IV Push once  dextrose 50% Injectable 25 Gram(s) IV Push once  finasteride 5 milliGRAM(s) Oral daily  furosemide   Injectable 60 milliGRAM(s) IV Push every 12 hours  glucagon  Injectable 1 milliGRAM(s) IntraMuscular once  heparin SubCutaneous Injection - Peds 5000 Unit(s) SubCutaneous every 8 hours  insulin lispro (ADMELOG) corrective regimen sliding scale   SubCutaneous three times a day before meals  metoprolol tartrate 50 milliGRAM(s) Oral two times a day  pantoprazole    Tablet 40 milliGRAM(s) Oral before breakfast  tamsulosin 0.4 milliGRAM(s) Oral at bedtime    Allergies: No Known Allergies    SOCIAL HISTORY: No illicit drug use    FAMILY HISTORY:  Family history of diabetes mellitus (Mother, Sibling)    Vital Signs Last 24 Hrs  T(C): 37.2 (01 Feb 2022 08:37), Max: 37.2 (01 Feb 2022 08:37)  T(F): 99 (01 Feb 2022 08:37), Max: 99 (01 Feb 2022 08:37)  HR: 71 (01 Feb 2022 09:58) (70 - 84)  BP: 123/89 (01 Feb 2022 07:42) (123/89 - 145/67)  RR: 19 (01 Feb 2022 09:58) (19 - 26)  SpO2: 97% (01 Feb 2022 09:58) (96% - 100%)    PHYSICAL EXAM:  Constitutional: NAD, well-developed. Bed and sheets noted to be soaked in urine. No blood visualized.  HEENT: NC/AT  Back: No CVA ttp bilaterally  Resp: No accessory respiratory muscle use  Abd: Soft, NT/ND. No suprapubic ttp  Cardio: +S1/S2  : Uncircumcised, foreskin easily retractable and replaceable, testicles x2, symmetric, descended, nonttp. +catheter induced hypospadias.   Neuro: Awake and alert  Psych: Normal mood, normal affect  Skin: Good color, non diaphoretic    LABS:                      12.7   8.82  )-----------( 203      ( 31 Jan 2022 16:12 )             43.3     02-01  144  |  104  |  48<H>  ----------------------------<  100<H>  3.8   |  27  |  2.5<H>    Ca    8.4<L>      01 Feb 2022 04:30  Phos  5.1     02-01  Mg     2.4     02-01    TPro  5.7<L>  /  Alb  3.2<L>  /  TBili  1.3<H>  /  DBili  x   /  AST  12  /  ALT  8   /  AlkPhos  75  02-01  PT/INR - ( 31 Jan 2022 16:12 )   PT: 11.90 sec;   INR: 1.04 ratio    PTT - ( 31 Jan 2022 16:12 )  PTT:31.6 sec    RADIOLOGY & ADDITIONAL STUDIES:  ACC: [id number] EXAM:  XR CHEST PORTABLE URGENT 1V                          PROCEDURE DATE:  01/31/2022      INTERPRETATION:  Clinical History / Reason for exam: Fever, sepsis.    Comparison : Chest radiograph November 18, 2021.    Technique/Positioning: Frontal portable.    Findings:    Support devices: Unchanged    Cardiac/mediastinum/hilum: Cardiomegaly. Bilateral opacities    Lung parenchyma/Pleura: Bilateral opacifications    Skeleton/soft tissues: Unremarkable.    Impression:    Bilateral opacifications, new. Follow-up as needed.    --- End of Report ---    CRIS POTTER MD; Attending Interventional Radiologist  This document has been electronically signed. Feb 1 2022  8:10AM

## 2022-02-01 NOTE — CONSULT NOTE ADULT - ASSESSMENT
Acute on chronic CHF/ CKD / SOB    Patient is massively fluid overloaded on exam  Give Bumex 2 mg IV push BID  Start 3% Hypertonic Saline 150ml BID(If infused throughout a central line, run over one hour, if a peripheral line is to be used run over 3 hours) start 30 mins before Bumex   Get BMP twice daily   Maintain potassium >4.0, Mg >2.1  Strict intake and output  Daily weight   Will continue to follow

## 2022-02-02 NOTE — CHART NOTE - NSCHARTNOTEFT_GEN_A_CORE
Pt had multiple episodes of NSVT and then 2 episodes of sustained vtach. Device was interogated, icd working properly, changed metoprolol to tid to prevent pvcs, ep consultes, stat labs ordered. Pt had multiple episodes of NSVT and then 2 episodes of sustained vtach. Device was interogated, icd working properly, changed metoprolol to tid to prevent pvcs, ep consultes, stat labs ordered.   Pt reloaded with amio

## 2022-02-02 NOTE — PROGRESS NOTE ADULT - ATTENDING COMMENTS
86M with PMH: VT s/p AICD, HFpEF, CKD4, HTN, Chronic Afib, CAD on KWAME, s/p Prostate Sx who presents with Dyspnea found to have AHRF  CXR: Vascular Congestion w/ elevated BNP    Dx: Acute on Chronic HFpEF / Acute on Chronic CKD4    Plan:   Bumex 2mg IVP BID  3% Saline 150ml IV Over 3hrs 30min before bumex   Monitor I/O's / Daily wts  Supplement KCL IV and Oral  f/u K/Mg/Cr daily   c/w Telemetry  Titrate O2 to maintain sats 92-95%   PT consultation - likely needs SNF  HF/Nephro Recs appreciated  1L Fluid Restriction     Dispo: Acute / Full Code

## 2022-02-02 NOTE — CONSULT NOTE ADULT - ASSESSMENT
86 y.o male patient with PMH as above admitted for HFpEF exacerbation; had multiple episodes of sustained wide complex tachycardia    # Wide complex tachycardia  - VT vs atrial flutter/fibrillation with aberrancy  - History of VT s/p ICD  - AICD interrogated; did not detect tachycardia   - Increase metoprolol tartrate to 50mg - 25mg - 50mg and increase as tolerated  - If episode recurs, re-load with amiodarone IV  - Continue heart failure management as per heart failure specialist 86 y.o male patient with PMH as above admitted for HFpEF exacerbation; had multiple episodes of sustained wide complex tachycardia    # Wide complex tachycardia  - VT vs atrial flutter/fibrillation with aberrancy  - History of VT s/p ICD  - AICD interrogated; did not detect tachycardia   - Increase metoprolol tartrate to 50mg - 25mg - 50mg and increase as tolerated  - If episode recurs, re-load with amiodarone IV  - Keep Mg>2 and K>4  - Continue heart failure management as per heart failure specialist 86 y.o male patient with PMH as above admitted for HFpEF exacerbation; had multiple episodes of sustained wide complex tachycardia    # Wide complex tachycardia  - VT likely   - History of VT s/p ICD  - AICD interrogated; did not detect tachycardia,    - Increase metoprolol tartrate to 50mg - 25mg - 50mg and increase as tolerated  - increase Amiodarone 200mg bid  - Keep Mg>2 and K>4  - Continue heart failure management as per heart failure specialist  - Follow up with Primary cardiology Dr García / Dr Anderson  - Repeat Echo (last 7/21) 86 y.o male patient with PMH as above admitted for HFpEF exacerbation; had multiple episodes of sustained wide complex tachycardia    # Wide complex tachycardia  - VT likely   - History of VT s/p ICD  - AICD interrogated; did not detect tachycardia,  will reprogram monitor zone with lower HR parameters  - Increase metoprolol tartrate to 50mg - 25mg - 50mg and increase as tolerated  - increase Amiodarone 200mg bid  - Keep Mg>2 and K>4  - Continue heart failure management as per heart failure specialist  - Follow up with Primary cardiology Dr Garíca / Dr Anderson  - Repeat Echo (last 7/21)

## 2022-02-02 NOTE — PHYSICAL THERAPY INITIAL EVALUATION ADULT - GENERAL OBSERVATIONS, REHAB EVAL
[id number] am. 85 y/o M rec'd/left on a stretcher, nad, sleeping, + tele, + NRBM. vitals: 116/59 69 100% on NRBM. oriented to name, asking for water (coughed on 1 sip of water, RN aware), presents with sleepiness and overall deconditioning.

## 2022-02-02 NOTE — CONSULT NOTE ADULT - SUBJECTIVE AND OBJECTIVE BOX
HPI:  History of Present Illness    Mr. Velez is an 86 year old male patient known to have:  - Baseline: AO3 from home  - JOHANNY not on CPAP  - HTN  - DM II  - CKD (Baseline Cr 2.8-3.2)  - Chronic Atrial Fibrillation and History of Sustained Ventricular Tachycardia during last admission 11/2021 not on Anticoagulation due to falls  - HFpEF. TTE 07/02/21 EF 54%, mild-mod MR and TR, mild AR, moderate PHTN  - History of  Shunt  - Dyslipidemia and CAD s/p LAD stent 11/2021 on DAP  - BPH and Urethral Stricture diagnosed during last admission 11/2021 s/p dilation and s/p discharge on barragan      He was brought to the ED on 01/31 for evaluation of worsening shortness of breath and lethargy.  History goes back to few days PTP when the patient started complaining of worsening SOB on minimal exertion to the extent the patient was having difficulty completing sentences.  This has been associated with some leg swelling, orthopnea, PNDs, and chronic productive cough since 11/2021 in the absence of chest pain, palpitations, light headedness, or diaphoresis.  Per son, patient has been lethargic for the last few days with reduced PO intake and difficulty ambulating due to leg heaviness.      On review of systems, patient denies any recent fever, chills, night sweats, URTI symptoms (cough, rhinorrhea, sore throat), change in bowel movements (diarrhea or constipation), abdominal pain, headache, nausea, or vomiting.   No sick contacts.   No recent travel or exposure to recent travelers.      Upon presentation to the ED, the patient was hemodynamically stable:  Vital Signs in ED   - /67 mmHg  - RR 26 bpm  - HR 70 bpm  - T 36.6  - Blood Gas Profile - Venous (01.31.22 @ 15:44)    pH, Venous: 7.34    pCO2, Venous: 57 mmHg    pO2, Venous: 23 mmHg    HCO3, Venous: 31 mmol/L    Base Excess, Venous: 3.7 mmol/L    Oxygen Saturation, Venous: 34.4 %      Investigations   Laboratory Workup  - CBC:                        12.7   8.82  )-----------( 203      ( 31 Jan 2022 16:12 )             43.3     - Chemistry:  01-31    142  |  101  |  51<H>  ----------------------------<  106<H>  3.4<L>   |  28  |  2.5<H>    Ca    8.6      31 Jan 2022 16:12    TPro  6.0  /  Alb  3.6  /  TBili  0.8  /  DBili  x   /  AST  10  /  ALT  8   /  AlkPhos  79  01-31    - Coagulation Studies:  PT/INR - ( 31 Jan 2022 16:12 )   PT: 11.90 sec;   INR: 1.04 ratio    PTT - ( 31 Jan 2022 16:12 )  PTT:31.6 sec      Microbiological Workup  * COVID negative      Radiological Workup  * Chest X Ray 01/31 congested with effusions      - Patient will received IV Lasix 40mg x1 dose for now  - He was placed on NC 3LPM with SaO2 97%  - He will be admitted for further investigations, management, and monitoring             (01 Feb 2022 01:30)      PAST MEDICAL & SURGICAL HISTORY  Afib    DM (diabetes mellitus)    CKD (chronic kidney disease)    Hydrocephalus    Hypertension    History of prostate surgery        FAMILY HISTORY:  FAMILY HISTORY:  Family history of diabetes mellitus (Mother, Sibling)        SOCIAL HISTORY:  []smoker  []Alcohol  []Drug    ALLERGIES:  No Known Allergies      MEDICATIONS:  MEDICATIONS  (STANDING):  aMIOdarone    Tablet 200 milliGRAM(s) Oral daily  aspirin enteric coated 81 milliGRAM(s) Oral daily  atorvastatin 20 milliGRAM(s) Oral at bedtime  buMETAnide Injectable 2 milliGRAM(s) IV Push two times a day  calcitriol   Capsule 0.25 MICROGram(s) Oral daily  calcium acetate 1334 milliGRAM(s) Oral three times a day with meals  chlorhexidine 4% Liquid 1 Application(s) Topical <User Schedule>  clopidogrel Tablet 75 milliGRAM(s) Oral daily  dextrose 40% Gel 15 Gram(s) Oral once  dextrose 5%. 1000 milliLiter(s) (50 mL/Hr) IV Continuous <Continuous>  dextrose 5%. 1000 milliLiter(s) (100 mL/Hr) IV Continuous <Continuous>  dextrose 50% Injectable 25 Gram(s) IV Push once  dextrose 50% Injectable 12.5 Gram(s) IV Push once  dextrose 50% Injectable 25 Gram(s) IV Push once  finasteride 5 milliGRAM(s) Oral daily  glucagon  Injectable 1 milliGRAM(s) IntraMuscular once  heparin SubCutaneous Injection - Peds 5000 Unit(s) SubCutaneous every 8 hours  insulin lispro (ADMELOG) corrective regimen sliding scale   SubCutaneous three times a day before meals  melatonin 3 milliGRAM(s) Oral at bedtime  methylPREDNISolone sodium succinate IV Push - Peds 40 milliGRAM(s) IV Push every 12 hours  metoprolol tartrate 50 milliGRAM(s) Oral three times a day  pantoprazole    Tablet 40 milliGRAM(s) Oral before breakfast  sodium chloride 3%. 150 milliLiter(s) (50 mL/Hr) IV Continuous <Continuous>  sodium chloride 3%. 150 milliLiter(s) (50 mL/Hr) IV Continuous <Continuous>  tamsulosin 0.4 milliGRAM(s) Oral at bedtime    MEDICATIONS  (PRN):      HOME MEDICATIONS:  Home Medications:  amiodarone 200 mg oral tablet: 1 tab(s) orally once a day (01 Feb 2022 02:04)  aspirin 81 mg oral delayed release tablet: 1 tab(s) orally once a day (01 Feb 2022 02:04)  Basaglar KwikPen: 20 unit(s) subcutaneous once a day (at bedtime) (03 Nov 2021 22:10)  calcitriol 0.25 mcg oral capsule: 1 cap(s) orally once a day (30 Jun 2021 15:07)  dutasteride 0.5 mg oral capsule: 1 cap(s) orally once a day (30 Jun 2021 15:07)  Farxiga 10 mg oral tablet: 1 tab(s) orally once a day (03 Nov 2021 21:56)  Lopressor 50 mg oral tablet: 1 tab(s) orally 2 times a day (01 Feb 2022 02:04)  Ozempic (1 mg dose) 4 mg/3 mL subcutaneous solution: 1 milligram(s) subcutaneous once a week on Thursday (03 Nov 2021 21:50)  Plavix 75 mg oral tablet: 1 tab(s) orally once a day (01 Feb 2022 02:04)  torsemide 10 mg oral tablet: 1 tab(s) orally once a day (01 Feb 2022 02:04)      VITALS:   T(F): 98.8 (02-02 @ 22:42), Max: 99 (02-01 @ 08:37)  HR: 77 (02-02 @ 22:42) (70 - 92)  BP: 123/53 (02-02 @ 22:42) (108/59 - 145/67)  BP(mean): --  RR: 18 (02-02 @ 22:42) (18 - 26)  SpO2: 96% (02-02 @ 22:42) (93% - 100%)    I&O's Summary      REVIEW OF SYSTEMS:  CONSTITUTIONAL: No weakness, fevers or chills  EYES: No visual changes  ENT: No vertigo or throat pain   NECK: No pain or stiffness  RESPIRATORY: No cough, wheezing, hemoptysis; No shortness of breath  CARDIOVASCULAR: No chest pain or palpitations  GASTROINTESTINAL: No abdominal or epigastric pain. No nausea, vomiting, or hematemesis; No diarrhea or constipation. No melena or hematochezia.  GENITOURINARY: No dysuria, frequency or hematuria  NEUROLOGICAL: No numbness or weakness  SKIN: No itching, no rashes  MSK: No pain    PHYSICAL EXAM:  NEURO: patient is awake , alert and oriented  GEN: Not in acute distress  NECK: no thyroid enlargement, no JVD  LUNGS: Clear to auscultation bilaterally   CARDIOVASCULAR: S1/S2 present, RRR , no murmurs or rubs, no carotid bruits,  + PP bilaterally  ABD: Soft, non-tender, non-distended, +BS  EXT: No KEISHA  SKIN: Intact    LABS:                        12.7   8.82  )-----------( 168      ( 02 Feb 2022 07:30 )             44.2     02-02    151<H>  |  110  |  49<H>  ----------------------------<  111<H>  4.1   |  28  |  2.7<H>    Ca    8.5      02 Feb 2022 23:27  Phos  5.3     02-02  Mg     2.5     02-02    TPro  5.8<L>  /  Alb  3.3<L>  /  TBili  1.5<H>  /  DBili  x   /  AST  10  /  ALT  8   /  AlkPhos  79  02-02        CARDIAC MARKERS ( 01 Feb 2022 04:30 )  x     / 0.06 ng/mL / 38 U/L / x     / 5.5 ng/mL        Troponin trend:    Serum Pro-Brain Natriuretic Peptide: 75965 pg/mL (02-01-22 @ 04:30)          RADIOLOGY:  -CXR:  -TTE:  -CCTA:  -STRESS TEST:  -CATHETERIZATION:    ECG:    TELEMETRY EVENTS:   HPI:  History of Present Illness    Mr. Velez is an 86 year old male patient known to have:  - Baseline: AO3 from home  - JOHANNY not on CPAP  - HTN  - DM II  - CKD (Baseline Cr 2.8-3.2)  - Chronic Atrial Fibrillation and History of Sustained Ventricular Tachycardia during last admission 11/2021 not on Anticoagulation due to falls  - HFpEF. TTE 07/02/21 EF 54%, mild-mod MR and TR, mild AR, moderate PHTN  - History of  Shunt  - Dyslipidemia and CAD s/p LAD stent 11/2021 on DAP  - BPH and Urethral Stricture diagnosed during last admission 11/2021 s/p dilation and s/p discharge on barragan      He was brought to the ED on 01/31 for evaluation of worsening shortness of breath and lethargy.  History goes back to few days PTP when the patient started complaining of worsening SOB on minimal exertion to the extent the patient was having difficulty completing sentences.  This has been associated with some leg swelling, orthopnea, PNDs, and chronic productive cough since 11/2021 in the absence of chest pain, palpitations, light headedness, or diaphoresis.  Per son, patient has been lethargic for the last few days with reduced PO intake and difficulty ambulating due to leg heaviness.      On review of systems, patient denies any recent fever, chills, night sweats, URTI symptoms (cough, rhinorrhea, sore throat), change in bowel movements (diarrhea or constipation), abdominal pain, headache, nausea, or vomiting.   No sick contacts.   No recent travel or exposure to recent travelers.      Upon presentation to the ED, the patient was hemodynamically stable:  Vital Signs in ED   - /67 mmHg  - RR 26 bpm  - HR 70 bpm  - T 36.6  - Blood Gas Profile - Venous (01.31.22 @ 15:44)    pH, Venous: 7.34    pCO2, Venous: 57 mmHg    pO2, Venous: 23 mmHg    HCO3, Venous: 31 mmol/L    Base Excess, Venous: 3.7 mmol/L    Oxygen Saturation, Venous: 34.4 %      Investigations   Laboratory Workup  - CBC:                        12.7   8.82  )-----------( 203      ( 31 Jan 2022 16:12 )             43.3     - Chemistry:  01-31    142  |  101  |  51<H>  ----------------------------<  106<H>  3.4<L>   |  28  |  2.5<H>    Ca    8.6      31 Jan 2022 16:12    TPro  6.0  /  Alb  3.6  /  TBili  0.8  /  DBili  x   /  AST  10  /  ALT  8   /  AlkPhos  79  01-31    - Coagulation Studies:  PT/INR - ( 31 Jan 2022 16:12 )   PT: 11.90 sec;   INR: 1.04 ratio    PTT - ( 31 Jan 2022 16:12 )  PTT:31.6 sec      Microbiological Workup  * COVID negative      Radiological Workup  * Chest X Ray 01/31 congested with effusions      - Patient will received IV Lasix 40mg x1 dose for now  - He was placed on NC 3LPM with SaO2 97%  - He will be admitted for further investigations, management, and monitoring             (01 Feb 2022 01:30)      PAST MEDICAL & SURGICAL HISTORY  Afib    DM (diabetes mellitus)    CKD (chronic kidney disease)    Hydrocephalus    Hypertension    History of prostate surgery        FAMILY HISTORY:  FAMILY HISTORY:  Family history of diabetes mellitus (Mother, Sibling)        SOCIAL HISTORY:  []smoker  []Alcohol  []Drug    ALLERGIES:  No Known Allergies      MEDICATIONS:  MEDICATIONS  (STANDING):  aMIOdarone    Tablet 200 milliGRAM(s) Oral daily  aspirin enteric coated 81 milliGRAM(s) Oral daily  atorvastatin 20 milliGRAM(s) Oral at bedtime  buMETAnide Injectable 2 milliGRAM(s) IV Push two times a day  calcitriol   Capsule 0.25 MICROGram(s) Oral daily  calcium acetate 1334 milliGRAM(s) Oral three times a day with meals  chlorhexidine 4% Liquid 1 Application(s) Topical <User Schedule>  clopidogrel Tablet 75 milliGRAM(s) Oral daily  dextrose 40% Gel 15 Gram(s) Oral once  dextrose 5%. 1000 milliLiter(s) (50 mL/Hr) IV Continuous <Continuous>  dextrose 5%. 1000 milliLiter(s) (100 mL/Hr) IV Continuous <Continuous>  dextrose 50% Injectable 25 Gram(s) IV Push once  dextrose 50% Injectable 12.5 Gram(s) IV Push once  dextrose 50% Injectable 25 Gram(s) IV Push once  finasteride 5 milliGRAM(s) Oral daily  glucagon  Injectable 1 milliGRAM(s) IntraMuscular once  heparin SubCutaneous Injection - Peds 5000 Unit(s) SubCutaneous every 8 hours  insulin lispro (ADMELOG) corrective regimen sliding scale   SubCutaneous three times a day before meals  melatonin 3 milliGRAM(s) Oral at bedtime  methylPREDNISolone sodium succinate IV Push - Peds 40 milliGRAM(s) IV Push every 12 hours  metoprolol tartrate 50 milliGRAM(s) Oral three times a day  pantoprazole    Tablet 40 milliGRAM(s) Oral before breakfast  sodium chloride 3%. 150 milliLiter(s) (50 mL/Hr) IV Continuous <Continuous>  sodium chloride 3%. 150 milliLiter(s) (50 mL/Hr) IV Continuous <Continuous>  tamsulosin 0.4 milliGRAM(s) Oral at bedtime    MEDICATIONS  (PRN):      HOME MEDICATIONS:  Home Medications:  amiodarone 200 mg oral tablet: 1 tab(s) orally once a day (01 Feb 2022 02:04)  aspirin 81 mg oral delayed release tablet: 1 tab(s) orally once a day (01 Feb 2022 02:04)  Basaglar KwikPen: 20 unit(s) subcutaneous once a day (at bedtime) (03 Nov 2021 22:10)  calcitriol 0.25 mcg oral capsule: 1 cap(s) orally once a day (30 Jun 2021 15:07)  dutasteride 0.5 mg oral capsule: 1 cap(s) orally once a day (30 Jun 2021 15:07)  Farxiga 10 mg oral tablet: 1 tab(s) orally once a day (03 Nov 2021 21:56)  Lopressor 50 mg oral tablet: 1 tab(s) orally 2 times a day (01 Feb 2022 02:04)  Ozempic (1 mg dose) 4 mg/3 mL subcutaneous solution: 1 milligram(s) subcutaneous once a week on Thursday (03 Nov 2021 21:50)  Plavix 75 mg oral tablet: 1 tab(s) orally once a day (01 Feb 2022 02:04)  torsemide 10 mg oral tablet: 1 tab(s) orally once a day (01 Feb 2022 02:04)      VITALS:   T(F): 98.8 (02-02 @ 22:42), Max: 99 (02-01 @ 08:37)  HR: 77 (02-02 @ 22:42) (70 - 92)  BP: 123/53 (02-02 @ 22:42) (108/59 - 145/67)  BP(mean): --  RR: 18 (02-02 @ 22:42) (18 - 26)  SpO2: 96% (02-02 @ 22:42) (93% - 100%)    I&O's Summary      REVIEW OF SYSTEMS:  CONSTITUTIONAL: No weakness, fevers or chills  EYES: No visual changes  ENT: No vertigo or throat pain   NECK: No pain or stiffness  RESPIRATORY: No cough, wheezing, hemoptysis; No shortness of breath  CARDIOVASCULAR: Shortness of breath  GASTROINTESTINAL: No abdominal or epigastric pain. No nausea, vomiting, or hematemesis; No diarrhea or constipation. No melena or hematochezia.  GENITOURINARY: No dysuria, frequency or hematuria  NEUROLOGICAL: No numbness or weakness  SKIN: No itching, no rashes  MSK: No pain    PHYSICAL EXAM:  NEURO: patient is awake  GEN: Not in acute distress  NECK: no thyroid enlargement, no JVD  LUNGS: Bilateral crackles  CARDIOVASCULAR: S1/S2 present, Irregular  ABD: Soft  EXT: 2+ bilateral LE edema  SKIN: Intact    LABS:                        12.7   8.82  )-----------( 168      ( 02 Feb 2022 07:30 )             44.2     02-02    151<H>  |  110  |  49<H>  ----------------------------<  111<H>  4.1   |  28  |  2.7<H>    Ca    8.5      02 Feb 2022 23:27  Phos  5.3     02-02  Mg     2.5     02-02    TPro  5.8<L>  /  Alb  3.3<L>  /  TBili  1.5<H>  /  DBili  x   /  AST  10  /  ALT  8   /  AlkPhos  79  02-02        CARDIAC MARKERS ( 01 Feb 2022 04:30 )  x     / 0.06 ng/mL / 38 U/L / x     / 5.5 ng/mL        Troponin trend:    Serum Pro-Brain Natriuretic Peptide: 68708 pg/mL (02-01-22 @ 04:30)          RADIOLOGY:  -CXR:  < from: Xray Chest 1 View- PORTABLE-Routine (Xray Chest 1 View- PORTABLE-Routine in AM.) (02.02.22 @ 04:09) >  IMPRESSION:    Increasing patchy airspace opacities on the left, relatively stable on   the right.    < end of copied text >    -TTE:  < from: TTE Echo Complete w/o Contrast w/ Doppler (07.02.21 @ 14:44) >  Summary:   1. Normal global left ventricular systolic function.   2. LVEjection Fraction by Lemon's Method with a biplane EF of 54 %.   3. Mild left ventricular hypertrophy.   4. Mildly increased LV wall thickness.   5. Normal left ventricular internal cavity size.   6. Moderately enlarged left atrium.   7. Mild mitral annular calcification.   8. Mild thickening and calcification of the anterior and posterior mitral valve leaflets.   9. Mild to moderate mitral valve regurgitation.  10. Mild-moderate tricuspid regurgitation.  11. Mild aortic regurgitation.  12. Sclerotic aortic valve with normal opening.  13. Estimated pulmonary artery systolic pressure is 54.2 mmHg assuming a right atrial pressure of 5 mmHg, which is consistent with moderate pulmonary hypertension.  14. LA volume Index is 51.8 ml/m² ml/m2.  15. There is moderate aortic root calcification.    < end of copied text >    -CCTA:  -STRESS TEST:  -CATHETERIZATION:    < from: Cardiac Catheterization (11.10.21 @ 12:16) >  CORONARY CIRCULATION: There was 1-vessel coronary artery disease (LAD).    Left main: The vessel was large sized. Angiography showed mild    atherosclerosis with no flow limiting lesions. Proximal LAD: There was a    tubular 90 % stenosis at a site with no prior intervention. There was HARVEY    grade 3 flow through the vessel (brisk flow). This is a likely culprit for    the patient's clinical presentation. An intervention was performed. Mid    LAD: Angiography showed mild atherosclerosis with no flow limiting    lesions. Distal LAD: Angiography showed mildatherosclerosis with no flow    limiting lesions. 1st diagonal: The vessel was medium sized. Angiography    showed mild atherosclerosis with no flow limiting lesions. 2nd diagonal:    The vessel was medium sized. Angiography showed mild atherosclerosis with    no flow limiting lesions. Circumflex: Angiography showed mild    atherosclerosis with no flow limiting lesions. 1st obtuse marginal:    Angiography showed mild atherosclerosis with no flow limiting lesions.    < end of copied text >    ECG: paced rhythm, atrial fibrillation?, PVC    TELEMETRY EVENTS: Wide complex tachycardia

## 2022-02-03 NOTE — ED ADULT NURSE REASSESSMENT NOTE - NS ED NURSE REASSESS COMMENT FT1
Move patient from bedside chair to bed. Patient is GCS of 15. Patient  went into V-tach for over 30 seconds. MD notified along with cardiology MD. Both MD brought to bedside. Placed another IV in patient. Patient is on non- rebreather mask at 10 liters. Patient is hemodynamically stable. Awaiting orders. Will continue to monitor patient for acute changes in vital signs
Patient keeps taken off mask and tries to get out of bed. Re-educated patient on the importance of follow medical treatment plan and medications. Patient still wants to no follow treatment plan. Will continue to monitor for acute changes in vital signs.
Pt assessed A&Ox3, VSS. Pt TELE monitor alarming "VTACH". Pt not in acute distress, MD [phone] notified and aware. MD stated to perform EKG.  EKG performed and signed by MD Short [phone]. Safety and comfort measures maintained. will continue to monitor.
Pt desating on 4L NC to 78-83% O2. Pt placed on nonrebreather, currently sating 100%. MD [phone] notified and aware. MD [phone] stated "leave patient on nonrebreather for now." Md stated "he will order a stat dose of lasix because the pt is in fluid overload." Pt is voiding but incontinent. Pt washed and linens changed. Safety and comfort measures maintained. will continue to monitor
unable to place barragan catheter, MD [phone] notified and aware. Condom cath placed on pt as per MD. Safety and comfort measures maintained. will continue to monitor
Patient had several runs of V-tach lasting more that 30s sustained. Patient is hemodynamically stable. Orders for amiodarone bolus. Awaiting for medications from pharmacy. Will continue to monitor patient for acute changes. MD and Cardiac MD at bedside. Will continue to monitor
Pt remains in ED, pt continues to be short of breath, satting 96% on 5 LPM NC. Pt continues to state "I don't feel well, I feel weak". Pt moved from chair to hospital bed, able to weight bare and take approx 3 steps. Pt remains AAOX4, having persistent runs of v tach lasting approx 5 seconds. MD is aware of patient's condition. Will monitor.
Pt remains in ED, waiting in patient bed assignment. Pt is on CM and having 5 second runs of v tach. Pt denies cp, but reports that he feels generalized weakness, and appears very lethargic. MD made aware. Will continue to monitor.

## 2022-02-03 NOTE — SWALLOW BEDSIDE ASSESSMENT ADULT - COMMENTS
Pt known to SLP department from 2019 at which time he had several instrumental swallow studies. Pt was placed on soft foods w/ moderately thick liquids s/p FEES in Feb 2019, at which time he was noted to have pharyngeal edema 2' emergent intubation.  Pt had an MBS as an outpatient in April 2019 and was recommended to consume regular consistency w/ mildly (nectar) thick liquids, thin liquids via small controlled sips & chin tuck.  Pt had presented w/ silent aspiration for large sips of thin liquids without strategies.  Per conversation w/ daughter on 2/2/22, pt has since been upgraded back to thin liquids w/ +toleration.
Pt known to SLP department from 2019 at which time he had several instrumental swallow studies. Pt was placed on soft foods w/ moderately thick liquids s/p FEES in Feb 2019, at which time he was noted to have pharyngeal edema 2' emergent intubation.  Pt had an MBS as an outpatient in April 2019 and was recommended to consume regular consistency w/ mildly (nectar) thick liquids, thin liquids via small controlled sips & chin tuck.  Pt had presented w/ silent aspiration for large sips of thin liquids without strategies.  Per daughter, pt has since been upgraded back to thin liquids w/ +toleration.

## 2022-02-03 NOTE — GOALS OF CARE CONVERSATION - ADVANCED CARE PLANNING - CONVERSATION DETAILS
D/w son, family at bedside; pt with worsening hypoxia, satting 60s on 100% bipap. Confirmed dnr/dni status. Living will, hcp form placed in chart; consistent with dni. MOLST filled, placed in chart. Family to decide on withdrawing care/ morphine gtt. Pt appears comfortable, unarousable.

## 2022-02-03 NOTE — SWALLOW BEDSIDE ASSESSMENT ADULT - SLP PERTINENT HISTORY OF CURRENT PROBLEM
Pt w/ hx including JOHANNY, DMII, NPH s/p  shunt, CAD, AICD 11/20, urethral stricture was adm w/ SOB associated w/ leg swelling, othopnea, PNDs, & chronic productive cough since 11/2021, & lethargy.  Pt being tx for acute on chronic CHF, CKD, & SOB.  CXR 1/31: diffuse b/l interstitial opacities; 2/1: increasing on Left, stable on R
Pt w/ hx including JOHANNY, DMII, NPH s/p  shunt, CAD, AICD 11/20, urethral stricture was adm w/ SOB associated w/ leg swelling, othopnea, PNDs, & chronic productive cough since 11/2021, & lethargy.  Pt being tx for acute on chronic CHF, CKD, & SOB.  CXR 1/31: diffuse b/l interstitial opacities; 2/2: increasing on Left, stable on R

## 2022-02-03 NOTE — DISCHARGE NOTE FOR THE EXPIRED PATIENT - HOSPITAL COURSE
86 year old male patient with multiple comorbidities listed above who was brought to the ED on 01/31 for evaluation of worsening SOB on minimal exertion associated with leg swelling, orthopnea, and cough, found to have congested CXR, admitted with working diagnosis of CHF and COPD exacerbation. Managed with diuretics and steroids.  Pt desating on BIPAP. Family at bedside, pt DNR/DNI as per health care proxy Paul Velez (Son).

## 2022-02-03 NOTE — PROGRESS NOTE ADULT - REASON FOR ADMISSION
Shortness of breath and Lethargy

## 2022-02-03 NOTE — CONSULT NOTE ADULT - REASON FOR ADMISSION
Shortness of breath and Lethargy

## 2022-02-03 NOTE — PROGRESS NOTE ADULT - TIME BILLING
86M PMHx HTN, DM2, AFib not on ac, s/p  shunt, HFpEF here with acute hypoxic resp failure.    #Acute hypoxic resp failure, presumed due to volume overload  +/- copd exacerbation  cxr bl opacities  desatting to mid 80s on 100% nrb in am  placed on bipap 100%  solumedrol 40 tid  duoneb q6  bumex 2 iv bid  hypertonic saline per hf  with worsening hypoxia, desatting to 60s, see goc note  dnr/dni  #HTN  loprsesor as below  #DM2  ssi  #AFib  lopressor 50 tid  not on ac  amio per cards  #s/p  shunt  #DVT ppx  subq hep 86M PMHx HTN, DM2, CKD III, AFib not on ac, s/p  shunt, HFpEF here with acute hypoxic resp failure.    #Acute hypoxic resp failure, presumed due to volume overload  +/- copd exacerbation  cxr bl opacities  desatting to mid 80s on 100% nrb in am  placed on bipap 100%  solumedrol 40 tid  duoneb q6  bumex 2 iv bid  hypertonic saline per hf  with worsening hypoxia, desatting to 60s, see goc note  dnr/dni  #HTN  loprsesor as below  #CKD III  scr near baseline  #DM2  ssi  #AFib  lopressor 50 tid  not on ac  amio per cards  #s/p  shunt  #DVT ppx  subq hep

## 2022-02-03 NOTE — PROGRESS NOTE ADULT - SUBJECTIVE AND OBJECTIVE BOX
Date of Admission: 22    Interval History:    - Patient appears lethargic today, on a NRB mask  - No I & O's documented      HISTORY OF PRESENT ILLNESS:     Mr. Velez is an 86 year old male patient known to have: HFpEF. TTE 21 EF 54%, mild-mod MR and TR, mild AR, moderate PHTN, JOHANNY not on CPAP, HTN, DM II, CKD (Baseline Cr 2.8-3.2), Chronic Atrial Fibrillation and History of Sustained Ventricular Tachycardia during last admission 2021 not on Anticoagulation due to falls. History of  Shunt, Dyslipidemia and CAD s/p LAD stent 2021 on DAP, BPH and Urethral Stricture diagnosed during last admission 2021 s/p dilation. He was brought to the ED on  for evaluation of worsening shortness of breath and lethargy. History goes back to few days PTP when the patient started complaining of worsening SOB on minimal exertion to the extent the patient was having difficulty completing sentences. This has been associated with some leg swelling, orthopnea, PNDs, and chronic productive cough since 2021 in the absence of chest pain, palpitations, light headedness, or diaphoresis. Per son, patient has been lethargic for the last few days with reduced PO intake and difficulty ambulating due to leg heaviness. On review of systems, patient denies any recent fever, chills, night sweats, URTI symptoms (cough, rhinorrhea, sore throat), change in bowel movements (diarrhea or constipation), abdominal pain, headache, nausea, or vomiting. No sick contacts. No recent travel or exposure to recent travelers.      PAST MEDICAL & SURGICAL HISTORY:    Afib  DM (diabetes mellitus)  CKD (chronic kidney disease)  Hydrocephalus  Hypertension  History of prostate surgery      FAMILY HISTORY:    Mother:  at 72 of HF  Father:  at 68 of a brain aneurism    SOCIAL HISTORY:      Former smoker, denies alcohol or drug use       Allergies    No Known Allergies    Intolerances    	    PHYSICAL EXAM:  General Appearance: well appearing, normal for age and gender. 	  Neck: normal JVP, no bruit.   Eyes: Extra Ocular muscles intact.   Cardiovascular: regular rate and rhythm S1 S2, JVD + , No murmurs, + BLE edema  Respiratory: RLL diminished	  Psychiatry: Alert and oriented x 3, Mood & affect appropriate  Gastrointestinal:  Soft, Non-tender  Skin/Integumentary : No rashes, No ecchymoses, No cyanosis	  Neurologic: Non-focal  Musculoskeletal/ extremities: Normal range of motion, No clubbing, cyanosis, BLE edema  Vascular: Peripheral pulses palpable 2+ bilaterally    CARDIAC MARKERS:    Serum Pro-Brain Natriuretic Peptide: 61103 pg/mL (22 @ 04:30)      TELEMETRY EVENTS: 	    EC/1/22    Ventricular Rate 76 BPM  Atrial Rate 82 BPM  QRS Duration 120 ms  Q-T Interval 400 ms  QTC Calculation(Bazett) 450 ms  R Axis -72 degrees  T Axis 213 degrees  Diagnosis Line Undetermined rhythm  with intermittent paced ventricular beats  Left axis deviation  Septal infarct , age undetermined  Inferior infarct , age undetermined  Abnormal ECG    Confirmed by DIEGO PHILLIP MD ([id number]) on 2022 9:34:09 AM    PREVIOUS DIAGNOSTIC TESTING:      TTE 21    Summary:   1. Normal global left ventricular systolic function.   2. LVEjection Fraction by Lemon's Method with a biplane EF of 54 %.   3. Mild left ventricular hypertrophy.   4. Mildly increased LV wall thickness.   5. Normal left ventricular internal cavity size.   6. Moderately enlarged left atrium.   7. Mild mitral annular calcification.   8. Mild thickening and calcification of the anterior and posterior mitral valve leaflets.   9. Mild to moderate mitral valve regurgitation.  10. Mild-moderate tricuspid regurgitation.  11. Mild aortic regurgitation.  12. Sclerotic aortic valve with normal opening.  13. Estimated pulmonary artery systolic pressure is 54.2 mmHg assuming a right atrial pressure of 5 mmHg, which is consistent with moderate pulmonary hypertension.  14. LA volume Index is 51.8 ml/m² ml/m2.  15. There is moderate aortic root calcification.      Home Medications:  amiodarone 200 mg oral tablet: 1 tab(s) orally once a day (2022 02:04)  aspirin 81 mg oral delayed release tablet: 1 tab(s) orally once a day (2022 02:04)  Basaglar KwikPen: 20 unit(s) subcutaneous once a day (at bedtime) (2021 22:10)  calcitriol 0.25 mcg oral capsule: 1 cap(s) orally once a day (2021 15:07)  dutasteride 0.5 mg oral capsule: 1 cap(s) orally once a day (2021 15:07)  Farxiga 10 mg oral tablet: 1 tab(s) orally once a day (2021 21:56)  Lopressor 50 mg oral tablet: 1 tab(s) orally 2 times a day (2022 02:04)  Ozempic (1 mg dose) 4 mg/3 mL subcutaneous solution: 1 milligram(s) subcutaneous once a week on Thursday (2021 21:50)  Plavix 75 mg oral tablet: 1 tab(s) orally once a day (2022 02:04)  torsemide 10 mg oral tablet: 1 tab(s) orally once a day (2022 02:04)    MEDICATIONS  (STANDING):  aMIOdarone    Tablet 200 milliGRAM(s) Oral daily  aspirin enteric coated 81 milliGRAM(s) Oral daily  atorvastatin 20 milliGRAM(s) Oral at bedtime  calcitriol   Capsule 0.25 MICROGram(s) Oral daily  calcium acetate 667 milliGRAM(s) Oral four times a day with meals  chlorhexidine 4% Liquid 1 Application(s) Topical <User Schedule>  clopidogrel Tablet 75 milliGRAM(s) Oral daily  dextrose 40% Gel 15 Gram(s) Oral once  dextrose 5%. 1000 milliLiter(s) (50 mL/Hr) IV Continuous <Continuous>  dextrose 5%. 1000 milliLiter(s) (100 mL/Hr) IV Continuous <Continuous>  dextrose 50% Injectable 25 Gram(s) IV Push once  dextrose 50% Injectable 12.5 Gram(s) IV Push once  dextrose 50% Injectable 25 Gram(s) IV Push once  finasteride 5 milliGRAM(s) Oral daily  furosemide   Injectable 60 milliGRAM(s) IV Push every 12 hours  glucagon  Injectable 1 milliGRAM(s) IntraMuscular once  heparin SubCutaneous Injection - Peds 5000 Unit(s) SubCutaneous every 8 hours  insulin lispro (ADMELOG) corrective regimen sliding scale   SubCutaneous three times a day before meals  LORazepam   Injectable 0.5 milliGRAM(s) IV Push once  metoprolol tartrate 50 milliGRAM(s) Oral two times a day  pantoprazole    Tablet 40 milliGRAM(s) Oral before breakfast  tamsulosin 0.4 milliGRAM(s) Oral at bedtime          
This patient is well known to me.  He is an 86-year-old male with significant multiple comorbidities.    When he was seen here in December, he was undergoing a GI workup for new onset jaundice.  He had been on a 5ARI but this was discontinued.  His most recent renal ultrasound was in November 2021 that demonstrated bilateral renal cysts without hydronephrosis.  His urologic history is also significant for simple prostatectomy in 1998 with open lithotomy for bladder stones.  From a urologic standpoint, he has known BPH, nephrolithiasis, and significant urethral strictures requiring instrumentation.  He has required complex dilatation in the past.  He has refused to perform CIC.  He was last seen in the office on December 30, 2021 at which time a bladder scan demonstrated complete emptying of his bladder.    Upon admission now, he was found to have some underlying CRI.  This is not new.  I have creatinines up to over 3.0 dating back to 2018 and 2019 respectively.  His PSA values were in the 2 range 2 years ago and we collectively agreed not to repeat any given his age and comorbidities.    I am told now that he is refusing catheterization in the hospital.  He reports that he is voiding without difficulty from my discussion with the PA who saw the patient.  We have agreed to do a bedside bladder scan to ascertain the need for catheterization.  If he is emptying and has hydronephrosis, I agree that I would not force the issue.    No other acute urologic intervention recommended at this time.    Roman Matthews MD
NEPHROLOGY FOLLOW UP NOTE    pt seen earlier today  events noted, included expiration        PAST MEDICAL & SURGICAL HISTORY:  Hypertension  Hydrocephalus  CKD (chronic kidney disease)  DM (diabetes mellitus)  Afib  History of prostate surgery    Allergies:  No Known Allergies    Home Medications Reviewed    SOCIAL HISTORY:  Denies ETOH,Smoking,   FAMILY HISTORY:  Family history of diabetes mellitus (Mother, Sibling)      REVIEW OF SYSTEMS  All other review of systems is negative unless indicated above.    PHYSICAL EXAM:  NAD  lethargic  O2 via FM  moist mm  no ln  decreased BS b/l  distant HS, irregular   soft + BS  1+ edema  no [facility] Medications:   MEDICATIONS  (STANDING):  albuterol/ipratropium for Nebulization 3 milliLiter(s) Nebulizer every 6 hours  aMIOdarone Infusion 1 mG/Min (33.3 mL/Hr) IV Continuous <Continuous>  aMIOdarone Infusion 0.501 mG/Min (16.7 mL/Hr) IV Continuous <Continuous>  aspirin enteric coated 81 milliGRAM(s) Oral daily  atorvastatin 20 milliGRAM(s) Oral at bedtime  buMETAnide Injectable 2 milliGRAM(s) IV Push two times a day  calcitriol   Capsule 0.25 MICROGram(s) Oral daily  calcium acetate 1334 milliGRAM(s) Oral three times a day with meals  chlorhexidine 4% Liquid 1 Application(s) Topical <User Schedule>  clopidogrel Tablet 75 milliGRAM(s) Oral daily  dextrose 40% Gel 15 Gram(s) Oral once  dextrose 5%. 1000 milliLiter(s) (50 mL/Hr) IV Continuous <Continuous>  dextrose 5%. 1000 milliLiter(s) (100 mL/Hr) IV Continuous <Continuous>  dextrose 50% Injectable 25 Gram(s) IV Push once  dextrose 50% Injectable 12.5 Gram(s) IV Push once  dextrose 50% Injectable 25 Gram(s) IV Push once  finasteride 5 milliGRAM(s) Oral daily  glucagon  Injectable 1 milliGRAM(s) IntraMuscular once  heparin SubCutaneous Injection - Peds 5000 Unit(s) SubCutaneous every 8 hours  insulin lispro (ADMELOG) corrective regimen sliding scale   SubCutaneous three times a day before meals  melatonin 3 milliGRAM(s) Oral at bedtime  methylPREDNISolone sodium succinate IV Push - Peds 40 milliGRAM(s) IV Push every 12 hours  metoprolol tartrate 50 milliGRAM(s) Oral three times a day  pantoprazole    Tablet 40 milliGRAM(s) Oral before breakfast  sodium chloride 3%. 150 milliLiter(s) (50 mL/Hr) IV Continuous <Continuous>  tamsulosin 0.4 milliGRAM(s) Oral at bedtime        VITALS:  T(F): 96.1 (02-03-22 @ 14:30), Max: 98.8 (02-02-22 @ 22:42)  HR: 78 (02-03-22 @ 14:30)  BP: 116/58 (02-03-22 @ 14:30)  RR: 16 (02-03-22 @ 14:30)  SpO2: 90% (02-03-22 @ 14:30)  Wt(kg): --    02-02 @ 07:01  -  02-03 @ 07:00  --------------------------------------------------------  IN: 33.3 mL / OUT: 0 mL / NET: 33.3 mL    02-03 @ 07:01  -  02-03 @ 16:19  --------------------------------------------------------  IN: 150.2 mL / OUT: 0 mL / NET: 150.2 mL          LABS:  02-03    148<H>  |  109  |  54<H>  ----------------------------<  151<H>  4.4   |  16<L>  |  3.0<H>    Ca    8.4<L>      03 Feb 2022 07:53  Phos  6.3     02-03  Mg     2.6     02-03    TPro  5.7<L>  /  Alb  3.1<L>  /  TBili  2.2<H>  /  DBili      /  AST  17  /  ALT  11  /  AlkPhos  85  02-03                          12.2   12.07 )-----------( 193      ( 03 Feb 2022 07:53 )             43.4       Urine Studies:        RADIOLOGY & ADDITIONAL STUDIES:  
24H events:    Patient is a 86y old Male who presents with a chief complaint of Shortness of breath and Lethargy (01 Feb 2022 18:26)    Primary diagnosis of SOB (shortness of breath)       Today is hospital day 2d. This morning patient was seen and examined at bedside, resting comfortably in bed.    patient had NSVT on the monitor  complaining of SOB/ patient is anxious  got delirious after ativan was given'    PAST MEDICAL & SURGICAL HISTORY  Afib    DM (diabetes mellitus)    CKD (chronic kidney disease)    Hydrocephalus    Hypertension    History of prostate surgery      SOCIAL HISTORY:  Social History:  Please refer to above for more details (01 Feb 2022 01:30)      ALLERGIES:  No Known Allergies    MEDICATIONS:  STANDING MEDICATIONS  aMIOdarone    Tablet 200 milliGRAM(s) Oral daily  aspirin enteric coated 81 milliGRAM(s) Oral daily  atorvastatin 20 milliGRAM(s) Oral at bedtime  buMETAnide Injectable 2 milliGRAM(s) IV Push two times a day  calcitriol   Capsule 0.25 MICROGram(s) Oral daily  calcium acetate 1334 milliGRAM(s) Oral three times a day with meals  chlorhexidine 4% Liquid 1 Application(s) Topical <User Schedule>  clopidogrel Tablet 75 milliGRAM(s) Oral daily  dextrose 40% Gel 15 Gram(s) Oral once  dextrose 5%. 1000 milliLiter(s) IV Continuous <Continuous>  dextrose 5%. 1000 milliLiter(s) IV Continuous <Continuous>  dextrose 50% Injectable 25 Gram(s) IV Push once  dextrose 50% Injectable 12.5 Gram(s) IV Push once  dextrose 50% Injectable 25 Gram(s) IV Push once  finasteride 5 milliGRAM(s) Oral daily  glucagon  Injectable 1 milliGRAM(s) IntraMuscular once  heparin SubCutaneous Injection - Peds 5000 Unit(s) SubCutaneous every 8 hours  insulin lispro (ADMELOG) corrective regimen sliding scale   SubCutaneous three times a day before meals  melatonin 3 milliGRAM(s) Oral at bedtime  metoprolol tartrate 50 milliGRAM(s) Oral two times a day  pantoprazole    Tablet 40 milliGRAM(s) Oral before breakfast  potassium chloride   Powder 40 milliEquivalent(s) Oral once  potassium chloride  20 mEq/100 mL IVPB 20 milliEquivalent(s) IV Intermittent every 2 hours  sodium chloride 3%. 150 milliLiter(s) IV Continuous <Continuous>  sodium chloride 3%. 150 milliLiter(s) IV Continuous <Continuous>  tamsulosin 0.4 milliGRAM(s) Oral at bedtime    PRN MEDICATIONS    VITALS:   T(F): 97.2  HR: 92  BP: 120/60  RR: 18  SpO2: 96%    PHYSICAL EXAM:  GENERAL: NAD; JVD distention ; overload   NERVOUS SYSTEM:  Alert & Oriented X3, non focal   CHEST/LUNG: bilateral crackles  HEART: Regular rate and rhythm;   ABDOMEN: Soft, Nontender, Nondistended; Bowel sounds present  EXTREMITIES:   No clubbing, cyanosis, pitting edema 2+ b/l     LABS:                        12.7   8.82  )-----------( 168      ( 02 Feb 2022 07:30 )             44.2     02-02    143  |  103  |  47<H>  ----------------------------<  135<H>  3.1<L>   |  26  |  2.6<H>    Ca    8.8      02 Feb 2022 07:30  Phos  5.3     02-02  Mg     2.6     02-02    TPro  5.6<L>  /  Alb  3.0<L>  /  TBili  1.1  /  DBili  x   /  AST  9   /  ALT  8   /  AlkPhos  78  02-02    PT/INR - ( 31 Jan 2022 16:12 )   PT: 11.90 sec;   INR: 1.04 ratio         PTT - ( 31 Jan 2022 16:12 )  PTT:31.6 sec    ABG - ( 01 Feb 2022 09:10 )  pH, Arterial: 7.34  pH, Blood: x     /  pCO2: 48    /  pO2: 139   / HCO3: 26    / Base Excess: -0.4  /  SaO2: 98.9                  Culture - Blood (collected 31 Jan 2022 15:45)  Source: .Blood Blood-Peripheral  Preliminary Report (01 Feb 2022 22:01):    No growth to date.    Culture - Blood (collected 31 Jan 2022 15:45)  Source: .Blood Blood-Peripheral  Preliminary Report (01 Feb 2022 22:01):    No growth to date.      CARDIAC MARKERS ( 01 Feb 2022 04:30 )  x     / 0.06 ng/mL / 38 U/L / x     / 5.5 ng/mL      RADIOLOGY:          
Date of Admission: 22    Interval History:    - Patient remains lethargic, on BiPAP today      HISTORY OF PRESENT ILLNESS:     Mr. Velez is an 86 year old male patient known to have: HFpEF. TTE 21 EF 54%, mild-mod MR and TR, mild AR, moderate PHTN, JOHANNY not on CPAP, HTN, DM II, CKD (Baseline Cr 2.8-3.2), Chronic Atrial Fibrillation and History of Sustained Ventricular Tachycardia during last admission 2021 not on Anticoagulation due to falls. History of  Shunt, Dyslipidemia and CAD s/p LAD stent 2021 on DAP, BPH and Urethral Stricture diagnosed during last admission 2021 s/p dilation. He was brought to the ED on  for evaluation of worsening shortness of breath and lethargy. History goes back to few days PTP when the patient started complaining of worsening SOB on minimal exertion to the extent the patient was having difficulty completing sentences. This has been associated with some leg swelling, orthopnea, PNDs, and chronic productive cough since 2021 in the absence of chest pain, palpitations, light headedness, or diaphoresis. Per son, patient has been lethargic for the last few days with reduced PO intake and difficulty ambulating due to leg heaviness. On review of systems, patient denies any recent fever, chills, night sweats, URTI symptoms (cough, rhinorrhea, sore throat), change in bowel movements (diarrhea or constipation), abdominal pain, headache, nausea, or vomiting. No sick contacts. No recent travel or exposure to recent travelers.      PAST MEDICAL & SURGICAL HISTORY:    Afib  DM (diabetes mellitus)  CKD (chronic kidney disease)  Hydrocephalus  Hypertension  History of prostate surgery      FAMILY HISTORY:    Mother:  at 72 of HF  Father:  at 68 of a brain aneurism    SOCIAL HISTORY:      Former smoker, denies alcohol or drug use       Allergies    No Known Allergies    Intolerances    	    PHYSICAL EXAM:  General Appearance: well appearing, normal for age and gender. 	  Neck: normal JVP, no bruit.   Eyes: Extra Ocular muscles intact.   Cardiovascular: regular rate and rhythm S1 S2, JVD + , No murmurs, + BLE edema  Respiratory: RLL diminished	  Psychiatry: Alert and oriented x 3, Mood & affect appropriate  Gastrointestinal:  Soft, Non-tender  Skin/Integumentary : No rashes, No ecchymoses, No cyanosis	  Neurologic: Non-focal  Musculoskeletal/ extremities: Normal range of motion, No clubbing, cyanosis, BLE edema  Vascular: Peripheral pulses palpable 2+ bilaterally    CARDIAC MARKERS:    Serum Pro-Brain Natriuretic Peptide: 90020 pg/mL (22 @ 04:30)      TELEMETRY EVENTS: 	    EC/1/22    Ventricular Rate 76 BPM  Atrial Rate 82 BPM  QRS Duration 120 ms  Q-T Interval 400 ms  QTC Calculation(Bazett) 450 ms  R Axis -72 degrees  T Axis 213 degrees  Diagnosis Line Undetermined rhythm  with intermittent paced ventricular beats  Left axis deviation  Septal infarct , age undetermined  Inferior infarct , age undetermined  Abnormal ECG    Confirmed by DIEGO PHILLIP MD ([id number]) on 2022 9:34:09 AM    PREVIOUS DIAGNOSTIC TESTING:      TTE 21    Summary:   1. Normal global left ventricular systolic function.   2. LVEjection Fraction by Lemon's Method with a biplane EF of 54 %.   3. Mild left ventricular hypertrophy.   4. Mildly increased LV wall thickness.   5. Normal left ventricular internal cavity size.   6. Moderately enlarged left atrium.   7. Mild mitral annular calcification.   8. Mild thickening and calcification of the anterior and posterior mitral valve leaflets.   9. Mild to moderate mitral valve regurgitation.  10. Mild-moderate tricuspid regurgitation.  11. Mild aortic regurgitation.  12. Sclerotic aortic valve with normal opening.  13. Estimated pulmonary artery systolic pressure is 54.2 mmHg assuming a right atrial pressure of 5 mmHg, which is consistent with moderate pulmonary hypertension.  14. LA volume Index is 51.8 ml/m² ml/m2.  15. There is moderate aortic root calcification.      Home Medications:  amiodarone 200 mg oral tablet: 1 tab(s) orally once a day (2022 02:04)  aspirin 81 mg oral delayed release tablet: 1 tab(s) orally once a day (2022 02:04)  Basaglar KwikPen: 20 unit(s) subcutaneous once a day (at bedtime) (2021 22:10)  calcitriol 0.25 mcg oral capsule: 1 cap(s) orally once a day (2021 15:07)  dutasteride 0.5 mg oral capsule: 1 cap(s) orally once a day (2021 15:07)  Farxiga 10 mg oral tablet: 1 tab(s) orally once a day (2021 21:56)  Lopressor 50 mg oral tablet: 1 tab(s) orally 2 times a day (2022 02:04)  Ozempic (1 mg dose) 4 mg/3 mL subcutaneous solution: 1 milligram(s) subcutaneous once a week on Thursday (2021 21:50)  Plavix 75 mg oral tablet: 1 tab(s) orally once a day (2022 02:04)  torsemide 10 mg oral tablet: 1 tab(s) orally once a day (2022 02:04)    MEDICATIONS  (STANDING):  aMIOdarone    Tablet 200 milliGRAM(s) Oral daily  aspirin enteric coated 81 milliGRAM(s) Oral daily  atorvastatin 20 milliGRAM(s) Oral at bedtime  calcitriol   Capsule 0.25 MICROGram(s) Oral daily  calcium acetate 667 milliGRAM(s) Oral four times a day with meals  chlorhexidine 4% Liquid 1 Application(s) Topical <User Schedule>  clopidogrel Tablet 75 milliGRAM(s) Oral daily  dextrose 40% Gel 15 Gram(s) Oral once  dextrose 5%. 1000 milliLiter(s) (50 mL/Hr) IV Continuous <Continuous>  dextrose 5%. 1000 milliLiter(s) (100 mL/Hr) IV Continuous <Continuous>  dextrose 50% Injectable 25 Gram(s) IV Push once  dextrose 50% Injectable 12.5 Gram(s) IV Push once  dextrose 50% Injectable 25 Gram(s) IV Push once  finasteride 5 milliGRAM(s) Oral daily  furosemide   Injectable 60 milliGRAM(s) IV Push every 12 hours  glucagon  Injectable 1 milliGRAM(s) IntraMuscular once  heparin SubCutaneous Injection - Peds 5000 Unit(s) SubCutaneous every 8 hours  insulin lispro (ADMELOG) corrective regimen sliding scale   SubCutaneous three times a day before meals  LORazepam   Injectable 0.5 milliGRAM(s) IV Push once  metoprolol tartrate 50 milliGRAM(s) Oral two times a day  pantoprazole    Tablet 40 milliGRAM(s) Oral before breakfast  tamsulosin 0.4 milliGRAM(s) Oral at bedtime          
INTERVAL HPI/OVERNIGHT EVENTS:    SUBJECTIVE: Patient seen and examined at bedside.     pt seen and examined 9:30  unable to obtain ros    OBJECTIVE:    VITAL SIGNS:  Vital Signs Last 24 Hrs  T(C): 35.6 (03 Feb 2022 14:30), Max: 37.1 (02 Feb 2022 22:42)  T(F): 96.1 (03 Feb 2022 14:30), Max: 98.8 (02 Feb 2022 22:42)  HR: 78 (03 Feb 2022 14:30) (72 - 89)  BP: 116/58 (03 Feb 2022 14:30) (116/58 - 129/64)  BP(mean): --  RR: 16 (03 Feb 2022 14:30) (12 - 20)  SpO2: 90% (03 Feb 2022 14:30) (80% - 96%)      PHYSICAL EXAM:    General: NAD; difficult to arouse  HEENT: NC/AT; PERRL, clear conjunctiva  Neck: supple  Respiratory: bl crackles  Cardiovascular: +S1/S2; RRR  Abdomen: soft, NT/ND; +BS x4  Extremities: WWP, 2+ peripheral pulses b/l; mild pit edema bl  Skin: normal color and turgor; no rash  Neurological:    MEDICATIONS:  MEDICATIONS  (STANDING):  albuterol/ipratropium for Nebulization 3 milliLiter(s) Nebulizer every 6 hours  aMIOdarone Infusion 1 mG/Min (33.3 mL/Hr) IV Continuous <Continuous>  aMIOdarone Infusion 0.501 mG/Min (16.7 mL/Hr) IV Continuous <Continuous>  aspirin enteric coated 81 milliGRAM(s) Oral daily  atorvastatin 20 milliGRAM(s) Oral at bedtime  buMETAnide Injectable 2 milliGRAM(s) IV Push two times a day  calcitriol   Capsule 0.25 MICROGram(s) Oral daily  calcium acetate 1334 milliGRAM(s) Oral three times a day with meals  chlorhexidine 4% Liquid 1 Application(s) Topical <User Schedule>  clopidogrel Tablet 75 milliGRAM(s) Oral daily  dextrose 40% Gel 15 Gram(s) Oral once  dextrose 5%. 1000 milliLiter(s) (50 mL/Hr) IV Continuous <Continuous>  dextrose 5%. 1000 milliLiter(s) (100 mL/Hr) IV Continuous <Continuous>  dextrose 50% Injectable 25 Gram(s) IV Push once  dextrose 50% Injectable 12.5 Gram(s) IV Push once  dextrose 50% Injectable 25 Gram(s) IV Push once  finasteride 5 milliGRAM(s) Oral daily  glucagon  Injectable 1 milliGRAM(s) IntraMuscular once  heparin SubCutaneous Injection - Peds 5000 Unit(s) SubCutaneous every 8 hours  insulin lispro (ADMELOG) corrective regimen sliding scale   SubCutaneous three times a day before meals  melatonin 3 milliGRAM(s) Oral at bedtime  methylPREDNISolone sodium succinate IV Push - Peds 40 milliGRAM(s) IV Push every 12 hours  metoprolol tartrate 50 milliGRAM(s) Oral three times a day  pantoprazole    Tablet 40 milliGRAM(s) Oral before breakfast  sodium chloride 3%. 150 milliLiter(s) (50 mL/Hr) IV Continuous <Continuous>  tamsulosin 0.4 milliGRAM(s) Oral at bedtime    MEDICATIONS  (PRN):      ALLERGIES:  Allergies    No Known Allergies    Intolerances        LABS:                        12.2   12.07 )-----------( 193      ( 03 Feb 2022 07:53 )             43.4     Hemoglobin: 12.2 g/dL (02-03 @ 07:53)  Hemoglobin: 12.7 g/dL (02-02 @ 07:30)  Hemoglobin: 13.2 g/dL (02-01 @ 04:30)  Hemoglobin: 12.7 g/dL (01-31 @ 16:12)    CBC Full  -  ( 03 Feb 2022 07:53 )  WBC Count : 12.07 K/uL  RBC Count : 4.96 M/uL  Hemoglobin : 12.2 g/dL  Hematocrit : 43.4 %  Platelet Count - Automated : 193 K/uL  Mean Cell Volume : 87.5 fL  Mean Cell Hemoglobin : 24.6 pg  Mean Cell Hemoglobin Concentration : 28.1 g/dL  Auto Neutrophil # : 10.85 K/uL  Auto Lymphocyte # : 0.44 K/uL  Auto Monocyte # : 0.60 K/uL  Auto Eosinophil # : 0.03 K/uL  Auto Basophil # : 0.02 K/uL  Auto Neutrophil % : 89.9 %  Auto Lymphocyte % : 3.6 %  Auto Monocyte % : 5.0 %  Auto Eosinophil % : 0.2 %  Auto Basophil % : 0.2 %    02-03    148<H>  |  109  |  54<H>  ----------------------------<  151<H>  4.4   |  16<L>  |  3.0<H>    Ca    8.4<L>      03 Feb 2022 07:53  Phos  6.3     02-03  Mg     2.6     02-03    TPro  5.7<L>  /  Alb  3.1<L>  /  TBili  2.2<H>  /  DBili  x   /  AST  17  /  ALT  11  /  AlkPhos  85  02-03    Creatinine Trend: 3.0<--, 2.7<--, 2.7<--, 2.6<--, 2.5<--, 2.5<--  LIVER FUNCTIONS - ( 03 Feb 2022 07:53 )  Alb: 3.1 g/dL / Pro: 5.7 g/dL / ALK PHOS: 85 U/L / ALT: 11 U/L / AST: 17 U/L / GGT: x               hs Troponin:    ABG - ( 03 Feb 2022 11:11 )  pH, Arterial: 7.15  pH, Blood: x     /  pCO2: 71    /  pO2: 55    / HCO3: 25    / Base Excess: -5.4  /  SaO2: 84.8                11:11 - ABG - pH: 7.15  |  pCO2: 71    |  pO2: 55    | Lactate:       | BE: -5.4         CSF:                      EKG:   MICROBIOLOGY:    IMAGING:      Labs, imaging, EKG personally reviewed    RADIOLOGY & ADDITIONAL TESTS: Reviewed.
NEPHROLOGY FOLLOW UP NOTE    still with dyspnea  BP's fair  on O2 via NC  urine output unclear        PAST MEDICAL & SURGICAL HISTORY:  Hypertension  Hydrocephalus  CKD (chronic kidney disease)  DM (diabetes mellitus)  Afib  History of prostate surgery    Allergies:  No Known Allergies    Home Medications Reviewed    SOCIAL HISTORY:  Denies ETOH,Smoking,   FAMILY HISTORY:  Family history of diabetes mellitus (Mother, Sibling)      REVIEW OF SYSTEMS  All other review of systems is negative unless indicated above.    PHYSICAL EXAM:  NAD  awake and alert  hard of hearing  O2 via FM  moist mm  no ln  decreased BS b/l  distant HS, irregular   soft + BS  1+ edema  no [facility] Medications:   MEDICATIONS  (STANDING):  aMIOdarone    Tablet 200 milliGRAM(s) Oral daily  aspirin enteric coated 81 milliGRAM(s) Oral daily  atorvastatin 20 milliGRAM(s) Oral at bedtime  buMETAnide Injectable 2 milliGRAM(s) IV Push two times a day  calcitriol   Capsule 0.25 MICROGram(s) Oral daily  calcium acetate 1334 milliGRAM(s) Oral three times a day with meals  chlorhexidine 4% Liquid 1 Application(s) Topical <User Schedule>  clopidogrel Tablet 75 milliGRAM(s) Oral daily  dextrose 40% Gel 15 Gram(s) Oral once  dextrose 5%. 1000 milliLiter(s) (50 mL/Hr) IV Continuous <Continuous>  dextrose 5%. 1000 milliLiter(s) (100 mL/Hr) IV Continuous <Continuous>  dextrose 50% Injectable 25 Gram(s) IV Push once  dextrose 50% Injectable 12.5 Gram(s) IV Push once  dextrose 50% Injectable 25 Gram(s) IV Push once  finasteride 5 milliGRAM(s) Oral daily  glucagon  Injectable 1 milliGRAM(s) IntraMuscular once  heparin SubCutaneous Injection - Peds 5000 Unit(s) SubCutaneous every 8 hours  insulin lispro (ADMELOG) corrective regimen sliding scale   SubCutaneous three times a day before meals  melatonin 3 milliGRAM(s) Oral at bedtime  metoprolol tartrate 50 milliGRAM(s) Oral two times a day  pantoprazole    Tablet 40 milliGRAM(s) Oral before breakfast  potassium chloride   Powder 40 milliEquivalent(s) Oral once  potassium chloride  20 mEq/100 mL IVPB 20 milliEquivalent(s) IV Intermittent every 2 hours  sodium chloride 3%. 150 milliLiter(s) (50 mL/Hr) IV Continuous <Continuous>  sodium chloride 3%. 150 milliLiter(s) (50 mL/Hr) IV Continuous <Continuous>  tamsulosin 0.4 milliGRAM(s) Oral at bedtime        VITALS:  T(F): 98.9 (02-02-22 @ 15:42), Max: 98.9 (02-02-22 @ 15:42)  HR: 77 (02-02-22 @ 15:42)  BP: 116/61 (02-02-22 @ 15:42)  RR: 18 (02-02-22 @ 15:42)  SpO2: 98% (02-02-22 @ 15:42)  Wt(kg): --        LABS:  02-02    143  |  103  |  47<H>  ----------------------------<  135<H>  3.1<L>   |  26  |  2.6<H>    Ca    8.8      02 Feb 2022 07:30  Phos  5.3     02-02  Mg     2.6     02-02    TPro  5.6<L>  /  Alb  3.0<L>  /  TBili  1.1  /  DBili      /  AST  9   /  ALT  8   /  AlkPhos  78  02-02                          12.7   8.82  )-----------( 168      ( 02 Feb 2022 07:30 )             44.2       Urine Studies:        RADIOLOGY & ADDITIONAL STUDIES:  tele + vt

## 2022-02-03 NOTE — CONSULT NOTE ADULT - SUBJECTIVE AND OBJECTIVE BOX
Date of Admission: 2/1/22    CHIEF COMPLAINT: shortness of breath    HISTORY OF PRESENT ILLNESS: 86yMale with PMH below presented to the hospital for shortness of breath, found to be in HFPEF exacerbation. Hospital course complicated by NSVT, acute respiratory acidosis. Recent hospitalization for NSTEMI with PCI to LAD by myself.     PAST MEDICAL & SURGICAL HISTORY:  Afib    DM (diabetes mellitus)    CKD (chronic kidney disease)    Hydrocephalus    Hypertension    History of prostate surgery        FAMILY HISTORY:  [ ] no pertinent family history of premature cardiovascular disease in first degree relatives.  Mother:   Father:   Siblings:     SOCIAL HISTORY:    [ ] Non-smoker  [ ] Smoker  [ ] Alcohol    Allergies    No Known Allergies    Intolerances    	    REVIEW OF SYSTEMS:  CONSTITUTIONAL: No fever, weight loss. +fatigue  CARDIOLOGY: see HPI  RESPIRATORY: see HPI  NEUROLOGICAL: NO weakness, no focal deficits to report.  ENDOCRINOLOGICAL: no recent change in diabetic medications.   GI: no BRBPR, no N,V,diarrhea.    PSYCHIATRY: normal mood and affect  HEENT: no nasal discharge, no ecchymosis  SKIN: no ecchymosis, no breakdown  MUSCULOSKELETAL: Full range of motion x4.     PHYSICAL EXAM:  T(C): 36.3 (02-03-22 @ 10:24), Max: 37.2 (02-02-22 @ 15:42)  HR: 75 (02-03-22 @ 11:47) (72 - 89)  BP: 117/59 (02-03-22 @ 10:24) (116/59 - 129/64)  RR: 20 (02-03-22 @ 11:08) (12 - 20)  SpO2: 93% (02-03-22 @ 11:47) (80% - 98%)  Wt(kg): --  I&O's Summary    02 Feb 2022 07:01  -  03 Feb 2022 07:00  --------------------------------------------------------  IN: 33.3 mL / OUT: 0 mL / NET: 33.3 mL    03 Feb 2022 07:01  -  03 Feb 2022 14:01  --------------------------------------------------------  IN: 133.5 mL / OUT: 0 mL / NET: 133.5 mL        General Appearance: well appearing, normal for age and gender. 	  Neck: normal JVP, no bruit.   Eyes: No xanthomalasia, Extra Ocular muscles intact.   Cardiovascular: irregular rate and rhythm S1 S2, No JVD, No murmurs, 1-2+ edema B/L LE  Respiratory: Lungs clear to auscultation	  Psychiatry: Alert and oriented x 3, Mood & affect appropriate  Gastrointestinal:  Soft, Non-tender  Skin/Integumen: No rashes, No ecchymoses, No cyanosis	  Neurologic: Non-focal  Musculoskeletal/ extremities: Normal range of motion, No clubbing, cyanosis or edema  Vascular: Peripheral pulses palpable 2+ bilaterally    LABS:	 	                          12.2   12.07 )-----------( 193      ( 03 Feb 2022 07:53 )             43.4     02-03    148<H>  |  109  |  54<H>  ----------------------------<  151<H>  4.4   |  16<L>  |  3.0<H>    Ca    8.4<L>      03 Feb 2022 07:53  Phos  6.3     02-03  Mg     2.6     02-03    TPro  5.7<L>  /  Alb  3.1<L>  /  TBili  2.2<H>  /  DBili  x   /  AST  17  /  ALT  11  /  AlkPhos  85  02-03    CARDIAC MARKERS ( 03 Feb 2022 07:53 )  x     / 0.07 ng/mL / x     / x     / x              CARDIAC MARKERS:            TELEMETRY EVENTS: 	    ECG: < from: 12 Lead ECG (02.01.22 @ 06:53) >  Diagnosis Line Undetermined rhythm  with intermittent paced ventricular beats  Left axis deviation  Septal infarct , age undetermined  Inferior infarct , age undetermined  Abnormal ECG    < end of copied text >   	  RADIOLOGY:  OTHER: 	    PREVIOUS DIAGNOSTIC TESTING:    [x ] Echocardiogram: e< from: TTE Echo Complete w/o Contrast w/ Doppler (07.02.21 @ 14:44) >   1. Normal global left ventricular systolic function.   2. LVEjection Fraction by Lemon's Method with a biplane EF of 54 %.   3. Mild left ventricular hypertrophy.   4. Mildly increased LV wall thickness.   5. Normal left ventricular internal cavity size.   6. Moderately enlarged left atrium.   7. Mild mitral annular calcification.   8. Mild thickening and calcification of the anterior and posterior mitral valve leaflets.   9. Mild to moderate mitral valve regurgitation.  10. Mild-moderate tricuspid regurgitation.  11. Mild aortic regurgitation.  12. Sclerotic aortic valve with normal opening.  13. Estimated pulmonary artery systolic pressure is 54.2 mmHg assuming a right atrial pressure of 5 mmHg, which is consistent with moderate pulmonary hypertension.  14. LA volume Index is 51.8 ml/m² ml/m2.  15. There is moderate aortic root calcification.    < end of copied text >    [x ]  Catheterization: < from: Cardiac Catheterization (11.10.21 @ 12:16) >  1ST LESION INTERVENTIONS: A successful stent with balloon angioplasty and    intravascular ultrasound was performed on the 90 % lesion in the proximal    LAD. Following intervention there was an excellent angiographic appearance    with a 0 % residual stenosis.         CORONARY CIRCULATION: There was 1-vessel coronary arterydisease (LAD).    Proximal LAD: There was a tubular 90 % stenosis at a site with no prior    intervention. There was HARVEY grade 3 flow through the vessel (brisk flow).    This is a likely culprit for the patient's clinical presentation. An    intervention was performed.    < end of copied text >    [ ] Stress Test:  	  	    Home Medications:  amiodarone 200 mg oral tablet: 1 tab(s) orally once a day (01 Feb 2022 02:04)  aspirin 81 mg oral delayed release tablet: 1 tab(s) orally once a day (01 Feb 2022 02:04)  Basaglar KwikPen: 20 unit(s) subcutaneous once a day (at bedtime) (03 Nov 2021 22:10)  calcitriol 0.25 mcg oral capsule: 1 cap(s) orally once a day (30 Jun 2021 15:07)  dutasteride 0.5 mg oral capsule: 1 cap(s) orally once a day (30 Jun 2021 15:07)  Farxiga 10 mg oral tablet: 1 tab(s) orally once a day (03 Nov 2021 21:56)  Lopressor 50 mg oral tablet: 1 tab(s) orally 2 times a day (01 Feb 2022 02:04)  Ozempic (1 mg dose) 4 mg/3 mL subcutaneous solution: 1 milligram(s) subcutaneous once a week on Thursday (03 Nov 2021 21:50)  Plavix 75 mg oral tablet: 1 tab(s) orally once a day (01 Feb 2022 02:04)  torsemide 10 mg oral tablet: 1 tab(s) orally once a day (01 Feb 2022 02:04)    MEDICATIONS  (STANDING):  albuterol/ipratropium for Nebulization 3 milliLiter(s) Nebulizer every 6 hours  aMIOdarone Infusion 1 mG/Min (33.3 mL/Hr) IV Continuous <Continuous>  aMIOdarone Infusion 0.501 mG/Min (16.7 mL/Hr) IV Continuous <Continuous>  aspirin enteric coated 81 milliGRAM(s) Oral daily  atorvastatin 20 milliGRAM(s) Oral at bedtime  buMETAnide Injectable 2 milliGRAM(s) IV Push two times a day  calcitriol   Capsule 0.25 MICROGram(s) Oral daily  calcium acetate 1334 milliGRAM(s) Oral three times a day with meals  chlorhexidine 4% Liquid 1 Application(s) Topical <User Schedule>  clopidogrel Tablet 75 milliGRAM(s) Oral daily  dextrose 40% Gel 15 Gram(s) Oral once  dextrose 5%. 1000 milliLiter(s) (50 mL/Hr) IV Continuous <Continuous>  dextrose 5%. 1000 milliLiter(s) (100 mL/Hr) IV Continuous <Continuous>  dextrose 50% Injectable 25 Gram(s) IV Push once  dextrose 50% Injectable 12.5 Gram(s) IV Push once  dextrose 50% Injectable 25 Gram(s) IV Push once  finasteride 5 milliGRAM(s) Oral daily  glucagon  Injectable 1 milliGRAM(s) IntraMuscular once  heparin SubCutaneous Injection - Peds 5000 Unit(s) SubCutaneous every 8 hours  insulin lispro (ADMELOG) corrective regimen sliding scale   SubCutaneous three times a day before meals  melatonin 3 milliGRAM(s) Oral at bedtime  methylPREDNISolone sodium succinate IV Push - Peds 40 milliGRAM(s) IV Push every 12 hours  metoprolol tartrate 50 milliGRAM(s) Oral three times a day  pantoprazole    Tablet 40 milliGRAM(s) Oral before breakfast  sodium chloride 3%. 150 milliLiter(s) (50 mL/Hr) IV Continuous <Continuous>  tamsulosin 0.4 milliGRAM(s) Oral at bedtime    MEDICATIONS  (PRN):

## 2022-02-03 NOTE — PROGRESS NOTE ADULT - ASSESSMENT
CKD stage 4   acute on chronic HFpEF   Afib / CAD / PCI / VT  JOHANNY on CPAP    HTN   NPH s/p  shunt    DM 2    dm neuropathy  hyperphosphatemia    plan:      bumex with hypertonic saline per heart failure team  KCl PO and IV  trend renal function and lytes  renal diet / fluid restriction  cont phoslo and calcitriol  f/u cardio / heart failure        
Acute on chronic CHF/ CKD / SOB    Patient remains fluid overloaded on exam IVC 2.6 cm non-collapsing  Continue Bumex 2 mg IV push BID  Continue 3% Hypertonic Saline 150ml BID(If infused throughout a central line, run over one hour, if a peripheral line is to be used run over 3 hours) start 30 mins before Bumex   Obtain ABG  Get BMP twice daily   Maintain potassium >4.0, Mg >2.1  Strict intake and output  Daily weight   Will continue to follow 
Acute on chronic CHF/ CKD / SOB    Patient remains massively fluid overloaded on exam  Continue Bumex 2 mg IV push BID  Continue 3% Hypertonic Saline 150ml BID(If infused throughout a central line, run over one hour, if a peripheral line is to be used run over 3 hours) start 30 mins before Bumex   Obtain ABG  Get BMP twice daily   Maintain potassium >4.0, Mg >2.1  Strict intake and output  Daily weight   Will continue to follow 
Case of an 86 year old male patient with multiple comorbidities listed above who was brought to the ED on 01/31 for evaluation of worsening SOB on minimal exertion associated with leg swelling, orthopnea, and cough, found to have congested CXR, s/p IV diuretics, to be admitted for further monitoring, investigations, and management. Currently hemodynamically stable.      Acute on Chronic HFpEF Exacerbation  S/P AICD 11/2021  * TTE 07/02/21 EF 54%, mild-mod MR and TR, mild AR, moderate PHTN  * Home Torsemide 10mg QD  * Pro BNP not sent  * PE leg swelling and diffuse crackles  * CXR 01/31 diffuse bilateral interstitial opacities and bilateral blunting of costophrenic angles   * S/P IV Lasix 40mg x1 dose in ED    - Monitor in/out  - Daily weight (standing)  - Monitor SaO2 and O2 requirements: on 4LPM NC  - bumex 2 mg q 12; Hypertonic saline 150 cc q 12  - Salt restricted diet and Fluid restriction to 1.2L per 24 hours  - Monitor telemetry   - Trend electrolytes and keep K>4 and Mg>2  - Check TTE  - Check Iron Studies  - still volume overload   - likely worsening HF secondary to ischemic event   - HF team on board       # CKD   * Baseline Cr 2.8-3.1  * ED BUN 51, Cr 2.5  - Trend CMP and P  - Diuresis as above  - avoid nephrotoxins  - renally dosed medication       # JOHANNY   * Not on CPAP  * Blood Gas Profile - Venous (01.31.22 @ 15:44)    pH, Venous: 7.34    pCO2, Venous: 57 mmHg    pO2, Venous: 23 mmHg    HCO3, Venous: 31 mmol/L    Base Excess, Venous: 3.7 mmol/L    Oxygen Saturation, Venous: 34.4 %  - Monitor SaO2 and O2 requirements  - Consider BiPAP if desaturates at night or becomes altered if suspect CO2 retention      # HTN  * Home meds Lopressor 50mg BID  * ED /67 mmHg   - Monitor BP closely  - Resume Lopressor 50mg BID      # DM II  * A1C with Estimated Average Glucose (11.04.21 @ 17:35)    A1C with Estimated Average Glucose Result: 5.7  * Home meds Basaglar 20 units bedtime, Farxiga, Ozempic  - Monitor POCT premeals and at bedtime  - Sliding Scale B for now      # Chronic Atrial Fibrillation   # History of Sustained Ventricular Tachycardia  # S/P AICD 11/2021  * History of Sustained Ventricular Tachycardia During last admission 11/2021   * Not on Anticoagulation due to falls  * Thyroid Stimulating Hormone, Serum (11.04.21 @ 17:35)    Thyroid Stimulating Hormone, Serum: 0.45 uIU/mL  * Home med AMiodarone 200mg QD, Lopressor 50mg BID, no AC  * ECG noted with RBBB    - Monitor HR  - Off AC  - Resume AMiodarone 200mg QD and Lopressor 50mg BID  - TSH 0.26  - EP for AICD interrogation      # Dyslipidemia   # CAD s/p LAD stent 11/2021 on DAP  # New RBBB on ECG    * Home meds Crestor, Lopressor 50mg BID, Aspirin 81mg QD, and Plavix 75mg QD  * Troponin T, Serum in AM (11.05.21 @ 05:51)    Troponin T, Serum: 0.08: Critical value: ng/m    - Check Troponin and CE  - Resume Crestor, Aspirin 81mg QD, and Plavix 75mg QD  - NSVT on monitor; patient will need ischemic work up once euvolemic     # BPH     Urethral Stricture diagnosed during last admission 11/2021 s/p dilation and s/p discharge on barragan  * Discharged on barragan in 11/2021 to follow with urologist  * Home meds Dutasteride 0.5 and Flomax 0.4mg QD  - Monitor in/out  - Resume Dutasteride 0.5 and Flomax 0.4mg QD  - condom Barragan to monitor UO  - urology on board      # History of  Shunt    - Outpatient follow up as indicated        - DVT Prophylaxis: heparin   - GI Prophylaxis: Pantoprazole 40mg PO QD  - Diet: DASH/ TLC  - Code Status: Full  
CKD stage 4   ARF  acute on chronic HFpEF   Afib / CAD / PCI / VT  JOHANNY    HTN   NPH s/p  shunt    DM 2        plan:    spoke with daughter at bedside earlier today as pt was deteriorating   She reiterated DNR / DNI / no HD  pt subsequently  following my visit

## 2022-02-03 NOTE — CONSULT NOTE ADULT - ASSESSMENT
CAD s/p LAD PCI in 11/21  VT at that time, very difficult to control, now with ICD  acute on chronic HFPEF exacerbation  Acute Hypercapneic respiratory failure    - HF and EPS recs appreciated   - continue HF  management  - would repeat echocardiogram   - bipap at night and PRN to improve respiratory acidosis  - continue DAPT  - would not continue amiodarone Gtt, NSVT is a vast improvement from prior episodes, resume amiodarone 200 mg po q12h  - patient has occasional dizziness at baseline, would increase BB but monitor vitals and orthostatics  - please call primary cardiologist when patients of theirs enter the hospital. heard about patient from EP service.

## 2022-02-03 NOTE — SWALLOW BEDSIDE ASSESSMENT ADULT - DIET PRIOR TO ADMI
Per daughter, pt has been eating regular foods & liquids at home without strategies
Per daughter, pt has been eating regular foods & liquids at home without strategies

## 2022-02-04 LAB
PROCALCITONIN SERPL-MCNC: 0.61 NG/ML — HIGH (ref 0.02–0.1)
T3FREE SERPL-MCNC: 1.08 PG/ML — LOW (ref 1.8–4.6)

## 2022-02-05 LAB
CULTURE RESULTS: SIGNIFICANT CHANGE UP
CULTURE RESULTS: SIGNIFICANT CHANGE UP
SPECIMEN SOURCE: SIGNIFICANT CHANGE UP
SPECIMEN SOURCE: SIGNIFICANT CHANGE UP

## 2022-02-16 DIAGNOSIS — E83.39 OTHER DISORDERS OF PHOSPHORUS METABOLISM: ICD-10-CM

## 2022-02-16 DIAGNOSIS — I08.3 COMBINED RHEUMATIC DISORDERS OF MITRAL, AORTIC AND TRICUSPID VALVES: ICD-10-CM

## 2022-02-16 DIAGNOSIS — Z98.2 PRESENCE OF CEREBROSPINAL FLUID DRAINAGE DEVICE: ICD-10-CM

## 2022-02-16 DIAGNOSIS — Z95.810 PRESENCE OF AUTOMATIC (IMPLANTABLE) CARDIAC DEFIBRILLATOR: ICD-10-CM

## 2022-02-16 DIAGNOSIS — Z95.5 PRESENCE OF CORONARY ANGIOPLASTY IMPLANT AND GRAFT: ICD-10-CM

## 2022-02-16 DIAGNOSIS — I48.20 CHRONIC ATRIAL FIBRILLATION, UNSPECIFIED: ICD-10-CM

## 2022-02-16 DIAGNOSIS — E87.2 ACIDOSIS: ICD-10-CM

## 2022-02-16 DIAGNOSIS — Z79.84 LONG TERM (CURRENT) USE OF ORAL HYPOGLYCEMIC DRUGS: ICD-10-CM

## 2022-02-16 DIAGNOSIS — G91.2 (IDIOPATHIC) NORMAL PRESSURE HYDROCEPHALUS: ICD-10-CM

## 2022-02-16 DIAGNOSIS — Z79.4 LONG TERM (CURRENT) USE OF INSULIN: ICD-10-CM

## 2022-02-16 DIAGNOSIS — E11.40 TYPE 2 DIABETES MELLITUS WITH DIABETIC NEUROPATHY, UNSPECIFIED: ICD-10-CM

## 2022-02-16 DIAGNOSIS — N40.0 BENIGN PROSTATIC HYPERPLASIA WITHOUT LOWER URINARY TRACT SYMPTOMS: ICD-10-CM

## 2022-02-16 DIAGNOSIS — I13.0 HYPERTENSIVE HEART AND CHRONIC KIDNEY DISEASE WITH HEART FAILURE AND STAGE 1 THROUGH STAGE 4 CHRONIC KIDNEY DISEASE, OR UNSPECIFIED CHRONIC KIDNEY DISEASE: ICD-10-CM

## 2022-02-16 DIAGNOSIS — N18.4 CHRONIC KIDNEY DISEASE, STAGE 4 (SEVERE): ICD-10-CM

## 2022-02-16 DIAGNOSIS — E11.22 TYPE 2 DIABETES MELLITUS WITH DIABETIC CHRONIC KIDNEY DISEASE: ICD-10-CM

## 2022-02-16 DIAGNOSIS — E78.5 HYPERLIPIDEMIA, UNSPECIFIED: ICD-10-CM

## 2022-02-16 DIAGNOSIS — Z87.891 PERSONAL HISTORY OF NICOTINE DEPENDENCE: ICD-10-CM

## 2022-02-16 DIAGNOSIS — Z66 DO NOT RESUSCITATE: ICD-10-CM

## 2022-02-16 DIAGNOSIS — R06.02 SHORTNESS OF BREATH: ICD-10-CM

## 2022-02-16 DIAGNOSIS — J44.1 CHRONIC OBSTRUCTIVE PULMONARY DISEASE WITH (ACUTE) EXACERBATION: ICD-10-CM

## 2022-02-16 DIAGNOSIS — I50.33 ACUTE ON CHRONIC DIASTOLIC (CONGESTIVE) HEART FAILURE: ICD-10-CM

## 2022-02-16 DIAGNOSIS — I27.20 PULMONARY HYPERTENSION, UNSPECIFIED: ICD-10-CM

## 2022-02-16 DIAGNOSIS — G47.33 OBSTRUCTIVE SLEEP APNEA (ADULT) (PEDIATRIC): ICD-10-CM

## 2022-02-16 DIAGNOSIS — N35.919 UNSPECIFIED URETHRAL STRICTURE, MALE, UNSPECIFIED SITE: ICD-10-CM

## 2022-02-16 DIAGNOSIS — I25.10 ATHEROSCLEROTIC HEART DISEASE OF NATIVE CORONARY ARTERY WITHOUT ANGINA PECTORIS: ICD-10-CM

## 2022-02-16 DIAGNOSIS — I25.2 OLD MYOCARDIAL INFARCTION: ICD-10-CM

## 2022-02-16 DIAGNOSIS — J96.01 ACUTE RESPIRATORY FAILURE WITH HYPOXIA: ICD-10-CM

## 2022-02-16 DIAGNOSIS — I47.2 VENTRICULAR TACHYCARDIA: ICD-10-CM

## 2023-10-29 NOTE — PATIENT PROFILE ADULT - CAREGIVER RELATION TO PATIENT
(M6) obeys commands
spouse
Complex Repair And Flap Additional Text (Will Appearing After The Standard Complex Repair Text): The complex repair was not sufficient to completely close the primary defect. The remaining additional defect was repaired with the flap mentioned below.

## 2023-11-25 NOTE — CONSULT NOTE ADULT - TIME-BASED BILLING (NON-CRITICAL CARE)
Jackie Bates(Resident)
Time-based billing (NON-critical care)
Time-based billing (NON-critical care)

## 2024-05-20 NOTE — PATIENT PROFILE ADULT - NSPROREFERSVCHOMEDIABETES_GEN_A_NUR
Medicare Annual Wellness Visit    Clif Julian is here for Medicare AWV and Sore throat    Assessment & Plan   Medicare annual wellness visit, subsequent  General wellness exam. Reviewed chart for past hx and updated today. Counseled on age appropriate health guidance and discussed screening recommendations. Vaccinations reviewed and discussed. All questions answered  -     CBC with Auto Differential; Future  -     Comprehensive Metabolic Panel, Fasting; Future  -     TSH with Reflex; Future  Class 2 severe obesity due to excess calories with serious comorbidity and body mass index (BMI) of 36.0 to 36.9 in adult (HCC)  Patient was asked about current diet and exercise habits, and personalized advice was provided regarding recommended lifestyle changes.  Patient's comorbid health conditions associated with elevated BMI were discussed, as well as the likely benefits weight loss.  Based upon patient's motivation to change behavior, the following plan was agreed upon to work toward a weight loss goal - increasing CV activity, reducing carbs/calories and healthy eating habits. Educational materials for weight loss were provided.  Patient will follow-up with myself at designated time in future.  Screening for prostate cancer  Discussed prostate cancer screening with male of appropriate age and risk factors.  I have provided evidence behind PSA and answered all questions regarding the sensitivity of this test.  At this time, through shared decision making, patient would like to move forward with collection of annual PSA.  -     PSA Screening; Future  Screening for AAA (abdominal aortic aneurysm)  Patient with risk factors for development of AAA given his smoking history.  No previous screening test completed.  Discussed reason for screening today with patient and he is agreeable.  Order provided.  Will call patient with update after results and determine if any follow-up plan is needed.  -     US SCREENING FOR AAA;  Pt perseverating on R visual deficits. MD and RN made aware./impaired no

## 2025-07-27 NOTE — SWALLOW BEDSIDE ASSESSMENT ADULT - SWALLOW EVAL: DIAGNOSIS
Pt p/w suspected pharyngeal dysphagia for thin liquids (+) coughing post swallow, bulging eyes. Pt consumed and tolerated nectar via tsp and cup sip and all consistencies with no overt s/s of aspiration/penetration. Rec dysphagia 3 cut up and nectar thick liquids. Pt may benefit from instrumental assessment during the week to r/o aspiration and determine least restrictive diet. ST to f/u.
pt currently now requiring continuous bipap therefore po trials nor instrumental assessment appropriate at this time
toleration observed for soft and honey thick liquids via consecutive swallows
Discharged